# Patient Record
Sex: FEMALE | Race: WHITE | Employment: UNEMPLOYED | URBAN - METROPOLITAN AREA
[De-identification: names, ages, dates, MRNs, and addresses within clinical notes are randomized per-mention and may not be internally consistent; named-entity substitution may affect disease eponyms.]

---

## 2017-02-15 ENCOUNTER — OFFICE VISIT (OUTPATIENT)
Dept: INTERNAL MEDICINE CLINIC | Facility: CLINIC | Age: 22
End: 2017-02-15

## 2017-02-15 VITALS
SYSTOLIC BLOOD PRESSURE: 105 MMHG | DIASTOLIC BLOOD PRESSURE: 63 MMHG | BODY MASS INDEX: 22.79 KG/M2 | HEIGHT: 62.5 IN | WEIGHT: 127 LBS | HEART RATE: 61 BPM

## 2017-02-15 DIAGNOSIS — H65.02 ACUTE SEROUS OTITIS MEDIA OF LEFT EAR, RECURRENCE NOT SPECIFIED: Primary | ICD-10-CM

## 2017-02-15 PROCEDURE — 99212 OFFICE O/P EST SF 10 MIN: CPT | Performed by: INTERNAL MEDICINE

## 2017-02-15 PROCEDURE — 99202 OFFICE O/P NEW SF 15 MIN: CPT | Performed by: INTERNAL MEDICINE

## 2017-02-15 RX ORDER — DOXYCYCLINE HYCLATE 50 MG/1
50 CAPSULE ORAL 2 TIMES DAILY
Qty: 14 CAPSULE | Refills: 0 | Status: SHIPPED | OUTPATIENT
Start: 2017-02-15 | End: 2017-02-22

## 2017-02-15 NOTE — PROGRESS NOTES
Nato Olivas is a 23year old female. Patient presents with:  Ear Problem: Pt stts that she can not hear out of the left ear for about a week    HPI:   [facility] presents this afternoon, accompanied by her mother, for evaluation.     About 1 week ago, she specified  Doxycycline 50 mg twice daily ×7 days. Prescription sent to pharmacy. (Of note, penicillin allergic and Zithromax interacts with methadone). Call if no better. Would then refer to ENT.     The patient indicates understanding of these issues a

## 2017-03-15 ENCOUNTER — HOSPITAL ENCOUNTER (INPATIENT)
Facility: HOSPITAL | Age: 22
LOS: 40 days | Discharge: HOME | DRG: 871 | End: 2017-04-24
Attending: EMERGENCY MEDICINE | Admitting: HOSPITALIST
Payer: MEDICAID

## 2017-03-15 ENCOUNTER — APPOINTMENT (OUTPATIENT)
Dept: CV DIAGNOSTICS | Facility: HOSPITAL | Age: 22
DRG: 871 | End: 2017-03-15
Attending: HOSPITALIST
Payer: MEDICAID

## 2017-03-15 ENCOUNTER — APPOINTMENT (OUTPATIENT)
Dept: CT IMAGING | Facility: HOSPITAL | Age: 22
DRG: 871 | End: 2017-03-15
Attending: EMERGENCY MEDICINE
Payer: MEDICAID

## 2017-03-15 ENCOUNTER — APPOINTMENT (OUTPATIENT)
Dept: GENERAL RADIOLOGY | Facility: HOSPITAL | Age: 22
DRG: 871 | End: 2017-03-15
Attending: EMERGENCY MEDICINE
Payer: MEDICAID

## 2017-03-15 DIAGNOSIS — K64.8 INTERNAL HEMORRHOIDS: ICD-10-CM

## 2017-03-15 DIAGNOSIS — R07.89 CHEST PAIN, ATYPICAL: Primary | ICD-10-CM

## 2017-03-15 DIAGNOSIS — L03.114 LEFT ARM CELLULITIS: ICD-10-CM

## 2017-03-15 DIAGNOSIS — R77.8 ELEVATED TROPONIN: ICD-10-CM

## 2017-03-15 PROBLEM — R79.89 ELEVATED TROPONIN: Status: ACTIVE | Noted: 2017-03-15

## 2017-03-15 PROCEDURE — 99223 1ST HOSP IP/OBS HIGH 75: CPT | Performed by: HOSPITALIST

## 2017-03-15 PROCEDURE — 99254 IP/OBS CNSLTJ NEW/EST MOD 60: CPT | Performed by: INTERNAL MEDICINE

## 2017-03-15 PROCEDURE — 93306 TTE W/DOPPLER COMPLETE: CPT | Performed by: INTERNAL MEDICINE

## 2017-03-15 PROCEDURE — 71020 XR CHEST PA + LAT CHEST (CPT=71020): CPT

## 2017-03-15 PROCEDURE — 71260 CT THORAX DX C+: CPT

## 2017-03-15 PROCEDURE — 93306 TTE W/DOPPLER COMPLETE: CPT

## 2017-03-15 RX ORDER — ASPIRIN 81 MG/1
324 TABLET, CHEWABLE ORAL ONCE
Status: COMPLETED | OUTPATIENT
Start: 2017-03-15 | End: 2017-03-15

## 2017-03-15 RX ORDER — TRAMADOL HYDROCHLORIDE 50 MG/1
100 TABLET ORAL EVERY 6 HOURS PRN
Status: DISCONTINUED | OUTPATIENT
Start: 2017-03-15 | End: 2017-03-24

## 2017-03-15 RX ORDER — ACETAMINOPHEN 500 MG
1000 TABLET ORAL ONCE
Status: DISCONTINUED | OUTPATIENT
Start: 2017-03-15 | End: 2017-03-15

## 2017-03-15 RX ORDER — METHADONE HYDROCHLORIDE 10 MG/1
10 TABLET ORAL ONCE AS NEEDED
Status: COMPLETED | OUTPATIENT
Start: 2017-03-15 | End: 2017-03-15

## 2017-03-15 RX ORDER — SODIUM CHLORIDE 9 MG/ML
INJECTION, SOLUTION INTRAVENOUS CONTINUOUS
Status: DISCONTINUED | OUTPATIENT
Start: 2017-03-15 | End: 2017-03-17

## 2017-03-15 RX ORDER — METHADONE HYDROCHLORIDE 10 MG/1
30 TABLET ORAL ONCE
Status: COMPLETED | OUTPATIENT
Start: 2017-03-15 | End: 2017-03-15

## 2017-03-15 RX ORDER — ONDANSETRON 2 MG/ML
4 INJECTION INTRAMUSCULAR; INTRAVENOUS EVERY 6 HOURS PRN
Status: DISCONTINUED | OUTPATIENT
Start: 2017-03-15 | End: 2017-04-24

## 2017-03-15 RX ORDER — KETOROLAC TROMETHAMINE 30 MG/ML
30 INJECTION, SOLUTION INTRAMUSCULAR; INTRAVENOUS ONCE
Status: COMPLETED | OUTPATIENT
Start: 2017-03-15 | End: 2017-03-15

## 2017-03-15 RX ORDER — METHADONE HYDROCHLORIDE 10 MG/1
75 TABLET ORAL DAILY
Status: DISCONTINUED | OUTPATIENT
Start: 2017-03-15 | End: 2017-03-15

## 2017-03-15 RX ORDER — ACETAMINOPHEN 500 MG
1000 TABLET ORAL ONCE
Status: COMPLETED | OUTPATIENT
Start: 2017-03-15 | End: 2017-03-15

## 2017-03-15 RX ORDER — HEPARIN SODIUM 5000 [USP'U]/ML
5000 INJECTION, SOLUTION INTRAVENOUS; SUBCUTANEOUS EVERY 8 HOURS
Status: DISCONTINUED | OUTPATIENT
Start: 2017-03-15 | End: 2017-03-17

## 2017-03-15 RX ORDER — KETOROLAC TROMETHAMINE 30 MG/ML
30 INJECTION, SOLUTION INTRAMUSCULAR; INTRAVENOUS EVERY 6 HOURS PRN
Status: DISPENSED | OUTPATIENT
Start: 2017-03-15 | End: 2017-03-17

## 2017-03-15 RX ORDER — ACETAMINOPHEN 325 MG/1
650 TABLET ORAL EVERY 6 HOURS PRN
Status: DISCONTINUED | OUTPATIENT
Start: 2017-03-15 | End: 2017-04-24

## 2017-03-15 NOTE — ED PROVIDER NOTES
Patient Seen in: [facility] AND [facility] Emergency Department    History   Patient presents with:  Chest Pain Angina (cardiovascular)    Stated Complaint: chest pain radiating down left arm     HPI    24 yo F with PMH IV heroin abuse (approximately $100 daily) p arm  Other systems are as noted in HPI. Constitutional and vital signs reviewed. All other systems reviewed and negative except as noted above. PSFH elements reviewed from today and agreed except as otherwise stated in HPI.     Physical Exam     ED ---------                               -----------         ------                     CBC W/ DIFFERENTIAL[[id number]]          Abnormal            Final result                 Please view results for these tests on the individual orders.    L Pulse Readings from Last 1 Encounters:  03/15/17 : 106  , sinus, tachycardic     Evaluation for atypical chest pain in low risk [place] patient though with mild tachycardia noted - dimer sent and elevated for which CT obtained.  Fever/leukocytosis noted in mi

## 2017-03-15 NOTE — ED INITIAL ASSESSMENT (HPI)
Pt c/o chest pain and left arm pain that started at 2100. Pt sts the last time she used heroin was 2100.  Pt sts that she uses 3-4 times per day

## 2017-03-15 NOTE — PLAN OF CARE
PAIN - ADULT    • Verbalizes/displays adequate comfort level or patient's stated pain goal Not Progressing        RESPIRATORY - ADULT    • Achieves optimal ventilation and oxygenation Not Progressing          Patient complains of 10/10 pain on 1-10 pain sc

## 2017-03-15 NOTE — PROGRESS NOTES
[facility] dosing service    Initial Pharmacokinetic Consult for Vancomycin Dosing     Ladi Covington is a 23year old female admitted on 3/15 who is being treated for cellulitis and possible MRSA infection.   Pharmacy has been asked to dose Vancomycin b care.    Adair Angelucci, PharmD  3/15/2017  8:39 AM  [address] [facility] Extension: [phone]

## 2017-03-15 NOTE — PROGRESS NOTES
Lauro Brambila visits [facility] for methadone treatment, [phone]    Pharmacy called to verify methadone treatment doses.  Per the clinic, the patient is required to visit the clinic daily to receive her methadone dose and she has not been in

## 2017-03-15 NOTE — H&P
[facility] Patient Status:  Emergency    1/3/1995 MRN [id number]   Location [address] Attending Cookie Velasquez MD   Hosp Day # 0 PCP Unknown Pcp     Date:  3/15/2017 Facility-Administered Medications: None       Review of Systems:  Constitutional:  Weakness, Fatigue. Fever  Eye:  Negative. Ear/Nose/Mouth/Throat:  Negative. Respiratory:  Negative  Cardiovascular: Left-sided chest pain  Gastrointestinal:  Negative. DDIMER 0.72 03/15/2017   TROP 0.05 03/15/2017       Imaging:  No exam resulted this encounter. Assessment and Plan:    Acute febrile illness  We will need to rule out endocarditis as cause for fever, cultures pending at this time will get ESR and CRP.

## 2017-03-15 NOTE — PROGRESS NOTES
[address] [place], [place]. [address]  ?  03/15/2017  ? Re: Carrie Lisa  ?   To Whom It May Concern:    Carrie Lisa was admitted to [facility] from 3/15/2017 to present    Please excuse Carrie Lisa from attending

## 2017-03-15 NOTE — ED NOTES
Pt c/o chest pain with pain score of 10/10 that started last night, denies N/V, mother stated that pt is a little warm around 1 am

## 2017-03-15 NOTE — CONSULTS
[facility]    Report of Consultation    Kelly Connelly Patient Status:  Inpatient    1/3/1995 MRN [id number]   Location [facility] 3W/SW Attending Claudette Harsh, MD   [facility] Day # 0 PCP Unknown Pcp     Date of Admission:  3/15/20 PRN   Cefepime HCl (MAXIPIME) 1 g in sodium chloride 0.9% 100 mL IVPB-MBP 1 g Intravenous Q8H   0.9%  NaCl infusion  Intravenous Continuous   Vancomycin HCl (VANCOCIN) 750 mg in sodium chloride 0.9 % 250 mL IVPB 15 mg/kg Intravenous Q12H   Methadone HCl (D 8:46 AM. 2. Multiple bilateral poorly defined perivascular nodules suspicious for septic emboli. Differential diagnosis includes fungal infection and less likely metastatic disease. 3. Mild enlargement of main pulmonary artery.  4. Developmental Scheuermann

## 2017-03-15 NOTE — ED PROVIDER NOTES
CT CHEST W/IV CON    r/o PE    IMPRESSION:    -- no PE  -- thoracic aorta normal in caliber without dissection  -- no effusion or pneumothorax  -- Multiple small, somewhat ill-defined pulmonary nodules measuring up to 1 cm in diameter with mild surrounding

## 2017-03-15 NOTE — CONSULTS
[facility]    Report of Consultation    Fabiana General Patient Status:  Inpatient    1/3/1995 MRN [id number]   Location [facility] 3W/SW Attending Nayana Pelletier MD   [address] Day # 0 PCP Unknown Pcp     Date of Admission:  3/15/20 Medications:    Current Facility-Administered Medications:  ondansetron HCl (ZOFRAN) injection 4 mg 4 mg Intravenous Q6H PRN   Heparin Sodium (Porcine) 5000 UNIT/ML injection 5,000 Units 5,000 Units Subcutaneous Q8H   acetaminophen (TYLENOL) tab 650 mg 650 294 03/15/2017   CREATSERUM 0.81 03/15/2017   BUN 5* 03/15/2017    03/15/2017   K 3.6 03/15/2017    03/15/2017   CO2 28 03/15/2017   * 03/15/2017   CA 9.3 03/15/2017   ALB 3.6 01/29/2015   TP 6.7 01/29/2015   AST 15 01/29/2015   ALT 11* adenopathy. PULMONARY ARTERIES: Mild enlargement of main pulmonary artery measuring 3 cm. A incomplete filling defect in the anteromedial basal segment left lower lobe pulmonary artery on image 76 and 77 series 3. No evidence for pulmonary embolus.   AORTA with evidence of bilateral nodules on CT chest suspicious for septic emboli. No obvious pulmonary embolism seen   Although incomplete filling defect seen in left lower lobe which may be secondary to motion artifact.   Given current clinical picture recomme

## 2017-03-16 ENCOUNTER — APPOINTMENT (OUTPATIENT)
Dept: INTERVENTIONAL RADIOLOGY/VASCULAR | Facility: HOSPITAL | Age: 22
DRG: 871 | End: 2017-03-16
Attending: NURSE PRACTITIONER
Payer: MEDICAID

## 2017-03-16 ENCOUNTER — APPOINTMENT (OUTPATIENT)
Dept: CV DIAGNOSTICS | Facility: HOSPITAL | Age: 22
DRG: 871 | End: 2017-03-16
Attending: NURSE PRACTITIONER
Payer: MEDICAID

## 2017-03-16 PROCEDURE — 99232 SBSQ HOSP IP/OBS MODERATE 35: CPT | Performed by: INTERNAL MEDICINE

## 2017-03-16 PROCEDURE — 99233 SBSQ HOSP IP/OBS HIGH 50: CPT | Performed by: HOSPITALIST

## 2017-03-16 PROCEDURE — 93325 DOPPLER ECHO COLOR FLOW MAPG: CPT

## 2017-03-16 PROCEDURE — 93320 DOPPLER ECHO COMPLETE: CPT

## 2017-03-16 PROCEDURE — B24BZZ4 ULTRASONOGRAPHY OF HEART WITH AORTA, TRANSESOPHAGEAL: ICD-10-PCS | Performed by: INTERNAL MEDICINE

## 2017-03-16 RX ORDER — QUETIAPINE 25 MG/1
50 TABLET, FILM COATED ORAL NIGHTLY
Status: DISCONTINUED | OUTPATIENT
Start: 2017-03-16 | End: 2017-03-17

## 2017-03-16 RX ORDER — QUETIAPINE 25 MG/1
25 TABLET, FILM COATED ORAL DAILY
Status: DISCONTINUED | OUTPATIENT
Start: 2017-03-16 | End: 2017-03-17

## 2017-03-16 RX ORDER — MIDAZOLAM HYDROCHLORIDE 1 MG/ML
INJECTION INTRAMUSCULAR; INTRAVENOUS
Status: DISPENSED
Start: 2017-03-16 | End: 2017-03-17

## 2017-03-16 RX ORDER — METHADONE HYDROCHLORIDE 10 MG/5ML
50 SOLUTION ORAL DAILY
Status: DISCONTINUED | OUTPATIENT
Start: 2017-03-16 | End: 2017-03-24

## 2017-03-16 RX ORDER — SODIUM CHLORIDE 9 MG/ML
INJECTION, SOLUTION INTRAVENOUS
Status: COMPLETED
Start: 2017-03-16 | End: 2017-03-16

## 2017-03-16 RX ORDER — 0.9 % SODIUM CHLORIDE 0.9 %
VIAL (ML) INJECTION
Status: COMPLETED
Start: 2017-03-16 | End: 2017-03-16

## 2017-03-16 RX ORDER — HYDROXYZINE HYDROCHLORIDE 25 MG/1
25 TABLET, FILM COATED ORAL 3 TIMES DAILY PRN
Status: DISCONTINUED | OUTPATIENT
Start: 2017-03-16 | End: 2017-03-20

## 2017-03-16 RX ORDER — NICOTINE 21 MG/24HR
1 PATCH, TRANSDERMAL 24 HOURS TRANSDERMAL DAILY
Status: DISCONTINUED | OUTPATIENT
Start: 2017-03-16 | End: 2017-04-24

## 2017-03-16 NOTE — PAYOR COMM NOTE
CLINICALS  VRX#[id number]    [id number]  (20 year old F)       EM IVS-EM IVS-EM Buddy Ross MD Physician Signed  ED Provider Notes 3/15/2017  4:14 AM      Expand All Collapse All    Patient Seen in: [facility] AND [facility] Negative for joint swelling and arthralgias. (+) LUE pain. Skin: Negative for rash.  No itching    Neurological: Negative for syncope and headaches. Psychiatric/Behavioral: Negative for agitation.  The patient is not hyperactive.        Positive for stat 16.0 (*)        Neutrophil Absolute  13.3 (*)        Monocyte Absolute  1.1 (*)        All other components within normal limits    LACTIC ACID, PLASMA - Normal    CBC WITH DIFFERENTIAL WITH PLATELET      Narrative:       The following orders were created have received this report in error, please notify the sender immediately at [phone] and permanently delete the original report and destroy any copies or printouts.      MDM      DIFFERENTIAL DIAGNOSIS: After history and physical exam differential diag Illness:  Fabiana Parker is a(n) 23year old female, with a past medical history significant for IVDA with drug of choice being heroin presents with complaint of left-sided chest pain radiating towards the left arm, described as a 9 out of 10 in severity Negative. Immunologic:  Negative. Musculoskeletal:  Negative. Integumentary:  Negative. Neurologic:  Negative. Psychiatric:  Negative.   ROS reviewed as documented in chart    Physical Exam:  Temp:  [100.3 °F (37.9 °C)-101.9 °F (38.8 °C)] 101.9 °F (38. CRP.  Patient started on vancomycin will add cefepime.  Will order 2D echo in a.m.     Probable pulmonary septic emboli  Likely source for fever, as stated previously patient started on IV antibiotics blood cultures pending at this time, will check pro calc back pain)      •  Acid reflux      •  Drug treatment not indicated          Past Surgical History        Past Surgical History      TONSILLECTOMY    2009      UPPER GI ENDOSCOPY,EXAM    2013      COLONOSCOPY    2013        Family History  Family History  rash  No labored breathing  Abdomen soft  No c/c/e  LUE at antecubital area is erythema, induration, no fluctuance      Results:      Laboratory Data:    Lab Results  Component  Value  Date    [facility] SHEA 16.0*  03/15/2017    HGB  14.8  03/15/2017    HCT  45.5  0 IVDA    Recommendations:    - continue vancomycin, cefepime  - BC  - Echocardiogram  - likely 4-6 wks iv abx  - follow temp, wbc to doc decr  - monitor for improvement  - d/w patient  - will follow    Thank you for allowing me to participate in the care of  >60    GFRNAA   >60   >60    CA   9.3   8.4*    ALB    --    2.5*    NA   137   138    K   3.6   4.0    CL   102   110    CO2   28   24    ALKPHO    --    82    AST    --    14*    ALT    --    11*    BILT    --    0.5    TP    --    5.7*        No result WBC trending up. Need to eval JANIYA to vancomycin once sens returns.    - 2 D ECHO negative for vegetations  - Consult Cards for KAVITA today to eval for endocarditis    - Will need 6 weeks of IV abx.    - ID following.      TIGREE cellulitis    - cont vancomycin.

## 2017-03-16 NOTE — PROGRESS NOTES
INFECTIOUS DISEASE PROGRESS NOTE    Ladi Covington Patient Status:  Inpatient    1/3/1995 MRN [id number]   Location [facility] 3W/SW Attending Rosalia Parisi MD   Hosp Day # 1 PCP Unknown Pcp     Subjective:  Patient seen and examined, notes, aureus sepsis, bacteremia  # LUE cellulitis due to IVDA, ?early abscess  # Septic emboli to lung, suspect TVE.   TTE negative  # Fever  # Leukocytosis  # Active IVDA    Recommendations:    - continue vancomycin, d/c cefepime  - await staph aureus sens  - re

## 2017-03-16 NOTE — PROGRESS NOTES
[place] HOSPITALIST  Progress Note     [place] Shallow Patient Status:  Inpatient    1/3/1995 MRN [id number]   Location [facility] Attending Justin Cavanaugh MD   Hosp Day # 1 PCP Unknown Pcp     Chief Complaint: Chest Pain and there are no major discrepancies. Ct Chest Pain/pe (iv Only) Em    3/15/2017  CONCLUSION:  1. No large or central pulmonary embolus.  A incomplete filling defect in anteromedial basal left lower lobe pulmonary artery branch could be a motion rel methadone 50mg daily   - Lidoderm daily. Anxiety   - start atarax PRN and seroquel to help for insomnia  - Pt asked for Benzo's explained to avoid using Benzo's and opioids.   - Dr. Eleazar Ahumada to see. Quality:  · DVT Prophylaxis: Lovenox.    · CO

## 2017-03-16 NOTE — PLAN OF CARE
COPING    • Pt/Family able to verbalize concerns and demonstrate effective coping strategies Not Progressing        PAIN - ADULT    • Verbalizes/displays adequate comfort level or patient's stated pain goal Not Progressing            Patient continues to h

## 2017-03-16 NOTE — PLAN OF CARE
Problem: COPING  Goal: Pt/Family able to verbalize concerns and demonstrate effective coping strategies  INTERVENTIONS:  - Assist patient/family to identify coping skills, available support systems and cultural and spiritual values  - Provide emotional sup Progressing    Problem: RESPIRATORY - ADULT  Goal: Achieves optimal ventilation and oxygenation  INTERVENTIONS:  - Assess for changes in respiratory status  - Assess for changes in mentation and behavior  - Position to facilitate oxygenation and minimize r strengthening/mobility  - Encourage toileting schedule   Outcome: Progressing

## 2017-03-16 NOTE — PROGRESS NOTES
[facility]     Progress Note        Valerio Knapp Patient Status:  Inpatient    1/3/1995 MRN [id number]   Location [facility] 3W/SW Attending Sammy Washington MD   Hosp Day # 1 PCP Unknown Pcp       Subjective:   Patient seen an dry      Results:     Lab Results  Component Value Date   WBC 21.5 03/16/2017   HGB 11.8 03/16/2017   HCT 36.6 03/16/2017    03/16/2017   CREATSERUM 0.60 03/16/2017   BUN 3 03/16/2017    03/16/2017   K 4.0 03/16/2017    03/16/2017   CO2 prominent residual thymic tissue. No mass or adenopathy. PULMONARY ARTERIES: Mild enlargement of main pulmonary artery measuring 3 cm.  A incomplete filling defect in the anteromedial basal segment left lower lobe pulmonary artery on image 76 and 77 series unspecified provider.       Ekg 12-lead    3/15/2017  ECG Report  Interpretation  -------------------------- Sinus Tachycardia -Short OH BORDERLINE RHYTHM When compared with ECG of 05/12/2008 16:00:00 No significant changes have occurred Electronically sign

## 2017-03-16 NOTE — PROGRESS NOTES
Discussion of Trans-esophogeal echocardiogram with patient and her family members. All questions answered.

## 2017-03-16 NOTE — PROGRESS NOTES
[facility]  Report of Consultation    Namita Byers Patient Status:  Inpatient    1/3/1995 MRN [id number]   Location [facility] 3W/SW Attending Senia Craig MD   Hosp Day # 1 PCP Unknown Pcp     Date of Admission:  3/15/2017 mg, Oral, Q6H PRN  •  ketorolac tromethamine (TORADOL) 30 MG/ML injection 30 mg, 30 mg, Intravenous, Q6H PRN  •  Cefepime HCl (MAXIPIME) 1 g in sodium chloride 0.9% 100 mL IVPB-MBP, 1 g, Intravenous, Q8H  •  0.9%  NaCl infusion, , Intravenous, Continuous history of IVDA and use of methadone. Recommend continuing present management with Toradol and Tramadol PRN. Would not recommend narcotics at this time.        Malu Carlos  3/16/2017  11:02 AM

## 2017-03-16 NOTE — PROCEDURES
Pre-op: Bacteremia  Post-op: Same  Procedure: KAVITA  Findings: Suspicious for vegetation on pulmonic valve.  Otherwise normal  Complications: none  EBL: 0 cc  Specimen: none   Sedation: Versed and Fentanyl, monitored for 20 minutes  Condition: stable

## 2017-03-17 PROBLEM — F11.20 OPIOID TYPE DEPENDENCE, CONTINUOUS (HCC): Status: ACTIVE | Noted: 2017-03-17

## 2017-03-17 PROBLEM — F31.9 BIPOLAR DISORDER, UNSPECIFIED (HCC): Status: ACTIVE | Noted: 2017-03-17

## 2017-03-17 PROCEDURE — 99233 SBSQ HOSP IP/OBS HIGH 50: CPT | Performed by: HOSPITALIST

## 2017-03-17 PROCEDURE — 99232 SBSQ HOSP IP/OBS MODERATE 35: CPT | Performed by: INTERNAL MEDICINE

## 2017-03-17 PROCEDURE — 90792 PSYCH DIAG EVAL W/MED SRVCS: CPT | Performed by: OTHER

## 2017-03-17 RX ORDER — POTASSIUM CHLORIDE 20 MEQ/1
40 TABLET, EXTENDED RELEASE ORAL ONCE
Status: COMPLETED | OUTPATIENT
Start: 2017-03-17 | End: 2017-03-17

## 2017-03-17 RX ORDER — 0.9 % SODIUM CHLORIDE 0.9 %
VIAL (ML) INJECTION
Status: COMPLETED
Start: 2017-03-17 | End: 2017-03-17

## 2017-03-17 RX ORDER — HEPARIN SODIUM 5000 [USP'U]/ML
5000 INJECTION, SOLUTION INTRAVENOUS; SUBCUTANEOUS EVERY 8 HOURS
Status: DISCONTINUED | OUTPATIENT
Start: 2017-03-17 | End: 2017-04-24

## 2017-03-17 RX ORDER — KETOROLAC TROMETHAMINE 15 MG/ML
15 INJECTION, SOLUTION INTRAMUSCULAR; INTRAVENOUS EVERY 6 HOURS PRN
Status: DISCONTINUED | OUTPATIENT
Start: 2017-03-17 | End: 2017-03-19

## 2017-03-17 RX ORDER — QUETIAPINE 25 MG/1
100 TABLET, FILM COATED ORAL NIGHTLY
Status: DISCONTINUED | OUTPATIENT
Start: 2017-03-17 | End: 2017-03-20

## 2017-03-17 RX ORDER — HALOPERIDOL 5 MG/ML
1 INJECTION INTRAMUSCULAR EVERY 6 HOURS PRN
Status: DISCONTINUED | OUTPATIENT
Start: 2017-03-17 | End: 2017-04-24

## 2017-03-17 RX ORDER — ARIPIPRAZOLE 2 MG/1
2 TABLET ORAL DAILY
Status: DISCONTINUED | OUTPATIENT
Start: 2017-03-17 | End: 2017-03-22

## 2017-03-17 NOTE — PROGRESS NOTES
[facility]     Progress Note        Fabiana General Patient Status:  Inpatient    1/3/1995 MRN [id number]   Location [facility] 3W/SW Attending Nayana Pelletier MD   Hosp Day # 2 PCP Judithe Ormond, MD       Subjective:   Patient s non tender  Extremities: no clubbing, cyanosis, edema  Neurologic: no gross motor or sensory deficits  Skin: warm, dry      Results:       Lab Results  Component Value Date   WBC 12.7 03/17/2017   HGB 10.5 03/17/2017   HCT 31.9 03/17/2017    03/17/2 residual thymic tissue. No mass or adenopathy. PULMONARY ARTERIES: Mild enlargement of main pulmonary artery measuring 3 cm. A incomplete filling defect in the anteromedial basal segment left lower lobe pulmonary artery on image 76 and 77 series 3.  No miky provider. Assessment   1.  Septic emboli  2.  Fevers  3.  IV drug abuse  4.  Leukocytosis  5.   MRSA bacteremia     Plan   -Patient with evidence of bilateral nodules on CT chest suspicious for septic emboli.  No obvious pulmonary embolism seen

## 2017-03-17 NOTE — DIETARY NOTE
ADULT NUTRITION INITIAL ASSESSMENT    Pt is at moderate nutrition risk. Pt does not meet malnutrition criteria.       RECOMMENDATIONS TO MD:  RD to manage oral nutritional supplement(ONS)     NUTRITION DIAGNOSIS/PROBLEM:  Inadequate oral intake related to Fat/Muscle Mass: well nourished per visual exam.  - Fluid Accumulation: none preported   - Skin Integrity: intact.  - Demarcus score 22    NUTRITION PRESCRIPTION:  Diet: Regular/General  Oral Supplements: CIB shake daily  Calories: 1680 calories/day (30 americo

## 2017-03-17 NOTE — PROGRESS NOTES
[facility] HOSPITALIST  Progress Note     Gissel Bakanatoly Patient Status:  Inpatient    1/3/1995 MRN [id number]   Location [facility] Attending Felix Ugalde MD   Hosp Day # 2 PCP Gray Gonzalez MD     Chief Complaint: Billy Wallace Fumarate  100 mg Oral Nightly   • vancomycin  1.25 g Intravenous Q8H   • rifampin  600 mg Oral Daily   • Methadone HCl  50 mg Oral Daily   • nicotine  1 patch Transdermal Daily     HydrOXYzine HCl, ondansetron HCl, acetaminophen, TraMADol HCl    ASSESSMENT

## 2017-03-17 NOTE — CONSULTS
[facility]    Report of Consultation    Nato Olivas Patient Status:  Inpatient    1/3/1995 MRN [id number]   Location [facility] 3W/SW Attending Florina Darden MD   Hosp Day # 2 PCP Brice Duvall MD     Date of Admission: the responsibility. Patient admitted multiple psychosocial stresses but admitted in her own conscience that she has been rationalizing her stresses to use more drugs.     Today the patient reported alternation in the mood, difficulty sleeping, racing tho 100 mg Oral Nightly   Vancomycin HCl (VANCOCIN) 1.25 g in sodium chloride 0.9 % 500 mL IVPB 1.25 g Intravenous Q8H   rifampin (RIFADIN) cap 600 mg 600 mg Oral Daily   Ketorolac Tromethamine (TORADOL) injection 15 mg 15 mg Intravenous Q6H PRN   haloperidol embolus. Findings were called to Dr. Evangelista Gupta at 8:46 AM. 2. Multiple bilateral poorly defined perivascular nodules suspicious for septic emboli. Differential diagnosis includes fungal infection and less likely metastatic disease.  3. Mild enlargement of main I, 0 Hour  Lactic Acid, Plasma  D-Dimer  Lipid Panel  HCG, Beta Subunit, Quant  CBC With Differential With Platelet  Comp Metabolic Panel (14)  Procalcitonin  Drug Abuse Panel 10 screen STAT  Urinalysis with Culture Reflex Once  Vancomycin Trough, Serum  B

## 2017-03-17 NOTE — PLAN OF CARE
Problem: Patient/Family Goals  Goal: Patient/Family Short Term Goal  Patient’s Short Term Goal:    Interventions:  - See additional Care Plan goals for specific interventions   Outcome: Progressing  Comments: IV Antibiotic treatment for infection.    Approp

## 2017-03-17 NOTE — PROGRESS NOTES
[facility] dosing service    Follow-up Pharmacokinetic Consult for Vancomycin Dosing     Vira Harper is a 23year old female admitted on 3/15 who is being treated for endocarditis. Patient is on day 3 of Vancomycin 750 mg IV Q 8 hours.   Goal trough Pharmacy Extension: [phone]

## 2017-03-17 NOTE — PROGRESS NOTES
[place]  Inpatient Pain Management Progress Note      Patient name: Kaia Romero 23year old female  : 1/3/1995  MRN: [id number]    Diagnosis: atypical chest pain    Reason for Consult: atypical chest and left UE pain    Current ho

## 2017-03-17 NOTE — PLAN OF CARE
COPING    • Pt/Family able to verbalize concerns and demonstrate effective coping strategies Not Progressing        DRUG ABUSE/DETOX    • Will have no detox symptoms and will verbalize plan for changing drug-related behavior Not Progressing        PAIN - A

## 2017-03-17 NOTE — PROGRESS NOTES
INFECTIOUS DISEASE PROGRESS NOTE    Kaia Romero Patient Status:  Inpatient    1/3/1995 MRN [id number]   Location [facility] 3W/SW Attending Haider Church MD   Hosp Day # 2 PCP Himanshu Lomax MD     Subjective:  Patient seen and examined, n site cellulitis, leukocytosis and abnormal CT chest c/w probable septic emboli.  Was started on vancomycin, cefepime.  ID consulted.      # Staph aureus (MRSA) sepsis, bacteremia due to PVE  # LUE cellulitis due to IVDA, ?early abscess  # Septic emboli to

## 2017-03-17 NOTE — PROGRESS NOTES
[facility] dosing service    Follow-up Pharmacokinetic Consult for Vancomycin Dosing     Dillon Luu is a 23year old female admitted on 3/15 who is being treated for Bacteremia/Sepsis & possible Endocarditis  Patient is on day 2 of Vancomycin 750 mg trough level 15-20 ug/mL. 3.  Pharmacy will need BUN/Scr daily while on Vancomycin to assess renal function. 4.  Pharmacy will follow and monitor renal function changes, toxicity and efficacy.     Vinayak Saucedo, PharmD  3/16/2017  8:08 PM  Paola GREGORY

## 2017-03-18 PROCEDURE — B5181ZA FLUOROSCOPY OF SUPERIOR VENA CAVA USING LOW OSMOLAR CONTRAST, GUIDANCE: ICD-10-PCS | Performed by: HOSPITALIST

## 2017-03-18 PROCEDURE — 99233 SBSQ HOSP IP/OBS HIGH 50: CPT | Performed by: HOSPITALIST

## 2017-03-18 PROCEDURE — 99232 SBSQ HOSP IP/OBS MODERATE 35: CPT | Performed by: INTERNAL MEDICINE

## 2017-03-18 PROCEDURE — 02HV33Z INSERTION OF INFUSION DEVICE INTO SUPERIOR VENA CAVA, PERCUTANEOUS APPROACH: ICD-10-PCS | Performed by: HOSPITALIST

## 2017-03-18 RX ORDER — ALBUTEROL SULFATE 2.5 MG/3ML
2.5 SOLUTION RESPIRATORY (INHALATION)
Status: DISCONTINUED | OUTPATIENT
Start: 2017-03-18 | End: 2017-03-19

## 2017-03-18 RX ORDER — LIDOCAINE 50 MG/G
1 PATCH TOPICAL EVERY 24 HOURS
Status: DISCONTINUED | OUTPATIENT
Start: 2017-03-18 | End: 2017-04-24

## 2017-03-18 RX ORDER — 0.9 % SODIUM CHLORIDE 0.9 %
VIAL (ML) INJECTION
Status: COMPLETED
Start: 2017-03-18 | End: 2017-03-18

## 2017-03-18 RX ORDER — SODIUM CHLORIDE 0.9 % (FLUSH) 0.9 %
10 SYRINGE (ML) INJECTION AS NEEDED
Status: DISCONTINUED | OUTPATIENT
Start: 2017-03-18 | End: 2017-04-24

## 2017-03-18 RX ORDER — LIDOCAINE HYDROCHLORIDE 10 MG/ML
0.5 INJECTION, SOLUTION INFILTRATION; PERINEURAL ONCE AS NEEDED
Status: ACTIVE | OUTPATIENT
Start: 2017-03-18 | End: 2017-03-18

## 2017-03-18 NOTE — PROGRESS NOTES
Pulmonary/Critical Care Follow Up Note    HPI:   Geovanna Gilmore is a 23year old female with Patient presents with:  Chest Pain Angina (cardiovascular)      PCP Nory Palacio MD  Admission Attending Aubrey Francois 15 Day #3    C/o CP  Not be EXAM:   Blood pressure 115/66, pulse 79, temperature 98.6 °F (37 °C), temperature source Oral, resp. rate 20, height 5' 2\" (1.575 m), weight 125 lb 6.4 oz (56.881 kg), SpO2 96 %, not currently breastfeeding.     Intake/Output Summary (Last 24 hours) at 03/

## 2017-03-18 NOTE — PROGRESS NOTES
[facility] HOSPITALIST  Progress Note     Valerio Knapp Patient Status:  Inpatient    1/3/1995 MRN [id number]   Location [facility] Attending Sammy Washington MD   Hosp Day # 3 PCP Behzad Medrano MD     Chief Complaint: Marylee Knudsen lidocaine  1 patch Transdermal Q24H   • Heparin Sodium (Porcine)  5,000 Units Subcutaneous Q8H   • ARIpiprazole  2 mg Oral Daily   • QUEtiapine Fumarate  100 mg Oral Nightly   • rifampin  600 mg Oral Daily   • Methadone HCl  50 mg Oral Daily   • nicotine

## 2017-03-18 NOTE — PROGRESS NOTES
[facility] dosing service    Follow-up Pharmacokinetic Consult for Vancomycin Dosing     Lei Stern is a 23year old female admitted on 3/15 who is being treated for Endocarditits. Patient is on day 4 of Vancomycin 1.25 gm IV Q 8 hours.   Goal trou changes, toxicity and efficacy.     Brandin Amin PharmD  3/18/2017  4:56 AM  [address] Extension: [phone]

## 2017-03-19 PROCEDURE — 99232 SBSQ HOSP IP/OBS MODERATE 35: CPT | Performed by: INTERNAL MEDICINE

## 2017-03-19 PROCEDURE — 99233 SBSQ HOSP IP/OBS HIGH 50: CPT | Performed by: HOSPITALIST

## 2017-03-19 RX ORDER — KETOROLAC TROMETHAMINE 30 MG/ML
30 INJECTION, SOLUTION INTRAMUSCULAR; INTRAVENOUS EVERY 6 HOURS PRN
Status: DISPENSED | OUTPATIENT
Start: 2017-03-19 | End: 2017-03-21

## 2017-03-19 RX ORDER — SACCHAROMYCES BOULARDII 250 MG
250 CAPSULE ORAL 2 TIMES DAILY
Status: DISCONTINUED | OUTPATIENT
Start: 2017-03-19 | End: 2017-04-24

## 2017-03-19 NOTE — PROGRESS NOTES
[facility] dosing service    Follow-up Pharmacokinetic Consult for Vancomycin Dosing     Valerio Knapp is a 23year old female admitted on 3/15 who is being treated for MRSA sepsis, bacteremia, PV endocarditis & LUE cellulitis.   Patient is now on day 5 Continue Vancomycin at 1.5 gm IVPB Q 8 hours (based on therapeutic Trough of 18 ug/mL, pharmacokinetics, 55kg weight and renal function)    2. Pharmacy will re-check Vancomycin trough levels every 5-7 days. Goal trough level 15-20 ug/mL.     3.  Pharmacy

## 2017-03-19 NOTE — PROGRESS NOTES
[place] HOSPITALIST  Progress Note     Christy Barthel Patient Status:  Inpatient    1/3/1995 MRN [id number]   Location [facility] Attending Anais Antunez MD   Hosp Day # 4 PCP See Donovan MD     Chief Complaint: Alejandra López • albuterol sulfate  2.5 mg Nebulization Q6H WA   • Heparin Sodium (Porcine)  5,000 Units Subcutaneous Q8H   • ARIpiprazole  2 mg Oral Daily   • QUEtiapine Fumarate  100 mg Oral Nightly   • rifampin  600 mg Oral Daily   • Methadone HCl  50 mg Oral Daily

## 2017-03-19 NOTE — PLAN OF CARE
Problem: Patient/Family Goals  Goal: Patient/Family Long Term Goal  Patient’s Long Term Goal: free from infection    Interventions:  - monitor VS, labs  Administer medication as per [facility]  - See additional Care Plan goals for specific interventions   Outcome temperature  - Assess for signs of decreased coronary artery perfusion - ex.  Angina  - Evaluate fluid balance, assess for edema, trend weights   Outcome: Progressing  Goal: Absence of cardiac arrhythmias or at baseline  INTERVENTIONS:  - Continuous cardiac Outcome: Not Progressing    Problem: SAFETY ADULT - FALL  Goal: Free from fall injury  INTERVENTIONS:  - Assess pt frequently for physical needs  - Identify cognitive and physical deficits and behaviors that affect risk of falls.   - [place] fall precau

## 2017-03-20 PROCEDURE — 99232 SBSQ HOSP IP/OBS MODERATE 35: CPT | Performed by: INTERNAL MEDICINE

## 2017-03-20 PROCEDURE — 99233 SBSQ HOSP IP/OBS HIGH 50: CPT | Performed by: HOSPITALIST

## 2017-03-20 RX ORDER — CLONIDINE HYDROCHLORIDE 0.2 MG/1
0.2 TABLET ORAL 4 TIMES DAILY PRN
Status: DISCONTINUED | OUTPATIENT
Start: 2017-03-20 | End: 2017-03-24

## 2017-03-20 RX ORDER — QUETIAPINE 25 MG/1
100 TABLET, FILM COATED ORAL NIGHTLY
Status: DISCONTINUED | OUTPATIENT
Start: 2017-03-20 | End: 2017-03-21

## 2017-03-20 RX ORDER — MIRTAZAPINE 45 MG/1
45 TABLET, FILM COATED ORAL NIGHTLY
Status: DISCONTINUED | OUTPATIENT
Start: 2017-03-20 | End: 2017-03-21

## 2017-03-20 RX ORDER — 0.9 % SODIUM CHLORIDE 0.9 %
VIAL (ML) INJECTION
Status: DISPENSED
Start: 2017-03-20 | End: 2017-03-21

## 2017-03-20 RX ORDER — METHYLPREDNISOLONE SODIUM SUCCINATE 125 MG/2ML
60 INJECTION, POWDER, LYOPHILIZED, FOR SOLUTION INTRAMUSCULAR; INTRAVENOUS ONCE
Status: DISCONTINUED | OUTPATIENT
Start: 2017-03-20 | End: 2017-03-20

## 2017-03-20 RX ORDER — LOPERAMIDE HYDROCHLORIDE 2 MG/1
2 CAPSULE ORAL 4 TIMES DAILY PRN
Status: DISCONTINUED | OUTPATIENT
Start: 2017-03-20 | End: 2017-04-24

## 2017-03-20 RX ORDER — QUETIAPINE 200 MG/1
200 TABLET, FILM COATED ORAL NIGHTLY
Status: DISCONTINUED | OUTPATIENT
Start: 2017-03-20 | End: 2017-03-20

## 2017-03-20 RX ORDER — ACETAMINOPHEN 10 MG/ML
1000 INJECTION, SOLUTION INTRAVENOUS ONCE
Status: COMPLETED | OUTPATIENT
Start: 2017-03-20 | End: 2017-03-20

## 2017-03-20 RX ORDER — GABAPENTIN 100 MG/1
100 CAPSULE ORAL 3 TIMES DAILY
Status: DISCONTINUED | OUTPATIENT
Start: 2017-03-20 | End: 2017-04-24

## 2017-03-20 RX ORDER — ZOLPIDEM TARTRATE 5 MG/1
5 TABLET ORAL NIGHTLY PRN
Status: DISCONTINUED | OUTPATIENT
Start: 2017-03-20 | End: 2017-03-20

## 2017-03-20 RX ORDER — HYDROXYZINE HYDROCHLORIDE 25 MG/1
50 TABLET, FILM COATED ORAL 3 TIMES DAILY PRN
Status: DISCONTINUED | OUTPATIENT
Start: 2017-03-20 | End: 2017-03-21

## 2017-03-20 RX ORDER — LORAZEPAM 2 MG/ML
2 INJECTION INTRAMUSCULAR ONCE
Status: COMPLETED | OUTPATIENT
Start: 2017-03-20 | End: 2017-03-20

## 2017-03-20 RX ORDER — LORAZEPAM 2 MG/ML
1 INJECTION INTRAMUSCULAR ONCE
Status: DISCONTINUED | OUTPATIENT
Start: 2017-03-20 | End: 2017-03-20

## 2017-03-20 NOTE — PROGRESS NOTES
[place] HOSPITALIST  Progress Note     Ju Willis Patient Status:  Inpatient    1/3/1995 MRN [id number]   Location [facility] Attending Aly Peñaloza MD   Hosp Day # 5 PCP Saúl Diaz MD     Chief Complaint: Ramin Dunbra 1 patch Transdermal Q24H   • Heparin Sodium (Porcine)  5,000 Units Subcutaneous Q8H   • ARIpiprazole  2 mg Oral Daily   • QUEtiapine Fumarate  100 mg Oral Nightly   • rifampin  600 mg Oral Daily   • Methadone HCl  50 mg Oral Daily   • nicotine  1 patch Tr - Psychiatry following. Quality:  · DVT Prophylaxis: Lovenox. · CODE status: Full.       >30 min spent with patient. > 50% time was spent counseling patient, discussing plan of care, discussing labs and imaging findings.  Spoke with consultant

## 2017-03-20 NOTE — PLAN OF CARE
Problem: Patient/Family Goals  Goal: Patient/Family Long Term Goal  Patient’s Long Term Goal: Patient remains free of infection, as evidenced by normal vital signs and absence of signs and symptoms of infection by discharge    Interventions:  - monitor VS hemodynamic stability  INTERVENTIONS:  - Monitor vital signs, rhythm, and trends  - Monitor for bleeding, hypotension and signs of decreased cardiac output  - Evaluate effectiveness of vasoactive medications to optimize hemodynamic stability  - Monitor art evaluate response  - Implement non-pharmacological measures as appropriate and evaluate response  - Consider cultural and social influences on pain and pain management  - Manage/alleviate anxiety  - Utilize distraction and/or relaxation techniques  - Monit identified and integrated in the patient’s plan of care  Interventions:  - What would you like us to know as we care for you?  I want to feel better  - Provide timely, complete, and accurate information to patient/family  - Incorporate patient and family kn

## 2017-03-20 NOTE — PROGRESS NOTES
INFECTIOUS DISEASE PROGRESS NOTE    Radha Herron Patient Status:  Inpatient    1/3/1995 MRN [id number]   Location [facility] 3W/SW Attending Jarrell Peabody, MD   Hosp Day # 5 PCP Nara Head MD     Subjective:  Patient seen and examined, n Vancomycin 3/15, rifampin 3/17;  (cefepime 3/15-17)    24 y/o female with h/o IVDA presents with fever, LUE injection site cellulitis, leukocytosis and abnormal CT chest c/w probable septic emboli.  Was started on vancomycin, cefepime.  ID consulted.

## 2017-03-20 NOTE — PROGRESS NOTES
Pulmonary/Critical Care Follow Up Note    HPI:   Sandra Bell is a 23year old female with Patient presents with:  Chest Pain Angina (cardiovascular)      PCP Guillermina Michelle MD  Admission Attending Aubrey Haynes 15 Day #4    Still c/o juan m rate 18, height 5' 2\" (1.575 m), weight 121 lb 4.8 oz (55.021 kg), SpO2 98 %, not currently breastfeeding.     Intake/Output Summary (Last 24 hours) at 03/19/17 2102  Last data filed at 03/19/17 1400   Gross per 24 hour   Intake   2540 ml   Output   2200 m

## 2017-03-21 PROCEDURE — 99233 SBSQ HOSP IP/OBS HIGH 50: CPT | Performed by: HOSPITALIST

## 2017-03-21 PROCEDURE — 99232 SBSQ HOSP IP/OBS MODERATE 35: CPT | Performed by: INTERNAL MEDICINE

## 2017-03-21 PROCEDURE — 99233 SBSQ HOSP IP/OBS HIGH 50: CPT | Performed by: OTHER

## 2017-03-21 RX ORDER — POTASSIUM CHLORIDE 20 MEQ/1
40 TABLET, EXTENDED RELEASE ORAL EVERY 4 HOURS
Status: COMPLETED | OUTPATIENT
Start: 2017-03-21 | End: 2017-03-21

## 2017-03-21 RX ORDER — HYDROXYZINE HYDROCHLORIDE 25 MG/1
25 TABLET, FILM COATED ORAL 3 TIMES DAILY PRN
Status: DISCONTINUED | OUTPATIENT
Start: 2017-03-21 | End: 2017-03-24

## 2017-03-21 RX ORDER — 0.9 % SODIUM CHLORIDE 0.9 %
VIAL (ML) INJECTION
Status: COMPLETED
Start: 2017-03-21 | End: 2017-03-21

## 2017-03-21 RX ORDER — LORAZEPAM 2 MG/ML
0.5 INJECTION INTRAMUSCULAR NIGHTLY PRN
Status: DISCONTINUED | OUTPATIENT
Start: 2017-03-21 | End: 2017-03-22

## 2017-03-21 RX ORDER — MIRTAZAPINE 15 MG/1
15 TABLET, FILM COATED ORAL NIGHTLY
Status: DISCONTINUED | OUTPATIENT
Start: 2017-03-21 | End: 2017-03-22

## 2017-03-21 RX ORDER — QUETIAPINE 200 MG/1
200 TABLET, FILM COATED ORAL NIGHTLY
Status: DISCONTINUED | OUTPATIENT
Start: 2017-03-21 | End: 2017-03-24

## 2017-03-21 NOTE — PROGRESS NOTES
[facility]    Progress Note    Ambreen Tye Patient Status:  Inpatient    1/3/1995 MRN [id number]   Location [facility] 3W/SW Attending Jojo Diaz MD   [facility] Day # 6 PCP Alirio Dexter MD       Subjective:   Ambreen Gamble presumed endocarditis with septic emboli to lung.    - ID following.      LUE cellulitis - improved.    - cont vancomycin.    - ID following.      Heroin withdrawal.    - Likely diarrhea and restlessness related to this.    - d/c Ambien.    - Continue  Clon

## 2017-03-21 NOTE — PROGRESS NOTES
INFECTIOUS DISEASE PROGRESS NOTE    Sukumar Crisostomo Patient Status:  Inpatient    1/3/1995 MRN [id number]   Location [facility] 3W/SW Attending Saw Serrato MD   Hosp Day # 6 PCP Jessica Lee MD     Subjective:  Patient seen and examined, n fever, LUE injection site cellulitis, leukocytosis and abnormal CT chest c/w probable septic emboli.  Was started on vancomycin, cefepime.  ID consulted.      # Staph aureus (MRSA) sepsis, bacteremia due to PVE  # LUE cellulitis due to IVDA, ?early abscess

## 2017-03-21 NOTE — PROGRESS NOTES
[facility]    Progress Note      Assessment and Plan:   1. 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     Recommendations: Pain control, 6 weeks of antibiotics as

## 2017-03-21 NOTE — PLAN OF CARE
COPING    • Pt/Family able to verbalize concerns and demonstrate effective coping strategies Not Progressing          CARDIOVASCULAR - ADULT    • Maintains optimal cardiac output and hemodynamic stability Progressing    • Absence of cardiac arrhythmias or

## 2017-03-21 NOTE — PLAN OF CARE
Problem: Patient/Family Goals  Goal: Patient/Family Long Term Goal  Patient’s Long Term Goal: Patient remains free of infection, as evidenced by normal vital signs and absence of signs and symptoms of infection by discharge    Interventions:  - monitor VS output and hemodynamic stability  INTERVENTIONS:  - Monitor vital signs, rhythm, and trends  - Monitor for bleeding, hypotension and signs of decreased cardiac output  - Evaluate effectiveness of vasoactive medications to optimize hemodynamic stability  - care  Interventions:  - What would you like us to know as we care for you?  I want to feel better  - Provide timely, complete, and accurate information to patient/family  - Incorporate patient and family knowledge, values, beliefs, and cultural backgrounds

## 2017-03-21 NOTE — PROGRESS NOTES
Fabiana Parker is a 23year old  single female lives with her family with a chronic history of bipolar disorder and polysubstance dependency mostly heroin IV currently in methadone clinic who presented to the hospital with left-sided chest pain a Types: Cigarettes      Quit date: 01/23/2015    Smokeless Status: Never Used                        Alcohol Use: No                 Medications (Active prior to today's visit):    Current Facility-Administered Medications:  LORazepam (ATIVAN) injecti 11* 03/16/2017   T4F 0.73 11/02/2011   TSH 1.47 01/29/2015   DDIMER 0.72* 03/15/2017   CRP <0.5 01/29/2015   TROP 0.05* 03/15/2017         Imaging        Vitals   03/21/17  1401   BP: 107/69   Pulse: 58   Temp: 97.3 °F (36.3 °C)   Resp: 16       PHYSICAL E Platelet  Vancomycin Trough, Serum  Vancomycin Trough, Serum  CBC, Platelet; No Differential  Potassium  Vancomycin Trough, Serum  Basic Metabolic Panel (8)  CBC With Differential With Platelet  Hemoglobin M2V  Basic Metabolic Panel (8)  CBC, Platelet;  No

## 2017-03-21 NOTE — PROGRESS NOTES
[facility]     Progress Note        Namita Byers Patient Status:  Inpatient    1/3/1995 MRN [id number]   Location [facility] 3W/SW Attending Senia Craig MD   Hosp Day # 6 PCP Joselyn Booker MD       Subjective:   Patient s injection 4 mg 4 mg Intravenous Q6H PRN   acetaminophen (TYLENOL) tab 650 mg 650 mg Oral Q6H PRN   TraMADol HCl (ULTRAM) tab 100 mg 100 mg Oral Q6H PRN       Continuous Infusions:        Physical Exam  Constitutional: Mild distress  HEENT: PERRL, EOMI, valery INDICATIONS: Pleuritic chest pain, and fever. History of IVDA. TECHNIQUE: CT images of the chest were obtained with non-ionic intravenous contrast material.  Automated exposure control for dose reduction was used.  Adjustment of the mA and/or kV was done b nonocclusive embolus. Findings were called to Dr. Yuval Joe at 8:46 AM. 2. Multiple bilateral poorly defined perivascular nodules suspicious for septic emboli. Differential diagnosis includes fungal infection and less likely metastatic disease.  3. Mild enlarg

## 2017-03-22 PROCEDURE — 99233 SBSQ HOSP IP/OBS HIGH 50: CPT | Performed by: OTHER

## 2017-03-22 PROCEDURE — 99232 SBSQ HOSP IP/OBS MODERATE 35: CPT | Performed by: INTERNAL MEDICINE

## 2017-03-22 PROCEDURE — 99233 SBSQ HOSP IP/OBS HIGH 50: CPT | Performed by: HOSPITALIST

## 2017-03-22 RX ORDER — CYCLOBENZAPRINE HCL 10 MG
10 TABLET ORAL
Status: DISCONTINUED | OUTPATIENT
Start: 2017-03-22 | End: 2017-03-24

## 2017-03-22 RX ORDER — QUETIAPINE FUMARATE 50 MG/1
50 TABLET, EXTENDED RELEASE ORAL EVERY 24 HOURS
Status: DISCONTINUED | OUTPATIENT
Start: 2017-03-23 | End: 2017-03-24

## 2017-03-22 NOTE — PROGRESS NOTES
[facility]    Progress Note    Mickael Schwab Patient Status:  Inpatient    1/3/1995 MRN [id number]   Location [facility] 3W/SW Attending Shanon Gomez MD   [facility] Day # 7 PCP Curt Smith MD       Subjective:   Mickael Schwab for presumed endocarditis with septic emboli to lung.    - ID following.    - will be cautious giving narcotics to patient as she may be drug seeking  - add flexeril 10 mg BID     LUE cellulitis - improved.    - cont vancomycin.    - ID following.      Hero

## 2017-03-22 NOTE — PAYOR COMM NOTE
CLINICAL UPDATES  Carla Mode #[id number]  (20 year old F)       [facility] [id number]         Renee Silva DO Physician Signed  Progress Notes 3/22/2017 10:31 AM      [address] All [address]     Progress Note 600 mg  Oral  Daily    haloperidol lactate (HALDOL) 5 MG/ML injection 1 mg  1 mg  Intramuscular  Q6H PRN    methadone solution (DOLOPHINE) 10 mg/5mL  50 mg  Oral  Daily    nicotine (NICODERM CQ) 21 MG/24HR 1 patch  1 patch  Mercedez Amos 3/15/2017 at 6:35          3/15/2017  CONCLUSION:         1. Stable findings from April 18, 2015. 2. Normal heart and lungs. 3. Minimal scoliosis. 4. Azygos fissure. 5. A preliminary report was submitted and there are no major discrepancies.          Ct Renée vertebral bodies compatible with a developmental Scheuermann's variant.  OTHER:Negative.   Dictated by (CST): Heike Gamboa MD on 3/15/2017 at 7:45     Approved by (CST): Heike Gamboa MD on 3/15/2017 at 8:46          3/15/2017  CONCLUSION:         1.  No 3W/SW  Attending  Sammy Washington MD    Hosp Day #  6  PCP  Behzad Medrano MD      Subjective:  Patient seen and examined, notes, labs reviewed    Objective:  /69 mmHg  Pulse 82  Temp(Src) 98.3 °F (36.8 °C) (Oral)  Resp 18  Ht 157.5 cm (5' 2\")  Wt septic emboli.  Was started on vancomycin, cefepime.  ID consulted.      # Staph aureus (MRSA) sepsis, bacteremia due to PVE  # LUE cellulitis due to IVDA, ?early abscess---> resolved  # L antecubital thrombophlebitis, likely septic thrombophlebitis  # Sep edema.    Neurologic grossly intact with symmetric tone and strength and reflex.     Results:      Lab Results  Component  Value  Date    CREATSERUM  0.63  03/20/2017    BUN  4  03/20/2017    NA  138  03/20/2017    K  4.1  03/20/2017    CL  104  03/20/2017 Oral, Nightly  •  rifampin (RIFADIN) cap 600 mg, 600 mg, Oral, Daily  •  haloperidol lactate (HALDOL) 5 MG/ML injection 1 mg, 1 mg, Intramuscular, Q6H PRN  •  methadone solution (DOLOPHINE) 10 mg/5mL, 50 mg, Oral, Daily  •  nicotine (NICODERM CQ) 21 MG/24H Collapse All    [facility] HOSPITALIST  Progress Note    Blanco Sawyer Patient Status:  Inpatient    [facility] 1/3/1995  MRN  [id number]    [address] Attending  Bong Pollock MD    Hosp Day #  3  PCP  Liz Hallman Nos input(s): TROP, CK in the last 72 hours.          Imaging:         Medications:   •  vancomycin   1.5 g  Intravenous  Q8H    •  lidocaine   1 patch  Transdermal  Q24H    •  Heparin Sodium (Porcine)   5,000 Units  Subcutaneous  Q8H    •  ARIpiprazole   2 mg

## 2017-03-22 NOTE — PROGRESS NOTES
[facility]     Progress Note        Claribel Amaral Patient Status:  Inpatient    1/3/1995 MRN [id number]   Location [facility] 3W/SW Attending Pravin Carmona MD   Hosp Day # 7 PCP Ellery Aschoff, MD       Subjective:   Patient s tab 650 mg 650 mg Oral Q6H PRN   TraMADol HCl (ULTRAM) tab 100 mg 100 mg Oral Q6H PRN       Continuous Infusions:        Physical Exam  Constitutional: Mild distress  HEENT: PERRL, EOMI, nares patents, no nasal polyps   Cardio: RRR, S1 S2  Respiratory: jenna of the chest were obtained with non-ionic intravenous contrast material.  Automated exposure control for dose reduction was used. Adjustment of the mA and/or kV was done based on the patient's size.  Use of iterative reconstruction technique for dose reduct bilateral poorly defined perivascular nodules suspicious for septic emboli. Differential diagnosis includes fungal infection and less likely metastatic disease. 3. Mild enlargement of main pulmonary artery.  4. Developmental Scheuermann's variant mid and lo

## 2017-03-22 NOTE — PLAN OF CARE
Mother approached both Dr. Ly Carlos and COLT regarding her concerns of her daughters safety. She stated that she can not stay the night with her daughter or at all times and is worried about certain visitors she may have.  Her daughter stated to her that she was

## 2017-03-22 NOTE — PROGRESS NOTES
INFECTIOUS DISEASE PROGRESS NOTE    Sukumar Crisostomo Patient Status:  Inpatient    1/3/1995 MRN [id number]   Location [facility] 3W/SW Attending Saw Serrato MD   Hosp Day # 7 PCP Jessica Lee MD     Subjective:  Patient seen and examined, n 3/15-17)    22 y/o female with h/o IVDA presents with fever, LUE injection site cellulitis, leukocytosis and abnormal CT chest c/w probable septic emboli.  Was started on vancomycin, cefepime.  ID consulted.      # Staph aureus (MRSA) sepsis, bacteremia du

## 2017-03-23 PROCEDURE — 99233 SBSQ HOSP IP/OBS HIGH 50: CPT | Performed by: HOSPITALIST

## 2017-03-23 PROCEDURE — 99232 SBSQ HOSP IP/OBS MODERATE 35: CPT | Performed by: INTERNAL MEDICINE

## 2017-03-23 RX ORDER — POTASSIUM CHLORIDE 20 MEQ/1
40 TABLET, EXTENDED RELEASE ORAL ONCE
Status: COMPLETED | OUTPATIENT
Start: 2017-03-23 | End: 2017-03-23

## 2017-03-23 RX ORDER — 0.9 % SODIUM CHLORIDE 0.9 %
VIAL (ML) INJECTION
Status: COMPLETED
Start: 2017-03-23 | End: 2017-03-23

## 2017-03-23 NOTE — PROGRESS NOTES
[facility]    Progress Note    Denise Crum Patient Status:  Inpatient    1/3/1995 MRN [id number]   Location [facility] 3W/SW Attending Kenneth Castillo MD   [address] Day # 8 PCP Pasty Cooks, MD       Subjective:   Denise Crum vancomycin  1.5 g Intravenous Q8H   • lidocaine  1 patch Transdermal Q24H   • Heparin Sodium (Porcine)  5,000 Units Subcutaneous Q8H   • rifampin  600 mg Oral Daily   • Methadone HCl  50 mg Oral Daily   • nicotine  1 patch Transdermal Daily       Assessmen counseling patient, discussing plan of care, discussing labs and imaging findings.   All questions answered. Jose Lynn MD  03/23/2017

## 2017-03-23 NOTE — DISCHARGE PLANNING
3/23/17 CM Discharge planning   Pt resides with mother, family home, independent prior to admission. PT will need 4-6 wks of IV ABX dx Endocarditis. Pt + herion use prior to admission and can not be discharged with PICC in place.     Humberto Sigala X [id number]

## 2017-03-23 NOTE — PROGRESS NOTES
[facility]     Progress Note        Fortunato Morejon Patient Status:  Inpatient    1/3/1995 MRN [id number]   Location [facility] 3W/SW Attending Chucho Bustos MD   Hosp Day # 8 PCP Shanda Peterson MD       Subjective:   Patient s Oral Q6H PRN   TraMADol HCl (ULTRAM) tab 100 mg 100 mg Oral Q6H PRN       Continuous Infusions:        Physical Exam  Constitutional: Mild distress  HEENT: PERRL, EOMI, nares patents, no nasal polyps   Cardio: RRR, S1 S2  Respiratory: clear to auscultation obtained with non-ionic intravenous contrast material.  Automated exposure control for dose reduction was used. Adjustment of the mA and/or kV was done based on the patient's size. Use of iterative reconstruction technique for dose reduction was used.    FI perivascular nodules suspicious for septic emboli. Differential diagnosis includes fungal infection and less likely metastatic disease. 3. Mild enlargement of main pulmonary artery. 4. Developmental Scheuermann's variant mid and lower thoracic spine.

## 2017-03-23 NOTE — PROGRESS NOTES
Debra Sepulveda is a 23year old  single female lives with her family with a chronic history of bipolar disorder and polysubstance dependency mostly heroin IV currently in methadone clinic who presented to the hospital with left-sided chest pain a 1.00  Years: 1         Types: Cigarettes      Quit date: 01/23/2015    Smokeless Status: Never Used                        Alcohol Use: No                 Medications (Active prior to today's visit):    Current Facility-Administered Medications:  Cyclobenz ALB 2.5* 03/16/2017   ALKPHO 82 03/16/2017   TP 5.7* 03/16/2017   AST 14* 03/16/2017   ALT 11* 03/16/2017   T4F 0.73 11/02/2011   TSH 1.47 01/29/2015   DDIMER 0.72* 03/15/2017   CRP <0.5 01/29/2015   TROP 0.05* 03/15/2017         Imaging        Vitals (14)  Procalcitonin  Drug Abuse Panel 10 screen STAT  Urinalysis with Culture Reflex Once  Vancomycin Trough, Serum  Basic Metabolic Panel (8)  CBC With Differential With Platelet  Vancomycin Trough, Serum  Vancomycin Trough, Serum  CBC, Platelet;  No Diffe

## 2017-03-23 NOTE — PROGRESS NOTES
INFECTIOUS DISEASE PROGRESS NOTE    Ladi Covington Patient Status:  Inpatient    1/3/1995 MRN [id number]   Location [facility] 3W/SW Attending Rosalia Parisi MD   Hosp Day # 8 PCP Alla Will MD     Subjective:  Patient seen and examined, n [facility])      ASSESSMENT/PLAN:    Antibiotics:  Vancomycin 3/15, rifampin 3/17;  (cefepime 3/15-17)    22 y/o female with h/o IVDA presents with fever, LUE injection site cellulitis, leukocytosis and abnormal CT chest c/w probable septic emboli.  Was started

## 2017-03-23 NOTE — PLAN OF CARE
Problem: SAFETY ADULT - FALL  Goal: Free from fall injury  INTERVENTIONS:  - Assess pt frequently for physical needs  - Identify cognitive and physical deficits and behaviors that affect risk of falls.   - [place] fall precautions as indicated by assessme

## 2017-03-24 PROCEDURE — 99232 SBSQ HOSP IP/OBS MODERATE 35: CPT | Performed by: INTERNAL MEDICINE

## 2017-03-24 PROCEDURE — 99233 SBSQ HOSP IP/OBS HIGH 50: CPT | Performed by: OTHER

## 2017-03-24 PROCEDURE — 99233 SBSQ HOSP IP/OBS HIGH 50: CPT | Performed by: HOSPITALIST

## 2017-03-24 RX ORDER — CYCLOBENZAPRINE HCL 10 MG
10 TABLET ORAL 3 TIMES DAILY PRN
Status: DISCONTINUED | OUTPATIENT
Start: 2017-03-24 | End: 2017-04-24

## 2017-03-24 RX ORDER — METHADONE HYDROCHLORIDE 10 MG/1
10 TABLET ORAL ONCE
Status: COMPLETED | OUTPATIENT
Start: 2017-03-24 | End: 2017-03-24

## 2017-03-24 RX ORDER — METHADONE HYDROCHLORIDE 10 MG/5ML
60 SOLUTION ORAL DAILY
Status: DISCONTINUED | OUTPATIENT
Start: 2017-03-25 | End: 2017-04-13

## 2017-03-24 RX ORDER — QUETIAPINE FUMARATE 50 MG/1
150 TABLET, EXTENDED RELEASE ORAL EVERY 24 HOURS
Status: DISCONTINUED | OUTPATIENT
Start: 2017-03-24 | End: 2017-04-24

## 2017-03-24 RX ORDER — QUETIAPINE 200 MG/1
200 TABLET, FILM COATED ORAL NIGHTLY
Status: DISCONTINUED | OUTPATIENT
Start: 2017-03-24 | End: 2017-04-24

## 2017-03-24 NOTE — PLAN OF CARE
Problem: Patient/Family Goals  Goal: Patient/Family Short Term Goal  Patient’s Short Term Goal: by the end of the shift patient will state 5 reasons why she should stop using drugs    Interventions:   - assess patient knoweledge about side effects  -educat

## 2017-03-24 NOTE — PLAN OF CARE
Problem: Patient/Family Goals  Goal: Patient/Family Long Term Goal  Patient’s Long Term Goal: Patient remains free of infection, as evidenced by normal vital signs and absence of signs and symptoms of infection by discharge    Interventions:  - monitor VS

## 2017-03-24 NOTE — PROGRESS NOTES
[facility]    Progress Note    Kelly Clipper Patient Status:  Inpatient    1/3/1995 MRN [id number]   Location [facility] 3W/SW Attending Kelsey Recinos MD   [facility] Day # 9 PCP Kelly Thomas MD       Subjective:   Kelly Clipper Intravenous Q8H   • lidocaine  1 patch Transdermal Q24H   • Heparin Sodium (Porcine)  5,000 Units Subcutaneous Q8H   • rifampin  600 mg Oral Daily   • Methadone HCl  50 mg Oral Daily   • nicotine  1 patch Transdermal Daily       Assessment and Plan: discussing plan of care, discussing labs and imaging findings.   All questions answered. Oralia Galdamez MD  03/24/2017

## 2017-03-24 NOTE — PLAN OF CARE
Mother of patient approached nurse at the desk and was visibly and verbally upset due to the changes in medications that the physician did earlier. Mother demanded that RN page the doctor. RN paged the doctor.  Rn was instructed to tell patient and patient'

## 2017-03-24 NOTE — PLAN OF CARE
SAFETY ADULT - FALL    • Free from fall injury Adequate for Discharge    Sherrie is up independently. She does call appropriately. No slip socks are in place. Bed is low/locked.       PAIN - ADULT    • Verbalizes/displays adequate comfort level or patient'

## 2017-03-24 NOTE — PROGRESS NOTES
[facility] dosing service    Follow-up Pharmacokinetic Consult for Vancomycin Dosing     Allan Wang is a 23year old female admitted on 3/15 who is being treated for endocarditis. Patient is on day 10/42 of Vancomycin 1.5 gm IV Q 8 hours.   Goal tr PharmD  3/24/2017  3:12 PM  [address]: [phone]

## 2017-03-24 NOTE — PROGRESS NOTES
[place] [facility]    Progress Note    Nato Olivas Patient Status:  Inpatient    1/3/1995 MRN [id number]   Location [facility] 3W/SW Attending Greg Walker MD   [facility] Day # 9 PCP Brice Duvall MD     Subjective:   Subjective:   n

## 2017-03-25 PROCEDURE — 99233 SBSQ HOSP IP/OBS HIGH 50: CPT | Performed by: OTHER

## 2017-03-25 PROCEDURE — 99233 SBSQ HOSP IP/OBS HIGH 50: CPT | Performed by: HOSPITALIST

## 2017-03-25 RX ORDER — 0.9 % SODIUM CHLORIDE 0.9 %
VIAL (ML) INJECTION
Status: COMPLETED
Start: 2017-03-25 | End: 2017-03-25

## 2017-03-25 RX ORDER — PANTOPRAZOLE SODIUM 40 MG/1
40 TABLET, DELAYED RELEASE ORAL
Status: DISCONTINUED | OUTPATIENT
Start: 2017-03-25 | End: 2017-04-24

## 2017-03-25 NOTE — PROGRESS NOTES
[place] [facility]    Progress Note    Valerio Knapp Patient Status:  Inpatient    1/3/1995 MRN [id number]   Location [facility] 3W/SW Attending Jose Patel MD   [address] Day # 10 PCP Behzad Medrano MD       Subjective:   Valerio Knapp Methadone HCl  60 mg Oral Daily   • gabapentin  100 mg Oral TID   • saccharomyces boulardii  250 mg Oral BID   • lidocaine  1 patch Transdermal Q24H   • Heparin Sodium (Porcine)  5,000 Units Subcutaneous Q8H   • rifampin  600 mg Oral Daily   • nicotine  1 Prophylaxis: Lovenox.    · CODE status: Full.      >35 min spent with patient. > 50% time was spent counseling patient, discussing plan of care, discussing labs and imaging findings.   All questions answered. Andrae Jalloh MD  03/24/2017

## 2017-03-25 NOTE — PROGRESS NOTES
Sandoval Garcia is a 23year old  single female lives with her family with a chronic history of bipolar disorder and polysubstance dependency mostly heroin IV currently in methadone clinic who presented to the hospital with left-sided chest pain a her condition and the process more self discipline behavior. Patient agreed to follow medication regimen differently and to discontinue all as needed.     Patient did not complain about an anxiety today and reported that she likes Seroquel  Patient denied Q24H   Normal Saline Flush 0.9 % injection 10 mL 10 mL Intravenous PRN   Heparin Sodium (Porcine) 5000 UNIT/ML injection 5,000 Units 5,000 Units Subcutaneous Q8H   rifampin (RIFADIN) cap 600 mg 600 mg Oral Daily   haloperidol lactate (HALDOL) 5 MG/ML injec nightly. .  3. Start Seroquel XR 50 mg nightly at 7 PM starting tomorrow. 4.  Use Haldol 1 mg every 4 hours as needed for agitation    Generalized anxiety disorders:  1. Continue Atarax to 25 mg 3 times daily.     Opiate dependency:  1.  Focus on education Once  Blood Culture Once  Clostridium difficile(toxigenic)PCR Once    Meds This Visit:    No prescriptions requested or ordered in this encounter      3/24/2017  Aixa Ardon MD

## 2017-03-25 NOTE — PLAN OF CARE
PAIN - ADULT    • Verbalizes/displays adequate comfort level or patient's stated pain goal Not Progressing          CARDIOVASCULAR - ADULT    • Maintains optimal cardiac output and hemodynamic stability Progressing    • Absence of cardiac arrhythmias or at

## 2017-03-26 PROCEDURE — 99233 SBSQ HOSP IP/OBS HIGH 50: CPT | Performed by: HOSPITALIST

## 2017-03-26 NOTE — PROGRESS NOTES
Frank Crisostomo is a 23year old  single female lives with her family with a chronic history of bipolar disorder and polysubstance dependency mostly heroin IV currently in methadone clinic who presented to the hospital with left-sided chest pain a Surgical History    TONSILLECTOMY  2009    UPPER GI ENDOSCOPY,EXAM  2013    COLONOSCOPY  2013      Family History   Problem Relation Age of Onset   • Hypertension Maternal Grandmother    • Diabetes Maternal Grandfather    • Prostate Cancer Paternal English Ort 11.6* 03/25/2017   HCT 34.5* 03/25/2017    03/25/2017   CREATSERUM 0.90 03/25/2017   BUN 7* 03/25/2017    03/25/2017   K 3.9 03/25/2017    03/25/2017   CO2 27 03/25/2017   * 03/25/2017   CA 8.6 03/25/2017   ALB 2.5* 03/16/2017   A Plasma  D-Dimer  Lipid Panel  HCG, Beta Subunit, Quant  CBC With Differential With Platelet  Comp Metabolic Panel (14)  Procalcitonin  Drug Abuse Panel 10 screen STAT  Urinalysis with Culture Reflex Once  Vancomycin Trough, Serum  Basic Metabolic Panel (8)

## 2017-03-26 NOTE — PLAN OF CARE
COPING    • Pt/Family able to verbalize concerns and demonstrate effective coping strategies Adequate for Discharge          PAIN - ADULT    • Verbalizes/displays adequate comfort level or patient's stated pain goal Not Progressing          CARDIOVASCULAR

## 2017-03-26 NOTE — PROGRESS NOTES
[facility] HOSP - [facility]    Progress Note    Lei Stern Patient Status:  Inpatient    1/3/1995 MRN [id number]   Location [facility] 3W/SW Attending Romulo Chacko MD   [facility] Day # 11 PCP Elif Barahona MD       Subjective:   Lei Stern 250 mg Oral BID   • lidocaine  1 patch Transdermal Q24H   • Heparin Sodium (Porcine)  5,000 Units Subcutaneous Q8H   • rifampin  600 mg Oral Daily   • nicotine  1 patch Transdermal Daily       Assessment and Plan:       MRSA sepsis  With septic emboli on C questions answered. Naveen Yee MD  03/26/2017

## 2017-03-27 PROCEDURE — 99233 SBSQ HOSP IP/OBS HIGH 50: CPT | Performed by: HOSPITALIST

## 2017-03-27 RX ORDER — 0.9 % SODIUM CHLORIDE 0.9 %
VIAL (ML) INJECTION
Status: COMPLETED
Start: 2017-03-27 | End: 2017-03-27

## 2017-03-27 NOTE — PROGRESS NOTES
INFECTIOUS DISEASE PROGRESS NOTE    Mickael Schwab Patient Status:  Inpatient    1/3/1995 MRN [id number]   Location [facility] 3W/SW Attending Brandy Bentley MD   Hosp Day # 12 PCP Curt Smith MD     Subjective:  Patient seen and examined, (cefepime 3/15-17)    22 y/o female with h/o IVDA presents with fever, LUE injection site cellulitis, leukocytosis and abnormal CT chest c/w probable septic emboli.  Was started on vancomycin, cefepime.  ID consulted.      # Staph aureus (MRSA) sepsis, stacy

## 2017-03-27 NOTE — PROGRESS NOTES
[facility] dosing service    Follow-up Pharmacokinetic Consult for Vancomycin Dosing     Tamir Muniz is a 23year old female admitted on 3/15 who is being treated for endocarditis. Patient is on day 12 of Vancomycin 1.5 gm IV Q 12 hours.   Goal trou function changes, toxicity and efficacy.     Sandie Lance, PharmD  3/26/2017  7:49 PM  [address]: [phone]

## 2017-03-27 NOTE — DIETARY NOTE
ADULT NUTRITION RE-ASSESSMENT    Pt is at moderate nutrition risk. Pt does not meet malnutrition criteria.       RECOMMENDATIONS TO MD:  RD to manage oral nutritional supplement(ONS)     NUTRITION DIAGNOSIS/PROBLEM:  Inadequate oral intake related to decre 3.2 oz)   03/20/17 0403 54. 023 kg (119 lb 1.6 oz)   03/19/17 0455 55.021 kg (121 lb 4.8 oz)   03/17/17 0558 56.881 kg (125 lb 6.4 oz)   03/16/17 0411 54.931 kg (121 lb 1.6 oz)   03/15/17 0423 56.7 kg (125 lb)     GASTROINTESTINAL PROBLEMS: GI intact    ISAURA

## 2017-03-27 NOTE — PROGRESS NOTES
[facility]    Progress Note    Debbie Donnelly Patient Status:  Inpatient    1/3/1995 MRN [id number]   Location [facility] 3W/SW Attending Ksenia Dominguez MD   [address] Day # 12 PCP Lali Kate MD       Subjective:   Debbie Donnelly Daily   • gabapentin  100 mg Oral TID   • saccharomyces boulardii  250 mg Oral BID   • lidocaine  1 patch Transdermal Q24H   • Heparin Sodium (Porcine)  5,000 Units Subcutaneous Q8H   • rifampin  600 mg Oral Daily   • nicotine  1 patch Transdermal Daily discussing plan of care, discussing labs and imaging findings.   All questions answered. Andrae Jalloh MD  03/27/2017

## 2017-03-28 PROCEDURE — 99233 SBSQ HOSP IP/OBS HIGH 50: CPT | Performed by: OTHER

## 2017-03-28 PROCEDURE — 99233 SBSQ HOSP IP/OBS HIGH 50: CPT | Performed by: HOSPITALIST

## 2017-03-28 NOTE — PLAN OF CARE
Patient is walking in hallway with two of her friends. Mother at bedside. Informed patient that stay in the unit. Patient verbalized understanding.

## 2017-03-28 NOTE — PLAN OF CARE
CARDIOVASCULAR - ADULT    • Maintains optimal cardiac output and hemodynamic stability Progressing    • Absence of cardiac arrhythmias or at baseline    DENIES CHEST PAIN AT THIS TIME. ON TELE.  DURING ACTIVITIES HR 130S Progressing        DRUG ABUSE/DETOX

## 2017-03-28 NOTE — PAYOR COMM NOTE
CLINICAL UPDATE  Bridgette Orozcoa #[id number]  (20 year old F)       [facility] [id number]         Mela Foote MD Physician Signed  Progress Notes 3/28/2017 10:53 AM      Expand All Collapse All    INFECTIOUS DISEASE PROGRESS NOTE      [facility] atypical     Left arm cellulitis     Elevated troponin     Bipolar disorder, unspecified ([facility] [address]     Opioid type dependence, continuous ([facility])      ASSESSMENT/PLAN:    Antibiotics:  Vancomycin 3/15, rifampin 3/17;  (cefepime 3/15-17)    22 y/o female with h/o 99 %, not currently breastfeeding. Physical Exam:    General: No acute distress.    Respiratory: Clear to auscultation bilaterally. No wheezes. No rhonchi. Cardiovascular: S1, S2. Regular rate and rhythm. No murmurs, rubs or gallops.    Abdomen: Soft, n ECHO negative for vegetations  - KAVITA shows suspicious vegetation on pulmonic valve.    - Will need 6 weeks of IV abx for presumed endocarditis with septic emboli to lung.    - Day 13 out of 42  - ID following.    - will be cautious giving narcotics to south Cuca Lee MD    Hosp Day #  11  PCP  Melissa Zuniga MD          Subjective:    Jose Angel Stewart is a(n) 23year old female admitted for heroin withdrawal and endocarditis. Heart burn is better.  Resting in bed.          Objective:    Blood pressure 11 Transdermal  Q24H    •  Heparin Sodium (Porcine)   5,000 Units  Subcutaneous  Q8H    •  rifampin   600 mg  Oral  Daily    •  nicotine   1 patch  Transdermal  Daily          Assessment and Plan:        MRSA sepsis  With septic emboli on CT.  - cont vancomyc answered. Basilia Meza MD  03/26/2017          Brice Nancy #[id number]  (20 year old F)       [facility] [id number]         Reina Chinchilla MD Physician Addendum  Progress Notes 3/25/2017  2:23 PM      Expand All Collapse All    [place] [facility] TROP  0.05*  03/15/2017      Cyclobenzaprine HCl, Loperamide HCl, Normal Saline Flush, haloperidol lactate, ondansetron HCl, acetaminophen             •  vancomycin   1,500 mg  Intravenous  Q12H    •  Pantoprazole Sodium   40 mg  Oral  QAM AC    •  Baylee Combs - cont tramadol and methadone, lidoderm patch. - continue IS.  Added flexeril 10 BID  - Albuterol nebs q6hrs WA. Heroin abuse  - Dr. Mariaa Anderson following.    - cont methadone 50mg daily.    - UDS reviewed + for opioids, BZ's and cocaine.      Chest pain Lab Results  Component  Value  Date    [facility] SHEA 6.8  03/24/2017    HGB  11.3*  03/24/2017    HCT  34.7*  03/24/2017    PLT  285  03/24/2017    CREATSERUM  0.98  03/24/2017    BUN  4*  03/24/2017    NA  140  03/24/2017    K  4.1  03/24/2017    CL  104  03/2  3*    --    1*   4*    CREATSERUM   0.74    --    0.66   0.87        No results for input(s): ALT, AST in the last 72 hours.     Invalid input(s): ALPHOS TBIL        Problem list reviewed:  Patient Active Problem List:     Abdominal pain     Acne     Beha

## 2017-03-28 NOTE — PROGRESS NOTES
INFECTIOUS DISEASE PROGRESS NOTE    Ambreen Gamble Patient Status:  Inpatient    1/3/1995 MRN [id number]   Location [facility] 3W/SW Attending Garett Arceo MD   [facility] Day # 15 PCP Alirio Dexter MD     Subjective:  Patient seen and examined, presents with fever, LUE injection site cellulitis, leukocytosis and abnormal CT chest c/w probable septic emboli.  Was started on vancomycin, cefepime.  ID consulted.      # Staph aureus (MRSA) sepsis, bacteremia due to PVE   - HIV non-reactive 3/27  # TIGRE

## 2017-03-28 NOTE — PROGRESS NOTES
[place] [facility]    Progress Note    Sandoval Garcia Patient Status:  Inpatient    1/3/1995 MRN [id number]   Location [facility] 3W/SW Attending Aretha Dunlap MD   [facility] Day # 15 PCP Jaycob Loweyr MD       Subjective:   Sandoval Garcia 60 mg Oral Daily   • gabapentin  100 mg Oral TID   • saccharomyces boulardii  250 mg Oral BID   • lidocaine  1 patch Transdermal Q24H   • Heparin Sodium (Porcine)  5,000 Units Subcutaneous Q8H   • rifampin  600 mg Oral Daily   • nicotine  1 patch Transderm Prophylaxis: Lovenox.    · CODE status: Full.      >35 min spent with patient. > 50% time was spent counseling patient, discussing plan of care, discussing labs and imaging findings.   All questions answered. Andrae Jalloh MD  03/28/2017

## 2017-03-28 NOTE — PROGRESS NOTES
Fortunato Morejon is a 23year old  single female lives with her family with a chronic history of bipolar disorder and polysubstance dependency mostly heroin IV currently in methadone clinic who presented to the hospital with left-sided chest pain a Mother       Social History:   Smoking Status: Former Smoker                   Packs/Day: 1.00  Years: 1         Types: Cigarettes      Quit date: 01/23/2015    Smokeless Status: Never Used                        Alcohol Use: No                 Medications T4F 0.73 11/02/2011   TSH 1.47 01/29/2015   DDIMER 0.72* 03/15/2017   CRP <0.5 01/29/2015   TROP 0.05* 03/15/2017         Imaging        Vitals   03/28/17  0821   BP: 113/80   Pulse: 104   Temp: 98 °F (36.7 °C)   Resp: 18       PHYSICAL EXAM:   The patie Serum  CBC, Platelet; No Differential  Potassium  Vancomycin Trough, Serum  Basic Metabolic Panel (8)  CBC With Differential With Platelet  Hemoglobin X8Z  Basic Metabolic Panel (8)  CBC, Platelet;  No Differential  Basic Metabolic Panel (8)  CBC With Diffe

## 2017-03-29 PROCEDURE — 99233 SBSQ HOSP IP/OBS HIGH 50: CPT | Performed by: HOSPITALIST

## 2017-03-29 RX ORDER — POTASSIUM CHLORIDE 20 MEQ/1
40 TABLET, EXTENDED RELEASE ORAL ONCE
Status: COMPLETED | OUTPATIENT
Start: 2017-03-29 | End: 2017-03-29

## 2017-03-29 NOTE — PROGRESS NOTES
[place] [facility] - [facility]    Progress Note    Ace Poser Patient Status:  Inpatient    1/3/1995 MRN [id number]   Location [facility] 3W/SW Attending Tyrell Carey MD   [facility] Day # 14 PCP Jasper Hall MD       Subjective:   Ace Poser Fumarate  200 mg Oral Nightly   • Methadone HCl  60 mg Oral Daily   • gabapentin  100 mg Oral TID   • saccharomyces boulardii  250 mg Oral BID   • lidocaine  1 patch Transdermal Q24H   • Heparin Sodium (Porcine)  5,000 Units Subcutaneous Q8H   • rifampin added.    - Added flexeril 10 TID       Quality:  · DVT Prophylaxis: Lovenox.    · CODE status: Full.      >35 min spent with patient. > 50% time was spent counseling patient, discussing plan of care, discussing labs and imaging findings.   All questions an

## 2017-03-30 PROCEDURE — 99233 SBSQ HOSP IP/OBS HIGH 50: CPT | Performed by: OTHER

## 2017-03-30 PROCEDURE — 99233 SBSQ HOSP IP/OBS HIGH 50: CPT | Performed by: HOSPITALIST

## 2017-03-30 RX ORDER — LORAZEPAM 2 MG/ML
0.5 INJECTION INTRAMUSCULAR ONCE
Status: COMPLETED | OUTPATIENT
Start: 2017-03-30 | End: 2017-03-30

## 2017-03-30 RX ORDER — HALOPERIDOL 5 MG/ML
0.5 INJECTION INTRAMUSCULAR ONCE
Status: COMPLETED | OUTPATIENT
Start: 2017-03-30 | End: 2017-03-30

## 2017-03-30 RX ORDER — LORAZEPAM 0.5 MG/1
0.5 TABLET ORAL DAILY
Status: DISCONTINUED | OUTPATIENT
Start: 2017-03-31 | End: 2017-04-03

## 2017-03-30 RX ORDER — HALOPERIDOL 1 MG/1
0.5 TABLET ORAL 3 TIMES DAILY
Status: DISCONTINUED | OUTPATIENT
Start: 2017-03-30 | End: 2017-04-04

## 2017-03-30 NOTE — PLAN OF CARE
Problem: Patient/Family Goals  Goal: Patient/Family Long Term Goal  Patient’s Long Term Goal: Patient remains free of infection, as evidenced by normal vital signs and absence of signs and symptoms of infection by discharge    Interventions:  - monitor VS Spirometry  - Assess the need for suctioning and perform as needed  - Assess and instruct to report SOB or any respiratory difficulty  - Respiratory Therapy support as indicated  - Manage/alleviate anxiety  - Monitor for signs/symptoms of CO2 retention   O

## 2017-03-30 NOTE — PLAN OF CARE
Cont. Contact isolation, picc, iv abx. Pt. Occ. Takes off tele herself & heard telling mother 'this is how i get their attention'. Instructed pt.  On rationale of tele monitor staying on & making a plan w/staff if needs to be off for adl's, etc. Also instru

## 2017-03-30 NOTE — PROGRESS NOTES
[place] [facility] - [facility]    Progress Note    Fabiana General Patient Status:  Inpatient    1/3/1995 MRN [id number]   Location [facility] 3W/SW Attending Jacqueline Sanchez Day # 15 PCP Judithe Ormond, MD     Subjective:     Ruth Corley rhabdo    3. gerd  - added protonix 40 mg PO qdaily     4. LUE cellulitis - improved.    - improved    - ID following.      5.Heroin withdrawal/Psychosis  - Likely diarrhea and restlessness related to this.    - d/c Ambien.    - Continue  Clonidine 0.2mg PO

## 2017-03-30 NOTE — PLAN OF CARE
Cont. Contact isolation, picc, iv abx. Seen by dr. Pedroza Backbone & is adjusting meds. Cont. To monitor, maintain safety, set limits as appropriate.       CARDIOVASCULAR - ADULT    • Maintains optimal cardiac output and hemodynamic stability Progressing

## 2017-03-30 NOTE — PROGRESS NOTES
Lei Stern is a 23year old  single female lives with her family with a chronic history of bipolar disorder and polysubstance dependency mostly heroin IV currently in methadone clinic who presented to the hospital with left-sided chest pain a Hypertension Maternal Grandmother    • Diabetes Maternal Grandfather    • Prostate Cancer Paternal Grandfather      w/ metastasis   • back pain[other] [OTHER] Father    • Hypertension Mother       Social History:   Smoking Status: Former Smoker 03/30/2017    03/30/2017   CREATSERUM 0.79 03/30/2017   BUN 12 03/30/2017    03/30/2017   K 4.1 03/30/2017   K 4.1 03/30/2017    03/30/2017   CO2 28 03/30/2017   * 03/30/2017   CA 8.7 03/30/2017   ALB 2.5* 03/16/2017   ALKPHO 82 0 Visit:    Orders Placed This Encounter  CBC With Differential With Platelet  Basic Metabolic Panel (8)  Troponin I, 0 Hour  Lactic Acid, Plasma  D-Dimer  Lipid Panel  HCG, Beta Subunit, Quant  CBC With Differential With Platelet  Comp Metabolic Panel (14)

## 2017-03-31 PROCEDURE — 99233 SBSQ HOSP IP/OBS HIGH 50: CPT | Performed by: HOSPITALIST

## 2017-03-31 NOTE — PROGRESS NOTES
INFECTIOUS DISEASE PROGRESS NOTE    Arabella Barrios Patient Status:  Inpatient    1/3/1995 MRN [id number]   Location [facility] 3W/SW Attending Edwin Morelos MD   [facility] Day # 12 PCP Shannon Mcguire MD     Subjective:  Patient seen and examined, fever, LUE injection site cellulitis, leukocytosis and abnormal CT chest c/w probable septic emboli.  Was started on vancomycin, cefepime.  ID consulted.      # Staph aureus (MRSA) sepsis, bacteremia due to PVE   - HIV non-reactive 3/27  # LUE cellulitis d

## 2017-03-31 NOTE — PLAN OF CARE
COPING    • Pt/Family able to verbalize concerns and demonstrate effective coping strategies Not Progressing        DRUG ABUSE/DETOX    • Will have no detox symptoms and will verbalize plan for changing drug-related behavior Not Progressing          CARDIO

## 2017-03-31 NOTE — PAYOR COMM NOTE
CLINICAL UPDATES    Bridgette Salinas #[id number]  (20 year old F)       [facility] [id number]         Doug Keita MD Physician Addendum  Progress Notes 3/31/2017 11:34 AM      Expand All Collapse All      [place] 2. Joint/muscle pain  - check CK level-WNL    - ruled out rhabdo    3. gerd  - added protonix 40 mg PO qdaily     4. LUE cellulitis - improved.    - improved    - ID following.      5.Heroin withdrawal/Psychosis  - Likely diarrhea and restlessness related to DDIMER  0.72*  03/15/2017    CRP  <0.5  01/29/2015    TROP  0.05*  03/15/2017    CK  55  03/29/2017                          Mattie Lock MD  3/31/2017                            Revision History       Date/Time User Provider Type Action     3/3 1. MRSA sepsis  With septic emboli on CT.  - cont rifampin and daptomycin.    -  Vancomycin discontinued. Daptomycin started yesterday. - Blood cx x 2 shows MRSA.  Rpt blood cx 3/16 NG.  Rpt blood cx 3/21 negative  - PICC line 3/18  - WBC normal and afebril HCT  33.1*  03/30/2017    PLT  212  03/30/2017    CREATSERUM  0.79  03/30/2017    BUN  12  03/30/2017    NA  140  03/30/2017    K  4.1  03/30/2017    K  4.1  03/30/2017    CL  104  03/30/2017    CO2  28  03/30/2017    GLU  110*  03/30/2017    CA  8.7  03/3 Lab Results  Component  Value  Date    [facility] SHEA 2.9*  03/29/2017    HGB  11.0*  03/29/2017    HCT  32.8*  03/29/2017    PLT  176  03/29/2017    CREATSERUM  0.72  03/29/2017    BUN  8  03/29/2017    NA  139  03/29/2017    K  3.7  03/29/2017    CL  104  03/29/20 - check CK level-WNL    - ruled out rhabdo      Heartburn  - added protonix 40 mg PO qdaily     LUE cellulitis - improved.    - improved    - ID following.      Heroin withdrawal/Psychosis  - Likely diarrhea and restlessness related to this.    - d/c Ambien

## 2017-03-31 NOTE — PROGRESS NOTES
[place] [facility] - [facility]    Progress Note    Namita Byers Patient Status:  Inpatient    1/3/1995 MRN [id number]   Location [facility] 3W/SW Attending Jacqueline Sanchez Stephani Day # 16 PCP Joselyn Booker MD     Subjective:     HENT: related to this.    - d/c Ambien.    - Continue  Clonidine 0.2mg PO qhours for restlessness hold for SBp < 90 or HR < 60.   - continue seroquel 200 qHS and  And  150 mg qHS  - Dr. Eliseo Ramos following   - cont methadone 60mg daily for withdrawal- increased fr

## 2017-04-01 PROCEDURE — 99232 SBSQ HOSP IP/OBS MODERATE 35: CPT | Performed by: HOSPITALIST

## 2017-04-01 RX ORDER — MAGNESIUM OXIDE 400 MG (241.3 MG MAGNESIUM) TABLET
400 TABLET ONCE
Status: COMPLETED | OUTPATIENT
Start: 2017-04-01 | End: 2017-04-01

## 2017-04-01 NOTE — PLAN OF CARE
DISCHARGE PLANNING    • Discharge to home or other facility with appropriate resources Not Progressing          Day 18 of 42 on long term Abx therapy    CARDIOVASCULAR - ADULT    • Maintains optimal cardiac output and hemodynamic stability Progressing    •

## 2017-04-01 NOTE — PLAN OF CARE
COPING    • Pt/Family able to verbalize concerns and demonstrate effective coping strategies Not Progressing        DISCHARGE PLANNING    • Discharge to home or other facility with appropriate resources Not Progressing        DRUG ABUSE/DETOX    • Will hav

## 2017-04-02 ENCOUNTER — APPOINTMENT (OUTPATIENT)
Dept: GENERAL RADIOLOGY | Facility: HOSPITAL | Age: 22
DRG: 871 | End: 2017-04-02
Attending: HOSPITALIST
Payer: MEDICAID

## 2017-04-02 PROCEDURE — 99233 SBSQ HOSP IP/OBS HIGH 50: CPT | Performed by: HOSPITALIST

## 2017-04-02 PROCEDURE — 71010 XR CHEST AP PORTABLE  (CPT=71010): CPT

## 2017-04-02 RX ORDER — CALAMINE
LOTION (ML) TOPICAL AS NEEDED
Status: DISCONTINUED | OUTPATIENT
Start: 2017-04-02 | End: 2017-04-24

## 2017-04-02 RX ORDER — LORAZEPAM 2 MG/ML
0.5 INJECTION INTRAMUSCULAR ONCE
Status: COMPLETED | OUTPATIENT
Start: 2017-04-02 | End: 2017-04-02

## 2017-04-02 RX ORDER — MAGNESIUM OXIDE 400 MG (241.3 MG MAGNESIUM) TABLET
400 TABLET ONCE
Status: COMPLETED | OUTPATIENT
Start: 2017-04-02 | End: 2017-04-02

## 2017-04-02 RX ORDER — FAMOTIDINE 20 MG/1
40 TABLET ORAL DAILY
Status: DISCONTINUED | OUTPATIENT
Start: 2017-04-02 | End: 2017-04-24

## 2017-04-02 RX ORDER — DIPHENHYDRAMINE HYDROCHLORIDE 50 MG/ML
25 INJECTION INTRAMUSCULAR; INTRAVENOUS EVERY 4 HOURS PRN
Status: DISCONTINUED | OUTPATIENT
Start: 2017-04-02 | End: 2017-04-04

## 2017-04-02 RX ORDER — SODIUM CHLORIDE 9 MG/ML
INJECTION, SOLUTION INTRAVENOUS ONCE
Status: COMPLETED | OUTPATIENT
Start: 2017-04-02 | End: 2017-04-02

## 2017-04-02 RX ORDER — CETIRIZINE HYDROCHLORIDE 10 MG/1
5 TABLET ORAL DAILY
Status: DISCONTINUED | OUTPATIENT
Start: 2017-04-02 | End: 2017-04-24

## 2017-04-02 RX ORDER — DEXAMETHASONE 4 MG/1
6 TABLET ORAL ONCE
Status: COMPLETED | OUTPATIENT
Start: 2017-04-02 | End: 2017-04-02

## 2017-04-02 RX ORDER — HYDROXYZINE HYDROCHLORIDE 25 MG/1
25 TABLET, FILM COATED ORAL ONCE
Status: COMPLETED | OUTPATIENT
Start: 2017-04-02 | End: 2017-04-02

## 2017-04-02 RX ORDER — METHYLPREDNISOLONE SODIUM SUCCINATE 125 MG/2ML
125 INJECTION, POWDER, LYOPHILIZED, FOR SOLUTION INTRAMUSCULAR; INTRAVENOUS EVERY 6 HOURS
Status: DISCONTINUED | OUTPATIENT
Start: 2017-04-02 | End: 2017-04-04

## 2017-04-02 NOTE — PLAN OF CARE
1500: paged dr Arcelia Wang, pt stated she is very itchy and has rash on abdomen hands and around tape Phyllistine Nicholas

## 2017-04-02 NOTE — PLAN OF CARE
Shortly after change of shift, this RN observed pt's heart rated elevated to 150's. Pt not in room when attempting to assess and pt found coming down the pacheco with day shift RN who found her smoking on the balcony.   After returning to the pt's room she wa

## 2017-04-02 NOTE — SIGNIFICANT EVENT
Patient was caught smoking on balcony yesterday. Patient given warning about smoking on hospital grounds, how it affects her current illness and potential dangers of stroke.   It was explained to patient, her boyfriend, and her mother that if caught again

## 2017-04-02 NOTE — PROGRESS NOTES
[place] [facility] - [facility]    Progress Note    Ju Willis Patient Status:  Inpatient    1/3/1995 MRN [id number]   Location [facility] 3W/SW Attending Chirag Naik MD   Hosp Day # 16 PCP Saúl Diaz MD       Subjective:   Caught at continue seroquel 200 qHS and  And  150 mg qHS  - Dr. Madelyn Sifuentes following   - cont methadone 60mg daily for withdrawal- increased from 50 mg      6. Septic emboli to lung    - suspect R sided endocarditis from IVDA.    - KAVITA as above.    - cont abx as above.

## 2017-04-02 NOTE — PROGRESS NOTES
[facility]    Progress Note    Claribelwilberto Amaral Patient Status:  Inpatient    1/3/1995 MRN [id number]   Location [facility] 3W/SW Attending Marko John MD   Hosp Day # 18 PCP Ellery Aschoff, MD       Subjective:   Caught at qhours for restlessness hold for SBp < 90 or HR < 60.   - continue seroquel 200 qHS and  And  150 mg qHS  - Dr. David Perrin following   - cont methadone 60mg daily for withdrawal- increased from 50 mg      6. Septic emboli to lung    - suspect R sided endocardi

## 2017-04-03 PROCEDURE — 99233 SBSQ HOSP IP/OBS HIGH 50: CPT | Performed by: OTHER

## 2017-04-03 PROCEDURE — 99233 SBSQ HOSP IP/OBS HIGH 50: CPT | Performed by: HOSPITALIST

## 2017-04-03 RX ORDER — LORAZEPAM 2 MG/ML
1 INJECTION INTRAMUSCULAR ONCE
Status: COMPLETED | OUTPATIENT
Start: 2017-04-03 | End: 2017-04-03

## 2017-04-03 RX ORDER — 0.9 % SODIUM CHLORIDE 0.9 %
VIAL (ML) INJECTION
Status: COMPLETED
Start: 2017-04-03 | End: 2017-04-03

## 2017-04-03 RX ORDER — HYDROXYZINE HYDROCHLORIDE 25 MG/1
25 TABLET, FILM COATED ORAL 2 TIMES DAILY
Status: DISCONTINUED | OUTPATIENT
Start: 2017-04-03 | End: 2017-04-24

## 2017-04-03 RX ORDER — LORAZEPAM 0.5 MG/1
0.5 TABLET ORAL NIGHTLY
Status: DISCONTINUED | OUTPATIENT
Start: 2017-04-03 | End: 2017-04-05

## 2017-04-03 RX ORDER — DIPHENHYDRAMINE HYDROCHLORIDE, ZINC ACETATE 2; .1 G/100G; G/100G
1 CREAM TOPICAL 3 TIMES DAILY PRN
Status: DISCONTINUED | OUTPATIENT
Start: 2017-04-03 | End: 2017-04-24

## 2017-04-03 RX ORDER — HALOPERIDOL 5 MG/ML
1 INJECTION INTRAMUSCULAR ONCE
Status: COMPLETED | OUTPATIENT
Start: 2017-04-03 | End: 2017-04-03

## 2017-04-03 NOTE — PROGRESS NOTES
INFECTIOUS DISEASE PROGRESS NOTE    Carrie Lisa Patient Status:  Inpatient    1/3/1995 MRN [id number]   Location [facility] 3W/SW Attending Ximena Morgan MD   [facility] Day # 23 PCP Alta Lucas MD     Subjective:  Patient seen and examined, cefepime 3/15-17)    22 y/o female with h/o IVDA presents with fever, LUE injection site cellulitis, leukocytosis and abnormal CT chest c/w probable septic emboli.  Was started on vancomycin, cefepime.  ID consulted.      # Acute Rash--> ?rifampin vs dapto

## 2017-04-03 NOTE — PLAN OF CARE
CARDIOVASCULAR - ADULT    • Maintains optimal cardiac output and hemodynamic stability Progressing    • Absence of cardiac arrhythmias or at baseline    ON TELE. SINUS TACH. DENIES CHEST PAIN THIS SHIFT.   Progressing        DRUG ABUSE/DETOX    • Will have

## 2017-04-03 NOTE — PROGRESS NOTES
PT CALLED C/O CHEST TIGHTNESS AND SOB.  VS STABLE. 100% ON RA  EKG DONE  DR Erlin Mckinley NOTIFIED  SEE ORDERS

## 2017-04-03 NOTE — PLAN OF CARE
At 1144: This writer given to patient  IM Ativan and Haldol  After medication administration noted drop off medication on injected site. Explained to patient IM injection procedure.      At 1236 Patient stated that \"All medications came out from injection

## 2017-04-03 NOTE — BH PROGRESS NOTE
S  Called to see patient b/o sob and pleuritic chest pain. She says this is similar to the day of admission but the pain is not as bad. She is tearful and anxious. Said she was up to the bathroom to urinate and deveolped the sudden onset of cp and sob.

## 2017-04-03 NOTE — PLAN OF CARE
Problem: Patient/Family Goals  Goal: Patient/Family Long Term Goal  Patient’s Long Term Goal: Patient remains free of infection, as evidenced by normal vital signs and absence of signs and symptoms of infection by discharge    Interventions:  - monitor VS and hemodynamic stability  INTERVENTIONS:  - Monitor vital signs, rhythm, and trends  - Monitor for bleeding, hypotension and signs of decreased cardiac output  - Evaluate effectiveness of vasoactive medications to optimize hemodynamic stability  - Monitor to call for assistance with activity based on assessment  - Modify environment to reduce risk of injury  - Provide assistive devices as appropriate  - Consider OT/PT consult to assist with strengthening/mobility  - Encourage toileting schedule   Outcome: P

## 2017-04-03 NOTE — DIETARY NOTE
ADULT NUTRITION RE-ASSESSMENT    Pt is at moderate nutrition risk. Pt does not meet malnutrition criteria.       RECOMMENDATIONS TO MD:  none at this time     NUTRITION DIAGNOSIS/PROBLEM:  Inadequate oral intake related to decreased ability to consume suff 6.4 oz)   03/21/17 0601 57.244 kg (126 lb 3.2 oz)     GASTROINTESTINAL PROBLEMS: GI intact    FOOD/NUTRITION RELATED HISTORY:  Appetite: Good and Improved  Intake: most reported as %   Intake Meeting Needs: Yes  Food Allergies: No  Cultural/Ethnic/Re

## 2017-04-03 NOTE — PLAN OF CARE
Patient requested IV benadryl. Informed patient that not due till another an hour. Offered topical benadryl cream, patient refused. Paged Dr. Myrna Osler. Per MD give one dose of Benadryl now then next dose should be given after 6 hours.

## 2017-04-03 NOTE — PROGRESS NOTES
[facility]    Progress Note    Debbie Donnelly Patient Status:  Inpatient    1/3/1995 MRN [id number]   Location [facility] 3W/SW Attending Jeffrey Garcia.  Mary Hedrick, [address] [facility] [address] Day # 23 PCP Lali Kate MD       Subjective:   Overnight h difficulty coping   off Ambien.    Continue  Clonidine 0.2mg PO qhours for restlessness hold for SBp < 90 or HR < 60.   continue seroquel 200 qHS and  And  150 mg qHS  Dr. Barry Hays following   cont methadone 60mg daily for withdrawal- increased from 50 mg

## 2017-04-04 PROCEDURE — 99233 SBSQ HOSP IP/OBS HIGH 50: CPT | Performed by: HOSPITALIST

## 2017-04-04 PROCEDURE — 99233 SBSQ HOSP IP/OBS HIGH 50: CPT | Performed by: OTHER

## 2017-04-04 RX ORDER — HALOPERIDOL 5 MG/ML
0.5 INJECTION INTRAMUSCULAR ONCE
Status: COMPLETED | OUTPATIENT
Start: 2017-04-04 | End: 2017-04-04

## 2017-04-04 RX ORDER — HALOPERIDOL 1 MG/1
1 TABLET ORAL 3 TIMES DAILY
Status: DISCONTINUED | OUTPATIENT
Start: 2017-04-04 | End: 2017-04-24

## 2017-04-04 RX ORDER — LORAZEPAM 2 MG/ML
0.5 INJECTION INTRAMUSCULAR ONCE
Status: COMPLETED | OUTPATIENT
Start: 2017-04-04 | End: 2017-04-04

## 2017-04-04 RX ORDER — METHYLPREDNISOLONE SODIUM SUCCINATE 125 MG/2ML
60 INJECTION, POWDER, LYOPHILIZED, FOR SOLUTION INTRAMUSCULAR; INTRAVENOUS EVERY 6 HOURS
Status: DISCONTINUED | OUTPATIENT
Start: 2017-04-04 | End: 2017-04-05

## 2017-04-04 NOTE — PROGRESS NOTES
INFECTIOUS DISEASE PROGRESS NOTE    Vira Harper Patient Status:  Inpatient    1/3/1995 MRN [id number]   Location [facility] 3W/SW Attending Jennifer Wright MD   Hosp Day # 21 PCP Ivonne Monson MD     Subjective:  Patient seen and examined, 3/15-17)    24 y/o female with h/o IVDA presents with fever, LUE injection site cellulitis, leukocytosis and abnormal CT chest c/w probable septic emboli.  Was started on vancomycin, cefepime.  ID consulted.      # Acute Rash--> ?rifampin vs daptomyin  # S

## 2017-04-04 NOTE — PROGRESS NOTES
[facility]    Progress Note    Sukumar Crisostomo Patient Status:  Inpatient    1/3/1995 MRN [id number]   Location [facility] 3W/SW Attending Deborah Delacruz.  Nereyda Robertson, [address] [facility] [address] Day # 21 PCP Jessica Lee MD       Subjective:   No acute ev difficulty coping   off Ambien.    Continue  Clonidine 0.2mg PO qhours for restlessness hold for SBp < 90 or HR < 60.   continue seroquel 200 qHS and  And  150 mg qHS  Dr. Cook Room following   cont methadone 60mg daily for withdrawal- increased from 50 mg

## 2017-04-04 NOTE — PLAN OF CARE
CARDIOVASCULAR - ADULT    • Maintains optimal cardiac output and hemodynamic stability Progressing    • Absence of cardiac arrhythmias or at baseline Progressing    Sinus tach on tele.  VSS    COPING    • Pt/Family able to verbalize concerns and demonstrate

## 2017-04-05 PROCEDURE — 99232 SBSQ HOSP IP/OBS MODERATE 35: CPT | Performed by: HOSPITALIST

## 2017-04-05 PROCEDURE — 99233 SBSQ HOSP IP/OBS HIGH 50: CPT | Performed by: OTHER

## 2017-04-05 RX ORDER — METHYLPREDNISOLONE SODIUM SUCCINATE 125 MG/2ML
60 INJECTION, POWDER, LYOPHILIZED, FOR SOLUTION INTRAMUSCULAR; INTRAVENOUS EVERY 8 HOURS
Status: DISCONTINUED | OUTPATIENT
Start: 2017-04-06 | End: 2017-04-06

## 2017-04-05 RX ORDER — CLONAZEPAM 0.5 MG/1
0.5 TABLET ORAL DAILY
Status: DISCONTINUED | OUTPATIENT
Start: 2017-04-05 | End: 2017-04-24

## 2017-04-05 NOTE — PAYOR COMM NOTE
CLINICAL UPDATES  Ganesh Alfonso #[id number]  (20 year old F)       [facility] [id number]         Altaf Kaufman MD Physician Signed  Progress Notes 4/1/2017  8:22 PM      [address]    Progress Note     improved.    - improved    - ID following.      5.Heroin withdrawal/Psychosis  - with diarrhea and restlessness, difficulty coping   - off Ambien.    - Continue  Clonidine 0.2mg PO qhours for restlessness hold for SBp < 90 or HR < 60.   - continue seroquel PM      Expand All Collapse All    [place] [facility] - [facility]    Progress Note  [address] [facility] Patient Status:  Inpatient    [facility] 1/3/1995  [address] [facility] [id number]    [facility] 3W/SW  Attending  Cesar Calderon MD    Hosp Day #  18  PC coping   - off Ambien.    - Continue  Clonidine 0.2mg PO qhours for restlessness hold for SBp < 90 or HR < 60.   - continue seroquel 200 qHS and  And  150 mg qHS  - Dr. Thomas Mathis following   - cont methadone 60mg daily for withdrawal- increased from 50 mg 01/29/2015    MG  1.8  04/02/2017    TROP  0.05*  03/15/2017    CK  55  03/29/2017                    April Ramirez MD  4/2/2017  Rodo Gonzalez I #[id number]  (20 year old F)       [facility] [id number]         Jose Sparks MD Physi diffuse  Likely drug reaction, rifampin stopped  D/w ID  cont antihistamines, Pepcid  On solumedrol  No respiratory difficulties    Joint/muscle pain  improved  check CK level-WNL    ruled out rhabdo    GERD  protonix 40 mg PO qdaily     LUE cellulitis  im -------------------------- Sinus Tachycardia WITHIN NORMAL LIMITS When compared with ECG of 03/15/2017 03:04:37 No significant changes have occurred Electronically signed on 04/03/2017 at 13:22 by Cuong Post.      4/3/2017  Dorota Fitch [facility] I # radiculopathy     mild dextroscoliosis with normal flexion and extension x-rays     L5-S1 right & central mild-slight, L4-5 mild central, L3-4 slight central bulging discs     Neck pain     Bilateral groin pain     Leg pain     Chest pain, atypical     Lef

## 2017-04-05 NOTE — PROGRESS NOTES
INFECTIOUS DISEASE PROGRESS NOTE    Valerio Knapp Patient Status:  Inpatient    1/3/1995 MRN [id number]   Location [facility] 3W/SW Attending Sammy Washington MD   Hosp Day # 21 PCP Behzad Medrano MD     Subjective:  Patient seen and examined, [facility])      ASSESSMENT/PLAN:    Antibiotics:   daptomycin 3/28, ( rifampin 3/17-4/2, Vancomycin 3/15-28, cefepime 3/15-17)    24 y/o female with h/o IVDA presents with fever, LUE injection site cellulitis, leukocytosis and abnormal CT chest c/w probable sep

## 2017-04-05 NOTE — PLAN OF CARE
Problem: Patient/Family Goals  Goal: Patient/Family Long Term Goal  Patient’s Long Term Goal: Patient remains free of infection, as evidenced by normal vital signs and absence of signs and symptoms of infection by discharge    Interventions:  - monitor VS staff  - Encourage 12-Step involvement or recovery focused alternative   Outcome: Progressing    Problem: CARDIOVASCULAR - ADULT  Goal: Maintains optimal cardiac output and hemodynamic stability  INTERVENTIONS:  - Monitor vital signs, rhythm, and trends  - goal  INTERVENTIONS:  - Encourage pt to monitor pain and request assistance  - Assess pain using appropriate pain scale  - Administer analgesics based on type and severity of pain and evaluate response  - Implement non-pharmacological measures as appropria post-discharge preferences of patient/family/discharge partner  - Complete POLST form as appropriate  - Assess patient’s ability to be responsible for managing their own health  - Refer to Case Management Department for coordinating discharge planning if t

## 2017-04-05 NOTE — PROGRESS NOTES
Nato Olivas is a 23year old  single female lives with her family with a chronic history of bipolar disorder and polysubstance dependency mostly heroin IV currently in methadone clinic who presented to the hospital with left-sided chest pain a (HALDOL) tab 1 mg 1 mg Oral TID   MethylPREDNISolone Sodium Succ (Solu-MEDROL) injection 60 mg 60 mg Intravenous Q6H   diphenhydrAMINE-zinc acetate (BENADRYL) 3-4.7 % cream 1 Application 1 Application Topical TID PRN   HydrOXYzine HCl (ATARAX) tab 25 mg 25 04/04/2017   CA 8.5 04/04/2017   ALB 2.7* 04/02/2017   ALKPHO 87 04/02/2017   TP 6.0 04/02/2017   AST 29 04/02/2017   ALT 27 04/02/2017   T4F 0.73 11/02/2011   TSH 1.47 01/29/2015   DDIMER 0.47 04/02/2017   CRP <0.5 01/29/2015   MG 1.9 04/03/2017   TROP 0. Acid, Plasma  D-Dimer  Lipid Panel  HCG, Beta Subunit, Quant  CBC With Differential With Platelet  Comp Metabolic Panel (14)  Procalcitonin  Drug Abuse Panel 10 screen STAT  Urinalysis with Culture Reflex Once  Vancomycin Trough, Serum  Basic Metabolic Pan Once    Meds This Visit:    No prescriptions requested or ordered in this encounter      4/4/2017  Pal Jenkins MD

## 2017-04-05 NOTE — PROGRESS NOTES
[facility]    Progress Note    Geovanna Gilmore Patient Status:  Inpatient    1/3/1995 MRN [id number]   Location [facility] 3W/SW Attending Mike Jackman.  Rosa Sandy, [address] [facility] Se Day # 21 PCP Nory Palacio MD       Subjective:   No acute ev Ambien.    Continue  Clonidine 0.2mg PO qhours for restlessness hold for SBp < 90 or HR < 60.   continue seroquel 200 qHS and  And  150 mg qHS  Dr. Mary Leal following   cont methadone 60mg daily for withdrawal- increased from 50 mg  Added scheduled Haldol a

## 2017-04-06 PROCEDURE — 99232 SBSQ HOSP IP/OBS MODERATE 35: CPT | Performed by: HOSPITALIST

## 2017-04-06 RX ORDER — PREDNISONE 20 MG/1
40 TABLET ORAL
Status: DISCONTINUED | OUTPATIENT
Start: 2017-04-07 | End: 2017-04-10

## 2017-04-06 NOTE — PROGRESS NOTES
Debbie Donnelly is a 23year old  single female lives with her family with a chronic history of bipolar disorder and polysubstance dependency mostly heroin IV currently in methadone clinic who presented to the hospital with left-sided chest pain a Packs/Day: 1.00  Years: 1         Types: Cigarettes      Quit date: 01/23/2015    Smokeless Status: Never Used                        Alcohol Use: No                 Medications (Active prior to today's visit):    Current Facility-Administered Medications: Hives    Laboratory Data:    Lab Results  Component Value Date   WBC 12.9* 04/05/2017   HGB 10.9* 04/05/2017   HCT 33.1* 04/05/2017    04/05/2017   CREATSERUM 0.69 04/05/2017   BUN 10 04/05/2017    04/05/2017   K 4.4 04/05/2017    04/05/ for drug-seeking behavior.         Orders This Visit:    Orders Placed This Encounter  CBC With Differential With Platelet  Basic Metabolic Panel (8)  Troponin I, 0 Hour  Lactic Acid, Plasma  D-Dimer  Lipid Panel  HCG, Beta Subunit, Quant  CBC With Differen Catheter Once  Blood Culture Once  Blood Culture FREQ X 2  Blood Culture Once  Blood Culture Once  Clostridium difficile(toxigenic)PCR Once  Blood Culture Once  Blood Culture Once    Meds This Visit:    No prescriptions requested or ordered in this encount

## 2017-04-06 NOTE — PROGRESS NOTES
[place] [facility] - [facility]    Progress Note    Namita Byers Patient Status:  Inpatient    1/3/1995 MRN [id number]   Location [facility] 3W/SW Attending Jacoby Menendez.  Agustina Fonseca, [address] [facility] Se Day # 25 PCP Joselyn Booker MD       Subjective:   No acute ev restlessness, difficulty coping -> this is much improved  off Ambien.    continue seroquel 200 qHS and  And  150 mg qHS  Dr. Lee Dangelo following   cont methadone 60mg daily for withdrawal- increased from 50 mg  Added scheduled Haldol and klonopin    Septic e

## 2017-04-06 NOTE — PLAN OF CARE
Problem: Patient/Family Goals  Goal: Patient/Family Long Term Goal  Patient’s Long Term Goal: Patient remains free of infection, as evidenced by normal vital signs and absence of signs and symptoms of infection by discharge    Interventions:  - monitor VS him    Problem: DRUG ABUSE/DETOX  Goal: Will have no detox symptoms and will verbalize plan for changing drug-related behavior  INTERVENTIONS:  - Administer medication as ordered  - Monitor physical status  - Provide emotional support with 1:1 interaction deep breathe, Incentive Spirometry  - Assess the need for suctioning and perform as needed  - Assess and instruct to report SOB or any respiratory difficulty  - Respiratory Therapy support as indicated  - Manage/alleviate anxiety  - Monitor for signs/sympt partner in discharge planning  - Arrange for needed discharge resources and transportation as appropriate  - Identify discharge learning needs (meds, wound care, etc)  - Arrange for interpreters to assist at discharge as needed  - Consider post-discharge p

## 2017-04-06 NOTE — PLAN OF CARE
Problem: Patient/Family Goals  Goal: Patient/Family Long Term Goal  Patient’s Long Term Goal: Patient remains free of infection, as evidenced by normal vital signs and absence of signs and symptoms of infection by discharge    Interventions:  - monitor VS Restricted visitors after 2100.     Problem: CARDIOVASCULAR - ADULT  Goal: Maintains optimal cardiac output and hemodynamic stability  INTERVENTIONS:  - Monitor vital signs, rhythm, and trends  - Monitor for bleeding, hypotension and signs of decreased card assistance  - Assess pain using appropriate pain scale  - Administer analgesics based on type and severity of pain and evaluate response  - Implement non-pharmacological measures as appropriate and evaluate response  - Consider cultural and social influenc ability or social support system   Outcome: Progressing    Problem: Patient Centered Care  Goal: Patient preferences are identified and integrated in the patient’s plan of care  Interventions:  - What would you like us to know as we care for you?  I want to

## 2017-04-07 PROCEDURE — 99233 SBSQ HOSP IP/OBS HIGH 50: CPT | Performed by: OTHER

## 2017-04-07 PROCEDURE — 99232 SBSQ HOSP IP/OBS MODERATE 35: CPT | Performed by: HOSPITALIST

## 2017-04-07 RX ORDER — BISACODYL 10 MG
10 SUPPOSITORY, RECTAL RECTAL
Status: DISCONTINUED | OUTPATIENT
Start: 2017-04-07 | End: 2017-04-24

## 2017-04-07 RX ORDER — POLYETHYLENE GLYCOL 3350 17 G/17G
17 POWDER, FOR SOLUTION ORAL DAILY PRN
Status: DISCONTINUED | OUTPATIENT
Start: 2017-04-07 | End: 2017-04-24

## 2017-04-07 NOTE — PLAN OF CARE
Problem: Patient/Family Goals  Goal: Patient/Family Long Term Goal  Patient’s Long Term Goal: Patient remains free of infection, as evidenced by normal vital signs and absence of signs and symptoms of infection by discharge    Interventions:  - monitor VS cardiac output and hemodynamic stability  INTERVENTIONS:  - Monitor vital signs, rhythm, and trends  - Monitor for bleeding, hypotension and signs of decreased cardiac output  - Evaluate effectiveness of vasoactive medications to optimize hemodynamic stabi Administer analgesics based on type and severity of pain and evaluate response  - Implement non-pharmacological measures as appropriate and evaluate response  - Consider cultural and social influences on pain and pain management  - Manage/alleviate anxiety Progressing    Problem: Patient Centered Care  Goal: Patient preferences are identified and integrated in the patient’s plan of care  Interventions:  - What would you like us to know as we care for you?  I want to feel better  - Provide timely, complete, an

## 2017-04-07 NOTE — PROGRESS NOTES
[place] [facility] - [facility]    Progress Note    Namita Byers Patient Status:  Inpatient    1/3/1995 MRN [id number]   Location [facility] 3W/SW Attending Jacoby Menendez.  Agustina Fonseca, [address] [facility] Se Day # 23 PCP Joselyn Booker MD       Subjective:   No acute ev restlessness, difficulty coping -> this is much improved  off Ambien.    continue seroquel   Dr. Jana Caban following   cont methadone 60mg daily for withdrawal- increased from 50 mg  Added scheduled Haldol and klonopin    Septic emboli to lung    suspect R s

## 2017-04-07 NOTE — PROGRESS NOTES
INFECTIOUS DISEASE PROGRESS NOTE    Fortunato Morejon Patient Status:  Inpatient    1/3/1995 MRN [id number]   Location [facility] 3W/SW Attending Chucho Bustos MD   [address] Day # 21 PCP Shanda Peterson MD     Subjective:  Patient seen and examined, female with h/o IVDA presents with fever, LUE injection site cellulitis, leukocytosis and abnormal CT chest c/w probable septic emboli.  Was started on vancomycin, cefepime.  ID consulted.      # Acute Rash--> resolving off rifampin   # Staph aureus (MRSA)

## 2017-04-07 NOTE — PLAN OF CARE
Problem: Patient/Family Goals  Goal: Patient/Family Long Term Goal  Patient’s Long Term Goal: Patient remains free of infection, as evidenced by normal vital signs and absence of signs and symptoms of infection by discharge    Interventions:  - monitor VS support with 1:1 interaction with staff  - Encourage 12-Step involvement or recovery focused alternative   Outcome: Progressing  Patient did not exhibit and drug seeking behavior     Problem: CARDIOVASCULAR - ADULT  Goal: Maintains optimal cardiac output a indicated  - Manage/alleviate anxiety  - Monitor for signs/symptoms of CO2 retention   Outcome: Progressing  Saturating well on room air    Problem: PAIN - ADULT  Goal: Verbalizes/displays adequate comfort level or patient’s stated pain goal  INTERVENTIONS preferences of patient/family/discharge partner  - Complete POLST form as appropriate  - Assess patient’s ability to be responsible for managing their own health  - Refer to Case Management Department for coordinating discharge planning if the patient need

## 2017-04-08 PROCEDURE — 99232 SBSQ HOSP IP/OBS MODERATE 35: CPT | Performed by: HOSPITALIST

## 2017-04-08 NOTE — PROGRESS NOTES
Vira Harper is a 23year old  single female lives with her family with a chronic history of bipolar disorder and polysubstance dependency mostly heroin IV currently in methadone clinic who presented to the hospital with left-sided chest pain a Packs/Day: 1.00  Years: 1         Types: Cigarettes      Quit date: 01/23/2015    Smokeless Status: Never Used                        Alcohol Use: No                 Medications (Active prior to today's visit):    Current Facility-Administered Medications: acetaminophen (TYLENOL) tab 650 mg 650 mg Oral Q6H PRN       Allergies:    Amoxicillin             Hives    Laboratory Data:    Lab Results  Component Value Date   WBC 13.5* 04/07/2017   HGB 10.6* 04/07/2017   HCT 32.0* 04/07/2017    04/07/2017 Encounter  CBC With Differential With Platelet  Basic Metabolic Panel (8)  Troponin I, 0 Hour  Lactic Acid, Plasma  D-Dimer  Lipid Panel  HCG, Beta Subunit, Quant  CBC With Differential With Platelet  Comp Metabolic Panel (14)  Procalcitonin  Drug Abuse Pa Platelet  Insert PICC/Midline Catheter Once  Blood Culture Once  Blood Culture FREQ X 2  Blood Culture Once  Blood Culture Once  Clostridium difficile(toxigenic)PCR Once  Blood Culture Once  Blood Culture Once    Meds This Visit:    No prescriptions reques

## 2017-04-08 NOTE — PLAN OF CARE
Problem: Patient/Family Goals  Goal: Patient/Family Long Term Goal  Patient’s Long Term Goal: Patient remains free of infection, as evidenced by normal vital signs and absence of signs and symptoms of infection by discharge    Interventions:  - monitor VS noted    Problem: CARDIOVASCULAR - ADULT  Goal: Maintains optimal cardiac output and hemodynamic stability  INTERVENTIONS:  - Monitor vital signs, rhythm, and trends  - Monitor for bleeding, hypotension and signs of decreased cardiac output  - Evaluate eff pain and request assistance  - Assess pain using appropriate pain scale  - Administer analgesics based on type and severity of pain and evaluate response  - Implement non-pharmacological measures as appropriate and evaluate response  - Consider cultural an patient needs post-hospital services based on physician/LIP order or complex needs related to functional status, cognitive ability or social support system   Outcome: Progressing  Plan of care discussed with pt    Problem: Patient Centered Care  Goal: Robyn

## 2017-04-08 NOTE — PROGRESS NOTES
[facility]  Hospitalist Progress  Note     Pedro Sheridan Patient Status:  Inpatient      23year old [facility] [id number]   Location 569/569-A Attending Margarito Cornejo MD   Hosp Day # 24 PCP Desiree Adrian MD     ASSESSMENT/PLAN    In 28.7  28.8   MCHC  33.2  33.2   RDW  13.6  13.4   WBC  13.5*  13.9*   PLT  234  229     Recent Labs   Lab  04/07/17   0618   GLU  89   BUN  18   CREATSERUM  0.66   GFRAA  >60   GFRNAA  >60   CA  8.5   NA  136   K  4.4   CL  100   CO2  31

## 2017-04-08 NOTE — PLAN OF CARE
Problem: Patient/Family Goals  Goal: Patient/Family Long Term Goal  Patient’s Long Term Goal: Patient remains free of infection, as evidenced by normal vital signs and absence of signs and symptoms of infection by discharge    Interventions:  - monitor VS ventilation and oxygenation  INTERVENTIONS:  - Assess for changes in respiratory status  - Assess for changes in mentation and behavior  - Position to facilitate oxygenation and minimize respiratory effort  - Oxygen supplementation based on oxygen saturati Progressing    Problem: Patient Centered Care  Goal: Patient preferences are identified and integrated in the patient’s plan of care  Interventions:  - What would you like us to know as we care for you?  I want to feel better  - Provide timely, complete, an

## 2017-04-09 PROCEDURE — 99232 SBSQ HOSP IP/OBS MODERATE 35: CPT | Performed by: HOSPITALIST

## 2017-04-09 RX ORDER — 0.9 % SODIUM CHLORIDE 0.9 %
VIAL (ML) INJECTION
Status: COMPLETED
Start: 2017-04-09 | End: 2017-04-09

## 2017-04-09 NOTE — PROGRESS NOTES
[facility]  Hospitalist Progress  Note     Ladi Arunor Patient Status:  Inpatient      23year old CSN [id number]   Location 569/569-A Attending Samantha Naik MD   Hosp Day # 25 PCP Alla Will MD     ASSESSMENT/PLAN    In 28.7  28.8   MCHC  33.2  33.2   RDW  13.6  13.4   WBC  13.5*  13.9*   PLT  234  229     Recent Labs   Lab  04/07/17   0618   GLU  89   BUN  18   CREATSERUM  0.66   GFRAA  >60   GFRNAA  >60   CA  8.5   NA  136   K  4.4   CL  100   CO2  31

## 2017-04-09 NOTE — PLAN OF CARE
Problem: Patient/Family Goals  Goal: Patient/Family Long Term Goal  Patient’s Long Term Goal: Patient remains free of infection, as evidenced by normal vital signs and absence of signs and symptoms of infection by discharge    Interventions:  - monitor VS I want to stay clean\"    Problem: CARDIOVASCULAR - ADULT  Goal: Maintains optimal cardiac output and hemodynamic stability  INTERVENTIONS:  - Monitor vital signs, rhythm, and trends  - Monitor for bleeding, hypotension and signs of decreased cardiac outpu Encourage pt to monitor pain and request assistance  - Assess pain using appropriate pain scale  - Administer analgesics based on type and severity of pain and evaluate response  - Implement non-pharmacological measures as appropriate and evaluate response planning if the patient needs post-hospital services based on physician/LIP order or complex needs related to functional status, cognitive ability or social support system   Outcome: Progressing  Discharge plan discussed with pt    Problem: Patient Светлана Perez

## 2017-04-10 PROCEDURE — 99233 SBSQ HOSP IP/OBS HIGH 50: CPT | Performed by: HOSPITALIST

## 2017-04-10 PROCEDURE — 99232 SBSQ HOSP IP/OBS MODERATE 35: CPT | Performed by: OTHER

## 2017-04-10 NOTE — PROGRESS NOTES
[place] [facility] - [facility]  Hospitalist Progress  Note     Ronnald Gearing Patient Status:  Inpatient      23year old [facility] [id number]   Location 569/569-A Attending Ale Pal MD   Hosp Day # 32 PCP Jaycob Lowery MD     ASSESSMENT/PLAN    I No results for input(s): GLU, BUN, CREATSERUM, GFRAA, GFRNAA, CA, ALB, NA, K, CL, CO2, ALKPHO, AST, ALT, BILT, TP in the last 72 hours.

## 2017-04-10 NOTE — PROGRESS NOTES
INFECTIOUS DISEASE PROGRESS NOTE    Tamir Muniz Patient Status:  Inpatient    1/3/1995 MRN [id number]   Location [facility] 3W/SW Attending Aury Kaur MD   [address] Day # 32 PCP Marilyn Basilio MD     Subjective:  Patient seen and examined, emboli.  Was started on vancomycin, cefepime.  ID consulted.      # Acute Rash--> resolving off rifampin   # Staph aureus (MRSA) sepsis, bacteremia due to PVE   - HIV non-reactive 3/27  # LUE cellulitis due to IVDA, ?early abscess---> resolved  # L antecub

## 2017-04-11 PROCEDURE — 99233 SBSQ HOSP IP/OBS HIGH 50: CPT | Performed by: HOSPITALIST

## 2017-04-11 RX ORDER — FLUCONAZOLE 100 MG/1
150 TABLET ORAL ONCE
Status: COMPLETED | OUTPATIENT
Start: 2017-04-11 | End: 2017-04-11

## 2017-04-11 RX ORDER — MAGNESIUM OXIDE 400 MG (241.3 MG MAGNESIUM) TABLET
400 TABLET ONCE
Status: COMPLETED | OUTPATIENT
Start: 2017-04-11 | End: 2017-04-11

## 2017-04-11 NOTE — PAYOR COMM NOTE
4/5/17  Objective:    Blood pressure 115/79, pulse 98, temperature 97.6 °F (36.4 °C), temperature source Oral, resp. rate 18, height 5' 2\" (1.575 m), weight 130 lb 8 oz (59.194 kg), SpO2 97 %, not currently breastfeeding.     HEENT: conjunctivae/corneas cl Results  Component  Value  Date    [facility] SHEA 12.9*  04/05/2017    HGB  10.9*  04/05/2017    HCT  33.1*  04/05/2017    PLT  257  04/05/2017    CREATSERUM  0.69  04/05/2017    BUN  10  04/05/2017    NA  137  04/05/2017    K  4.4  04/05/2017    CL  104  04/05/2017 PO qdaily     LUE cellulitis  resolved  ID following.    Cont abx          4/7/17    Subjective:  Patient seen and examined, notes, labs reviewed  rash resolving      Objective:  /79 mmHg  Pulse 108  Temp(Src) 97.4 °F (36.3 °C) (Oral)  Resp 18  Ht 157 following  -Repeat KAVITA upon completion of treatment course    Other Problems  Sepsis.  Due to MRSA.  Surveillance blood cultures negative.  See above  Septic emboli.  Due to above  Rash.  Possibly related to rifampin which is stopped.  Resolved.  Decrease Decrease steroids in 3 days  Left upper extremity cellulitis.  Resolved  Heroin abuse/withdrawal.  Psychiatry following  Leukocytosis.  Due to steroids.     DVT prophylaxis with subcu heparin  CODE STATUS is full code      SUBJECTIVE   no new complaints.  N

## 2017-04-11 NOTE — PROGRESS NOTES
Sumaya Chau is a 23year old  single female lives with her family with a chronic history of bipolar disorder and polysubstance dependency mostly heroin IV currently in methadone clinic who presented to the hospital with left-sided chest pain a Maternal Grandfather    • Prostate Cancer Paternal Grandfather      w/ metastasis   • back pain[other] [OTHER] Father    • Hypertension Mother       Social History:   Smoking Status: Former Smoker                   Packs/Day: 1.00  Years: 1         Types: Subcutaneous Q8H   haloperidol lactate (HALDOL) 5 MG/ML injection 1 mg 1 mg Intramuscular Q6H PRN   nicotine (NICODERM CQ) 21 MG/24HR 1 patch 1 patch Transdermal Daily   ondansetron HCl (ZOFRAN) injection 4 mg 4 mg Intravenous Q6H PRN   acetaminophen (TYLE Continue methadone 60 mg daily. 5.  Communicate with attending and RN and agree to be strict with the medication management and minimize reinforcement for drug-seeking behavior.         Orders This Visit:    Orders Placed This Encounter  CBC With Different Differential With Platelet  Basic Metabolic Panel (8)  MD BLOOD SMEAR CONSULT  CBC With Differential With Platelet  Basic Metabolic Panel (8)  CBC With Differential With Platelet  Basic Metabolic Panel (8)  CBC With Differential With Platelet  Basic Metabo

## 2017-04-11 NOTE — PLAN OF CARE
Problem: COPING  Goal: Pt/Family able to verbalize concerns and demonstrate effective coping strategies  INTERVENTIONS:  - Assist patient/family to identify coping skills, available support systems and cultural and spiritual values  - Provide emotional sup Progressing    Problem: RESPIRATORY - ADULT  Goal: Achieves optimal ventilation and oxygenation  INTERVENTIONS:  - Assess for changes in respiratory status  - Assess for changes in mentation and behavior  - Position to facilitate oxygenation and minimize r strengthening/mobility  - Encourage toileting schedule   Outcome: Progressing    Problem: Patient Centered Care  Goal: Patient preferences are identified and integrated in the patient’s plan of care  Interventions:  - What would you like us to know as we c

## 2017-04-11 NOTE — PROGRESS NOTES
[place] [facility] - [facility]  Hospitalist Progress  Note     Namita Barronoln Patient Status:  Inpatient      23year old CSN [id number]   Location 569/569-A Attending Mayda Wilkins MD   Hosp Day # 32 PCP Joselyn Booker MD     ASSESSMENT/PLAN    I Recent Labs   Lab  04/11/17   0520   GLU  113*   BUN  17   CREATSERUM  0.71   GFRAA  >60   GFRNAA  >60   CA  8.8   NA  138   K  4.2   CL  97   CO2  33*

## 2017-04-12 PROCEDURE — 99232 SBSQ HOSP IP/OBS MODERATE 35: CPT | Performed by: OTHER

## 2017-04-12 PROCEDURE — 99233 SBSQ HOSP IP/OBS HIGH 50: CPT | Performed by: HOSPITALIST

## 2017-04-12 RX ORDER — SIMETHICONE 80 MG
80 TABLET,CHEWABLE ORAL
Status: DISCONTINUED | OUTPATIENT
Start: 2017-04-12 | End: 2017-04-24

## 2017-04-12 NOTE — PLAN OF CARE
Problem: Patient/Family Goals  Goal: Patient/Family Long Term Goal  Patient’s Long Term Goal: Patient remains free of infection, as evidenced by normal vital signs and absence of signs and symptoms of infection by discharge    Interventions:  - monitor VS emotional support with 1:1 interaction with staff  - Encourage 12-Step involvement or recovery focused alternative   Outcome: Progressing  Patient is calm and receptive to treatment plan.     Problem: CARDIOVASCULAR - ADULT  Goal: Maintains optimal cardiac indicated  - Manage/alleviate anxiety  - Monitor for signs/symptoms of CO2 retention   Outcome: Progressing  Patient 02 saturation within normal limits at this time.     Problem: PAIN - ADULT  Goal: Verbalizes/displays adequate comfort level or patient’s st Arrange for interpreters to assist at discharge as needed  - Consider post-discharge preferences of patient/family/discharge partner  - Complete POLST form as appropriate  - Assess patient’s ability to be responsible for managing their own health  - Refer

## 2017-04-12 NOTE — PROGRESS NOTES
[facility] [place]  Hospitalist Progress  Note     Debra Sepulveda Patient Status:  Inpatient      23year old [facility] [id number]   Location 569/569-A Attending Esther Mcmanus MD   Hosp Day # 28 PCP Rogelio Montoya MD     ASSESSMENT/PLAN    I MCV  87.3   MCH  29.3   MCHC  33.5   RDW  13.8   WBC  12.1*   PLT  200     Recent Labs   Lab  04/11/17   0520  04/12/17   0511   GLU  113*  94   BUN  17  14   CREATSERUM  0.71  0.86   GFRAA  >60  >60   GFRNAA  >60  >60   CA  8.8  8.9   NA  138  138   K

## 2017-04-12 NOTE — PLAN OF CARE
Problem: Patient/Family Goals  Goal: Patient/Family Long Term Goal  Patient’s Long Term Goal: Patient remains free of infection, as evidenced by normal vital signs and absence of signs and symptoms of infection by discharge    Interventions:  - monitor VS Angina  - Evaluate fluid balance, assess for edema, trend weights   Outcome: Progressing  Goal: Absence of cardiac arrhythmias or at baseline  INTERVENTIONS:  - Continuous cardiac monitoring, monitor vital signs, obtain 12 lead EKG if indicated  - Evaluate injury  INTERVENTIONS:  - Assess pt frequently for physical needs  - Identify cognitive and physical deficits and behaviors that affect risk of falls.   - [place] fall precautions as indicated by assessment.  - Educate pt/family on patient safety includin

## 2017-04-13 ENCOUNTER — APPOINTMENT (OUTPATIENT)
Dept: GENERAL RADIOLOGY | Facility: HOSPITAL | Age: 22
DRG: 871 | End: 2017-04-13
Attending: HOSPITALIST
Payer: MEDICAID

## 2017-04-13 PROCEDURE — 99233 SBSQ HOSP IP/OBS HIGH 50: CPT | Performed by: HOSPITALIST

## 2017-04-13 PROCEDURE — 74000 XR ABDOMEN (1 VIEW) (CPT=74000): CPT

## 2017-04-13 RX ORDER — BISACODYL 10 MG
10 SUPPOSITORY, RECTAL RECTAL
Status: DISCONTINUED | OUTPATIENT
Start: 2017-04-13 | End: 2017-04-13

## 2017-04-13 RX ORDER — PREDNISONE 20 MG/1
20 TABLET ORAL
Status: COMPLETED | OUTPATIENT
Start: 2017-04-14 | End: 2017-04-16

## 2017-04-13 RX ORDER — METHADONE HYDROCHLORIDE 10 MG/5ML
60 SOLUTION ORAL DAILY
Status: DISCONTINUED | OUTPATIENT
Start: 2017-04-13 | End: 2017-04-24

## 2017-04-13 RX ORDER — LACTULOSE 10 G/15ML
20 SOLUTION ORAL 2 TIMES DAILY
Status: DISCONTINUED | OUTPATIENT
Start: 2017-04-13 | End: 2017-04-24

## 2017-04-13 NOTE — PLAN OF CARE
Problem: Patient/Family Goals  Goal: Patient/Family Long Term Goal  Patient’s Long Term Goal: Patient remains free of infection, as evidenced by normal vital signs and absence of signs and symptoms of infection by discharge    Interventions:  - monitor VS color and temperature  - Assess for signs of decreased coronary artery perfusion - ex. Angina  - Evaluate fluid balance, assess for edema, trend weights   Outcome: Progressing  No calls received from telemetry.     Problem: RESPIRATORY - ADULT  Goal: [employer] night time for supervision.  No visitors allowed after 9 PM.

## 2017-04-13 NOTE — PROGRESS NOTES
Tiana Nagel is a 23year old  single female lives with her family with a chronic history of bipolar disorder and polysubstance dependency mostly heroin IV currently in methadone clinic who presented to the hospital with left-sided chest pain a Family History   Problem Relation Age of Onset   • Hypertension Maternal Grandmother    • Diabetes Maternal Grandfather    • Prostate Cancer Paternal Grandfather      w/ metastasis   • back pain[other] [OTHER] Father    • Hypertension Mother       Soc Q24H   Normal Saline Flush 0.9 % injection 10 mL 10 mL Intravenous PRN   Heparin Sodium (Porcine) 5000 UNIT/ML injection 5,000 Units 5,000 Units Subcutaneous Q8H   haloperidol lactate (HALDOL) 5 MG/ML injection 1 mg 1 mg Intramuscular Q6H PRN   nicotine (N daily.    Opiate dependency:  1.  Focus on education and support. 2.  Eliminating the drug-seeking behavior by eliminating as needed medication. 4.  Continue methadone 60 mg daily.   5.  Communicate with attending and RN and agree to be strict with the me STAT  Troponin I, 0 Hour  Troponin I  Troponin I  D-Dimer  CBC With Differential With Platelet  Comp Metabolic Panel (14)  Potassium  CBC With Differential With Platelet  Basic Metabolic Panel (8)  MD BLOOD SMEAR CONSULT  CBC With Differential With Platele

## 2017-04-13 NOTE — PROGRESS NOTES
[facility]  Hospitalist Progress  Note     Arabella Naas Patient Status:  Inpatient      23year old [facility] [id number]   Location 569/569-A Attending Maricruz Brian MD   Hosp Day # 34 PCP Shannon Mcguire MD     ASSESSMENT/PLAN    I 3.62*   HGB  10.6*   HCT  31.6*   MCV  87.3   MCH  29.3   MCHC  33.5   RDW  13.8   WBC  12.1*   PLT  200     Recent Labs   Lab  04/11/17   0520  04/12/17   0511  04/13/17   0550   GLU  113*  94  108*   BUN  17  14  12   CREATSERUM  0.71  0.86  0.72   GFRAA

## 2017-04-13 NOTE — PLAN OF CARE
Problem: Patient/Family Goals  Goal: Patient/Family Long Term Goal  Patient’s Long Term Goal: Patient remains free of infection, as evidenced by normal vital signs and absence of signs and symptoms of infection by discharge    Interventions:  - monitor VS involvement or recovery focused alternative   Outcome: Progressing  Important items from home such as phone and computer to help with coping.     Problem: CARDIOVASCULAR - ADULT  Goal: Maintains optimal cardiac output and hemodynamic stability  INTERVENTION Outcome: Progressing  Patient on room air,resting comfortable in bed at this time.     Problem: PAIN - ADULT  Goal: Verbalizes/displays adequate comfort level or patient’s stated pain goal  INTERVENTIONS:  - Encourage pt to monitor pain and request assist preferences of patient/family/discharge partner  - Complete POLST form as appropriate  - Assess patient’s ability to be responsible for managing their own health  - Refer to Case Management Department for coordinating discharge planning if the patient need

## 2017-04-14 PROCEDURE — 99233 SBSQ HOSP IP/OBS HIGH 50: CPT | Performed by: HOSPITALIST

## 2017-04-14 PROCEDURE — 99253 IP/OBS CNSLTJ NEW/EST LOW 45: CPT | Performed by: INTERNAL MEDICINE

## 2017-04-14 PROCEDURE — 45378 DIAGNOSTIC COLONOSCOPY: CPT | Performed by: INTERNAL MEDICINE

## 2017-04-14 PROCEDURE — 99232 SBSQ HOSP IP/OBS MODERATE 35: CPT | Performed by: OTHER

## 2017-04-14 RX ORDER — POLYETHYLENE GLYCOL 3350 17 G/17G
17 POWDER, FOR SOLUTION ORAL 2 TIMES DAILY
Status: DISCONTINUED | OUTPATIENT
Start: 2017-04-15 | End: 2017-04-15 | Stop reason: DRUGHIGH

## 2017-04-14 RX ORDER — MAGNESIUM OXIDE 400 MG (241.3 MG MAGNESIUM) TABLET
400 TABLET ONCE
Status: COMPLETED | OUTPATIENT
Start: 2017-04-14 | End: 2017-04-14

## 2017-04-14 NOTE — PROGRESS NOTES
INFECTIOUS DISEASE PROGRESS NOTE    Debbie Donnelly Patient Status:  Inpatient    1/3/1995 MRN [id number]   Location [facility] 3W/SW Attending Kit Humphreys MD   Hosp Day # 27 PCP Lali Kate MD     Subjective:  Patient seen and examined[age] y/o female with h/o IVDA presents with fever, LUE injection site cellulitis, leukocytosis and abnormal CT chest c/w probable septic emboli.  Was started on vancomycin, cefepime.  ID consulted.      # Acute Rash--> resolving off rifampin   # Staph aureus (M

## 2017-04-14 NOTE — CONSULTS
[facility]  Report of GI Consultation    Vira Harper Patient Status:  Inpatient    1/3/1995 MRN [id number]   Location [facility] 5SW/SE Attending Ebony Bell MD   [facility] Day # 27 PCP Ivonne Monson MD     Date of Admission: shows increased stool burden, possible constipation. Ms. Homar Amato describes seeing some blood with stools. This sounds like streaks, drops, hemorrhoidal.    Ms. Homar Amato describes previous diagnosis of \"IBS. \"  She describes seeing a pediatric GI specialist s Daily   lactulose (CHRONULAC) 10 GM/15ML solution 20 g 20 g Oral BID   simethicone (MYLICON) chewable tab 80 mg 80 mg Oral TID CC and HS   PEG 3350 (MIRALAX) powder packet 17 g 17 g Oral Daily PRN   bisacodyl (DULCOLAX) rectal suppository 10 mg 10 mg Recta loss.  10 point review of systems is as above, otherwise negative. Physical Exam:   Blood pressure 120/83, pulse 92, temperature 97.7 °F (36.5 °C), temperature source Oral, resp.  rate 20, height 5' 2\" (1.575 m), weight 133 lb 13.1 oz (60.7 kg), SpO2 98 several times per day. 3.  Celiac serologies after we discussed testing for celiac disease. 4.  The most aggressive evaluation of these symptoms would be EGD and colonoscopy examinations to evaluate for ulcer disease, inflammatory bowel disease.   She

## 2017-04-14 NOTE — PLAN OF CARE
Problem: Patient/Family Goals  Goal: Patient/Family Short Term Goal  Patient’s Short Term Goal: by the end of the shift patient will state 5 reasons why she should stop using drugs    Interventions:   - assess patient knoweledge about side effects  -educat artery perfusion - ex. Angina  - Evaluate fluid balance, assess for edema, trend weights   Outcome: Progressing  Vital signs taken and recorded, on telebox.  No signs of bleeding and no edema noted,     Problem: RESPIRATORY - ADULT  Goal: Achieves optimal v physical limitations  - Instruct pt to call for assistance with activity based on assessment  - Modify environment to reduce risk of injury  - Provide assistive devices as appropriate  - Consider OT/PT consult to assist with strengthening/mobility  - Encou

## 2017-04-14 NOTE — PROGRESS NOTES
[facility]  Hospitalist Progress  Note     Fabiana General Patient Status:  Inpatient      23year old [facility] [id number]   Location 569/569-A Attending Charles Peters MD   Hosp Day # 27 PCP Judithe Ormond, MD     ASSESSMENT/PLAN    I warm and dry. No rashes, erythema, diaphoresis. Psych: Normal mood and affect.  Behavior and judgment normal.     Labs:  Recent Labs   Lab  04/14/17   0546   RBC  3.76   HGB  10.8*   HCT  32.8*   MCV  87.4   MCH  28.7   MCHC  32.8   RDW  14.0   WBC  11.6*

## 2017-04-15 PROCEDURE — 99233 SBSQ HOSP IP/OBS HIGH 50: CPT | Performed by: HOSPITALIST

## 2017-04-15 RX ORDER — POLYETHYLENE GLYCOL 3350 17 G/17G
17 POWDER, FOR SOLUTION ORAL DAILY
Status: DISCONTINUED | OUTPATIENT
Start: 2017-04-15 | End: 2017-04-24

## 2017-04-15 RX ORDER — MAGNESIUM CARB/ALUMINUM HYDROX 105-160MG
296 TABLET,CHEWABLE ORAL ONCE
Status: COMPLETED | OUTPATIENT
Start: 2017-04-16 | End: 2017-04-16

## 2017-04-15 RX ORDER — MAGNESIUM OXIDE 400 MG (241.3 MG MAGNESIUM) TABLET
400 TABLET ONCE
Status: COMPLETED | OUTPATIENT
Start: 2017-04-15 | End: 2017-04-15

## 2017-04-15 RX ORDER — PREDNISONE 10 MG/1
10 TABLET ORAL
Status: COMPLETED | OUTPATIENT
Start: 2017-04-17 | End: 2017-04-19

## 2017-04-15 NOTE — PROGRESS NOTES
Kelly Connelly is a 23year old  single female lives with her family with a chronic history of bipolar disorder and polysubstance dependency mostly heroin IV currently in methadone clinic who presented to the hospital with left-sided chest pain a [place] [employer] Father    • Hypertension Mother       Social History:   Smoking Status: Former Smoker                   Packs/Day: 1.00  Years: 1         Types: Cigarettes      Quit date: 01/23/2015    Smokeless Status: Never Used                        Alcohol Us Heparin Sodium (Porcine) 5000 UNIT/ML injection 5,000 Units 5,000 Units Subcutaneous Q8H   haloperidol lactate (HALDOL) 5 MG/ML injection 1 mg 1 mg Intramuscular Q6H PRN   nicotine (NICODERM CQ) 21 MG/24HR 1 patch 1 patch Transdermal Daily   ondansetron nightly at 7 PM starting tomorrow. 4.  Continue Haldol 1 mg 3 times daily. Generalized anxiety disorders:  1. Continue Atarax 25 mg twice daily. 2.   Continue Klonopin 0.5 mg daily. Opiate dependency:  1.  Focus on education and support.   2.  Lewis With Platelet  Basic Metabolic Panel (8)  CBC With Differential With Platelet  Basic Metabolic Panel (8)  Magnesium  Magnesium  Magnesium  Drug screen 7 w/out confirmation, urine STAT  Troponin I, 0 Hour  Troponin I  Troponin I  D-Dimer  CBC With Different

## 2017-04-15 NOTE — PROGRESS NOTES
[facility]  Hospitalist Progress  Note     Kaia Romero Patient Status:  Inpatient      23year old [facility] [id number]   [facility] [address] Deborah Muñoz MD   Hosp Day # 32 PCP Himanshu Lomax MD     ASSESSMENT/PLAN    I effusions. No peripheral edema. Skin: Skin is warm and dry. No rashes, erythema, diaphoresis. Psych: Normal mood and affect.  Behavior and judgment normal.     Labs:  Recent Labs   Lab  04/14/17   0546   RBC  3.76   HGB  10.8*   HCT  32.8*   MCV  87.4

## 2017-04-16 PROCEDURE — 99232 SBSQ HOSP IP/OBS MODERATE 35: CPT | Performed by: INTERNAL MEDICINE

## 2017-04-16 PROCEDURE — 99232 SBSQ HOSP IP/OBS MODERATE 35: CPT | Performed by: HOSPITALIST

## 2017-04-16 NOTE — PROGRESS NOTES
[facility]  GI SERVICE PROGRESS NOTE    Tamir Muniz Patient Status:  Inpatient    1/3/1995 MRN [id number]   Location [facility] 5SW/SE Attending Salvador Coe MD   [facility] Day # 28 PCP Marilyn Basilio MD       Subjective:     Gianfranco Pruett 0500   MG  1.8  1.8  1.9       No results for input(s): URINE, CULTI, BLDSMR in the last 72 hours.               Assessment and Plan:     28-year-old woman with complex and tragic psychiatric history of depression, anxiety, attention deficit disorder and lopes

## 2017-04-16 NOTE — PLAN OF CARE
Problem: Patient/Family Goals  Goal: Patient/Family Long Term Goal  Patient’s Long Term Goal: Patient remains free of infection, as evidenced by normal vital signs and absence of signs and symptoms of infection by discharge    Interventions:  - monitor VS hemodynamic stability  INTERVENTIONS:  - Monitor vital signs, rhythm, and trends  - Monitor for bleeding, hypotension and signs of decreased cardiac output  - Evaluate effectiveness of vasoactive medications to optimize hemodynamic stability  - Monitor art Educate pt/family on patient safety including physical limitations  - Instruct pt to call for assistance with activity based on assessment  - Modify environment to reduce risk of injury  - Provide assistive devices as appropriate  - Consider OT/PT consult Outcome: Progressing

## 2017-04-16 NOTE — PLAN OF CARE
Problem: COPING  Goal: Pt/Family able to verbalize concerns and demonstrate effective coping strategies  INTERVENTIONS:  - Assist patient/family to identify coping skills, available support systems and cultural and spiritual values  - Provide emotional sup replacement therapy as ordered   Outcome: Progressing  No calls from tele.      Problem: RESPIRATORY - ADULT  Goal: Achieves optimal ventilation and oxygenation  INTERVENTIONS:  - Assess for changes in respiratory status  - Assess for changes in mentation a Modify environment to reduce risk of injury  - Provide assistive devices as appropriate  - Consider OT/PT consult to assist with strengthening/mobility  - Encourage toileting schedule   Outcome: Progressing  Patient is steady upon ambulation.      Problem:

## 2017-04-16 NOTE — PROGRESS NOTES
[facility]  Hospitalist Progress  Note     Kelly Connelly Patient Status:  Inpatient      23year old [facility] [id number]   Location 569/569-A Attending Johny Taveras MD   Hosp Day # 28 PCP Kelly Thomas MD     ASSESSMENT/PLAN    I No joint effusions. No peripheral edema. Skin: Skin is warm and dry. No rashes, erythema, diaphoresis. Psych: Normal mood and affect.  Behavior and judgment normal.     Labs:  Recent Labs   Lab  04/14/17   0546   RBC  3.76   HGB  10.8*   HCT  32.8*   MC

## 2017-04-17 ENCOUNTER — ANESTHESIA (OUTPATIENT)
Dept: ENDOSCOPY | Facility: HOSPITAL | Age: 22
DRG: 871 | End: 2017-04-17
Payer: MEDICAID

## 2017-04-17 ENCOUNTER — ANESTHESIA EVENT (OUTPATIENT)
Dept: ENDOSCOPY | Facility: HOSPITAL | Age: 22
DRG: 871 | End: 2017-04-17
Payer: MEDICAID

## 2017-04-17 ENCOUNTER — SURGERY (OUTPATIENT)
Age: 22
End: 2017-04-17

## 2017-04-17 PROCEDURE — 0DB68ZX EXCISION OF STOMACH, VIA NATURAL OR ARTIFICIAL OPENING ENDOSCOPIC, DIAGNOSTIC: ICD-10-PCS | Performed by: INTERNAL MEDICINE

## 2017-04-17 PROCEDURE — 0DJD8ZZ INSPECTION OF LOWER INTESTINAL TRACT, VIA NATURAL OR ARTIFICIAL OPENING ENDOSCOPIC: ICD-10-PCS | Performed by: INTERNAL MEDICINE

## 2017-04-17 PROCEDURE — 43239 EGD BIOPSY SINGLE/MULTIPLE: CPT | Performed by: INTERNAL MEDICINE

## 2017-04-17 PROCEDURE — 0DB98ZX EXCISION OF DUODENUM, VIA NATURAL OR ARTIFICIAL OPENING ENDOSCOPIC, DIAGNOSTIC: ICD-10-PCS | Performed by: INTERNAL MEDICINE

## 2017-04-17 PROCEDURE — 99232 SBSQ HOSP IP/OBS MODERATE 35: CPT | Performed by: HOSPITALIST

## 2017-04-17 RX ORDER — SODIUM CHLORIDE, SODIUM LACTATE, POTASSIUM CHLORIDE, CALCIUM CHLORIDE 600; 310; 30; 20 MG/100ML; MG/100ML; MG/100ML; MG/100ML
INJECTION, SOLUTION INTRAVENOUS CONTINUOUS
Status: DISCONTINUED | OUTPATIENT
Start: 2017-04-17 | End: 2017-04-17

## 2017-04-17 RX ORDER — MIDAZOLAM HYDROCHLORIDE 1 MG/ML
INJECTION INTRAMUSCULAR; INTRAVENOUS AS NEEDED
Status: DISCONTINUED | OUTPATIENT
Start: 2017-04-17 | End: 2017-04-17 | Stop reason: SURG

## 2017-04-17 RX ORDER — KETAMINE HCL IN 0.9 % NACL 100MG/10ML
SYRINGE (ML) INTRAVENOUS AS NEEDED
Status: DISCONTINUED | OUTPATIENT
Start: 2017-04-17 | End: 2017-04-17 | Stop reason: SURG

## 2017-04-17 RX ORDER — SODIUM CHLORIDE, SODIUM LACTATE, POTASSIUM CHLORIDE, CALCIUM CHLORIDE 600; 310; 30; 20 MG/100ML; MG/100ML; MG/100ML; MG/100ML
INJECTION, SOLUTION INTRAVENOUS CONTINUOUS PRN
Status: DISCONTINUED | OUTPATIENT
Start: 2017-04-17 | End: 2017-04-17 | Stop reason: SURG

## 2017-04-17 RX ORDER — NALOXONE HYDROCHLORIDE 0.4 MG/ML
80 INJECTION, SOLUTION INTRAMUSCULAR; INTRAVENOUS; SUBCUTANEOUS AS NEEDED
Status: DISCONTINUED | OUTPATIENT
Start: 2017-04-17 | End: 2017-04-17 | Stop reason: HOSPADM

## 2017-04-17 RX ORDER — LIDOCAINE HYDROCHLORIDE 10 MG/ML
INJECTION, SOLUTION EPIDURAL; INFILTRATION; INTRACAUDAL; PERINEURAL AS NEEDED
Status: DISCONTINUED | OUTPATIENT
Start: 2017-04-17 | End: 2017-04-17 | Stop reason: SURG

## 2017-04-17 RX ADMIN — SODIUM CHLORIDE, SODIUM LACTATE, POTASSIUM CHLORIDE, CALCIUM CHLORIDE: 600; 310; 30; 20 INJECTION, SOLUTION INTRAVENOUS at 11:31:00

## 2017-04-17 RX ADMIN — KETAMINE HCL IN 0.9 % NACL 80 MG: 100MG/10ML SYRINGE (ML) INTRAVENOUS at 11:35:00

## 2017-04-17 RX ADMIN — MIDAZOLAM HYDROCHLORIDE 2 MG: 1 INJECTION INTRAMUSCULAR; INTRAVENOUS at 11:30:00

## 2017-04-17 RX ADMIN — LIDOCAINE HYDROCHLORIDE 50 MG: 10 INJECTION, SOLUTION EPIDURAL; INFILTRATION; INTRACAUDAL; PERINEURAL at 11:34:00

## 2017-04-17 RX ADMIN — SODIUM CHLORIDE, SODIUM LACTATE, POTASSIUM CHLORIDE, CALCIUM CHLORIDE: 600; 310; 30; 20 INJECTION, SOLUTION INTRAVENOUS at 12:35:00

## 2017-04-17 NOTE — OPERATIVE REPORT
EGD AND COLONOSCOPY PROCEDURE REPORTS:    DATE OF PROCEDURE:  4/17/2017    PCP: Ellery Aschoff, MD     PREOPERATIVE DIAGNOSIS:  Dyspepsia, abdominal bloating, change bowel habit     POSTOPERATIVE DIAGNOSIS:  See impression. SURGEON:  Xu Nolan induced by the washings did limit our exam today somewhat. Sessile, small polyps could have been missed. COLONOSCOPY FINDINGS:  · Entirely healthy and normal cecum and ileocecal valve terminal ileum  · Healthy normal colonic mucosa throughout.   No inf

## 2017-04-17 NOTE — ADDENDUM NOTE
Addendum  created 04/17/17 1241 by Pastora Skinner CRNA    Modules edited: Orders, PRL Based Order Sets

## 2017-04-17 NOTE — PROGRESS NOTES
[facility]  Hospitalist Progress  Note     Jose Angel Gave Patient Status:  Inpatient      23year old [facility] [id number]   Location 569/569-A Attending Josse Diaz MD   Hosp Day # 35 PCP Melissa Zuniga MD     ASSESSMENT/PLAN    I Psych: Normal mood and affect. Behavior and judgment normal.     Labs:  No results for input(s): RBC, HGB, HCT, MCV, MCH, MCHC, RDW, NEPRELIM, WBC, PLT in the last 72 hours.   Recent Labs   Lab  04/15/17   0541  04/16/17   0500   GLU  81  128*   BUN  13

## 2017-04-17 NOTE — ANESTHESIA PREPROCEDURE EVALUATION
Anesthesia PreOp Note    HPI:     Arabella Barrios is a 23year old female who presents for preoperative consultation requested by: Phani Mays MD    Date of Surgery: 3/15/2017 - 4/17/2017    Procedure(s):  COLONOSCOPY  GRPLLNLYNLUNKBAWVYNCCYP (attention deficit disorder) 2009   • Depression    • Anxiety state, unspecified    • LBP (low back pain)    • Acid reflux    • Drug treatment not indicated            Past Surgical History    TONSILLECTOMY  2009    UPPER GI ENDOSCOPY,EXAM  2013    COLONOS calamine lotion  Topical PRN Liudmila Rose MD    nicotine (NICODERM CQ) 7 MG/24HR 1 patch 1 patch Transdermal Daily Ethan Sanchez MD 1 patch at 04/16/17 1150   Pantoprazole Sodium (PROTONIX) EC tab 40 mg 40 mg Oral QAM AC Tan Fuller • Prostate Cancer Paternal Grandfather      w/ metastasis   • back pain[other] [OTHER] Father    • Hypertension Mother        Social History   Marital Status: Single  Spouse Name: N/A    Years of Education: N/A  Number of Children: 0     Occupational His Weight:    56.7 kg (125 lb)   SpO2: 98% 99% 99% 100%        Anesthesia ROS/Med Hx and Physical Exam     Patient summary reviewed and Nursing notes reviewed    Airway   Mallampati: I  TM distance: >3 FB  Neck ROM: full  Dental - normal exam     Pulmonary

## 2017-04-17 NOTE — PROGRESS NOTES
INFECTIOUS DISEASE PROGRESS NOTE    Ladi Covington Patient Status:  Inpatient    1/3/1995 MRN [id number]   Location [facility] 3W/SW Attending Rosalia Parisi MD   [facility] Day # 35 PCP Alla Will MD     Subjective:  Patient seen and examined, vancomycin, cefepime.  ID consulted.      # Acute Rash--> resolving off rifampin   # Staph aureus (MRSA) sepsis, bacteremia due to PVE   - HIV non-reactive 3/27  # LUE cellulitis due to IVDA, ?early abscess---> resolved  # L antecubital thrombophlebitis, l

## 2017-04-17 NOTE — PAYOR COMM NOTE
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  Blood cultures positive for MRSA.  No evidence of valve dysfunction.  -Continue daptomycin to finish treatment course 42 days  -ID following  -Repeat KAVITA upon co 4/1217  SUBJECTIVE  Complains of abdominal bloating.  +pain off and on  Ambulating in hallways w/o issues      OBJECTIVE    Vital signs:  Temp:  [97.7 °F (36.5 °C)-98.3 °F (36.8 °C)] 98.3 °F (36.8 °C)  Pulse:  [] 100  Resp:  [18-20] 20  BP: ( steroids.   Abdominal bloating--> simethicone--> will check kub    DVT prophylaxis with subcu heparin  CODE STATUS is full code  Disposition: Home after completion of 42 days of antibiotics    SUBJECTIVE  Complains of abdominal bloating.  +pain off and on    Musculoskeletal: No edema.  LUE erythema, induration antecubital area resolved  Integument: diffuse rash resolved  picc line    Labs:        Recent Labs    Lab  04/14/17   0546    WBC   11.6*    HGB   10.8*    PLT   220          Recent Labs    Lab  04/1 rifampin which is stopped.  Resolved.  Decrease steroids in 3 days  Left upper extremity cellulitis.  Resolved  Heroin abuse/withdrawal.  Psychiatry following  Leukocytosis.  Due to steroids.   Abdominal bloating--> simethicone--> will check kub    DVT prop friends and family are remarking that she has low abdominal distention. Showered this morning.  Ambulating around the room. Continuing to tolerate general diet.  States decreased appetite.     Afebrile; HR 80s - 102; BP stable      Objective:    Blood pre heroin.  Apparently she has been on methadone therapy for the heroin addiction.     Ms. Jonnathan Santos was admitted through the emergency department March 15th, 2017 complaining of chest pain and left arm pain since 9 PM the night before.  She had used heroin at 9 PM above  Septic emboli.  Due to above  Rash.  Possibly related to rifampin which is stopped.  Resolved.  Decrease steroids in 3 days  Left upper extremity cellulitis.  Resolved  Heroin abuse/withdrawal.  Psychiatry following  Leukocytosis.  Due to steroids.

## 2017-04-17 NOTE — ANESTHESIA POSTPROCEDURE EVALUATION
Patient: Carrie Lisa    Procedure Summary     Date Anesthesia Start Anesthesia Stop Room / Location    04/17/17 1131  [facility] ENDOSCOPY 05 / [facility] ENDOSCOPY       Procedure Diagnosis Surgeon Responsible Provider    COLONOSCOPY (N/A ); [facility]

## 2017-04-17 NOTE — PLAN OF CARE
Problem: Patient/Family Goals  Goal: Patient/Family Long Term Goal  Patient’s Long Term Goal: Patient remains free of infection, as evidenced by normal vital signs and absence of signs and symptoms of infection by discharge    Interventions:  - monitor VS Problem: CARDIOVASCULAR - ADULT  Goal: Maintains optimal cardiac output and hemodynamic stability  INTERVENTIONS:  - Monitor vital signs, rhythm, and trends  - Monitor for bleeding, hypotension and signs of decreased cardiac output  - Evaluate effectiv Assess pain using appropriate pain scale  - Administer analgesics based on type and severity of pain and evaluate response  - Implement non-pharmacological measures as appropriate and evaluate response  - Consider cultural and social influences on pain and planning if the patient needs post-hospital services based on physician/LIP order or complex needs related to functional status, cognitive ability or social support system   Outcome: Progressing  Her potential discharge date is April 24th, 2017.  Pt is also

## 2017-04-17 NOTE — PLAN OF CARE
Problem: Patient/Family Goals  Goal: Patient/Family Long Term Goal  Patient’s Long Term Goal: Patient remains free of infection, as evidenced by normal vital signs and absence of signs and symptoms of infection by discharge    Interventions:  - monitor VS involvement or recovery focused alternative   Outcome: Progressing  Patient has been clean from drugs since admission date,encourage patient to keep up the good work once discharge.      Problem: CARDIOVASCULAR - ADULT  Goal: Maintains optimal cardiac outpu Monitor for signs/symptoms of CO2 retention   Outcome: Progressing  Patient oxygen saturation within normal limits     Problem: PAIN - ADULT  Goal: Verbalizes/displays adequate comfort level or patient’s stated pain goal  INTERVENTIONS:  - Encourage pt to Consider post-discharge preferences of patient/family/discharge partner  - Complete POLST form as appropriate  - Assess patient’s ability to be responsible for managing their own health  - Refer to Case Management Department for coordinating discharge plan

## 2017-04-17 NOTE — PLAN OF CARE
Problem: COPING  Goal: Pt/Family able to verbalize concerns and demonstrate effective coping strategies  INTERVENTIONS:  - Assist patient/family to identify coping skills, available support systems and cultural and spiritual values  - Provide emotional sup pain and evaluate response  - Implement non-pharmacological measures as appropriate and evaluate response  - Consider cultural and social influences on pain and pain management  - Manage/alleviate anxiety  - Utilize distraction and/or relaxation techniques

## 2017-04-18 PROCEDURE — 99233 SBSQ HOSP IP/OBS HIGH 50: CPT | Performed by: HOSPITALIST

## 2017-04-18 NOTE — PLAN OF CARE
Problem: Patient/Family Goals  Goal: Patient/Family Long Term Goal  Patient’s Long Term Goal: Patient remains free of infection, as evidenced by normal vital signs and absence of signs and symptoms of infection by discharge    Interventions:  - monitor VS support with 1:1 interaction with staff  - Encourage 12-Step involvement or recovery focused alternative   Outcome: Progressing    Problem: CARDIOVASCULAR - ADULT  Goal: Maintains optimal cardiac output and hemodynamic stability  INTERVENTIONS:  - Monitor Verbalizes/displays adequate comfort level or patient’s stated pain goal  INTERVENTIONS:  - Encourage pt to monitor pain and request assistance  - Assess pain using appropriate pain scale  - Administer analgesics based on type and severity of pain and eval Management Department for coordinating discharge planning if the patient needs post-hospital services based on physician/LIP order or complex needs related to functional status, cognitive ability or social support system   Outcome: Progressing    Problem:

## 2017-04-18 NOTE — PLAN OF CARE
Problem: Patient/Family Goals  Goal: Patient/Family Long Term Goal  Patient’s Long Term Goal: Patient remains free of infection, as evidenced by normal vital signs and absence of signs and symptoms of infection by discharge    Interventions:  - monitor VS Instruct pt to call for assistance with activity based on assessment  - Modify environment to reduce risk of injury  - Provide assistive devices as appropriate  - Consider OT/PT consult to assist with strengthening/mobility  - Encourage toileting schedule

## 2017-04-18 NOTE — PROGRESS NOTES
[facility]  Hospitalist Progress  Note     Ladi Covington Patient Status:  Inpatient      23year old [facility] [id number]   Location 569/569-A Attending Irving Perez MD   Hosp Day # 29 PCP Alla Will MD     ASSESSMENT/PLAN    I D/w mom and pt the normal endoscopy results and that boating may be functional.  Discussed plan for repeat CT if worsens.   All questions answered

## 2017-04-19 PROCEDURE — 99232 SBSQ HOSP IP/OBS MODERATE 35: CPT | Performed by: HOSPITALIST

## 2017-04-19 NOTE — PLAN OF CARE
Problem: Patient/Family Goals  Goal: Patient/Family Long Term Goal  Patient’s Long Term Goal: Patient remains free of infection, as evidenced by normal vital signs and absence of signs and symptoms of infection by discharge    Interventions:  - monitor VS medication as ordered  - Monitor physical status  - Provide emotional support with 1:1 interaction with staff  - Encourage 12-Step involvement or recovery focused alternative   Outcome: Progressing  No signs of detox    Problem: Willian difficulty  - Respiratory Therapy support as indicated  - Manage/alleviate anxiety  - Monitor for signs/symptoms of CO2 retention   Outcome: Progressing  Saturates well on room air     Problem: PAIN - ADULT  Goal: Verbalizes/displays adequate comfort level etc)  - Arrange for interpreters to assist at discharge as needed  - Consider post-discharge preferences of patient/family/discharge partner  - Complete POLST form as appropriate  - Assess patient’s ability to be responsible for managing their own health

## 2017-04-19 NOTE — PLAN OF CARE
Spoke with [facility]  on the phone because [facility] was requesting to go on the balcony with her visitor Ryan. [facility] stated that she knows this andrew and is comfortable with him taking her out on the balcony.  Gave permission for the visit until

## 2017-04-19 NOTE — PROGRESS NOTES
[place] [facility] - [facility]    Progress Note    Geovanna Gilmore Patient Status:  Inpatient    1/3/1995 MRN [id number]   Location [facility] 5SW/SE Attending Glenda Vega MD   [address] Day # 28 PCP Nory Palacio MD       Subjective:     Pt still f Full    Maria Elena Jim MD  4/19/2017

## 2017-04-20 PROCEDURE — 99232 SBSQ HOSP IP/OBS MODERATE 35: CPT | Performed by: HOSPITALIST

## 2017-04-20 RX ORDER — FUROSEMIDE 20 MG/1
10 TABLET ORAL ONCE
Status: COMPLETED | OUTPATIENT
Start: 2017-04-20 | End: 2017-04-20

## 2017-04-20 NOTE — PLAN OF CARE
Problem: Patient/Family Goals  Goal: Patient/Family Long Term Goal  Patient’s Long Term Goal: Patient remains free of infection, as evidenced by normal vital signs and absence of signs and symptoms of infection by discharge    Interventions:  - monitor VS interaction with staff  - Encourage 12-Step involvement or recovery focused alternative   Outcome: Progressing  No signs of detox    Problem: CARDIOVASCULAR - ADULT  Goal: Absence of cardiac arrhythmias or at baseline  INTERVENTIONS:  - Continuous cardiac unsuccessful or patient reports new pain   Outcome: Progressing  Denied pain    Problem: SAFETY ADULT - FALL  Goal: Free from fall injury  INTERVENTIONS:  - Assess pt frequently for physical needs  - Identify cognitive and physical deficits and behaviors t accurate information to patient/family  - Incorporate patient and family knowledge, values, beliefs, and cultural backgrounds into the planning and delivery of care  - Encourage patient/family to participate in care and decision-making at the level they ch

## 2017-04-20 NOTE — DISCHARGE PLANNING
RN spoke with MD who states the pt is not allowed to go to the [facility].  aware.     radha valadez,MARGAUX [id number]

## 2017-04-20 NOTE — PROGRESS NOTES
[place] [facility] - [facility]    Progress Note    Son Chambers Patient Status:  Inpatient    1/3/1995 MRN [id number]   Location [facility] 5SW/SE Attending Jimmy Douglas MD   [address] Day # 39 PCP Luther Aldrich MD       Subjective:   Pt c/o bloat proph:   heparin    Code status:   Full      Raymundo Garcia MD  4/20/2017

## 2017-04-21 PROCEDURE — 99232 SBSQ HOSP IP/OBS MODERATE 35: CPT | Performed by: HOSPITALIST

## 2017-04-21 RX ORDER — SULFAMETHOXAZOLE AND TRIMETHOPRIM 800; 160 MG/1; MG/1
1 TABLET ORAL EVERY 12 HOURS SCHEDULED
Status: COMPLETED | OUTPATIENT
Start: 2017-04-21 | End: 2017-04-23

## 2017-04-21 RX ORDER — 0.9 % SODIUM CHLORIDE 0.9 %
VIAL (ML) INJECTION
Status: COMPLETED
Start: 2017-04-21 | End: 2017-04-21

## 2017-04-21 NOTE — PROGRESS NOTES
[facility]    Progress Note    Tianarosa m Ngael Patient Status:  Inpatient    1/3/1995 MRN [id number]   Location [facility] 5SW/SE Attending Gabrielle Rosales MD   [facility] Day # 40 PCP Dimitrios Finch MD       Subjective:     Pt c/o jonas abuse/withdrawal.  Psychiatry following  Leukocytosis.  Due to steroids.     dvt proph:   heparin    Code status:   Full    Caryn Lambert MD  4/21/2017

## 2017-04-21 NOTE — PAYOR COMM NOTE
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  Blood cultures positive for MRSA.  No evidence of valve dysfunction.  -Continue daptomycin to finish treatment course 42 days  -ID following  -Repeat KAVITA upon co 4/20/17  Subjective:    Pt c/o bloating. Had BM yesterday.      Objective:    Blood pressure 112/68, pulse 104, temperature 97.5 °F (36.4 °C), temperature source Oral, resp.  rate 18, height 5' 2\" (1.575 m), weight 125 lb (56.7 kg), SpO2 100 %, not clinically  - miralax daily  - gave 10 mg po lasix on 4/20    Dysuria.   - check UA and culture  - give 3 days Bactrim for now    Other Problems  Sepsis.  Due to MRSA.  Surveillance blood cultures negative.  See above  Septic emboli.  Due to above  Rash.

## 2017-04-21 NOTE — PLAN OF CARE
Problem: Patient/Family Goals  Goal: Patient/Family Long Term Goal  Patient’s Long Term Goal: Patient remains free of infection, as evidenced by normal vital signs and absence of signs and symptoms of infection by discharge    Interventions:  - monitor VS hemodynamic stability  INTERVENTIONS:  - Monitor vital signs, rhythm, and trends  - Monitor for bleeding, hypotension and signs of decreased cardiac output  - Evaluate effectiveness of vasoactive medications to optimize hemodynamic stability  - Monitor art evaluate response  - Implement non-pharmacological measures as appropriate and evaluate response  - Consider cultural and social influences on pain and pain management  - Manage/alleviate anxiety  - Utilize distraction and/or relaxation techniques  - Monit

## 2017-04-22 PROCEDURE — 99232 SBSQ HOSP IP/OBS MODERATE 35: CPT | Performed by: HOSPITALIST

## 2017-04-22 NOTE — PROGRESS NOTES
[place] [facility] - [facility]    Progress Note    Frank Crisostomo Patient Status:  Inpatient    1/3/1995 MRN [id number]   Location [facility] 5SW/SE Attending Arianne Chand MD   [facility] Day # 45 PCP Leisa Loredo MD       Subjective:     Pt still w following  Leukocytosis.  Due to steroids.     dvt proph:   heparin    Code status:   Full    Dany Parker MD  4/22/2017

## 2017-04-22 NOTE — PLAN OF CARE
Problem: Patient/Family Goals  Goal: Patient/Family Long Term Goal  Patient’s Long Term Goal: Patient remains free of infection, as evidenced by normal vital signs and absence of signs and symptoms of infection by discharge    Interventions:  - monitor VS ordered    Problem: CARDIOVASCULAR - ADULT  Goal: Maintains optimal cardiac output and hemodynamic stability  INTERVENTIONS:  - Monitor vital signs, rhythm, and trends  - Monitor for bleeding, hypotension and signs of decreased cardiac output  - Evaluate e pain and request assistance  - Assess pain using appropriate pain scale  - Administer analgesics based on type and severity of pain and evaluate response  - Implement non-pharmacological measures as appropriate and evaluate response  - Consider cultural an post-hospital services based on physician/LIP order or complex needs related to functional status, cognitive ability or social support system   Outcome: Progressing  Possible discharge on Monday 4/24    Problem: Patient Centered Care  Goal: Patient prefere

## 2017-04-23 PROCEDURE — 99232 SBSQ HOSP IP/OBS MODERATE 35: CPT | Performed by: HOSPITALIST

## 2017-04-23 NOTE — PROGRESS NOTES
[place] [facility] - [facility]    Progress Note    Lauro Brambila Patient Status:  Inpatient    1/3/1995 MRN [id number]   Location [facility] 5SW/SE Attending Isabelle Cuevas MD   [facility] Day # 44 PCP Zulma Trinidad MD       Subjective:     No more dy following  Leukocytosis.  Due to steroids.     dvt proph:   heparin    Code status:   Full      Christina Kim MD  4/23/2017

## 2017-04-23 NOTE — PLAN OF CARE
Problem: Patient/Family Goals  Goal: Patient/Family Long Term Goal  Patient’s Long Term Goal: Patient remains free of infection, as evidenced by normal vital signs and absence of signs and symptoms of infection by discharge    Interventions:  - monitor VS drug-related behavior  INTERVENTIONS:  - Administer medication as ordered  - Monitor physical status  - Provide emotional support with 1:1 interaction with staff  - Encourage 12-Step involvement or recovery focused alternative   Outcome: Progressing  No si Manage/alleviate anxiety  - Utilize distraction and/or relaxation techniques  - Monitor for opioid side effects  - Notify MD/LIP if interventions unsuccessful or patient reports new pain   Outcome: Progressing  No complaints of pain     Problem: SAFETY AGUEDA and integrated in the patient’s plan of care  Interventions:  - What would you like us to know as we care for you?  I want to feel better  - Provide timely, complete, and accurate information to patient/family  - Incorporate patient and family knowledge, Emanuel Cruz

## 2017-04-23 NOTE — PLAN OF CARE
Problem: Patient/Family Goals  Goal: Patient/Family Long Term Goal  Patient’s Long Term Goal: Patient remains free of infection, as evidenced by normal vital signs and absence of signs and symptoms of infection by discharge    Interventions:  - monitor VS involvement or recovery focused alternative   Outcome: Progressing  Medication as ordered. No detox symptoms noted.     Problem: CARDIOVASCULAR - ADULT  Goal: Maintains optimal cardiac output and hemodynamic stability  INTERVENTIONS:  - Monitor vital signs, Verbalizes/displays adequate comfort level or patient’s stated pain goal  INTERVENTIONS:  - Encourage pt to monitor pain and request assistance  - Assess pain using appropriate pain scale  - Administer analgesics based on type and severity of pain and eval health  - Refer to Case Management Department for coordinating discharge planning if the patient needs post-hospital services based on physician/LIP order or complex needs related to functional status, cognitive ability or social support system   Outcome:

## 2017-04-24 ENCOUNTER — APPOINTMENT (OUTPATIENT)
Dept: CT IMAGING | Facility: HOSPITAL | Age: 22
DRG: 871 | End: 2017-04-24
Attending: HOSPITALIST
Payer: MEDICAID

## 2017-04-24 VITALS
HEIGHT: 62 IN | HEART RATE: 99 BPM | WEIGHT: 125 LBS | OXYGEN SATURATION: 98 % | RESPIRATION RATE: 18 BRPM | BODY MASS INDEX: 23 KG/M2 | SYSTOLIC BLOOD PRESSURE: 119 MMHG | TEMPERATURE: 98 F | DIASTOLIC BLOOD PRESSURE: 77 MMHG

## 2017-04-24 PROCEDURE — 99233 SBSQ HOSP IP/OBS HIGH 50: CPT | Performed by: OTHER

## 2017-04-24 PROCEDURE — 74177 CT ABD & PELVIS W/CONTRAST: CPT

## 2017-04-24 PROCEDURE — 99239 HOSP IP/OBS DSCHRG MGMT >30: CPT | Performed by: HOSPITALIST

## 2017-04-24 RX ORDER — QUETIAPINE 150 MG/1
150 TABLET, FILM COATED, EXTENDED RELEASE ORAL EVERY 24 HOURS
Qty: 30 TABLET | Refills: 0 | Status: SHIPPED | OUTPATIENT
Start: 2017-04-24 | End: 2017-08-22

## 2017-04-24 RX ORDER — PANTOPRAZOLE SODIUM 40 MG/1
40 TABLET, DELAYED RELEASE ORAL
Qty: 30 TABLET | Refills: 0 | Status: SHIPPED | OUTPATIENT
Start: 2017-04-24 | End: 2017-05-02

## 2017-04-24 RX ORDER — GABAPENTIN 100 MG/1
100 CAPSULE ORAL 3 TIMES DAILY
Qty: 90 CAPSULE | Refills: 0 | Status: SHIPPED | OUTPATIENT
Start: 2017-04-24 | End: 2017-05-01

## 2017-04-24 RX ORDER — SIMETHICONE 80 MG
80 TABLET,CHEWABLE ORAL
Qty: 120 TABLET | Refills: 0 | Status: SHIPPED | OUTPATIENT
Start: 2017-04-24 | End: 2017-05-02

## 2017-04-24 RX ORDER — QUETIAPINE 200 MG/1
200 TABLET, FILM COATED ORAL NIGHTLY
Qty: 30 TABLET | Refills: 0 | Status: SHIPPED | OUTPATIENT
Start: 2017-04-24 | End: 2017-08-22

## 2017-04-24 RX ORDER — CLONAZEPAM 0.5 MG/1
0.5 TABLET ORAL DAILY
Qty: 30 TABLET | Refills: 0 | Status: SHIPPED | OUTPATIENT
Start: 2017-04-24 | End: 2017-09-27 | Stop reason: ALTCHOICE

## 2017-04-24 RX ORDER — CIPROFLOXACIN 500 MG/1
500 TABLET, FILM COATED ORAL EVERY 12 HOURS SCHEDULED
Status: DISCONTINUED | OUTPATIENT
Start: 2017-04-24 | End: 2017-04-24

## 2017-04-24 RX ORDER — HYDROXYZINE HYDROCHLORIDE 25 MG/1
25 TABLET, FILM COATED ORAL 2 TIMES DAILY
Qty: 60 TABLET | Refills: 0 | Status: SHIPPED | OUTPATIENT
Start: 2017-04-24 | End: 2017-09-27 | Stop reason: ALTCHOICE

## 2017-04-24 RX ORDER — HALOPERIDOL 1 MG/1
1 TABLET ORAL 3 TIMES DAILY
Qty: 90 TABLET | Refills: 0 | Status: SHIPPED | OUTPATIENT
Start: 2017-04-24 | End: 2017-09-27 | Stop reason: ALTCHOICE

## 2017-04-24 RX ORDER — NICOTINE 21 MG/24HR
1 PATCH, TRANSDERMAL 24 HOURS TRANSDERMAL DAILY
Qty: 30 PATCH | Refills: 0 | Status: SHIPPED | OUTPATIENT
Start: 2017-04-24 | End: 2017-05-02

## 2017-04-24 NOTE — PROGRESS NOTES
INFECTIOUS DISEASE PROGRESS NOTE    Lauro Brambila Patient Status:  Inpatient    1/3/1995 MRN [id number]   Location [facility] 3W/SW Attending Brenda Zhang MD   [address] Day # 36 PCP Zulma Trinidad MD     Subjective:  Patient seen and examined, Rash--> resolving off rifampin   # Staph aureus (MRSA) sepsis, bacteremia due to PVE   - HIV non-reactive 3/27  # LUE cellulitis due to IVDA, ?early abscess---> resolved  # L antecubital thrombophlebitis, likely septic thrombophlebitis  # Septic emboli to

## 2017-04-24 NOTE — PLAN OF CARE
Patient still had visitor in room at 2100. I reminded [facility] that her visitor needed to leave. Shortly after, mother Shivam Henderson called me and I asked her if it was ok that the visitor was still here.  [facility] stated she was going to call an uber for her v

## 2017-04-24 NOTE — PLAN OF CARE
Problem: Patient/Family Goals  Goal: Patient/Family Long Term Goal  Patient’s Long Term Goal: Patient remains free of infection, as evidenced by normal vital signs and absence of signs and symptoms of infection by discharge    Interventions:  - monitor VS focused alternative   Outcome: Progressing  Continues to be clean and free from use at this time.     Problem: CARDIOVASCULAR - ADULT  Goal: Maintains optimal cardiac output and hemodynamic stability  INTERVENTIONS:  - Monitor vital signs, rhythm, and trend Verbalizes/displays adequate comfort level or patient’s stated pain goal  INTERVENTIONS:  - Encourage pt to monitor pain and request assistance  - Assess pain using appropriate pain scale  - Administer analgesics based on type and severity of pain and eval their own health  - Refer to Case Management Department for coordinating discharge planning if the patient needs post-hospital services based on physician/LIP order or complex needs related to functional status, cognitive ability or social support system

## 2017-04-24 NOTE — PLAN OF CARE
Problem: Patient/Family Goals  Goal: Patient/Family Long Term Goal  Patient’s Long Term Goal: Patient remains free of infection, as evidenced by normal vital signs and absence of signs and symptoms of infection by discharge    Interventions:  - monitor VS as ordered  - Monitor physical status  - Provide emotional support with 1:1 interaction with staff  - Encourage 12-Step involvement or recovery focused alternative   Outcome: Progressing  No signs of detox noted    Problem: CARDIOVASCULAR - ADULT  Goal: Ab techniques  - Monitor for opioid side effects  - Notify MD/LIP if interventions unsuccessful or patient reports new pain   Outcome: Progressing  Denied pain.  Stated abdomen doesn't feel right, Dr Tan Paget aware    Problem: SAFETY ADULT - FALL  Goal: Free f What would you like us to know as we care for you?  I want to feel better  - Provide timely, complete, and accurate information to patient/family  - Incorporate patient and family knowledge, values, beliefs, and cultural backgrounds into the planning and de

## 2017-04-24 NOTE — PROGRESS NOTES
[place] [facility] - [facility]    Progress Note    Mickael Schwab Patient Status:  Inpatient    1/3/1995 MRN [id number]   Location [facility] 5SW/SE Attending Mariluz Lee MD   [facility] Day # 36 PCP Curt Smith MD     Subjective:     Constituti status:   Full    Dispo: ct abd today, if ok d/c home.  Remove picc line prior to d/c     Results:     Lab Results  Component Value Date   WBC 5.7 04/24/2017   HGB 10.7* 04/24/2017   HCT 32.0* 04/24/2017    04/24/2017   CREATSERUM 0.84 04/24/2017   B

## 2017-04-25 NOTE — PLAN OF CARE
Problem: Patient/Family Goals  Goal: Patient/Family Long Term Goal  Patient’s Long Term Goal: Patient remains free of infection, as evidenced by normal vital signs and absence of signs and symptoms of infection by discharge    Interventions:  - monitor VS emotional support with 1:1 interaction with staff  - Encourage 12-Step involvement or recovery focused alternative   Outcome: Adequate for Discharge  Provided support - monitored for signs of detox    Problem: CARDIOVASCULAR - ADULT  Goal: Maintains optima of CO2 retention   Outcome: Adequate for Discharge  No c/o of shortness of breath.  SpO2 98% on RA    Problem: PAIN - ADULT  Goal: Verbalizes/displays adequate comfort level or patient’s stated pain goal  INTERVENTIONS:  - Encourage pt to monitor pain and r patient/family/discharge partner  - Complete POLST form as appropriate  - Assess patient’s ability to be responsible for managing their own health  - Refer to Case Management Department for coordinating discharge planning if the patient needs post-hospital

## 2017-04-25 NOTE — PAYOR COMM NOTE
4/22/17    Subjective:      Pt still with dysuria.      Objective:    Blood pressure 102/63, pulse 102, temperature 97.7 °F (36.5 °C), temperature source Axillary, resp.  rate 16, height 5' 2\" (1.575 m), weight 125 lb (56.7 kg), SpO2 97 %, not currently Psychiatry following  Leukocytosis.  Due to steroids.     dvt proph:   heparin            Date of Discharge:  4/24/2017

## 2017-04-25 NOTE — DISCHARGE SUMMARY
[place] [facility] - [facility]    Discharge Summary    Allan Wang Patient Status:  Inpatient    1/3/1995 MRN [id number]   Location [facility] 5SW/SE Attending Km Moura MD   [address] Day # 36 PCP Landry Yusuf MD     Date of Admission: 3/1 of choice being heroin presents with complaint of left-sided chest pain radiating towards the left arm, described as a 9 out of 10 in severity aggravated by deep breathing and no relieving factors.  She describes her pain as sharp denies any associated chong this was given:  1 mg on 4/24/2017  6:27 PM   Commonly known as:  HALDOL        Take 1 tablet (1 mg total) by mouth 3 (three) times daily.     Quantity:  90 tablet   Refills:  0       HydrOXYzine HCl 25 MG Tabs   Last time this was given:  25 mg on 4/24/201 0         CONTINUE taking these medications       Instructions Prescription details    METHADONE HCL OR   Last time this was given:  60 mg on 4/24/2017 10:36 AM        Take 75 mg by mouth daily.     Refills:  0            Where to Get Your Medications preparing this discharge.     Raheem Monet  4/24/2017  7:52 PM

## 2017-04-26 NOTE — PROGRESS NOTES
Allan Wang is a 23year old  single female lives with her family with a chronic history of bipolar disorder and polysubstance dependency mostly heroin IV currently in methadone clinic who presented to the hospital with left-sided chest pain a Maternal Grandmother    • Diabetes Maternal Grandfather    • Prostate Cancer Paternal Grandfather      w/ metastasis   • back pain[other] [OTHER] Father    • Hypertension Mother       Social History:   Smoking Status: Former Smoker                   Packs/ and support with encouraging positive behavior  2. Continue Seroquel 200 mg nightly. .  3. Continue Seroquel  mg nightly at 7 PM starting tomorrow. 4.  Continue Haldol 1 mg 3 times daily. Generalized anxiety disorders:  1.   Continue Atarax 25 mg t With Platelet  Basic Metabolic Panel (8)  CBC With Differential With Platelet  Basic Metabolic Panel (8)  Magnesium  Magnesium  Magnesium  Drug screen 7 w/out confirmation, urine STAT  Troponin I, 0 Hour  Troponin I  D-Dimer  CBC With Differential With Alireza daily.      nicotine 21 MG/24HR Transdermal Patch 24 Hr 30 patch 0      Sig: Place 1 patch onto the skin daily. nicotine 7 MG/24HR Transdermal Patch 24 Hr 30 patch 0      Sig: Place 1 patch onto the skin daily.       simethicone 80 MG Oral Chew Tab 120

## 2017-05-01 ENCOUNTER — OFFICE VISIT (OUTPATIENT)
Dept: INTERNAL MEDICINE CLINIC | Facility: CLINIC | Age: 22
End: 2017-05-01

## 2017-05-01 VITALS
WEIGHT: 143 LBS | BODY MASS INDEX: 25.66 KG/M2 | SYSTOLIC BLOOD PRESSURE: 122 MMHG | DIASTOLIC BLOOD PRESSURE: 81 MMHG | HEART RATE: 109 BPM | HEIGHT: 62.5 IN

## 2017-05-01 DIAGNOSIS — I33.0 SUBACUTE BACTERIAL ENDOCARDITIS: Primary | ICD-10-CM

## 2017-05-01 DIAGNOSIS — F11.20 OPIOID TYPE DEPENDENCE, CONTINUOUS (HCC): ICD-10-CM

## 2017-05-01 PROCEDURE — 99212 OFFICE O/P EST SF 10 MIN: CPT | Performed by: INTERNAL MEDICINE

## 2017-05-01 PROCEDURE — 99214 OFFICE O/P EST MOD 30 MIN: CPT | Performed by: INTERNAL MEDICINE

## 2017-05-01 RX ORDER — GABAPENTIN 100 MG/1
100 CAPSULE ORAL 3 TIMES DAILY
Qty: 90 CAPSULE | Refills: 3 | Status: SHIPPED | OUTPATIENT
Start: 2017-05-01 | End: 2017-07-06

## 2017-05-01 RX ORDER — PANTOPRAZOLE SODIUM 40 MG/1
40 TABLET, DELAYED RELEASE ORAL
Qty: 30 TABLET | Refills: 5 | Status: CANCELLED | OUTPATIENT
Start: 2017-05-01

## 2017-05-01 NOTE — PROGRESS NOTES
Debbie Donnelly is a 23year old female. Patient presents with:  Hospital F/U: Pt stts she went to the ER on March 15th due to chest pain and was admitted    HPI:   Sherrie presents this afternoon, accompanied by her mother, for hospital follow-up.     She days with Cipro on the 42nd day. UTI was treated with Bactrim. She was discharged home on April 24. PICC line was removed prior to discharge. She presents today with her mother for follow-up.   She has an appointment with the [facility] in Cardiology tomorro Amoxicillin             Hives   Past Medical History   Diagnosis Date   • Self-inflicted injury 0383     hospitalizaed   • ADD (attention deficit disorder) 2009   • Depression    • Anxiety state, unspecified    • LBP (low back pain)    • Acid reflux

## 2017-05-01 NOTE — PATIENT INSTRUCTIONS
Please stop pantoprazole. Continue gabapentin. Await appointment with Cardiology tomorrow. Continue follow-up with Psychiatry, IOP, and substance abuse treatment. Return visit in one month.

## 2017-05-02 ENCOUNTER — OFFICE VISIT (OUTPATIENT)
Dept: CARDIOLOGY CLINIC | Facility: CLINIC | Age: 22
End: 2017-05-02

## 2017-05-02 VITALS
SYSTOLIC BLOOD PRESSURE: 96 MMHG | HEIGHT: 62.5 IN | DIASTOLIC BLOOD PRESSURE: 68 MMHG | RESPIRATION RATE: 18 BRPM | BODY MASS INDEX: 25.66 KG/M2 | HEART RATE: 72 BPM | WEIGHT: 143 LBS

## 2017-05-02 DIAGNOSIS — R07.89 CHEST PAIN, ATYPICAL: ICD-10-CM

## 2017-05-02 DIAGNOSIS — R07.9 CHEST PAIN, UNSPECIFIED TYPE: Primary | ICD-10-CM

## 2017-05-02 PROCEDURE — 99213 OFFICE O/P EST LOW 20 MIN: CPT | Performed by: NURSE PRACTITIONER

## 2017-05-02 PROCEDURE — 99212 OFFICE O/P EST SF 10 MIN: CPT | Performed by: NURSE PRACTITIONER

## 2017-05-02 NOTE — PROGRESS NOTES
Namita Byers is a 23year old female. Patient presents with:  Hospital F/U: vegatation on pulmonic valve  Chest Pain: left side chest pain, occasional  Dizziness: positional  Dyspnea: on exertion    HPI:   Patient comes in today for a checkup after hospi treated with daptomycin ×42 days      Social History:    Smoking Status: Current Some Day Smoker         Packs/Day: 1.00  Years: 1         Types: Cigarettes      Last Attempt to quit: 01/23/2015    Smokeless Status: Never Used                        [employer]

## 2017-05-12 ENCOUNTER — OFFICE VISIT (OUTPATIENT)
Dept: INTERNAL MEDICINE CLINIC | Facility: CLINIC | Age: 22
End: 2017-05-12

## 2017-05-12 VITALS
BODY MASS INDEX: 24.94 KG/M2 | DIASTOLIC BLOOD PRESSURE: 68 MMHG | HEIGHT: 62.5 IN | WEIGHT: 139 LBS | SYSTOLIC BLOOD PRESSURE: 96 MMHG | HEART RATE: 84 BPM | TEMPERATURE: 98 F

## 2017-05-12 DIAGNOSIS — J02.9 ACUTE PHARYNGITIS, UNSPECIFIED ETIOLOGY: Primary | ICD-10-CM

## 2017-05-12 PROCEDURE — 99212 OFFICE O/P EST SF 10 MIN: CPT | Performed by: INTERNAL MEDICINE

## 2017-05-12 PROCEDURE — 99213 OFFICE O/P EST LOW 20 MIN: CPT | Performed by: INTERNAL MEDICINE

## 2017-05-12 PROCEDURE — 87880 STREP A ASSAY W/OPTIC: CPT | Performed by: INTERNAL MEDICINE

## 2017-05-12 NOTE — PROGRESS NOTES
Viktor Braxton is a 23year old female. Patient presents with:  Sore Throat    HPI:   [facility] presents this morning, accompanied by her mother, for evaluation. Since Wednesday, 2 days ago, she has had a sore throat which is slowly improving.   She note comfortably in no distress  BP 96/68 mmHg  Pulse 84  Temp(Src) 98.2 °F (36.8 °C) (Oral)  Ht 5' 2.5\" (1.588 m)  Wt 139 lb (63.05 kg)  BMI 25.00 kg/m2  HEENT: Anicteric, conjunctiva pink, canals and TMs normal bilaterally, turbinates normal without purulent

## 2017-05-12 NOTE — PATIENT INSTRUCTIONS
Your Strep test today was negative. Treat symptoms with Tylenol and warm salt water gargles as needed. Please try to stop smoking. Call if not soon better.

## 2017-05-19 ENCOUNTER — HOSPITAL ENCOUNTER (OUTPATIENT)
Dept: CV DIAGNOSTICS | Facility: HOSPITAL | Age: 22
Discharge: HOME | End: 2017-05-19
Attending: NURSE PRACTITIONER
Payer: MEDICAID

## 2017-05-19 DIAGNOSIS — R07.9 CHEST PAIN, UNSPECIFIED TYPE: ICD-10-CM

## 2017-05-19 PROCEDURE — 93350 STRESS TTE ONLY: CPT | Performed by: NURSE PRACTITIONER

## 2017-05-19 PROCEDURE — 93018 CV STRESS TEST I&R ONLY: CPT | Performed by: NURSE PRACTITIONER

## 2017-05-19 PROCEDURE — 93016 CV STRESS TEST SUPVJ ONLY: CPT | Performed by: NURSE PRACTITIONER

## 2017-05-19 PROCEDURE — 93017 CV STRESS TEST TRACING ONLY: CPT | Performed by: NURSE PRACTITIONER

## 2017-07-05 ENCOUNTER — TELEPHONE (OUTPATIENT)
Dept: FAMILY MEDICINE CLINIC | Facility: CLINIC | Age: 22
End: 2017-07-05

## 2017-07-05 NOTE — TELEPHONE ENCOUNTER
Actions Requested: Requesting appt. Problem: Feeling burning sensation from stomach towards throat. Onset and Timing: One day  Associated Symptoms: No other symptom. Aggravating by: Possibly food or drink.   Alleviated by: None  Triage Note: Pts\" moth

## 2017-07-06 ENCOUNTER — APPOINTMENT (OUTPATIENT)
Dept: LAB | Facility: HOSPITAL | Age: 22
End: 2017-07-06
Attending: INTERNAL MEDICINE
Payer: COMMERCIAL

## 2017-07-06 ENCOUNTER — OFFICE VISIT (OUTPATIENT)
Dept: INTERNAL MEDICINE CLINIC | Facility: CLINIC | Age: 22
End: 2017-07-06

## 2017-07-06 VITALS
BODY MASS INDEX: 24.81 KG/M2 | HEART RATE: 74 BPM | DIASTOLIC BLOOD PRESSURE: 64 MMHG | SYSTOLIC BLOOD PRESSURE: 90 MMHG | TEMPERATURE: 98 F | HEIGHT: 64 IN | WEIGHT: 145.31 LBS | RESPIRATION RATE: 18 BRPM

## 2017-07-06 DIAGNOSIS — R30.0 DYSURIA: ICD-10-CM

## 2017-07-06 DIAGNOSIS — K21.9 GASTROESOPHAGEAL REFLUX DISEASE, ESOPHAGITIS PRESENCE NOT SPECIFIED: Primary | ICD-10-CM

## 2017-07-06 LAB
BILIRUB UR QL: NEGATIVE
COLOR UR: YELLOW
GLUCOSE UR-MCNC: NEGATIVE MG/DL
KETONES UR-MCNC: NEGATIVE MG/DL
NITRITE UR QL STRIP.AUTO: NEGATIVE
PH UR: 7 [PH] (ref 5–8)
PROT UR-MCNC: 30 MG/DL
RBC #/AREA URNS AUTO: 49 /HPF
SP GR UR STRIP: 1.01 (ref 1–1.03)
UROBILINOGEN UR STRIP-ACNC: <2
VIT C UR-MCNC: NEGATIVE MG/DL
WBC #/AREA URNS AUTO: 307 /HPF

## 2017-07-06 PROCEDURE — 99212 OFFICE O/P EST SF 10 MIN: CPT | Performed by: INTERNAL MEDICINE

## 2017-07-06 PROCEDURE — 87186 SC STD MICRODIL/AGAR DIL: CPT | Performed by: INTERNAL MEDICINE

## 2017-07-06 PROCEDURE — 87077 CULTURE AEROBIC IDENTIFY: CPT | Performed by: INTERNAL MEDICINE

## 2017-07-06 PROCEDURE — 87086 URINE CULTURE/COLONY COUNT: CPT | Performed by: INTERNAL MEDICINE

## 2017-07-06 PROCEDURE — 81001 URINALYSIS AUTO W/SCOPE: CPT | Performed by: INTERNAL MEDICINE

## 2017-07-06 PROCEDURE — 99213 OFFICE O/P EST LOW 20 MIN: CPT | Performed by: INTERNAL MEDICINE

## 2017-07-06 RX ORDER — PANTOPRAZOLE SODIUM 40 MG/1
40 TABLET, DELAYED RELEASE ORAL
Qty: 30 TABLET | Refills: 5 | Status: SHIPPED | OUTPATIENT
Start: 2017-07-06 | End: 2017-09-27 | Stop reason: ALTCHOICE

## 2017-07-06 NOTE — PROGRESS NOTES
HPI:    Patient ID: Ju Willis is a 23year old female. HPI  Patient is here with a couple of active complaints. Recent history remarkable for a long hospitalization for bacterial endocarditis in March and April. That was related to IV drug use. pulses. Pulmonary/Chest: Effort normal and breath sounds normal.   Abdominal: Soft. Bowel sounds are normal. She exhibits no distension. There is no hepatosplenomegaly. There is tenderness. Mild diffuse abdominal tenderness.   Also bilateral CVA and fl

## 2017-07-07 ENCOUNTER — TELEPHONE (OUTPATIENT)
Dept: INTERNAL MEDICINE CLINIC | Facility: CLINIC | Age: 22
End: 2017-07-07

## 2017-07-07 RX ORDER — SULFAMETHOXAZOLE AND TRIMETHOPRIM 800; 160 MG/1; MG/1
1 TABLET ORAL 2 TIMES DAILY
Qty: 14 TABLET | Refills: 0 | Status: SHIPPED | OUTPATIENT
Start: 2017-07-07 | End: 2017-07-14

## 2017-07-07 RX ORDER — CIPROFLOXACIN 500 MG/1
500 TABLET, FILM COATED ORAL 2 TIMES DAILY
Qty: 10 TABLET | Refills: 0 | Status: CANCELLED | OUTPATIENT
Start: 2017-07-07 | End: 2017-07-12

## 2017-07-07 NOTE — TELEPHONE ENCOUNTER
Please see message attacehd to test results UA shows infection. Awaiting culture. Start on Bactrim now.  Rx sent

## 2017-07-07 NOTE — TELEPHONE ENCOUNTER
Verified name and  with pt mother  Results/recommendations reviewed with pt mother and pt mother verb understanding.

## 2017-07-07 NOTE — TELEPHONE ENCOUNTER
Pt's mom is calling to inquire about results. Was informed message will be sent to nurse or MD to relate results and will call her back, Pt's mom got upset and stated her daughter is in a lot of pain and cant wait for results.  Pt's mom repeated Rolly Glad is o

## 2017-07-11 ENCOUNTER — TELEPHONE (OUTPATIENT)
Dept: FAMILY MEDICINE CLINIC | Facility: CLINIC | Age: 22
End: 2017-07-11

## 2017-07-11 RX ORDER — FLUCONAZOLE 150 MG/1
150 TABLET ORAL ONCE
Qty: 1 TABLET | Refills: 0 | Status: SHIPPED | OUTPATIENT
Start: 2017-07-11 | End: 2017-07-11

## 2017-07-11 NOTE — TELEPHONE ENCOUNTER
Please call patient or mother. A prescription for Diflucan sent to pharmacy. She is to take it once if she is developing any symptoms only.

## 2017-07-11 NOTE — TELEPHONE ENCOUNTER
Actions Requested: Mother requesting medication to prevent yeast infection on pt d/t being on multiple antibiotics. Pharmacy verified. Problem: Mother wanted to make sure pt was to take both antibiotics that were prescribed.    Onset and Timing: N/a  A

## 2017-07-13 ENCOUNTER — TELEPHONE (OUTPATIENT)
Dept: INTERNAL MEDICINE CLINIC | Facility: CLINIC | Age: 22
End: 2017-07-13

## 2017-07-13 DIAGNOSIS — Z09 FOLLOW-UP TREATMENT: ICD-10-CM

## 2017-07-13 DIAGNOSIS — R30.0 DYSURIA: Primary | ICD-10-CM

## 2017-07-13 NOTE — TELEPHONE ENCOUNTER
LOV 7/6/17 seen for multiple issues and UTI being one. Started bactrim and macrobid 7/10/17. Continues to have dysuria, afebrile, denies hematuria, foul smelling urine.   Still has a few days left to complete therapy and wants to know if additional labs s

## 2017-08-22 ENCOUNTER — OFFICE VISIT (OUTPATIENT)
Dept: OBGYN CLINIC | Facility: CLINIC | Age: 22
End: 2017-08-22

## 2017-08-22 ENCOUNTER — APPOINTMENT (OUTPATIENT)
Dept: LAB | Age: 22
End: 2017-08-22
Attending: OBSTETRICS & GYNECOLOGY
Payer: COMMERCIAL

## 2017-08-22 VITALS
SYSTOLIC BLOOD PRESSURE: 108 MMHG | WEIGHT: 144.31 LBS | HEIGHT: 62.6 IN | BODY MASS INDEX: 25.89 KG/M2 | DIASTOLIC BLOOD PRESSURE: 82 MMHG | HEART RATE: 80 BPM

## 2017-08-22 DIAGNOSIS — Z11.3 VENEREAL DISEASE SCREENING: ICD-10-CM

## 2017-08-22 DIAGNOSIS — N91.1 AMENORRHEA, SECONDARY: ICD-10-CM

## 2017-08-22 DIAGNOSIS — N89.8 VAGINAL DISCHARGE: ICD-10-CM

## 2017-08-22 DIAGNOSIS — N91.1 AMENORRHEA, SECONDARY: Primary | ICD-10-CM

## 2017-08-22 LAB
HCG SERPL QL: NEGATIVE
PROLACTIN SERPL-MCNC: 1.9 NG/ML (ref 1.4–24.2)
TSH SERPL-ACNC: 1.12 UIU/ML (ref 0.45–5.33)

## 2017-08-22 PROCEDURE — 86803 HEPATITIS C AB TEST: CPT

## 2017-08-22 PROCEDURE — 86780 TREPONEMA PALLIDUM: CPT

## 2017-08-22 PROCEDURE — 84443 ASSAY THYROID STIM HORMONE: CPT

## 2017-08-22 PROCEDURE — 87340 HEPATITIS B SURFACE AG IA: CPT

## 2017-08-22 PROCEDURE — 87389 HIV-1 AG W/HIV-1&-2 AB AG IA: CPT

## 2017-08-22 PROCEDURE — 36415 COLL VENOUS BLD VENIPUNCTURE: CPT

## 2017-08-22 PROCEDURE — 84703 CHORIONIC GONADOTROPIN ASSAY: CPT

## 2017-08-22 PROCEDURE — 84146 ASSAY OF PROLACTIN: CPT

## 2017-08-22 PROCEDURE — 99213 OFFICE O/P EST LOW 20 MIN: CPT | Performed by: OBSTETRICS & GYNECOLOGY

## 2017-08-22 NOTE — PROGRESS NOTES
HPI:    Patient ID: Fortunato Morejon is a 23year old female. HPI   with amenorrhea since March.   She followed this with 4 heavy flow days beginning   and none since Menarche at age 17 and regular menses until admission for endocarditis i Hives   PHYSICAL EXAM:   Physical Exam   Genitourinary:   Genitourinary Comments: EG, vagina and cervix w/o lesions. Uterus NSSC and no adnexal mass / tenderness.                    ASSESSMENT/PLAN:   Amenorrhea, secondary  (primary encounter diagnosis

## 2017-08-23 ENCOUNTER — TELEPHONE (OUTPATIENT)
Dept: OBGYN CLINIC | Facility: CLINIC | Age: 22
End: 2017-08-23

## 2017-08-23 LAB
C TRACH DNA SPEC QL NAA+PROBE: POSITIVE
HBV SURFACE AG SERPL QL IA: NONREACTIVE
HCV AB SERPL QL IA: NONREACTIVE
HIV1+2 AB SERPL QL IA: NONREACTIVE
N GONORRHOEA DNA SPEC QL NAA+PROBE: NEGATIVE
T PALLIDUM AB SER QL: NEGATIVE
T VAGINALIS RRNA SPEC QL NAA+PROBE: NEGATIVE

## 2017-08-23 RX ORDER — AZITHROMYCIN 500 MG/1
1000 TABLET, FILM COATED ORAL ONCE
Qty: 2 TABLET | Refills: 0 | Status: SHIPPED | OUTPATIENT
Start: 2017-08-23 | End: 2017-08-23

## 2017-08-23 NOTE — TELEPHONE ENCOUNTER
Pt notified SCOTTIE reviewed pt's labs done yesterday 8/22/17 and the Chlamydia result is positive and everything else was negative and normal. Pt informed a rx for Zithromax has been sent to her pharmacy and pt is to take both tabs at once.  Pt advised to noti

## 2017-08-23 NOTE — TELEPHONE ENCOUNTER
Received call from reference lab to report a positive Chlamydia result. Routed to SCOTTIE to review and advise.

## 2017-09-27 ENCOUNTER — OFFICE VISIT (OUTPATIENT)
Dept: OBGYN CLINIC | Facility: CLINIC | Age: 22
End: 2017-09-27

## 2017-09-27 VITALS
SYSTOLIC BLOOD PRESSURE: 111 MMHG | DIASTOLIC BLOOD PRESSURE: 74 MMHG | BODY MASS INDEX: 26 KG/M2 | HEART RATE: 88 BPM | WEIGHT: 145 LBS

## 2017-09-27 DIAGNOSIS — N91.1 AMENORRHEA, SECONDARY: ICD-10-CM

## 2017-09-27 DIAGNOSIS — Z20.2 CHLAMYDIA CONTACT, TREATED: Primary | ICD-10-CM

## 2017-09-27 DIAGNOSIS — Z32.00 PREGNANCY EXAMINATION OR TEST, PREGNANCY UNCONFIRMED: ICD-10-CM

## 2017-09-27 PROCEDURE — 99213 OFFICE O/P EST LOW 20 MIN: CPT | Performed by: OBSTETRICS & GYNECOLOGY

## 2017-09-27 PROCEDURE — 81025 URINE PREGNANCY TEST: CPT | Performed by: OBSTETRICS & GYNECOLOGY

## 2017-09-27 RX ORDER — MEDROXYPROGESTERONE ACETATE 10 MG/1
10 TABLET ORAL DAILY
Qty: 7 TABLET | Refills: 0 | Status: SHIPPED | OUTPATIENT
Start: 2017-09-27 | End: 2017-10-04

## 2017-09-27 NOTE — PROGRESS NOTES
HPI:    Patient ID: Ambreen Gamble is a 23year old female. HPI Here for Chlamydia DIONICIO. Both she and partner treated and resumed IC. She is amenorrheic with previous normal labs, most likely due to meds. Megan Dukes at visit.      Review of Systems   C MedroxyPROGESTERone Acetate (PROVERA) 10 MG Oral Tab 7 tablet 0      Sig: Take 1 tablet (10 mg total) by mouth daily.            Imaging & Referrals:  None       BB#[id number]

## 2017-09-30 ENCOUNTER — TELEPHONE (OUTPATIENT)
Dept: PEDIATRICS CLINIC | Facility: CLINIC | Age: 22
End: 2017-09-30

## 2017-10-04 ENCOUNTER — APPOINTMENT (OUTPATIENT)
Dept: GENERAL RADIOLOGY | Facility: HOSPITAL | Age: 22
DRG: 603 | End: 2017-10-04
Attending: EMERGENCY MEDICINE
Payer: MEDICAID

## 2017-10-04 ENCOUNTER — OFFICE VISIT (OUTPATIENT)
Dept: INTERNAL MEDICINE CLINIC | Facility: CLINIC | Age: 22
End: 2017-10-04

## 2017-10-04 ENCOUNTER — HOSPITAL ENCOUNTER (INPATIENT)
Facility: HOSPITAL | Age: 22
LOS: 4 days | Discharge: HOME | DRG: 603 | End: 2017-10-09
Attending: EMERGENCY MEDICINE | Admitting: HOSPITALIST
Payer: MEDICAID

## 2017-10-04 ENCOUNTER — LAB ENCOUNTER (OUTPATIENT)
Dept: LAB | Facility: HOSPITAL | Age: 22
End: 2017-10-04
Attending: INTERNAL MEDICINE
Payer: MEDICAID

## 2017-10-04 VITALS
DIASTOLIC BLOOD PRESSURE: 69 MMHG | WEIGHT: 143 LBS | HEIGHT: 62.6 IN | SYSTOLIC BLOOD PRESSURE: 108 MMHG | BODY MASS INDEX: 25.66 KG/M2 | TEMPERATURE: 101 F | HEART RATE: 93 BPM

## 2017-10-04 DIAGNOSIS — L03.114 LEFT ARM CELLULITIS: Primary | ICD-10-CM

## 2017-10-04 DIAGNOSIS — L03.114 CELLULITIS OF LEFT UPPER EXTREMITY: Primary | ICD-10-CM

## 2017-10-04 DIAGNOSIS — L03.114 CELLULITIS OF LEFT UPPER EXTREMITY: ICD-10-CM

## 2017-10-04 PROCEDURE — 85025 COMPLETE CBC W/AUTO DIFF WBC: CPT

## 2017-10-04 PROCEDURE — 36415 COLL VENOUS BLD VENIPUNCTURE: CPT

## 2017-10-04 PROCEDURE — 99212 OFFICE O/P EST SF 10 MIN: CPT | Performed by: INTERNAL MEDICINE

## 2017-10-04 PROCEDURE — 80053 COMPREHEN METABOLIC PANEL: CPT

## 2017-10-04 PROCEDURE — 73080 X-RAY EXAM OF ELBOW: CPT | Performed by: EMERGENCY MEDICINE

## 2017-10-04 PROCEDURE — 99222 1ST HOSP IP/OBS MODERATE 55: CPT | Performed by: HOSPITALIST

## 2017-10-04 PROCEDURE — 99214 OFFICE O/P EST MOD 30 MIN: CPT | Performed by: INTERNAL MEDICINE

## 2017-10-04 RX ORDER — CLINDAMYCIN HYDROCHLORIDE 300 MG/1
300 CAPSULE ORAL 4 TIMES DAILY
Qty: 40 CAPSULE | Refills: 0 | Status: ON HOLD | OUTPATIENT
Start: 2017-10-04 | End: 2017-10-09

## 2017-10-04 RX ORDER — CIPROFLOXACIN 250 MG/1
250 TABLET, FILM COATED ORAL 2 TIMES DAILY
Qty: 20 TABLET | Refills: 0 | Status: SHIPPED | OUTPATIENT
Start: 2017-10-04 | End: 2017-10-09

## 2017-10-04 NOTE — PROGRESS NOTES
Denise Crum is a 23year old female.   Patient presents with:  Arm Pain: heroin addict, used needle last night, can't move arm       HPI:   Pt is a 24 yo woman comes with her mom  C/c left arm pain x one day -- last night when she used heroin last -- [employer] fevers  VISION: No recent vision problems, blurry vision or double vision  HEENT: No decreased hearing ear pain nasal congestion or sore throat  RESPIRATORY: denies shortness of breath, cough, wheezing  CARDIOVASCULAR: denies chest pain on exertion, palpit admission, she states that it is not as bad as last time but did advise that she is at high risk-both the patient and the mom is aware  They are willing to do blood work   advised to ice the area and to come back in 2 days  Advised to go to ER at any time

## 2017-10-05 ENCOUNTER — APPOINTMENT (OUTPATIENT)
Dept: ULTRASOUND IMAGING | Facility: HOSPITAL | Age: 22
DRG: 603 | End: 2017-10-05
Attending: NURSE PRACTITIONER
Payer: MEDICAID

## 2017-10-05 ENCOUNTER — APPOINTMENT (OUTPATIENT)
Dept: CV DIAGNOSTICS | Facility: HOSPITAL | Age: 22
DRG: 603 | End: 2017-10-05
Attending: NURSE PRACTITIONER
Payer: MEDICAID

## 2017-10-05 PROCEDURE — 93306 TTE W/DOPPLER COMPLETE: CPT | Performed by: NURSE PRACTITIONER

## 2017-10-05 PROCEDURE — 93971 EXTREMITY STUDY: CPT | Performed by: NURSE PRACTITIONER

## 2017-10-05 PROCEDURE — 99233 SBSQ HOSP IP/OBS HIGH 50: CPT | Performed by: HOSPITALIST

## 2017-10-05 RX ORDER — MEDROXYPROGESTERONE ACETATE 10 MG/1
10 TABLET ORAL DAILY
COMMUNITY
End: 2017-11-28 | Stop reason: ALTCHOICE

## 2017-10-05 RX ORDER — SODIUM CHLORIDE AND POTASSIUM CHLORIDE .9; .15 G/100ML; G/100ML
SOLUTION INTRAVENOUS CONTINUOUS
Status: DISCONTINUED | OUTPATIENT
Start: 2017-10-05 | End: 2017-10-05

## 2017-10-05 RX ORDER — ONDANSETRON 2 MG/ML
4 INJECTION INTRAMUSCULAR; INTRAVENOUS EVERY 6 HOURS PRN
Status: DISCONTINUED | OUTPATIENT
Start: 2017-10-05 | End: 2017-10-09

## 2017-10-05 RX ORDER — CLONAZEPAM 1 MG/1
1 TABLET ORAL NIGHTLY
Status: DISCONTINUED | OUTPATIENT
Start: 2017-10-05 | End: 2017-10-08

## 2017-10-05 RX ORDER — IBUPROFEN 400 MG/1
400 TABLET ORAL EVERY 4 HOURS PRN
Status: DISCONTINUED | OUTPATIENT
Start: 2017-10-05 | End: 2017-10-09

## 2017-10-05 RX ORDER — ONDANSETRON 2 MG/ML
4 INJECTION INTRAMUSCULAR; INTRAVENOUS EVERY 4 HOURS PRN
Status: DISCONTINUED | OUTPATIENT
Start: 2017-10-05 | End: 2017-10-09

## 2017-10-05 RX ORDER — LOPERAMIDE HYDROCHLORIDE 2 MG/1
2 CAPSULE ORAL EVERY 4 HOURS PRN
Status: DISCONTINUED | OUTPATIENT
Start: 2017-10-05 | End: 2017-10-09

## 2017-10-05 RX ORDER — HEPARIN SODIUM AND DEXTROSE 10000; 5 [USP'U]/100ML; G/100ML
18 INJECTION INTRAVENOUS ONCE
Status: COMPLETED | OUTPATIENT
Start: 2017-10-05 | End: 2017-10-05

## 2017-10-05 RX ORDER — KETOROLAC TROMETHAMINE 30 MG/ML
30 INJECTION, SOLUTION INTRAMUSCULAR; INTRAVENOUS EVERY 6 HOURS PRN
Status: DISPENSED | OUTPATIENT
Start: 2017-10-05 | End: 2017-10-07

## 2017-10-05 RX ORDER — METHADONE HYDROCHLORIDE 10 MG/1
80 TABLET ORAL DAILY
Status: DISCONTINUED | OUTPATIENT
Start: 2017-10-05 | End: 2017-10-08

## 2017-10-05 RX ORDER — NICOTINE 21 MG/24HR
1 PATCH, TRANSDERMAL 24 HOURS TRANSDERMAL DAILY
Status: DISCONTINUED | OUTPATIENT
Start: 2017-10-06 | End: 2017-10-09

## 2017-10-05 RX ORDER — DIPHENHYDRAMINE HYDROCHLORIDE 50 MG/ML
25 INJECTION INTRAMUSCULAR; INTRAVENOUS EVERY 6 HOURS PRN
Status: DISCONTINUED | OUTPATIENT
Start: 2017-10-05 | End: 2017-10-09

## 2017-10-05 RX ORDER — HYDROCODONE BITARTRATE AND ACETAMINOPHEN 5; 325 MG/1; MG/1
1 TABLET ORAL EVERY 6 HOURS PRN
Status: DISCONTINUED | OUTPATIENT
Start: 2017-10-05 | End: 2017-10-09

## 2017-10-05 RX ORDER — ONDANSETRON 4 MG/1
4 TABLET, ORALLY DISINTEGRATING ORAL EVERY 4 HOURS PRN
Status: DISCONTINUED | OUTPATIENT
Start: 2017-10-05 | End: 2017-10-09

## 2017-10-05 RX ORDER — ACETAMINOPHEN 325 MG/1
650 TABLET ORAL EVERY 6 HOURS PRN
Status: DISCONTINUED | OUTPATIENT
Start: 2017-10-05 | End: 2017-10-09

## 2017-10-05 RX ORDER — TRAZODONE HYDROCHLORIDE 50 MG/1
50 TABLET ORAL NIGHTLY PRN
Status: DISCONTINUED | OUTPATIENT
Start: 2017-10-05 | End: 2017-10-09

## 2017-10-05 RX ORDER — NICOTINE 21 MG/24HR
1 PATCH, TRANSDERMAL 24 HOURS TRANSDERMAL DAILY
Status: DISCONTINUED | OUTPATIENT
Start: 2017-10-05 | End: 2017-10-05

## 2017-10-05 RX ORDER — DICYCLOMINE HCL 20 MG
20 TABLET ORAL EVERY 4 HOURS PRN
Status: DISCONTINUED | OUTPATIENT
Start: 2017-10-05 | End: 2017-10-09

## 2017-10-05 RX ORDER — HEPARIN SODIUM 5000 [USP'U]/ML
5000 INJECTION, SOLUTION INTRAVENOUS; SUBCUTANEOUS EVERY 12 HOURS
Status: DISCONTINUED | OUTPATIENT
Start: 2017-10-05 | End: 2017-10-05

## 2017-10-05 RX ORDER — MEDROXYPROGESTERONE ACETATE 5 MG/1
10 TABLET ORAL DAILY
Status: DISCONTINUED | OUTPATIENT
Start: 2017-10-05 | End: 2017-10-09

## 2017-10-05 RX ORDER — 0.9 % SODIUM CHLORIDE 0.9 %
VIAL (ML) INJECTION
Status: COMPLETED
Start: 2017-10-05 | End: 2017-10-05

## 2017-10-05 RX ORDER — HEPARIN SODIUM 5000 [USP'U]/ML
5000 INJECTION, SOLUTION INTRAVENOUS; SUBCUTANEOUS EVERY 12 HOURS SCHEDULED
Status: DISCONTINUED | OUTPATIENT
Start: 2017-10-05 | End: 2017-10-05 | Stop reason: ALTCHOICE

## 2017-10-05 RX ORDER — DIPHENHYDRAMINE HYDROCHLORIDE 50 MG/ML
INJECTION INTRAMUSCULAR; INTRAVENOUS
Status: COMPLETED
Start: 2017-10-05 | End: 2017-10-05

## 2017-10-05 RX ORDER — HEPARIN SODIUM 1000 [USP'U]/ML
80 INJECTION, SOLUTION INTRAVENOUS; SUBCUTANEOUS ONCE
Status: COMPLETED | OUTPATIENT
Start: 2017-10-05 | End: 2017-10-05

## 2017-10-05 RX ORDER — HEPARIN SODIUM AND DEXTROSE 10000; 5 [USP'U]/100ML; G/100ML
INJECTION INTRAVENOUS CONTINUOUS
Status: DISCONTINUED | OUTPATIENT
Start: 2017-10-06 | End: 2017-10-09

## 2017-10-05 RX ORDER — CALCIUM CARBONATE 200(500)MG
500 TABLET,CHEWABLE ORAL 3 TIMES DAILY PRN
Status: DISCONTINUED | OUTPATIENT
Start: 2017-10-05 | End: 2017-10-09

## 2017-10-05 RX ORDER — DIVALPROEX SODIUM 500 MG/1
1000 TABLET, EXTENDED RELEASE ORAL NIGHTLY
Status: DISCONTINUED | OUTPATIENT
Start: 2017-10-05 | End: 2017-10-07

## 2017-10-05 NOTE — CM/SW NOTE
Pt is an active heroin user per notes. SW was informed that pt goes to [facility] still to receive methadone, thought pt actively uses. Pt has hx of Bipolar. Pt was hospitalized from 3/15-4/24 due to needing LT IV Abx.  Per notes last hospitalization,

## 2017-10-05 NOTE — CONSULTS
..                                                       INFECTIOUS DISEASE CONSULTATION    Carrie Lisa Patient Status:  Inpatient    1/3/1995 MRN [id number]   Location [facility] 5SW/SE Attending Luna Denney MD   Hosp Day # 0 DARLENE Wolff uses drugs, including Heroin. She reports that she does not drink alcohol.     Allergies:    Amoxicillin             Hives    Medications:    Current Facility-Administered Medications:   •  ondansetron HCl (ZOFRAN) injection 4 mg, 4 mg, Intravenous, Q6H PRN No subconjunctival hemorrhage. Neck: No lymphadenopathy. supple, no masses  Respiratory: Lungs are clear to auscultation. No wheezing. .  Cardiovascular: No murmur presently. No rub. Regular rate and rhythm. .  Abdomen: Soft, nontender, nondistended.   Cecilia Luciana [facility])      ASSESSMENT/PLAN:  -Left arm cellulitis. No evidence of septic elbow at this time. 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   Status post trial of vancomycin compli

## 2017-10-05 NOTE — PROGRESS NOTES
[facility]    Progress Note    Lauro Brambila Patient Status:  Inpatient    1/3/1995 MRN [id number]   Location [facility] 5SW/SE Attending Nadia Browning MD   Hosp Day # 0 PCP Zulma Trinidad MD       Subjective:   Lauro Brambila allergy  - will consult ID for antibiotics   - check ultrasound to rule out DVT     History of IV drug use  - Counselled patient to quit   - on withdrawal protocol     Smoking cessation  - counselled patient to quit  - continue nicotine patch       Quality

## 2017-10-05 NOTE — ED PROVIDER NOTES
Patient Seen in: [facility] AND [facility] Emergency Department    History   Patient presents with:  Cellulitis (integumentary, infectious)    Stated Complaint: elevated White count, Fever    HPI    22-year-old female with history of bipolar disorder, IV heroin of Systems    Positive for stated complaint: elevated White count, Fever  Other systems are as noted in HPI. Constitutional and vital signs reviewed. All other systems reviewed and negative except as noted above.     PSFH elements reviewed from today elbow joint effusion.     Moderate subcutaneous soft tissue stranding and swelling seen in the superficial soft tissues overlying the elbow, primarily anteriorly.      ============================================================  ED Course  ----------------

## 2017-10-05 NOTE — H&P
[address] [facility] Patient Status:  Emergency    1/3/1995 MRN [id number]   Location [address] Attending Gilles Mark MD   [facility] Day # 0 PCP Behzad Medrano MD     Date:  10/ reports that she does not drink alcohol. Allergies:    Amoxicillin             Hives    Home Medications:  Prior to Admission Medications   Prescriptions Last Dose Informant Patient Reported? Taking?    Ciprofloxacin HCl 250 MG Oral Tab Not Taking  No No organomegaly. Lymphatics:  No lymphadenopathy neck, axilla, groin. Musculoskeletal: Normal range of motion. normal strength. Feet:  Normal pulses. Neurologic:  Alert, oriented, no focal deficits, cranial nerves II-XII are grossly intact.   Cognition an

## 2017-10-05 NOTE — PLAN OF CARE
Problem: Patient/Family Goals  Goal: Patient/Family Long Term Goal  Patient's shortTerm Goal: decrease pain     Interventions:  - pain management   - IV Abx   - cool/ hot compress     - See additional Care Plan goals for specific interventions   Outcome: P

## 2017-10-05 NOTE — PROGRESS NOTES
[facility] dosing service    Initial Pharmacokinetic Consult for Vancomycin Dosing     Vira Harper is a 23year old female who is being treated for cellulitis. Pharmacy has been asked to dose Vancomycin by Dr. Terry Moreira    She is allergic to amoxicillin. care.    Payam George [facility]  10/5/2017  5:20 AM  [facility]: [phone]

## 2017-10-05 NOTE — ED NOTES
Pt presents to the ED sent by PCP for IV abx for left arm cellulitis s/p IV heroin use. Pt last used yesterday. Neuro: WNL  HEENT: WNL  Resp: WNL  Cardiac:  WNL  GI: WNL  : WNL  Skin: Multiple track marks to bilateral arms, redness noted to the anteri

## 2017-10-05 NOTE — ED NOTES
Unable to obtain 2nd set of blood culture by ultrasound guidance x2. MD aware and states OK to start IV abx.

## 2017-10-05 NOTE — ED INITIAL ASSESSMENT (HPI)
Patient presents with left arm cellulitis d/t heroin injections. Patient seen by pcp, labs drawn, elevated white count. Advised to go to ER for evaluation as she believes 'po abx won't work' per doctors note. Last heroin use last night.

## 2017-10-06 ENCOUNTER — APPOINTMENT (OUTPATIENT)
Dept: CT IMAGING | Facility: HOSPITAL | Age: 22
DRG: 603 | End: 2017-10-06
Attending: INTERNAL MEDICINE
Payer: MEDICAID

## 2017-10-06 PROCEDURE — 73200 CT UPPER EXTREMITY W/O DYE: CPT | Performed by: INTERNAL MEDICINE

## 2017-10-06 PROCEDURE — 3E0234Z INTRODUCTION OF SERUM, TOXOID AND VACCINE INTO MUSCLE, PERCUTANEOUS APPROACH: ICD-10-PCS | Performed by: HOSPITALIST

## 2017-10-06 PROCEDURE — 99233 SBSQ HOSP IP/OBS HIGH 50: CPT | Performed by: HOSPITALIST

## 2017-10-06 PROCEDURE — 99223 1ST HOSP IP/OBS HIGH 75: CPT | Performed by: INTERNAL MEDICINE

## 2017-10-06 RX ORDER — 0.9 % SODIUM CHLORIDE 0.9 %
VIAL (ML) INJECTION
Status: COMPLETED
Start: 2017-10-06 | End: 2017-10-06

## 2017-10-06 RX ORDER — SODIUM CHLORIDE 0.9 % (FLUSH) 0.9 %
10 SYRINGE (ML) INJECTION AS NEEDED
Status: DISCONTINUED | OUTPATIENT
Start: 2017-10-06 | End: 2017-10-09

## 2017-10-06 RX ORDER — LIDOCAINE HYDROCHLORIDE 10 MG/ML
0.5 INJECTION, SOLUTION INFILTRATION; PERINEURAL ONCE AS NEEDED
Status: ACTIVE | OUTPATIENT
Start: 2017-10-06 | End: 2017-10-06

## 2017-10-06 NOTE — CONSULTS
[facility]    PATIENT'S NAME: Jigna Bledsoe   ATTENDING PHYSICIAN: Topher Roman MD   CONSULTING PHYSICIAN: Naomi Alva.  Tyrell Sanchez MD   PATIENT ACCOUNT#:   [id number]    LOCATION:  [address] [place] [address] #:   [id number]       DATE OF BIRTH:  [birth year] Admits to longstanding and recent IV drug abuse. FAMILY HISTORY:  No history of venous thromboembolic disease in the family. REVIEW OF SYSTEMS:  Other systems reviewed and negative x12. PHYSICAL EXAMINATION:    GENERAL:  No acute distress.   Lynn this hospitalization, we would agree with continuing heparin IV for the time being.   She does not necessarily need long-term anticoagulation, however, if at the time of discharge she is still having symptoms secondary to her thrombosis, we would recommend

## 2017-10-06 NOTE — PROGRESS NOTES
Vascular Access Note    Vascular Access Screening:   Allergies to Lidocaine: no  Allergies to Latex: no  Presence of Pacemaker/Defibrillator: No  Mastectomy with Lymph Node Dissection: No  AV Fistula / AV Graft: No  Dialysis Catheter: No  Central Line: No

## 2017-10-06 NOTE — PLAN OF CARE
Problem: Patient/Family Goals  Goal: Patient/Family Long Term Goal  Patient's shortTerm Goal: decrease pain     Interventions:  - pain management   - IV Abx   - cool/ hot compress     - See additional Care Plan goals for specific interventions    Outcome:

## 2017-10-06 NOTE — CONSULTS
[facility]    PATIENT'S NAME: Ericka Landaverde   ATTENDING PHYSICIAN: Gume Bacon MD   CONSULTING PHYSICIAN: Farrukh Gann MD   PATIENT ACCOUNT#:   [id number]    LOCATION:  [address] [place] [address] #:   [id number]       DATE OF BIR Daptomycin, heparin, Provera, methadone 80 mg daily, NicoDerm CQ. ALLERGIES:  Amoxil. SOCIAL HISTORY:  She lives with her mother and grandparents, and is not working. FAMILY HISTORY:  Both parents have bipolar disorder.   Her father had drug addict hesitant to give a controlled substance to somebody who is a substance abuser, she reports that she does not like benzodiazepines in general and does not abuse them outside of the hospital.  I think it would be reasonable to try to get her symptoms under c

## 2017-10-06 NOTE — PROGRESS NOTES
[facility] [facility]    Progress Note    Denise Crum Patient Status:  Inpatient    1/3/1995 MRN [id number]   Location [facility] 5SW/SE Attending Kenneth Castillo MD   Hosp Day # 1 PCP Pasty Cooks, MD     Subjective:     Constitution need xarelto at discharge   - elevate arm     History of IV drug use  - Counselled patient to quit   - on withdrawal protocol   - psych following     Smoking cessation  - counselled patient to quit  - continue nicotine patch         Quality:  · DVT Prophyl

## 2017-10-06 NOTE — CONSULTS
..                                                       INFECTIOUS DISEASE CONSULTATION    Sandra Bell Patient Status:  Inpatient    1/3/1995 MRN [id number]   Location [facility] 5SW/SE Attending Cinthia Waller MD   Hosp Day # 1 DARLENE Duron PRN  •  DiphenhydrAMINE HCl (BENADRYL) injection 25 mg, 25 mg, Intravenous, Q6H PRN  •  Methadone HCl (DOLOPHINE) tab 80 mg, 80 mg, Oral, Daily  •  medroxyPROGESTERone (PROVERA) tab, 10 mg, Oral, Daily  •  Dicyclomine HCl (BENTYL) tab 20 mg, 20 mg, Oral, Q dorsum of her left hand were again there was a pen marking from previous erythema. She states she can move her elbow. I was able to demonstrate some movement of the elbow by having her point at things in the room.   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 Status post trial of vancomycin complicated by neutropenia and some itching and eventually transitioned to daptomycin. Completed a 6 week course.     P

## 2017-10-07 PROCEDURE — 99233 SBSQ HOSP IP/OBS HIGH 50: CPT | Performed by: HOSPITALIST

## 2017-10-07 RX ORDER — OLANZAPINE 5 MG/1
5 TABLET ORAL NIGHTLY
Status: DISCONTINUED | OUTPATIENT
Start: 2017-10-07 | End: 2017-10-08

## 2017-10-07 RX ORDER — 0.9 % SODIUM CHLORIDE 0.9 %
VIAL (ML) INJECTION
Status: COMPLETED
Start: 2017-10-07 | End: 2017-10-07

## 2017-10-07 RX ORDER — DIVALPROEX SODIUM 500 MG/1
1500 TABLET, EXTENDED RELEASE ORAL NIGHTLY
Status: DISCONTINUED | OUTPATIENT
Start: 2017-10-07 | End: 2017-10-09

## 2017-10-07 NOTE — BH PROGRESS NOTE
Pt seen. She reports continued withdrawal sxs of muscle aches, extreme irritability (but her nurse reports that she is much calmer than during her last admission.)  Insomnia, nausea.   She also reports poor sleep at night, and says she just napped for 20

## 2017-10-07 NOTE — CONSULTS
..                                                       INFECTIOUS DISEASE CONSULTATION    Carrie Lisa Patient Status:  Inpatient    1/3/1995 MRN [id number]   Location [facility] 5SW/SE Attending Luna Denney MD   Hosp Day # 2 PCP Terra Wolff alcohol.     Allergies:    Amoxicillin             Hives    Medications:    Current Facility-Administered Medications:   •  Normal Saline Flush 0.9 % injection 10 mL, 10 mL, Intravenous, PRN  •  ondansetron HCl (ZOFRAN) injection 4 mg, 4 mg, Intravenous, Q6 94/64  HEENT: No subjective containable hemorrhage. .    Neck: No thrush. No masses. Respiratory: Lungs are clear to auscultation. No wheezing. .  Cardiovascular: Regular rate and rhythm without murmur. Abdomen: Soft, nontender, nondistended.   No hepatos pain     Leg pain     Chest pain, atypical     Left arm cellulitis     Elevated troponin     Bipolar disorder, unspecified     Opioid type dependence, continuous (HCC)     Superficial venous thrombosis of arm, left     IV drug abuse      ASSESSMENT/PLAN:

## 2017-10-08 PROCEDURE — 99233 SBSQ HOSP IP/OBS HIGH 50: CPT | Performed by: HOSPITALIST

## 2017-10-08 PROCEDURE — 99232 SBSQ HOSP IP/OBS MODERATE 35: CPT | Performed by: INTERNAL MEDICINE

## 2017-10-08 RX ORDER — METHADONE HYDROCHLORIDE 10 MG/5ML
10 SOLUTION ORAL ONCE
Status: COMPLETED | OUTPATIENT
Start: 2017-10-08 | End: 2017-10-08

## 2017-10-08 RX ORDER — METHADONE HYDROCHLORIDE 10 MG/5ML
90 SOLUTION ORAL DAILY
Status: DISCONTINUED | OUTPATIENT
Start: 2017-10-09 | End: 2017-10-09

## 2017-10-08 RX ORDER — OLANZAPINE 10 MG/1
10 TABLET ORAL NIGHTLY
Status: DISCONTINUED | OUTPATIENT
Start: 2017-10-08 | End: 2017-10-09

## 2017-10-08 NOTE — PROGRESS NOTES
[facility]    Hematology/Oncology   Progress Note    Carrie Lisa Patient Status:  Inpatient    1/3/1995 MRN [id number]   Location [facility] 5SW/SE Attending Luna Denney MD   [facility] Day # 3 PCP MD Martha Swartz drug abuse and previous endocarditis, admitted to the hospital with left upper extremity cellulitis secondary to IV drug use as well as a provoked left upper extremity superficial thrombus in the left cephalic vein.   The patient's IV antibiotics are being

## 2017-10-08 NOTE — PLAN OF CARE
Left arm redness and swelling improved. Heparin drip continues. Continue to monitor PTT and left arm redness and swelling. Daptomycin iv.

## 2017-10-08 NOTE — PROGRESS NOTES
[facility] - [facility]    Progress Note for 2017    Carrie Lisa Patient Status:  Inpatient    1/3/1995 MRN [id number]   Location [facility] 5SW/SE Attending Luna Denney MD   [facility] Day # 3 PCP MD Shanell Swartz lymphadenopathy is likely reactive. Short-term followup ultrasound recommended following resolution of patient's symptoms to ensure resolution.   Enlargement of the distal cephalic vein consistent with patient's known thrombus seen on prior DVT study perfor

## 2017-10-08 NOTE — PROGRESS NOTES
[place] [facility] - [facility]    Progress Note for 2017    Denise Crum Patient Status:  Inpatient    1/3/1995 MRN [id number]   Location [facility] 5SW/SE Attending Kenneth Castillo MD   [facility] Day # 3 PCP Pasty Cooks, MD Frazier Katos lymphadenopathy is likely reactive. Short-term followup ultrasound recommended following resolution of patient's symptoms to ensure resolution.   Enlargement of the distal cephalic vein consistent with patient's known thrombus seen on prior DVT study perfor

## 2017-10-08 NOTE — BH PROGRESS NOTE
Pt seen with mother and male friend Coretta Choi in the  room during the entire interview. She again told me that she didn't sleep well last night, but she told Dr Cody Banks that she did sleep well, and was falling asleep when she spoke with her.   Pt said that her

## 2017-10-09 VITALS
HEIGHT: 62 IN | OXYGEN SATURATION: 98 % | SYSTOLIC BLOOD PRESSURE: 93 MMHG | HEART RATE: 56 BPM | DIASTOLIC BLOOD PRESSURE: 51 MMHG | TEMPERATURE: 98 F | BODY MASS INDEX: 27.58 KG/M2 | RESPIRATION RATE: 18 BRPM | WEIGHT: 149.88 LBS

## 2017-10-09 PROCEDURE — 99239 HOSP IP/OBS DSCHRG MGMT >30: CPT | Performed by: HOSPITALIST

## 2017-10-09 RX ORDER — PANTOPRAZOLE SODIUM 40 MG/1
40 TABLET, DELAYED RELEASE ORAL
Status: DISCONTINUED | OUTPATIENT
Start: 2017-10-09 | End: 2017-10-09

## 2017-10-09 RX ORDER — DIVALPROEX SODIUM 500 MG/1
1500 TABLET, EXTENDED RELEASE ORAL NIGHTLY
Qty: 30 TABLET | Refills: 0 | Status: SHIPPED | OUTPATIENT
Start: 2017-10-09 | End: 2017-11-28 | Stop reason: ALTCHOICE

## 2017-10-09 RX ORDER — DICYCLOMINE HCL 20 MG
20 TABLET ORAL EVERY 4 HOURS PRN
Qty: 30 TABLET | Refills: 0 | Status: SHIPPED | OUTPATIENT
Start: 2017-10-09 | End: 2017-10-16

## 2017-10-09 RX ORDER — CLINDAMYCIN HYDROCHLORIDE 150 MG/1
450 CAPSULE ORAL 3 TIMES DAILY
Qty: 63 CAPSULE | Refills: 0 | Status: SHIPPED | OUTPATIENT
Start: 2017-10-09 | End: 2017-10-16

## 2017-10-09 RX ORDER — TRAZODONE HYDROCHLORIDE 50 MG/1
50 TABLET ORAL NIGHTLY PRN
Qty: 30 PACKAGE | Refills: 0 | Status: SHIPPED | OUTPATIENT
Start: 2017-10-09 | End: 2017-10-31

## 2017-10-09 RX ORDER — OLANZAPINE 10 MG/1
10 TABLET ORAL NIGHTLY
Qty: 30 TABLET | Refills: 0 | Status: SHIPPED | OUTPATIENT
Start: 2017-10-09 | End: 2017-11-28 | Stop reason: ALTCHOICE

## 2017-10-09 NOTE — PROGRESS NOTES
INFECTIOUS DISEASE PROGRESS NOTE    Vira Harper Patient Status:  Inpatient    1/3/1995 MRN [id number]   Location [facility] 5SW/SE Attending Taryn Peralta MD   Hosp Day # 4 PCP Ivonne Monson MD     Subjective:  Afebrile.  Complaining of LUE of septic elbow at this time. 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   Status post trial of vancomycin complicated by neutropenia and some itching and eventually transitione

## 2017-10-09 NOTE — PLAN OF CARE
Problem: Patient/Family Goals  Goal: Patient/Family Long Term Goal  Patient's shortTerm Goal: decrease pain     Interventions:  - pain management   - IV Abx   - cool/ hot compress     - See additional Care Plan goals for specific interventions    Outcome: indicated   Outcome: Progressing      Comments: Pt on Heparin drip. Complaints of pain in left arm, PRN Norco given. Plan is for possible discharge today or tomorrow. VSS, all needs attended to, will continue to monitor.

## 2017-10-09 NOTE — DISCHARGE SUMMARY
[place] [facility] - [facility]    Discharge Summary    Ace Poser Patient Status:  Inpatient    1/3/1995 MRN [id number]   Location [facility] 5SW/SE Attending Tyrell Carey MD   [facility] Day # 4 PCP Jasper Hall MD     Date of Admission: 10/4/ Warm and dry  Psych: Normal affect  Ext: left arm trace edema, redness much improved      History of Present Illness: Denise Crum is a(n) 23year old female, medical history significant for IVDA and bacterial endocarditis presents with a complaint of l known as:  XARELTO      Take 1 tablet (15 mg total) by mouth 2 (two) times daily with meals.    Stop taking on:  10/30/2017  Quantity:  42 tablet  Refills:  0     TraZODone HCl 50 MG Tabs  Commonly known as:  DESYREL      Take 1 tablet (50 mg total) by mout preparation and coordination of this discharge    Addendum  Patient seen and examined independently. Chart reviewed. Agree with plan with APN.     Geovanna Vázquez MD

## 2017-10-09 NOTE — PROGRESS NOTES
[facility]    Progress Note    Fortunato Morejon Patient Status:  Inpatient    1/3/1995 MRN [id number]   Location [facility] 5SW/SE Attending Charla Benedict MD   [facility] Day # 4 PCP Shanda Peterson MD     Subjective:     Constitution methadone  - advised patient she needs to quit   - on withdrawal protocol     Smoking Cessation  - counseled patient to quit   - continue nicotine patch      History of Biplar Disorder  - Psych on board   - continue psych meds         Quality:  · DVT Proph

## 2017-10-09 NOTE — PROGRESS NOTES
Pt discharged to home, mother provided transportation. Discharge information and prescriptions reviewed. Patient and mother verbalized understanding. PICC line discontinued by Frantz Owens RN.

## 2017-10-09 NOTE — CM/SW NOTE
DEIDRE informed by pt's mother that the pt's xarelto prescription was denied by [facility] due to insurance. DEIDRE placed call to the [facility] who stated that this drug will require prior authorization.  They provided the number to call as

## 2017-10-09 NOTE — PAYOR COMM NOTE
--------------  ADMISSION REVIEW     Payor: Mason Hendrix #:  [id number]  Authorization Number: [id number]    Admit date: 10/5/17  Admit time: 503 [address]       Admitting Physician: Chanel Seo MD  Attending Physician: COLONOSCOPY N/A      Comment: Procedure: COLONOSCOPY;  Surgeon: Fadia Abbasi MD;  Location: [address] [place] [address] ENDOSCOPY  No date: OTHER SURGICAL HISTORY      Comment: wisdom teeth   2009: TONSILLECTOMY  2013: UPPER GI ENDOSCOPY,EXAM    Family Erythema and tenderness noted to the anterior aspect of the left arm to the distal third of the arm to the antecubital area. No fluctuance noted. Small area of induration noted to the antecubital area. No open wound or active drainage noted.   Psychiatri H&P - H&P Note      H&P signed by Mary Taylor MD at 10/5/2017  6:32 AM     Author:  Mary Taylor MD Service:  (none) Author Type:  Physician    Filed:  10/5/2017  6:32 AM Date of Service:  10/4/2017  9:56 PM Status:  Signed    : • Hypertension Maternal Grandmother    • Diabetes Maternal Grandfather    • Prostate Cancer Paternal Grandfather      w/ metastasis      reports that she has been smoking Cigarettes. She has a 1.00 pack-year smoking history.  She has never used smokeless venous distention, no lymphadenopathy, no thyromegaly. Respiratory:  Lungs are clear to auscultation, respirations are non-labored, breath sounds are equal, symmetrical chest wall expansion.   Cardiovascular:  Normal rate, regular rhythm, no murmur, no tejal [address] [facility] Units/hr Intravenous Cyndy Wright, RN      DAPTOmycin (CUBICIN) 250 mg in [employer] for Injection IV syringe     Date Action Dose Route User    10/8/2017 2104 Given 250 mg Intravenous Jeremiah Kaminski, HANSEL      DiphenhydrAMINE HCl (

## 2017-10-10 ENCOUNTER — TELEPHONE (OUTPATIENT)
Dept: INTERNAL MEDICINE UNIT | Facility: HOSPITAL | Age: 22
End: 2017-10-10

## 2017-10-16 ENCOUNTER — OFFICE VISIT (OUTPATIENT)
Dept: INTERNAL MEDICINE CLINIC | Facility: CLINIC | Age: 22
End: 2017-10-16

## 2017-10-16 VITALS
SYSTOLIC BLOOD PRESSURE: 111 MMHG | HEIGHT: 62.6 IN | HEART RATE: 89 BPM | BODY MASS INDEX: 26.74 KG/M2 | DIASTOLIC BLOOD PRESSURE: 79 MMHG | WEIGHT: 149 LBS

## 2017-10-16 DIAGNOSIS — F19.10 IV DRUG ABUSE (HCC): ICD-10-CM

## 2017-10-16 DIAGNOSIS — F11.20 OPIOID TYPE DEPENDENCE, CONTINUOUS (HCC): ICD-10-CM

## 2017-10-16 DIAGNOSIS — L03.114 LEFT ARM CELLULITIS: Primary | ICD-10-CM

## 2017-10-16 DIAGNOSIS — I82.612 SUPERFICIAL VENOUS THROMBOSIS OF ARM, LEFT: ICD-10-CM

## 2017-10-16 PROCEDURE — 99214 OFFICE O/P EST MOD 30 MIN: CPT | Performed by: INTERNAL MEDICINE

## 2017-10-16 PROCEDURE — 99212 OFFICE O/P EST SF 10 MIN: CPT | Performed by: INTERNAL MEDICINE

## 2017-10-16 RX ORDER — DICYCLOMINE HCL 20 MG
20 TABLET ORAL EVERY 4 HOURS PRN
Qty: 30 TABLET | Refills: 0 | Status: SHIPPED | OUTPATIENT
Start: 2017-10-16 | End: 2017-11-28 | Stop reason: ALTCHOICE

## 2017-10-16 NOTE — PATIENT INSTRUCTIONS
Please finish clindamycin. Please take Xarelto twice daily for 3 weeks. Continue follow-up with your counselor and with Dr. Gilford Adam. Refrain completely from all illicit drug use. Return visit in 1 month.

## 2017-10-16 NOTE — PROGRESS NOTES
Jeanne Barthel is a 23year old female. Patient presents with:  Hospital F/U    HPI:   [facility] presents this afternoon, accompanied by her mother, for hospital follow-up.     She was hospitalized from October 4 until October 9 with left upper extremity ce with heroin and occasionally snorting crack. She has not used any illicit drugs since her hospitalization. She sees a counselor regularly, along with Dr. Dario Varghese of Psychiatry. She is on methadone maintenance.     Current Outpatient Prescriptions:  Dic GENERAL: Pleasant female appearing well in no distress  /79 (BP Location: Right arm, Patient Position: Sitting, Cuff Size: adult)   Pulse 89   Ht 5' 2.6\" (1.59 m)   Wt 149 lb (67.6 kg)   LMP 07/14/2017   BMI 26.73 kg/m²   HEENT: Anicteric, conjunc

## 2017-10-27 ENCOUNTER — APPOINTMENT (OUTPATIENT)
Dept: GENERAL RADIOLOGY | Facility: HOSPITAL | Age: 22
End: 2017-10-27
Attending: EMERGENCY MEDICINE
Payer: MEDICAID

## 2017-10-27 ENCOUNTER — HOSPITAL ENCOUNTER (EMERGENCY)
Facility: HOSPITAL | Age: 22
Discharge: HOME | End: 2017-10-27
Attending: EMERGENCY MEDICINE
Payer: MEDICAID

## 2017-10-27 VITALS
RESPIRATION RATE: 18 BRPM | BODY MASS INDEX: 27.6 KG/M2 | OXYGEN SATURATION: 97 % | WEIGHT: 150 LBS | HEIGHT: 62 IN | DIASTOLIC BLOOD PRESSURE: 60 MMHG | HEART RATE: 60 BPM | TEMPERATURE: 98 F | SYSTOLIC BLOOD PRESSURE: 95 MMHG

## 2017-10-27 DIAGNOSIS — L03.113 CELLULITIS OF RIGHT UPPER EXTREMITY: Primary | ICD-10-CM

## 2017-10-27 PROCEDURE — 99283 EMERGENCY DEPT VISIT LOW MDM: CPT

## 2017-10-27 PROCEDURE — 73110 X-RAY EXAM OF WRIST: CPT | Performed by: EMERGENCY MEDICINE

## 2017-10-27 RX ORDER — SULFAMETHOXAZOLE AND TRIMETHOPRIM 800; 160 MG/1; MG/1
1 TABLET ORAL 2 TIMES DAILY
Qty: 14 TABLET | Refills: 0 | Status: SHIPPED | OUTPATIENT
Start: 2017-10-27 | End: 2017-10-31

## 2017-10-27 RX ORDER — NAPROXEN 500 MG/1
500 TABLET ORAL 2 TIMES DAILY PRN
Qty: 14 TABLET | Refills: 0 | Status: SHIPPED | OUTPATIENT
Start: 2017-10-27 | End: 2017-11-03

## 2017-10-27 NOTE — ED NOTES
Patient discharged home with mom with written/verbal discharge instructions which patient verbalizes understanding. Gait steady.

## 2017-10-27 NOTE — ED INITIAL ASSESSMENT (HPI)
Patient states she used heroin in her R wrist last night, states she woke up with R hand numbness/swelling  Today, R radial pulse present

## 2017-10-27 NOTE — ED PROVIDER NOTES
Patient Seen in: [facility] AND [facility] Emergency Department    History   Patient presents with:  Hand Pain    Stated Complaint: R hand pain    HPI    53-year-old female patient presents her complaining of pain and redness of the volar aspect of her wrist.  P (36.7 °C) [10/27/17 1443]  Temp src: n/a  SpO2: 98 % [10/27/17 1443]  O2 Device: None (Room air) [10/27/17 1523]    Current:/66   Pulse 80   Temp 98 °F (36.7 °C)   Resp 18   Ht 157.5 cm (5' 2\")   Wt 68 kg   LMP 10/14/2017   SpO2 98%   BMI 27.44 kg/m

## 2017-10-27 NOTE — ED NOTES
Rec'd patient sitting on cart with complaints of right wrist pain and swelling onset this morning.   States she \"shot up\" heroin last night using a dirty needle and this morning she woke with N/T and pain to wrist.  States she went to the [facility] Clinic

## 2017-10-31 ENCOUNTER — OFFICE VISIT (OUTPATIENT)
Dept: INTERNAL MEDICINE CLINIC | Facility: CLINIC | Age: 22
End: 2017-10-31

## 2017-10-31 VITALS
SYSTOLIC BLOOD PRESSURE: 101 MMHG | TEMPERATURE: 99 F | HEIGHT: 62.6 IN | BODY MASS INDEX: 27.09 KG/M2 | WEIGHT: 151 LBS | HEART RATE: 85 BPM | DIASTOLIC BLOOD PRESSURE: 71 MMHG

## 2017-10-31 DIAGNOSIS — I80.8 SUPERFICIAL THROMBOPHLEBITIS OF BOTH UPPER EXTREMITIES: ICD-10-CM

## 2017-10-31 DIAGNOSIS — L03.113 CELLULITIS OF HAND, RIGHT: Primary | ICD-10-CM

## 2017-10-31 PROCEDURE — 99213 OFFICE O/P EST LOW 20 MIN: CPT | Performed by: INTERNAL MEDICINE

## 2017-10-31 PROCEDURE — 99212 OFFICE O/P EST SF 10 MIN: CPT | Performed by: INTERNAL MEDICINE

## 2017-10-31 RX ORDER — SULFAMETHOXAZOLE AND TRIMETHOPRIM 800; 160 MG/1; MG/1
1 TABLET ORAL 2 TIMES DAILY
Qty: 14 TABLET | Refills: 0 | Status: SHIPPED | OUTPATIENT
Start: 2017-10-31 | End: 2017-11-07

## 2017-10-31 NOTE — PROGRESS NOTES
Ju Willis is a 23year old female. Patient presents with:  ER F/U: Pt stts she was seen in the ER on 10/27 due to cellulitis in the right wrist    HPI:   [facility] presents this afternoon, again accompanied by her mother, for ER follow-up.     She went disorder ([facility] [address]    • History of blood clots    • Intravenous drug abuse     Heroin and crack cocaine   • Opioid dependence [facility])      Past Surgical History:  2013: COLONOSCOPY  4/17/2017: COLONOSCOPY N/A      Comment: Procedure: COLONOSCOPY;  Surgeon: Chantal Ramos times daily and naproxen as needed. The patient indicates understanding of these issues and agrees to the plan. The patient is asked to return as needed.     Elif Barahona MD  10/31/2017  2:10 PM

## 2017-10-31 NOTE — PATIENT INSTRUCTIONS
Please continue generic Bactrim twice daily for 14 days total.  Apply warm compresses to the areas of phlebitis near both elbows 2-3 times daily, and take naproxen as needed.   Please consider more aggressive treatment for substance use disorder, and follow

## 2017-11-28 ENCOUNTER — OFFICE VISIT (OUTPATIENT)
Dept: INTERNAL MEDICINE CLINIC | Facility: CLINIC | Age: 22
End: 2017-11-28

## 2017-11-28 VITALS
SYSTOLIC BLOOD PRESSURE: 105 MMHG | BODY MASS INDEX: 27.52 KG/M2 | WEIGHT: 153.38 LBS | DIASTOLIC BLOOD PRESSURE: 71 MMHG | HEART RATE: 73 BPM | TEMPERATURE: 98 F | HEIGHT: 62.6 IN

## 2017-11-28 DIAGNOSIS — M25.521 RIGHT ELBOW PAIN: Primary | ICD-10-CM

## 2017-11-28 DIAGNOSIS — F19.10 IV DRUG ABUSE (HCC): ICD-10-CM

## 2017-11-28 DIAGNOSIS — F19.90 IV DRUG USER: ICD-10-CM

## 2017-11-28 DIAGNOSIS — I82.612 SUPERFICIAL VENOUS THROMBOSIS OF ARM, LEFT: ICD-10-CM

## 2017-11-28 DIAGNOSIS — F11.20 OPIOID DEPENDENCE ON AGONIST THERAPY (HCC): ICD-10-CM

## 2017-11-28 PROCEDURE — 99214 OFFICE O/P EST MOD 30 MIN: CPT | Performed by: INTERNAL MEDICINE

## 2017-11-28 PROCEDURE — 99212 OFFICE O/P EST SF 10 MIN: CPT | Performed by: INTERNAL MEDICINE

## 2017-11-28 RX ORDER — PANTOPRAZOLE SODIUM 40 MG/1
TABLET, DELAYED RELEASE ORAL
Refills: 5 | COMMUNITY
Start: 2017-11-12 | End: 2017-12-20 | Stop reason: ALTCHOICE

## 2017-11-28 NOTE — PROGRESS NOTES
Pedro Sheridan is a 23year old female.   Patient presents with:  Arm Pain: right arm, pt c/o of swelling, redness, warm to touch  Nausea      HPI:   She is a 22-year-old female with be drug abuse, opioid dependence, history of bacterial endocarditis, supe crack cocaine   • Opioid dependence [facility])       Past Surgical History:   Procedure Laterality Date   • Colonoscopy  2013   • Colonoscopy N/A 4/17/2017    Procedure: COLONOSCOPY;  Surgeon: Lucila Barrera MD;  Location: [address] [place] [address] ENDOSCOPY   • Other davi without murmur, rubs or gallops. GI: normal bowel sounds ,no tenderness or masses, no guarding or rigidity. EXTREMITIES: Right antecubital area stiff and fibrosed with scar tissue and track marks. No fluctuant swelling or palpable abscess.   No warmth or stop however she knows her limitation and is trying to work around it. Advised her to follow-up with a psychiatrist and restart the methadone program  The patient indicates understanding of these issues and agrees to the plan.           Marylene Putnam, MD  41

## 2017-11-29 ENCOUNTER — LAB ENCOUNTER (OUTPATIENT)
Dept: LAB | Facility: HOSPITAL | Age: 22
End: 2017-11-29
Attending: INTERNAL MEDICINE
Payer: MEDICAID

## 2017-11-29 DIAGNOSIS — F19.90 IV DRUG USER: ICD-10-CM

## 2017-11-29 PROCEDURE — 36415 COLL VENOUS BLD VENIPUNCTURE: CPT

## 2017-11-29 PROCEDURE — 85025 COMPLETE CBC W/AUTO DIFF WBC: CPT

## 2017-11-29 PROCEDURE — 87040 BLOOD CULTURE FOR BACTERIA: CPT

## 2017-12-04 ENCOUNTER — TELEPHONE (OUTPATIENT)
Dept: OTHER | Age: 22
End: 2017-12-04

## 2017-12-04 ENCOUNTER — HOSPITAL ENCOUNTER (OUTPATIENT)
Dept: ULTRASOUND IMAGING | Facility: HOSPITAL | Age: 22
Discharge: HOME | End: 2017-12-04
Attending: INTERNAL MEDICINE
Payer: MEDICAID

## 2017-12-04 ENCOUNTER — TELEPHONE (OUTPATIENT)
Dept: INTERNAL MEDICINE CLINIC | Facility: CLINIC | Age: 22
End: 2017-12-04

## 2017-12-04 DIAGNOSIS — F19.90 IV DRUG USER: ICD-10-CM

## 2017-12-04 DIAGNOSIS — I82.C11 THROMBOSIS OF RIGHT INTERNAL JUGULAR VEIN (HCC): Primary | ICD-10-CM

## 2017-12-04 PROBLEM — I82.B19 SUBCLAVIAN VEIN THROMBOSIS (HCC): Status: ACTIVE | Noted: 2017-12-04

## 2017-12-04 PROBLEM — I82.C19 INTERNAL JUGULAR VEIN THROMBOSIS (HCC): Status: ACTIVE | Noted: 2017-12-04

## 2017-12-04 PROCEDURE — 93971 EXTREMITY STUDY: CPT | Performed by: INTERNAL MEDICINE

## 2017-12-04 NOTE — TELEPHONE ENCOUNTER
Per mom of pt ,  Pt rx med Xarelto is not cover by her insurance and it is very expensive and would like to know if Dr Charlette Jaquez can give another med to pt or what to do?  pls call mom of pt once rx med have send to pharmacy on file verified.

## 2017-12-04 NOTE — TELEPHONE ENCOUNTER
Spoke with patient's mom and states if  can do a prior auth for medication since the insurance is not covering Eliquis.  authorized for prior auth to be entered and for patient to continue taking an aspirin.  Gave instructions to patient's m

## 2017-12-04 NOTE — TELEPHONE ENCOUNTER
Verified name and . Results/recommendations reviewed with pt and pt verb understanding          Notes Recorded by Rain Wilson MD on 2017 at 8:55 AM CST  Results noted to be normal, please inform the patient.   Please inform that the blood culture

## 2017-12-04 NOTE — PROGRESS NOTES
Ultrasound duplex right upper extremity evidencing a closed being thrombosis of cephalic veins and partially occluding thrombus of internal jugular vein extending to subclavian vein and the right side. This is secondary to chronic IV drug use.   Ordered El

## 2017-12-05 ENCOUNTER — TELEPHONE (OUTPATIENT)
Dept: INTERNAL MEDICINE CLINIC | Facility: CLINIC | Age: 22
End: 2017-12-05

## 2017-12-05 DIAGNOSIS — I82.C11 ACUTE EMBOLISM AND THROMBOSIS OF RIGHT INTERNAL JUGULAR VEIN (HCC): Primary | ICD-10-CM

## 2017-12-05 NOTE — TELEPHONE ENCOUNTER
PA for Eliquis 5 mg tab completed with [employer] via CMM response time 3-5 business days KEY [id number].

## 2017-12-06 ENCOUNTER — TELEPHONE (OUTPATIENT)
Dept: INTERNAL MEDICINE CLINIC | Facility: CLINIC | Age: 22
End: 2017-12-06

## 2017-12-06 DIAGNOSIS — I82.C11 ACUTE EMBOLISM AND THROMBOSIS OF RIGHT INTERNAL JUGULAR VEIN (HCC): Primary | ICD-10-CM

## 2017-12-06 RX ORDER — WARFARIN SODIUM 5 MG/1
5 TABLET ORAL DAILY
Qty: 30 TABLET | Refills: 1 | Status: SHIPPED | OUTPATIENT
Start: 2017-12-06 | End: 2017-12-20

## 2017-12-06 RX ORDER — ENOXAPARIN SODIUM 100 MG/ML
60 INJECTION SUBCUTANEOUS 2 TIMES DAILY
Qty: 10 VIAL | Refills: 0 | Status: SHIPPED | OUTPATIENT
Start: 2017-12-06 | End: 2017-12-11

## 2017-12-06 NOTE — TELEPHONE ENCOUNTER
Eliquis prior authorization was denied by the insurance. Xarelto was attempted before.   Patient and her mother refused to get the medication because of the cost.  Patient being an IV drug abuser and noncompliant, I was hesitant to start her on Lovenox and

## 2017-12-06 NOTE — TELEPHONE ENCOUNTER
PA for Eliquis is denied. Per mom insurance told her covered drug is Warfarin. Mother requests a new rx be sent in immediately. Please advise.

## 2017-12-06 NOTE — TELEPHONE ENCOUNTER
Appt made, pt will start to take warfarin 5 mg today and tmr, see pt on Friday. Will be starting on lovenox shot as well.

## 2017-12-08 ENCOUNTER — TELEPHONE (OUTPATIENT)
Dept: INTERNAL MEDICINE CLINIC | Facility: CLINIC | Age: 22
End: 2017-12-08

## 2017-12-08 ENCOUNTER — TELEPHONE (OUTPATIENT)
Dept: CARDIOLOGY CLINIC | Facility: CLINIC | Age: 22
End: 2017-12-08

## 2017-12-08 ENCOUNTER — ANTI-COAG VISIT (OUTPATIENT)
Dept: INTERNAL MEDICINE CLINIC | Facility: CLINIC | Age: 22
End: 2017-12-08

## 2017-12-08 DIAGNOSIS — I82.C11 ACUTE EMBOLISM AND THROMBOSIS OF RIGHT INTERNAL JUGULAR VEIN (HCC): ICD-10-CM

## 2017-12-08 PROCEDURE — 36416 COLLJ CAPILLARY BLOOD SPEC: CPT

## 2017-12-08 PROCEDURE — 99211 OFF/OP EST MAY X REQ PHY/QHP: CPT

## 2017-12-08 PROCEDURE — 85610 PROTHROMBIN TIME: CPT

## 2017-12-08 RX ORDER — ENOXAPARIN SODIUM 100 MG/ML
60 INJECTION SUBCUTANEOUS 2 TIMES DAILY
Qty: 14 VIAL | Refills: 1 | Status: SHIPPED | OUTPATIENT
Start: 2017-12-08 | End: 2017-12-15

## 2017-12-08 RX ORDER — ENOXAPARIN SODIUM 100 MG/ML
INJECTION SUBCUTANEOUS
Qty: 6 ML | Refills: 0 | OUTPATIENT
Start: 2017-12-08

## 2017-12-08 NOTE — TELEPHONE ENCOUNTER
Pt's mother called in while at the coumadin clinic requesting for Pt to have an order for warfarin shots.

## 2017-12-08 NOTE — TELEPHONE ENCOUNTER
Hi Dr. Dorita Drake,    Pt was at coumadin clinic today with INR 1.2, since her INR is still below therapeutic range.  Will need more lovenox shot, she states only have enough til Sun, We will be seeing her on coming Tuesday again, if you can give her more refill

## 2017-12-08 NOTE — TELEPHONE ENCOUNTER
Pharmacy     [address], [phone], [phone]   Medication Detail      Disp Refills Start End    Enoxaparin Sodium (LOVENOX) 60 MG/0.6ML Subcutaneous Solution 14 vial 1 1

## 2017-12-09 NOTE — TELEPHONE ENCOUNTER
Conversation: Refill Request   (Newest Message First)   Maria G Guevara RN         12/8/17 12:21 PM   Note      Pt's mother informed that the refill has been sent.                  12/8/17 12:15 PM   Milena Thompson MD routed this conversation to [facility]

## 2017-12-12 ENCOUNTER — ANTI-COAG VISIT (OUTPATIENT)
Dept: INTERNAL MEDICINE CLINIC | Facility: CLINIC | Age: 22
End: 2017-12-12

## 2017-12-12 ENCOUNTER — TELEPHONE (OUTPATIENT)
Dept: INTERNAL MEDICINE CLINIC | Facility: CLINIC | Age: 22
End: 2017-12-12

## 2017-12-12 DIAGNOSIS — I82.C11 ACUTE EMBOLISM AND THROMBOSIS OF RIGHT INTERNAL JUGULAR VEIN (HCC): ICD-10-CM

## 2017-12-12 PROCEDURE — 85610 PROTHROMBIN TIME: CPT

## 2017-12-12 PROCEDURE — 36416 COLLJ CAPILLARY BLOOD SPEC: CPT

## 2017-12-12 PROCEDURE — 99211 OFF/OP EST MAY X REQ PHY/QHP: CPT

## 2017-12-12 NOTE — TELEPHONE ENCOUNTER
Hi Dr. Castellon Members. I have asked pt to give you a call for an appointment earlier than you asked. Pt is having discomfort now in her other arm. I told her to stop her Lovenox as her INT=2.0. She will see Coumadin clinic next week.  If you would like me to do

## 2017-12-19 ENCOUNTER — ANTI-COAG VISIT (OUTPATIENT)
Dept: INTERNAL MEDICINE CLINIC | Facility: CLINIC | Age: 22
End: 2017-12-19

## 2017-12-19 DIAGNOSIS — I82.C11 ACUTE EMBOLISM AND THROMBOSIS OF RIGHT INTERNAL JUGULAR VEIN (HCC): ICD-10-CM

## 2017-12-19 PROCEDURE — 85610 PROTHROMBIN TIME: CPT

## 2017-12-19 PROCEDURE — 36416 COLLJ CAPILLARY BLOOD SPEC: CPT

## 2017-12-20 ENCOUNTER — OFFICE VISIT (OUTPATIENT)
Dept: INTERNAL MEDICINE CLINIC | Facility: CLINIC | Age: 22
End: 2017-12-20

## 2017-12-20 VITALS
TEMPERATURE: 98 F | BODY MASS INDEX: 27.6 KG/M2 | SYSTOLIC BLOOD PRESSURE: 117 MMHG | HEART RATE: 73 BPM | WEIGHT: 153.81 LBS | HEIGHT: 62.6 IN | DIASTOLIC BLOOD PRESSURE: 83 MMHG

## 2017-12-20 DIAGNOSIS — I82.C11 ACUTE EMBOLISM AND THROMBOSIS OF RIGHT INTERNAL JUGULAR VEIN (HCC): Primary | ICD-10-CM

## 2017-12-20 DIAGNOSIS — I82.B11 THROMBOSIS OF RIGHT SUBCLAVIAN VEIN (HCC): ICD-10-CM

## 2017-12-20 PROCEDURE — 99212 OFFICE O/P EST SF 10 MIN: CPT | Performed by: INTERNAL MEDICINE

## 2017-12-20 PROCEDURE — 99214 OFFICE O/P EST MOD 30 MIN: CPT | Performed by: INTERNAL MEDICINE

## 2017-12-20 RX ORDER — WARFARIN SODIUM 5 MG/1
5 TABLET ORAL DAILY
Qty: 60 TABLET | Refills: 1 | Status: SHIPPED | OUTPATIENT
Start: 2017-12-20 | End: 2018-04-26

## 2017-12-20 NOTE — PROGRESS NOTES
HPI:    Patient ID: Frank Crisostomo is a 23year old female. HPI  Patient is here for follow-up of left subclavian and internal carotid vein thrombosis. There is thrombosis of right and left cephalic vein. She is a chronic IV heroin abuser.   Currently HENT:   Head: Normocephalic and atraumatic. Eyes: Conjunctivae are normal.   Neck: Neck supple. Cardiovascular: Normal rate and regular rhythm. Exam reveals no gallop and no friction rub. No murmur heard.   Pulmonary/Chest: Effort normal and breath

## 2017-12-27 ENCOUNTER — OFFICE VISIT (OUTPATIENT)
Dept: HEMATOLOGY/ONCOLOGY | Facility: HOSPITAL | Age: 22
End: 2017-12-27
Attending: INTERNAL MEDICINE
Payer: MEDICAID

## 2017-12-27 VITALS
RESPIRATION RATE: 16 BRPM | DIASTOLIC BLOOD PRESSURE: 64 MMHG | BODY MASS INDEX: 26.38 KG/M2 | SYSTOLIC BLOOD PRESSURE: 109 MMHG | WEIGHT: 147 LBS | HEART RATE: 77 BPM | TEMPERATURE: 99 F | HEIGHT: 62.6 IN

## 2017-12-27 DIAGNOSIS — I82.C11 THROMBOSIS OF RIGHT INTERNAL JUGULAR VEIN (HCC): Primary | ICD-10-CM

## 2017-12-27 DIAGNOSIS — I82.612 SUPERFICIAL VENOUS THROMBOSIS OF ARM, LEFT: ICD-10-CM

## 2017-12-27 DIAGNOSIS — I82.B11 THROMBOSIS OF RIGHT SUBCLAVIAN VEIN (HCC): ICD-10-CM

## 2017-12-27 DIAGNOSIS — I82.611 SUPERFICIAL VENOUS THROMBOSIS OF ARM, RIGHT: ICD-10-CM

## 2017-12-27 DIAGNOSIS — F19.10 IV DRUG ABUSE (HCC): ICD-10-CM

## 2017-12-27 PROCEDURE — 99212 OFFICE O/P EST SF 10 MIN: CPT | Performed by: INTERNAL MEDICINE

## 2017-12-27 PROCEDURE — 99215 OFFICE O/P EST HI 40 MIN: CPT | Performed by: INTERNAL MEDICINE

## 2017-12-27 NOTE — PROGRESS NOTES
[facility] Progress Note    Patient Name: Allan Wang   YOB: 1995   Medical Record Number: [id number]   Attending Physician: Garfield Diaz M.D.      Chief Complaint:  Recurrent venous thrombosis superficial and deep, IV drug abuse    Hist metastasis       Social History:    Social History  Social History   Marital status: Single  Spouse name: N/A    Years of education: N/A  Number of children: 0     Occupational History  Unemployed       Social History Main Topics   Smoking status: Current CREATSERUM 0.64 10/09/2017   ANIONGAP 5 10/09/2017   GFRNAA >60 10/09/2017   GFRAA >60 10/09/2017   CA 8.8 10/09/2017   OSMOCALC 291 10/09/2017   ALKPHO 95 10/04/2017   AST 21 10/04/2017   ALT 12 (L) 10/04/2017   ALKPHOS 56 01/29/2015   BILT 0.6 10/04/20 Risk assessment: High recent diagnosis of right subclavian and internal jugular deep venous thrombosis continued IV drug abuse    The patient's emotional well being was assessed and resources were discussed.   Appropriate resources were reviewed and d

## 2018-01-02 ENCOUNTER — TELEPHONE (OUTPATIENT)
Dept: OTHER | Age: 23
End: 2018-01-02

## 2018-01-02 NOTE — TELEPHONE ENCOUNTER
Pt's mother states pt has red bumps on both arms, bumps are open, draining pus. Temp is 101.  Advised pt's mother to take pt to ER, mom agreed with recommendations, will take pt to [address] [facility] ER.     1/3/18 ER f/u

## 2018-01-03 ENCOUNTER — HOSPITAL ENCOUNTER (INPATIENT)
Facility: HOSPITAL | Age: 23
LOS: 6 days | Discharge: HOME | DRG: 603 | End: 2018-01-09
Attending: EMERGENCY MEDICINE | Admitting: HOSPITALIST
Payer: MEDICAID

## 2018-01-03 ENCOUNTER — OFFICE VISIT (OUTPATIENT)
Dept: INTERNAL MEDICINE CLINIC | Facility: CLINIC | Age: 23
End: 2018-01-03

## 2018-01-03 ENCOUNTER — APPOINTMENT (OUTPATIENT)
Dept: ULTRASOUND IMAGING | Facility: HOSPITAL | Age: 23
DRG: 603 | End: 2018-01-03
Attending: EMERGENCY MEDICINE
Payer: MEDICAID

## 2018-01-03 VITALS
SYSTOLIC BLOOD PRESSURE: 111 MMHG | HEIGHT: 62 IN | DIASTOLIC BLOOD PRESSURE: 74 MMHG | HEART RATE: 81 BPM | BODY MASS INDEX: 27.42 KG/M2 | TEMPERATURE: 98 F | WEIGHT: 149 LBS

## 2018-01-03 DIAGNOSIS — F11.20 OPIOID TYPE DEPENDENCE, CONTINUOUS (HCC): ICD-10-CM

## 2018-01-03 DIAGNOSIS — L03.119 CELLULITIS OF UPPER EXTREMITY, UNSPECIFIED LATERALITY: Primary | ICD-10-CM

## 2018-01-03 DIAGNOSIS — I82.612 SUPERFICIAL VENOUS THROMBOSIS OF ARM, LEFT: Primary | ICD-10-CM

## 2018-01-03 DIAGNOSIS — L03.113 CELLULITIS OF RIGHT ARM: ICD-10-CM

## 2018-01-03 DIAGNOSIS — L03.114 LEFT ARM CELLULITIS: ICD-10-CM

## 2018-01-03 DIAGNOSIS — F11.29 OPIOID DEPENDENCE WITH OPIOID-INDUCED DISORDER (HCC): ICD-10-CM

## 2018-01-03 LAB
ANION GAP SERPL CALC-SCNC: 10 MMOL/L (ref 0–18)
APTT PPP: 31.5 SECONDS (ref 23.2–35.3)
B-HCG UR QL: NEGATIVE
BASOPHILS # BLD: 0.1 K/UL (ref 0–0.2)
BASOPHILS NFR BLD: 1 %
BILIRUB UR QL: NEGATIVE
BUN SERPL-MCNC: 10 MG/DL (ref 8–20)
BUN/CREAT SERPL: 12.7 (ref 10–20)
CALCIUM SERPL-MCNC: 8.8 MG/DL (ref 8.5–10.5)
CHLORIDE SERPL-SCNC: 103 MMOL/L (ref 95–110)
CLARITY UR: CLEAR
CO2 SERPL-SCNC: 24 MMOL/L (ref 22–32)
COLOR UR: YELLOW
CREAT SERPL-MCNC: 0.79 MG/DL (ref 0.5–1.5)
EOSINOPHIL # BLD: 0.2 K/UL (ref 0–0.7)
EOSINOPHIL NFR BLD: 2 %
ERYTHROCYTE [DISTWIDTH] IN BLOOD BY AUTOMATED COUNT: 14.5 % (ref 11–15)
GLUCOSE SERPL-MCNC: 81 MG/DL (ref 70–99)
GLUCOSE UR-MCNC: NEGATIVE MG/DL
HCT VFR BLD AUTO: 39.6 % (ref 35–48)
HGB BLD-MCNC: 13 G/DL (ref 12–16)
HGB UR QL STRIP.AUTO: NEGATIVE
HYALINE CASTS #/AREA URNS AUTO: 1 /LPF
INR BLD: 1.6 (ref 0.9–1.2)
KETONES UR-MCNC: NEGATIVE MG/DL
LYMPHOCYTES # BLD: 3.6 K/UL (ref 1–4)
LYMPHOCYTES NFR BLD: 40 %
MCH RBC QN AUTO: 27.7 PG (ref 27–32)
MCHC RBC AUTO-ENTMCNC: 32.9 G/DL (ref 32–37)
MCV RBC AUTO: 84.2 FL (ref 80–100)
MONOCYTES # BLD: 0.6 K/UL (ref 0–1)
MONOCYTES NFR BLD: 7 %
NEUTROPHILS # BLD AUTO: 4.6 K/UL (ref 1.8–7.7)
NEUTROPHILS NFR BLD: 50 %
NITRITE UR QL STRIP.AUTO: NEGATIVE
OSMOLALITY UR CALC.SUM OF ELEC: 282 MOSM/KG (ref 275–295)
PH UR: 6 [PH] (ref 5–8)
PLATELET # BLD AUTO: 317 K/UL (ref 140–400)
PMV BLD AUTO: 8.5 FL (ref 7.4–10.3)
POTASSIUM SERPL-SCNC: 4.4 MMOL/L (ref 3.3–5.1)
PROT UR-MCNC: NEGATIVE MG/DL
PROTHROMBIN TIME: 18.6 SECONDS (ref 11.8–14.5)
RBC # BLD AUTO: 4.71 M/UL (ref 3.7–5.4)
RBC #/AREA URNS AUTO: 1 /HPF
SODIUM SERPL-SCNC: 137 MMOL/L (ref 136–144)
SP GR UR STRIP: 1.02 (ref 1–1.03)
UROBILINOGEN UR STRIP-ACNC: <2
VIT C UR-MCNC: NEGATIVE MG/DL
WBC # BLD AUTO: 9.2 K/UL (ref 4–11)
WBC #/AREA URNS AUTO: 2 /HPF

## 2018-01-03 PROCEDURE — 99214 OFFICE O/P EST MOD 30 MIN: CPT | Performed by: INTERNAL MEDICINE

## 2018-01-03 PROCEDURE — 99212 OFFICE O/P EST SF 10 MIN: CPT | Performed by: INTERNAL MEDICINE

## 2018-01-03 PROCEDURE — 93970 EXTREMITY STUDY: CPT | Performed by: EMERGENCY MEDICINE

## 2018-01-03 PROCEDURE — 99223 1ST HOSP IP/OBS HIGH 75: CPT | Performed by: HOSPITALIST

## 2018-01-03 RX ORDER — TRAMADOL HYDROCHLORIDE 50 MG/1
50 TABLET ORAL EVERY 6 HOURS PRN
Status: DISCONTINUED | OUTPATIENT
Start: 2018-01-03 | End: 2018-01-04

## 2018-01-03 RX ORDER — DIPHENHYDRAMINE HYDROCHLORIDE 50 MG/ML
25 INJECTION INTRAMUSCULAR; INTRAVENOUS EVERY 6 HOURS PRN
Status: DISCONTINUED | OUTPATIENT
Start: 2018-01-03 | End: 2018-01-03

## 2018-01-03 RX ORDER — SODIUM CHLORIDE 0.9 % (FLUSH) 0.9 %
3 SYRINGE (ML) INJECTION AS NEEDED
Status: DISCONTINUED | OUTPATIENT
Start: 2018-01-03 | End: 2018-01-09

## 2018-01-03 RX ORDER — WARFARIN SODIUM 2.5 MG/1
2.5 TABLET ORAL
COMMUNITY
End: 2018-03-27

## 2018-01-03 RX ORDER — WARFARIN SODIUM 7.5 MG/1
7.5 TABLET ORAL NIGHTLY
Status: DISCONTINUED | OUTPATIENT
Start: 2018-01-03 | End: 2018-01-05

## 2018-01-03 RX ORDER — ALPRAZOLAM 1 MG/1
1 TABLET ORAL EVERY 6 HOURS PRN
Status: DISCONTINUED | OUTPATIENT
Start: 2018-01-03 | End: 2018-01-09

## 2018-01-03 RX ORDER — MORPHINE SULFATE 4 MG/ML
4 INJECTION, SOLUTION INTRAMUSCULAR; INTRAVENOUS ONCE
Status: COMPLETED | OUTPATIENT
Start: 2018-01-03 | End: 2018-01-03

## 2018-01-03 RX ORDER — NICOTINE 21 MG/24HR
1 PATCH, TRANSDERMAL 24 HOURS TRANSDERMAL DAILY
Status: DISCONTINUED | OUTPATIENT
Start: 2018-01-03 | End: 2018-01-09

## 2018-01-03 RX ORDER — ONDANSETRON 2 MG/ML
4 INJECTION INTRAMUSCULAR; INTRAVENOUS EVERY 6 HOURS PRN
Status: DISCONTINUED | OUTPATIENT
Start: 2018-01-03 | End: 2018-01-09

## 2018-01-03 RX ORDER — 0.9 % SODIUM CHLORIDE 0.9 %
VIAL (ML) INJECTION
Status: COMPLETED
Start: 2018-01-03 | End: 2018-01-03

## 2018-01-03 RX ORDER — WARFARIN SODIUM 2.5 MG/1
2.5 TABLET ORAL ONCE
Status: COMPLETED | OUTPATIENT
Start: 2018-01-03 | End: 2018-01-03

## 2018-01-03 RX ORDER — KETOROLAC TROMETHAMINE 15 MG/ML
15 INJECTION, SOLUTION INTRAMUSCULAR; INTRAVENOUS EVERY 6 HOURS PRN
Status: DISCONTINUED | OUTPATIENT
Start: 2018-01-03 | End: 2018-01-04

## 2018-01-03 RX ORDER — DIPHENHYDRAMINE HYDROCHLORIDE 50 MG/ML
25 INJECTION INTRAMUSCULAR; INTRAVENOUS ONCE
Status: COMPLETED | OUTPATIENT
Start: 2018-01-03 | End: 2018-01-03

## 2018-01-03 RX ORDER — DIPHENHYDRAMINE HYDROCHLORIDE 50 MG/ML
25 INJECTION INTRAMUSCULAR; INTRAVENOUS EVERY 8 HOURS
Status: DISCONTINUED | OUTPATIENT
Start: 2018-01-04 | End: 2018-01-09

## 2018-01-03 NOTE — PATIENT INSTRUCTIONS
Alcohol Addiction  Are you addicted to alcohol? You may be addicted to alcohol if your drinking harms yourself or others or leads to other problems with your daily life. The medical term for this is alcohol use disorder.  Your healthcare provider may

## 2018-01-03 NOTE — ED INITIAL ASSESSMENT (HPI)
Pt sent from Dr Linda Peters office for abscess to bilateral arms. Pt states she has had a fever at home. Pt hx of heroin use. Pt tearful in triage. Pt denies SI/HI.

## 2018-01-03 NOTE — ED PROVIDER NOTES
Patient Seen in: [facility] Emergency Department    History   Patient presents with:  Abscess (integumentary)    Stated Complaint: bilateral arm abscess    HPI    Patient presents emergency department complaining of bilateral arm swelling and pain noted in HPI. Constitutional and vital signs reviewed. All other systems reviewed and negative except as noted above.     Physical Exam   ED Triage Vitals [01/03/18 1354]  BP: 119/77  Pulse: 95  Resp: 22  Temp: 97.8 °F (36.6 °C)  Temp src: Oral  SpO2: for the following:     Glucose 107 (*)     Calcium, Total 8.4 (*)     All other components within normal limits   PROTHROMBIN TIME (PT) - Abnormal; Notable for the following:     PT 16.9 (*)     INR 1.4 (*)     All other components within normal limits   V created for panel order CBC WITH DIFFERENTIAL WITH PLATELET.   Procedure                               Abnormality         Status                     ---------                               -----------         ------                     CBC W/ DIFFERENTIAL[ Impression:  Cellulitis of upper extremity, unspecified laterality  (primary encounter diagnosis)  Opioid dependence with [facility]    Disposition:  Admit  1/3/2018  5:32 pm    Follow-up:  No follow-up provider specified.       Medicatio

## 2018-01-03 NOTE — PROGRESS NOTES
Patient ID: Debra Sepulveda is a 26year old female. Patient presents with:  Swelling: IV heroin addict. Reports that she hasnt used heroin since 12/15/2017. Reports random fevers. HISTORY OF PRESENT ILLNESS:   HPI  Patient presents for above.   He •  Warfarin Sodium 5 MG Oral Tab, Take 1 tablet (5 mg total) by mouth daily. , Disp: 60 tablet, Rfl: 1  •  METHADONE HCL OR, Take 90 mg by mouth daily.   , Disp: , Rfl:     Allergies:  Amoxicillin             Hives      Social History  Social History   Willye Saltness Kayla Aragon MD  1/3/2018

## 2018-01-03 NOTE — ED NOTES
Pt crying in waiting room because her abscess are now \"leaking staph infection\". Pt squeezing abscess in waiting area. Pt mother stating patient needs a room \"right now\".   Pt and mother informed that we are working on getting patient a room as soon as

## 2018-01-04 ENCOUNTER — TELEPHONE (OUTPATIENT)
Dept: INTERNAL MEDICINE CLINIC | Facility: CLINIC | Age: 23
End: 2018-01-04

## 2018-01-04 ENCOUNTER — APPOINTMENT (OUTPATIENT)
Dept: GENERAL RADIOLOGY | Facility: HOSPITAL | Age: 23
DRG: 603 | End: 2018-01-04
Attending: HOSPITALIST
Payer: MEDICAID

## 2018-01-04 LAB
ANION GAP SERPL CALC-SCNC: 6 MMOL/L (ref 0–18)
BASOPHILS # BLD: 0.1 K/UL (ref 0–0.2)
BASOPHILS NFR BLD: 1 %
BUN SERPL-MCNC: 10 MG/DL (ref 8–20)
BUN/CREAT SERPL: 14.7 (ref 10–20)
CALCIUM SERPL-MCNC: 8.4 MG/DL (ref 8.5–10.5)
CHLORIDE SERPL-SCNC: 108 MMOL/L (ref 95–110)
CO2 SERPL-SCNC: 22 MMOL/L (ref 22–32)
CREAT SERPL-MCNC: 0.68 MG/DL (ref 0.5–1.5)
EOSINOPHIL # BLD: 0.2 K/UL (ref 0–0.7)
EOSINOPHIL NFR BLD: 3 %
ERYTHROCYTE [DISTWIDTH] IN BLOOD BY AUTOMATED COUNT: 14.6 % (ref 11–15)
GLUCOSE SERPL-MCNC: 107 MG/DL (ref 70–99)
HCT VFR BLD AUTO: 38.9 % (ref 35–48)
HGB BLD-MCNC: 12.7 G/DL (ref 12–16)
INR BLD: 1.4 (ref 0.9–1.2)
LACTATE SERPL-SCNC: 0.9 MMOL/L (ref 0.5–2.2)
LYMPHOCYTES # BLD: 2.3 K/UL (ref 1–4)
LYMPHOCYTES NFR BLD: 34 %
MCH RBC QN AUTO: 27.7 PG (ref 27–32)
MCHC RBC AUTO-ENTMCNC: 32.6 G/DL (ref 32–37)
MCV RBC AUTO: 84.9 FL (ref 80–100)
MONOCYTES # BLD: 0.4 K/UL (ref 0–1)
MONOCYTES NFR BLD: 5 %
NEUTROPHILS # BLD AUTO: 3.8 K/UL (ref 1.8–7.7)
NEUTROPHILS NFR BLD: 57 %
OSMOLALITY UR CALC.SUM OF ELEC: 282 MOSM/KG (ref 275–295)
PLATELET # BLD AUTO: 279 K/UL (ref 140–400)
PMV BLD AUTO: 8.6 FL (ref 7.4–10.3)
POTASSIUM SERPL-SCNC: 4 MMOL/L (ref 3.3–5.1)
PROTHROMBIN TIME: 16.9 SECONDS (ref 11.8–14.5)
RBC # BLD AUTO: 4.59 M/UL (ref 3.7–5.4)
RHEUMATOID FACT SER QL: <5 IU/ML
SODIUM SERPL-SCNC: 136 MMOL/L (ref 136–144)
VANCOMYCIN TROUGH SERPL-MCNC: 22.1 MCG/ML (ref 10–20)
WBC # BLD AUTO: 6.7 K/UL (ref 4–11)

## 2018-01-04 PROCEDURE — 99254 IP/OBS CNSLTJ NEW/EST MOD 60: CPT | Performed by: SURGERY

## 2018-01-04 PROCEDURE — 90792 PSYCH DIAG EVAL W/MED SRVCS: CPT | Performed by: OTHER

## 2018-01-04 PROCEDURE — 05H633Z INSERTION OF INFUSION DEVICE INTO LEFT SUBCLAVIAN VEIN, PERCUTANEOUS APPROACH: ICD-10-PCS | Performed by: HOSPITALIST

## 2018-01-04 PROCEDURE — 99233 SBSQ HOSP IP/OBS HIGH 50: CPT | Performed by: HOSPITALIST

## 2018-01-04 PROCEDURE — 71046 X-RAY EXAM CHEST 2 VIEWS: CPT | Performed by: HOSPITALIST

## 2018-01-04 RX ORDER — SODIUM CHLORIDE 0.9 % (FLUSH) 0.9 %
10 SYRINGE (ML) INJECTION AS NEEDED
Status: DISCONTINUED | OUTPATIENT
Start: 2018-01-04 | End: 2018-01-09

## 2018-01-04 RX ORDER — KETOROLAC TROMETHAMINE 30 MG/ML
30 INJECTION, SOLUTION INTRAMUSCULAR; INTRAVENOUS EVERY 6 HOURS PRN
Status: DISPENSED | OUTPATIENT
Start: 2018-01-04 | End: 2018-01-05

## 2018-01-04 RX ORDER — METHADONE HYDROCHLORIDE 10 MG/5ML
110 SOLUTION ORAL DAILY
Status: DISCONTINUED | OUTPATIENT
Start: 2018-01-04 | End: 2018-01-09

## 2018-01-04 RX ORDER — LORAZEPAM 2 MG/ML
1 INJECTION INTRAMUSCULAR EVERY 6 HOURS PRN
Status: DISCONTINUED | OUTPATIENT
Start: 2018-01-04 | End: 2018-01-09

## 2018-01-04 RX ORDER — TRAMADOL HYDROCHLORIDE 50 MG/1
50 TABLET ORAL EVERY 4 HOURS PRN
Status: DISCONTINUED | OUTPATIENT
Start: 2018-01-04 | End: 2018-01-09

## 2018-01-04 RX ORDER — 0.9 % SODIUM CHLORIDE 0.9 %
VIAL (ML) INJECTION
Status: COMPLETED
Start: 2018-01-04 | End: 2018-01-04

## 2018-01-04 RX ORDER — POLYETHYLENE GLYCOL 3350 17 G
1 POWDER IN PACKET (EA) ORAL AS NEEDED
Status: DISCONTINUED | OUTPATIENT
Start: 2018-01-04 | End: 2018-01-09

## 2018-01-04 RX ORDER — ALBUTEROL SULFATE 2.5 MG/3ML
2.5 SOLUTION RESPIRATORY (INHALATION) EVERY 6 HOURS PRN
Status: DISCONTINUED | OUTPATIENT
Start: 2018-01-04 | End: 2018-01-09

## 2018-01-04 RX ORDER — POLYETHYLENE GLYCOL 3350 17 G/17G
17 POWDER, FOR SOLUTION ORAL DAILY
Status: DISCONTINUED | OUTPATIENT
Start: 2018-01-04 | End: 2018-01-08

## 2018-01-04 RX ORDER — SENNA AND DOCUSATE SODIUM 50; 8.6 MG/1; MG/1
2 TABLET, FILM COATED ORAL DAILY
Status: DISCONTINUED | OUTPATIENT
Start: 2018-01-04 | End: 2018-01-08

## 2018-01-04 NOTE — H&P
[facility]    PATIENT'S NAME: Hira Pérez   ATTENDING PHYSICIAN: Doni Boss MD   PATIENT ACCOUNT#:   [id number]    LOCATION:  [address]  MEDICAL RECORD #:   [id number]       YOB: 1995  ADMISSION DATE:       01/03/2 the anterior ventral area of both forearms, developing lump on the right side. The patient had fever and chills per her report at home. She denies any chest pain or shortness of breath. Other 12-point review of systems is negative.       PHYSICAL EXAMINA Tylenol and small doses of IV Toradol for now. Further recommendations to follow.     Dictated By Vijay Moreno MD  d: 01/03/2018 17:30:00  t: 01/03/2018 17:59:11  [facility] [id number]  FB/

## 2018-01-04 NOTE — PROGRESS NOTES
[facility]    Progress Note    Radha Herron Patient Status:  Inpatient    1/3/1995 MRN [id number]   Location [facility] 5SW/SE Attending Isabelle Townsend MD   Hosp Day # 1 PCP Montrell Adkins MD       Subjective:   Radha Herron Warfarin Sodium  7.5 mg Oral Nightly   • nicotine  1 patch Transdermal Daily   • DiphenhydrAMINE HCl  25 mg Intravenous Q8H       Current PRN Inpatient Meds:      LORazepam, Normal Saline Flush, TraMADol HCl, ketorolac (TORADOL) injection, Normal Saline Fl Assessment and Plan:   Cellulitis R and L forearms. Suspect MRSA  +/- b/l abscesses   -u/s done in ER on the R upper ext showed seroma.   Patient adamant that she had colored drainage from the arm  -cont IV vancomycin  -elevation  -ID consult  -bl

## 2018-01-04 NOTE — ED NOTES
Pt refused Blood Cultures, and lactic acid. MD informed pt importance of obtaining blood samples.  Pt states, \"I don't want anyone drawing anymore blood\"

## 2018-01-04 NOTE — CONSULTS
[facility] ID CONSULT NOTE    Debbie Donnelly Patient Status:  Inpatient    1/3/1995 MRN [id number]   Location [facility] 5SW/SE Attending Hannah Akbar MD   Hosp Day # 1 PCP Theresa Cage MD       Reason for Con ENDOSCOPY  No date: OTHER SURGICAL HISTORY      Comment: wisdom teeth   2009: TONSILLECTOMY  2013: UPPER GI ENDOSCOPY,EXAM  Family History   Problem Relation Age of Onset   • back pain [OTHER] Father    • Hypertension Mother    • Hypertension Maternal [facility] [place] discomfort  RESPIRATORY:  No shortness of breath, cough or sputum. GASTROINTESTINAL:  No anorexia, nausea, vomiting or diarrhea. No abdominal pain or blood. GENITOURINARY:  No Burning on urination.    NEUROLOGICAL:  No headache, dizziness, syncope, paraly yesterday and sent to the ER for further evaluation given increased redness and swelling to BUE, and subjective fevers. According to patient, these are chronic wounds/nodules but that the LUE wound began draining purulent material in the ED.  Patient states PA-C  [facility]  [phone]  1/4/2018

## 2018-01-04 NOTE — TELEPHONE ENCOUNTER
Pt's mother is calling to inform clinic that pt was admitted into the hospital. Pt is scheduled to come in today at 1:45 but mother is unsure if she should cancel or re-schedule. pls advise.  Thank you

## 2018-01-04 NOTE — ED NOTES
REceived pt via wheelchair. Pt is A&OX4. Pt reports bilateral arm swelling, pus like drainage, fever from wounds. Pt has hx of heroin abuse. Last time used was 12/15. Pt has old scars to R/L wrist. Denies SI, or HI.

## 2018-01-04 NOTE — PLAN OF CARE
Patient voided 150cc of clear and yellow urine. Post void residual was done and recorded. I will continue to monitor patient for intake and output. Patient is encouraged to drink plenty of oral fluids. Patient verbalized understanding.

## 2018-01-04 NOTE — PROGRESS NOTES
[facility] dosing service    Initial Pharmacokinetic Consult for Vancomycin Dosing     Jeanne Barthel is a 26year old female admitted on 1/3/18 who is being treated for cellulitis. Pharmacy has been asked to dose Vancomycin by Dr. Shashi Clements.     She is continue to follow her. We appreciate the opportunity to assist in her care.     Rubi Whitehead, PharmD  1/3/2018  7:58 PM  [address]: [phone]

## 2018-01-04 NOTE — PROGRESS NOTES
Pharmacy [facility] =  [phone]     Patient receives methadone liquid 110mg PO daily.      CAP center requests that at discharge it is ensured that patient has adequate supply

## 2018-01-04 NOTE — PLAN OF CARE
Patient reported that she has not voided. Notified Dr. Venus Bledsoe regarding patient not voiding since last night. Patient refused to be straight cath and to have gibson catheter inserted.  Dr. Venus Bledsoe is aware, and ordered post void residual after patient voi

## 2018-01-04 NOTE — PLAN OF CARE
Patient gait is unsteady, and reported that she felt light headed after she jump up after the bed. Patient was instructed to use call light for assistance and informed that we will activate bed alarm to ensure safety.  Patient refused, and stated, \"I do no

## 2018-01-05 ENCOUNTER — APPOINTMENT (OUTPATIENT)
Dept: ULTRASOUND IMAGING | Facility: HOSPITAL | Age: 23
DRG: 603 | End: 2018-01-05
Attending: SURGERY
Payer: MEDICAID

## 2018-01-05 LAB
ANION GAP SERPL CALC-SCNC: 4 MMOL/L (ref 0–18)
BASOPHILS # BLD: 0.1 K/UL (ref 0–0.2)
BASOPHILS NFR BLD: 1 %
BENZODIAZ UR QL SCN: DETECTED
BILIRUB UR QL: NEGATIVE
BUN SERPL-MCNC: 5 MG/DL (ref 8–20)
BUN/CREAT SERPL: 7.9 (ref 10–20)
CALCIUM SERPL-MCNC: 8.5 MG/DL (ref 8.5–10.5)
CCP IGG SERPL-ACNC: 0.5 U/ML (ref 0–6.9)
CHLORIDE SERPL-SCNC: 106 MMOL/L (ref 95–110)
CO2 SERPL-SCNC: 25 MMOL/L (ref 22–32)
COLOR UR: YELLOW
CREAT SERPL-MCNC: 0.63 MG/DL (ref 0.5–1.5)
EOSINOPHIL # BLD: 0.4 K/UL (ref 0–0.7)
EOSINOPHIL NFR BLD: 5 %
ERYTHROCYTE [DISTWIDTH] IN BLOOD BY AUTOMATED COUNT: 14.2 % (ref 11–15)
GLUCOSE SERPL-MCNC: 86 MG/DL (ref 70–99)
GLUCOSE UR-MCNC: NEGATIVE MG/DL
HAV AB SER QL IA: REACTIVE
HAV IGM SERPL QL IA: NONREACTIVE
HBV CORE AB SERPL QL IA: NONREACTIVE
HBV SURFACE AB SER-ACNC: 115.04 MIU/ML (ref ?–10)
HBV SURFACE AG SERPL QL IA: NONREACTIVE
HBV SURFACE AG SERPL QL IA: REACTIVE
HCT VFR BLD AUTO: 37.3 % (ref 35–48)
HCV AB SERPL QL IA: NONREACTIVE
HGB BLD-MCNC: 12.1 G/DL (ref 12–16)
HGB UR QL STRIP.AUTO: NEGATIVE
HIV1+2 AB SERPL QL IA: NONREACTIVE
INR BLD: 1.3 (ref 0.9–1.2)
KETONES UR-MCNC: NEGATIVE MG/DL
LYMPHOCYTES # BLD: 2.6 K/UL (ref 1–4)
LYMPHOCYTES NFR BLD: 36 %
MCH RBC QN AUTO: 27.3 PG (ref 27–32)
MCHC RBC AUTO-ENTMCNC: 32.4 G/DL (ref 32–37)
MCV RBC AUTO: 84.4 FL (ref 80–100)
METHADONE UR QL SCN: DETECTED
MONOCYTES # BLD: 0.5 K/UL (ref 0–1)
MONOCYTES NFR BLD: 7 %
NEUTROPHILS # BLD AUTO: 3.8 K/UL (ref 1.8–7.7)
NEUTROPHILS NFR BLD: 52 %
NITRITE UR QL STRIP.AUTO: NEGATIVE
OPIATES UR QL SCN: DETECTED
OSMOLALITY UR CALC.SUM OF ELEC: 277 MOSM/KG (ref 275–295)
PH UR: 7 [PH] (ref 5–8)
PLATELET # BLD AUTO: 275 K/UL (ref 140–400)
PMV BLD AUTO: 8.8 FL (ref 7.4–10.3)
POTASSIUM SERPL-SCNC: 4 MMOL/L (ref 3.3–5.1)
PROT UR-MCNC: NEGATIVE MG/DL
PROTHROMBIN TIME: 15.4 SECONDS (ref 11.8–14.5)
RBC # BLD AUTO: 4.42 M/UL (ref 3.7–5.4)
RBC #/AREA URNS AUTO: 2 /HPF
SODIUM SERPL-SCNC: 135 MMOL/L (ref 136–144)
SP GR UR STRIP: 1.01 (ref 1–1.03)
UROBILINOGEN UR STRIP-ACNC: <2
VANCOMYCIN TROUGH SERPL-MCNC: 14.4 MCG/ML (ref 10–20)
VIT C UR-MCNC: NEGATIVE MG/DL
WBC # BLD AUTO: 7.3 K/UL (ref 4–11)
WBC #/AREA URNS AUTO: 4 /HPF

## 2018-01-05 PROCEDURE — 99233 SBSQ HOSP IP/OBS HIGH 50: CPT | Performed by: OTHER

## 2018-01-05 PROCEDURE — 0J9G3ZZ DRAINAGE OF RIGHT LOWER ARM SUBCUTANEOUS TISSUE AND FASCIA, PERCUTANEOUS APPROACH: ICD-10-PCS | Performed by: RADIOLOGY

## 2018-01-05 PROCEDURE — 99233 SBSQ HOSP IP/OBS HIGH 50: CPT | Performed by: HOSPITALIST

## 2018-01-05 PROCEDURE — 99231 SBSQ HOSP IP/OBS SF/LOW 25: CPT | Performed by: SURGERY

## 2018-01-05 PROCEDURE — 10160 PNXR ASPIR ABSC HMTMA BULLA: CPT | Performed by: SURGERY

## 2018-01-05 RX ORDER — WARFARIN SODIUM 10 MG/1
10 TABLET ORAL NIGHTLY
Status: DISCONTINUED | OUTPATIENT
Start: 2018-01-05 | End: 2018-01-07

## 2018-01-05 RX ORDER — BISACODYL 10 MG
10 SUPPOSITORY, RECTAL RECTAL
Status: DISCONTINUED | OUTPATIENT
Start: 2018-01-05 | End: 2018-01-09

## 2018-01-05 RX ORDER — IBUPROFEN 400 MG/1
800 TABLET ORAL 3 TIMES DAILY
Status: DISCONTINUED | OUTPATIENT
Start: 2018-01-05 | End: 2018-01-08

## 2018-01-05 RX ORDER — 0.9 % SODIUM CHLORIDE 0.9 %
VIAL (ML) INJECTION
Status: COMPLETED
Start: 2018-01-05 | End: 2018-01-05

## 2018-01-05 NOTE — CONSULTS
[facility]    PATIENT'S NAME: Cristina Boles   ATTENDING PHYSICIAN: Hal Aleman MD   CONSULTING PHYSICIAN: Behzad Roger MD   PATIENT ACCOUNT#:   [id number]    LOCATION:  [address] [place] [address] #:   [id number]       DATE OF [facility] regularly and has a history of heroin and cocaine use. 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 She would like to continue her education and perhaps go on to be a  or nurse. FAMILY HISTORY:  Hypertension.     REVIEW OF SYSTEMS:  Otherwis body.      CT scan of the humerus on the left in October showed cellulitis without abscess and reactive lymphadenopathy and no apparent foreign body.       CT scan of the chest in March of last year showed multiple bilateral nodules suspicious for septic em

## 2018-01-05 NOTE — PLAN OF CARE
Patient was resting comfortably in the bed. Boyfriend was in the bed with patient at the time, who had stayed overnight. Patient's cousin, named Jurgen Romero came to visit the patient.  Visitor's personal belongings were checked as ordered by Dr. Eleazar Ahumada for safe

## 2018-01-05 NOTE — PLAN OF CARE
Patient will be sent to Ultrasound for test and procedure to be done. Notified John Neil, nurse in ultrasound department to inform that there was not any orders of informed consent entered in the computer.  Sridevi Gandara in ultrasound reported that she will obtain

## 2018-01-05 NOTE — PLAN OF CARE
Problem: Patient/Family Goals  Goal: Patient/Family Long Term Goal  Patient's Long Term Goal:    Interventions:  -   - See additional Care Plan goals for specific interventions   Outcome: Progressing  (1) Patient will have improvement in skin integrity.   ( wound bed, drain sites and surrounding tissue  - Implement wound care per orders  - Initiate isolation precautions as appropriate  - Initiate Pressure Ulcer prevention bundle as indicated   Outcome: Progressing  (1) Assess skin for redness or skin breakdow

## 2018-01-05 NOTE — PROGRESS NOTES
[facility]    Progress Note    Carrie Lisa Patient Status:  Inpatient    1/3/1995 MRN [id number]   Location [facility] 5SW/SE Attending Skeeter Jeans, MD   Hosp Day # 2 PCP Lakesha Sharp MD     Assessment and Plan:     Pt wa (vde=[id number])    Result Date: 1/3/2018  CONCLUSION:   No evidence of venous thrombosis in the left or right upper extremity. Anechoic collection corresponding with area of lump in the right upper extremity. This could represent a small seroma.  No internal c

## 2018-01-05 NOTE — PROGRESS NOTES
[facility] ID PROGRESS NOTE    Gissel Bake Patient Status:  Inpatient    1/3/1995 MRN [id number]   Location [facility] 5SW/SE Attending Laure Osorio MD   Hosp Day # 2 PCP Deandra Alvarez MD     Subjective:  Awake, patient upset that visitor was a mild central, L3-4 slight central bulging discs     Neck pain     Bilateral groin pain     Leg pain     Chest pain, atypical     Left arm cellulitis     Elevated troponin     Bipolar disorder, unspecified ([address]     Opioid type dependence, continuous ([address] valve endocarditis with septic embolic to lung s/p 6 weeks daptomycin (completed 4/24/17)               -Recent TTE 10/5/2017 with no evidence of vegetations               -KAVITA 3/16: cannot exclude pulmonic valve vegetations  # History of IV heroin abuse

## 2018-01-05 NOTE — CONSULTS
[facility]    Report of Consultation    Lauro Brambila Patient Status:  Inpatient    1/3/1995 MRN [id number]   Location [facility] 5SW/SE Attending Brook Gregorio MD   Hosp Day # 1 PCP Ana Maria Mejia MD     Date of Admission: reported that she has been overwhelmed and distress with all of her psychological and addiction issue. Patient admitted that she has been very irritable and impulsive. Otherwise patient denied any homicidal or suicidal ideation.     Patient reported that Facility-Administered Medications:  methadone solution (DOLOPHINE) 10 mg/5mL 110 mg Oral Daily   LORazepam (ATIVAN) injection 1 mg 1 mg Intravenous Q6H PRN   Normal Saline Flush 0.9 % injection 10 mL 10 mL Intravenous PRN   Senna-Docusate Sodium (SENOKOT S 10/04/2017   ALT 12 (L) 10/04/2017   PTT 31.5 01/03/2018   INR 1.4 (H) 01/04/2018   PTP 16.9 (H) 01/04/2018   T4F 0.73 11/02/2011   TSH 1.12 08/22/2017   DDIMER 0.47 04/02/2017   CRP <0.5 01/29/2015   MG 1.9 04/16/2017   TROP 0.01 04/03/2017   CK 55 03/29/ Continue methadone 110 mg daily. 4.  Limit narcotic medication for her pain knowing patient have high tolerance and drug-seeking behavior.       Orders This Visit:    Orders Placed This Encounter      Urinalysis with Culture Reflex STAT      CBC With Diffe

## 2018-01-05 NOTE — PLAN OF CARE
Patient's mother came to nursing station very upset, and stated, \"I am going to take my daughter's boyfriend home to change clothes. When I come back, I want to speak to the primary doctor. I want to fire Dr. Juan Francisco Ibrahim from my daughter's care.  I want the vi

## 2018-01-05 NOTE — PROGRESS NOTES
[facility] dosing service    Follow-up Pharmacokinetic Consult for Vancomycin Dosing     Denise Crum is a 26year old female admitted on 1/3 who is being treated for cellulitis. Patient is on day 2 of Vancomycin 1250mg Q8H. .  Goal trough is 15-20ug

## 2018-01-06 LAB
INR BLD: 1.5 (ref 0.9–1.2)
PROTHROMBIN TIME: 17.3 SECONDS (ref 11.8–14.5)

## 2018-01-06 PROCEDURE — 99233 SBSQ HOSP IP/OBS HIGH 50: CPT | Performed by: HOSPITALIST

## 2018-01-06 PROCEDURE — 99231 SBSQ HOSP IP/OBS SF/LOW 25: CPT | Performed by: SURGERY

## 2018-01-06 RX ORDER — 0.9 % SODIUM CHLORIDE 0.9 %
VIAL (ML) INJECTION
Status: COMPLETED
Start: 2018-01-06 | End: 2018-01-06

## 2018-01-06 RX ORDER — MAGNESIUM CARB/ALUMINUM HYDROX 105-160MG
296 TABLET,CHEWABLE ORAL ONCE
Status: COMPLETED | OUTPATIENT
Start: 2018-01-06 | End: 2018-01-06

## 2018-01-06 NOTE — PROGRESS NOTES
Patient arguing with her Mom, [facility] out of room in gown and bare feet to [employer], stating she wanted to leave. Patient was agreeable to return to her room. Dr Tim Guidry notified of all of the above.

## 2018-01-06 NOTE — PROGRESS NOTES
[facility]    Progress Note    Newington Shallow Patient Status:  Inpatient    1/3/1995 MRN [id number]   Location [facility] 5SW/SE Attending Thea Lackey MD   Hosp Day # 2 PCP Elba Wilburn MD       Subjective:   Newington Shallow Current PRN Inpatient Meds:      bisacodyl, magnesium hydroxide, LORazepam, Normal Saline Flush, TraMADol HCl, ketorolac (TORADOL) injection, albuterol sulfate, nicotine polacrilex, Normal Saline Flush, ondansetron HCl, ALPRAZolam    Results:       L 1/4/2018  CONCLUSION: No acute cardiopulmonary pathology. Us Venous Doppler Arm Bilat - Diag Img (cpt=93970)    Result Date: 1/3/2018  CONCLUSION:   No evidence of venous thrombosis in the left or right upper extremity.   Anechoic collection corresp and workup  Robyn Hurt MD

## 2018-01-06 NOTE — PROGRESS NOTES
[facility]    Progress Note    Son Chambers Patient Status:  Inpatient    1/3/1995 MRN [id number]   Location [facility] 5SW/SE Attending Moisés Crocker MD   Hosp Day # 3 PCP Brooke Romero MD     Assessment and Plan:     Nadeen Delacruz cardiopulmonary pathology. Us Puncture Asp/abscess/hemt/cyst Bul  (cpt=10160)    Result Date: 1/5/2018  CONCLUSION: Successful ultrasound-guided aspiration of the cystic lesion within the right forearm subcutaneous tissues.   Cellulitis within the r

## 2018-01-06 NOTE — PLAN OF CARE
Problem: Patient/Family Goals  Goal: Patient/Family Long Term Goal  Patient's Long Term Goal: Improvement in skin integrity!     Interventions:  -   - See additional Care Plan goals for specific interventions   Outcome: Progressing  (1) Patient will have im infection  INTERVENTIONS:  - Assess and document risk factors for pressure ulcer development  - Assess and document skin integrity  - Assess and document dressing/incision, wound bed, drain sites and surrounding tissue  - Implement wound care per orders  -

## 2018-01-06 NOTE — PROGRESS NOTES
[facility] dosing service    Follow-up Pharmacokinetic Consult for Vancomycin Dosing     Valerio Knapp is a 26year old female admitted on 1/3 who is being treated for cellulitis. Patient is on day 3 of Vancomycin 1 gm IV Q 8 hours.   Goal trough is 1 trough levels prior to 1/8 0700 dose. Goal trough level 15-20 ug/mL. 3.  Pharmacy will need BUN/Scr daily while on Vancomycin to assess renal function. 4.  Pharmacy will follow and monitor renal function changes, toxicity and efficacy.     Natalie Shaw

## 2018-01-07 LAB
INR BLD: 2.5 (ref 0.9–1.2)
INR BLD: 2.5 (ref 0.9–1.2)
PROTHROMBIN TIME: 26.3 SECONDS (ref 11.8–14.5)
PROTHROMBIN TIME: 26.7 SECONDS (ref 11.8–14.5)

## 2018-01-07 PROCEDURE — 99233 SBSQ HOSP IP/OBS HIGH 50: CPT | Performed by: HOSPITALIST

## 2018-01-07 RX ORDER — CLOTRIMAZOLE 1 %
CREAM WITH APPLICATOR VAGINAL NIGHTLY
Status: DISCONTINUED | OUTPATIENT
Start: 2018-01-07 | End: 2018-01-07 | Stop reason: RX

## 2018-01-07 RX ORDER — ACETAMINOPHEN 500 MG
1000 TABLET ORAL EVERY 6 HOURS PRN
Status: DISCONTINUED | OUTPATIENT
Start: 2018-01-07 | End: 2018-01-09

## 2018-01-07 RX ORDER — CALCIUM CARBONATE 200(500)MG
500 TABLET,CHEWABLE ORAL 3 TIMES DAILY PRN
Status: DISCONTINUED | OUTPATIENT
Start: 2018-01-07 | End: 2018-01-09

## 2018-01-07 NOTE — PLAN OF CARE
Problem: Patient/Family Goals  Goal: Patient/Family Long Term Goal  Patient's Long Term Goal: return home    Interventions:  - IV abx  Psych consult  Follow MD orders  - See additional Care Plan goals for specific interventions   Outcome: Progressing    Go

## 2018-01-07 NOTE — PROGRESS NOTES
[facility]    [facility]    Ladi Covington Patient Status:  Inpatient    1/3/1995 MRN [id number]   Location [facility] 5SW/SE Attending Elis Butt MD   Hosp Day # 3 PCP Rosibel Tapia MD       Subjective:   Ladi Covington Intravenous Q8H       Current PRN Inpatient Meds:      bisacodyl, magnesium hydroxide, LORazepam, Normal Saline Flush, TraMADol HCl, albuterol sulfate, nicotine polacrilex, Normal Saline Flush, ondansetron HCl, ALPRAZolam    Results:       Lab Results  Com Growth 2 Days N/A       Imaging/EKG:   Us Puncture Asp/abscess/hemt/cyst Bul  (cpt=10160)    Result Date: 1/5/2018  CONCLUSION: Successful ultrasound-guided aspiration of the cystic lesion within the right forearm subcutaneous tissues.   Cellulitis within t

## 2018-01-07 NOTE — PLAN OF CARE
Problem: Patient/Family Goals  Goal: Patient/Family Long Term Goal  Patient's Long Term Goal: return home    Interventions:  - IV abx  Psych consult  Follow MD orders  - See additional Care Plan goals for specific interventions    Outcome: Not Progressing

## 2018-01-07 NOTE — PROGRESS NOTES
Viktor Braxton is a 26year old  single female lives with her family with a chronic history of bipolar disorder and polysubstance dependency mostly heroin IV currently in methadone clinic who presented to the hospital with upper extremity celluli manipulation. Patient denied any homicidal or suicidal ideation. Patient going to be challenging in this treatment at least in the first week until we start to being controlled with the management.     HISTORY:  Past Medical History:   Diagnosis Date   • Oral Daily   PEG 3350 (MIRALAX) powder packet 17 g 17 g Oral Daily   TraMADol HCl (ULTRAM) tab 50 mg 50 mg Oral Q4H PRN   albuterol sulfate (VENTOLIN) (2.5 MG/3ML) 0.083% nebulizer solution 2.5 mg 2.5 mg Nebulization Q6H PRN   nicotine polacrilex (NICORETT report was communicated by telephone to Michael Ross, Dr. Robby Rodriguez nurse at the dictation time shown below.           Vitals   01/06/18  1628   BP: 108/59   Pulse: 75   Resp: 18   Temp: 97.7 °F (36.5 °C)       PHYSICAL EXAM:     The patient is alert and oriented × benzodiazepine medication the patient taken for now appropriate clinical indication.   6.  Follow-up during these admission    Orders This Visit:    Orders Placed This Encounter      Urinalysis with Culture Reflex STAT      CBC With Differential With Platel

## 2018-01-07 NOTE — PROGRESS NOTES
[facility] HOSP - [facility]    Progress Note    Lei Stern Patient Status:  Inpatient    1/3/1995 MRN [id number]   Location [facility] 5SW/SE Attending Arnold Fink MD   Hosp Day # 4 PCP Em Pizarro MD       Subjective:   Lei Stern DiphenhydrAMINE HCl  25 mg Intravenous Q8H       Current PRN Inpatient Meds:      acetaminophen, Calcium Carbonate Antacid, bisacodyl, magnesium hydroxide, LORazepam, Normal Saline Flush, TraMADol HCl, albuterol sulfate, nicotine polacrilex, Normal Saline Cellulitis R and L forearms. Suspect MRSA  +/- b/l abscesses   -u/s done in ER on the R upper ext showed seroma.   Patient adamant that she had colored drainage from the arm  -cont IV vancomycin  -elevation  -ID consult  -blood cultures pending  -s/p I+D

## 2018-01-08 ENCOUNTER — APPOINTMENT (OUTPATIENT)
Dept: GENERAL RADIOLOGY | Facility: HOSPITAL | Age: 23
DRG: 603 | End: 2018-01-08
Attending: HOSPITALIST
Payer: MEDICAID

## 2018-01-08 LAB
ANION GAP SERPL CALC-SCNC: 7 MMOL/L (ref 0–18)
BUN SERPL-MCNC: 4 MG/DL (ref 8–20)
BUN/CREAT SERPL: 6.3 (ref 10–20)
CALCIUM SERPL-MCNC: 8.3 MG/DL (ref 8.5–10.5)
CHLORIDE SERPL-SCNC: 104 MMOL/L (ref 95–110)
CO2 SERPL-SCNC: 25 MMOL/L (ref 22–32)
CREAT SERPL-MCNC: 0.63 MG/DL (ref 0.5–1.5)
GLUCOSE SERPL-MCNC: 105 MG/DL (ref 70–99)
INR BLD: 2.6 (ref 0.9–1.2)
NUCLEAR IGG TITR SER IF: NEGATIVE {TITER}
OSMOLALITY UR CALC.SUM OF ELEC: 279 MOSM/KG (ref 275–295)
POTASSIUM SERPL-SCNC: 4 MMOL/L (ref 3.3–5.1)
PROTHROMBIN TIME: 27.6 SECONDS (ref 11.8–14.5)
SODIUM SERPL-SCNC: 136 MMOL/L (ref 136–144)
VANCOMYCIN TROUGH SERPL-MCNC: 12.8 MCG/ML (ref 10–20)

## 2018-01-08 PROCEDURE — 73090 X-RAY EXAM OF FOREARM: CPT | Performed by: HOSPITALIST

## 2018-01-08 PROCEDURE — 99233 SBSQ HOSP IP/OBS HIGH 50: CPT | Performed by: HOSPITALIST

## 2018-01-08 RX ORDER — POLYETHYLENE GLYCOL 3350 17 G/17G
17 POWDER, FOR SOLUTION ORAL 2 TIMES DAILY
Status: DISCONTINUED | OUTPATIENT
Start: 2018-01-08 | End: 2018-01-09

## 2018-01-08 RX ORDER — KETOROLAC TROMETHAMINE 30 MG/ML
30 INJECTION, SOLUTION INTRAMUSCULAR; INTRAVENOUS EVERY 6 HOURS PRN
Status: DISCONTINUED | OUTPATIENT
Start: 2018-01-08 | End: 2018-01-09

## 2018-01-08 RX ORDER — SODIUM PHOSPHATE, DIBASIC AND SODIUM PHOSPHATE, MONOBASIC 7; 19 G/133ML; G/133ML
1 ENEMA RECTAL ONCE AS NEEDED
Status: ACTIVE | OUTPATIENT
Start: 2018-01-08 | End: 2018-01-08

## 2018-01-08 RX ORDER — SENNA AND DOCUSATE SODIUM 50; 8.6 MG/1; MG/1
2 TABLET, FILM COATED ORAL 2 TIMES DAILY
Status: DISCONTINUED | OUTPATIENT
Start: 2018-01-08 | End: 2018-01-09

## 2018-01-08 NOTE — OCCUPATIONAL THERAPY NOTE
OCCUPATIONAL THERAPY EVALUATION - INPATIENT     Room Number: 558/558-A  Evaluation Date: 1/8/2018  Type of Evaluation: Initial  Presenting Problem:  (B foreram cellulitis; fever; suspect MRSA)    Physician Order: IP Consult to Occupational Therapy  Reason Pt's mother reported pt had fallen out of bed twice last night. RN was notified of this. Pt became very dramatic at times and expressing she would have to be in a w/c as she could not walk when she demonstrated she could walk.  RN aware of pt's pain and pt MRSA treated with daptomycin ×42 days   • Bipolar disorder ([facility] [address]    • History of blood clots    • Intravenous drug abuse     Heroin and crack cocaine   • Opioid dependence [facility])        Past Surgical History  Past Surgical History:  2013: COLONOSCOPY  4/17/ Upper extremity ROM to be within functional limits; when formally assessed pt with BUE shoulder flexion to 1/2 AROM;  BUE elbow extension 3/4 AROM    STRENGTH ASSESSMENT  Unable to accurately assess at this time; demonstrated functional strength     COORDI 3x/week     Екатерина nKott OTJOSEFINA/COLLETTE davis

## 2018-01-08 NOTE — PLAN OF CARE
Problem: Patient/Family Goals  Goal: Patient/Family Long Term Goal  Patient's Long Term Goal: return home    Interventions:  - IV abx  Psych consult  Follow MD orders  - See additional Care Plan goals for specific interventions    Outcome: Progressing    G

## 2018-01-08 NOTE — PHYSICAL THERAPY NOTE
PHYSICAL THERAPY EVALUATION - INPATIENT     Room Number: 558/558-A  Evaluation Date: 1/8/2018  Type of Evaluation: Initial  Physician Order: PT Eval and Treat    Presenting Problem: Cellulitis R and L forearms.  Suspected MRSA  Reason for Therapy: Mobil able to lift her extremity against gravity and is able to have a full AROM on B LE in supine. She c/o pain on B thighs with AROM which likely is more muscular is nature.  Left pt on the recliner and encourage to be up at tolerance and to call nursing for as 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 ..   -   Moving to and from a bed to a chair (including a wheelchair)?: A Little   -   Need to walk in hospital room?: [address]

## 2018-01-08 NOTE — PROGRESS NOTES
[facility] dosing service    Follow-up Pharmacokinetic Consult for Vancomycin Dosing     Kelly Connelly is a 26year old female who is being treated for cellulitis. Patient is on day 6 of Vancomycin 1 gm IV Q 8 hours. Goal trough is 15-20 ug/mL.     Monica Sam vascularity was seen. There is diffuse subcutaneous edema throughout the right forearm   with multiple ill-defined areas of fluid. No other defined loculated collection    Based on the above:    1.  Continue Vancomycin at 1 gm IVPB Q 8 hours (based on Troug

## 2018-01-08 NOTE — PROGRESS NOTES
clotrimazole (LOTRIMIN) 1 % vaginal cream is unavailable &  Auto-Substituted to Miconazole Vaginal cream Per P&T PROTOCOL

## 2018-01-08 NOTE — PROGRESS NOTES
[facility]    Progress Note    Claribel Amaral Patient Status:  Inpatient    1/3/1995 MRN [id number]   Location [facility] 5SW/SE Attending Gilda Harper MD   Hosp Day # 5 PCP Jerod Carranza MD       Subjective:   Claribel Amaral Transdermal Daily   • DiphenhydrAMINE HCl  25 mg Intravenous Q8H       Current PRN Inpatient Meds:      FLEET ENEMA, ketorolac (TORADOL) injection, acetaminophen, Calcium Carbonate Antacid, bisacodyl, magnesium hydroxide, LORazepam, Normal Saline Flush, Tr Value Ref Range   Blood Culture Result No Growth 4 Days N/A       Imaging/EKG:   Xr Forearm Bilat (dai=[id number])    Result Date: 1/8/2018  CONCLUSION: Proximal forearm soft tissue swelling bilaterally without a radiopaque foreign body.              Assessment

## 2018-01-08 NOTE — CM/SW NOTE
SW met with the patient at bedside today. The patient was not receptive to SW visit, but did answer a few questions. She lives with her mom, oes not work or go to school. She is independent with her care.   She has a dx bipolar disorder and is an IV drug

## 2018-01-08 NOTE — PROGRESS NOTES
[facility] ID PROGRESS NOTE    [facility] Patient Status:  Inpatient    1/3/1995 MRN [id number]   Location [facility] 5SW/SE Attending Arnold Fink MD   Hosp Day # 5 PCP Em Pizarro MD     Subjective:  Awake, mom at the bedside.  States that troponin     Bipolar disorder, unspecified ([address]     Opioid type dependence, continuous ([address]     Superficial venous thrombosis of arm, left     IV drug abuse     Opioid dependence on agonist therapy ([address]     Subclavian vein thrombosis ([address]     Internal septic embolic to lung s/p 6 weeks daptomycin (completed 4/24/17)               -Recent TTE 10/5/2017 with no evidence of vegetations               -KAVITA 3/16: cannot exclude pulmonic valve vegetations  # History of IV heroin abuse               -States las

## 2018-01-09 VITALS
BODY MASS INDEX: 26.2 KG/M2 | RESPIRATION RATE: 18 BRPM | OXYGEN SATURATION: 98 % | TEMPERATURE: 98 F | WEIGHT: 142.38 LBS | SYSTOLIC BLOOD PRESSURE: 129 MMHG | DIASTOLIC BLOOD PRESSURE: 67 MMHG | HEIGHT: 62 IN | HEART RATE: 59 BPM

## 2018-01-09 LAB
C TRACH DNA SPEC QL NAA+PROBE: NEGATIVE
INR BLD: 3.3 (ref 0.9–1.2)
N GONORRHOEA DNA SPEC QL NAA+PROBE: NEGATIVE
PROTHROMBIN TIME: 33.5 SECONDS (ref 11.8–14.5)

## 2018-01-09 PROCEDURE — 99239 HOSP IP/OBS DSCHRG MGMT >30: CPT | Performed by: HOSPITALIST

## 2018-01-09 PROCEDURE — 99233 SBSQ HOSP IP/OBS HIGH 50: CPT | Performed by: OTHER

## 2018-01-09 RX ORDER — CEFADROXIL 1000 MG/1
1 TABLET ORAL 2 TIMES DAILY
Qty: 8 TABLET | Refills: 0 | Status: SHIPPED | COMMUNITY
Start: 2018-01-09 | End: 2018-03-05

## 2018-01-09 RX ORDER — WARFARIN SODIUM 7.5 MG/1
7.5 TABLET ORAL NIGHTLY
Status: DISCONTINUED | OUTPATIENT
Start: 2018-01-10 | End: 2018-01-09

## 2018-01-09 RX ORDER — BISACODYL 10 MG
10 SUPPOSITORY, RECTAL RECTAL
Refills: 0 | Status: SHIPPED | COMMUNITY
Start: 2018-01-09 | End: 2018-03-05

## 2018-01-09 RX ORDER — SULFAMETHOXAZOLE AND TRIMETHOPRIM 800; 160 MG/1; MG/1
1 TABLET ORAL 2 TIMES DAILY
Qty: 16 TABLET | Refills: 0 | Status: SHIPPED | OUTPATIENT
Start: 2018-01-09 | End: 2018-01-17

## 2018-01-09 RX ORDER — SULFAMETHOXAZOLE AND TRIMETHOPRIM 800; 160 MG/1; MG/1
1 TABLET ORAL 2 TIMES DAILY
Qty: 16 TABLET | Refills: 0 | Status: SHIPPED | COMMUNITY
Start: 2018-01-09 | End: 2018-03-05

## 2018-01-09 RX ORDER — SENNA AND DOCUSATE SODIUM 50; 8.6 MG/1; MG/1
2 TABLET, FILM COATED ORAL 2 TIMES DAILY
Refills: 0 | Status: SHIPPED | COMMUNITY
Start: 2018-01-09 | End: 2018-03-05

## 2018-01-09 RX ORDER — WARFARIN SODIUM 7.5 MG/1
7.5 TABLET ORAL NIGHTLY
Status: DISCONTINUED | OUTPATIENT
Start: 2018-01-09 | End: 2018-01-09

## 2018-01-09 RX ORDER — POLYETHYLENE GLYCOL 3350 17 G/17G
17 POWDER, FOR SOLUTION ORAL 2 TIMES DAILY
Refills: 0 | Status: SHIPPED | COMMUNITY
Start: 2018-01-09 | End: 2018-03-05

## 2018-01-09 RX ORDER — CEFADROXIL 1000 MG/1
1 TABLET ORAL 2 TIMES DAILY
Qty: 10 TABLET | Refills: 0 | Status: SHIPPED | OUTPATIENT
Start: 2018-01-09 | End: 2018-01-17

## 2018-01-09 RX ORDER — KETOROLAC TROMETHAMINE 10 MG/1
10 TABLET, FILM COATED ORAL EVERY 8 HOURS PRN
Qty: 21 TABLET | Refills: 0 | Status: SHIPPED | OUTPATIENT
Start: 2018-01-09 | End: 2018-03-05

## 2018-01-09 RX ORDER — MAGNESIUM CARB/ALUMINUM HYDROX 105-160MG
296 TABLET,CHEWABLE ORAL AS NEEDED
Status: DISCONTINUED | OUTPATIENT
Start: 2018-01-09 | End: 2018-01-09

## 2018-01-09 RX ORDER — CALCIUM CARBONATE 200(500)MG
500 TABLET,CHEWABLE ORAL 3 TIMES DAILY PRN
Refills: 0 | Status: SHIPPED | COMMUNITY
Start: 2018-01-09 | End: 2018-03-05

## 2018-01-09 RX ORDER — ACETAMINOPHEN 500 MG
1000 TABLET ORAL EVERY 8 HOURS PRN
Refills: 0 | Status: SHIPPED | COMMUNITY
Start: 2018-01-09 | End: 2018-03-05

## 2018-01-09 NOTE — PLAN OF CARE
Problem: Patient/Family Goals  Goal: Patient/Family Long Term Goal  Patient's Long Term Goal: return home    Interventions:  - IV abx  Psych consult  Follow MD orders  - See additional Care Plan goals for specific interventions    Outcome: Progressing    G wound care per orders  - Initiate isolation precautions as appropriate  - Initiate Pressure Ulcer prevention bundle as indicated   Outcome: Progressing  Skin remains intact, pt is ambulatory, continent and turns/repositions self in bed.     Problem: SAFETY environmental stimuli, as able  - Instruct patient/family in relaxation techniques, as appropriate  - Assess for spiritual and psychosocial needs and initiate Spiritual Care or Behavioral Health consult as needed   Outcome: Not Progressing  Pt has poor

## 2018-01-09 NOTE — PLAN OF CARE
Focus: coping  Data: pt was upset w/ bf,arguing, yelling, crying, \"give me my phone\".  Pt pulled her lt arm midline catheter, pressure dressing applied to site, asked pt if she wanted bf to leave the room but she wanted him to stay and lie beside her, pt

## 2018-01-09 NOTE — PLAN OF CARE
Problem: Patient/Family Goals  Goal: Patient/Family Long Term Goal  Patient's Long Term Goal: return home    Interventions:  - IV abx  Psych consult  Follow MD orders  - See additional Care Plan goals for specific interventions    Outcome: Progressing    G her head. Patient updated as high fall risk and bed alarm placed. Patient is upset with these parameters. Patient indicated that she does not want Dr. Robert Boyle to see her and is tearful and wailing. Patient has not had a bowel movement since admission.  oLrie Mail

## 2018-01-09 NOTE — CM/SW NOTE
DEIDRE met with the patient at bedside again today. Per report, she will be d/c'd today and expressed interest in inpatient rehab. DEIDRE met with her at bedside to discuss. Patient barely opened her eyes during this visit.   When asked by DEIDRE and her RN re inpat

## 2018-01-09 NOTE — PLAN OF CARE
ANXIETY    • Will report anxiety at manageable levels Adequate for Discharge        COPING    • Pt/Family able to verbalize concerns and demonstrate effective coping strategies Adequate for Discharge        PAIN - ADULT    • Verbalizes/displays adequate co

## 2018-01-09 NOTE — PROGRESS NOTES
[facility] ID PROGRESS NOTE    Ju Willis Patient Status:  Inpatient    1/3/1995 MRN [id number]   Location [facility] 5SW/SE Attending David Littlejohn MD   Hosp Day # 6 PCP Rome Henley MD     Subjective:  Awake, still having pain.  Midline pulle of arm, left     IV drug abuse     Opioid dependence on agonist therapy ([facility] [address]     Subclavian vein thrombosis (HCC)     Internal jugular vein thrombosis (HCC)     Acute embolism and thrombosis of right internal jugular vein (HCC)     Cellulitis of right arm vegetations               -KAVITA 3/16: cannot exclude pulmonic valve vegetations  # History of IV heroin abuse               -States last used 12/16               -On methadone               -HIV AG AB 1/4 nonreactive  # Tobacco usage   -Per patient, smokes

## 2018-01-10 ENCOUNTER — TELEPHONE (OUTPATIENT)
Dept: CASE MANAGEMENT | Age: 23
End: 2018-01-10

## 2018-01-10 ENCOUNTER — TELEPHONE (OUTPATIENT)
Dept: INTERNAL MEDICINE UNIT | Facility: HOSPITAL | Age: 23
End: 2018-01-10

## 2018-01-10 NOTE — TELEPHONE ENCOUNTER
HOSPITALIST NURSE              TELEPHONE NOTE    Per Dr Nadege Espinal, discharging Hospitalist Md. Pt to continue Coumadin at 5mg QD and follow up w INR draw at the [address] today. Pt called, no answer.  Voicemail left requesting call back to

## 2018-01-10 NOTE — TELEPHONE ENCOUNTER
HOSPITALIST NURSE              TELEPHONE NOTE    Call made to pts mother (listed in confidentiality agreement) to notify of Coumadin directions and pts need to f/u at Coumadin Clinic today. Apt at [facility] Coumadin clinic scheduled.    Coumadin letter rou

## 2018-01-10 NOTE — PROGRESS NOTES
Debra Sepulveda is a 26year old  single female lives with her family with a chronic history of bipolar disorder and polysubstance dependency mostly heroin IV currently in methadone clinic who presented to the hospital with upper extremity celluli Problem Relation Age of Onset   • back pain [OTHER] Father    • Hypertension Mother    • Hypertension Maternal Grandmother    • Diabetes Maternal Grandfather    • Prostate Cancer Paternal Grandfather      w/ metastasis      Social History: Smoking status denied any homicidal or suicidal ideation. Patient denying any auditory or visual hallucination. Cognition impaired by her distractibility and narcotic medicine. Judgment and insight are impaired.        ASSESSMENT/PLAN:       Bipolar disorder unspecifie Cefadroxil 1 g Oral Tab 10 tablet 0      Sig: Take 1 g by mouth 2 (two) times daily. Sulfamethoxazole-TMP -160 MG Oral Tab per tablet 16 tablet 0      Sig: Take 1 tablet by mouth 2 (two) times daily.       Ketorolac Tromethamine 10 MG Oral Tab 21

## 2018-01-11 ENCOUNTER — ANTI-COAG VISIT (OUTPATIENT)
Dept: INTERNAL MEDICINE CLINIC | Facility: CLINIC | Age: 23
End: 2018-01-11

## 2018-01-11 DIAGNOSIS — I82.C11 ACUTE EMBOLISM AND THROMBOSIS OF RIGHT INTERNAL JUGULAR VEIN (HCC): ICD-10-CM

## 2018-01-11 LAB — INR: 3.7 (ref 2–3)

## 2018-01-11 PROCEDURE — 85610 PROTHROMBIN TIME: CPT

## 2018-01-11 PROCEDURE — 93793 ANTICOAG MGMT PT WARFARIN: CPT

## 2018-01-12 NOTE — PAYOR COMM NOTE
--------------  ADMISSION REVIEW     Payor: Mason Hendrix #:  [id number]  Authorization Number: [id number]    Admit date: 1/3/18  Admit time: 1940         Patient Seen in: [facility] Emergency Department Conjunctivae and EOM are normal.   Neck: Normal range of motion. Neck supple. Cardiovascular: Normal rate and regular rhythm. No murmur heard. Pulmonary/Chest: Effort normal and breath sounds normal. No respiratory distress. Abdominal: Soft.  She ex extremity, unspecified laterality  (primary encounter diagnosis)    Disposition:  Admit        ADMISSION DATE:       01/03/2018    HISTORY AND PHYSICAL EXAMINATION    CHIEF COMPLAINT:  Bilateral proximal forearm cellulitis and possible abscesses.     HISTOR thyromegaly or lymphadenopathy. LUNGS:  Clear to auscultation bilaterally. Normal respiratory effort. No intercostal retractions. HEART:  Regular rate and rhythm. S1 and S2 auscultated. No murmur. ABDOMEN:  Soft, nondistended. No tenderness.   No TempSrc:   Oral Oral Oral   SpO2:   97% 99% 97%   Weight: 142 lb 6.4 oz (64.6 kg)         Height: 5' 2\" (1.575 m)               GENERAL:  The patient appeared to be in no distress and was comfortable.   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   SKIN:  Warm and hydr in both upper extremity systems in the cephalic veins and subclavian axillary veins  -f/u with Dr. Dorinda Roamn as outpt  -monitor INR     Intractable arm pain  -x rays show no metal fragments  -PT OT to see     Exp wheezing  -suspect viral bronchitis  -albuterol

## 2018-01-19 ENCOUNTER — TELEPHONE (OUTPATIENT)
Dept: INTERNAL MEDICINE CLINIC | Facility: CLINIC | Age: 23
End: 2018-01-19

## 2018-01-19 NOTE — TELEPHONE ENCOUNTER
No answer. Left message to call back. SHERLYN if pt calls back please schedule pt with Coumadin anytime next week.

## 2018-01-21 NOTE — DISCHARGE SUMMARY
[facility] HOSP - [facility]    Discharge Summary    Radha Herron Patient Status:  Inpatient    1/3/1995 MRN [id number]   Location [facility] 5SW/SE Attending No att. providers found   [place] Day # 6 PCP Montrell Adkins MD     Date of Admission: 1/3 see     Exp wheezing  Tobacco abuse  -suspect viral bronchitis  -albuterol neb prn     Urinary retention  -likely from metadone / chronic constipation  -aggressive stool softeners     Myalgias/joint pains  -FANY negative  -checked RF, anti-CCP - both neg  - Pain.   Quantity:  21 tablet  Refills:  0     PEG 3350 Pack  Commonly known as:  MIRALAX      Take 17 g by mouth 2 (two) times daily.    Refills:  0     Senna-Docusate Sodium 8.6-50 MG Tabs  Commonly known as:  SENOKOT S      Take 2 tablets by mouth 2 (two) the location directed by your doctor or nurse    Bring a paper prescription for each of these medications  · Cefadroxil 1 g Tabs  · Sulfamethoxazole-TMP -160 MG Tabs per tablet         Follow up Visits  Mela Kamara MD  [facility]

## 2018-01-22 ENCOUNTER — ANTI-COAG VISIT (OUTPATIENT)
Dept: INTERNAL MEDICINE CLINIC | Facility: CLINIC | Age: 23
End: 2018-01-22

## 2018-01-22 DIAGNOSIS — I82.C11 ACUTE EMBOLISM AND THROMBOSIS OF RIGHT INTERNAL JUGULAR VEIN (HCC): ICD-10-CM

## 2018-01-22 LAB — INR: 1.7 (ref 2–3)

## 2018-01-22 PROCEDURE — 36416 COLLJ CAPILLARY BLOOD SPEC: CPT

## 2018-01-22 PROCEDURE — 85610 PROTHROMBIN TIME: CPT

## 2018-01-26 NOTE — PAYOR COMM NOTE
--------------  DISCHARGE REVIEW    Payor: Mason Hendrix #:  [id number]  Authorization Number: [id number]    Admit date: 1/3/18  Admit time:  1940  Discharge Date: 1/9/2018  2:09 PM     Admitting Physician: Brice De Luna abscesses   -u/s done in ER on the R upper ext showed seroma.   Patient adamant that she had colored drainage from the arm  -cont IV vancomycin - home on oral abx  -elevation  -ID consult  -blood cultures pending  -s/p I+D - micro with no growth     H/O stacy STRENGTH  Notes to patient:  TAKE AS NEEDED      Take 2 tablets (1,000 mg total) by mouth every 8 (eight) hours as needed. Refills:  0     bisacodyl 10 MG Supp  Commonly known as:  DULCOLAX      Place 1 suppository (10 mg total) rectally daily as needed. Stop taking on:  1/17/2018  Quantity:  16 tablet  Refills:  0     Cefadroxil 1 g Tabs  Ask about: Should I take this medication? Take 1 g by mouth 2 (two) times daily.    Stop taking on:  1/17/2018  Quantity:  10 tablet  Refills:  0           Where to

## 2018-02-05 ENCOUNTER — ANTI-COAG VISIT (OUTPATIENT)
Dept: INTERNAL MEDICINE CLINIC | Facility: CLINIC | Age: 23
End: 2018-02-05

## 2018-02-05 DIAGNOSIS — I82.C11 ACUTE EMBOLISM AND THROMBOSIS OF RIGHT INTERNAL JUGULAR VEIN (HCC): ICD-10-CM

## 2018-02-05 LAB — INR: 1.2 (ref 2–3)

## 2018-02-05 PROCEDURE — 85610 PROTHROMBIN TIME: CPT

## 2018-02-05 PROCEDURE — 93793 ANTICOAG MGMT PT WARFARIN: CPT

## 2018-02-05 PROCEDURE — 36416 COLLJ CAPILLARY BLOOD SPEC: CPT

## 2018-02-13 ENCOUNTER — ANTI-COAG VISIT (OUTPATIENT)
Dept: INTERNAL MEDICINE CLINIC | Facility: CLINIC | Age: 23
End: 2018-02-13

## 2018-02-13 DIAGNOSIS — I82.C11 ACUTE EMBOLISM AND THROMBOSIS OF RIGHT INTERNAL JUGULAR VEIN (HCC): ICD-10-CM

## 2018-02-13 LAB — INR: 1.7 (ref 2–3)

## 2018-02-13 PROCEDURE — 36416 COLLJ CAPILLARY BLOOD SPEC: CPT

## 2018-02-13 PROCEDURE — 85610 PROTHROMBIN TIME: CPT

## 2018-02-14 ENCOUNTER — OFFICE VISIT (OUTPATIENT)
Dept: INTERNAL MEDICINE CLINIC | Facility: CLINIC | Age: 23
End: 2018-02-14

## 2018-02-14 VITALS
BODY MASS INDEX: 27.05 KG/M2 | TEMPERATURE: 98 F | WEIGHT: 147 LBS | SYSTOLIC BLOOD PRESSURE: 128 MMHG | DIASTOLIC BLOOD PRESSURE: 81 MMHG | HEART RATE: 73 BPM | HEIGHT: 62 IN

## 2018-02-14 DIAGNOSIS — L03.113 CELLULITIS OF RIGHT ARM: Primary | ICD-10-CM

## 2018-02-14 DIAGNOSIS — F11.20 OPIOID TYPE DEPENDENCE, CONTINUOUS (HCC): ICD-10-CM

## 2018-02-14 PROCEDURE — 99212 OFFICE O/P EST SF 10 MIN: CPT | Performed by: INTERNAL MEDICINE

## 2018-02-14 PROCEDURE — 99214 OFFICE O/P EST MOD 30 MIN: CPT | Performed by: INTERNAL MEDICINE

## 2018-02-14 RX ORDER — CLINDAMYCIN HYDROCHLORIDE 300 MG/1
300 CAPSULE ORAL 3 TIMES DAILY
Qty: 21 CAPSULE | Refills: 0 | Status: SHIPPED | OUTPATIENT
Start: 2018-02-14 | End: 2018-02-21

## 2018-02-14 NOTE — PROGRESS NOTES
Patient ID: Fortunato Morejon is a 26year old female. Patient presents with:  Hospital F/U  Infection: Infection on R arm, started off as a blister and had a fever 3 days ago, 100 taken orally.        HISTORY OF PRESENT ILLNESS:   HPI  Patient presents for Current Outpatient Prescriptions:   •  Clindamycin HCl 300 MG Oral Cap, Take 1 capsule (300 mg total) by mouth 3 (three) times daily. , Disp: 21 capsule, Rfl: 0  •  acetaminophen 500 MG Oral Tab, Take 2 tablets (1,000 mg total) by mouth every 8 (eight) hour Smokeless tobacco: Never Used    Alcohol use No    Drug use: Yes     Other Topics Concern    Caffeine Concern No    Comment: 2 cans Cola per day    Exercise Yes    Comment: hep     Social History Narrative    The patient does not use an assistive device. Tim Guo

## 2018-02-14 NOTE — PATIENT INSTRUCTIONS
Recovering from [facility] [address] making a new life for yourself. This includes finding new interests. It involves building new relationships. It means taking better care of yourself.  These will all help you replace substance use with a new and h Cellulitis is an infection of the deep layers of skin. A break in the skin, such as a cut or scratch, can let bacteria under the skin. If the bacteria get to deep layers of the skin, it can be serious.  If not treated, cellulitis can get into the bloodstrea Date Last Reviewed: 9/1/2016  © 1427-2070 [employer] [address]. [address] [facility], [address] [place]. All rights reserved. This information is not intended as a substitute for professional medical care.  Always follow your healthcare professional'

## 2018-02-22 ENCOUNTER — ANTI-COAG VISIT (OUTPATIENT)
Dept: INTERNAL MEDICINE CLINIC | Facility: CLINIC | Age: 23
End: 2018-02-22

## 2018-02-22 DIAGNOSIS — I82.C11 ACUTE EMBOLISM AND THROMBOSIS OF RIGHT INTERNAL JUGULAR VEIN (HCC): ICD-10-CM

## 2018-02-22 LAB — INR: 1.3 (ref 2–3)

## 2018-02-22 PROCEDURE — 99211 OFF/OP EST MAY X REQ PHY/QHP: CPT

## 2018-02-22 PROCEDURE — 36416 COLLJ CAPILLARY BLOOD SPEC: CPT

## 2018-02-22 PROCEDURE — 85610 PROTHROMBIN TIME: CPT

## 2018-03-05 ENCOUNTER — ANTI-COAG VISIT (OUTPATIENT)
Dept: INTERNAL MEDICINE CLINIC | Facility: CLINIC | Age: 23
End: 2018-03-05

## 2018-03-05 ENCOUNTER — OFFICE VISIT (OUTPATIENT)
Dept: INTERNAL MEDICINE CLINIC | Facility: CLINIC | Age: 23
End: 2018-03-05

## 2018-03-05 VITALS
SYSTOLIC BLOOD PRESSURE: 107 MMHG | TEMPERATURE: 98 F | HEART RATE: 79 BPM | DIASTOLIC BLOOD PRESSURE: 72 MMHG | HEIGHT: 62 IN | BODY MASS INDEX: 27.79 KG/M2 | WEIGHT: 151 LBS

## 2018-03-05 DIAGNOSIS — R23.4 SKIN ESCHAR: Primary | ICD-10-CM

## 2018-03-05 DIAGNOSIS — I82.C11 ACUTE EMBOLISM AND THROMBOSIS OF RIGHT INTERNAL JUGULAR VEIN (HCC): ICD-10-CM

## 2018-03-05 DIAGNOSIS — L03.119 CELLULITIS OF UPPER EXTREMITY, UNSPECIFIED LATERALITY: ICD-10-CM

## 2018-03-05 LAB — INR: 1.2 (ref 2–3)

## 2018-03-05 PROCEDURE — 99212 OFFICE O/P EST SF 10 MIN: CPT | Performed by: INTERNAL MEDICINE

## 2018-03-05 PROCEDURE — 85610 PROTHROMBIN TIME: CPT

## 2018-03-05 PROCEDURE — 99214 OFFICE O/P EST MOD 30 MIN: CPT | Performed by: INTERNAL MEDICINE

## 2018-03-05 PROCEDURE — 36416 COLLJ CAPILLARY BLOOD SPEC: CPT

## 2018-03-05 RX ORDER — CLINDAMYCIN HYDROCHLORIDE 300 MG/1
300 CAPSULE ORAL 3 TIMES DAILY
Qty: 21 CAPSULE | Refills: 0 | Status: SHIPPED | OUTPATIENT
Start: 2018-03-05 | End: 2018-03-27

## 2018-03-05 NOTE — PATIENT INSTRUCTIONS
Cellulitis  Cellulitis is an infection of the deep layers of skin. A break in the skin, such as a cut or scratch, can let bacteria under the skin. If the bacteria get to deep layers of the skin, it can be serious.  If not treated, cellulitis can get into · Fever higher of 100.4º F (38.0º C) or higher after 2 days on antibiotics  Date Last Reviewed: 9/1/2016  © 6338-2705 [employer] [address]. [address] [facility], [address] [place] [address]. All rights reserved.  This information is not intended as a substitut Follow up with your healthcare provider, or as advised. If your infection does not go away on the first antibiotic, your healthcare provider will prescribe a different one.   When to seek medical advice  Call your healthcare provider right away if any of th

## 2018-03-05 NOTE — PROGRESS NOTES
Kaia Romero is a 26year old female.   Patient presents with:  Wound: right arm, seen by Dr. Gonsales Payment was given antibiotics      HPI:   Pt comes as an urgent visit   Pt with her mom   C/c right forarm   C/o having this lesion for over 2 weeks   She states EXAM:   /72 (BP Location: Left arm, Patient Position: Sitting, Cuff Size: adult)   Pulse 79   Temp 98.2 °F (36.8 °C) (Oral)   Ht 5' 2\" (1.575 m)   Wt 151 lb (68.5 kg)   BMI 27.62 kg/m²   GENERAL: well developed, well nourished,in no apparent dis

## 2018-03-12 ENCOUNTER — OFFICE VISIT (OUTPATIENT)
Dept: WOUND CARE | Facility: HOSPITAL | Age: 23
End: 2018-03-12
Attending: NURSE PRACTITIONER
Payer: MEDICAID

## 2018-03-12 DIAGNOSIS — R23.4 SKIN ESCHAR: ICD-10-CM

## 2018-03-12 PROBLEM — S41.101A OPEN ARM WOUND, RIGHT, INITIAL ENCOUNTER: Status: ACTIVE | Noted: 2018-03-12

## 2018-03-12 PROCEDURE — 97162 PT EVAL MOD COMPLEX 30 MIN: CPT

## 2018-03-12 PROCEDURE — 97597 DBRDMT OPN WND 1ST 20 CM/<: CPT

## 2018-03-12 NOTE — PROGRESS NOTES
Subjective    Chief Complaint  This information was obtained from the patient  The patient is new to the [address] [facility] here for an initial visit for the evaluation and management of non-healing wound(s).     Allergies  vancomycin, amoxicillin    HPI  This in Height/Length: 62 in (157.48 cm), Weight: 149.6 lbs (68 kgs), BMI: 27.4, Temperature: 98.4 °F (36.89 °C), Pulse: 74 bpm, Respiratory Rate: 17 breaths/min, Blood Pressure: 98/66 mmHg, Pulse Oximetry: 99 %.      Integumentary (Hair, Skin)  Wound #1 Right, Med Pt is a 26 year female who has a history of heroin use with a open wound in her R mid forearm. Pt reports the wound started after a heroin injection site blistered up and popped.  The wound than became painful irritated and appeared to look infected with pu Pt will continue to be seen 1-2x/week for 10-12 weeks for selective debridement (01586OG, [id number]), non-selective enzymatic debridement (35722), advanced wound dressings, compression wrapping (33695DM), and possible consult with ID if infection continues t Achieve wound closure to promote return to normal functional activities  Thank you for your referral. Please co-sign  this letter using the CO-SIGN button to certify the need for these services furnished under this plan of treatment and while under my care

## 2018-03-13 ENCOUNTER — TELEPHONE (OUTPATIENT)
Dept: INTERNAL MEDICINE CLINIC | Facility: CLINIC | Age: 23
End: 2018-03-13

## 2018-03-13 NOTE — TELEPHONE ENCOUNTER
Left message for patient regarding overdue INR. Patient missed appointment with [address] yesterday. Please call our office to schedule an appointment or go to any [address] [place] [address] lab for lab draw.  Any patient representative can help assist in scheduling an appo

## 2018-03-14 ENCOUNTER — TELEPHONE (OUTPATIENT)
Dept: INTERNAL MEDICINE CLINIC | Facility: CLINIC | Age: 23
End: 2018-03-14

## 2018-03-14 NOTE — TELEPHONE ENCOUNTER
Patient Mother calling to have appointment scheduled due to Patient missed original appointment.      Please Advise

## 2018-03-14 NOTE — TELEPHONE ENCOUNTER
Left message for patient to call back regarding appointment request. Any patient representative can assist in scheduling an appt with coumadin clinic. Once patient calls back, please schedule a 15 minute appointment with coumadin clinic. Thank you.

## 2018-03-15 ENCOUNTER — ANTI-COAG VISIT (OUTPATIENT)
Dept: INTERNAL MEDICINE CLINIC | Facility: CLINIC | Age: 23
End: 2018-03-15

## 2018-03-15 DIAGNOSIS — I82.C11 ACUTE EMBOLISM AND THROMBOSIS OF RIGHT INTERNAL JUGULAR VEIN (HCC): ICD-10-CM

## 2018-03-15 LAB — INR: 1 (ref 2–3)

## 2018-03-15 PROCEDURE — 99211 OFF/OP EST MAY X REQ PHY/QHP: CPT

## 2018-03-15 PROCEDURE — 85610 PROTHROMBIN TIME: CPT

## 2018-03-15 PROCEDURE — 36416 COLLJ CAPILLARY BLOOD SPEC: CPT

## 2018-03-15 PROCEDURE — 99212 OFFICE O/P EST SF 10 MIN: CPT

## 2018-03-19 ENCOUNTER — OFFICE VISIT (OUTPATIENT)
Dept: WOUND CARE | Facility: HOSPITAL | Age: 23
End: 2018-03-19
Attending: NURSE PRACTITIONER
Payer: MEDICAID

## 2018-03-19 DIAGNOSIS — L03.119 CELLULITIS OF ARM: ICD-10-CM

## 2018-03-19 DIAGNOSIS — S41.101A OPEN ARM WOUND, RIGHT, INITIAL ENCOUNTER: Primary | ICD-10-CM

## 2018-03-19 PROCEDURE — 97602 WOUND(S) CARE NON-SELECTIVE: CPT

## 2018-03-19 NOTE — PROGRESS NOTES
Subjective    Chief Complaint  This information was obtained from the patient  3/19/18 No complaints, pt reports she has been changing the dressings daily as recommended slight pain in the wound but a little better than before.      Allergies  vancomycin, a The periwound skin texture is normal. The periwound skin moisture is normal. The periwound skin color is normal. The temperature of the periwound skin is Warm. Periwound skin does not exhibit signs or symptoms of infection.  Local Pulse is Normal.        As Reduce wound area by 100%  Wound closure  Achieve wound closure to promote return to normal functional activities

## 2018-03-23 ENCOUNTER — ANTI-COAG VISIT (OUTPATIENT)
Dept: INTERNAL MEDICINE CLINIC | Facility: CLINIC | Age: 23
End: 2018-03-23

## 2018-03-23 DIAGNOSIS — I82.C11 ACUTE EMBOLISM AND THROMBOSIS OF RIGHT INTERNAL JUGULAR VEIN (HCC): ICD-10-CM

## 2018-03-23 LAB — INR: 2 (ref 2–3)

## 2018-03-23 PROCEDURE — 36416 COLLJ CAPILLARY BLOOD SPEC: CPT

## 2018-03-23 PROCEDURE — 85610 PROTHROMBIN TIME: CPT

## 2018-03-26 ENCOUNTER — APPOINTMENT (OUTPATIENT)
Dept: WOUND CARE | Facility: HOSPITAL | Age: 23
End: 2018-03-26
Payer: MEDICAID

## 2018-03-27 ENCOUNTER — OFFICE VISIT (OUTPATIENT)
Dept: INTERNAL MEDICINE CLINIC | Facility: CLINIC | Age: 23
End: 2018-03-27

## 2018-03-27 VITALS — HEIGHT: 62 IN | BODY MASS INDEX: 26.16 KG/M2 | WEIGHT: 142.13 LBS

## 2018-03-27 DIAGNOSIS — M79.89 ARM SWELLING: Primary | ICD-10-CM

## 2018-03-27 DIAGNOSIS — L02.414 ABSCESS OF LEFT ARM: ICD-10-CM

## 2018-03-27 PROCEDURE — 99213 OFFICE O/P EST LOW 20 MIN: CPT | Performed by: INTERNAL MEDICINE

## 2018-03-27 PROCEDURE — 99212 OFFICE O/P EST SF 10 MIN: CPT | Performed by: INTERNAL MEDICINE

## 2018-03-27 NOTE — PROGRESS NOTES
Sandoval Garcia is a 26year old female. Patient presents with:  Bruising: Loss of feeling, Bruising/Pain and Swelling on Back side of RT arm. Hx of Blood Clots. Lesion: Lesion on LT Arm which patient reports with brown discharge recently.      HPI:   [de-identified] 2009: TONSILLECTOMY  2013: UPPER GI ENDOSCOPY,EXAM   Social History:  Smoking status: Current Every Day Smoker                                                   Packs/day: 1.00      Years: 1.00         Types: Cigarettes     Last attempt to quit: 1/23/201

## 2018-03-30 ENCOUNTER — TELEPHONE (OUTPATIENT)
Dept: FAMILY MEDICINE CLINIC | Facility: CLINIC | Age: 23
End: 2018-03-30

## 2018-03-30 ENCOUNTER — ANTI-COAG VISIT (OUTPATIENT)
Dept: INTERNAL MEDICINE CLINIC | Facility: CLINIC | Age: 23
End: 2018-03-30

## 2018-03-30 DIAGNOSIS — I82.C11 ACUTE EMBOLISM AND THROMBOSIS OF RIGHT INTERNAL JUGULAR VEIN (HCC): ICD-10-CM

## 2018-03-30 NOTE — TELEPHONE ENCOUNTER
Pt mother is calling state that [employer] [place] [employer] is stopping pt coumadin  Mother is requesting to speak with Madeleine to explain

## 2018-04-02 ENCOUNTER — APPOINTMENT (OUTPATIENT)
Dept: WOUND CARE | Facility: HOSPITAL | Age: 23
End: 2018-04-02
Payer: MEDICAID

## 2018-04-16 ENCOUNTER — APPOINTMENT (OUTPATIENT)
Dept: WOUND CARE | Facility: HOSPITAL | Age: 23
End: 2018-04-16
Payer: MEDICAID

## 2018-04-23 ENCOUNTER — TELEPHONE (OUTPATIENT)
Dept: INTERNAL MEDICINE CLINIC | Facility: CLINIC | Age: 23
End: 2018-04-23

## 2018-04-23 ENCOUNTER — APPOINTMENT (OUTPATIENT)
Dept: WOUND CARE | Facility: HOSPITAL | Age: 23
End: 2018-04-23
Payer: MEDICAID

## 2018-04-26 ENCOUNTER — TELEPHONE (OUTPATIENT)
Dept: INTERNAL MEDICINE CLINIC | Facility: CLINIC | Age: 23
End: 2018-04-26

## 2018-04-26 ENCOUNTER — OFFICE VISIT (OUTPATIENT)
Dept: INTERNAL MEDICINE CLINIC | Facility: CLINIC | Age: 23
End: 2018-04-26

## 2018-04-26 VITALS
WEIGHT: 153 LBS | TEMPERATURE: 98 F | HEIGHT: 62 IN | DIASTOLIC BLOOD PRESSURE: 81 MMHG | HEART RATE: 81 BPM | SYSTOLIC BLOOD PRESSURE: 118 MMHG | BODY MASS INDEX: 28.16 KG/M2

## 2018-04-26 DIAGNOSIS — I82.B11 THROMBOSIS OF RIGHT SUBCLAVIAN VEIN (HCC): ICD-10-CM

## 2018-04-26 DIAGNOSIS — I82.612 SUPERFICIAL VENOUS THROMBOSIS OF ARM, LEFT: Primary | ICD-10-CM

## 2018-04-26 DIAGNOSIS — F11.90 HEROIN USE: ICD-10-CM

## 2018-04-26 DIAGNOSIS — I82.C11 THROMBOSIS OF RIGHT INTERNAL JUGULAR VEIN (HCC): ICD-10-CM

## 2018-04-26 PROCEDURE — 1111F DSCHRG MED/CURRENT MED MERGE: CPT | Performed by: INTERNAL MEDICINE

## 2018-04-26 PROCEDURE — 99214 OFFICE O/P EST MOD 30 MIN: CPT | Performed by: INTERNAL MEDICINE

## 2018-04-26 PROCEDURE — 99212 OFFICE O/P EST SF 10 MIN: CPT | Performed by: INTERNAL MEDICINE

## 2018-04-26 RX ORDER — WARFARIN SODIUM 5 MG/1
5 TABLET ORAL DAILY
Qty: 60 TABLET | Refills: 1 | Status: SHIPPED | OUTPATIENT
Start: 2018-04-26 | End: 2018-09-16

## 2018-04-27 ENCOUNTER — ANTI-COAG VISIT (OUTPATIENT)
Dept: INTERNAL MEDICINE CLINIC | Facility: CLINIC | Age: 23
End: 2018-04-27

## 2018-04-27 DIAGNOSIS — I82.B11 THROMBOSIS OF RIGHT SUBCLAVIAN VEIN (HCC): ICD-10-CM

## 2018-04-27 PROCEDURE — 36416 COLLJ CAPILLARY BLOOD SPEC: CPT

## 2018-04-27 PROCEDURE — 85610 PROTHROMBIN TIME: CPT

## 2018-04-30 ENCOUNTER — APPOINTMENT (OUTPATIENT)
Dept: WOUND CARE | Facility: HOSPITAL | Age: 23
End: 2018-04-30
Payer: MEDICAID

## 2018-05-04 ENCOUNTER — ANTI-COAG VISIT (OUTPATIENT)
Dept: INTERNAL MEDICINE CLINIC | Facility: CLINIC | Age: 23
End: 2018-05-04

## 2018-05-04 DIAGNOSIS — I82.B11 THROMBOSIS OF RIGHT SUBCLAVIAN VEIN (HCC): ICD-10-CM

## 2018-05-04 PROCEDURE — 85610 PROTHROMBIN TIME: CPT

## 2018-05-04 PROCEDURE — 36416 COLLJ CAPILLARY BLOOD SPEC: CPT

## 2018-05-11 ENCOUNTER — ANTI-COAG VISIT (OUTPATIENT)
Dept: INTERNAL MEDICINE CLINIC | Facility: CLINIC | Age: 23
End: 2018-05-11

## 2018-05-11 DIAGNOSIS — I82.B11 THROMBOSIS OF RIGHT SUBCLAVIAN VEIN (HCC): ICD-10-CM

## 2018-05-11 PROCEDURE — 36416 COLLJ CAPILLARY BLOOD SPEC: CPT

## 2018-05-11 PROCEDURE — 85610 PROTHROMBIN TIME: CPT

## 2018-05-29 ENCOUNTER — ANTI-COAG VISIT (OUTPATIENT)
Dept: INTERNAL MEDICINE CLINIC | Facility: CLINIC | Age: 23
End: 2018-05-29

## 2018-05-29 DIAGNOSIS — I82.B11 THROMBOSIS OF RIGHT SUBCLAVIAN VEIN (HCC): ICD-10-CM

## 2018-05-29 PROCEDURE — 99211 OFF/OP EST MAY X REQ PHY/QHP: CPT

## 2018-05-29 PROCEDURE — 36416 COLLJ CAPILLARY BLOOD SPEC: CPT

## 2018-05-29 PROCEDURE — 85610 PROTHROMBIN TIME: CPT

## 2018-06-18 ENCOUNTER — ANTI-COAG VISIT (OUTPATIENT)
Dept: INTERNAL MEDICINE CLINIC | Facility: CLINIC | Age: 23
End: 2018-06-18

## 2018-06-18 DIAGNOSIS — I82.B11 THROMBOSIS OF RIGHT SUBCLAVIAN VEIN (HCC): ICD-10-CM

## 2018-06-18 PROCEDURE — 99212 OFFICE O/P EST SF 10 MIN: CPT

## 2018-06-18 PROCEDURE — 85610 PROTHROMBIN TIME: CPT

## 2018-06-18 PROCEDURE — 36416 COLLJ CAPILLARY BLOOD SPEC: CPT

## 2018-06-18 PROCEDURE — 99211 OFF/OP EST MAY X REQ PHY/QHP: CPT

## 2018-07-02 ENCOUNTER — ANTI-COAG VISIT (OUTPATIENT)
Dept: INTERNAL MEDICINE CLINIC | Facility: CLINIC | Age: 23
End: 2018-07-02

## 2018-07-02 ENCOUNTER — OFFICE VISIT (OUTPATIENT)
Dept: OBGYN CLINIC | Facility: CLINIC | Age: 23
End: 2018-07-02

## 2018-07-02 VITALS
HEIGHT: 62.7 IN | DIASTOLIC BLOOD PRESSURE: 69 MMHG | SYSTOLIC BLOOD PRESSURE: 101 MMHG | WEIGHT: 150 LBS | BODY MASS INDEX: 26.91 KG/M2 | HEART RATE: 84 BPM

## 2018-07-02 DIAGNOSIS — I82.B11 THROMBOSIS OF RIGHT SUBCLAVIAN VEIN (HCC): ICD-10-CM

## 2018-07-02 DIAGNOSIS — Z12.4 SCREENING FOR MALIGNANT NEOPLASM OF CERVIX: ICD-10-CM

## 2018-07-02 DIAGNOSIS — Z01.419 ENCOUNTER FOR GYNECOLOGICAL EXAMINATION WITHOUT ABNORMAL FINDING: Primary | ICD-10-CM

## 2018-07-02 DIAGNOSIS — Z72.51 HIGH RISK HETEROSEXUAL BEHAVIOR: ICD-10-CM

## 2018-07-02 DIAGNOSIS — Z11.3 SCREENING EXAMINATION FOR VENEREAL DISEASE: ICD-10-CM

## 2018-07-02 LAB — INR: 3 (ref 2–3)

## 2018-07-02 PROCEDURE — 99395 PREV VISIT EST AGE 18-39: CPT | Performed by: OBSTETRICS & GYNECOLOGY

## 2018-07-02 PROCEDURE — 36416 COLLJ CAPILLARY BLOOD SPEC: CPT

## 2018-07-02 PROCEDURE — 85610 PROTHROMBIN TIME: CPT

## 2018-07-03 LAB
C TRACH DNA SPEC QL NAA+PROBE: NEGATIVE
N GONORRHOEA DNA SPEC QL NAA+PROBE: NEGATIVE

## 2018-07-06 LAB — T VAGINALIS RRNA SPEC QL NAA+PROBE: NEGATIVE

## 2018-07-12 ENCOUNTER — TELEPHONE (OUTPATIENT)
Dept: OBGYN CLINIC | Facility: CLINIC | Age: 23
End: 2018-07-12

## 2018-07-13 NOTE — TELEPHONE ENCOUNTER
Informed pt that SCOTTIE stated that her STD cervical screen is negative, but pap shows a mild/low grade result. Informed pt with guidelines, she is to repeat exam and pap in one year since she is under 22years old.  Informed pt that she still needs to get the

## 2018-07-13 NOTE — TELEPHONE ENCOUNTER
Please let [facility] know that STD cervical screen is negative but pap shows a mild / Low Grade result. With current guidelines, she is to repeat exam and pap in 1 year since she is under 23 y/o. She still needs to get the blood STD screen.  She has difficul

## 2018-08-07 ENCOUNTER — ANTI-COAG VISIT (OUTPATIENT)
Dept: INTERNAL MEDICINE CLINIC | Facility: CLINIC | Age: 23
End: 2018-08-07
Payer: MEDICAID

## 2018-08-07 DIAGNOSIS — I82.B11 THROMBOSIS OF RIGHT SUBCLAVIAN VEIN (HCC): ICD-10-CM

## 2018-08-07 LAB — INR: 1.5 (ref 2–3)

## 2018-08-07 PROCEDURE — 85610 PROTHROMBIN TIME: CPT

## 2018-08-07 PROCEDURE — 36416 COLLJ CAPILLARY BLOOD SPEC: CPT

## 2018-08-23 ENCOUNTER — OFFICE VISIT (OUTPATIENT)
Dept: INTERNAL MEDICINE CLINIC | Facility: CLINIC | Age: 23
End: 2018-08-23
Payer: MEDICAID

## 2018-08-23 ENCOUNTER — ANTI-COAG VISIT (OUTPATIENT)
Dept: INTERNAL MEDICINE CLINIC | Facility: CLINIC | Age: 23
End: 2018-08-23
Payer: MEDICAID

## 2018-08-23 VITALS
SYSTOLIC BLOOD PRESSURE: 100 MMHG | HEART RATE: 69 BPM | RESPIRATION RATE: 16 BRPM | WEIGHT: 151 LBS | HEIGHT: 62 IN | DIASTOLIC BLOOD PRESSURE: 68 MMHG | BODY MASS INDEX: 27.79 KG/M2

## 2018-08-23 DIAGNOSIS — I82.B11 THROMBOSIS OF RIGHT SUBCLAVIAN VEIN (HCC): ICD-10-CM

## 2018-08-23 DIAGNOSIS — F41.9 ANXIETY: ICD-10-CM

## 2018-08-23 DIAGNOSIS — F32.9 REACTIVE DEPRESSION: ICD-10-CM

## 2018-08-23 DIAGNOSIS — L97.121 SKIN ULCER OF LEFT THIGH, LIMITED TO BREAKDOWN OF SKIN (HCC): Primary | ICD-10-CM

## 2018-08-23 LAB — INR: 1.2 (ref 2–3)

## 2018-08-23 PROCEDURE — 99212 OFFICE O/P EST SF 10 MIN: CPT | Performed by: INTERNAL MEDICINE

## 2018-08-23 PROCEDURE — 85610 PROTHROMBIN TIME: CPT

## 2018-08-23 PROCEDURE — 36416 COLLJ CAPILLARY BLOOD SPEC: CPT

## 2018-08-23 PROCEDURE — 99213 OFFICE O/P EST LOW 20 MIN: CPT | Performed by: INTERNAL MEDICINE

## 2018-08-23 NOTE — PROGRESS NOTES
Tori Vanegas is a 26year old female.   Patient presents with:  Lesion: left thigh      HPI:   Pt comes for a f/y  C/c thigh wound   C/o  Thigh wound one mn or so --had taken antibiotics that she had left over but it isnt healing all the way   She states METHADONE HCL OR Take 110 mg by mouth daily.    Disp:  Rfl:       Past Medical History:   Diagnosis Date   • Bacterial endocarditis March 2017    SBE pulmonic valve secondary to MRSA treated with daptomycin ×42 days   • Bipolar disorder [facility])    • History this visit:    Skin ulcer of left thigh, limited to breakdown of skin ([address]  -     [facility] WOUND ELENA   Appears to be healing well , as her previous wound needed debridement, will hold off on abx   Where to go to ER if any fevers or her wound gets worse any bl

## 2018-08-23 NOTE — PATIENT INSTRUCTIONS
Treating Anxiety Disorders with Therapy    If you have an anxiety disorder, you don’t have to suffer anymore. Treatment is available. Therapy (also called counseling) is often a helpful treatment for anxiety disorders.  With therapy, a specially trained piero Therapy will help you feel better and teach you skills to help manage anxiety long term. But change doesn’t happen right away. It takes a commitment from you. And treatment only works if you learn to face the causes of your anxiety.  So, you might feel wors © 9995-9685 [employer] [address]. [address] [facility], [facility] [place]. All rights reserved. This information is not intended as a substitute for professional medical care. Always follow your healthcare professional's instructions.

## 2018-08-30 ENCOUNTER — TELEPHONE (OUTPATIENT)
Dept: OBGYN CLINIC | Facility: CLINIC | Age: 23
End: 2018-08-30

## 2018-08-30 ENCOUNTER — OFFICE VISIT (OUTPATIENT)
Dept: WOUND CARE | Facility: HOSPITAL | Age: 23
End: 2018-08-30
Attending: NURSE PRACTITIONER
Payer: MEDICAID

## 2018-08-30 DIAGNOSIS — L97.121 NON-PRESSURE CHRONIC ULCER OF LEFT THIGH, LIMITED TO BREAKDOWN OF SKIN (HCC): Primary | ICD-10-CM

## 2018-08-30 PROCEDURE — 97161 PT EVAL LOW COMPLEX 20 MIN: CPT

## 2018-08-30 PROCEDURE — 97597 DBRDMT OPN WND 1ST 20 CM/<: CPT

## 2018-08-30 NOTE — PROGRESS NOTES
Subjective    Chief Complaint  This information was obtained from the patient  The patient is new to the [address] [facility] here for an initial visit for the evaluation and management of non-healing wound(s).  L thigh wound    Allergies  vancomycin, amoxicillin This information was obtained from the patient  Patient has a surgical history of:   Tonsillectomy (2009)  colonoscopy (2013, 2017)  upper GI endoscopy    Medications  methadone 5 mg tablet oral tablet oral  warfarin 5 mg tablet oral tablet oral        Obje The wound has slight depth, but some granulation tissue after top layer of eschar was removed. Educated pt on moist dressings and issued supplies for the next week.   Pt declined supplies to be ordered through insurance, states they will \"manage\" with  Treatment Notes Summary  Wound #2 (Left, Anterior Thigh)  . Wound Treatment Note  Cleansed wound and periwound with non-cytotoxic agent.  using 0.9% normal saline (1)  Applied topical agent to base of wound bed. using Silver hydrogel (1)  Applied Primary Wou Electronically signed by therapist: Zach Bright PT    Physician's certification required: Yes    Certification From:  8/30/2018  To: 11/28/2018  Tiana Nagel [id number]

## 2018-08-30 NOTE — TELEPHONE ENCOUNTER
SPOKE WITH MOM AND THEN SHE BROUGHT PT TO THE PHONE AND I SPOKE WITH BOTH OF THEM ON SPEAKER. HAS BEEN ON COUMADIN FOR 1 YEAR. PERIODS USUALLY LAST 3-4 DAYS WITH THE LAST DAY BEING LIGHT.   PERIOD BEGAN 4 DAYS AGO AND HAS BEEN SOAKING PAD Q 1-2 HOURS BUT

## 2018-08-30 NOTE — TELEPHONE ENCOUNTER
MESSAGE REVIEWED WITH SCOTTIE OVER THE PHONE.  DUE TO H/O SUBCLAVIAN CLOT HE CANNOT PRESCRIBE ANYTHING HORMONAL TO STOP THE BLEEDING SO RECOMMENDS PT GO TO THE ER FOR AN EVAL.   I CALLED AND NOTIFIED PT'S MOTHER, SAM AND ALSO REVIEWED SIGNS AND SYMPTOMS AN

## 2018-09-07 ENCOUNTER — TELEPHONE (OUTPATIENT)
Dept: INTERNAL MEDICINE CLINIC | Facility: CLINIC | Age: 23
End: 2018-09-07

## 2018-09-07 NOTE — TELEPHONE ENCOUNTER
Patient scheduled today at 2:30 and needs to reschedule. Patient looking for something next week but schedule is on hold. Please call and reschedule.

## 2018-09-12 ENCOUNTER — APPOINTMENT (OUTPATIENT)
Dept: WOUND CARE | Facility: HOSPITAL | Age: 23
End: 2018-09-12
Attending: NURSE PRACTITIONER
Payer: MEDICAID

## 2018-09-16 DIAGNOSIS — I82.B11 THROMBOSIS OF RIGHT SUBCLAVIAN VEIN (HCC): ICD-10-CM

## 2018-09-17 RX ORDER — WARFARIN SODIUM 5 MG/1
TABLET ORAL
Qty: 60 TABLET | Refills: 0 | Status: SHIPPED | OUTPATIENT
Start: 2018-09-17 | End: 2018-12-12

## 2018-09-17 NOTE — TELEPHONE ENCOUNTER
Requested Prescriptions     Pending Prescriptions Disp Refills   • WARFARIN SODIUM 5 MG Oral Tab [Pharmacy Med Name: WARFARIN SOD 5MG TABLETS (PEACH)] 60 tablet 0     Sig: TAKE 1 TABLET(5 MG) BY MOUTH DAILY       Last Office Visit with PCP: 8/23/2018  Last

## 2018-09-19 ENCOUNTER — ANTI-COAG VISIT (OUTPATIENT)
Dept: INTERNAL MEDICINE CLINIC | Facility: CLINIC | Age: 23
End: 2018-09-19
Payer: MEDICAID

## 2018-09-19 DIAGNOSIS — I82.B11 THROMBOSIS OF RIGHT SUBCLAVIAN VEIN (HCC): ICD-10-CM

## 2018-09-19 LAB — INR: 5.3 (ref 2–3)

## 2018-09-19 PROCEDURE — 85610 PROTHROMBIN TIME: CPT

## 2018-09-19 PROCEDURE — 99211 OFF/OP EST MAY X REQ PHY/QHP: CPT | Performed by: INTERNAL MEDICINE

## 2018-09-19 PROCEDURE — 36416 COLLJ CAPILLARY BLOOD SPEC: CPT

## 2018-09-19 PROCEDURE — 93793 ANTICOAG MGMT PT WARFARIN: CPT

## 2018-09-26 ENCOUNTER — APPOINTMENT (OUTPATIENT)
Dept: WOUND CARE | Facility: HOSPITAL | Age: 23
End: 2018-09-26
Attending: NURSE PRACTITIONER
Payer: MEDICAID

## 2018-09-27 ENCOUNTER — OFFICE VISIT (OUTPATIENT)
Dept: OBGYN CLINIC | Facility: CLINIC | Age: 23
End: 2018-09-27
Payer: MEDICAID

## 2018-09-27 VITALS
SYSTOLIC BLOOD PRESSURE: 93 MMHG | BODY MASS INDEX: 28 KG/M2 | HEART RATE: 67 BPM | DIASTOLIC BLOOD PRESSURE: 62 MMHG | WEIGHT: 151 LBS

## 2018-09-27 DIAGNOSIS — T19.2XXA RETAINED TAMPON, INITIAL ENCOUNTER: Primary | ICD-10-CM

## 2018-09-27 PROCEDURE — 99213 OFFICE O/P EST LOW 20 MIN: CPT | Performed by: CLINICAL NURSE SPECIALIST

## 2018-09-27 RX ORDER — CLINDAMYCIN HYDROCHLORIDE 300 MG/1
300 CAPSULE ORAL 3 TIMES DAILY
Qty: 21 CAPSULE | Refills: 0 | Status: SHIPPED | OUTPATIENT
Start: 2018-09-27 | End: 2018-10-04

## 2018-09-27 NOTE — PROGRESS NOTES
Lei Stern is a 26year old female [place] [address] Patient's last menstrual period was 08/27/2018. Patient presents with:  Gyn Problem: discharge, foul smell  Here with mom. C/o vaginal odor and discharge for the last month.  Discharge is sometimes red, someti activity: Yes        Partners: Male        Birth control/protection: Condom    Other Topics      Concerns:         Service: Not Asked        Blood Transfusions: Not Asked        Caffeine Concern: No           2 cans Cola per day        Occupational normal without masses or tenderness  Perineum: normal  Anus: no hemorroids   Lymph node: no inguinal lymph nodes    Assessment & Plan:  [facility] was seen today for gyn problem.     Diagnoses and all orders for this visit:    Retained tampon, initial encount

## 2018-10-05 ENCOUNTER — ANTI-COAG VISIT (OUTPATIENT)
Dept: INTERNAL MEDICINE CLINIC | Facility: CLINIC | Age: 23
End: 2018-10-05
Payer: MEDICAID

## 2018-10-05 DIAGNOSIS — Z51.81 ENCOUNTER FOR THERAPEUTIC DRUG MONITORING: Primary | ICD-10-CM

## 2018-10-05 DIAGNOSIS — I82.B11 THROMBOSIS OF RIGHT SUBCLAVIAN VEIN (HCC): ICD-10-CM

## 2018-10-05 DIAGNOSIS — Z79.01 LONG TERM (CURRENT) USE OF ANTICOAGULANTS: ICD-10-CM

## 2018-10-05 PROCEDURE — 99211 OFF/OP EST MAY X REQ PHY/QHP: CPT

## 2018-10-05 PROCEDURE — 36416 COLLJ CAPILLARY BLOOD SPEC: CPT

## 2018-10-05 PROCEDURE — 99212 OFFICE O/P EST SF 10 MIN: CPT

## 2018-10-05 PROCEDURE — 85610 PROTHROMBIN TIME: CPT

## 2018-10-10 ENCOUNTER — APPOINTMENT (OUTPATIENT)
Dept: WOUND CARE | Facility: HOSPITAL | Age: 23
End: 2018-10-10
Attending: NURSE PRACTITIONER
Payer: MEDICAID

## 2018-11-01 ENCOUNTER — ANTI-COAG VISIT (OUTPATIENT)
Dept: INTERNAL MEDICINE CLINIC | Facility: CLINIC | Age: 23
End: 2018-11-01
Payer: MEDICAID

## 2018-11-01 DIAGNOSIS — I82.B11 THROMBOSIS OF RIGHT SUBCLAVIAN VEIN (HCC): ICD-10-CM

## 2018-11-01 DIAGNOSIS — Z51.81 ENCOUNTER FOR THERAPEUTIC DRUG MONITORING: Primary | ICD-10-CM

## 2018-11-01 DIAGNOSIS — Z79.01 LONG TERM (CURRENT) USE OF ANTICOAGULANTS: ICD-10-CM

## 2018-11-01 PROCEDURE — 36416 COLLJ CAPILLARY BLOOD SPEC: CPT

## 2018-11-01 PROCEDURE — 85610 PROTHROMBIN TIME: CPT

## 2018-11-23 ENCOUNTER — OFFICE VISIT (OUTPATIENT)
Dept: DERMATOLOGY CLINIC | Facility: CLINIC | Age: 23
End: 2018-11-23
Payer: MEDICAID

## 2018-11-23 DIAGNOSIS — L70.0 ACNE VULGARIS: Primary | ICD-10-CM

## 2018-11-23 PROCEDURE — 99212 OFFICE O/P EST SF 10 MIN: CPT | Performed by: DERMATOLOGY

## 2018-11-23 PROCEDURE — 99202 OFFICE O/P NEW SF 15 MIN: CPT | Performed by: DERMATOLOGY

## 2018-11-23 RX ORDER — SPIRONOLACTONE 25 MG/1
25 TABLET ORAL DAILY
Qty: 60 TABLET | Refills: 3 | Status: SHIPPED | OUTPATIENT
Start: 2018-11-23 | End: 2019-02-13

## 2018-11-23 RX ORDER — DOXYCYCLINE HYCLATE 100 MG/1
CAPSULE ORAL
Qty: 60 CAPSULE | Refills: 6 | Status: SHIPPED | OUTPATIENT
Start: 2018-11-23 | End: 2018-12-12

## 2018-11-23 RX ORDER — TEMAZEPAM 15 MG/1
CAPSULE ORAL
Refills: 0 | COMMUNITY
Start: 2018-10-04 | End: 2018-12-12 | Stop reason: ALTCHOICE

## 2018-11-23 RX ORDER — CITALOPRAM 20 MG/1
TABLET ORAL
Refills: 0 | COMMUNITY
Start: 2018-10-04 | End: 2019-05-28

## 2018-11-29 ENCOUNTER — ANTI-COAG VISIT (OUTPATIENT)
Dept: INTERNAL MEDICINE CLINIC | Facility: CLINIC | Age: 23
End: 2018-11-29
Payer: MEDICAID

## 2018-11-29 DIAGNOSIS — I82.B11 THROMBOSIS OF RIGHT SUBCLAVIAN VEIN (HCC): ICD-10-CM

## 2018-11-29 PROCEDURE — 93793 ANTICOAG MGMT PT WARFARIN: CPT | Performed by: INTERNAL MEDICINE

## 2018-12-03 NOTE — PROGRESS NOTES
Tamir Muniz is a 26year old female. Patient presents with:  Acne: New pt presenting with acne to face, ears, neck, and back. Pt states became worse after starting after starting antidepressant aprox. c/o pain 4/10 and boil -like lesions.  2 months p night Disp: 20 g Rfl: 3   WARFARIN SODIUM 5 MG Oral Tab TAKE 1 TABLET(5 MG) BY MOUTH DAILY Disp: 60 tablet Rfl: 0   METHADONE HCL OR Take 110 mg by mouth daily.    Disp:  Rfl:    Citalopram Hydrobromide 20 MG Oral Tab TK 1 T PO  QAM Disp:  Rfl: 0   temazepa Types: Heroin, \"Crack\" cocaine      Sexual activity: Yes        Partners: Male        Birth control/protection: Condom    Other Topics      Concerns:         Service: Not Asked        Blood Transfusions: Not Asked        Caffeine Concern:  No nourished,oriented to person, place, time, and situation,in no apparent distress  HEENT: atraumatic, normocephalic  NECK: supple,no adenopathy,  EXTREMITIES: no cyanosis, clubbing or edema    SKIN:  multiple inflammatory papules,   nodules  , prominent atr Prescriptions Disp Refills   • Doxycycline Hyclate 100 MG Oral Cap 60 capsule 6     Sig: Take po bid   • spironolactone 25 MG Oral Tab 60 tablet 3     Sig: Take 1 tablet (25 mg total) by mouth daily.    • Tretinoin 0.05 % External Cream 20 g 3     Sig: Appl

## 2018-12-06 ENCOUNTER — NURSE TRIAGE (OUTPATIENT)
Dept: OTHER | Age: 23
End: 2018-12-06

## 2018-12-06 NOTE — TELEPHONE ENCOUNTER
Spoke to pts mother relayed Dr. Alexis Bell message as shown below. Pts mother verbalized understanding. States will be calling back to schedule appt if not will take pt to [facility] as advised. -awaiting call back at this time.

## 2018-12-06 NOTE — TELEPHONE ENCOUNTER
Action Requested: Summary for Provider     []  Critical Lab, Recommendations Needed  [] Need Additional Advice  []   FYI    []   Need Orders  [] Need Medications Sent to Pharmacy  []  Other     SUMMARY: Pt mother asking for meds to pharmacy for possible \"

## 2018-12-06 NOTE — TELEPHONE ENCOUNTER
Pt would need to be evaluated - if not avail today then WIC/[facility]/First avail MD   Advised warm compresses until eval

## 2018-12-12 ENCOUNTER — OFFICE VISIT (OUTPATIENT)
Dept: INTERNAL MEDICINE CLINIC | Facility: CLINIC | Age: 23
End: 2018-12-12
Payer: MEDICAID

## 2018-12-12 VITALS
SYSTOLIC BLOOD PRESSURE: 95 MMHG | HEART RATE: 71 BPM | DIASTOLIC BLOOD PRESSURE: 66 MMHG | WEIGHT: 162 LBS | TEMPERATURE: 99 F | RESPIRATION RATE: 17 BRPM | BODY MASS INDEX: 30 KG/M2

## 2018-12-12 DIAGNOSIS — H00.011 HORDEOLUM EXTERNUM OF RIGHT UPPER EYELID: Primary | ICD-10-CM

## 2018-12-12 DIAGNOSIS — I82.B11 THROMBOSIS OF RIGHT SUBCLAVIAN VEIN (HCC): ICD-10-CM

## 2018-12-12 PROCEDURE — 99213 OFFICE O/P EST LOW 20 MIN: CPT | Performed by: INTERNAL MEDICINE

## 2018-12-12 PROCEDURE — 99212 OFFICE O/P EST SF 10 MIN: CPT | Performed by: INTERNAL MEDICINE

## 2018-12-12 RX ORDER — WARFARIN SODIUM 5 MG/1
TABLET ORAL
Qty: 60 TABLET | Refills: 0 | Status: SHIPPED | OUTPATIENT
Start: 2018-12-12 | End: 2019-02-27

## 2018-12-12 NOTE — PROGRESS NOTES
Ambrocio Soler is a 26year old female.   Patient presents with:  Eye Problem: right eye lump      HPI:   Pt comes as an urgent visit   C/c bump on the right upper eye lid   C/o bump on the right eyelid x  1 1/2 weeks --usually black and blue and it was it Hydrobromide 20 MG Oral Tab TK 1 T PO  QAM Disp:  Rfl: 0   spironolactone 25 MG Oral Tab Take 1 tablet (25 mg total) by mouth daily.  Disp: 60 tablet Rfl: 3   Tretinoin 0.05 % External Cream Apply to the entire face as directed at bedtime every other night 95/66 (BP Location: Left arm, Patient Position: Sitting, Cuff Size: adult)   Pulse 71   Temp 98.6 °F (37 °C) (Oral)   Resp 17   Wt 162 lb (73.5 kg)   BMI 29.63 kg/m²   GENERAL: well developed, well nourished,in no apparent distress  SKIN: no rashes,no susp

## 2018-12-31 ENCOUNTER — ANTI-COAG VISIT (OUTPATIENT)
Dept: INTERNAL MEDICINE CLINIC | Facility: CLINIC | Age: 23
End: 2018-12-31
Payer: MEDICAID

## 2018-12-31 DIAGNOSIS — Z79.01 LONG TERM (CURRENT) USE OF ANTICOAGULANTS: ICD-10-CM

## 2018-12-31 DIAGNOSIS — I82.B11 THROMBOSIS OF RIGHT SUBCLAVIAN VEIN (HCC): ICD-10-CM

## 2018-12-31 DIAGNOSIS — Z51.81 ENCOUNTER FOR THERAPEUTIC DRUG MONITORING: ICD-10-CM

## 2018-12-31 PROCEDURE — 85610 PROTHROMBIN TIME: CPT

## 2018-12-31 PROCEDURE — 93793 ANTICOAG MGMT PT WARFARIN: CPT

## 2018-12-31 PROCEDURE — 36416 COLLJ CAPILLARY BLOOD SPEC: CPT

## 2019-01-11 ENCOUNTER — OFFICE VISIT (OUTPATIENT)
Dept: DERMATOLOGY CLINIC | Facility: CLINIC | Age: 24
End: 2019-01-11
Payer: MEDICAID

## 2019-01-11 DIAGNOSIS — L70.0 ACNE VULGARIS: Primary | ICD-10-CM

## 2019-01-11 PROCEDURE — 99212 OFFICE O/P EST SF 10 MIN: CPT | Performed by: DERMATOLOGY

## 2019-01-11 PROCEDURE — 99213 OFFICE O/P EST LOW 20 MIN: CPT | Performed by: DERMATOLOGY

## 2019-01-11 RX ORDER — SPIRONOLACTONE 100 MG/1
100 TABLET, FILM COATED ORAL DAILY
Qty: 30 TABLET | Refills: 3 | Status: SHIPPED | OUTPATIENT
Start: 2019-01-11 | End: 2019-02-13

## 2019-01-11 RX ORDER — DOXYCYCLINE HYCLATE 100 MG/1
CAPSULE ORAL
Qty: 60 CAPSULE | Refills: 6 | Status: SHIPPED | OUTPATIENT
Start: 2019-01-11 | End: 2019-02-13

## 2019-01-11 RX ORDER — TRETINOIN 0.025 %
GEL (GRAM) TOPICAL
Qty: 20 G | Refills: 3 | Status: SHIPPED | OUTPATIENT
Start: 2019-01-11 | End: 2019-04-08

## 2019-01-11 RX ORDER — CLINDAMYCIN PHOSPHATE 10 MG/G
1 GEL TOPICAL DAILY
Qty: 60 G | Refills: 5 | Status: SHIPPED | OUTPATIENT
Start: 2019-01-11 | End: 2019-02-10

## 2019-01-14 ENCOUNTER — ANTI-COAG VISIT (OUTPATIENT)
Dept: INTERNAL MEDICINE CLINIC | Facility: CLINIC | Age: 24
End: 2019-01-14
Payer: MEDICAID

## 2019-01-14 DIAGNOSIS — Z51.81 ENCOUNTER FOR THERAPEUTIC DRUG MONITORING: ICD-10-CM

## 2019-01-14 DIAGNOSIS — I82.B11 THROMBOSIS OF RIGHT SUBCLAVIAN VEIN (HCC): ICD-10-CM

## 2019-01-14 DIAGNOSIS — Z79.01 LONG TERM (CURRENT) USE OF ANTICOAGULANTS: ICD-10-CM

## 2019-01-14 LAB — INR: 1.9 (ref 2–3)

## 2019-01-14 PROCEDURE — 36416 COLLJ CAPILLARY BLOOD SPEC: CPT

## 2019-01-14 PROCEDURE — 93793 ANTICOAG MGMT PT WARFARIN: CPT

## 2019-01-14 PROCEDURE — 85610 PROTHROMBIN TIME: CPT

## 2019-01-21 NOTE — PROGRESS NOTES
Viktor Braxton is a 27year old female. Patient presents with:  Acne: LOV 11/23/2018 Patient present today with f/u on acne. Patient currently not using any meds that was prescribed to her             Vancomycin;  Amoxicillin    Current Outpatient Medic Oral Tab Take 1 tablet (100 mg total) by mouth daily. Disp: 30 tablet Rfl: 3   Clindamycin Phosphate 1 % External Gel Apply 1 Application topically daily for 30 doses. Apply thin film to the affected area(s).  Disp: 60 g Rfl: 5   benzoyl peroxide 5 % Extern file      Food insecurity - worry: Not on file      Food insecurity - inability: Not on file      Transportation needs - medical: Not on file      Transportation needs - non-medical: Not on file    Occupational History      Occupation: Unemployed    Ana Maria Ha prescribed to her     Patient states she did fill initial prescriptions use these for a while. No clear exactly how long she is the topicals.   Had noted some improvement with the spinal lactone no side effects discontinue this was taking it once daily sti menses. May need to titrate up dose. Discussed over-the-counter products patient not using any of these consistently as well. Does have severe inflammatory lesions, scarring. Await response  Add doxycycline. Will continue on this.   Interaction with wa Hours: No results found for this or any previous visit (from the past 48 hour(s)). Meds This Visit:      Imaging Orders:  None     Referral Orders:  No orders of the defined types were placed in this encounter.         The patient indicates understandin

## 2019-02-12 ENCOUNTER — ANTI-COAG VISIT (OUTPATIENT)
Dept: INTERNAL MEDICINE CLINIC | Facility: CLINIC | Age: 24
End: 2019-02-12
Payer: MEDICAID

## 2019-02-12 DIAGNOSIS — Z79.01 LONG TERM (CURRENT) USE OF ANTICOAGULANTS: ICD-10-CM

## 2019-02-12 DIAGNOSIS — I82.B11 THROMBOSIS OF RIGHT SUBCLAVIAN VEIN (HCC): ICD-10-CM

## 2019-02-12 DIAGNOSIS — Z51.81 ENCOUNTER FOR THERAPEUTIC DRUG MONITORING: ICD-10-CM

## 2019-02-12 LAB — INR: 1.9 (ref 2–3)

## 2019-02-12 PROCEDURE — 93793 ANTICOAG MGMT PT WARFARIN: CPT

## 2019-02-12 PROCEDURE — 85610 PROTHROMBIN TIME: CPT

## 2019-02-12 PROCEDURE — 36416 COLLJ CAPILLARY BLOOD SPEC: CPT

## 2019-02-13 ENCOUNTER — OFFICE VISIT (OUTPATIENT)
Dept: INTERNAL MEDICINE CLINIC | Facility: CLINIC | Age: 24
End: 2019-02-13
Payer: MEDICAID

## 2019-02-13 ENCOUNTER — LAB ENCOUNTER (OUTPATIENT)
Dept: LAB | Age: 24
End: 2019-02-13
Attending: INTERNAL MEDICINE
Payer: MEDICAID

## 2019-02-13 VITALS
HEIGHT: 62 IN | BODY MASS INDEX: 30.36 KG/M2 | DIASTOLIC BLOOD PRESSURE: 74 MMHG | HEART RATE: 75 BPM | WEIGHT: 165 LBS | SYSTOLIC BLOOD PRESSURE: 105 MMHG | TEMPERATURE: 98 F

## 2019-02-13 DIAGNOSIS — G43.709 CHRONIC MIGRAINE WITHOUT AURA WITHOUT STATUS MIGRAINOSUS, NOT INTRACTABLE: ICD-10-CM

## 2019-02-13 DIAGNOSIS — Z11.3 SCREENING EXAMINATION FOR VENEREAL DISEASE: ICD-10-CM

## 2019-02-13 DIAGNOSIS — G43.709 CHRONIC MIGRAINE WITHOUT AURA WITHOUT STATUS MIGRAINOSUS, NOT INTRACTABLE: Primary | ICD-10-CM

## 2019-02-13 LAB
ALBUMIN SERPL-MCNC: 4.1 G/DL (ref 3.4–5)
ALBUMIN/GLOB SERPL: 0.9 {RATIO} (ref 1–2)
ALP LIVER SERPL-CCNC: 143 U/L (ref 52–144)
ALT SERPL-CCNC: 25 U/L (ref 13–56)
ANION GAP SERPL CALC-SCNC: 12 MMOL/L (ref 0–18)
AST SERPL-CCNC: 21 U/L (ref 15–37)
BASOPHILS # BLD AUTO: 0.09 X10(3) UL (ref 0–0.2)
BASOPHILS NFR BLD AUTO: 1.1 %
BILIRUB SERPL-MCNC: 0.5 MG/DL (ref 0.1–2)
BUN BLD-MCNC: 8 MG/DL (ref 7–18)
BUN/CREAT SERPL: 8.9 (ref 10–20)
CALCIUM BLD-MCNC: 9.1 MG/DL (ref 8.5–10.1)
CHLORIDE SERPL-SCNC: 97 MMOL/L (ref 98–107)
CO2 SERPL-SCNC: 29 MMOL/L (ref 21–32)
CREAT BLD-MCNC: 0.9 MG/DL (ref 0.55–1.02)
DEPRECATED RDW RBC AUTO: 41.7 FL (ref 35.1–46.3)
EOSINOPHIL # BLD AUTO: 0.19 X10(3) UL (ref 0–0.7)
EOSINOPHIL NFR BLD AUTO: 2.4 %
ERYTHROCYTE [DISTWIDTH] IN BLOOD BY AUTOMATED COUNT: 13.1 % (ref 11–15)
GLOBULIN PLAS-MCNC: 4.6 G/DL (ref 2.8–4.4)
GLUCOSE BLD-MCNC: 82 MG/DL (ref 70–99)
HCT VFR BLD AUTO: 41.3 % (ref 35–48)
HGB BLD-MCNC: 13.7 G/DL (ref 12–16)
IMM GRANULOCYTES # BLD AUTO: 0.04 X10(3) UL (ref 0–1)
IMM GRANULOCYTES NFR BLD: 0.5 %
LYMPHOCYTES # BLD AUTO: 2.93 X10(3) UL (ref 1–4)
LYMPHOCYTES NFR BLD AUTO: 37.1 %
M PROTEIN MFR SERPL ELPH: 8.7 G/DL (ref 6.4–8.2)
MCH RBC QN AUTO: 28.9 PG (ref 26–34)
MCHC RBC AUTO-ENTMCNC: 33.2 G/DL (ref 31–37)
MCV RBC AUTO: 87.1 FL (ref 80–100)
MONOCYTES # BLD AUTO: 0.47 X10(3) UL (ref 0.1–1)
MONOCYTES NFR BLD AUTO: 5.9 %
NEUTROPHILS # BLD AUTO: 4.18 X10 (3) UL (ref 1.5–7.7)
NEUTROPHILS # BLD AUTO: 4.18 X10(3) UL (ref 1.5–7.7)
NEUTROPHILS NFR BLD AUTO: 53 %
OSMOLALITY SERPL CALC.SUM OF ELEC: 283 MOSM/KG (ref 275–295)
PLATELET # BLD AUTO: 336 10(3)UL (ref 150–450)
POTASSIUM SERPL-SCNC: 4 MMOL/L (ref 3.5–5.1)
RBC # BLD AUTO: 4.74 X10(6)UL (ref 3.8–5.3)
SODIUM SERPL-SCNC: 138 MMOL/L (ref 136–145)
T PALLIDUM AB SER QL: NEGATIVE
TSI SER-ACNC: 2.08 MIU/ML (ref 0.36–3.74)
WBC # BLD AUTO: 7.9 X10(3) UL (ref 4–11)

## 2019-02-13 PROCEDURE — 85025 COMPLETE CBC W/AUTO DIFF WBC: CPT

## 2019-02-13 PROCEDURE — 87340 HEPATITIS B SURFACE AG IA: CPT

## 2019-02-13 PROCEDURE — 86780 TREPONEMA PALLIDUM: CPT

## 2019-02-13 PROCEDURE — 87389 HIV-1 AG W/HIV-1&-2 AB AG IA: CPT

## 2019-02-13 PROCEDURE — 99212 OFFICE O/P EST SF 10 MIN: CPT | Performed by: INTERNAL MEDICINE

## 2019-02-13 PROCEDURE — 86803 HEPATITIS C AB TEST: CPT

## 2019-02-13 PROCEDURE — 36415 COLL VENOUS BLD VENIPUNCTURE: CPT

## 2019-02-13 PROCEDURE — 80053 COMPREHEN METABOLIC PANEL: CPT

## 2019-02-13 PROCEDURE — 84443 ASSAY THYROID STIM HORMONE: CPT

## 2019-02-13 PROCEDURE — 99213 OFFICE O/P EST LOW 20 MIN: CPT | Performed by: INTERNAL MEDICINE

## 2019-02-13 NOTE — PROGRESS NOTES
Tori Vanegas is a 27year old female.   Patient presents with:  Migraine: started 3-4 days ago, pressure, nausea       HPI:   Pt comes as an urgent visit   With her mom  C/c migraines   C/o migraines x 3 days --light bothers hers , has been in dark room thrombosis--advised lifelong anticoagulation with warfarin as per [facility]al[facility] note December 2017  Thrombophlebitis of the cephalic vein and left cephalic vein thrombosis  Acne- sees derm dr Mauro Szymanski               Current Outpatient Medications:  benzoyl peroxide throat  SKIN: denies any unusual skin lesions or rashes  RESPIRATORY: denies shortness of breath, cough, wheezing  CARDIOVASCULAR: denies chest pain on exertion, palpitations, swelling in feet  GI: denies abdominal pain and denies heartburn, nausea or vomi

## 2019-02-14 LAB
HBV SURFACE AG SER-ACNC: <0.1 [IU]/L
HBV SURFACE AG SERPL QL IA: NONREACTIVE
HCV AB SERPL QL IA: NONREACTIVE

## 2019-02-27 DIAGNOSIS — I82.B11 THROMBOSIS OF RIGHT SUBCLAVIAN VEIN (HCC): ICD-10-CM

## 2019-02-28 RX ORDER — WARFARIN SODIUM 5 MG/1
TABLET ORAL
Qty: 60 TABLET | Refills: 0 | Status: SHIPPED | OUTPATIENT
Start: 2019-02-28 | End: 2019-05-17

## 2019-02-28 NOTE — TELEPHONE ENCOUNTER
No Protocol on this med.        Requested Prescriptions     Pending Prescriptions Disp Refills   • WARFARIN SODIUM 5 MG Oral Tab [Pharmacy Med Name: WARFARIN SOD 5MG TABLETS (PEACH)] 60 tablet 0     Sig: TAKE 1 TABLET(5 MG) BY MOUTH DAILY       Last Office

## 2019-03-13 ENCOUNTER — ANTI-COAG VISIT (OUTPATIENT)
Dept: INTERNAL MEDICINE CLINIC | Facility: CLINIC | Age: 24
End: 2019-03-13
Payer: MEDICAID

## 2019-03-13 DIAGNOSIS — I82.B11 THROMBOSIS OF RIGHT SUBCLAVIAN VEIN (HCC): ICD-10-CM

## 2019-03-13 DIAGNOSIS — Z51.81 ENCOUNTER FOR THERAPEUTIC DRUG MONITORING: ICD-10-CM

## 2019-03-13 DIAGNOSIS — Z79.01 LONG TERM (CURRENT) USE OF ANTICOAGULANTS: ICD-10-CM

## 2019-03-13 LAB — INR: 1.7 (ref 2–3)

## 2019-03-13 PROCEDURE — 85610 PROTHROMBIN TIME: CPT

## 2019-03-13 PROCEDURE — 93793 ANTICOAG MGMT PT WARFARIN: CPT

## 2019-03-13 PROCEDURE — 36416 COLLJ CAPILLARY BLOOD SPEC: CPT

## 2019-03-19 ENCOUNTER — APPOINTMENT (OUTPATIENT)
Dept: ULTRASOUND IMAGING | Facility: HOSPITAL | Age: 24
End: 2019-03-19
Attending: EMERGENCY MEDICINE
Payer: MEDICAID

## 2019-03-19 ENCOUNTER — HOSPITAL ENCOUNTER (EMERGENCY)
Facility: HOSPITAL | Age: 24
Discharge: HOME | End: 2019-03-19
Attending: EMERGENCY MEDICINE
Payer: MEDICAID

## 2019-03-19 ENCOUNTER — APPOINTMENT (OUTPATIENT)
Dept: CT IMAGING | Facility: HOSPITAL | Age: 24
End: 2019-03-19
Attending: EMERGENCY MEDICINE
Payer: MEDICAID

## 2019-03-19 ENCOUNTER — OFFICE VISIT (OUTPATIENT)
Dept: INTERNAL MEDICINE CLINIC | Facility: CLINIC | Age: 24
End: 2019-03-19
Payer: MEDICAID

## 2019-03-19 VITALS
RESPIRATION RATE: 16 BRPM | OXYGEN SATURATION: 100 % | TEMPERATURE: 99 F | HEART RATE: 68 BPM | DIASTOLIC BLOOD PRESSURE: 61 MMHG | SYSTOLIC BLOOD PRESSURE: 108 MMHG

## 2019-03-19 VITALS
WEIGHT: 164 LBS | BODY MASS INDEX: 30.18 KG/M2 | DIASTOLIC BLOOD PRESSURE: 83 MMHG | SYSTOLIC BLOOD PRESSURE: 115 MMHG | HEART RATE: 92 BPM | HEIGHT: 62 IN | RESPIRATION RATE: 16 BRPM

## 2019-03-19 DIAGNOSIS — F11.23 HEROIN WITHDRAWAL (HCC): ICD-10-CM

## 2019-03-19 DIAGNOSIS — R60.9 DEPENDENT EDEMA: Primary | ICD-10-CM

## 2019-03-19 DIAGNOSIS — R79.1 SUBTHERAPEUTIC INTERNATIONAL NORMALIZED RATIO (INR): ICD-10-CM

## 2019-03-19 DIAGNOSIS — F19.10 IV DRUG ABUSE (HCC): ICD-10-CM

## 2019-03-19 DIAGNOSIS — M79.605 PAIN OF LEFT LOWER EXTREMITY: Primary | ICD-10-CM

## 2019-03-19 LAB
ANION GAP SERPL CALC-SCNC: 7 MMOL/L (ref 0–18)
BASOPHILS # BLD AUTO: 0.07 X10(3) UL (ref 0–0.2)
BASOPHILS NFR BLD AUTO: 0.8 %
BUN BLD-MCNC: 9 MG/DL (ref 7–18)
BUN/CREAT SERPL: 11.3 (ref 10–20)
CALCIUM BLD-MCNC: 9.8 MG/DL (ref 8.5–10.1)
CHLORIDE SERPL-SCNC: 106 MMOL/L (ref 98–107)
CO2 SERPL-SCNC: 26 MMOL/L (ref 21–32)
CREAT BLD-MCNC: 0.8 MG/DL (ref 0.55–1.02)
D DIMER PPP FEU-MCNC: 0.84 MCG/ML (ref ?–0.5)
DEPRECATED RDW RBC AUTO: 42.2 FL (ref 35.1–46.3)
EOSINOPHIL # BLD AUTO: 0.1 X10(3) UL (ref 0–0.7)
EOSINOPHIL NFR BLD AUTO: 1.1 %
ERYTHROCYTE [DISTWIDTH] IN BLOOD BY AUTOMATED COUNT: 13.2 % (ref 11–15)
GLUCOSE BLD-MCNC: 79 MG/DL (ref 70–99)
HCT VFR BLD AUTO: 42.8 % (ref 35–48)
HGB BLD-MCNC: 13.8 G/DL (ref 12–16)
IMM GRANULOCYTES # BLD AUTO: 0.05 X10(3) UL (ref 0–1)
IMM GRANULOCYTES NFR BLD: 0.5 %
INR BLD: 1.31 (ref 0.9–1.2)
LYMPHOCYTES # BLD AUTO: 3.14 X10(3) UL (ref 1–4)
LYMPHOCYTES NFR BLD AUTO: 33.8 %
MCH RBC QN AUTO: 28.3 PG (ref 26–34)
MCHC RBC AUTO-ENTMCNC: 32.2 G/DL (ref 31–37)
MCV RBC AUTO: 87.7 FL (ref 80–100)
MONOCYTES # BLD AUTO: 0.54 X10(3) UL (ref 0.1–1)
MONOCYTES NFR BLD AUTO: 5.8 %
NEUTROPHILS # BLD AUTO: 5.39 X10 (3) UL (ref 1.5–7.7)
NEUTROPHILS # BLD AUTO: 5.39 X10(3) UL (ref 1.5–7.7)
NEUTROPHILS NFR BLD AUTO: 58 %
OSMOLALITY SERPL CALC.SUM OF ELEC: 286 MOSM/KG (ref 275–295)
PLATELET # BLD AUTO: 378 10(3)UL (ref 150–450)
POTASSIUM SERPL-SCNC: 4.4 MMOL/L (ref 3.5–5.1)
PROTHROMBIN TIME: 16.2 SECONDS (ref 11.8–14.5)
RBC # BLD AUTO: 4.88 X10(6)UL (ref 3.8–5.3)
SODIUM SERPL-SCNC: 139 MMOL/L (ref 136–145)
WBC # BLD AUTO: 9.3 X10(3) UL (ref 4–11)

## 2019-03-19 PROCEDURE — 85379 FIBRIN DEGRADATION QUANT: CPT | Performed by: EMERGENCY MEDICINE

## 2019-03-19 PROCEDURE — 96374 THER/PROPH/DIAG INJ IV PUSH: CPT

## 2019-03-19 PROCEDURE — 71260 CT THORAX DX C+: CPT | Performed by: EMERGENCY MEDICINE

## 2019-03-19 PROCEDURE — 96361 HYDRATE IV INFUSION ADD-ON: CPT

## 2019-03-19 PROCEDURE — 85025 COMPLETE CBC W/AUTO DIFF WBC: CPT | Performed by: EMERGENCY MEDICINE

## 2019-03-19 PROCEDURE — 99212 OFFICE O/P EST SF 10 MIN: CPT | Performed by: PHYSICIAN ASSISTANT

## 2019-03-19 PROCEDURE — 93970 EXTREMITY STUDY: CPT | Performed by: EMERGENCY MEDICINE

## 2019-03-19 PROCEDURE — 99213 OFFICE O/P EST LOW 20 MIN: CPT | Performed by: PHYSICIAN ASSISTANT

## 2019-03-19 PROCEDURE — 80048 BASIC METABOLIC PNL TOTAL CA: CPT | Performed by: EMERGENCY MEDICINE

## 2019-03-19 PROCEDURE — 99285 EMERGENCY DEPT VISIT HI MDM: CPT

## 2019-03-19 PROCEDURE — 85610 PROTHROMBIN TIME: CPT | Performed by: EMERGENCY MEDICINE

## 2019-03-19 RX ORDER — ONDANSETRON 4 MG/1
4 TABLET, ORALLY DISINTEGRATING ORAL EVERY 4 HOURS PRN
Qty: 10 TABLET | Refills: 0 | Status: SHIPPED | OUTPATIENT
Start: 2019-03-19 | End: 2019-03-26

## 2019-03-19 RX ORDER — ACETAMINOPHEN 325 MG/1
650 TABLET ORAL ONCE
Status: COMPLETED | OUTPATIENT
Start: 2019-03-19 | End: 2019-03-19

## 2019-03-19 RX ORDER — ONDANSETRON 2 MG/ML
4 INJECTION INTRAMUSCULAR; INTRAVENOUS ONCE
Status: COMPLETED | OUTPATIENT
Start: 2019-03-19 | End: 2019-03-19

## 2019-03-19 NOTE — ED NOTES
Pt informed that because of her past medical history and history of substance abuse,  Pt's belonging will have to be searched by security. Mother states that patient doesn't have any belonging besides her clothes.  Two purses seen at the bedside, mother Qiananatoly Grant

## 2019-03-19 NOTE — ED INITIAL ASSESSMENT (HPI)
Pt is a heroin addict that has sores on all ext's and left breast. Pt shoots in these sites. Pt reports that she is withdrawing from heroin.  Pt reports vomiting, left calf pain, and a sore to left breast.

## 2019-03-19 NOTE — ED PROVIDER NOTES
Patient Seen in: [facility] Emergency Department    History   Patient presents with:  Deep Vein Thrombosis (cardiovascular)    Stated Complaint:     HPI    29-year-old female with history of IV drug abuse, polysubstance abuse, prior DVTs presently noted in HPI. Constitutional and vital signs reviewed. All other systems reviewed and negative except as noted above.     Physical Exam     ED Triage Vitals [03/19/19 1707]   /78   Pulse 72   Resp 14   Temp 98.7 °F (37.1 °C)   Temp src Oral   Sp DIFFERENTIAL[[id number]]                              Final result                 Please view results for these tests on the individual orders.    URINALYSIS WITH CULTURE REFLEX   RAINBOW DRAW BLUE   RAINBOW DRAW LAVENDER   RAINBOW DRAW LIGHT GREEN   K[address]lyn[address]

## 2019-03-19 NOTE — PROGRESS NOTES
HPI:    Patient ID: Arabella Barrios is a 27year old female. HPI   Patient presents today for pain in the left leg that started this morning. Patient states that she has sores on her legs and she is concerned about cellulitis at this time.   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   Sore on her left breast    Musculoskeletal:   Tenderness to palpation of left calf, positive homans sign    Neurological: She is alert and oriented to person, place, and time. Skin: Skin is warm. Lesion noted. She is diaphoretic.    D

## 2019-03-20 ENCOUNTER — ANTI-COAG VISIT (OUTPATIENT)
Dept: INTERNAL MEDICINE CLINIC | Facility: CLINIC | Age: 24
End: 2019-03-20

## 2019-03-20 ENCOUNTER — TELEPHONE (OUTPATIENT)
Dept: INTERNAL MEDICINE CLINIC | Facility: CLINIC | Age: 24
End: 2019-03-20

## 2019-03-20 DIAGNOSIS — I82.B11 THROMBOSIS OF RIGHT SUBCLAVIAN VEIN (HCC): ICD-10-CM

## 2019-03-20 PROCEDURE — 93793 ANTICOAG MGMT PT WARFARIN: CPT | Performed by: INTERNAL MEDICINE

## 2019-03-20 NOTE — ED NOTES
Discharge instructions reviewed with the patient and mom. Pt encouraged to quit IV drug use. Pt verbalized understanding.

## 2019-03-20 NOTE — TELEPHONE ENCOUNTER
Pt mom is calling to request a apt sooner then the one pthas on 4/8 pt was in hospital and follow up with PCP Dr. Micheal Lewis for pt to get in sooner because INR 1.3 which was high Dr Jay Jay Castañeda and to get in to get it level       Please advise

## 2019-03-28 ENCOUNTER — ANTI-COAG VISIT (OUTPATIENT)
Dept: INTERNAL MEDICINE CLINIC | Facility: CLINIC | Age: 24
End: 2019-03-28

## 2019-03-28 ENCOUNTER — TELEPHONE (OUTPATIENT)
Dept: INTERNAL MEDICINE CLINIC | Facility: CLINIC | Age: 24
End: 2019-03-28

## 2019-03-28 DIAGNOSIS — Z51.81 ENCOUNTER FOR THERAPEUTIC DRUG MONITORING: ICD-10-CM

## 2019-03-28 DIAGNOSIS — I82.B11 THROMBOSIS OF RIGHT SUBCLAVIAN VEIN (HCC): Primary | ICD-10-CM

## 2019-03-28 DIAGNOSIS — I82.B11 THROMBOSIS OF RIGHT SUBCLAVIAN VEIN (HCC): ICD-10-CM

## 2019-03-28 DIAGNOSIS — Z79.01 LONG TERM (CURRENT) USE OF ANTICOAGULANTS: ICD-10-CM

## 2019-03-28 NOTE — TELEPHONE ENCOUNTER
Anticoag referral expires soon. Order pended. Please sign, thank you.   Dx: Thrombosis of right subclavian vein (HCC) (I82.B11)

## 2019-04-02 ENCOUNTER — ANTI-COAG VISIT (OUTPATIENT)
Dept: INTERNAL MEDICINE CLINIC | Facility: CLINIC | Age: 24
End: 2019-04-02
Payer: MEDICAID

## 2019-04-02 DIAGNOSIS — Z79.01 LONG TERM (CURRENT) USE OF ANTICOAGULANTS: ICD-10-CM

## 2019-04-02 DIAGNOSIS — I82.B11 THROMBOSIS OF RIGHT SUBCLAVIAN VEIN (HCC): ICD-10-CM

## 2019-04-02 DIAGNOSIS — Z51.81 ENCOUNTER FOR THERAPEUTIC DRUG MONITORING: ICD-10-CM

## 2019-04-02 PROCEDURE — 93793 ANTICOAG MGMT PT WARFARIN: CPT

## 2019-04-02 PROCEDURE — 85610 PROTHROMBIN TIME: CPT

## 2019-04-02 PROCEDURE — 36416 COLLJ CAPILLARY BLOOD SPEC: CPT

## 2019-04-08 ENCOUNTER — OFFICE VISIT (OUTPATIENT)
Dept: INTERNAL MEDICINE CLINIC | Facility: CLINIC | Age: 24
End: 2019-04-08
Payer: MEDICAID

## 2019-04-08 ENCOUNTER — ANTI-COAG VISIT (OUTPATIENT)
Dept: INTERNAL MEDICINE CLINIC | Facility: CLINIC | Age: 24
End: 2019-04-08
Payer: MEDICAID

## 2019-04-08 VITALS
BODY MASS INDEX: 30.36 KG/M2 | HEIGHT: 62 IN | DIASTOLIC BLOOD PRESSURE: 72 MMHG | WEIGHT: 165 LBS | HEART RATE: 88 BPM | SYSTOLIC BLOOD PRESSURE: 102 MMHG

## 2019-04-08 DIAGNOSIS — Z79.01 LONG TERM (CURRENT) USE OF ANTICOAGULANTS: ICD-10-CM

## 2019-04-08 DIAGNOSIS — L98.9 SORE ON LEG: Primary | ICD-10-CM

## 2019-04-08 DIAGNOSIS — F11.20 OPIOID DEPENDENCE ON AGONIST THERAPY (HCC): ICD-10-CM

## 2019-04-08 DIAGNOSIS — I82.B11 THROMBOSIS OF RIGHT SUBCLAVIAN VEIN (HCC): ICD-10-CM

## 2019-04-08 DIAGNOSIS — Z51.81 ENCOUNTER FOR THERAPEUTIC DRUG MONITORING: ICD-10-CM

## 2019-04-08 PROCEDURE — 99212 OFFICE O/P EST SF 10 MIN: CPT | Performed by: INTERNAL MEDICINE

## 2019-04-08 PROCEDURE — 36416 COLLJ CAPILLARY BLOOD SPEC: CPT

## 2019-04-08 PROCEDURE — 99213 OFFICE O/P EST LOW 20 MIN: CPT | Performed by: INTERNAL MEDICINE

## 2019-04-08 PROCEDURE — 85610 PROTHROMBIN TIME: CPT

## 2019-04-08 PROCEDURE — 93793 ANTICOAG MGMT PT WARFARIN: CPT

## 2019-04-08 NOTE — PROGRESS NOTES
Nato Olivas is a 27year old female.   Patient presents with:  Wound: legs       HPI:   Pt comes for f/u with mom   C/c sores on legs   C./o mult sores on b/l legs x 2 mn   Used heroin one mn ag o   HA subsided -- never saw bereket -- thinks it was withdr tablet Rfl: 0   benzoyl peroxide 5 % External Gel Use with clindamycin gel bid Disp: 90 g Rfl: 2   Citalopram Hydrobromide 20 MG Oral Tab TK 1 T PO  QAM Disp:  Rfl: 0   METHADONE HCL OR Take 110 mg by mouth daily.    Disp:  Rfl:       Past Medical History: distress  SKIN: mult  Lesions b/l LE with some of hem escar like , hyper pigmented spots also noted some on her breasts, few well-healed ones on bilateral upper extremities   HEENT: atraumatic, normocephalic  LUNGS: clear to auscultation, no wheeze  CARDIO

## 2019-04-16 ENCOUNTER — TELEPHONE (OUTPATIENT)
Dept: WOUND CARE | Facility: HOSPITAL | Age: 24
End: 2019-04-16

## 2019-04-16 ENCOUNTER — APPOINTMENT (OUTPATIENT)
Dept: WOUND CARE | Facility: HOSPITAL | Age: 24
End: 2019-04-16
Attending: NURSE PRACTITIONER
Payer: MEDICAID

## 2019-04-16 DIAGNOSIS — I82.B11 THROMBOSIS OF RIGHT SUBCLAVIAN VEIN (HCC): ICD-10-CM

## 2019-04-22 ENCOUNTER — ANTI-COAG VISIT (OUTPATIENT)
Dept: INTERNAL MEDICINE CLINIC | Facility: CLINIC | Age: 24
End: 2019-04-22
Payer: MEDICAID

## 2019-04-22 ENCOUNTER — OFFICE VISIT (OUTPATIENT)
Dept: INTERNAL MEDICINE CLINIC | Facility: CLINIC | Age: 24
End: 2019-04-22
Payer: MEDICAID

## 2019-04-22 VITALS
HEART RATE: 79 BPM | SYSTOLIC BLOOD PRESSURE: 101 MMHG | HEIGHT: 62 IN | DIASTOLIC BLOOD PRESSURE: 70 MMHG | WEIGHT: 165 LBS | TEMPERATURE: 99 F | BODY MASS INDEX: 30.36 KG/M2

## 2019-04-22 DIAGNOSIS — Z51.81 ENCOUNTER FOR THERAPEUTIC DRUG MONITORING: ICD-10-CM

## 2019-04-22 DIAGNOSIS — J02.9 SORE THROAT: Primary | ICD-10-CM

## 2019-04-22 DIAGNOSIS — M25.532 LEFT WRIST PAIN: ICD-10-CM

## 2019-04-22 DIAGNOSIS — I82.B11 THROMBOSIS OF RIGHT SUBCLAVIAN VEIN (HCC): ICD-10-CM

## 2019-04-22 DIAGNOSIS — Z79.01 LONG TERM (CURRENT) USE OF ANTICOAGULANTS: ICD-10-CM

## 2019-04-22 PROCEDURE — 93793 ANTICOAG MGMT PT WARFARIN: CPT

## 2019-04-22 PROCEDURE — 36416 COLLJ CAPILLARY BLOOD SPEC: CPT

## 2019-04-22 PROCEDURE — 99213 OFFICE O/P EST LOW 20 MIN: CPT | Performed by: PHYSICIAN ASSISTANT

## 2019-04-22 PROCEDURE — 85610 PROTHROMBIN TIME: CPT

## 2019-04-22 PROCEDURE — 99212 OFFICE O/P EST SF 10 MIN: CPT | Performed by: PHYSICIAN ASSISTANT

## 2019-04-22 NOTE — PROGRESS NOTES
HPI:    Patient ID: Frank Crisostomo is a 27year old female. HPI   Patient with PMH of drug use on methadone currently presents for a sore throat that started yesterday. She states she has some pain when swallowing and it feel scratchy.  No pmh of season Years since quittin.2      Smokeless tobacco: Never Used    Alcohol use: No      Alcohol/week: 0.0 oz    Past Surgical History:   Procedure Laterality Date   • COLONOSCOPY     • COLONOSCOPY N/A 2017    Performed by Cuong Kelley She has no cervical adenopathy. Neurological: She is alert and oriented to person, place, and time. Skin: Skin is warm and dry. She is not diaphoretic. Psychiatric: She has a normal mood and affect.  Her behavior is normal. Judgment and thought cont

## 2019-04-29 ENCOUNTER — APPOINTMENT (OUTPATIENT)
Dept: WOUND CARE | Facility: HOSPITAL | Age: 24
End: 2019-04-29
Attending: INTERNAL MEDICINE
Payer: MEDICAID

## 2019-05-15 ENCOUNTER — ANTI-COAG VISIT (OUTPATIENT)
Dept: INTERNAL MEDICINE CLINIC | Facility: CLINIC | Age: 24
End: 2019-05-15
Payer: MEDICAID

## 2019-05-15 DIAGNOSIS — I82.B11 THROMBOSIS OF RIGHT SUBCLAVIAN VEIN (HCC): ICD-10-CM

## 2019-05-15 DIAGNOSIS — Z79.01 LONG TERM (CURRENT) USE OF ANTICOAGULANTS: ICD-10-CM

## 2019-05-15 DIAGNOSIS — Z51.81 ENCOUNTER FOR THERAPEUTIC DRUG MONITORING: ICD-10-CM

## 2019-05-15 PROCEDURE — 36416 COLLJ CAPILLARY BLOOD SPEC: CPT

## 2019-05-15 PROCEDURE — 93793 ANTICOAG MGMT PT WARFARIN: CPT

## 2019-05-15 PROCEDURE — 85610 PROTHROMBIN TIME: CPT

## 2019-05-17 DIAGNOSIS — I82.B11 THROMBOSIS OF RIGHT SUBCLAVIAN VEIN (HCC): ICD-10-CM

## 2019-05-17 RX ORDER — WARFARIN SODIUM 5 MG/1
TABLET ORAL
Qty: 60 TABLET | Refills: 0 | Status: SHIPPED | OUTPATIENT
Start: 2019-05-17 | End: 2019-05-28

## 2019-05-17 NOTE — TELEPHONE ENCOUNTER
Pt mother calling and states pt only has two tabs left. Unable to refill per protocol. Routed to MD for review.

## 2019-05-28 ENCOUNTER — ANTI-COAG VISIT (OUTPATIENT)
Dept: INTERNAL MEDICINE CLINIC | Facility: CLINIC | Age: 24
End: 2019-05-28
Payer: MEDICAID

## 2019-05-28 ENCOUNTER — OFFICE VISIT (OUTPATIENT)
Dept: INTERNAL MEDICINE CLINIC | Facility: CLINIC | Age: 24
End: 2019-05-28
Payer: MEDICAID

## 2019-05-28 VITALS
DIASTOLIC BLOOD PRESSURE: 84 MMHG | WEIGHT: 152.81 LBS | SYSTOLIC BLOOD PRESSURE: 128 MMHG | HEART RATE: 90 BPM | HEIGHT: 62 IN | BODY MASS INDEX: 28.12 KG/M2

## 2019-05-28 DIAGNOSIS — I82.B11 THROMBOSIS OF RIGHT SUBCLAVIAN VEIN (HCC): ICD-10-CM

## 2019-05-28 DIAGNOSIS — Z79.01 LONG TERM (CURRENT) USE OF ANTICOAGULANTS: ICD-10-CM

## 2019-05-28 DIAGNOSIS — R85.612 LOW GRADE SQUAMOUS INTRAEPITH LESION ON CYTOLOGIC SMEAR ANUS (LGSIL): ICD-10-CM

## 2019-05-28 DIAGNOSIS — M67.432 GANGLION OF LEFT WRIST: Primary | ICD-10-CM

## 2019-05-28 DIAGNOSIS — Z51.81 ENCOUNTER FOR THERAPEUTIC DRUG MONITORING: ICD-10-CM

## 2019-05-28 DIAGNOSIS — F11.90 HEROIN USE: ICD-10-CM

## 2019-05-28 DIAGNOSIS — R07.89 ATYPICAL CHEST PAIN: ICD-10-CM

## 2019-05-28 PROCEDURE — 85610 PROTHROMBIN TIME: CPT

## 2019-05-28 PROCEDURE — 99212 OFFICE O/P EST SF 10 MIN: CPT | Performed by: INTERNAL MEDICINE

## 2019-05-28 PROCEDURE — 99214 OFFICE O/P EST MOD 30 MIN: CPT | Performed by: INTERNAL MEDICINE

## 2019-05-28 PROCEDURE — 93793 ANTICOAG MGMT PT WARFARIN: CPT

## 2019-05-28 PROCEDURE — 36416 COLLJ CAPILLARY BLOOD SPEC: CPT

## 2019-05-28 RX ORDER — ALBUTEROL SULFATE 90 UG/1
2 AEROSOL, METERED RESPIRATORY (INHALATION) EVERY 6 HOURS PRN
Qty: 1 INHALER | Refills: 0 | Status: SHIPPED | OUTPATIENT
Start: 2019-05-28 | End: 2020-07-29

## 2019-05-28 RX ORDER — NALOXONE HYDROCHLORIDE 4 MG/.1ML
4 SPRAY, METERED NASAL AS NEEDED
Qty: 1 KIT | Refills: 0 | Status: SHIPPED | OUTPATIENT
Start: 2019-05-28 | End: 2019-06-22

## 2019-05-28 RX ORDER — WARFARIN SODIUM 5 MG/1
TABLET ORAL
Qty: 60 TABLET | Refills: 0 | Status: SHIPPED | OUTPATIENT
Start: 2019-05-28 | End: 2019-09-03

## 2019-05-28 NOTE — PROGRESS NOTES
Claribel Amaral is a 27year old female.   Patient presents with:  Lump: wrist follow up      HPI:   Pt comes for f/u-- here with her filui morrison --who lived in [address] [place] [address] -- has been together for 14 mns   C/c left wrist swelling   C/o states her ?cyst has been 2017  Thrombophlebitis of the cephalic vein and left cephalic vein thrombosis  Acne- sees derm dr Kay Lawton        Current Outpatient Medications:  Warfarin Sodium 5 MG Oral Tab Take Coumadin as recommended by the Coumadin clinic Disp: 60 tablet Rfl: 0   Alb feet  NEURO: denies headaches , + anxiety,+ depression-- off antidepressants bc it made her feel suicidal     EXAM:   /84 (BP Location: Left arm, Patient Position: Sitting, Cuff Size: adult)   Pulse 90   Ht 5' 2\" (1.575 m)   Wt 152 lb 12.8 oz (69.3

## 2019-05-30 ENCOUNTER — APPOINTMENT (OUTPATIENT)
Dept: LAB | Age: 24
End: 2019-05-30
Attending: INTERNAL MEDICINE
Payer: MEDICAID

## 2019-05-30 DIAGNOSIS — Z79.01 LONG TERM (CURRENT) USE OF ANTICOAGULANTS: ICD-10-CM

## 2019-05-30 DIAGNOSIS — I82.B11 THROMBOSIS OF RIGHT SUBCLAVIAN VEIN (HCC): ICD-10-CM

## 2019-05-30 DIAGNOSIS — R07.89 ATYPICAL CHEST PAIN: ICD-10-CM

## 2019-05-30 DIAGNOSIS — Z51.81 ENCOUNTER FOR THERAPEUTIC DRUG MONITORING: ICD-10-CM

## 2019-05-30 PROCEDURE — 93010 ELECTROCARDIOGRAM REPORT: CPT | Performed by: INTERNAL MEDICINE

## 2019-05-30 PROCEDURE — 36415 COLL VENOUS BLD VENIPUNCTURE: CPT

## 2019-05-30 PROCEDURE — 85610 PROTHROMBIN TIME: CPT

## 2019-05-30 PROCEDURE — 93005 ELECTROCARDIOGRAM TRACING: CPT

## 2019-05-31 ENCOUNTER — ANTI-COAG VISIT (OUTPATIENT)
Dept: INTERNAL MEDICINE CLINIC | Facility: CLINIC | Age: 24
End: 2019-05-31

## 2019-05-31 DIAGNOSIS — Z79.01 LONG TERM (CURRENT) USE OF ANTICOAGULANTS: ICD-10-CM

## 2019-05-31 DIAGNOSIS — Z51.81 ENCOUNTER FOR THERAPEUTIC DRUG MONITORING: ICD-10-CM

## 2019-05-31 DIAGNOSIS — I82.B11 THROMBOSIS OF RIGHT SUBCLAVIAN VEIN (HCC): ICD-10-CM

## 2019-06-04 ENCOUNTER — TELEPHONE (OUTPATIENT)
Dept: INTERNAL MEDICINE CLINIC | Facility: CLINIC | Age: 24
End: 2019-06-04

## 2019-06-05 ENCOUNTER — NURSE TRIAGE (OUTPATIENT)
Dept: OTHER | Age: 24
End: 2019-06-05

## 2019-06-05 DIAGNOSIS — R39.9 UTI SYMPTOMS: Primary | ICD-10-CM

## 2019-06-05 NOTE — TELEPHONE ENCOUNTER
Action Requested: Summary for Provider     []  Critical Lab, Recommendations Needed  [] Need Additional Advice  []   FYI    []   Need Orders  [] Need Medications Sent to Pharmacy  []  Other     SUMMARY:Pt requesting ABX for s/s burning with urination, bravo

## 2019-06-05 NOTE — TELEPHONE ENCOUNTER
Will order UA–please ask patient to call back once it is drawn  If she is positive for a urinary tract infection then she will need to let the Coumadin clinic know that she is going to be started on antibiotics

## 2019-06-05 NOTE — TELEPHONE ENCOUNTER
Verified pt name and . Verified pt name and . Reviewed doctor's recommendations as noted below. Pt states she isn't sure if she will be able to get to lab today, also not sure she will be able to provide urine sample.  Advised pt to make sure she is d

## 2019-06-14 ENCOUNTER — ANTI-COAG VISIT (OUTPATIENT)
Dept: INTERNAL MEDICINE CLINIC | Facility: CLINIC | Age: 24
End: 2019-06-14
Payer: MEDICAID

## 2019-06-14 DIAGNOSIS — Z51.81 ENCOUNTER FOR THERAPEUTIC DRUG MONITORING: ICD-10-CM

## 2019-06-14 DIAGNOSIS — I82.B11 THROMBOSIS OF RIGHT SUBCLAVIAN VEIN (HCC): ICD-10-CM

## 2019-06-14 DIAGNOSIS — Z79.01 LONG TERM (CURRENT) USE OF ANTICOAGULANTS: ICD-10-CM

## 2019-06-14 PROCEDURE — 93793 ANTICOAG MGMT PT WARFARIN: CPT

## 2019-06-14 PROCEDURE — 85610 PROTHROMBIN TIME: CPT

## 2019-06-14 PROCEDURE — 36416 COLLJ CAPILLARY BLOOD SPEC: CPT

## 2019-06-22 DIAGNOSIS — F11.90 HEROIN USE: ICD-10-CM

## 2019-06-24 RX ORDER — NALOXONE HYDROCHLORIDE 4 MG/.1ML
SPRAY NASAL
Qty: 1 EACH | Refills: 3 | Status: SHIPPED | OUTPATIENT
Start: 2019-06-24 | End: 2020-02-14

## 2019-06-24 NOTE — TELEPHONE ENCOUNTER
Review pended refill request as it does not fall under a protocol.   Requested Prescriptions     Pending Prescriptions Disp Refills   • NARCAN 4 MG/0.1ML Nasal Liquid [Pharmacy Med Name: Chales Favre 4MG/0.1ML NASAL SPRAY 2 PACK]  0     Sig: USE 1 SPRAYS IN NOSTR

## 2019-07-10 ENCOUNTER — OFFICE VISIT (OUTPATIENT)
Dept: OBGYN CLINIC | Facility: CLINIC | Age: 24
End: 2019-07-10
Payer: MEDICAID

## 2019-07-10 VITALS
WEIGHT: 151.38 LBS | DIASTOLIC BLOOD PRESSURE: 76 MMHG | HEIGHT: 63.25 IN | HEART RATE: 84 BPM | SYSTOLIC BLOOD PRESSURE: 105 MMHG | BODY MASS INDEX: 26.49 KG/M2

## 2019-07-10 DIAGNOSIS — Z11.3 SCREEN FOR STD (SEXUALLY TRANSMITTED DISEASE): ICD-10-CM

## 2019-07-10 DIAGNOSIS — Z01.419 ENCOUNTER FOR GYNECOLOGICAL EXAMINATION WITHOUT ABNORMAL FINDING: Primary | ICD-10-CM

## 2019-07-10 DIAGNOSIS — Z12.4 SCREENING FOR MALIGNANT NEOPLASM OF CERVIX: ICD-10-CM

## 2019-07-10 PROCEDURE — 99395 PREV VISIT EST AGE 18-39: CPT | Performed by: OBSTETRICS & GYNECOLOGY

## 2019-07-11 LAB
C TRACH DNA SPEC QL NAA+PROBE: NEGATIVE
N GONORRHOEA DNA SPEC QL NAA+PROBE: NEGATIVE

## 2019-07-12 LAB — T VAGINALIS RRNA SPEC QL NAA+PROBE: NEGATIVE

## 2019-07-14 LAB — HPV I/H RISK 1 DNA SPEC QL NAA+PROBE: POSITIVE

## 2019-07-17 ENCOUNTER — ANTI-COAG VISIT (OUTPATIENT)
Dept: INTERNAL MEDICINE CLINIC | Facility: CLINIC | Age: 24
End: 2019-07-17
Payer: MEDICAID

## 2019-07-17 DIAGNOSIS — Z51.81 ENCOUNTER FOR THERAPEUTIC DRUG MONITORING: ICD-10-CM

## 2019-07-17 DIAGNOSIS — I82.B11 THROMBOSIS OF RIGHT SUBCLAVIAN VEIN (HCC): ICD-10-CM

## 2019-07-17 DIAGNOSIS — Z79.01 LONG TERM (CURRENT) USE OF ANTICOAGULANTS: ICD-10-CM

## 2019-07-17 LAB — INR: 2.5 (ref 2–3)

## 2019-07-17 PROCEDURE — 36416 COLLJ CAPILLARY BLOOD SPEC: CPT

## 2019-07-17 PROCEDURE — 93793 ANTICOAG MGMT PT WARFARIN: CPT

## 2019-07-17 PROCEDURE — 85610 PROTHROMBIN TIME: CPT

## 2019-07-22 NOTE — PROGRESS NOTES
HPI:    Patient ID: Lei Stern is a 27year old female. HPI   with menses q 26 d/ 4d / cramps x 2 d. Condoms for BC. Presents with partner today. They confirm they are using IV heroin w/o needle sharing.  She no longer has outside partners f normal heart sounds. No murmur heard. Pulmonary/Chest: Effort normal and breath sounds normal. She has no wheezes. She has no rales. No breast discharge. Bilateral breasts without skin / nipple changes, mass, tenderness or axillary adenopathy.      Abd

## 2019-08-19 ENCOUNTER — ANTI-COAG VISIT (OUTPATIENT)
Dept: INTERNAL MEDICINE CLINIC | Facility: CLINIC | Age: 24
End: 2019-08-19
Payer: MEDICAID

## 2019-08-19 ENCOUNTER — OFFICE VISIT (OUTPATIENT)
Dept: INTERNAL MEDICINE CLINIC | Facility: CLINIC | Age: 24
End: 2019-08-19
Payer: MEDICAID

## 2019-08-19 VITALS
WEIGHT: 142 LBS | HEIGHT: 63.25 IN | SYSTOLIC BLOOD PRESSURE: 107 MMHG | HEART RATE: 58 BPM | DIASTOLIC BLOOD PRESSURE: 72 MMHG | BODY MASS INDEX: 24.85 KG/M2

## 2019-08-19 DIAGNOSIS — I82.B11 THROMBOSIS OF RIGHT SUBCLAVIAN VEIN (HCC): ICD-10-CM

## 2019-08-19 DIAGNOSIS — Z51.81 ENCOUNTER FOR THERAPEUTIC DRUG MONITORING: ICD-10-CM

## 2019-08-19 DIAGNOSIS — Z79.01 LONG TERM (CURRENT) USE OF ANTICOAGULANTS: ICD-10-CM

## 2019-08-19 DIAGNOSIS — H10.32 ACUTE CONJUNCTIVITIS OF LEFT EYE, UNSPECIFIED ACUTE CONJUNCTIVITIS TYPE: Primary | ICD-10-CM

## 2019-08-19 LAB — INR: 1.7 (ref 2–3)

## 2019-08-19 PROCEDURE — 93793 ANTICOAG MGMT PT WARFARIN: CPT

## 2019-08-19 PROCEDURE — 85610 PROTHROMBIN TIME: CPT

## 2019-08-19 PROCEDURE — 36416 COLLJ CAPILLARY BLOOD SPEC: CPT

## 2019-08-19 PROCEDURE — 99213 OFFICE O/P EST LOW 20 MIN: CPT | Performed by: PHYSICIAN ASSISTANT

## 2019-08-19 RX ORDER — POLYMYXIN B SULFATE AND TRIMETHOPRIM 1; 10000 MG/ML; [USP'U]/ML
1 SOLUTION OPHTHALMIC EVERY 6 HOURS
Qty: 10 ML | Refills: 0 | Status: SHIPPED | OUTPATIENT
Start: 2019-08-19 | End: 2019-08-26

## 2019-08-19 NOTE — PROGRESS NOTES
HPI:    Patient ID: Jeanne Barthel is a 27year old female. HPI  Patient presents complaining of left eye pain, redness and crusting. Boyfriend was diagnosed with pink eye thinks she got it from him.  Does not wear contact lens    Review of Systems   Co Years since quittin.5      Smokeless tobacco: Never Used    Alcohol use:  Yes      Alcohol/week: 0.0 standard drinks      Comment: social    Past Surgical History:   Procedure Laterality Date   • COLONOSCOPY     • COLONOSCOPY N/A 2017    Perfor behavior is normal. Judgment and thought content normal.              ASSESSMENT/PLAN:   1.  Acute conjunctivitis of left eye, unspecified acute conjunctivitis type  -advised to alternate warm and cool compress  -keep good eye and hand hygiene  -apply one d

## 2019-09-03 DIAGNOSIS — I82.B11 THROMBOSIS OF RIGHT SUBCLAVIAN VEIN (HCC): ICD-10-CM

## 2019-09-03 RX ORDER — WARFARIN SODIUM 5 MG/1
TABLET ORAL
Qty: 60 TABLET | Refills: 0 | Status: SHIPPED | OUTPATIENT
Start: 2019-09-03 | End: 2019-09-25

## 2019-09-03 NOTE — TELEPHONE ENCOUNTER
Pt's mother stopped by [facility] requesting refill of Warfarin 5mg tabs. Pt is out of meds and needs to take today.

## 2019-09-03 NOTE — TELEPHONE ENCOUNTER
Pt mom is following up and is requesting a call back from the nurse.        Please advise     CB # [phone]

## 2019-09-25 ENCOUNTER — OFFICE VISIT (OUTPATIENT)
Dept: INTERNAL MEDICINE CLINIC | Facility: CLINIC | Age: 24
End: 2019-09-25
Payer: MEDICAID

## 2019-09-25 VITALS
BODY MASS INDEX: 25 KG/M2 | HEART RATE: 88 BPM | SYSTOLIC BLOOD PRESSURE: 102 MMHG | HEIGHT: 63 IN | TEMPERATURE: 99 F | DIASTOLIC BLOOD PRESSURE: 74 MMHG

## 2019-09-25 DIAGNOSIS — J06.9 VIRAL URI: Primary | ICD-10-CM

## 2019-09-25 DIAGNOSIS — I82.B11 THROMBOSIS OF RIGHT SUBCLAVIAN VEIN (HCC): ICD-10-CM

## 2019-09-25 PROCEDURE — 99213 OFFICE O/P EST LOW 20 MIN: CPT | Performed by: PHYSICIAN ASSISTANT

## 2019-09-25 RX ORDER — WARFARIN SODIUM 5 MG/1
TABLET ORAL
Qty: 60 TABLET | Refills: 0 | Status: SHIPPED | OUTPATIENT
Start: 2019-09-25 | End: 2019-10-07

## 2019-09-25 RX ORDER — ONDANSETRON 4 MG/1
4 TABLET, FILM COATED ORAL EVERY 8 HOURS PRN
Qty: 20 TABLET | Refills: 0 | Status: SHIPPED | OUTPATIENT
Start: 2019-09-25 | End: 2020-09-09

## 2019-09-25 NOTE — PROGRESS NOTES
HPI:    Patient ID: Tamir Muniz is a 27year old female. HPI   Patient presents with hot flashes, headaches, chills and nausea since yesterday. Was at work and felt sick and threw up, had to leave early. Denies any cough or congestion.  Hx of substan Day Smoker        Packs/day: 1.00        Years: 1.00        Pack years: 1        Types: Cigarettes        Quit date: 2015        Years since quittin.6      Smokeless tobacco: Never Used    Alcohol use:  Yes      Alcohol/week: 0.0 standard drinks oriented to person, place, and time. Skin: Skin is warm and dry. Psychiatric: She has a normal mood and affect. Her behavior is normal. Judgment and thought content normal.              ASSESSMENT/PLAN:   1.  Viral URI  -supportive measures, rest, fluid

## 2019-09-27 ENCOUNTER — TELEPHONE (OUTPATIENT)
Dept: INTERNAL MEDICINE CLINIC | Facility: CLINIC | Age: 24
End: 2019-09-27

## 2019-09-27 DIAGNOSIS — I82.B11 THROMBOSIS OF RIGHT SUBCLAVIAN VEIN (HCC): ICD-10-CM

## 2019-09-27 NOTE — TELEPHONE ENCOUNTER
Per mom of patient,  Patient needs a new prescription for her Warfarin 5mg. Patient is totally out of medicine.       Current Outpatient Medications:        Warfarin Sodium 5 MG Oral Tab TAKE 1 TABLET BY MOUTH EVERY DAY Disp: 60 tablet Rfl: 0

## 2019-10-07 ENCOUNTER — ANTI-COAG VISIT (OUTPATIENT)
Dept: INTERNAL MEDICINE CLINIC | Facility: CLINIC | Age: 24
End: 2019-10-07
Payer: MEDICAID

## 2019-10-07 DIAGNOSIS — Z79.01 LONG TERM (CURRENT) USE OF ANTICOAGULANTS: ICD-10-CM

## 2019-10-07 DIAGNOSIS — Z51.81 ENCOUNTER FOR THERAPEUTIC DRUG MONITORING: ICD-10-CM

## 2019-10-07 DIAGNOSIS — I82.B11 THROMBOSIS OF RIGHT SUBCLAVIAN VEIN (HCC): ICD-10-CM

## 2019-10-07 PROCEDURE — 36416 COLLJ CAPILLARY BLOOD SPEC: CPT

## 2019-10-07 PROCEDURE — 93793 ANTICOAG MGMT PT WARFARIN: CPT

## 2019-10-07 PROCEDURE — 85610 PROTHROMBIN TIME: CPT

## 2019-10-07 RX ORDER — WARFARIN SODIUM 5 MG/1
TABLET ORAL
Qty: 60 TABLET | Refills: 0 | Status: SHIPPED | OUTPATIENT
Start: 2019-10-07 | End: 2020-03-30

## 2019-10-26 ENCOUNTER — TELEPHONE (OUTPATIENT)
Dept: INTERNAL MEDICINE CLINIC | Facility: CLINIC | Age: 24
End: 2019-10-26

## 2019-10-26 RX ORDER — WARFARIN SODIUM 7.5 MG/1
TABLET ORAL
Qty: 30 TABLET | Refills: 0 | Status: ON HOLD | OUTPATIENT
Start: 2019-10-26 | End: 2020-05-31

## 2019-10-26 RX ORDER — WARFARIN SODIUM 5 MG/1
TABLET ORAL
Qty: 60 TABLET | Refills: 0 | Status: ON HOLD | OUTPATIENT
Start: 2019-10-26 | End: 2020-05-31

## 2019-10-26 NOTE — TELEPHONE ENCOUNTER
Spoke with mother states patient is out of Coumadin, did not take any dose yesterday and has no meds for today .  Was told by pharmacy is to soon to fill     [employer], spoke with Samantha Coronado  The delay is with insurance, he requesting a maximum do

## 2019-10-26 NOTE — TELEPHONE ENCOUNTER
Dr. Shelly Cao on call for Dr. Irais Briseno:      Can you approve warfarin 5mg and 7.5mg tablets? Mother called. Patient was seen by coumadin clinic 10/7/19.   A new RX was sent to pharmacy on 10/7/19, however pharmacy will not fill it because its refill too so

## 2019-11-04 ENCOUNTER — ANTI-COAG VISIT (OUTPATIENT)
Dept: INTERNAL MEDICINE CLINIC | Facility: CLINIC | Age: 24
End: 2019-11-04
Payer: MEDICAID

## 2019-11-04 DIAGNOSIS — Z51.81 ENCOUNTER FOR THERAPEUTIC DRUG MONITORING: ICD-10-CM

## 2019-11-04 DIAGNOSIS — I82.B11 THROMBOSIS OF RIGHT SUBCLAVIAN VEIN (HCC): ICD-10-CM

## 2019-11-04 DIAGNOSIS — Z79.01 LONG TERM (CURRENT) USE OF ANTICOAGULANTS: ICD-10-CM

## 2019-11-04 PROCEDURE — 36416 COLLJ CAPILLARY BLOOD SPEC: CPT

## 2019-11-04 PROCEDURE — 85610 PROTHROMBIN TIME: CPT

## 2019-11-04 PROCEDURE — 93793 ANTICOAG MGMT PT WARFARIN: CPT

## 2019-12-04 ENCOUNTER — ANTI-COAG VISIT (OUTPATIENT)
Dept: INTERNAL MEDICINE CLINIC | Facility: CLINIC | Age: 24
End: 2019-12-04
Payer: MEDICAID

## 2019-12-04 DIAGNOSIS — I82.B11 THROMBOSIS OF RIGHT SUBCLAVIAN VEIN (HCC): ICD-10-CM

## 2019-12-04 DIAGNOSIS — Z79.01 LONG TERM (CURRENT) USE OF ANTICOAGULANTS: ICD-10-CM

## 2019-12-04 DIAGNOSIS — Z51.81 ENCOUNTER FOR THERAPEUTIC DRUG MONITORING: ICD-10-CM

## 2019-12-04 PROCEDURE — 93793 ANTICOAG MGMT PT WARFARIN: CPT

## 2019-12-04 PROCEDURE — 85610 PROTHROMBIN TIME: CPT

## 2019-12-04 PROCEDURE — 36416 COLLJ CAPILLARY BLOOD SPEC: CPT

## 2019-12-28 ENCOUNTER — TELEPHONE (OUTPATIENT)
Dept: INTERNAL MEDICINE CLINIC | Facility: CLINIC | Age: 24
End: 2019-12-28

## 2019-12-30 ENCOUNTER — NURSE TRIAGE (OUTPATIENT)
Dept: INTERNAL MEDICINE CLINIC | Facility: CLINIC | Age: 24
End: 2019-12-30

## 2019-12-30 NOTE — TELEPHONE ENCOUNTER
Patient mom calling and states daughter throat hurts and hard to swallow       Please advise   Transfer to triage

## 2019-12-30 NOTE — TELEPHONE ENCOUNTER
Action Requested: Summary for Provider     []  Critical Lab, Recommendations Needed  [] Need Additional Advice  []   FYI    []   Need Orders  [] Need Medications Sent to Pharmacy  []  Other     SUMMARY: Per protocol advised OV today pt scheduled today with

## 2019-12-31 ENCOUNTER — OFFICE VISIT (OUTPATIENT)
Dept: INTERNAL MEDICINE CLINIC | Facility: CLINIC | Age: 24
End: 2019-12-31
Payer: MEDICAID

## 2019-12-31 VITALS
SYSTOLIC BLOOD PRESSURE: 99 MMHG | BODY MASS INDEX: 25.16 KG/M2 | HEIGHT: 63 IN | TEMPERATURE: 98 F | WEIGHT: 142 LBS | DIASTOLIC BLOOD PRESSURE: 73 MMHG | HEART RATE: 88 BPM

## 2019-12-31 DIAGNOSIS — F19.10 IV DRUG ABUSE (HCC): ICD-10-CM

## 2019-12-31 DIAGNOSIS — J02.9 SORE THROAT: Primary | ICD-10-CM

## 2019-12-31 PROCEDURE — 99213 OFFICE O/P EST LOW 20 MIN: CPT | Performed by: PHYSICIAN ASSISTANT

## 2019-12-31 RX ORDER — AZITHROMYCIN 250 MG/1
TABLET, FILM COATED ORAL
Qty: 6 TABLET | Refills: 0 | Status: SHIPPED | OUTPATIENT
Start: 2019-12-31 | End: 2020-03-30 | Stop reason: ALTCHOICE

## 2019-12-31 NOTE — PROGRESS NOTES
HPI:    Patient ID: Ambreen Gamble is a 27year old female. HPI   Patient presents complaining of a sore throat that started yesterday. Patient would also like to have 2 sores near her pubic region checked to make sure they are not infected.  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 Conjunctivae and EOM are normal.   Neck: Normal range of motion. Neck supple. No thyromegaly present. Cardiovascular: Normal rate, regular rhythm and normal heart sounds.     Pulmonary/Chest: Effort no

## 2020-01-15 ENCOUNTER — TELEPHONE (OUTPATIENT)
Dept: OBGYN CLINIC | Facility: CLINIC | Age: 25
End: 2020-01-15

## 2020-01-15 ENCOUNTER — LAB ENCOUNTER (OUTPATIENT)
Dept: LAB | Facility: HOSPITAL | Age: 25
End: 2020-01-15
Attending: OBSTETRICS & GYNECOLOGY
Payer: MEDICAID

## 2020-01-15 ENCOUNTER — OFFICE VISIT (OUTPATIENT)
Dept: OBGYN CLINIC | Facility: CLINIC | Age: 25
End: 2020-01-15
Payer: MEDICAID

## 2020-01-15 VITALS
BODY MASS INDEX: 25 KG/M2 | SYSTOLIC BLOOD PRESSURE: 109 MMHG | WEIGHT: 138.38 LBS | DIASTOLIC BLOOD PRESSURE: 76 MMHG | HEART RATE: 81 BPM | TEMPERATURE: 99 F

## 2020-01-15 DIAGNOSIS — Z11.3 SCREEN FOR STD (SEXUALLY TRANSMITTED DISEASE): ICD-10-CM

## 2020-01-15 DIAGNOSIS — R10.2 PELVIC PAIN: Primary | ICD-10-CM

## 2020-01-15 DIAGNOSIS — T19.2XXA RETAINED TAMPON, INITIAL ENCOUNTER: ICD-10-CM

## 2020-01-15 DIAGNOSIS — Z32.00 PREGNANCY EXAMINATION OR TEST, PREGNANCY UNCONFIRMED: ICD-10-CM

## 2020-01-15 DIAGNOSIS — R10.2 PELVIC PAIN: ICD-10-CM

## 2020-01-15 LAB — CONTROL LINE PRESENT WITH A CLEAR BACKGROUND (YES/NO): YES YES/NO

## 2020-01-15 PROCEDURE — 81025 URINE PREGNANCY TEST: CPT | Performed by: OBSTETRICS & GYNECOLOGY

## 2020-01-15 PROCEDURE — 99214 OFFICE O/P EST MOD 30 MIN: CPT | Performed by: OBSTETRICS & GYNECOLOGY

## 2020-01-16 ENCOUNTER — APPOINTMENT (OUTPATIENT)
Dept: LAB | Facility: HOSPITAL | Age: 25
End: 2020-01-16
Attending: OBSTETRICS & GYNECOLOGY
Payer: MEDICAID

## 2020-01-16 DIAGNOSIS — R10.2 PELVIC PAIN: ICD-10-CM

## 2020-01-16 LAB
BILIRUB UR QL: NEGATIVE
C TRACH DNA SPEC QL NAA+PROBE: NEGATIVE
COLOR UR: YELLOW
GLUCOSE UR-MCNC: NEGATIVE MG/DL
HGB UR QL STRIP.AUTO: NEGATIVE
KETONES UR-MCNC: NEGATIVE MG/DL
N GONORRHOEA DNA SPEC QL NAA+PROBE: NEGATIVE
NITRITE UR QL STRIP.AUTO: NEGATIVE
PH UR: 6 [PH] (ref 5–8)
PROT UR-MCNC: NEGATIVE MG/DL
RBC #/AREA URNS AUTO: 1 /HPF
SP GR UR STRIP: 1.01 (ref 1–1.03)
UROBILINOGEN UR STRIP-ACNC: <2
WBC #/AREA URNS AUTO: 2 /HPF

## 2020-01-16 PROCEDURE — 81001 URINALYSIS AUTO W/SCOPE: CPT

## 2020-01-16 NOTE — TELEPHONE ENCOUNTER
Pt received via bed. Assessment completed surgical site  5 laps plus DIANA CDI. Pt oriented to room call bell and remote. SCD placed, IS and teaching provided with return demo. Ice chips provided. Mom and Spouse at the bedside for support. States pain @ 6/10. 2 is tolerable level. Pts goal for shift will be to manage pain and nausea, walk and void adequate amount.  Pt aware of planned goals Pt states that she had to go to work. Informed pt that the lab is open until 8:00 pm today. Pt states she will not go today, but will go first thing in the am.  Informed pt that the lab opens at 6:30 AM.     Sent to [address] [place] [address] as a FYI.

## 2020-01-16 NOTE — TELEPHONE ENCOUNTER
I ordered a urinalysis today for patient to do at the lab after our visit. She didn't go.   Please call and advise her I need her UA results to determine abx course

## 2020-01-17 LAB — TRICHOMONAS SCREEN: NEGATIVE

## 2020-01-19 NOTE — H&P
HPI:  The patient is a 28-year-old sexually active female who presents complaining of suspected retained tampon and pelvic and vaginal discomfort. The patient states that the tampons been in for over a month.   She tried to remove it on her own and then he Non-medical: Not on file    Tobacco Use      Smoking status: Current Every Day Smoker        Packs/day: 1.00        Years: 1.00        Pack years: 1        Types: Cigarettes        Quit date: 2015        Years since quittin.9      Smokeless tobacc assistive device. .        The patient does live in a home with stairs.       FAMILY HISTORY:  Family History   Problem Relation Age of Onset   • Other (back pain) Father    • Hypertension Mother    • Hypertension Maternal Grandmother    • Diabetes Maternal denies back pain. Skin/Breast:  Denies any breast pain, lumps, or discharge. Neurological:  denies headaches, extremity weakness  Psychiatric: denies depression or anxiety.        01/15/20  0957   BP: 109/76   Pulse: 81   Temp: 98.5 °F (36.9 °C)       PH Rocephin due to amoxicillin allergy. Toxic shock syndrome symptoms reviewed. She is nontoxic-appearing today and afebrile with normal vital signs. All questions answered and patient and her mother voiced understanding.

## 2020-01-21 NOTE — TELEPHONE ENCOUNTER
----- Message from Brad Issa DO sent at 1/19/2020 12:44 PM CST -----  UA neg for UTI. Needs doxycycline 100mg BID x 14 days for presumed PID with retained tampon and also needs diflucan 150mg PO x 2 doses for yeast infx.

## 2020-01-28 ENCOUNTER — ANTI-COAG VISIT (OUTPATIENT)
Dept: INTERNAL MEDICINE CLINIC | Facility: CLINIC | Age: 25
End: 2020-01-28
Payer: MEDICAID

## 2020-01-28 DIAGNOSIS — Z79.01 LONG TERM (CURRENT) USE OF ANTICOAGULANTS: ICD-10-CM

## 2020-01-28 DIAGNOSIS — I82.B11 THROMBOSIS OF RIGHT SUBCLAVIAN VEIN (HCC): ICD-10-CM

## 2020-01-28 DIAGNOSIS — Z51.81 ENCOUNTER FOR THERAPEUTIC DRUG MONITORING: ICD-10-CM

## 2020-01-28 LAB — INR: 1.1 (ref 0.8–1.2)

## 2020-01-28 PROCEDURE — 36416 COLLJ CAPILLARY BLOOD SPEC: CPT

## 2020-01-28 PROCEDURE — 93793 ANTICOAG MGMT PT WARFARIN: CPT

## 2020-01-28 PROCEDURE — 85610 PROTHROMBIN TIME: CPT

## 2020-01-29 NOTE — TELEPHONE ENCOUNTER
Informed pt that we have tried to reach her and lmtcb. Informed pt that her UA neg for UTI. Informed pt that she needs doxycycline 100mg bid x 14 days fo presumed PID with retained tampon and also needs diflucan 150mg po x 2 doses for yeast infection. Sent to [address] [place] [address] to see if he wants to send Diflucan, because when entered it states drug-drug Warafin Sodium - Fluconazole and Methadone and Fluconazole. Please send if you want or let us know other medication. Also please send Doxycycline 100mg bid x 14 days if you wish. It states Drug-Drug, Warfarin Sodium - Doxycycline. Informed pt that we are awaiting to see what JIM recs.

## 2020-01-30 RX ORDER — AZITHROMYCIN 500 MG/1
TABLET, FILM COATED ORAL
Qty: 4 TABLET | Refills: 0 | Status: CANCELLED | OUTPATIENT
Start: 2020-01-30

## 2020-01-30 NOTE — TELEPHONE ENCOUNTER
Lets try monistat 7 due to interaction. Its been over 2 weeks since I saw her. Is she still having discomfort or has that resolved.   Given her drug to drug interaction, I want to make sure it is clinically necessary

## 2020-01-30 NOTE — TELEPHONE ENCOUNTER
Pt informed of JIMs recs and verbalized understanding. Pt stated that she is still having the same pelvic pain and cramping that she was having when she saw JIM in the office. Message to [address] [place] [address] as [phone] Darin Barrios.

## 2020-01-30 NOTE — TELEPHONE ENCOUNTER
When Azithromycin is entered the pop up states Drug - Drug Azithromycin and Warfarin. Azithromycin is pended if you wish to send it with the pop up Drug - Drug Azithromycin and Warfarin. Do you wish to send another antibiotic. When JIM gives recs, will call pt with what med was sent.

## 2020-02-04 ENCOUNTER — TELEPHONE (OUTPATIENT)
Dept: OBGYN CLINIC | Facility: CLINIC | Age: 25
End: 2020-02-04

## 2020-02-04 NOTE — TELEPHONE ENCOUNTER
Pt requesting to speak to nurse. Pt states that she is using 7day Monistat and notice that urine is orange.  Please advise

## 2020-02-05 ENCOUNTER — TELEPHONE (OUTPATIENT)
Dept: INTERNAL MEDICINE CLINIC | Facility: CLINIC | Age: 25
End: 2020-02-05

## 2020-02-05 NOTE — TELEPHONE ENCOUNTER
CALLED PT AND SHE USED A VERY QUIET VOICE, WAS CONFUSED SEVERAL TIMES. PT WAS SURE JIM PRESCRIBED DOXY. I ASKED PT MULTIPLE TIMES IF SHE WAS OK OR IF SHE NEEDED HELP BECAUSE SHE WAS SO CONFUSED.   EXPLAINED THAT IF SHE IS TAKING DOXY STILL ALONG WITH HER

## 2020-02-05 NOTE — TELEPHONE ENCOUNTER
Pt reports orange colored urine since yesterday morning. Denies pain and burning with urination. Reports mild abdominal pain and cramping but has had these symptoms for a few weeks.  Pt saw Sharonda Devine on 1/15/20 for retained tampon and states symptoms have not imp

## 2020-02-05 NOTE — TELEPHONE ENCOUNTER
Who sent her the doxy? We were waiting to hear from coumadin clinic regarding safety with INR and warfarin use?

## 2020-02-05 NOTE — TELEPHONE ENCOUNTER
Spoke with [employer], she states she is not feeling well today and just can't come in (Acute call noted to OB 2/5) reiterated the importance of closer monitoring for INR, that last time she was in it was low and now if taking antibiotics it can be too high.

## 2020-02-06 RX ORDER — DOXYCYCLINE HYCLATE 100 MG/1
100 CAPSULE ORAL 2 TIMES DAILY
Qty: 28 CAPSULE | Refills: 0 | Status: SHIPPED | OUTPATIENT
Start: 2020-02-06 | End: 2020-02-20

## 2020-02-06 NOTE — TELEPHONE ENCOUNTER
Spoke with pt and she was incoherent on the phone. Pt gave verbal of speaking with her Mom regarding her medication. Spoke with pt Mom (Sonia Fraser signed).  Per Mariel Rivera pt had Doxy that was left over from the last time she was prescribed the medication

## 2020-02-06 NOTE — TELEPHONE ENCOUNTER
Lui Rubio from [facility].  She spoke to the coumadin clinic and states pt has not been compliant with their care. She states its okay for doxycycline as it will actually help thin her blood. Her INR was not therapeutic.   Okay for doxy 100mg BID x 14 days

## 2020-02-06 NOTE — TELEPHONE ENCOUNTER
Occupational Therapy Daily Treatment    Visit Count: 4  Plan of Care:6/4/2019 Through: 7/16/2019  Insurance Information:   [facility]  85/15  BOMN  Referred by: John Mcknight MD; Next provider visit (if known/scheduled): Mid July 2019 with orthopedics (5/27/19 cardiac doctor)  Medical Diagnosis (from order):   Treatment Diagnosis: Shoulder symptoms with increased pain/symptoms, impaired strength, impaired range of motion, impaired joint mobility/play, impaired activity tolerance     Date of Surgery: 3/19/19; Surgery performed: right shoulder arthroscopic rotator cuff repair, arthroscopic biceps tenodesis, arthroscopic labral debridement, arthroscopic subacromial decompression     SUBJECTIVE   Patient states that he continues to fatigue very easily due to the heart problems.  He states that the shoulder feels more stiff in the cooler weather.       Current Pain (0-10 scale): 1/10, more tightness and stiffness   Functional Change: none reported since evaluation    OBJECTIVE     NO OBJECTIVE MEASUREMENTS TAKEN TODAY    Range of Motion (degrees)   Left Right   Date Initial Initial   Shoulder Flexion (170-180) 163 98   Shoulder Abduction (170-180) 170 85   Shoulder Adduction (50-75) 45 0   Shoulder Internal Rotation () 90 45   Shoulder External Rotation (80-90) 90 50   Reported in degrees, active range of motion recorded unless noted as AA=active assistive or P=passive; standard testing positions unless otherwise noted, norms included in ( ); *=pain   only those motions that were assessed are noted.    Strength (out of 5)    Left Right   Date Initial Initial   Shoulder Flexors 4 3-   Shoulder Abductors 4 3-   Shoulder Internal Rotators 4 3+   Shoulder External Rotators  4 3-   standard testing positions unless otherwise noted; *=pain  Only muscle strength that was assessed are noted.    Treatment     Therapeutic Exercise:   Exercise Repetitions Sets Position/Cues   Rolling therapy ball on table for shoulder flexion  1 1  signed 3 minutes   Supine PROM, all shoulder motions 10 3 Emphasis on flexion and abduction    Supine ABC with 3# weight 2 1    Non-resistive pulleys for shoulder flexion with back to door - - 5 minutes   Supine cane exercises (press ups, shoulder flexion, IR/ER, and abduction) 10 2 each  2-3# weighted cane   Side-lying ER with 2# weight - -    Side-lying Abduction 2# weight  - -    Standing lifting therapy ball with two hands to shoulder-height 10 2    Standing cane exercises, shoulder flexion  - -    Wall slides for shoulder flexion and abduction in doorway 10 2 each Abduction is very difficult for patient    Comments: needed repeated cues to breath while completing exercises.    Modalities:  Ice applied to shoulder x10 minutes after therapy     Home Program:  Exercise: Date issued Date DC Comments   Non-resistive pulleys for shoulder flexion with back to door   Supine cane exercises (press ups, shoulder flexion, IR/ER, and abduction)    6/4/19  Has pulleys at home from previous therapy.   Handout Provided     Standing cane exercises  6/10/19  Demonstrated Understanding     Wall slides 6/17/19  Demonstrated Understanding       Skilled input: therapist position for techniques, hand placement and palpation for techniques    Writer verbally educated the patient and received verbal consent from the patient on hand placement, positioning of patient, and techniques to be performed today including therapist position for techniques, hand placement and palpation for techniques as described above and how they are pertinent to the patient's plan of care.     Suggestions for next session as indicated: progress per plan of care, review home exercise program, progress per guidelines, continue with ROM, stretching, and strengthening per patient tolerance.  No formal postoperative restrictions.  Be mindful of patient's current cardiac status.  Avoid lifting more than 10 pounds at this time and avoid any cardiovascular warm-up activity  until after he begins cardiac rehab.       ASSESSMENT   Patient struggles more with holding his breath today during activity.  He needs frequent rest breaks due to becoming light-headed.  Oxygen level is at 98% and heart rate is between 70-80bpm.  Will benefit from additional verbal cues to breathe during exercises. Will progress per patient tolerance.     Pain after treatment (patient reported, 0-10 scale): no numerical value, states shoulder feels \"looser\"  Result of above outlined education: Verbalizes understanding, Demonstrates understanding and Needs reinforcement    Goals: To be obtained by end of this plan of care:  1. Patient will be independent with progressed and modified home exercise program   2. Decrease pain/symptoms to 0/10  3. Improve involved strength to 4/5  4. Improve involved range of motion to 160 shoulder flexion and abduction   5. Independent with instructed task modifications  through improvements listed above patient will:  6. Be able to  and lift a light grocery bag,  steering wheel, perform light home/yard maintenance tasks without pain/difficulty  7. Be able to reach overhead, out to side, behind back without pain/difficulty to improve activities of independent daily living  8. Be able to sleep 6 hours without disruption from pain    THERAPY DAILY BILLING   Insurance: [employer] 2. N/A    Evaluation Procedures:  No evaluation codes were used on this date of service    Timed Procedures:  Therapeutic Exercise, 45 minutes   Ice 10 minutes after treatment    Untimed Procedures:  No untimed codes were used on this date of service    Total Treatment Time: 55 minutes

## 2020-02-14 DIAGNOSIS — F11.90 HEROIN USE: ICD-10-CM

## 2020-02-14 RX ORDER — NALOXONE HYDROCHLORIDE 4 MG/.1ML
SPRAY NASAL
Qty: 2 EACH | Refills: 1 | Status: SHIPPED | OUTPATIENT
Start: 2020-02-14

## 2020-02-14 NOTE — TELEPHONE ENCOUNTER
Review pended refill request as it does not fall under a protocol.   Requested Prescriptions     Pending Prescriptions Disp Refills   • NARCAN 4 MG/0.1ML Nasal Liquid [Pharmacy Med Name: Georgi Galvez 4MG/0.1ML NASAL SPRAY 2 PACK] 2 each 0     Sig: USE 1 SPRAYS IN

## 2020-02-24 ENCOUNTER — ANTI-COAG VISIT (OUTPATIENT)
Dept: INTERNAL MEDICINE CLINIC | Facility: CLINIC | Age: 25
End: 2020-02-24
Payer: MEDICAID

## 2020-02-24 DIAGNOSIS — Z51.81 ENCOUNTER FOR THERAPEUTIC DRUG MONITORING: ICD-10-CM

## 2020-02-24 DIAGNOSIS — I82.B11 THROMBOSIS OF RIGHT SUBCLAVIAN VEIN (HCC): ICD-10-CM

## 2020-02-24 DIAGNOSIS — Z79.01 LONG TERM (CURRENT) USE OF ANTICOAGULANTS: ICD-10-CM

## 2020-02-24 LAB
INR: 1.9 (ref 0.8–1.2)
TEST STRIP EXPIRATION DATE: ABNORMAL DATE

## 2020-02-24 PROCEDURE — 36416 COLLJ CAPILLARY BLOOD SPEC: CPT

## 2020-02-24 PROCEDURE — 93793 ANTICOAG MGMT PT WARFARIN: CPT

## 2020-02-24 PROCEDURE — 85610 PROTHROMBIN TIME: CPT

## 2020-03-09 ENCOUNTER — ANTI-COAG VISIT (OUTPATIENT)
Dept: INTERNAL MEDICINE CLINIC | Facility: CLINIC | Age: 25
End: 2020-03-09
Payer: MEDICAID

## 2020-03-09 ENCOUNTER — TELEPHONE (OUTPATIENT)
Dept: INTERNAL MEDICINE CLINIC | Facility: CLINIC | Age: 25
End: 2020-03-09

## 2020-03-09 DIAGNOSIS — Z51.81 ENCOUNTER FOR THERAPEUTIC DRUG MONITORING: ICD-10-CM

## 2020-03-09 DIAGNOSIS — Z79.01 LONG TERM (CURRENT) USE OF ANTICOAGULANTS: ICD-10-CM

## 2020-03-09 DIAGNOSIS — I82.B11 THROMBOSIS OF RIGHT SUBCLAVIAN VEIN (HCC): ICD-10-CM

## 2020-03-09 LAB
INR: 1.3 (ref 0.8–1.2)
TEST STRIP EXPIRATION DATE: ABNORMAL DATE

## 2020-03-09 PROCEDURE — 36416 COLLJ CAPILLARY BLOOD SPEC: CPT

## 2020-03-09 PROCEDURE — 93793 ANTICOAG MGMT PT WARFARIN: CPT

## 2020-03-09 PROCEDURE — 85610 PROTHROMBIN TIME: CPT

## 2020-03-09 NOTE — TELEPHONE ENCOUNTER
Today's INR 1.3  Goal 2.0-3.0    Reports missing one dose. Understands per protocol, weekly dose increased by 20% (actual increase 21.4%). Repeat INR in 2 weeks.

## 2020-03-30 ENCOUNTER — TELEPHONE (OUTPATIENT)
Dept: INTERNAL MEDICINE CLINIC | Facility: CLINIC | Age: 25
End: 2020-03-30

## 2020-03-30 DIAGNOSIS — I82.B11 THROMBOSIS OF RIGHT SUBCLAVIAN VEIN (HCC): ICD-10-CM

## 2020-03-30 RX ORDER — WARFARIN SODIUM 5 MG/1
TABLET ORAL
Qty: 60 TABLET | Refills: 0 | Status: SHIPPED | OUTPATIENT
Start: 2020-03-30 | End: 2020-05-08

## 2020-04-01 ENCOUNTER — TELEPHONE (OUTPATIENT)
Dept: INTERNAL MEDICINE CLINIC | Facility: CLINIC | Age: 25
End: 2020-04-01

## 2020-04-01 DIAGNOSIS — I82.B11 THROMBOSIS OF RIGHT SUBCLAVIAN VEIN (HCC): Primary | ICD-10-CM

## 2020-04-01 DIAGNOSIS — Z79.01 LONG TERM (CURRENT) USE OF ANTICOAGULANTS: ICD-10-CM

## 2020-04-01 NOTE — TELEPHONE ENCOUNTER
Anticoag referral . Next INR 4/6. Order pended. Please sign, thank you.     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

## 2020-05-08 ENCOUNTER — TELEPHONE (OUTPATIENT)
Dept: INTERNAL MEDICINE CLINIC | Facility: CLINIC | Age: 25
End: 2020-05-08

## 2020-05-08 DIAGNOSIS — I82.B11 THROMBOSIS OF RIGHT SUBCLAVIAN VEIN (HCC): ICD-10-CM

## 2020-05-08 RX ORDER — WARFARIN SODIUM 5 MG/1
TABLET ORAL
Qty: 60 TABLET | Refills: 0 | Status: ON HOLD | OUTPATIENT
Start: 2020-05-08 | End: 2020-05-31

## 2020-05-08 NOTE — TELEPHONE ENCOUNTER
Current Outpatient Medications:   •  Warfarin Sodium 5 MG Oral Tab, As directed by Coumadin Clinic 1 tab daily (5mg) except Mondays take 1 1/2 tabs (7.5 mg) with changes per coumadin clinic, Disp: 60 tablet, Rfl: 0

## 2020-05-13 ENCOUNTER — ANTI-COAG VISIT (OUTPATIENT)
Dept: INTERNAL MEDICINE CLINIC | Facility: CLINIC | Age: 25
End: 2020-05-13
Payer: MEDICAID

## 2020-05-13 DIAGNOSIS — Z51.81 ENCOUNTER FOR THERAPEUTIC DRUG MONITORING: ICD-10-CM

## 2020-05-13 DIAGNOSIS — I82.B11 THROMBOSIS OF RIGHT SUBCLAVIAN VEIN (HCC): ICD-10-CM

## 2020-05-13 DIAGNOSIS — Z79.01 LONG TERM (CURRENT) USE OF ANTICOAGULANTS: ICD-10-CM

## 2020-05-13 PROCEDURE — 93793 ANTICOAG MGMT PT WARFARIN: CPT

## 2020-05-13 PROCEDURE — 85610 PROTHROMBIN TIME: CPT

## 2020-05-13 PROCEDURE — 36416 COLLJ CAPILLARY BLOOD SPEC: CPT

## 2020-05-30 ENCOUNTER — HOSPITAL ENCOUNTER (OUTPATIENT)
Facility: HOSPITAL | Age: 25
Setting detail: OBSERVATION
Discharge: HOME | End: 2020-05-31
Attending: EMERGENCY MEDICINE | Admitting: HOSPITALIST
Payer: MEDICAID

## 2020-05-30 ENCOUNTER — APPOINTMENT (OUTPATIENT)
Dept: CT IMAGING | Facility: HOSPITAL | Age: 25
End: 2020-05-30
Attending: EMERGENCY MEDICINE
Payer: MEDICAID

## 2020-05-30 ENCOUNTER — APPOINTMENT (OUTPATIENT)
Dept: GENERAL RADIOLOGY | Facility: HOSPITAL | Age: 25
End: 2020-05-30
Attending: EMERGENCY MEDICINE
Payer: MEDICAID

## 2020-05-30 DIAGNOSIS — R56.9 SEIZURE (HCC): Primary | ICD-10-CM

## 2020-05-30 DIAGNOSIS — I82.B11 THROMBOSIS OF RIGHT SUBCLAVIAN VEIN (HCC): ICD-10-CM

## 2020-05-30 DIAGNOSIS — F19.10 POLYSUBSTANCE ABUSE (HCC): ICD-10-CM

## 2020-05-30 PROCEDURE — 99244 OFF/OP CNSLTJ NEW/EST MOD 40: CPT | Performed by: OTHER

## 2020-05-30 PROCEDURE — 71045 X-RAY EXAM CHEST 1 VIEW: CPT | Performed by: EMERGENCY MEDICINE

## 2020-05-30 PROCEDURE — 70450 CT HEAD/BRAIN W/O DYE: CPT | Performed by: EMERGENCY MEDICINE

## 2020-05-30 PROCEDURE — 95816 EEG AWAKE AND DROWSY: CPT | Performed by: OTHER

## 2020-05-30 RX ORDER — LORAZEPAM 2 MG/ML
0.5 INJECTION INTRAMUSCULAR EVERY 2 HOUR PRN
Status: DISCONTINUED | OUTPATIENT
Start: 2020-05-30 | End: 2020-05-31

## 2020-05-30 RX ORDER — LORAZEPAM 2 MG/ML
0.5 INJECTION INTRAMUSCULAR EVERY 6 HOURS PRN
Status: DISCONTINUED | OUTPATIENT
Start: 2020-05-30 | End: 2020-05-30

## 2020-05-30 RX ORDER — ENOXAPARIN SODIUM 100 MG/ML
40 INJECTION SUBCUTANEOUS DAILY
Status: DISCONTINUED | OUTPATIENT
Start: 2020-05-30 | End: 2020-05-31

## 2020-05-30 RX ORDER — LORAZEPAM 2 MG/ML
2 INJECTION INTRAMUSCULAR AS NEEDED
Status: DISCONTINUED | OUTPATIENT
Start: 2020-05-30 | End: 2020-05-30

## 2020-05-30 RX ORDER — ACETAMINOPHEN 325 MG/1
650 TABLET ORAL EVERY 6 HOURS PRN
Status: DISCONTINUED | OUTPATIENT
Start: 2020-05-30 | End: 2020-05-31

## 2020-05-30 RX ORDER — METHADONE HYDROCHLORIDE 10 MG/1
20 TABLET ORAL ONCE
Status: COMPLETED | OUTPATIENT
Start: 2020-05-30 | End: 2020-05-30

## 2020-05-30 RX ORDER — ONDANSETRON 2 MG/ML
4 INJECTION INTRAMUSCULAR; INTRAVENOUS EVERY 6 HOURS PRN
Status: DISCONTINUED | OUTPATIENT
Start: 2020-05-30 | End: 2020-05-31

## 2020-05-30 RX ORDER — CLONIDINE HYDROCHLORIDE 0.1 MG/1
0.1 TABLET ORAL 2 TIMES DAILY PRN
Status: DISCONTINUED | OUTPATIENT
Start: 2020-05-30 | End: 2020-05-31

## 2020-05-30 NOTE — ED NOTES
Patient now answering questions but able to move all extremities equally, mother at bedside providing information. Patient opens eyes and tracks this writer. MD at bedside.

## 2020-05-30 NOTE — ED INITIAL ASSESSMENT (HPI)
Mom pick patient from friends house. Mom witnessed patient having seizures. Patient has no history of seizures.

## 2020-05-30 NOTE — ED NOTES
Patient alert and oriented x's 4 at this time, answering questions appropriately. States she has been shooting up heroin as well as using benzos.

## 2020-05-30 NOTE — PLAN OF CARE
Pt came up from ED around 1130. Pt is A&Ox4, VSS on room air. Complaining of headache, PRN tylenol given. Voiding freely. Pt and mom denying consult from Dr. Juan Francisco Ibrahim. On general diet, but has little to no appetite. Up with SBA.  Seizure precautions in place pain and evaluate response  - Implement non-pharmacological measures as appropriate and evaluate response  - Consider cultural and social influences on pain and pain management  - Manage/alleviate anxiety  - Utilize distraction and/or relaxation techniques social support system  Outcome: Progressing     Problem: METABOLIC/FLUID AND ELECTROLYTES - ADULT  Goal: Electrolytes maintained within normal limits  Description  INTERVENTIONS:  - Monitor labs and rhythm and assess patient for signs and symptoms of elect

## 2020-05-30 NOTE — ED PROVIDER NOTES
Patient Seen in: [facility] Emergency Department      History   Patient presents with:  Seizures    Stated Complaint: seizure    HPI    Patient presents to the emergency department with what mother reports was a tonic-clonic seizure.   Patient has All other systems reviewed and negative except as noted above.     Physical Exam     ED Triage Vitals   BP 05/30/20 0846 126/83   Pulse 05/30/20 0846 106   Resp 05/30/20 0846 19   Temp 05/30/20 0912 98.1 °F (36.7 °C)   Temp src 05/30/20 1138 Oral   SpO2 CO2 17.0 (*)     Creatinine 1.04 (*)     BUN/CREA Ratio 8.7 (*)     All other components within normal limits   URINALYSIS WITH CULTURE REFLEX - Abnormal; Notable for the following components:    Ketones Urine Trace (*)     Nitrite Urine Positive (*)     L GREEN   RAINBOW DRAW GOLD   BLOOD CULTURE   BLOOD CULTURE   URINE CULTURE, ROUTINE                  MDM     Pulse Ox: 96%, Normal,     Cardiac Monitor: Pulse Readings from Last 1 Encounters:  05/30/20 : 76  , sinus,      Radiology findings: Shahana Barley to the hospital for observation to a telemetry bed.             Disposition and Plan     Clinical Impression:  Seizure [facility])  (primary encounter diagnosis)  Polysubstance abuse ([facility] [address]    Disposition:  Admit  5/30/2020  9:56 am    Follow-up:  No follow-up pro

## 2020-05-30 NOTE — ED NOTES
Orders for admission, patient is aware of plan and ready to go upstairs. Any questions, please call ED RN Velma Pete at extension [phone]. Patient is alert and oriented x's 4 at this time.

## 2020-05-30 NOTE — H&P
[address] [facility] Patient Status:  Emergency    1/3/1995 MRN [id number]   Location [address] Attending Amanda Us MD   Hosp Day # 0 PCP Susie Cardoso MD     Date:   Other (back pain) Father    • Hypertension Mother    • Hypertension Maternal Grandmother    • Diabetes Maternal Grandfather    • Prostate Cancer Paternal Grandfather         w/ metastasis     Social History:  Social History    Tobacco Use      Smoking stat clear and coherent. Psychiatric:  Cooperative, appropriate mood & affect, but anxious.   Skin: multiple healed skin lesions, and tattoos    Cervical Papanicolaou to be done in MD's office    Results:     Lab Results   Component Value Date    WBC 7.3 05/30/ clinic), now withdrawal  -psych consult offered (but both mom and daughter vehemently refused)  -clonidine as BP allows  -zofran, ativan prn  -supportive  -would be a candidate for methadone or subaxone, if she agrees to psych consult  -SW for resources, p

## 2020-05-30 NOTE — CONSULTS
[facility] HOSP - [facility]    Report of Consultation    Sandra Bell Patient Status:  Emergency    1/3/1995 MRN [id number]   Location [address] Attending Doris Mayfield MD   Hosp Day # 0 PCP Charles Rivera MD     Date Social History  Patient Guardians:  Not on file    Other Topics            Concern  Caffeine Concern        No    Comment: 2 cans Cola per day  Exercise                Yes    Comment:hep  Reaction to local anes* No    Social History Narrative    The proximally and distally                  - lower  extremities 5/5 proximally and distally    Sensory Exam:  Light touch sensation- intact in all 4 extremities    Deep Tendon Reflexes:  Biceps 2+ bilateral symmetric  Triceps 2+ bilateral symmetric  Brachior benzodiazepine withdrawal.  There is no need for antiepileptic medications in this kind of scenario but still we will do MRI of the brain and EEG to rule out any other etiology for her new onset seizure    Thank you for allowing me to participate in the ca

## 2020-05-30 NOTE — PROCEDURES
EEG report    REFERRING PHYSICIAN: Cesar Calderon MD    PCP and phone number:  Jocelin Sharp MD  [phone]    TECHNIQUE: 21 channels of EEG, 2 channels of EOG, and 1 channel of EKG were recorded utilizing the International 10/20 System.  The rec

## 2020-05-30 NOTE — ED NOTES
Assumed care of patient from [facility]. Patient is currently sitting calmly in ED stretcher. PIV infusing with normal saline. Patient alert, oriented x4, speech clear. Respirations regular and nonlabored. Will continue to monitor.

## 2020-05-31 ENCOUNTER — APPOINTMENT (OUTPATIENT)
Dept: MRI IMAGING | Facility: HOSPITAL | Age: 25
End: 2020-05-31
Attending: Other
Payer: MEDICAID

## 2020-05-31 VITALS
SYSTOLIC BLOOD PRESSURE: 111 MMHG | RESPIRATION RATE: 20 BRPM | DIASTOLIC BLOOD PRESSURE: 79 MMHG | WEIGHT: 120 LBS | HEART RATE: 88 BPM | BODY MASS INDEX: 21.26 KG/M2 | OXYGEN SATURATION: 100 % | HEIGHT: 63 IN | TEMPERATURE: 98 F

## 2020-05-31 PROCEDURE — 99217 OBSERVATION CARE DISCHARGE: CPT | Performed by: HOSPITALIST

## 2020-05-31 PROCEDURE — 70551 MRI BRAIN STEM W/O DYE: CPT | Performed by: OTHER

## 2020-05-31 PROCEDURE — 99214 OFFICE O/P EST MOD 30 MIN: CPT | Performed by: OTHER

## 2020-05-31 RX ORDER — METHADONE HYDROCHLORIDE 10 MG/1
20 TABLET ORAL ONCE
Status: DISCONTINUED | OUTPATIENT
Start: 2020-05-31 | End: 2020-05-31

## 2020-05-31 RX ORDER — LORAZEPAM 1 MG/1
1 TABLET ORAL EVERY 4 HOURS PRN
Status: DISCONTINUED | OUTPATIENT
Start: 2020-05-31 | End: 2020-05-31

## 2020-05-31 RX ORDER — KETOROLAC TROMETHAMINE 30 MG/ML
30 INJECTION, SOLUTION INTRAMUSCULAR; INTRAVENOUS EVERY 6 HOURS PRN
Status: DISCONTINUED | OUTPATIENT
Start: 2020-05-31 | End: 2020-05-31

## 2020-05-31 RX ORDER — WARFARIN SODIUM 5 MG/1
TABLET ORAL
Qty: 60 TABLET | Refills: 0 | Status: SHIPPED | OUTPATIENT
Start: 2020-05-31 | End: 2020-07-08

## 2020-05-31 RX ORDER — LEVOFLOXACIN 500 MG/1
500 TABLET, FILM COATED ORAL DAILY
Status: DISCONTINUED | OUTPATIENT
Start: 2020-05-31 | End: 2020-05-31

## 2020-05-31 RX ORDER — LEVOFLOXACIN 500 MG/1
500 TABLET, FILM COATED ORAL DAILY
Qty: 3 TABLET | Refills: 0 | Status: SHIPPED | OUTPATIENT
Start: 2020-05-31 | End: 2020-06-03

## 2020-05-31 NOTE — PROGRESS NOTES
[facility] AND [facility]  Neurology Progress Note    Elridge Naas Patient Status:  Observation    1/3/1995 MRN [id number]   Location [facility] 4W/SW/SE Attending Altaf Kaufman MD   Hosp Day # 0 PCP Kishan Amaya MD     Subjective:  Jon Stewart Visual fields full to confrontation        Fundoscopically- No papilledema or retinal hemorrhages. Normal optic discs, sharp edges. III, IV, VI- EOM intact, NORMA  V. Facial sensation intact  VII. Face symmetric, no facial weakness  VIII.  Hearing intact t 9:30 AM          Mri Brain (cpt=[id number])    Result Date: 5/31/2020  CONCLUSION:  1. Unremarkable non-contrast MR appearance of the brain. No suspect epileptogenic foci are identified.  2. Significant image degradation secondary to patient related motion janna wound, right, initial encounter     Thrombosis of right subclavian vein (HCC)     Reactive depression     Long term (current) use of anticoagulants     Encounter for therapeutic drug monitoring     New onset seizure ([facility] [address]     Seizure ([facility] [address]     Vijay Yeboah

## 2020-05-31 NOTE — PLAN OF CARE
Patient sleeping throughout most of the night. PRN ativan given for anxiety. Clonidine x1 given for withdrawal symptoms. Methadone given as ordered by MD. PRN tylenol for headache. PRN zofran for nausea. Patient did not eat dinner, reports no appetite.  Did pain and evaluate response  - Implement non-pharmacological measures as appropriate and evaluate response  - Consider cultural and social influences on pain and pain management  - Manage/alleviate anxiety  - Utilize distraction and/or relaxation techniques social support system  Outcome: Progressing     Problem: METABOLIC/FLUID AND ELECTROLYTES - ADULT  Goal: Electrolytes maintained within normal limits  Description  INTERVENTIONS:  - Monitor labs and rhythm and assess patient for signs and symptoms of elect

## 2020-06-01 ENCOUNTER — TELEPHONE (OUTPATIENT)
Dept: INTERNAL MEDICINE UNIT | Facility: HOSPITAL | Age: 25
End: 2020-06-01

## 2020-06-01 NOTE — TELEPHONE ENCOUNTER
VM left for pt  Instructing to schedule coumadin clinic follow up Margarette, recommended Wednesday OR Thursday. Message routed to Coumadin clinic for assistance w scheduling a sooner apt.

## 2020-06-01 NOTE — TELEPHONE ENCOUNTER
Spoke with Sherrie. [facility] appointment made for this Wednesday 6/3. Encouraged her to keep this appointment as her INR was low during recent hospitalization and she is at risk for developing a blood clot when INR is low. She verbalized understanding.

## 2020-06-03 ENCOUNTER — ANTI-COAG VISIT (OUTPATIENT)
Dept: INTERNAL MEDICINE CLINIC | Facility: CLINIC | Age: 25
End: 2020-06-03
Payer: MEDICAID

## 2020-06-03 DIAGNOSIS — Z51.81 ENCOUNTER FOR THERAPEUTIC DRUG MONITORING: ICD-10-CM

## 2020-06-03 DIAGNOSIS — I82.B11 THROMBOSIS OF RIGHT SUBCLAVIAN VEIN (HCC): ICD-10-CM

## 2020-06-03 DIAGNOSIS — Z79.01 LONG TERM (CURRENT) USE OF ANTICOAGULANTS: ICD-10-CM

## 2020-06-03 PROCEDURE — 36416 COLLJ CAPILLARY BLOOD SPEC: CPT

## 2020-06-03 PROCEDURE — 93793 ANTICOAG MGMT PT WARFARIN: CPT

## 2020-06-03 PROCEDURE — 85610 PROTHROMBIN TIME: CPT

## 2020-06-08 ENCOUNTER — TELEPHONE (OUTPATIENT)
Dept: INTERNAL MEDICINE CLINIC | Facility: CLINIC | Age: 25
End: 2020-06-08

## 2020-06-08 RX ORDER — SULFAMETHOXAZOLE AND TRIMETHOPRIM 800; 160 MG/1; MG/1
1 TABLET ORAL 2 TIMES DAILY
Qty: 14 TABLET | Refills: 0 | Status: SHIPPED | OUTPATIENT
Start: 2020-06-08 | End: 2020-06-15

## 2020-06-08 NOTE — DISCHARGE SUMMARY
[facility] HOSPITALIST  DISCHARGE SUMMARY     Namita Byers Patient Status:  Observation    1/3/1995 MRN [id number]   Location [facility] 4W/SW/SE Attending No att. providers found   Hosp Day # 0 PCP Jose Lopez MD     Date of Admission:  both mom and daughter vehemently refused)  -clonidine as BP allows  -zofran, ativan prn  -supportive  -would be a candidate for methadone or subaxone, if she agrees to psych consult  - for resources, pt would really benefit from structured dual diagnosis puffs into the lungs every 6 (six) hours as needed. Quantity:  1 Inhaler  Refills:  0     METHADONE HCL OR      Take 110 mg by mouth daily. Refills:  0     Narcan 4 MG/0.1ML Liqd  Generic drug:  Naloxone HCl      USE 1 SPRAYS IN NOSTRIL AS NEEDED.  IF P Risk  0-28   Low Risk. TCM Follow-Up Recommendation:  LACE < 29: Low Risk of readmission after discharge from the hospital. No TCM follow-up needed.         April Ramirez MD 6/7/2020    Time spent:  > 30 minutes

## 2020-06-08 NOTE — TELEPHONE ENCOUNTER
ACC was notified by PCP that patient will be starting Bactrim two times daily for the next 7 days. Bactrin may increase the effect of coumadin and make the blood thinner. Called and spoke with [employer].  Educated on the effect of the antibiotic and coum

## 2020-06-08 NOTE — PROGRESS NOTES
Reviewed urine culture results--noted sensitivity to Bactrim    Called and spoke to pt --patient currently without any urinary   symptoms   GENERAL HEALTH: No fevers, no chills,no  sweats, no fatigue  GI: denies abdominal pain,no  Nausea, no vomiting

## 2020-06-08 NOTE — TELEPHONE ENCOUNTER
Spoke with infectious disease–since patient is without any symptoms and her urine did not show any pus, she does not need to be treated  Called and spoke to pt, she has not picked up the medications--did advise her that she did not have to take it  Call ba

## 2020-06-09 ENCOUNTER — VIRTUAL PHONE E/M (OUTPATIENT)
Dept: INTERNAL MEDICINE CLINIC | Facility: CLINIC | Age: 25
End: 2020-06-09
Payer: MEDICAID

## 2020-06-09 DIAGNOSIS — F19.10 POLYSUBSTANCE ABUSE (HCC): ICD-10-CM

## 2020-06-09 DIAGNOSIS — R56.9 SEIZURE (HCC): Primary | ICD-10-CM

## 2020-06-09 PROCEDURE — 99213 OFFICE O/P EST LOW 20 MIN: CPT | Performed by: INTERNAL MEDICINE

## 2020-06-09 NOTE — PROGRESS NOTES
Telemedicine Visit  Virtual/Telephone Check-In    Lauro Brambila verbally consents to a Telephone Check-In service on 06/09/20.  Patient understands and accepts financial responsibility for any deductible, co-insurance and/or co-pays associated with this s weeks ago      Problem list    H/o herion use  echar s/p debridment  Right internal jugular and subclavian vein thrombosis--advised lifelong anticoagulation with warfarin as per [facility] note December 2017  Thrombophlebitis of the cephalic vein and left cep central, L3-4 slight central bulging discs     Neck pain     Bilateral groin pain     Leg pain     Chest pain, atypical     Left arm cellulitis     Elevated troponin     Bipolar disorder, unspecified ([address]     Opioid type dependence, continuous ([address]     S ITCHING  Amoxicillin             HIVES  Clindamycin             RASH    Physical Exam  Pt A+O x 3 ,no acute distress  Pt speaking in complete sentences without any conversational dyspnea , no respt distress , no coughing       Summary of topics discussed/

## 2020-06-17 ENCOUNTER — ANTI-COAG VISIT (OUTPATIENT)
Dept: INTERNAL MEDICINE CLINIC | Facility: CLINIC | Age: 25
End: 2020-06-17
Payer: MEDICAID

## 2020-06-17 DIAGNOSIS — I82.B11 THROMBOSIS OF RIGHT SUBCLAVIAN VEIN (HCC): ICD-10-CM

## 2020-06-17 DIAGNOSIS — Z51.81 ENCOUNTER FOR THERAPEUTIC DRUG MONITORING: ICD-10-CM

## 2020-06-17 DIAGNOSIS — Z79.01 LONG TERM (CURRENT) USE OF ANTICOAGULANTS: ICD-10-CM

## 2020-06-17 PROCEDURE — 93793 ANTICOAG MGMT PT WARFARIN: CPT

## 2020-06-17 PROCEDURE — 36416 COLLJ CAPILLARY BLOOD SPEC: CPT

## 2020-06-17 PROCEDURE — 85610 PROTHROMBIN TIME: CPT

## 2020-07-08 ENCOUNTER — TELEPHONE (OUTPATIENT)
Dept: INTERNAL MEDICINE CLINIC | Facility: CLINIC | Age: 25
End: 2020-07-08

## 2020-07-08 DIAGNOSIS — I82.B11 THROMBOSIS OF RIGHT SUBCLAVIAN VEIN (HCC): ICD-10-CM

## 2020-07-08 RX ORDER — WARFARIN SODIUM 5 MG/1
TABLET ORAL
Qty: 60 TABLET | Refills: 0 | Status: SHIPPED | OUTPATIENT
Start: 2020-07-08 | End: 2020-09-09

## 2020-07-08 NOTE — TELEPHONE ENCOUNTER
Current Outpatient Medications:   •  Warfarin Sodium 5 MG Oral Tab, As directed by Coumadin Clinic 1 tab (5mg) every other day to alternate with 1 1/2 tabs (7.5 mg) every other day, with changes per coumadin clinic, Disp: 60 tablet, Rfl: 0

## 2020-07-20 ENCOUNTER — ANTI-COAG VISIT (OUTPATIENT)
Dept: INTERNAL MEDICINE CLINIC | Facility: CLINIC | Age: 25
End: 2020-07-20
Payer: MEDICAID

## 2020-07-20 DIAGNOSIS — Z79.01 LONG TERM (CURRENT) USE OF ANTICOAGULANTS: ICD-10-CM

## 2020-07-20 DIAGNOSIS — I82.B11 THROMBOSIS OF RIGHT SUBCLAVIAN VEIN (HCC): ICD-10-CM

## 2020-07-20 DIAGNOSIS — Z51.81 ENCOUNTER FOR THERAPEUTIC DRUG MONITORING: ICD-10-CM

## 2020-07-20 LAB
INR: 1 (ref 0.8–1.2)
TEST STRIP EXPIRATION DATE: NORMAL DATE

## 2020-07-20 PROCEDURE — 85610 PROTHROMBIN TIME: CPT

## 2020-07-20 PROCEDURE — 36416 COLLJ CAPILLARY BLOOD SPEC: CPT

## 2020-07-20 PROCEDURE — 93793 ANTICOAG MGMT PT WARFARIN: CPT

## 2020-07-29 ENCOUNTER — OFFICE VISIT (OUTPATIENT)
Dept: NEUROLOGY | Facility: CLINIC | Age: 25
End: 2020-07-29
Payer: MEDICAID

## 2020-07-29 VITALS — BODY MASS INDEX: 21.26 KG/M2 | WEIGHT: 120 LBS | HEIGHT: 63 IN

## 2020-07-29 DIAGNOSIS — R56.9 SEIZURE (HCC): Primary | ICD-10-CM

## 2020-07-29 PROCEDURE — 3008F BODY MASS INDEX DOCD: CPT | Performed by: OTHER

## 2020-07-29 PROCEDURE — 99214 OFFICE O/P EST MOD 30 MIN: CPT | Performed by: OTHER

## 2020-07-29 RX ORDER — DIVALPROEX SODIUM 500 MG/1
1000 TABLET, EXTENDED RELEASE ORAL DAILY
Qty: 180 TABLET | Refills: 2 | Status: SHIPPED | OUTPATIENT
Start: 2020-07-29 | End: 2020-09-09
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
## 2020-08-12 ENCOUNTER — TELEPHONE (OUTPATIENT)
Dept: INTERNAL MEDICINE CLINIC | Facility: CLINIC | Age: 25
End: 2020-08-12

## 2020-08-12 ENCOUNTER — ANTI-COAG VISIT (OUTPATIENT)
Dept: INTERNAL MEDICINE CLINIC | Facility: CLINIC | Age: 25
End: 2020-08-12
Payer: MEDICAID

## 2020-08-12 DIAGNOSIS — Z3A.01 LESS THAN 8 WEEKS GESTATION OF PREGNANCY: Primary | ICD-10-CM

## 2020-08-12 DIAGNOSIS — I82.B11 THROMBOSIS OF RIGHT SUBCLAVIAN VEIN (HCC): ICD-10-CM

## 2020-08-12 DIAGNOSIS — Z51.81 ENCOUNTER FOR THERAPEUTIC DRUG MONITORING: ICD-10-CM

## 2020-08-12 DIAGNOSIS — Z79.01 LONG TERM (CURRENT) USE OF ANTICOAGULANTS: ICD-10-CM

## 2020-08-12 LAB
INR: 1.9 (ref 0.8–1.2)
TEST STRIP EXPIRATION DATE: ABNORMAL DATE

## 2020-08-12 PROCEDURE — 85610 PROTHROMBIN TIME: CPT

## 2020-08-12 PROCEDURE — 36416 COLLJ CAPILLARY BLOOD SPEC: CPT

## 2020-08-12 PROCEDURE — 93793 ANTICOAG MGMT PT WARFARIN: CPT

## 2020-08-12 NOTE — TELEPHONE ENCOUNTER
She should stop the Coumadin immediately  Check a home pregnancy test  Will forward anticoagulation clinic as well

## 2020-08-12 NOTE — TELEPHONE ENCOUNTER
Advised patient of Dr. Cal Grubbs note. Patient verbalized understanding. Patient last took coumadin last night. INR at the coumadin clinic today was 1.9. Patient did check a urine pregnancy test and it was positive. Patient is 2 weeks late on her period. Patient is having bilateral breast pain, morning sickness, and emotional. Patient was on the coumadin for a blood clot. Please advise on next steps. Patient has an appointment with Dr Deon Reyes tomorrow in the office.  Did you want to check a blood pregnancy test?

## 2020-08-12 NOTE — TELEPHONE ENCOUNTER
Yes, noted that patient has appointment with me tomorrow and can order the blood test tomorrow  She needs to follow-up with ob GYN--I think that she has seen Dr. Gema Cifuentes in the past  She should let her know her consultants know as well-neurology

## 2020-08-12 NOTE — TELEPHONE ENCOUNTER
Patient had coumadin today, states she may be pregnant does have an appt tomorrow morning wants to know if she would still be on her coumadin meds

## 2020-08-12 NOTE — TELEPHONE ENCOUNTER
No MyChart. Left complete message (FYI) in patient's voice mail regarding Dr Matty Parham note.     Future Appointments   Date Time Provider Jacqueline Quinones   8/13/2020  8:40 AM Soledad Bowling MD [facility]   8/26/2020  8:15 AM [facility]   8/28/2020  7:45 AM Hal Roberts MD [facility]

## 2020-08-13 ENCOUNTER — OFFICE VISIT (OUTPATIENT)
Dept: INTERNAL MEDICINE CLINIC | Facility: CLINIC | Age: 25
End: 2020-08-13
Payer: MEDICAID

## 2020-08-13 VITALS
HEIGHT: 63 IN | SYSTOLIC BLOOD PRESSURE: 121 MMHG | BODY MASS INDEX: 26.01 KG/M2 | DIASTOLIC BLOOD PRESSURE: 83 MMHG | RESPIRATION RATE: 15 BRPM | TEMPERATURE: 98 F | WEIGHT: 146.81 LBS | HEART RATE: 79 BPM

## 2020-08-13 DIAGNOSIS — Z79.01 ON WARFARIN AT HOME: ICD-10-CM

## 2020-08-13 DIAGNOSIS — I82.B11 THROMBOSIS OF RIGHT SUBCLAVIAN VEIN (HCC): ICD-10-CM

## 2020-08-13 DIAGNOSIS — N92.6 MISSED PERIODS: Primary | ICD-10-CM

## 2020-08-13 DIAGNOSIS — F19.10 POLYSUBSTANCE ABUSE (HCC): ICD-10-CM

## 2020-08-13 DIAGNOSIS — Z3A.01 LESS THAN 8 WEEKS GESTATION OF PREGNANCY: ICD-10-CM

## 2020-08-13 DIAGNOSIS — R56.9 SEIZURES (HCC): ICD-10-CM

## 2020-08-13 LAB — CONTROL LINE PRESENT WITH A CLEAR BACKGROUND (YES/NO): YES YES/NO

## 2020-08-13 PROCEDURE — 81025 URINE PREGNANCY TEST: CPT | Performed by: INTERNAL MEDICINE

## 2020-08-13 PROCEDURE — 3079F DIAST BP 80-89 MM HG: CPT | Performed by: INTERNAL MEDICINE

## 2020-08-13 PROCEDURE — 3008F BODY MASS INDEX DOCD: CPT | Performed by: INTERNAL MEDICINE

## 2020-08-13 PROCEDURE — 3074F SYST BP LT 130 MM HG: CPT | Performed by: INTERNAL MEDICINE

## 2020-08-13 PROCEDURE — 99214 OFFICE O/P EST MOD 30 MIN: CPT | Performed by: INTERNAL MEDICINE

## 2020-08-13 RX ORDER — PNV 119/IRON FUM/FOLIC ACID 29 MG-1 MG
1 TABLET ORAL DAILY
Qty: 90 TABLET | Refills: 3 | Status: SHIPPED | OUTPATIENT
Start: 2020-08-13

## 2020-08-13 NOTE — PROGRESS NOTES
Geovanna Gilmore is a 23year old female.   Patient presents with:  Irregular Periods      HPI:   Pt comes as an urgent visit --last seen   C/c missed period  C/o LMP 7/1/2020 -- sexually active but doesn't use condoms   Still using heroin and on benzos - sh vein   She saw him again December and recommendations are as follows 12/17  She will remain on Coumadin with a goal INR of 2-3 if she can remain free of IV drugs over the next 6 months we can consider discontinuing anticoagulation at that time however she blood clots    • Intravenous drug abuse ([facility] [address]     Heroin and crack cocaine   • Opioid dependence [facility])       Past Surgical History:   Procedure Laterality Date   • Colonoscopy  2013   • Colonoscopy N/A 4/17/2017    Procedure: COLONOSCOPY;  Surgeon: Elisabeth Avila, NAVIGATOR    Less than 8 weeks gestation of pregnancy  -     OBG - INTERNAL    Seizures (HCC)  -     NEURO - INTERNAL    Thrombosis of right subclavian vein (HCC)  -     ONCOLOGY/HEMATOLOGY - INTERNAL    On warfarin at home  -     ONCOLOGY/HEMATOLOGY - INT

## 2020-08-17 ENCOUNTER — TELEPHONE (OUTPATIENT)
Dept: OBGYN CLINIC | Facility: CLINIC | Age: 25
End: 2020-08-17

## 2020-08-17 NOTE — TELEPHONE ENCOUNTER
Pt had a home positive preg test. LMP 7-1-20. Pt also had a positive with her pcp. Pt has decided to see our group and agrees to see all the MDs. Pt will call if she has any problems. Pt states that she has regular periods and they come every 28 days.

## 2020-08-17 NOTE — TELEPHONE ENCOUNTER
Pt had a home positive test and a positive with her pcp. Pt would like to see all women. Pt will call the midwives to schedule an appt.

## 2020-08-19 ENCOUNTER — TELEPHONE (OUTPATIENT)
Dept: INTERNAL MEDICINE CLINIC | Facility: CLINIC | Age: 25
End: 2020-08-19

## 2020-08-19 NOTE — TELEPHONE ENCOUNTER
Per MA call patient and be sure she has appt with Dr. Veronika Iglesias since MA stopped warfarin.  LM for pt to call back

## 2020-08-20 NOTE — TELEPHONE ENCOUNTER
Pt contacted and informed her to call Dr. Daniel Wilson office to schedule appt. Pt verbal understanding.

## 2020-08-25 ENCOUNTER — TELEPHONE (OUTPATIENT)
Dept: OBGYN CLINIC | Facility: CLINIC | Age: 25
End: 2020-08-25

## 2020-08-25 ENCOUNTER — TELEPHONE (OUTPATIENT)
Dept: INTERNAL MEDICINE CLINIC | Facility: CLINIC | Age: 25
End: 2020-08-25

## 2020-08-25 NOTE — TELEPHONE ENCOUNTER
Informed pt that we received a from from [address] [place] [address] for authorization for Release of Information. Informed pt that we need her signature. (The line for signature states verbal authorization at 10:05 am on 8/24/20 per [place]. )

## 2020-08-25 NOTE — TELEPHONE ENCOUNTER
Patients mother states [facility] was sending a medical release form to Dr. Adi Alcantar is requesting a call to discuss. States they need this immediately.

## 2020-08-26 ENCOUNTER — TELEPHONE (OUTPATIENT)
Dept: HEMATOLOGY/ONCOLOGY | Facility: HOSPITAL | Age: 25
End: 2020-08-26

## 2020-08-26 NOTE — TELEPHONE ENCOUNTER
Received forms, will fill out, please call her mom to let her know that this is done prior to faxing

## 2020-08-26 NOTE — PROGRESS NOTES
Cancer Center Progress Note    Patient Name: Ambrocio Soler   YOB: 1995   Medical Record Number: [id number]   Attending Physician: Anthony Edwards M.D.      Chief Complaint:  Recurrent venous thrombosis superficial and deep, IV drug abuse    Hist • Hypertension Maternal Grandmother    • Diabetes Maternal Grandfather    • Prostate Cancer Paternal Grandfather         w/ metastasis       Social History:  Social History    Socioeconomic History      Marital status: Single      Spouse name: Not on sangeeta cans Cola per day        Occupational Exposure: Not Asked        Hobby Hazards: Not Asked        Sleep Concern: Not Asked        Stress Concern: Not Asked        Weight Concern: Not Asked        Special Diet: Not Asked        Back Care: Not Asked        Ex x12    Vital Signs:  /61 (BP Location: Left arm)   Pulse 89   Resp 16   Ht 1.6 m (5' 3\")   Wt 63.6 kg (140 lb 3.4 oz)   LMP 07/01/2020   SpO2 98%   BMI 24.84 kg/m²     Physical Examination:  General: Patient is alert and oriented x 3, not in acute d and 12/4/2017 showed thrombus in the right internal jugular and subclavian veins. She has had previous ultrasound showing superficial thrombus in both upper extremities.   –The patient's DVT and superficial thrombotic events are certainly secondary to vasc

## 2020-08-27 ENCOUNTER — NURSE ONLY (OUTPATIENT)
Dept: OBGYN CLINIC | Facility: CLINIC | Age: 25
End: 2020-08-27
Payer: MEDICAID

## 2020-08-27 VITALS — HEIGHT: 62.75 IN | WEIGHT: 146 LBS | BODY MASS INDEX: 26.19 KG/M2

## 2020-08-27 DIAGNOSIS — Z34.81 ENCOUNTER FOR SUPERVISION OF OTHER NORMAL PREGNANCY IN FIRST TRIMESTER: Primary | ICD-10-CM

## 2020-08-27 PROCEDURE — 3008F BODY MASS INDEX DOCD: CPT | Performed by: OBSTETRICS & GYNECOLOGY

## 2020-08-27 NOTE — PROGRESS NOTES
Pt seen for OBN appt today with no complaints. Normal PN labs, hep C and varicella ordered. Pt advised all labs must be completed and resulted prior to MD appt. NPN appt with MD scheduled with SCOTTIE on 9/3/2020. Pt is a current IV drug user.   Pt is in no colpo was done   Hypertension  No    Infertility  No    Kidney Disease/Frequent UTIs  No    Medication Allergies Yes See list   Latex Allergies No    Food Allergies  No    Neurological Disorder/Epilepsy Yes Seizures without benzodiazapines   Operations/ Mental retardation/autism:  Yes (Comment: Pt's cousin has autism)   If yes, was the person tested for Fragile X?:  No (Comment: unsure)   Other inherited genetic or chromosomal disorder:  Yes (Comment: FOB's needed brain surgery at 3years old for artery

## 2020-09-01 ENCOUNTER — LAB ENCOUNTER (OUTPATIENT)
Dept: LAB | Facility: HOSPITAL | Age: 25
End: 2020-09-01
Attending: INTERNAL MEDICINE
Payer: MEDICAID

## 2020-09-01 ENCOUNTER — TELEPHONE (OUTPATIENT)
Dept: OBGYN CLINIC | Facility: CLINIC | Age: 25
End: 2020-09-01

## 2020-09-01 DIAGNOSIS — Z34.81 ENCOUNTER FOR SUPERVISION OF OTHER NORMAL PREGNANCY IN FIRST TRIMESTER: ICD-10-CM

## 2020-09-01 DIAGNOSIS — R56.9 SEIZURE (HCC): ICD-10-CM

## 2020-09-01 LAB
ALBUMIN SERPL-MCNC: 3.1 G/DL (ref 3.4–5)
ALBUMIN/GLOB SERPL: 0.7 {RATIO} (ref 1–2)
ALP LIVER SERPL-CCNC: 103 U/L (ref 52–144)
ALT SERPL-CCNC: 226 U/L (ref 13–56)
ANION GAP SERPL CALC-SCNC: 6 MMOL/L (ref 0–18)
ANTIBODY SCREEN: NEGATIVE
AST SERPL-CCNC: 157 U/L (ref 15–37)
BASOPHILS # BLD AUTO: 0.07 X10(3) UL (ref 0–0.2)
BASOPHILS NFR BLD AUTO: 0.7 %
BILIRUB SERPL-MCNC: 0.7 MG/DL (ref 0.1–2)
BUN BLD-MCNC: 10 MG/DL (ref 7–18)
BUN/CREAT SERPL: 15.4 (ref 10–20)
CALCIUM BLD-MCNC: 9.5 MG/DL (ref 8.5–10.1)
CHLORIDE SERPL-SCNC: 105 MMOL/L (ref 98–112)
CO2 SERPL-SCNC: 25 MMOL/L (ref 21–32)
CREAT BLD-MCNC: 0.65 MG/DL (ref 0.55–1.02)
DEPRECATED RDW RBC AUTO: 43 FL (ref 35.1–46.3)
EOSINOPHIL # BLD AUTO: 0.07 X10(3) UL (ref 0–0.7)
EOSINOPHIL NFR BLD AUTO: 0.7 %
ERYTHROCYTE [DISTWIDTH] IN BLOOD BY AUTOMATED COUNT: 12.9 % (ref 11–15)
GLOBULIN PLAS-MCNC: 4.3 G/DL (ref 2.8–4.4)
GLUCOSE BLD-MCNC: 84 MG/DL (ref 70–99)
HBV SURFACE AG SER-ACNC: <0.1 [IU]/L
HBV SURFACE AG SERPL QL IA: NONREACTIVE
HCT VFR BLD AUTO: 39.8 % (ref 35–48)
HGB BLD-MCNC: 12.8 G/DL (ref 12–16)
IMM GRANULOCYTES # BLD AUTO: 0.03 X10(3) UL (ref 0–1)
IMM GRANULOCYTES NFR BLD: 0.3 %
LYMPHOCYTES # BLD AUTO: 1.37 X10(3) UL (ref 1–4)
LYMPHOCYTES NFR BLD AUTO: 14.5 %
M PROTEIN MFR SERPL ELPH: 7.4 G/DL (ref 6.4–8.2)
MCH RBC QN AUTO: 29 PG (ref 26–34)
MCHC RBC AUTO-ENTMCNC: 32.2 G/DL (ref 31–37)
MCV RBC AUTO: 90 FL (ref 80–100)
MONOCYTES # BLD AUTO: 0.43 X10(3) UL (ref 0.1–1)
MONOCYTES NFR BLD AUTO: 4.5 %
NEUTROPHILS # BLD AUTO: 7.5 X10 (3) UL (ref 1.5–7.7)
NEUTROPHILS # BLD AUTO: 7.5 X10(3) UL (ref 1.5–7.7)
NEUTROPHILS NFR BLD AUTO: 79.3 %
OSMOLALITY SERPL CALC.SUM OF ELEC: 280 MOSM/KG (ref 275–295)
PATIENT FASTING Y/N/NP: NO
PLATELET # BLD AUTO: 107 10(3)UL (ref 150–450)
POTASSIUM SERPL-SCNC: 4 MMOL/L (ref 3.5–5.1)
RBC # BLD AUTO: 4.42 X10(6)UL (ref 3.8–5.3)
RH BLOOD TYPE: POSITIVE
RUBV IGG SER-ACNC: 7.3 IU/ML (ref 10–?)
SODIUM SERPL-SCNC: 136 MMOL/L (ref 136–145)
WBC # BLD AUTO: 9.5 X10(3) UL (ref 4–11)

## 2020-09-01 PROCEDURE — 86901 BLOOD TYPING SEROLOGIC RH(D): CPT

## 2020-09-01 PROCEDURE — 86787 VARICELLA-ZOSTER ANTIBODY: CPT

## 2020-09-01 PROCEDURE — 80053 COMPREHEN METABOLIC PANEL: CPT

## 2020-09-01 PROCEDURE — 86900 BLOOD TYPING SEROLOGIC ABO: CPT

## 2020-09-01 PROCEDURE — 86850 RBC ANTIBODY SCREEN: CPT

## 2020-09-01 PROCEDURE — 87902 NFCT AGT GNTYP ALYS HEP C: CPT

## 2020-09-01 PROCEDURE — 86803 HEPATITIS C AB TEST: CPT

## 2020-09-01 PROCEDURE — 87522 HEPATITIS C REVRS TRNSCRPJ: CPT

## 2020-09-01 PROCEDURE — 85025 COMPLETE CBC W/AUTO DIFF WBC: CPT

## 2020-09-01 PROCEDURE — 86762 RUBELLA ANTIBODY: CPT

## 2020-09-01 PROCEDURE — 87389 HIV-1 AG W/HIV-1&-2 AB AG IA: CPT

## 2020-09-01 PROCEDURE — 36415 COLL VENOUS BLD VENIPUNCTURE: CPT

## 2020-09-01 PROCEDURE — 87340 HEPATITIS B SURFACE AG IA: CPT

## 2020-09-01 PROCEDURE — 86780 TREPONEMA PALLIDUM: CPT

## 2020-09-01 NOTE — TELEPHONE ENCOUNTER
8w6d Nataliia  staff presented to triage. Tyree Mueller states pt Mom is at  requesting note from [facility] for lab. Per Gabriela Parish needs note stating they can draw from anywhere they can. \" Informed Tyree Mueller to inform Mom to return to lab and lab will be c

## 2020-09-02 ENCOUNTER — TELEPHONE (OUTPATIENT)
Dept: NEUROLOGY | Facility: CLINIC | Age: 25
End: 2020-09-02

## 2020-09-02 ENCOUNTER — LAB ENCOUNTER (OUTPATIENT)
Dept: LAB | Facility: HOSPITAL | Age: 25
End: 2020-09-02
Attending: OBSTETRICS & GYNECOLOGY
Payer: MEDICAID

## 2020-09-02 DIAGNOSIS — Z34.81 ENCOUNTER FOR SUPERVISION OF OTHER NORMAL PREGNANCY IN FIRST TRIMESTER: ICD-10-CM

## 2020-09-02 PROCEDURE — 87086 URINE CULTURE/COLONY COUNT: CPT

## 2020-09-02 NOTE — TELEPHONE ENCOUNTER
----- Message from Divya Guerrero MD sent at 9/2/2020  7:22 AM CDT -----  Please let the patient know that results of these particular lab tests so far were normal.    Thank you

## 2020-09-02 NOTE — TELEPHONE ENCOUNTER
S/w pt and informed her of normal lab results per Dr. Sandy Bennett in previous note. Pt verbalized understanding.

## 2020-09-04 LAB
HCV QNT BY NAAT (LOG IU/ML): 7.05 LOG IU/ML
HCV QNT BY NAAT INTERP: DETECTED
T PALLIDUM AB SER QL: NEGATIVE
VZV IGG SER IA-ACNC: 1136 (ref 165–?)

## 2020-09-09 ENCOUNTER — INITIAL PRENATAL (OUTPATIENT)
Dept: OBGYN CLINIC | Facility: CLINIC | Age: 25
End: 2020-09-09
Payer: MEDICAID

## 2020-09-09 VITALS
DIASTOLIC BLOOD PRESSURE: 70 MMHG | SYSTOLIC BLOOD PRESSURE: 107 MMHG | HEART RATE: 85 BPM | WEIGHT: 146.63 LBS | BODY MASS INDEX: 26 KG/M2

## 2020-09-09 DIAGNOSIS — Z34.81 ENCOUNTER FOR SUPERVISION OF OTHER NORMAL PREGNANCY IN FIRST TRIMESTER: Primary | ICD-10-CM

## 2020-09-09 PROBLEM — L03.119 CELLULITIS OF UPPER EXTREMITY, UNSPECIFIED LATERALITY: Status: RESOLVED | Noted: 2018-01-03 | Resolved: 2020-09-09

## 2020-09-09 PROBLEM — L03.114 LEFT ARM CELLULITIS: Status: RESOLVED | Noted: 2017-03-15 | Resolved: 2020-09-09

## 2020-09-09 PROBLEM — I82.B19 SUBCLAVIAN VEIN THROMBOSIS (HCC): Status: RESOLVED | Noted: 2017-12-04 | Resolved: 2020-09-09

## 2020-09-09 PROBLEM — L03.113 CELLULITIS OF RIGHT ARM: Status: RESOLVED | Noted: 2018-01-03 | Resolved: 2020-09-09

## 2020-09-09 PROBLEM — Z51.81 ENCOUNTER FOR THERAPEUTIC DRUG MONITORING: Status: RESOLVED | Noted: 2019-03-28 | Resolved: 2020-09-09

## 2020-09-09 PROBLEM — L03.119 CELLULITIS OF ARM: Status: RESOLVED | Noted: 2018-01-03 | Resolved: 2020-09-09

## 2020-09-09 PROBLEM — S41.101A OPEN ARM WOUND, RIGHT, INITIAL ENCOUNTER: Status: RESOLVED | Noted: 2018-03-12 | Resolved: 2020-09-09

## 2020-09-09 PROBLEM — F31.9 BIPOLAR DISORDER, UNSPECIFIED (HCC): Status: RESOLVED | Noted: 2017-03-17 | Resolved: 2020-09-09

## 2020-09-09 PROBLEM — R56.9 SEIZURE (HCC): Status: RESOLVED | Noted: 2020-05-30 | Resolved: 2020-09-09

## 2020-09-09 PROCEDURE — 3074F SYST BP LT 130 MM HG: CPT | Performed by: OBSTETRICS & GYNECOLOGY

## 2020-09-09 PROCEDURE — 3078F DIAST BP <80 MM HG: CPT | Performed by: OBSTETRICS & GYNECOLOGY

## 2020-09-09 PROCEDURE — 0500F INITIAL PRENATAL CARE VISIT: CPT | Performed by: OBSTETRICS & GYNECOLOGY

## 2020-09-09 NOTE — PROGRESS NOTES
Here with mom. Pap/Gc/chlamydia/trichomonas. Will be going to [facility] detox and will be doing a 21 day inpatient detox. Bedside u/s--+FHT, c/w dates (unable to print pics). Pt unable to give urine at visit.   RTC 4 wk

## 2020-09-10 LAB
C TRACH DNA SPEC QL NAA+PROBE: NEGATIVE
N GONORRHOEA DNA SPEC QL NAA+PROBE: NEGATIVE
T VAGINALIS RRNA SPEC QL NAA+PROBE: NEGATIVE

## 2020-09-10 NOTE — TELEPHONE ENCOUNTER
Pt filled out ORIN at OBN visit. Records were placed in MA office for NPN appt with SCOTTIE.  Forms were filled out by pcp.

## 2020-09-11 ENCOUNTER — TELEPHONE (OUTPATIENT)
Dept: OBGYN CLINIC | Facility: CLINIC | Age: 25
End: 2020-09-11

## 2020-09-11 NOTE — TELEPHONE ENCOUNTER
Eddy from [facility] calling for mutual patient requesting to speak with nurse, please contact at:[phone],thanks.

## 2020-09-11 NOTE — TELEPHONE ENCOUNTER
[place] [employer] form filled clinicals attached including insurance card. Fax confirmation received. Spoke to pt. Asked how she was doing stated she was \"hanging in there\" [address] faxed to [facility].  Stated she needed to find pen and paper

## 2020-09-11 NOTE — TELEPHONE ENCOUNTER
Spoke with mom and pt. MD discussion -- due to pt's high risk pregnancy, pt needs to transfer to tertiary center. Pt has Andrea  ? [facility].    Please assist    Pt has IV drug use (heroin, fentanyl, benzothiazepine)-- pt is scheduled to inpatient det

## 2020-09-11 NOTE — TELEPHONE ENCOUNTER
Received call back from [address] [place] [address] NPI number [id number]    CPT [id number] sent for medical review

## 2020-09-11 NOTE — TELEPHONE ENCOUNTER
Girma Cuellar from [facility] states records received was not a good copy and requesting record to be faxed again and also labs. Also states pt will need clinic to do [place] for [id number] for dating. Appt not scheduled yet, possibly appt will be on 9/16/20 if pt accepts.    Recor

## 2020-09-15 NOTE — TELEPHONE ENCOUNTER
Will send to Mahesh Baird [phone] as FYI     willl await until pt is out of rehab to see what is the next step

## 2020-09-15 NOTE — TELEPHONE ENCOUNTER
Xin Ang from [facility] called back and did receive the referral, but, apparently the patient is entering rehab and will be a month before an ultra sound can be done.  This will make the orders different with gestation and right now they are not able to do anythin

## 2020-09-15 NOTE — TELEPHONE ENCOUNTER
CPT [id number] Approved,    Spoke to  Jessica Elizabeth from [facility] and Provided [address] Number, will fax over PA to [phone]  Fax Confirmation received

## 2020-09-18 NOTE — PROGRESS NOTES
Send letter     Good to see you at the office.   Your pap was normal.  Negative gc/chlamydia/trichomonas

## 2020-09-21 ENCOUNTER — TELEPHONE (OUTPATIENT)
Dept: OBGYN CLINIC | Facility: CLINIC | Age: 25
End: 2020-09-21

## 2020-09-21 PROBLEM — B19.20 HEPATITIS C: Status: ACTIVE | Noted: 2020-09-21

## 2020-09-21 NOTE — TELEPHONE ENCOUNTER
----- Message from Lon Duverney, DO sent at 9/21/2020 12:14 PM CDT -----  Please notify pt she tested positive for Hep C. She is curerntly in a rehab facility and pregnant, but she is tranferring care due to high risk pregnancy.   She needs to discuss

## 2020-09-23 ENCOUNTER — TELEPHONE (OUTPATIENT)
Dept: OBGYN CLINIC | Facility: CLINIC | Age: 25
End: 2020-09-23

## 2020-09-23 NOTE — TELEPHONE ENCOUNTER
Spoke to Joya advised pt must get dating US done first prior to getting FTS. Still in window to get dating US done.  Verbalized understands will try to figure out why dating wasn't done/ why she is getting FTS done

## 2020-09-23 NOTE — TELEPHONE ENCOUNTER
Pt is scheduled for ultrasound code 12712 needs prior authorization for the scan, Pt is coming in today @ 11:15

## 2020-09-24 ENCOUNTER — TELEPHONE (OUTPATIENT)
Dept: OBGYN CLINIC | Facility: CLINIC | Age: 25
End: 2020-09-24

## 2020-09-24 NOTE — TELEPHONE ENCOUNTER
Received [facility] First Trimester Screening dated 9-23-20.  Placed on [facility]'s desk

## 2022-02-16 ENCOUNTER — OFFICE VISIT (OUTPATIENT)
Dept: OBGYN CLINIC | Facility: CLINIC | Age: 27
End: 2022-02-16
Payer: MEDICAID

## 2022-02-16 VITALS
WEIGHT: 130 LBS | HEART RATE: 75 BPM | BODY MASS INDEX: 24 KG/M2 | DIASTOLIC BLOOD PRESSURE: 66 MMHG | SYSTOLIC BLOOD PRESSURE: 99 MMHG

## 2022-02-16 DIAGNOSIS — N91.2 AMENORRHEA: ICD-10-CM

## 2022-02-16 DIAGNOSIS — R30.0 DYSURIA: ICD-10-CM

## 2022-02-16 DIAGNOSIS — N89.8 VAGINAL DISCHARGE: ICD-10-CM

## 2022-02-16 DIAGNOSIS — R10.2 PELVIC PAIN: ICD-10-CM

## 2022-02-16 DIAGNOSIS — Z11.3 SCREENING FOR STD (SEXUALLY TRANSMITTED DISEASE): Primary | ICD-10-CM

## 2022-02-16 PROCEDURE — 3078F DIAST BP <80 MM HG: CPT | Performed by: CLINICAL NURSE SPECIALIST

## 2022-02-16 PROCEDURE — 99213 OFFICE O/P EST LOW 20 MIN: CPT | Performed by: CLINICAL NURSE SPECIALIST

## 2022-02-16 PROCEDURE — 3074F SYST BP LT 130 MM HG: CPT | Performed by: CLINICAL NURSE SPECIALIST

## 2022-02-17 LAB
C TRACH DNA SPEC QL NAA+PROBE: NEGATIVE
N GONORRHOEA DNA SPEC QL NAA+PROBE: NEGATIVE

## 2022-02-18 LAB
GENITAL VAGINOSIS SCREEN: POSITIVE
TRICHOMONAS SCREEN: NEGATIVE

## 2022-02-21 ENCOUNTER — TELEPHONE (OUTPATIENT)
Dept: OBGYN CLINIC | Facility: CLINIC | Age: 27
End: 2022-02-21

## 2022-02-21 NOTE — TELEPHONE ENCOUNTER
----- Message from ALISE Barrett sent at 2/21/2022 10:37 AM CST -----  Please let pt know vaginal culture is + for BV and send Rx for Flagyl or Metrogel, whichever she prefers. Vaginal STD testing is negative. Is she planning of having the blood work I ordered done? ?     MAF

## 2022-02-28 ENCOUNTER — APPOINTMENT (OUTPATIENT)
Dept: GENERAL RADIOLOGY | Facility: HOSPITAL | Age: 27
End: 2022-02-28
Payer: MEDICAID

## 2022-02-28 ENCOUNTER — APPOINTMENT (OUTPATIENT)
Dept: CT IMAGING | Facility: HOSPITAL | Age: 27
End: 2022-02-28
Attending: EMERGENCY MEDICINE
Payer: MEDICAID

## 2022-02-28 ENCOUNTER — HOSPITAL ENCOUNTER (EMERGENCY)
Facility: HOSPITAL | Age: 27
Discharge: HOME | End: 2022-02-28
Attending: EMERGENCY MEDICINE
Payer: MEDICAID

## 2022-02-28 VITALS
TEMPERATURE: 98 F | OXYGEN SATURATION: 97 % | DIASTOLIC BLOOD PRESSURE: 87 MMHG | RESPIRATION RATE: 16 BRPM | SYSTOLIC BLOOD PRESSURE: 108 MMHG | HEART RATE: 90 BPM

## 2022-02-28 DIAGNOSIS — F19.10 POLYSUBSTANCE ABUSE (HCC): ICD-10-CM

## 2022-02-28 DIAGNOSIS — R07.9 CHEST PAIN, UNSPECIFIED TYPE: Primary | ICD-10-CM

## 2022-02-28 LAB
ANION GAP SERPL CALC-SCNC: 2 MMOL/L (ref 0–18)
B-HCG UR QL: NEGATIVE
BASOPHILS # BLD AUTO: 0.08 X10(3) UL (ref 0–0.2)
BASOPHILS NFR BLD AUTO: 1.3 %
BILIRUB UR QL: NEGATIVE
BUN BLD-MCNC: 14 MG/DL (ref 7–18)
BUN/CREAT SERPL: 17.9 (ref 10–20)
CALCIUM BLD-MCNC: 9.3 MG/DL (ref 8.5–10.1)
CHLORIDE SERPL-SCNC: 103 MMOL/L (ref 98–112)
CLARITY UR: CLEAR
CO2 SERPL-SCNC: 33 MMOL/L (ref 21–32)
CREAT BLD-MCNC: 0.78 MG/DL
D DIMER PPP FEU-MCNC: 0.47 UG/ML FEU (ref ?–0.5)
DEPRECATED RDW RBC AUTO: 50.6 FL (ref 35.1–46.3)
EOSINOPHIL # BLD AUTO: 0.2 X10(3) UL (ref 0–0.7)
EOSINOPHIL NFR BLD AUTO: 3.2 %
ERYTHROCYTE [DISTWIDTH] IN BLOOD BY AUTOMATED COUNT: 16.1 % (ref 11–15)
GLUCOSE BLD-MCNC: 87 MG/DL (ref 70–99)
GLUCOSE UR-MCNC: NEGATIVE MG/DL
HCT VFR BLD AUTO: 47.3 %
HGB BLD-MCNC: 14.5 G/DL
HGB UR QL STRIP.AUTO: NEGATIVE
IMM GRANULOCYTES # BLD AUTO: 0.02 X10(3) UL (ref 0–1)
IMM GRANULOCYTES NFR BLD: 0.3 %
KETONES UR-MCNC: NEGATIVE MG/DL
LACTATE SERPL-SCNC: 1.8 MMOL/L (ref 0.4–2)
LYMPHOCYTES # BLD AUTO: 2.81 X10(3) UL (ref 1–4)
MCH RBC QN AUTO: 26.4 PG (ref 26–34)
MCHC RBC AUTO-ENTMCNC: 30.7 G/DL (ref 31–37)
MCV RBC AUTO: 86 FL
MONOCYTES # BLD AUTO: 0.44 X10(3) UL (ref 0.1–1)
MONOCYTES NFR BLD AUTO: 7 %
NEUTROPHILS # BLD AUTO: 2.74 X10 (3) UL (ref 1.5–7.7)
NEUTROPHILS # BLD AUTO: 2.74 X10(3) UL (ref 1.5–7.7)
NEUTROPHILS NFR BLD AUTO: 43.5 %
NITRITE UR QL STRIP.AUTO: NEGATIVE
OSMOLALITY SERPL CALC.SUM OF ELEC: 286 MOSM/KG (ref 275–295)
PH UR: 7 [PH] (ref 5–8)
PLATELET # BLD AUTO: 297 10(3)UL (ref 150–450)
POTASSIUM SERPL-SCNC: 4.5 MMOL/L (ref 3.5–5.1)
PROT UR-MCNC: NEGATIVE MG/DL
SODIUM SERPL-SCNC: 138 MMOL/L (ref 136–145)
SP GR UR STRIP: 1.01 (ref 1–1.03)
TROPONIN I HIGH SENSITIVITY: 4 NG/L
UROBILINOGEN UR STRIP-ACNC: <2
WBC # BLD AUTO: 6.3 X10(3) UL (ref 4–11)

## 2022-02-28 PROCEDURE — 87086 URINE CULTURE/COLONY COUNT: CPT | Performed by: EMERGENCY MEDICINE

## 2022-02-28 PROCEDURE — 87040 BLOOD CULTURE FOR BACTERIA: CPT | Performed by: EMERGENCY MEDICINE

## 2022-02-28 PROCEDURE — 71045 X-RAY EXAM CHEST 1 VIEW: CPT | Performed by: EMERGENCY MEDICINE

## 2022-02-28 PROCEDURE — 85025 COMPLETE CBC W/AUTO DIFF WBC: CPT | Performed by: EMERGENCY MEDICINE

## 2022-02-28 PROCEDURE — 36415 COLL VENOUS BLD VENIPUNCTURE: CPT

## 2022-02-28 PROCEDURE — 93010 ELECTROCARDIOGRAM REPORT: CPT | Performed by: EMERGENCY MEDICINE

## 2022-02-28 PROCEDURE — 81025 URINE PREGNANCY TEST: CPT

## 2022-02-28 PROCEDURE — 99285 EMERGENCY DEPT VISIT HI MDM: CPT

## 2022-02-28 PROCEDURE — 83605 ASSAY OF LACTIC ACID: CPT | Performed by: EMERGENCY MEDICINE

## 2022-02-28 PROCEDURE — 74177 CT ABD & PELVIS W/CONTRAST: CPT | Performed by: EMERGENCY MEDICINE

## 2022-02-28 PROCEDURE — 84484 ASSAY OF TROPONIN QUANT: CPT | Performed by: EMERGENCY MEDICINE

## 2022-02-28 PROCEDURE — 80048 BASIC METABOLIC PNL TOTAL CA: CPT | Performed by: EMERGENCY MEDICINE

## 2022-02-28 PROCEDURE — 96374 THER/PROPH/DIAG INJ IV PUSH: CPT

## 2022-02-28 PROCEDURE — 81001 URINALYSIS AUTO W/SCOPE: CPT | Performed by: EMERGENCY MEDICINE

## 2022-02-28 PROCEDURE — 93005 ELECTROCARDIOGRAM TRACING: CPT

## 2022-02-28 PROCEDURE — 85379 FIBRIN DEGRADATION QUANT: CPT | Performed by: EMERGENCY MEDICINE

## 2022-02-28 RX ORDER — LORAZEPAM 1 MG/1
1 TABLET ORAL ONCE
Status: COMPLETED | OUTPATIENT
Start: 2022-02-28 | End: 2022-02-28

## 2022-02-28 RX ORDER — LORAZEPAM 2 MG/ML
1 INJECTION INTRAMUSCULAR ONCE
Status: COMPLETED | OUTPATIENT
Start: 2022-02-28 | End: 2022-02-28

## 2022-03-01 NOTE — ED INITIAL ASSESSMENT (HPI)
Patient aox3 to ed via private vehicle co of chest pain xfew days with sob denies cough/fever hx of heroin use, last used x this am patient reported recent hx of sepsis and endocarditis

## 2022-03-01 NOTE — ED QUICK NOTES
Pt and pt's mother provided and explained d/c instructions, at-home care, follow-up, and otc rx. Pt in nad at this time. Iv access d/c. Vss. Hanson. Ambulatory. A&ox3. Belongings with pt. All questions and concerns addressed.

## 2022-03-22 RX ORDER — METRONIDAZOLE 500 MG/1
500 TABLET ORAL 2 TIMES DAILY
Qty: 14 TABLET | Refills: 0 | Status: SHIPPED | OUTPATIENT
Start: 2022-03-22 | End: 2022-03-29

## 2022-03-22 NOTE — TELEPHONE ENCOUNTER
Old calls. Mom states pt is at a Rehab facility and will not be available for 30 days or more. Asking we can release info to her as she is on pt's ORIN, Confirmed mom as authorized to receive info. Mom advised of MAF's results can recs per below. Mom requesting oral meds. Advised to inform pt no ETOH during the course of the tx. Also advised to remind pt when pt is dischared to complete blood tests ordered. Pharmacy and allergies reviewed. Mom ok with this and states understanding. Med order e-sent.

## 2022-03-27 NOTE — PLAN OF CARE
Pt is A&Ox4, VSS on room air. Complaining of not feeling well due to withdrawal, PO ativan given due to loss of IV access, MD aware. Tolerating general diet, some nausea, but no vomiting.  Neurology cleared her to go since MRI of brain with no contrast was Rest, ice, compress, elevate, wrist immobilizer.  NSAIDS PRN.     using appropriate pain scale  - Administer analgesics based on type and severity of pain and evaluate response  - Implement non-pharmacological measures as appropriate and evaluate response  - Consider cultural and social influences on pain and pain manage services based on physician/LIP order or complex needs related to functional status, cognitive ability or social support system  Outcome: Adequate for Discharge     Problem: METABOLIC/FLUID AND ELECTROLYTES - ADULT  Goal: Electrolytes maintained within nor

## 2022-04-14 ENCOUNTER — TELEPHONE (OUTPATIENT)
Dept: OBGYN CLINIC | Facility: CLINIC | Age: 27
End: 2022-04-14

## 2022-04-14 NOTE — TELEPHONE ENCOUNTER
Called back pt and mother answered (ORIN signed), states pt is currently at methadone clinic and cannot come to the phone. Mother states pt thinks it is possible that she has a retained tampon. States pt does not remember if she withdrew the tampon. Mother states pt is not having abdominal pain, cramping, fever, body aches, chills. Informed mother nurse was going to offer the only available appt at 10:20 today. Mother states pt will not me able to make an appt at this time. Advised to take pt to  or ER today. Mother states pt will not go to [facility] or ER. Mother states pt just needs to see the doctor in clinic. Mother accepts appt tomorrow morning. Instructed mother to monitor pt and take pt to the ER if she develops fever, body aches or chills. Mother agrees.

## 2022-04-15 ENCOUNTER — OFFICE VISIT (OUTPATIENT)
Dept: OBGYN CLINIC | Facility: CLINIC | Age: 27
End: 2022-04-15
Payer: MEDICAID

## 2022-04-15 VITALS
HEART RATE: 93 BPM | WEIGHT: 138 LBS | DIASTOLIC BLOOD PRESSURE: 68 MMHG | BODY MASS INDEX: 25 KG/M2 | SYSTOLIC BLOOD PRESSURE: 100 MMHG

## 2022-04-15 DIAGNOSIS — R10.2 PELVIC PRESSURE IN FEMALE: ICD-10-CM

## 2022-04-15 DIAGNOSIS — Z11.3 SCREEN FOR STD (SEXUALLY TRANSMITTED DISEASE): Primary | ICD-10-CM

## 2022-04-15 PROCEDURE — 87661 TRICHOMONAS VAGINALIS AMPLIF: CPT | Performed by: OBSTETRICS & GYNECOLOGY

## 2022-04-15 PROCEDURE — 87591 N.GONORRHOEAE DNA AMP PROB: CPT | Performed by: OBSTETRICS & GYNECOLOGY

## 2022-04-15 PROCEDURE — 87491 CHLMYD TRACH DNA AMP PROBE: CPT | Performed by: OBSTETRICS & GYNECOLOGY

## 2022-04-15 RX ORDER — QUETIAPINE FUMARATE 50 MG/1
TABLET, FILM COATED ORAL
COMMUNITY
Start: 2022-04-14

## 2022-04-15 RX ORDER — GABAPENTIN 300 MG/1
CAPSULE ORAL
COMMUNITY
Start: 2022-04-14

## 2022-04-15 RX ORDER — CLONIDINE HYDROCHLORIDE 0.1 MG/1
TABLET ORAL
COMMUNITY
Start: 2022-03-21

## 2022-04-19 ENCOUNTER — TELEPHONE (OUTPATIENT)
Dept: INTERNAL MEDICINE CLINIC | Facility: CLINIC | Age: 27
End: 2022-04-19

## 2022-04-19 NOTE — TELEPHONE ENCOUNTER
Per Juan Telles, patient is having 3Teeth extraction with IV sedation and would like a medical clearance fax to [phone]. Patient appointment is 04/26/2022.

## 2022-04-19 NOTE — TELEPHONE ENCOUNTER
Patients mother calling to ask for medical clearance for tooth extraction. Patient is scheduled for surgery 4/26/2022  With Dr Harmony Montana  Ph# [phone]

## 2022-04-21 ENCOUNTER — OFFICE VISIT (OUTPATIENT)
Dept: INTERNAL MEDICINE CLINIC | Facility: CLINIC | Age: 27
End: 2022-04-21
Payer: MEDICAID

## 2022-04-21 VITALS
SYSTOLIC BLOOD PRESSURE: 92 MMHG | BODY MASS INDEX: 25.76 KG/M2 | DIASTOLIC BLOOD PRESSURE: 60 MMHG | RESPIRATION RATE: 17 BRPM | HEART RATE: 76 BPM | WEIGHT: 140 LBS | HEIGHT: 62 IN

## 2022-04-21 DIAGNOSIS — Z01.818 PREOP EXAMINATION: Primary | ICD-10-CM

## 2022-04-21 PROCEDURE — 3078F DIAST BP <80 MM HG: CPT | Performed by: INTERNAL MEDICINE

## 2022-04-21 PROCEDURE — 99214 OFFICE O/P EST MOD 30 MIN: CPT | Performed by: INTERNAL MEDICINE

## 2022-04-21 PROCEDURE — 3074F SYST BP LT 130 MM HG: CPT | Performed by: INTERNAL MEDICINE

## 2022-04-21 PROCEDURE — 3008F BODY MASS INDEX DOCD: CPT | Performed by: INTERNAL MEDICINE

## 2022-04-21 RX ORDER — VENLAFAXINE 100 MG/1
100 TABLET ORAL DAILY
COMMUNITY

## 2022-04-25 ENCOUNTER — TELEPHONE (OUTPATIENT)
Dept: INTERNAL MEDICINE CLINIC | Facility: CLINIC | Age: 27
End: 2022-04-25

## 2022-04-25 NOTE — TELEPHONE ENCOUNTER
Yolanda Kim called from [facility]. She is asking for a more formal letter stating that patient is not on coumadin because the last note says she is. Letter created and faxed to [phone] via Right Fax per Dr. Kamaljit Lopez note below.

## 2022-04-25 NOTE — TELEPHONE ENCOUNTER
[facility] stated they received the pre op clearance but would like to confirm if patient is or isn't on coumadin. Patient's surgery is tomorrow at 10:30 am and they need a call back today.

## 2022-09-26 ENCOUNTER — NURSE TRIAGE (OUTPATIENT)
Dept: INTERNAL MEDICINE CLINIC | Facility: CLINIC | Age: 27
End: 2022-09-26

## 2022-09-27 NOTE — TELEPHONE ENCOUNTER
Spoke with Pt. Verified Pt's name and . Pt states she did not go to ER/IC yesterday. Pain has gone down and less sore today. Pt did want to schedule a routine follow-up with Dr. Cherie Morfin for a check up. Appointment scheduled.   Future Appointments   Date Time Provider Jacqueline Quionnes   10/31/2022 11:40 AM Sommer Ordoñez MD [facility]

## 2022-10-14 NOTE — PAYOR COMM NOTE
--------------  DISCHARGE REVIEW    Payor: Mason Hendrix #:  [id number]  Authorization Number: [id number]    Admit date: 10/5/17  Admit time:  0023  Discharge Date: 10/9/2017  4:07 PM     Admitting Physician: Audley Ganser Infant stable on room air in bassinet, all vital signs WNL. Tolerating NG feeds of fortified formula, disinterested in PO feeds, voiding and stooling appropriately. Mom and grandma visiting. Mom put infant to breast during NG feeding. Mom asked to speak to lactation, this RN spoke to [facility] and asked for her to contact mom when available. bowel syndrome     Low back pain     right L5 radiculopathy     mild dextroscoliosis with normal flexion and extension x-rays     L5-S1 right & central mild-slight, L4-5 mild central, L3-4 slight central bulging discs     Neck pain     Bilateral groin pain counseled patient to quit   - continue nicotine patch      History of Biplar Disorder  - Psych on board   - continue psych meds[VS.1]- scripts given[SH.1]        Discharge Condition: Stable    Discharge Medications:      Discharge Medications      START ta known as:  CLEOCIN      Take 1 capsule (300 mg total) by mouth 4 (four) times daily.    Stop taking on:  10/14/2017  Quantity:  40 capsule  Refills:  0           Where to Get Your Medications      These medications were sent to [id number] [facility]. -

## 2022-11-11 NOTE — PROGRESS NOTES
Ju Willis is a 26year old female.   Patient presents with:  Hospital F/U: hospital f/u cellulitis   Referral: referral for coumadin clinic      HPI:   Pt comes for f/o  c/c cellulitis   Hand and arm of the right arm - decreased feeling   Doing much b • Bipolar disorder ([facility] [address]    • History of blood clots    • Intravenous drug abuse     Heroin and crack cocaine   • Opioid dependence [facility])       Past Surgical History:   Procedure Laterality Date   • Colonoscopy  2013   • Colonoscopy N/A 4/17/2017    Proc clinic  Thrombosis of right subclavian vein (HCC)  -     AMB REFERRAL TO ANTICOAGULATION MONITORING  -     PROTHROMBIN TIME (PT); Future  -     Warfarin Sodium 5 MG Oral Tab; Take 1 tablet (5 mg total) by mouth daily.    Noted heme onc  recommendations that [[id number]],[UNKNOWN]

## 2022-12-13 ENCOUNTER — OFFICE VISIT (OUTPATIENT)
Dept: INTERNAL MEDICINE CLINIC | Facility: CLINIC | Age: 27
End: 2022-12-13
Payer: MEDICAID

## 2022-12-13 VITALS
WEIGHT: 165.63 LBS | HEIGHT: 62 IN | OXYGEN SATURATION: 100 % | DIASTOLIC BLOOD PRESSURE: 72 MMHG | RESPIRATION RATE: 20 BRPM | BODY MASS INDEX: 30.48 KG/M2 | SYSTOLIC BLOOD PRESSURE: 112 MMHG | HEART RATE: 93 BPM

## 2022-12-13 DIAGNOSIS — M79.89 SWELLING OF LEFT LOWER EXTREMITY: Primary | ICD-10-CM

## 2022-12-13 DIAGNOSIS — T14.8XXA OPEN WOUND: ICD-10-CM

## 2022-12-13 DIAGNOSIS — E66.09 CLASS 1 OBESITY DUE TO EXCESS CALORIES WITH SERIOUS COMORBIDITY AND BODY MASS INDEX (BMI) OF 30.0 TO 30.9 IN ADULT: ICD-10-CM

## 2022-12-13 DIAGNOSIS — R10.32 BILATERAL GROIN PAIN: ICD-10-CM

## 2022-12-13 DIAGNOSIS — B18.2 CHRONIC HEPATITIS C WITHOUT HEPATIC COMA (HCC): ICD-10-CM

## 2022-12-13 DIAGNOSIS — R10.31 BILATERAL GROIN PAIN: ICD-10-CM

## 2022-12-13 DIAGNOSIS — F19.10 IV DRUG ABUSE (HCC): ICD-10-CM

## 2022-12-13 PROCEDURE — 3008F BODY MASS INDEX DOCD: CPT | Performed by: INTERNAL MEDICINE

## 2022-12-13 PROCEDURE — 99214 OFFICE O/P EST MOD 30 MIN: CPT | Performed by: INTERNAL MEDICINE

## 2022-12-13 PROCEDURE — 3074F SYST BP LT 130 MM HG: CPT | Performed by: INTERNAL MEDICINE

## 2022-12-13 PROCEDURE — 3078F DIAST BP <80 MM HG: CPT | Performed by: INTERNAL MEDICINE

## 2022-12-13 RX ORDER — QUETIAPINE FUMARATE 50 MG/1
150 TABLET, FILM COATED ORAL NIGHTLY
COMMUNITY
Start: 2022-12-05

## 2022-12-13 RX ORDER — SULFAMETHOXAZOLE AND TRIMETHOPRIM 800; 160 MG/1; MG/1
1 TABLET ORAL 2 TIMES DAILY
Qty: 14 TABLET | Refills: 0 | Status: SHIPPED | OUTPATIENT
Start: 2022-12-13

## 2022-12-27 PROBLEM — E66.09 CLASS 1 OBESITY DUE TO EXCESS CALORIES WITH SERIOUS COMORBIDITY AND BODY MASS INDEX (BMI) OF 30.0 TO 30.9 IN ADULT: Status: ACTIVE | Noted: 2022-12-27

## 2022-12-27 PROBLEM — E66.811 CLASS 1 OBESITY DUE TO EXCESS CALORIES WITH SERIOUS COMORBIDITY AND BODY MASS INDEX (BMI) OF 30.0 TO 30.9 IN ADULT: Status: ACTIVE | Noted: 2022-12-27

## 2023-03-10 ENCOUNTER — OFFICE VISIT (OUTPATIENT)
Dept: OBGYN CLINIC | Facility: CLINIC | Age: 28
End: 2023-03-10

## 2023-03-10 VITALS
DIASTOLIC BLOOD PRESSURE: 78 MMHG | HEART RATE: 78 BPM | BODY MASS INDEX: 32 KG/M2 | WEIGHT: 175.63 LBS | SYSTOLIC BLOOD PRESSURE: 105 MMHG

## 2023-03-10 DIAGNOSIS — N89.8 VAGINAL ODOR: ICD-10-CM

## 2023-03-10 DIAGNOSIS — N89.8 VAGINAL DISCHARGE: Primary | ICD-10-CM

## 2023-03-10 DIAGNOSIS — Z11.3 ROUTINE SCREENING FOR STI (SEXUALLY TRANSMITTED INFECTION): ICD-10-CM

## 2023-03-10 PROCEDURE — 3078F DIAST BP <80 MM HG: CPT | Performed by: NURSE PRACTITIONER

## 2023-03-10 PROCEDURE — 99212 OFFICE O/P EST SF 10 MIN: CPT | Performed by: NURSE PRACTITIONER

## 2023-03-10 PROCEDURE — 3074F SYST BP LT 130 MM HG: CPT | Performed by: NURSE PRACTITIONER

## 2023-03-10 RX ORDER — VENLAFAXINE HYDROCHLORIDE 150 MG/1
150 CAPSULE, EXTENDED RELEASE ORAL EVERY MORNING
COMMUNITY
Start: 2023-01-30

## 2023-03-12 LAB
GENITAL VAGINOSIS SCREEN: NEGATIVE
TRICHOMONAS SCREEN: NEGATIVE

## 2023-03-13 LAB
C TRACH DNA SPEC QL NAA+PROBE: NEGATIVE
N GONORRHOEA DNA SPEC QL NAA+PROBE: NEGATIVE

## 2023-03-15 ENCOUNTER — TELEPHONE (OUTPATIENT)
Dept: OBGYN CLINIC | Facility: CLINIC | Age: 28
End: 2023-03-15

## 2023-03-15 NOTE — TELEPHONE ENCOUNTER
----- Message from ALISE Garcia sent at 3/14/2023  8:23 AM CDT -----  Please call patient and tell her that her cultures are normal, negative for STIs.   Review vaginal hygiene    ALISE Garcia

## 2023-04-10 ENCOUNTER — OFFICE VISIT (OUTPATIENT)
Dept: OBGYN CLINIC | Facility: CLINIC | Age: 28
End: 2023-04-10

## 2023-04-10 VITALS
HEART RATE: 94 BPM | SYSTOLIC BLOOD PRESSURE: 105 MMHG | DIASTOLIC BLOOD PRESSURE: 74 MMHG | WEIGHT: 173 LBS | BODY MASS INDEX: 32 KG/M2

## 2023-04-10 DIAGNOSIS — F19.10 POLYSUBSTANCE ABUSE (HCC): ICD-10-CM

## 2023-04-10 DIAGNOSIS — B19.20 HEPATITIS C VIRUS INFECTION WITHOUT HEPATIC COMA, UNSPECIFIED CHRONICITY: ICD-10-CM

## 2023-04-10 DIAGNOSIS — Z01.419 WELL WOMAN EXAM WITH ROUTINE GYNECOLOGICAL EXAM: Primary | ICD-10-CM

## 2023-04-10 DIAGNOSIS — Z12.4 SCREENING FOR CERVICAL CANCER: ICD-10-CM

## 2023-04-10 DIAGNOSIS — N89.8 VAGINAL ODOR: ICD-10-CM

## 2023-04-10 LAB — TRICHOMONAS SCREEN: NEGATIVE

## 2023-04-10 PROCEDURE — 3074F SYST BP LT 130 MM HG: CPT | Performed by: NURSE PRACTITIONER

## 2023-04-10 PROCEDURE — 99395 PREV VISIT EST AGE 18-39: CPT | Performed by: NURSE PRACTITIONER

## 2023-04-10 PROCEDURE — 3078F DIAST BP <80 MM HG: CPT | Performed by: NURSE PRACTITIONER

## 2023-04-12 LAB
GENITAL VAGINOSIS SCREEN: NEGATIVE
TRICHOMONAS SCREEN: NEGATIVE

## 2023-04-26 ENCOUNTER — OFFICE VISIT (OUTPATIENT)
Dept: INTERNAL MEDICINE CLINIC | Facility: CLINIC | Age: 28
End: 2023-04-26

## 2023-04-26 VITALS
WEIGHT: 175.38 LBS | SYSTOLIC BLOOD PRESSURE: 92 MMHG | HEART RATE: 106 BPM | HEIGHT: 62 IN | DIASTOLIC BLOOD PRESSURE: 70 MMHG | BODY MASS INDEX: 32.27 KG/M2

## 2023-04-26 DIAGNOSIS — R25.1 OCCASIONAL TREMORS: ICD-10-CM
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
## 2023-04-26 PROCEDURE — 3074F SYST BP LT 130 MM HG: CPT | Performed by: NURSE PRACTITIONER

## 2023-04-26 PROCEDURE — 3078F DIAST BP <80 MM HG: CPT | Performed by: NURSE PRACTITIONER

## 2023-04-26 PROCEDURE — 99214 OFFICE O/P EST MOD 30 MIN: CPT | Performed by: NURSE PRACTITIONER

## 2023-04-26 PROCEDURE — 3008F BODY MASS INDEX DOCD: CPT | Performed by: NURSE PRACTITIONER

## 2023-04-26 NOTE — ASSESSMENT & PLAN NOTE
Patient states she is having seizures. She is an active IV drug abuser using her using her femoral arteries for injections. She was in a drug rehab program last year but continues to use. She is here because she complains of tremors, she states she might have fallen and hit her head. She refuses to go to the emergency room and is requesting a CT of her head. Plan  Instructed her mother who is accompanying her to take her to inpatient drug rehab.   CT scan of her head ordered per patient's request

## 2023-05-15 ENCOUNTER — MED REC SCAN ONLY (OUTPATIENT)
Dept: INTERNAL MEDICINE CLINIC | Facility: CLINIC | Age: 28
End: 2023-05-15

## 2023-05-19 ENCOUNTER — TELEPHONE (OUTPATIENT)
Dept: INTERNAL MEDICINE CLINIC | Facility: CLINIC | Age: 28
End: 2023-05-19

## 2023-05-19 ENCOUNTER — HOSPITAL ENCOUNTER (OUTPATIENT)
Dept: CT IMAGING | Facility: HOSPITAL | Age: 28
Discharge: HOME | End: 2023-05-19
Attending: NURSE PRACTITIONER
Payer: MEDICAID
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
## 2023-05-19 PROCEDURE — 70450 CT HEAD/BRAIN W/O DYE: CPT | Performed by: NURSE PRACTITIONER

## 2023-05-20 NOTE — TELEPHONE ENCOUNTER
Patient called and results of CT discussed with her mother. CT scan is negative.     Plan  Will come in for full physical.    Katerin Radford DNP

## 2023-08-01 ENCOUNTER — OFFICE VISIT (OUTPATIENT)
Dept: INTERNAL MEDICINE CLINIC | Facility: CLINIC | Age: 28
End: 2023-08-01

## 2023-08-01 VITALS
HEART RATE: 71 BPM | HEIGHT: 62 IN | BODY MASS INDEX: 31.28 KG/M2 | WEIGHT: 170 LBS | DIASTOLIC BLOOD PRESSURE: 79 MMHG | SYSTOLIC BLOOD PRESSURE: 106 MMHG

## 2023-08-01 DIAGNOSIS — Z00.00 ANNUAL PHYSICAL EXAM: Primary | ICD-10-CM

## 2023-08-01 DIAGNOSIS — K59.01 SLOW TRANSIT CONSTIPATION: ICD-10-CM

## 2023-08-01 DIAGNOSIS — K64.0 GRADE I HEMORRHOIDS: ICD-10-CM

## 2023-08-01 DIAGNOSIS — K21.00 GASTROESOPHAGEAL REFLUX DISEASE WITH ESOPHAGITIS WITHOUT HEMORRHAGE: ICD-10-CM

## 2023-08-01 PROCEDURE — 3008F BODY MASS INDEX DOCD: CPT | Performed by: NURSE PRACTITIONER

## 2023-08-01 PROCEDURE — 3078F DIAST BP <80 MM HG: CPT | Performed by: NURSE PRACTITIONER

## 2023-08-01 PROCEDURE — 3074F SYST BP LT 130 MM HG: CPT | Performed by: NURSE PRACTITIONER

## 2023-08-01 PROCEDURE — 99395 PREV VISIT EST AGE 18-39: CPT | Performed by: NURSE PRACTITIONER

## 2023-08-01 RX ORDER — PANTOPRAZOLE SODIUM 20 MG/1
20 TABLET, DELAYED RELEASE ORAL
Qty: 30 TABLET | Refills: 1 | Status: SHIPPED | OUTPATIENT
Start: 2023-08-01

## 2023-08-01 NOTE — ASSESSMENT & PLAN NOTE
Normal exam.  Labs as ordered. Skin check normal.  Track marks and puncture wounds  Breast exam completed-no palpable abnormalities, discharge from the nipples or axillary adenopathy. No cervical or inguinal lymphadenopathy. Hernial orifices intact.   Follow up with GYNE

## 2023-08-02 RX ORDER — PANTOPRAZOLE SODIUM 20 MG/1
20 TABLET, DELAYED RELEASE ORAL
Qty: 90 TABLET | Refills: 0 | OUTPATIENT
Start: 2023-08-02

## 2024-01-04 ENCOUNTER — HOSPITAL ENCOUNTER (EMERGENCY)
Age: 29
Discharge: LEFT BEFORE DISCHARGE | End: 2024-01-04

## 2024-01-04 VITALS
DIASTOLIC BLOOD PRESSURE: 67 MMHG | RESPIRATION RATE: 16 BRPM | HEART RATE: 95 BPM | SYSTOLIC BLOOD PRESSURE: 93 MMHG | TEMPERATURE: 97.9 F | OXYGEN SATURATION: 97 % | HEIGHT: 62 IN | BODY MASS INDEX: 31.28 KG/M2 | WEIGHT: 170 LBS

## 2024-02-05 ENCOUNTER — OFFICE VISIT (OUTPATIENT)
Dept: INTERNAL MEDICINE CLINIC | Facility: CLINIC | Age: 29
End: 2024-02-05

## 2024-02-05 VITALS
SYSTOLIC BLOOD PRESSURE: 107 MMHG | HEART RATE: 98 BPM | HEIGHT: 62 IN | DIASTOLIC BLOOD PRESSURE: 76 MMHG | WEIGHT: 172 LBS | BODY MASS INDEX: 31.65 KG/M2

## 2024-02-05 DIAGNOSIS — M79.89 LEG SWELLING: ICD-10-CM

## 2024-02-05 DIAGNOSIS — T14.8XXA WOUND DISCHARGE: Primary | ICD-10-CM

## 2024-02-05 PROCEDURE — 99214 OFFICE O/P EST MOD 30 MIN: CPT | Performed by: NURSE PRACTITIONER

## 2024-02-05 NOTE — PROGRESS NOTES
HPI:    Patient ID: Latonia Barker is a 29 year old female.    HPI Right Groin draining wound and right leg is swollen.  She is pale.  29-year-old female who is a patient of Dr. Agatha Mcmanus. She is a know Heroin abuser and uses  several times a day.  She is draining from her right groin area. Her right leg is swollen.  She is here with her mother.  According to mother she has failed many inpatient treatment plans.    Hx of thrombosis    Right leg swollen.  Immunization History   Administered Date(s) Administered    Covid-19 Vaccine Moderna 100 mcg/0.5 ml 2021, 2021    DTAP 1998, 2002    DTP/HIB 1997, 1997, 1997    HEP A 2009, 2012    HEP A,Ped/Adol,(2 Dose) 2012    HEP B 1997, 1997, 1997    HIB 1998    HPV (Gardasil) 2009, 2009, 2012    MMR 1998, 2002    Meningococcal (Menomune) 2009    OPV 1997, 1997, 1997, 2002    TDAP 2009    Varicella 2002, 2009       Past Medical History:   Diagnosis Date    Acne     Anxiety     Bacterial endocarditis 2017    SBE pulmonic valve secondary to MRSA treated with daptomycin ×42 days    Bipolar disorder (HCC)     Chlamydia     Decorative tattoo     Depression     History of blood clots     Human papilloma virus infection     Intravenous drug abuse (HCC)     Heroin and crack cocaine    Opioid dependence (HCC)     Subclavian vein thrombosis (HCC) 2017    Substance abuse (HCC)       Past Surgical History:   Procedure Laterality Date    Colonoscopy      Colonoscopy N/A 2017    Procedure: COLONOSCOPY;  Surgeon: Xu Day MD;  Location: [facility] ENDOSCOPY    Colposcopy, cervix w/upper adjacent vagina; w/biopsy(s), cervix            Other surgical history      wisdom teeth     Other surgical history      fasciotomy of right arm    Tonsillectomy      Upper gi endoscopy,exam  2013      Social  History     Socioeconomic History    Marital status: Single    Number of children: 0   Occupational History    Occupation: Unemployed   Tobacco Use    Smoking status: Every Day     Packs/day: 1.00     Years: 1.00     Additional pack years: 0.00     Total pack years: 1.00     Types: Cigarettes     Last attempt to quit: 2015     Years since quittin.0    Smokeless tobacco: Never   Vaping Use    Vaping Use: Never used   Substance and Sexual Activity    Alcohol use: Yes     Alcohol/week: 0.0 standard drinks of alcohol     Comment: social    Drug use: Yes     Types: Heroin, \"Crack\" cocaine, benzodiazepines    Sexual activity: Yes     Partners: Male     Birth control/protection: Condom   Other Topics Concern    Caffeine Concern No     Comment:  2 cans Cola per day    Exercise Yes     Comment: hep    Reaction to local anesthetic No          Review of Systems   Constitutional:  Negative for chills, fatigue and fever.   HENT:  Negative for congestion, ear discharge, ear pain, facial swelling, hearing loss, postnasal drip, sinus pressure, sore throat and trouble swallowing.    Eyes:  Negative for pain, discharge, redness and visual disturbance.   Respiratory:  Negative for cough, chest tightness, shortness of breath and wheezing.    Cardiovascular:  Negative for chest pain, palpitations and leg swelling.   Gastrointestinal:  Negative for abdominal distention, abdominal pain, constipation, diarrhea, nausea and vomiting.   Endocrine: Negative for cold intolerance, heat intolerance, polydipsia, polyphagia and polyuria.   Genitourinary:  Negative for difficulty urinating, dysuria, pelvic pain and vaginal bleeding.   Musculoskeletal:  Negative for back pain, gait problem, neck pain and neck stiffness.   Skin:  Negative for color change and rash.   Neurological:  Negative for dizziness, seizures, weakness and headaches.   Psychiatric/Behavioral:  Negative for agitation and sleep disturbance. The patient is not  nervous/anxious.               Current Outpatient Medications   Medication Sig Dispense Refill    Melatonin 3 MG Oral Cap Take by mouth.      pantoprazole 20 MG Oral Tab EC Take 1 tablet (20 mg total) by mouth every morning before breakfast. 30 tablet 1    venlafaxine  MG Oral Capsule SR 24 Hr Take 1 capsule (150 mg total) by mouth every morning.      QUEtiapine 50 MG Oral Tab Take 3 tablets (150 mg total) by mouth nightly.      gabapentin 300 MG Oral Cap Take 1 capsule (300 mg total) by mouth nightly.      NARCAN 4 MG/0.1ML Nasal Liquid USE 1 SPRAYS IN NOSTRIL AS NEEDED. IF PATIENT REMAINS UNRESPONSIVE, REPEAT DOSE IN OTHER NOSTRIL 2 TO 5 MINUTES AFTER FIRST DOSE 2 each 1     Allergies:  Allergies   Allergen Reactions    Amoxicillin HIVES    Vancomycin RASH and ITCHING    Clindamycin RASH      PHYSICAL EXAM:   Physical Exam  Constitutional:       Appearance: Normal appearance.      Comments: Pale   HENT:      Head: Normocephalic.      Mouth/Throat:      Mouth: Mucous membranes are moist.   Cardiovascular:      Rate and Rhythm: Normal rate and regular rhythm.   Pulmonary:      Effort: Pulmonary effort is normal.   Abdominal:      General: Abdomen is flat.      Palpations: Abdomen is soft.   Musculoskeletal:         General: Normal range of motion.      Cervical back: Normal range of motion and neck supple.   Skin:     General: Skin is dry.      Comments: Right groin wound draining with swollen right leg.   Neurological:      Mental Status: She is alert and oriented to person, place, and time.   Psychiatric:         Mood and Affect: Mood normal.         Behavior: Behavior normal.         Thought Content: Thought content normal.         Judgment: Judgment normal.       /76 (BP Location: Right arm, Patient Position: Sitting, Cuff Size: adult)   Pulse 98   Ht 5' 2\" (1.575 m)   Wt 172 lb (78 kg)   LMP 01/29/2024 (Approximate)   BMI 31.46 kg/m²   Wt Readings from Last 2 Encounters:   02/05/24 172 lb (78  kg)   08/01/23 170 lb (77.1 kg)     Body mass index is 31.46 kg/m².(2)  Lab Results   Component Value Date    WBC 6.3 02/28/2022    RBC 5.50 (H) 02/28/2022    HGB 14.5 02/28/2022    HCT 47.3 02/28/2022    MCV 86.0 02/28/2022    MCH 26.4 02/28/2022    MCHC 30.7 (L) 02/28/2022    RDW 16.1 (H) 02/28/2022    .0 02/28/2022    MPV 8.8 01/05/2018      Lab Results   Component Value Date    GLU 87 02/28/2022    BUN 14 02/28/2022    BUNCREA 17.9 02/28/2022    CREATSERUM 0.78 02/28/2022    ANIONGAP 2 02/28/2022    GFRNAA 106 02/28/2022    GFRAA 122 02/28/2022    CA 9.3 02/28/2022    OSMOCALC 286 02/28/2022    ALKPHO 103 09/01/2020     (H) 09/01/2020     (H) 09/01/2020    ALKPHOS 56 01/29/2015    BILT 0.7 09/01/2020    TP 7.4 09/01/2020    ALB 3.1 (L) 09/01/2020    GLOBULIN 4.3 09/01/2020    AGRATIO 1.2 01/29/2015     02/28/2022    K 4.5 02/28/2022     02/28/2022    CO2 33.0 (H) 02/28/2022      Lab Results   Component Value Date    A1C 5.2 03/19/2017      Lab Results   Component Value Date    CHOLEST 135 03/15/2017    TRIG 40 03/15/2017    HDL 39 03/15/2017    LDL 88 03/15/2017    NONHDLC 96 03/15/2017      Lab Results   Component Value Date    T4F 0.73 11/02/2011    TSH 2.080 02/13/2019                ASSESSMENT/PLAN:     Problem List Items Addressed This Visit       Wound discharge - Primary     Wound-  IV drug abuser with right groin wound         Leg swelling     Right leg swelling- Hx of thrombosis  IV drug user.    Plan  Follow up in ED               No orders of the defined types were placed in this encounter.      Meds This Visit:  Requested Prescriptions      No prescriptions requested or ordered in this encounter       Imaging & Referrals:  None         ALISE Byrnes

## 2024-02-19 ENCOUNTER — HOSPITAL ENCOUNTER (EMERGENCY)
Facility: HOSPITAL | Age: 29
Discharge: HOME | End: 2024-02-19
Attending: EMERGENCY MEDICINE
Payer: MEDICAID

## 2024-02-19 VITALS
TEMPERATURE: 99 F | WEIGHT: 172 LBS | DIASTOLIC BLOOD PRESSURE: 77 MMHG | HEIGHT: 62 IN | RESPIRATION RATE: 14 BRPM | SYSTOLIC BLOOD PRESSURE: 111 MMHG | OXYGEN SATURATION: 100 % | HEART RATE: 75 BPM | BODY MASS INDEX: 31.65 KG/M2

## 2024-02-19 DIAGNOSIS — L03.90 CELLULITIS, UNSPECIFIED CELLULITIS SITE: Primary | ICD-10-CM

## 2024-02-19 LAB
ANION GAP SERPL CALC-SCNC: 5 MMOL/L (ref 0–18)
BASOPHILS # BLD AUTO: 0.06 X10(3) UL (ref 0–0.2)
BASOPHILS NFR BLD AUTO: 0.7 %
BUN BLD-MCNC: 10 MG/DL (ref 9–23)
BUN/CREAT SERPL: 12.7 (ref 10–20)
CALCIUM BLD-MCNC: 9.1 MG/DL (ref 8.7–10.4)
CHLORIDE SERPL-SCNC: 103 MMOL/L (ref 98–112)
CO2 SERPL-SCNC: 28 MMOL/L (ref 21–32)
CREAT BLD-MCNC: 0.79 MG/DL
DEPRECATED RDW RBC AUTO: 39.8 FL (ref 35.1–46.3)
EGFRCR SERPLBLD CKD-EPI 2021: 105 ML/MIN/1.73M2 (ref 60–?)
EOSINOPHIL # BLD AUTO: 0.12 X10(3) UL (ref 0–0.7)
EOSINOPHIL NFR BLD AUTO: 1.3 %
ERYTHROCYTE [DISTWIDTH] IN BLOOD BY AUTOMATED COUNT: 13.5 % (ref 11–15)
GLUCOSE BLD-MCNC: 85 MG/DL (ref 70–99)
HCT VFR BLD AUTO: 32.1 %
HGB BLD-MCNC: 9.5 G/DL
IMM GRANULOCYTES # BLD AUTO: 0.03 X10(3) UL (ref 0–1)
IMM GRANULOCYTES NFR BLD: 0.3 %
LACTATE SERPL-SCNC: 0.8 MMOL/L (ref 0.5–2)
LYMPHOCYTES # BLD AUTO: 2.44 X10(3) UL (ref 1–4)
LYMPHOCYTES NFR BLD AUTO: 26.5 %
MCH RBC QN AUTO: 24 PG (ref 26–34)
MCHC RBC AUTO-ENTMCNC: 29.6 G/DL (ref 31–37)
MCV RBC AUTO: 81.1 FL
MONOCYTES # BLD AUTO: 0.58 X10(3) UL (ref 0.1–1)
MONOCYTES NFR BLD AUTO: 6.3 %
NEUTROPHILS # BLD AUTO: 5.97 X10 (3) UL (ref 1.5–7.7)
NEUTROPHILS # BLD AUTO: 5.97 X10(3) UL (ref 1.5–7.7)
NEUTROPHILS NFR BLD AUTO: 64.9 %
OSMOLALITY SERPL CALC.SUM OF ELEC: 280 MOSM/KG (ref 275–295)
PLATELET # BLD AUTO: 281 10(3)UL (ref 150–450)
POTASSIUM SERPL-SCNC: 4.4 MMOL/L (ref 3.5–5.1)
RBC # BLD AUTO: 3.96 X10(6)UL
SODIUM SERPL-SCNC: 136 MMOL/L (ref 136–145)
WBC # BLD AUTO: 9.2 X10(3) UL (ref 4–11)

## 2024-02-19 PROCEDURE — 87040 BLOOD CULTURE FOR BACTERIA: CPT | Performed by: EMERGENCY MEDICINE

## 2024-02-19 PROCEDURE — 99283 EMERGENCY DEPT VISIT LOW MDM: CPT

## 2024-02-19 PROCEDURE — 85025 COMPLETE CBC W/AUTO DIFF WBC: CPT | Performed by: EMERGENCY MEDICINE

## 2024-02-19 PROCEDURE — 83605 ASSAY OF LACTIC ACID: CPT | Performed by: EMERGENCY MEDICINE

## 2024-02-19 PROCEDURE — 36415 COLL VENOUS BLD VENIPUNCTURE: CPT

## 2024-02-19 PROCEDURE — 99284 EMERGENCY DEPT VISIT MOD MDM: CPT

## 2024-02-19 PROCEDURE — 80048 BASIC METABOLIC PNL TOTAL CA: CPT | Performed by: EMERGENCY MEDICINE

## 2024-02-19 RX ORDER — DOXYCYCLINE HYCLATE 100 MG/1
100 CAPSULE ORAL 2 TIMES DAILY
Qty: 14 CAPSULE | Refills: 0 | Status: SHIPPED | OUTPATIENT
Start: 2024-02-19 | End: 2024-02-26

## 2024-02-19 NOTE — ED PROVIDER NOTES
Patient Seen in: [facility] Emergency Department      History     Chief Complaint   Patient presents with    Leg Pain    Cellulitis     Stated Complaint: Right Leg Pain    Subjective:   HPI    Patient is a 29-year-old female who is a known heroin drug user and presents with chronic wound infections of her lower legs.  She states for the last few months they have been getting worse.  She admits to worsening swelling and redness of the leg.  She is currently not on any antibiotics.  No known fevers.  She was recently accepted into rehab facility but needs medical clearance.    Objective:   Past Medical History:   Diagnosis Date    Acne     Anxiety     Bacterial endocarditis 2017    SBE pulmonic valve secondary to MRSA treated with daptomycin ×42 days    Bipolar disorder (HCC)     Chlamydia     Decorative tattoo     Depression     History of blood clots     Human papilloma virus infection     Intravenous drug abuse (HCC)     Heroin and crack cocaine    Opioid dependence (HCC)     Subclavian vein thrombosis (HCC) 2017    Substance abuse (HCC)               Past Surgical History:   Procedure Laterality Date    COLONOSCOPY      COLONOSCOPY N/A 2017    Procedure: COLONOSCOPY;  Surgeon: Xu Day MD;  Location: [facility] ENDOSCOPY    COLPOSCOPY, CERVIX W/UPPER ADJACENT VAGINA; W/BIOPSY(S), CERVIX            OTHER SURGICAL HISTORY      wisdom teeth     OTHER SURGICAL HISTORY      fasciotomy of right arm    TONSILLECTOMY      UPPER GI ENDOSCOPY,EXAM                  Social History     Socioeconomic History    Marital status: Single    Number of children: 0   Occupational History    Occupation: Unemployed   Tobacco Use    Smoking status: Every Day     Packs/day: 1.00     Years: 1.00     Additional pack years: 0.00     Total pack years: 1.00     Types: Cigarettes     Last attempt to quit: 2015     Years since quittin.0    Smokeless tobacco: Never   Vaping Use    Vaping  Use: Never used   Substance and Sexual Activity    Alcohol use: Yes     Alcohol/week: 0.0 standard drinks of alcohol     Comment: social    Drug use: Yes     Types: Heroin, \"Crack\" cocaine, benzodiazepines    Sexual activity: Yes     Partners: Male     Birth control/protection: Condom   Other Topics Concern    Caffeine Concern No     Comment:  2 cans Cola per day    Exercise Yes     Comment: hep    Reaction to local anesthetic No   Social History Narrative    The patient does not use an assistive device..      The patient does live in a home with stairs.              Review of Systems    Positive for stated complaint: Right Leg Pain  Other systems are as noted in HPI.  Constitutional and vital signs reviewed.      All other systems reviewed and negative except as noted above.    Physical Exam     ED Triage Vitals [02/19/24 0017]   /76   Pulse 94   Resp 18   Temp 98.6 °F (37 °C)   Temp src Temporal   SpO2 92 %   O2 Device None (Room air)       Current:/77   Pulse 75   Temp 98.6 °F (37 °C) (Temporal)   Resp 14   Ht 157.5 cm (5' 2\")   Wt 78 kg   LMP 01/29/2024 (Approximate)   SpO2 100%   BMI 31.46 kg/m²         Physical Exam    GENERAL: No acute distress, awake and alert  HEENT: EOMI, PERRL  Neck: supple  CV: RRR, no murmurs  Resp: CTAB, no wheezes or retractions  Extremities: FROM of all extremities, +edema BLE  Neuro: CN intact, normal speech, 5/5 motor strength in all extremities, no focal deficits  SKIN: chronic ulceration wounds in b/l groin and lower legs. +erythema to dorsum foot      ED Course     Labs Reviewed   CBC W/ DIFFERENTIAL - Abnormal; Notable for the following components:       Result Value    HGB 9.5 (*)     HCT 32.1 (*)     MCH 24.0 (*)     MCHC 29.6 (*)     All other components within normal limits   BASIC METABOLIC PANEL (8) - Normal   LACTIC ACID, PLASMA - Normal   CBC WITH DIFFERENTIAL WITH PLATELET    Narrative:     The following orders were created for panel order CBC With  Differential With Platelet.  Procedure                               Abnormality         Status                     ---------                               -----------         ------                     CBC W/ DIFFERENTIAL[[id number]]          Abnormal            Final result                 Please view results for these tests on the individual orders.   BLOOD CULTURE   BLOOD CULTURE          MDM           Medical Decision Making  Labs reassuring.  Vitals stable.  Blood cultures pending.  No evidence of sepsis at this time.  Patient cleared to go to rehab with antibiotics for worsening redness and swelling and advised to return for worsening symptoms.    Amount and/or Complexity of Data Reviewed  External Data Reviewed: labs and radiology.     Details: Labs 2022 reviewed  Ultrasound 2019 reviewed  Labs: ordered.    Risk  Prescription drug management.        Disposition and Plan     Clinical Impression:  1. Cellulitis, unspecified cellulitis site         Disposition:  Discharge  2/19/2024  4:02 am    Follow-up:  Agatha Mcmanus MD  [address] [place] [id number]  [phone]    Follow up in 2 day(s)            Medications Prescribed:  Current Discharge Medication List        START taking these medications    Details   doxycycline 100 MG Oral Cap Take 1 capsule (100 mg total) by mouth 2 (two) times daily for 7 days.  Qty: 14 capsule, Refills: 0

## 2024-02-19 NOTE — ED INITIAL ASSESSMENT (HPI)
Patient arrives to the ER complaining of right leg sores and swelling x1 week. Patient is an IV drug user. Right leg notably more swollen than left. +intermittent fevers

## 2024-04-16 ENCOUNTER — OFFICE VISIT (OUTPATIENT)
Dept: INTERNAL MEDICINE CLINIC | Facility: CLINIC | Age: 29
End: 2024-04-16

## 2024-04-16 VITALS
DIASTOLIC BLOOD PRESSURE: 78 MMHG | HEIGHT: 62 IN | HEART RATE: 90 BPM | SYSTOLIC BLOOD PRESSURE: 109 MMHG | BODY MASS INDEX: 30.18 KG/M2 | WEIGHT: 164 LBS

## 2024-04-16 DIAGNOSIS — F11.20 HEROIN ADDICTION (HCC): ICD-10-CM

## 2024-04-16 DIAGNOSIS — F11.90 HEROIN USE: ICD-10-CM

## 2024-04-16 DIAGNOSIS — R56.9 NEW ONSET SEIZURE (HCC): ICD-10-CM

## 2024-04-16 DIAGNOSIS — R56.9 SEIZURE (HCC): Primary | ICD-10-CM

## 2024-04-16 PROCEDURE — 99214 OFFICE O/P EST MOD 30 MIN: CPT | Performed by: NURSE PRACTITIONER

## 2024-04-16 RX ORDER — VENLAFAXINE HYDROCHLORIDE 150 MG/1
150 CAPSULE, EXTENDED RELEASE ORAL EVERY MORNING
Qty: 90 CAPSULE | Refills: 0 | Status: SHIPPED | OUTPATIENT
Start: 2024-04-16

## 2024-04-16 RX ORDER — ONDANSETRON 4 MG/1
4 TABLET, FILM COATED ORAL EVERY 8 HOURS PRN
COMMUNITY

## 2024-04-16 RX ORDER — NALOXONE HYDROCHLORIDE 4 MG/.1ML
4 SPRAY NASAL AS NEEDED
Qty: 1 EACH | Refills: 0 | Status: SHIPPED | OUTPATIENT
Start: 2024-04-16

## 2024-04-16 RX ORDER — QUETIAPINE FUMARATE 200 MG/1
200 TABLET, FILM COATED ORAL NIGHTLY
COMMUNITY
End: 2024-04-19

## 2024-04-16 RX ORDER — PRAZOSIN HYDROCHLORIDE 1 MG/1
1 CAPSULE ORAL NIGHTLY
Qty: 360 CAPSULE | Refills: 0 | Status: SHIPPED | OUTPATIENT
Start: 2024-04-16

## 2024-04-16 RX ORDER — BUPRENORPHINE AND NALOXONE 4; 1 MG/1; MG/1
FILM, SOLUBLE BUCCAL; SUBLINGUAL
Qty: 30 EACH | Refills: 0 | Status: CANCELLED | OUTPATIENT
Start: 2024-04-16

## 2024-04-16 RX ORDER — PANTOPRAZOLE SODIUM 20 MG/1
20 TABLET, DELAYED RELEASE ORAL
Qty: 90 TABLET | Refills: 1 | Status: SHIPPED | OUTPATIENT
Start: 2024-04-16

## 2024-04-16 RX ORDER — BUPRENORPHINE AND NALOXONE 4; 1 MG/1; MG/1
4 FILM, SOLUBLE BUCCAL; SUBLINGUAL 3 TIMES DAILY PRN
Qty: 90 EACH | Refills: 0 | Status: SHIPPED | OUTPATIENT
Start: 2024-04-16 | End: 2024-04-17

## 2024-04-16 RX ORDER — BUPRENORPHINE AND NALOXONE 4; 1 MG/1; MG/1
FILM, SOLUBLE BUCCAL; SUBLINGUAL
COMMUNITY
Start: 2024-03-11 | End: 2024-04-16

## 2024-04-16 RX ORDER — PRAZOSIN HYDROCHLORIDE 1 MG/1
CAPSULE ORAL
COMMUNITY
Start: 2024-04-01 | End: 2024-04-16

## 2024-04-16 RX ORDER — CLONIDINE HYDROCHLORIDE 0.1 MG/1
0.1 TABLET ORAL 2 TIMES DAILY PRN
COMMUNITY

## 2024-04-16 RX ORDER — GABAPENTIN 300 MG/1
300 CAPSULE ORAL NIGHTLY
Qty: 360 CAPSULE | Refills: 0 | Status: SHIPPED | OUTPATIENT
Start: 2024-04-16

## 2024-04-16 NOTE — ASSESSMENT & PLAN NOTE
Last time I saw patient she was doing very poorly.  She had generalized edema.  She has been injecting heroin into her right left groin for many years.  Her mother paid for a recovery program in [place].  Patient has been off of heroin for 41 days.  She states she is happy and feeling much better.  She is asking for a refill on discharge meds until she is able to see psychiatrist and neurologist

## 2024-04-16 NOTE — PROGRESS NOTES
HPI:    Patient ID: Latonia Barker is a 29 year old female.    HPI Follow up on Heroin additction.  29 year old female with a heroin addiction. Shet went into rehab on . At [facility].  She stated that she knew she was not going to be living much longer.  She has been off of herotin for 41 days now.  She states she is happy and she feels better.  She is proud to say that   She hosted a meeting at [facility].  [phone].      Immunization History   Administered Date(s) Administered    Covid-19 Vaccine Moderna 100 mcg/0.5 ml 2021, 2021    DTAP 1998, 2002    DTP/HIB 1997, 1997, 1997    HEP A 2009, 2012    HEP A,Ped/Adol,(2 Dose) 2012    HEP B 1997, 1997, 1997    HIB 1998    HPV (Gardasil) 2009, 2009, 2012    MMR 1998, 2002    Meningococcal (Menomune) 2009    OPV 1997, 1997, 1997, 2002    TDAP 2009    Varicella 2002, 2009       Past Medical History:    Acne    Anxiety    Bacterial endocarditis (HCC)    SBE pulmonic valve secondary to MRSA treated with daptomycin ×42 days    Bipolar disorder (HCC)    Chlamydia    Decorative tattoo    Depression    History of blood clots    Human papilloma virus infection    Intravenous drug abuse (HCC)    Heroin and crack cocaine    Opioid dependence (HCC)    Subclavian vein thrombosis (HCC)    Substance abuse (HCC)      Past Surgical History:   Procedure Laterality Date    Colonoscopy      Colonoscopy N/A 2017    Procedure: COLONOSCOPY;  Surgeon: Xu Day MD;  Location: [facility] ENDOSCOPY    Colposcopy, cervix w/upper adjacent vagina; w/biopsy(s), cervix            Other surgical history      wisdom teeth     Other surgical history      fasciotomy of right arm    Tonsillectomy      Upper gi endoscopy,exam  2013      Social History      Socioeconomic History    Marital status: Single    Number of children: 0   Occupational History    Occupation: Unemployed   Tobacco Use    Smoking status: Every Day     Current packs/day: 0.00     Average packs/day: 1 pack/day for 1 year (1.0 ttl pk-yrs)     Types: Cigarettes     Start date: 2014     Last attempt to quit: 2015     Years since quittin.2    Smokeless tobacco: Never   Vaping Use    Vaping status: Never Used   Substance and Sexual Activity    Alcohol use: Yes     Alcohol/week: 0.0 standard drinks of alcohol     Comment: social    Drug use: Yes     Types: Heroin, \"Crack\" cocaine, benzodiazepines    Sexual activity: Yes     Partners: Male     Birth control/protection: Condom   Other Topics Concern    Caffeine Concern No     Comment:  2 cans Cola per day    Exercise Yes     Comment: hep    Reaction to local anesthetic No          Review of Systems   Constitutional:  Negative for chills, fatigue and fever.   HENT:  Negative for ear pain, hearing loss, sinus pain, sore throat and trouble swallowing.    Eyes:  Negative for pain and visual disturbance.   Respiratory:  Negative for cough, chest tightness and shortness of breath.    Cardiovascular:  Negative for chest pain, palpitations and leg swelling.   Gastrointestinal:  Positive for constipation. Negative for abdominal pain, diarrhea, nausea and vomiting.   Endocrine: Negative for cold intolerance and heat intolerance.   Genitourinary:  Negative for dysuria and hematuria.   Musculoskeletal:  Negative for back pain and joint swelling.   Skin:  Negative for rash.   Allergic/Immunologic: Negative for environmental allergies.   Neurological:  Negative for weakness, numbness and headaches.   Hematological:  Does not bruise/bleed easily.   Psychiatric/Behavioral:  Negative for dysphoric mood and sleep disturbance. The patient is not nervous/anxious.               Current Outpatient Medications   Medication Sig Dispense Refill    pantoprazole 20  MG Oral Tab EC Take 1 tablet (20 mg total) by mouth every morning before breakfast. 90 tablet 1    Buprenorphine HCl-Naloxone HCl 4-1 MG Sublingual Film       gabapentin 300 MG Oral Cap Take 1 capsule (300 mg total) by mouth nightly. 360 capsule 0    Melatonin 3 MG Oral Cap Take 1 capsule (3 mg total) by mouth at bedtime. 30 capsule 0    Naloxone HCl (NARCAN) 4 MG/0.1ML Nasal Liquid 4 mg by Nasal route as needed. IF PATIENT REMAINS UNRESPONSIVE, REPEAT DOSE IN OTHER NOSTRIL 2 TO 5 MINUTES AFTER FIRST DOSE 1 each 0    QUEtiapine 200 MG Oral Tab Take 1 tablet (200 mg total) by mouth nightly.      prazosin 1 MG Oral Cap Take 1 capsule (1 mg total) by mouth nightly. Takes 4mg nightly 360 capsule 0    venlafaxine  MG Oral Capsule SR 24 Hr Take 1 capsule (150 mg total) by mouth every morning. 90 capsule 0    ondansetron (ZOFRAN) 4 mg tablet Take 1 tablet (4 mg total) by mouth every 8 (eight) hours as needed for Nausea.      cloNIDine 0.1 MG Oral Tab Take 1 tablet (0.1 mg total) by mouth 2 (two) times daily as needed.       Allergies:  Allergies   Allergen Reactions    Amoxicillin HIVES    Vancomycin RASH and ITCHING    Clindamycin RASH      PHYSICAL EXAM:   Physical Exam  Constitutional:       Appearance: Normal appearance. She is well-developed.   HENT:      Head: Normocephalic.   Cardiovascular:      Rate and Rhythm: Normal rate and regular rhythm.      Heart sounds: Normal heart sounds. No murmur heard.     No friction rub. No gallop.   Pulmonary:      Effort: Pulmonary effort is normal. No respiratory distress.      Breath sounds: Normal breath sounds. No wheezing, rhonchi or rales.   Abdominal:      General: Bowel sounds are normal. There is no distension.      Palpations: Abdomen is soft. There is no mass.      Tenderness: There is no abdominal tenderness. There is no right CVA tenderness, left CVA tenderness or guarding.   Musculoskeletal:         General: No tenderness.      Cervical back: Normal range of  motion and neck supple. No tenderness.      Right lower leg: No edema.      Left lower leg: No edema.   Lymphadenopathy:      Cervical: No cervical adenopathy.   Skin:     General: Skin is warm and dry.      Findings: No rash.      Comments: Groin wounds are clean dry with no erythema or drainage   Neurological:      Mental Status: She is alert. She is disoriented.      Coordination: Coordination normal.      Gait: Gait normal.   Psychiatric:         Mood and Affect: Mood normal.         Behavior: Behavior normal.         Thought Content: Thought content normal.         Judgment: Judgment normal.       /78 (BP Location: Right arm, Patient Position: Sitting, Cuff Size: adult)   Pulse 90   Ht 5' 2\" (1.575 m)   Wt 164 lb (74.4 kg)   LMP 03/27/2024 (Approximate)   BMI 30.00 kg/m²   Wt Readings from Last 2 Encounters:   04/16/24 164 lb (74.4 kg)   02/19/24 172 lb (78 kg)     Body mass index is 30 kg/m².(2)  Lab Results   Component Value Date    WBC 9.2 02/19/2024    RBC 3.96 02/19/2024    HGB 9.5 (L) 02/19/2024    HCT 32.1 (L) 02/19/2024    MCV 81.1 02/19/2024    MCH 24.0 (L) 02/19/2024    MCHC 29.6 (L) 02/19/2024    RDW 13.5 02/19/2024    .0 02/19/2024    MPV 8.8 01/05/2018      Lab Results   Component Value Date    GLU 85 02/19/2024    BUN 10 02/19/2024    BUNCREA 12.7 02/19/2024    CREATSERUM 0.79 02/19/2024    ANIONGAP 5 02/19/2024    GFRNAA 106 02/28/2022    GFRAA 122 02/28/2022    CA 9.1 02/19/2024    OSMOCALC 280 02/19/2024    ALKPHO 103 09/01/2020     (H) 09/01/2020     (H) 09/01/2020    ALKPHOS 56 01/29/2015    BILT 0.7 09/01/2020    TP 7.4 09/01/2020    ALB 3.1 (L) 09/01/2020    GLOBULIN 4.3 09/01/2020    AGRATIO 1.2 01/29/2015     02/19/2024    K 4.4 02/19/2024     02/19/2024    CO2 28.0 02/19/2024      Lab Results   Component Value Date    A1C 5.2 03/19/2017      Lab Results   Component Value Date    CHOLEST 135 03/15/2017    TRIG 40 03/15/2017    HDL 39 03/15/2017     LDL 88 03/15/2017    NONHDLC 96 03/15/2017      Lab Results   Component Value Date    T4F 0.73 11/02/2011    TSH 2.080 02/13/2019                ASSESSMENT/PLAN:     Problem List Items Addressed This Visit       Heroin addiction (HCC)     Last time I saw patient she was doing very poorly.  She had generalized edema.  She has been injecting heroin into her right left groin for many years.  Her mother paid for a recovery program in [place].  Patient has been off of heroin for 41 days.  She states she is happy and feeling much better.  She is asking for a refill on discharge meds until she is able to see psychiatrist and neurologist         Relevant Medications    Melatonin 3 MG Oral Cap    Naloxone HCl (NARCAN) 4 MG/0.1ML Nasal Liquid    QUEtiapine 200 MG Oral Tab    venlafaxine  MG Oral Capsule SR 24 Hr    New onset seizure (HCC)     Hx of seizure.    She had a seizure while in rehab.    Plan  Follow up with neurologist         Relevant Medications    gabapentin 300 MG Oral Cap    Melatonin 3 MG Oral Cap     Other Visit Diagnoses       Seizure (HCC)    -  Primary    Relevant Medications    gabapentin 300 MG Oral Cap    Melatonin 3 MG Oral Cap    Other Relevant Orders    Neuro Referral - In Network    Heroin use        Relevant Medications    Melatonin 3 MG Oral Cap    Naloxone HCl (NARCAN) 4 MG/0.1ML Nasal Liquid    QUEtiapine 200 MG Oral Tab    venlafaxine  MG Oral Capsule SR 24 Hr               No orders of the defined types were placed in this encounter.      Meds This Visit:  Requested Prescriptions     Signed Prescriptions Disp Refills    pantoprazole 20 MG Oral Tab EC 90 tablet 1     Sig: Take 1 tablet (20 mg total) by mouth every morning before breakfast.    gabapentin 300 MG Oral Cap 360 capsule 0     Sig: Take 1 capsule (300 mg total) by mouth nightly.    Melatonin 3 MG Oral Cap 30 capsule 0     Sig: Take 1 capsule (3 mg total) by mouth at bedtime.    Naloxone HCl (NARCAN) 4 MG/0.1ML  Nasal Liquid 1 each 0     Si mg by Nasal route as needed. IF PATIENT REMAINS UNRESPONSIVE, REPEAT DOSE IN OTHER NOSTRIL 2 TO 5 MINUTES AFTER FIRST DOSE    prazosin 1 MG Oral Cap 360 capsule 0     Sig: Take 1 capsule (1 mg total) by mouth nightly. Takes 4mg nightly    venlafaxine  MG Oral Capsule SR 24 Hr 90 capsule 0     Sig: Take 1 capsule (150 mg total) by mouth every morning.       Imaging & Referrals:  NEURO - INTERNAL         ALISE Byrnes

## 2024-04-17 ENCOUNTER — TELEPHONE (OUTPATIENT)
Dept: INTERNAL MEDICINE CLINIC | Facility: CLINIC | Age: 29
End: 2024-04-17

## 2024-04-17 NOTE — TELEPHONE ENCOUNTER
Will refill medications until patient able to get appointment with an addiction specialist    Fabiana Vega DNP  Working with

## 2024-04-17 NOTE — TELEPHONE ENCOUNTER
Patients Mother calling to inform that the wrong dose of:  Buprenorphine HCl-Naloxone HCl 4-1 MG Sublingual Film was sent to pharmacy, it is supposed to be 8 MG 3 times a day, please advise

## 2024-04-18 ENCOUNTER — TELEPHONE (OUTPATIENT)
Dept: INTERNAL MEDICINE CLINIC | Facility: CLINIC | Age: 29
End: 2024-04-18

## 2024-04-18 NOTE — TELEPHONE ENCOUNTER
Plan does not cover this medication       Current Outpatient Medications:     Buprenorphine HCl-Naloxone HCl 8.6-2.1 MG Sublingual SL Tab, Place 8.6 mg of buprenorphine under the tongue in the morning, at noon, and at bedtime., Disp: 30 tablet, Rfl: 0    Patient ID# [id number]  Maximum daily dose of 2

## 2024-04-19 NOTE — TELEPHONE ENCOUNTER
Prior authorization initiated for,await 1-5 days for decision - NOT needed    Buprenorphine HCl-Naloxone HCl 8.6-2.1 MG Sublingual SL Tab     Close reason: Prescription within prescribing limits. Prior Authorization not required.   Note from payer: No PA required, Prescription is within prescribing limits.       Called pharmacy to inform. Pharmacist said being denied    Exceeding dosing of two per day allowed current sig 3 daily.     Pharmacist said patient picked up  #90/30 day supply prescribed by: Matilde Lozano     unable to fill Suboxon 8.6-2.1

## 2024-04-19 NOTE — TELEPHONE ENCOUNTER
Fabiana, please advise on refill, pended for review. Thank you.    Patient's mother contacted, ok per ORIN (name and  of patient verified). MIRELA Jung's message reviewed, verbalizes understanding. She states patient is in need of Seroquel refill. Requesting that MIRELA Jung send refill if possible. Patient's preferred pharmacy verified. Medication pended for review.    Requested Prescriptions     Pending Prescriptions Disp Refills    QUEtiapine 200 MG Oral Tab 90 tablet 0     Sig: Take 1 tablet (200 mg total) by mouth nightly.     Last Office Visit with PCP: 2024    Per progress note by MIRELA Jung 24:  Heroin addiction (HCC)        Last time I saw patient she was doing very poorly.  She had generalized edema.  She has been injecting heroin into her right left groin for many years.  Her mother paid for a recovery program in [place].  Patient has been off of heroin for 41 days.  She states she is happy and feeling much better.  She is asking for a refill on discharge meds until she is able to see psychiatrist and neurologist           Relevant Medications     Melatonin 3 MG Oral Cap     Naloxone HCl (NARCAN) 4 MG/0.1ML Nasal Liquid     QUEtiapine 200 MG Oral Tab     venlafaxine  MG Oral Capsule SR 24 Hr

## 2024-04-19 NOTE — TELEPHONE ENCOUNTER
Please call patient.    Pharmacy will not fill this medication at this dose.    She needs to call the rehab center she was in  so they can connect her with a doctor who specializes in withdraw.    Fabiana Vega, DNP

## 2024-04-20 RX ORDER — QUETIAPINE FUMARATE 200 MG/1
200 TABLET, FILM COATED ORAL NIGHTLY
Qty: 90 TABLET | Refills: 0 | Status: SHIPPED | OUTPATIENT
Start: 2024-04-20

## 2024-05-06 ENCOUNTER — TELEPHONE (OUTPATIENT)
Dept: OBGYN CLINIC | Facility: CLINIC | Age: 29
End: 2024-05-06

## 2024-05-09 ENCOUNTER — TELEPHONE (OUTPATIENT)
Dept: OBGYN CLINIC | Facility: CLINIC | Age: 29
End: 2024-05-09

## 2024-05-09 ENCOUNTER — NURSE ONLY (OUTPATIENT)
Dept: OBGYN CLINIC | Facility: CLINIC | Age: 29
End: 2024-05-09

## 2024-05-09 DIAGNOSIS — Z34.81 ENCOUNTER FOR SUPERVISION OF OTHER NORMAL PREGNANCY IN FIRST TRIMESTER (HCC): Primary | ICD-10-CM

## 2024-05-09 NOTE — TELEPHONE ENCOUNTER
RN Spoke with Dr. Moran on call.  verbal per Dr. Moran:  1)patients medications will be reviewed with Dr. Laurent at patients appointment.  2) patient will discuss Maternal Fetal Medicine consult at time of Dr. Laurent appointment.  3)patient will have ultrasound done at first OB visit, patients dates are not off from her cycle   4)No additional testing at this time.  5) Please let patient know that we are referring her to [facility] (form in MA basket)    Patient has an appointment with Dr. Laurent 5/14/24    Left message to call back to let patient know above information.

## 2024-05-09 NOTE — TELEPHONE ENCOUNTER
6w3d  Patient seen for OB Nurse Education today, patient has a History of Drug abuse, multiple miscarriages , and is taking several medications.  1)Please review medication list to find out what patient can continue taking throughout the pregnancy.    2) does patient need Maternal Fetal Medicine consult?  3) Does patient need ultrasound due to multiple Miscarriages?  4)Is there any additional testing needed in addition to initial OB blood work?  5) referral for Vanessa Hunter through [facility]  RN please provide Information to patient when we call about the above information. Release of information form in MA basket to be signed at first New OB appointment     To Dr. Moran on call for recs
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
## 2024-05-13 NOTE — TELEPHONE ENCOUNTER
Called patient and patients mother answered (ORIN signed). Mother confirms patient will come to appointment tomorrow at 2:20 pm.

## 2024-05-14 ENCOUNTER — TELEPHONE (OUTPATIENT)
Dept: OBGYN CLINIC | Facility: CLINIC | Age: 29
End: 2024-05-14

## 2024-05-14 NOTE — TELEPHONE ENCOUNTER
Called and spoke to patients mother (ORIN signed) and informed that labs need to be completed prior to appointment so we would need to reschedule the appointment from today. Mother states understanding and rescheduled patient for next Friday with Dr. Christianson. States they will attempt to get labs done today.

## 2024-05-21 ENCOUNTER — LAB ENCOUNTER (OUTPATIENT)
Dept: LAB | Facility: HOSPITAL | Age: 29
End: 2024-05-21
Attending: OBSTETRICS & GYNECOLOGY

## 2024-05-21 DIAGNOSIS — Z34.81 ENCOUNTER FOR SUPERVISION OF OTHER NORMAL PREGNANCY IN FIRST TRIMESTER (HCC): ICD-10-CM

## 2024-05-21 LAB
ANTIBODY SCREEN: NEGATIVE
BASOPHILS # BLD AUTO: 0.06 X10(3) UL (ref 0–0.2)
BASOPHILS NFR BLD AUTO: 0.9 %
DEPRECATED RDW RBC AUTO: 47.6 FL (ref 35.1–46.3)
EOSINOPHIL # BLD AUTO: 0.13 X10(3) UL (ref 0–0.7)
EOSINOPHIL NFR BLD AUTO: 2 %
ERYTHROCYTE [DISTWIDTH] IN BLOOD BY AUTOMATED COUNT: 17.2 % (ref 11–15)
HBV SURFACE AG SER-ACNC: <0.1 [IU]/L
HBV SURFACE AG SERPL QL IA: NONREACTIVE
HCG SERPL QL: POSITIVE
HCT VFR BLD AUTO: 34.5 %
HGB BLD-MCNC: 11.2 G/DL
IMM GRANULOCYTES # BLD AUTO: 0.02 X10(3) UL (ref 0–1)
IMM GRANULOCYTES NFR BLD: 0.3 %
LYMPHOCYTES # BLD AUTO: 2.62 X10(3) UL (ref 1–4)
LYMPHOCYTES NFR BLD AUTO: 40.2 %
MCH RBC QN AUTO: 24.8 PG (ref 26–34)
MCHC RBC AUTO-ENTMCNC: 32.5 G/DL (ref 31–37)
MCV RBC AUTO: 76.3 FL
MONOCYTES # BLD AUTO: 0.43 X10(3) UL (ref 0.1–1)
MONOCYTES NFR BLD AUTO: 6.6 %
NEUTROPHILS # BLD AUTO: 3.26 X10 (3) UL (ref 1.5–7.7)
NEUTROPHILS # BLD AUTO: 3.26 X10(3) UL (ref 1.5–7.7)
NEUTROPHILS NFR BLD AUTO: 50 %
PLATELET # BLD AUTO: 217 10(3)UL (ref 150–450)
RBC # BLD AUTO: 4.52 X10(6)UL
RH BLOOD TYPE: POSITIVE
RUBV IGG SER-ACNC: 7.6 IU/ML (ref 10–?)
T PALLIDUM AB SER QL IA: NONREACTIVE
WBC # BLD AUTO: 6.5 X10(3) UL (ref 4–11)

## 2024-05-21 PROCEDURE — 87522 HEPATITIS C REVRS TRNSCRPJ: CPT | Performed by: OBSTETRICS & GYNECOLOGY

## 2024-05-21 PROCEDURE — 87086 URINE CULTURE/COLONY COUNT: CPT | Performed by: OBSTETRICS & GYNECOLOGY

## 2024-05-21 PROCEDURE — 36415 COLL VENOUS BLD VENIPUNCTURE: CPT

## 2024-05-21 PROCEDURE — 87340 HEPATITIS B SURFACE AG IA: CPT | Performed by: OBSTETRICS & GYNECOLOGY

## 2024-05-21 PROCEDURE — 86787 VARICELLA-ZOSTER ANTIBODY: CPT | Performed by: OBSTETRICS & GYNECOLOGY

## 2024-05-21 PROCEDURE — 86803 HEPATITIS C AB TEST: CPT | Performed by: OBSTETRICS & GYNECOLOGY

## 2024-05-21 PROCEDURE — 86762 RUBELLA ANTIBODY: CPT | Performed by: OBSTETRICS & GYNECOLOGY

## 2024-05-21 PROCEDURE — 85025 COMPLETE CBC W/AUTO DIFF WBC: CPT | Performed by: OBSTETRICS & GYNECOLOGY

## 2024-05-21 PROCEDURE — 87389 HIV-1 AG W/HIV-1&-2 AB AG IA: CPT | Performed by: OBSTETRICS & GYNECOLOGY

## 2024-05-21 PROCEDURE — 86900 BLOOD TYPING SEROLOGIC ABO: CPT

## 2024-05-21 PROCEDURE — 86850 RBC ANTIBODY SCREEN: CPT

## 2024-05-21 PROCEDURE — 87088 URINE BACTERIA CULTURE: CPT | Performed by: OBSTETRICS & GYNECOLOGY

## 2024-05-21 PROCEDURE — 86901 BLOOD TYPING SEROLOGIC RH(D): CPT

## 2024-05-21 PROCEDURE — 84703 CHORIONIC GONADOTROPIN ASSAY: CPT | Performed by: OBSTETRICS & GYNECOLOGY

## 2024-05-21 PROCEDURE — 86780 TREPONEMA PALLIDUM: CPT | Performed by: OBSTETRICS & GYNECOLOGY

## 2024-05-21 PROCEDURE — 87186 SC STD MICRODIL/AGAR DIL: CPT | Performed by: OBSTETRICS & GYNECOLOGY

## 2024-05-22 PROBLEM — R76.8 POSITIVE HEPATITIS C ANTIBODY TEST: Status: ACTIVE | Noted: 2024-05-22

## 2024-05-22 PROBLEM — O09.899 RUBELLA NON-IMMUNE STATUS, ANTEPARTUM (HCC): Status: ACTIVE | Noted: 2024-05-22

## 2024-05-22 PROBLEM — Z28.39 RUBELLA NON-IMMUNE STATUS, ANTEPARTUM (HCC): Status: ACTIVE | Noted: 2024-05-22

## 2024-05-22 LAB — VZV IGG SER IA-ACNC: 712.4 (ref 165–?)

## 2024-05-24 ENCOUNTER — TELEPHONE (OUTPATIENT)
Dept: OBGYN CLINIC | Facility: CLINIC | Age: 29
End: 2024-05-24

## 2024-05-24 ENCOUNTER — INITIAL PRENATAL (OUTPATIENT)
Dept: OBGYN CLINIC | Facility: CLINIC | Age: 29
End: 2024-05-24

## 2024-05-24 ENCOUNTER — LAB ENCOUNTER (OUTPATIENT)
Dept: LAB | Facility: HOSPITAL | Age: 29
End: 2024-05-24
Attending: OBSTETRICS & GYNECOLOGY

## 2024-05-24 VITALS
HEART RATE: 92 BPM | DIASTOLIC BLOOD PRESSURE: 71 MMHG | WEIGHT: 160.63 LBS | SYSTOLIC BLOOD PRESSURE: 106 MMHG | BODY MASS INDEX: 29 KG/M2

## 2024-05-24 DIAGNOSIS — O36.80X0 CONFIRM FETAL VIABILITY WITH HISTORY OF MISCARRIAGE, ULTRASOUND (HCC): Primary | ICD-10-CM

## 2024-05-24 DIAGNOSIS — O26.841 SIGNIFICANT DISCREPANCY BETWEEN UTERINE SIZE AND CLINICAL DATES, ANTEPARTUM, FIRST TRIMESTER (HCC): ICD-10-CM

## 2024-05-24 DIAGNOSIS — Z87.59 CONFIRM FETAL VIABILITY WITH HISTORY OF MISCARRIAGE, ULTRASOUND (HCC): Primary | ICD-10-CM

## 2024-05-24 DIAGNOSIS — Z34.91 ENCOUNTER FOR SUPERVISION OF NORMAL PREGNANCY IN FIRST TRIMESTER, UNSPECIFIED GRAVIDITY (HCC): Primary | ICD-10-CM

## 2024-05-24 LAB
APPEARANCE: CLEAR
B-HCG SERPL-ACNC: ABNORMAL MIU/ML
BILIRUBIN: NEGATIVE
GLUCOSE (URINE DIPSTICK): NEGATIVE MG/DL
KETONES (URINE DIPSTICK): NEGATIVE MG/DL
MULTISTIX LOT#: ABNORMAL NUMERIC
NITRITE, URINE: NEGATIVE
OCCULT BLOOD: NEGATIVE
PH, URINE: 6 (ref 4.5–8)
PROTEIN (URINE DIPSTICK): NEGATIVE MG/DL
SPECIFIC GRAVITY: 1.01 (ref 1–1.03)
URINE-COLOR: YELLOW
UROBILINOGEN,SEMI-QN: 0.2 MG/DL (ref 0–1.9)

## 2024-05-24 PROCEDURE — 87661 TRICHOMONAS VAGINALIS AMPLIF: CPT | Performed by: OBSTETRICS & GYNECOLOGY

## 2024-05-24 PROCEDURE — 87491 CHLMYD TRACH DNA AMP PROBE: CPT | Performed by: OBSTETRICS & GYNECOLOGY

## 2024-05-24 PROCEDURE — 87591 N.GONORRHOEAE DNA AMP PROB: CPT | Performed by: OBSTETRICS & GYNECOLOGY

## 2024-05-24 PROCEDURE — 36415 COLL VENOUS BLD VENIPUNCTURE: CPT | Performed by: OBSTETRICS & GYNECOLOGY

## 2024-05-24 PROCEDURE — 84702 CHORIONIC GONADOTROPIN TEST: CPT | Performed by: OBSTETRICS & GYNECOLOGY

## 2024-05-24 RX ORDER — CHOLECALCIFEROL (VITAMIN D3) 25 MCG
1 TABLET,CHEWABLE ORAL DAILY
COMMUNITY

## 2024-05-24 RX ORDER — NITROFURANTOIN 25; 75 MG/1; MG/1
100 CAPSULE ORAL 2 TIMES DAILY
Qty: 14 CAPSULE | Refills: 0 | Status: SHIPPED | OUTPATIENT
Start: 2024-05-24 | End: 2024-05-31

## 2024-05-24 RX ORDER — BUPRENORPHINE AND NALOXONE 8; 2 MG/1; MG/1
FILM, SOLUBLE BUCCAL; SUBLINGUAL
COMMUNITY
Start: 2024-03-30

## 2024-05-24 NOTE — TELEPHONE ENCOUNTER
----- Message from Caesar Christianson sent at 5/22/2024  9:24 AM CDT -----  Patient cannot see My Chart messages. Please send prescription for MacroBID x 7d and let her know. Can we place problem list for Friday discussion?

## 2024-05-24 NOTE — TELEPHONE ENCOUNTER
Patient informed Dr. Jean ordered OB ultrasound. Provided CS # to call for appointment.   Episode placed on discussion pile.

## 2024-05-24 NOTE — TELEPHONE ENCOUNTER
Pt informed of results and recs and verbalized understanding. Advised to complete Macrobid prescription and to drink at least 64 oz of water daily. Also advised to wipe front to back.   Patient asking for quant lab results from today. Patient had New Prenatal visit today and states quant was ordered because Dr. Christianson was not able to get fetal heart tones and stated possible too early in gestation.   Patient reports last menstrual period 3/25/24 is approximate (would make patient 8w4d today) Reports cycles are normally 28-32 days.  Patient denies pain, cramping and spotting/bleeding.   Message to Dr. Jean on call to please review and advise.     Component      Latest Ref Rng 5/24/2024   HCG QUANTITATIVE      <=4.2 mIU/mL 114,831.4 (H)

## 2024-05-25 PROBLEM — F19.11 HISTORY OF SUBSTANCE ABUSE (HCC): Status: ACTIVE | Noted: 2024-05-25

## 2024-05-25 PROBLEM — Z87.59 HISTORY OF MATERNAL DEEP VEIN THROMBOSIS: Status: ACTIVE | Noted: 2024-05-25

## 2024-05-25 PROBLEM — Z86.718 HISTORY OF MATERNAL DEEP VEIN THROMBOSIS: Status: ACTIVE | Noted: 2024-05-25

## 2024-05-25 PROBLEM — O09.299 HISTORY OF PRE-ECLAMPSIA IN PRIOR PREGNANCY, CURRENTLY PREGNANT (HCC): Status: ACTIVE | Noted: 2024-05-25

## 2024-05-25 NOTE — PROGRESS NOTES
Mom- Brandie at visit. Latonia's daughter, \"Mj\", was a camp of [facility] after birth and now in open adoption with a family near [facility]. Unable to document IUP on office ultrasound, though there was a lot of overlying bowel. We'll start with HCG quant and further recommendations on ultrasound to follow. If pregnancy proven viable, we discussed likely transfer to tertiary center as with prior pregnancy.

## 2024-05-28 DIAGNOSIS — Z34.81 PRENATAL CARE, SUBSEQUENT PREGNANCY, FIRST TRIMESTER (HCC): Primary | ICD-10-CM

## 2024-06-03 ENCOUNTER — TELEPHONE (OUTPATIENT)
Dept: INTERNAL MEDICINE CLINIC | Facility: CLINIC | Age: 29
End: 2024-06-03

## 2024-06-03 RX ORDER — PRAZOSIN HYDROCHLORIDE 1 MG/1
4 CAPSULE ORAL NIGHTLY
Qty: 360 CAPSULE | Refills: 0 | Status: SHIPPED | OUTPATIENT
Start: 2024-06-03 | End: 2024-08-02

## 2024-06-03 NOTE — TELEPHONE ENCOUNTER
Mom calling on status of prescription. She is requesting a call back when it is approved and sent to [facility].

## 2024-06-03 NOTE — TELEPHONE ENCOUNTER
Fabiana - Please review the RX sent for the below medication. Needs new RX sent please with updated Sig line      Sig line needs clarification.     Patient taking 4mg at a time but RX says 1mg     Per message, mom reports patient takes 4mg daily    Medication Quantity Refills Start End   prazosin 1 MG Oral Cap 360 capsule 0 4/16/2024 --   Sig:   Take 1 capsule (1 mg total) by mouth nightly. Takes 4mg nightly     Route:   Oral

## 2024-06-03 NOTE — TELEPHONE ENCOUNTER
Pharmacy question on Prazosin 1MG capsules    Clarification on Sig line conflicting ... Is it one capsule night, or 4 cs nightly? Mom says it is 4. Please advise so we can fill rx Properly.      prazosin 1 MG Oral Cap, Take 1 capsule (1 mg total) by mouth nightly. Takes 4mg nightly, Disp: 360 capsule, Rfl: 0

## 2024-06-09 ENCOUNTER — APPOINTMENT (OUTPATIENT)
Dept: ULTRASOUND IMAGING | Facility: HOSPITAL | Age: 29
DRG: 831 | End: 2024-06-09
Attending: INTERNAL MEDICINE

## 2024-06-09 ENCOUNTER — APPOINTMENT (OUTPATIENT)
Dept: CT IMAGING | Facility: HOSPITAL | Age: 29
DRG: 831 | End: 2024-06-09
Attending: STUDENT IN AN ORGANIZED HEALTH CARE EDUCATION/TRAINING PROGRAM

## 2024-06-09 ENCOUNTER — APPOINTMENT (OUTPATIENT)
Dept: CV DIAGNOSTICS | Facility: HOSPITAL | Age: 29
DRG: 831 | End: 2024-06-09
Attending: INTERNAL MEDICINE

## 2024-06-09 ENCOUNTER — APPOINTMENT (OUTPATIENT)
Dept: GENERAL RADIOLOGY | Facility: HOSPITAL | Age: 29
DRG: 831 | End: 2024-06-09
Attending: STUDENT IN AN ORGANIZED HEALTH CARE EDUCATION/TRAINING PROGRAM

## 2024-06-09 ENCOUNTER — HOSPITAL ENCOUNTER (INPATIENT)
Facility: HOSPITAL | Age: 29
LOS: 15 days | Discharge: HOME | DRG: 831 | End: 2024-06-24
Attending: STUDENT IN AN ORGANIZED HEALTH CARE EDUCATION/TRAINING PROGRAM | Admitting: INTERNAL MEDICINE

## 2024-06-09 ENCOUNTER — APPOINTMENT (OUTPATIENT)
Dept: ULTRASOUND IMAGING | Facility: HOSPITAL | Age: 29
DRG: 831 | End: 2024-06-09
Attending: STUDENT IN AN ORGANIZED HEALTH CARE EDUCATION/TRAINING PROGRAM

## 2024-06-09 DIAGNOSIS — O46.8X1 SUBCHORIONIC HEMATOMA IN FIRST TRIMESTER, SINGLE OR UNSPECIFIED FETUS (HCC): ICD-10-CM

## 2024-06-09 DIAGNOSIS — R65.21 SEPTIC SHOCK (HCC): Primary | ICD-10-CM

## 2024-06-09 DIAGNOSIS — A41.9 SEPTIC SHOCK (HCC): Primary | ICD-10-CM

## 2024-06-09 DIAGNOSIS — F11.93 OPIATE WITHDRAWAL (HCC): ICD-10-CM

## 2024-06-09 DIAGNOSIS — F11.90 OPIATE USE: ICD-10-CM

## 2024-06-09 DIAGNOSIS — O41.8X10 SUBCHORIONIC HEMATOMA IN FIRST TRIMESTER, SINGLE OR UNSPECIFIED FETUS (HCC): ICD-10-CM

## 2024-06-09 DIAGNOSIS — F11.20 HEROIN ADDICTION (HCC): ICD-10-CM

## 2024-06-09 DIAGNOSIS — J18.9 PNEUMONIA OF LEFT LOWER LOBE DUE TO INFECTIOUS ORGANISM: ICD-10-CM

## 2024-06-09 LAB
ALBUMIN SERPL-MCNC: 3.4 G/DL (ref 3.2–4.8)
ALBUMIN/GLOB SERPL: 1.2 {RATIO} (ref 1–2)
ALP LIVER SERPL-CCNC: 162 U/L
ALT SERPL-CCNC: 66 U/L
AMPHET UR QL SCN: NEGATIVE
ANION GAP SERPL CALC-SCNC: 6 MMOL/L (ref 0–18)
APAP SERPL-MCNC: <2 UG/ML (ref 10–20)
AST SERPL-CCNC: 206 U/L (ref ?–34)
ATRIAL RATE: 100 BPM
B-HCG SERPL-ACNC: ABNORMAL MIU/ML
BARBITURATES UR QL SCN: NEGATIVE
BASOPHILS # BLD: 0 X10(3) UL (ref 0–0.2)
BASOPHILS NFR BLD: 0 %
BILIRUB SERPL-MCNC: 0.4 MG/DL (ref 0.3–1.2)
BILIRUB UR QL: NEGATIVE
BUN BLD-MCNC: 15 MG/DL (ref 9–23)
BUN/CREAT SERPL: 13.6 (ref 10–20)
CALCIUM BLD-MCNC: 9.5 MG/DL (ref 8.7–10.4)
CHLORIDE SERPL-SCNC: 103 MMOL/L (ref 98–112)
CO2 SERPL-SCNC: 23 MMOL/L (ref 21–32)
COLOR UR: YELLOW
CREAT BLD-MCNC: 1.1 MG/DL
CREAT UR-SCNC: 161.1 MG/DL
DEPRECATED RDW RBC AUTO: 48.3 FL (ref 35.1–46.3)
EGFRCR SERPLBLD CKD-EPI 2021: 71 ML/MIN/1.73M2 (ref 60–?)
EOSINOPHIL # BLD: 0 X10(3) UL (ref 0–0.7)
EOSINOPHIL NFR BLD: 0 %
ERYTHROCYTE [DISTWIDTH] IN BLOOD BY AUTOMATED COUNT: 17.2 % (ref 11–15)
ETHANOL SERPL-MCNC: <3 MG/DL (ref ?–3)
FLUAV + FLUBV RNA SPEC NAA+PROBE: NEGATIVE
FLUAV + FLUBV RNA SPEC NAA+PROBE: NEGATIVE
GLOBULIN PLAS-MCNC: 2.8 G/DL (ref 2–3.5)
GLUCOSE BLD-MCNC: 117 MG/DL (ref 70–99)
GLUCOSE UR-MCNC: NORMAL MG/DL
HCT VFR BLD AUTO: 26.1 %
HGB BLD-MCNC: 8.2 G/DL
KETONES UR-MCNC: NEGATIVE MG/DL
L PNEUMO AG UR QL: NEGATIVE
LACTATE SERPL-SCNC: 1.9 MMOL/L (ref 0.5–2)
LACTATE SERPL-SCNC: 3.1 MMOL/L (ref 0.5–2)
LEUKOCYTE ESTERASE UR QL STRIP.AUTO: NEGATIVE
LYMPHOCYTES NFR BLD: 0.26 X10(3) UL (ref 1–4)
LYMPHOCYTES NFR BLD: 5 %
MCH RBC QN AUTO: 24.2 PG (ref 26–34)
MCHC RBC AUTO-ENTMCNC: 31.4 G/DL (ref 31–37)
MCV RBC AUTO: 77 FL
MDMA UR QL SCN: NEGATIVE
METHADONE UR QL SCN: NEGATIVE
MONOCYTES # BLD: 0.21 X10(3) UL (ref 0.1–1)
MONOCYTES NFR BLD: 4 %
NEUTROPHILS # BLD AUTO: 4.38 X10 (3) UL (ref 1.5–7.7)
NEUTROPHILS NFR BLD: 89 %
NEUTS BAND NFR BLD: 2 %
NEUTS HYPERSEG # BLD: 4.73 X10(3) UL (ref 1.5–7.7)
NITRITE UR QL STRIP.AUTO: NEGATIVE
OSMOLALITY SERPL CALC.SUM OF ELEC: 276 MOSM/KG (ref 275–295)
OXYCODONE UR QL SCN: NEGATIVE
P AXIS: 29 DEGREES
P-R INTERVAL: 122 MS
PCP UR QL SCN: NEGATIVE
PH UR: 5.5 [PH] (ref 5–8)
PLATELET # BLD AUTO: 85 10(3)UL (ref 150–450)
PLATELET MORPHOLOGY: NORMAL
PLATELETS.RETICULATED NFR BLD AUTO: 12.4 % (ref 0–7)
POTASSIUM SERPL-SCNC: 3.4 MMOL/L (ref 3.5–5.1)
PROT SERPL-MCNC: 6.2 G/DL (ref 5.7–8.2)
PROT UR-MCNC: 50 MG/DL
Q-T INTERVAL: 314 MS
QRS DURATION: 78 MS
QTC CALCULATION (BEZET): 405 MS
R AXIS: 71 DEGREES
RBC # BLD AUTO: 3.39 X10(6)UL
RSV RNA SPEC NAA+PROBE: NEGATIVE
SALICYLATES SERPL-MCNC: <3 MG/DL (ref 3–20)
SARS-COV-2 RNA RESP QL NAA+PROBE: NOT DETECTED
SODIUM SERPL-SCNC: 132 MMOL/L (ref 136–145)
SP GR UR STRIP: 1.02 (ref 1–1.03)
STREP PNEUMO ANTIGEN, URINE: NEGATIVE
T AXIS: -7 DEGREES
TOTAL CELLS COUNTED BLD: 100
UROBILINOGEN UR STRIP-ACNC: 3
VENTRICULAR RATE: 100 BPM
WBC # BLD AUTO: 5.2 X10(3) UL (ref 4–11)

## 2024-06-09 PROCEDURE — 99291 CRITICAL CARE FIRST HOUR: CPT | Performed by: INTERNAL MEDICINE

## 2024-06-09 PROCEDURE — 71045 X-RAY EXAM CHEST 1 VIEW: CPT | Performed by: STUDENT IN AN ORGANIZED HEALTH CARE EDUCATION/TRAINING PROGRAM

## 2024-06-09 PROCEDURE — 71260 CT THORAX DX C+: CPT | Performed by: STUDENT IN AN ORGANIZED HEALTH CARE EDUCATION/TRAINING PROGRAM

## 2024-06-09 PROCEDURE — 93306 TTE W/DOPPLER COMPLETE: CPT | Performed by: INTERNAL MEDICINE

## 2024-06-09 PROCEDURE — 76817 TRANSVAGINAL US OBSTETRIC: CPT | Performed by: STUDENT IN AN ORGANIZED HEALTH CARE EDUCATION/TRAINING PROGRAM

## 2024-06-09 PROCEDURE — 3E033XZ INTRODUCTION OF VASOPRESSOR INTO PERIPHERAL VEIN, PERCUTANEOUS APPROACH: ICD-10-PCS | Performed by: STUDENT IN AN ORGANIZED HEALTH CARE EDUCATION/TRAINING PROGRAM

## 2024-06-09 PROCEDURE — 76705 ECHO EXAM OF ABDOMEN: CPT | Performed by: INTERNAL MEDICINE

## 2024-06-09 PROCEDURE — 99223 1ST HOSP IP/OBS HIGH 75: CPT | Performed by: INTERNAL MEDICINE

## 2024-06-09 RX ORDER — SODIUM CHLORIDE 9 MG/ML
INJECTION, SOLUTION INTRAVENOUS CONTINUOUS
Status: DISCONTINUED | OUTPATIENT
Start: 2024-06-09 | End: 2024-06-12

## 2024-06-09 RX ORDER — MELATONIN
3 NIGHTLY
Status: DISCONTINUED | OUTPATIENT
Start: 2024-06-09 | End: 2024-06-24

## 2024-06-09 RX ORDER — ACETAMINOPHEN 500 MG
1000 TABLET ORAL ONCE
Status: COMPLETED | OUTPATIENT
Start: 2024-06-09 | End: 2024-06-09

## 2024-06-09 RX ORDER — POTASSIUM CHLORIDE 20 MEQ/1
40 TABLET, EXTENDED RELEASE ORAL ONCE
Status: COMPLETED | OUTPATIENT
Start: 2024-06-09 | End: 2024-06-09

## 2024-06-09 RX ORDER — PROCHLORPERAZINE EDISYLATE 5 MG/ML
5 INJECTION INTRAMUSCULAR; INTRAVENOUS EVERY 8 HOURS PRN
Status: DISCONTINUED | OUTPATIENT
Start: 2024-06-09 | End: 2024-06-24

## 2024-06-09 RX ORDER — PRAZOSIN HYDROCHLORIDE 2 MG/1
4 CAPSULE ORAL NIGHTLY
Status: DISCONTINUED | OUTPATIENT
Start: 2024-06-09 | End: 2024-06-11

## 2024-06-09 RX ORDER — VENLAFAXINE HYDROCHLORIDE 75 MG/1
150 CAPSULE, EXTENDED RELEASE ORAL NIGHTLY
Status: DISCONTINUED | OUTPATIENT
Start: 2024-06-09 | End: 2024-06-11

## 2024-06-09 RX ORDER — BUPRENORPHINE 8 MG/1
8 TABLET SUBLINGUAL 3 TIMES DAILY
Status: DISCONTINUED | OUTPATIENT
Start: 2024-06-09 | End: 2024-06-10

## 2024-06-09 RX ORDER — BUPRENORPHINE 8 MG/1
8 TABLET SUBLINGUAL 2 TIMES DAILY
Status: DISCONTINUED | OUTPATIENT
Start: 2024-06-09 | End: 2024-06-09

## 2024-06-09 RX ORDER — ACETAMINOPHEN 325 MG/1
650 TABLET ORAL EVERY 6 HOURS PRN
Status: DISCONTINUED | OUTPATIENT
Start: 2024-06-09 | End: 2024-06-24

## 2024-06-09 RX ORDER — QUETIAPINE FUMARATE 200 MG/1
200 TABLET, FILM COATED ORAL NIGHTLY
Status: DISCONTINUED | OUTPATIENT
Start: 2024-06-09 | End: 2024-06-14

## 2024-06-09 RX ORDER — ONDANSETRON 2 MG/ML
4 INJECTION INTRAMUSCULAR; INTRAVENOUS EVERY 6 HOURS PRN
Status: DISCONTINUED | OUTPATIENT
Start: 2024-06-09 | End: 2024-06-24

## 2024-06-09 RX ORDER — BUPRENORPHINE 2 MG/1
2 TABLET SUBLINGUAL EVERY 4 HOURS PRN
Status: DISCONTINUED | OUTPATIENT
Start: 2024-06-09 | End: 2024-06-09

## 2024-06-09 RX ORDER — ENOXAPARIN SODIUM 100 MG/ML
40 INJECTION SUBCUTANEOUS DAILY
Status: CANCELLED | OUTPATIENT
Start: 2024-06-09

## 2024-06-09 NOTE — ED QUICK NOTES
Pt out of dept with rn and monitor  Levo infusing  Pt brought to ct  Dr. Saleh at bedside  Iv rocephin given

## 2024-06-09 NOTE — CONSULTS
[facility] - CARDIOLOGY CONSULT NOTE    Latonia Barker Patient Status:  Emergency    1/3/1995 MRN [id number]   Location [facility] EMERGENCY DEPARTMENT Attending Caitlin Espinoza MD   Hosp Day # 0 PCP Agatha Mcmanus MD     Date of Admission:  2024  Date of Consult:  2024  I was asked by Caitlin Espinoza MD to provide recommendations for evaluation and management of cardiac issues.  Impression:     Pregnancy  10 weeks 2 days by ultrasound    History of endocarditis bacterial  Previous history of MRSA and possible pulmonary valve endocarditis with septic pulmonary emboli back in 2017. Status post trial of vancomycin complicated by neutropenia and some itching and eventually transitioned to daptomycin. Completed a 6 week course.     History of subclavian vein thrombosis    Hepatitis B history    Active IV drug abuse    Recommendations:    Echo    Reason for Consultation:     History of medical endocarditis, fever    History of Present Illness:   Patient is a 29 year old female who was admitted to the hospital for fever and history of endocarditis.    Patient's history of current pregnancy about 11 weeks and history of IV drug abuse on Suboxone history of bacterial endocarditis, subclavian vein thrombosis, hepatitis B presented with for complaints of fever and had low blood pressure.  Patient reports fevers throughout the day taken Tylenol at home and had shortness of breath and a cough.  There is no chest pain.  Patient denies abdominal pain nausea vomiting diarrhea.  Patient's mother reports vaginal bleeding.  OB has been consulted.  There is report of drug use relapse and patient used IV drugs day before admission.      Cardiac tests:  EKG sinus tachycardia 100 beats a minute mild ST-T wave abnormalities  Drug screen positive for benzodiazepines, cannabis, cocaine, opiates  Urinalysis abnormal bacteria  Urine positive for fentanyl  Results:     Lab Results   Component Value Date    WBC 5.2  06/09/2024    HGB 8.2 (L) 06/09/2024    HCT 26.1 (L) 06/09/2024    PLT 85.0 (L) 06/09/2024    CREATSERUM 1.10 (H) 06/09/2024    BUN 15 06/09/2024     (L) 06/09/2024    K 3.4 (L) 06/09/2024     06/09/2024    CO2 23.0 06/09/2024     (H) 06/09/2024    CA 9.5 06/09/2024    ALB 3.4 06/09/2024    ALKPHO 162 (H) 06/09/2024    BILT 0.4 06/09/2024    TP 6.2 06/09/2024     (H) 06/09/2024    ALT 66 (H) 06/09/2024    PTT 29.2 05/30/2020    INR 1.9 (A) 08/12/2020    T4F 0.73 11/02/2011    TSH 2.080 02/13/2019    LIP 18 (L) 05/30/2020    DDIMER 0.47 02/28/2022    CRP <0.5 01/29/2015    MG 1.9 04/16/2017    TROP <0.045 05/30/2020    CK 55 03/29/2017    ETOH <3 06/09/2024       US PREG EV ONLY (CPT=76817)    Result Date: 6/9/2024  CONCLUSION:   Single intrauterine pregnancy with a heart rate of 195 beats per minute and a crown-rump length of 3.4 cm, which corresponds to a gestational age of 10 weeks and 2 days.  A 3.2 cm subchorionic hemorrhage along the inferior aspect of the gestational sac.  A 1.9 cm left corpus luteal cyst.     Dictated by (CST): Kaz French MD on 6/09/2024 at 9:13 AM     Finalized by (CST): Kaz Frnech MD on 6/09/2024 at 9:16 AM          XR CHEST AP PORTABLE  (CPT=71045)    Result Date: 6/9/2024  CONCLUSION:  Hazy left basilar opacity, which may be secondary to subsegmental atelectasis in the setting of low lung volumes.  Superimposed pneumonia is a differential in the appropriate clinical setting.     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  treated with daptomycin ×42 days    Bipolar disorder (HCC)    Chlamydia    Decorative tattoo    Depression    Hepatitis B virus infection    History of blood clots    Human papilloma virus infection    Intravenous drug abuse (HCC)    Heroin and crack cocaine    Opioid dependence (HCC)    Subclavian vein thrombosis (HCC)    Substance abuse (HCC)       Past Surgical History  Past Surgical History:   Procedure Laterality Date    Colonoscopy      Colonoscopy N/A 2017    Procedure: COLONOSCOPY;  Surgeon: Xu Day MD;  Location: [facility] ENDOSCOPY    Colposcopy, cervix w/upper adjacent vagina; w/biopsy(s), cervix            Other surgical history      wisdom teeth     Other surgical history      fasciotomy of right arm    Tonsillectomy      Upper gi endoscopy,exam         Family History  Family History   Problem Relation Age of Onset    Other (back pain) Father     Hypertension Mother     Hypertension Maternal Grandmother     Diabetes Maternal Grandfather     Prostate Cancer Paternal Grandfather         w/ metastasis       Social History  Pediatric History   Patient Parents    Brandie Robbins (Mother)    Frankie Barker (Father)     Other Topics Concern     Service Not Asked    Blood Transfusions Not Asked    Caffeine Concern No     Comment:  2 cans Cola per day    Occupational Exposure Not Asked    Hobby Hazards Not Asked    Sleep Concern Not Asked    Stress Concern Not Asked    Weight Concern Not Asked    Special Diet Not Asked    Back Care Not Asked    Exercise Yes     Comment: hep    Bike Helmet Not Asked    Seat Belt Not Asked    Self-Exams Not Asked    Grew up on a farm Not Asked    History of tanning Not Asked    Outdoor occupation Not Asked    Breast feeding Not Asked    Reaction to local anesthetic No   Social History Narrative    The patient does not use an assistive device..      The patient does live in a home with stairs.           Current Medications:  Current  Facility-Administered Medications   Medication Dose Route Frequency    sodium chloride 0.9 % IV bolus 2,184 mL  30 mL/kg Intravenous Once    norepinephrine (Levophed) 4 mg/250mL infusion premix  0.5-30 mcg/min Intravenous Continuous     (Not in a hospital admission)      Allergies  Allergies   Allergen Reactions    Amoxicillin HIVES    Vancomycin RASH and ITCHING    Clindamycin RASH       Review of Systems:   10 pt ROS performed, separate from HPI  Review of Systems:  GENERAL: no fevers, chills, sweats  HEENT: no visual or hearing changes  SKIN: denies any unusual skin lesions or rashes  RESPIRATORY: denies shortness of breath with exertion  CARDIOVASCULAR: no active chest pain, no claudication  GI: denies abdominal pain and denies heartburn  : no dysuria or hematuria  NEURO: denies headaches, focal weaknesses or paresthesias  All other systems reviewed and negative.  fatigue is present  All other systems were reviewed are negative  Physical Exam:   Blood pressure 109/68, pulse 86, temperature 99.3 °F (37.4 °C), temperature source Oral, resp. rate 23, weight 158 lb 11.7 oz (72 kg), last menstrual period 03/25/2024, SpO2 95%, not currently breastfeeding.    Scheduled Meds:    sodium chloride  30 mL/kg Intravenous Once         Physical Exam:    General: Alert and oriented. No apparent distress. No respiratory or constitutional distress.  HEENT: Normocephalic, anicteric sclera, neck supple  Neck: No JVD, carotids 2+, supple  Cardiac: Regular rate. No pathologic murmur.  Lungs: Clear with normal effort.  Normal excursions and effort.  Abdomen: Soft, non-tender. BS-present.  Extremities: Without clubbing, cyanosis.  Peripheral pulsespresent.  Neurologic: Alert and oriented, normal affect. Motor ok.  Skin: Warm and dry.     Imaging: I independently visualized all relevant chest imaging in PACS, agree with radiology interpretation except where noted.  Thank you for allowing me to participate in the care of your  patient.    Ernie Mcdaniel MD  [facility]  Cardiac Electrophysiology  6/9/2024  5 level consult

## 2024-06-09 NOTE — CONSULTS
[facility]  part of [facility]    Consult Note    Date:  2024  Date of Admission:  2024    Chief Complaint:   Latonia Barker is a(n) 29 year old female with fevers.    HPI:   The patient has a history of venous thromboembolism, MRSA pulmonic valve endocarditis, cellulitis, sepsis and heroin abuse with IV drug injection and she had injected as recently as yesterday.  The patient was brought to the emergency room by her family as they noted lethargy with fevers up to 102.7 at home.  She had headache initially which is resolved.  She has mild GERD symptomatology.  She tells me that she injects her heroin in her femoral vein.  Upon arrival to the emergency room, her systolic blood pressure was in the 60s and she was started on Levophed while IV fluid bolus was being initiated.  Additionally, the patient is 10 weeks pregnant and ultrasonography suggested a 3.2 cm subchorionic hemorrhage along the inferior aspect of the gestational sac.  The hCG quantitative is decreasing from 114,000-to-76,000.  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 treated with daptomycin ×42 days    Bipolar disorder (HCC)    Chlamydia    Decorative tattoo    Depression    Hepatitis B virus infection    History of blood clots    Human papilloma virus infection    Intravenous drug abuse (HCC)    Heroin and crack cocaine    Opioid dependence (HCC)    Subclavian vein thrombosis (HCC)    Substance abuse (HCC)     Past Surgical History:   Procedure Laterality Date    Colonoscopy      Colonoscopy N/A 2017    Procedure: COLONOSCOPY;  Surgeon: Xu Day MD;  Location: [facility] ENDOSCOPY    Colposcopy, cervix w/upper adjacent vagina; w/biopsy(s), cervix            Other surgical history      wisdom teeth     Other surgical history      fasciotomy of right arm    Tonsillectomy      Upper gi endoscopy,exam        Family History   Problem Relation Age of Onset    Other (back pain) Father     Hypertension Mother     Hypertension Maternal Grandmother     Diabetes Maternal Grandfather     Prostate Cancer Paternal Grandfather         w/ metastasis     Social History: Single, 1 daughter, 1 pack/day tobacco, no alcohol, heroin injection  Social History     Socioeconomic History    Marital status: Single    Number of children: 0   Occupational History    Occupation: Unemployed   Tobacco Use    Smoking status: Every Day     Current packs/day: 0.00     Average packs/day: 1 pack/day for 1 year (1.0 ttl pk-yrs)     Types: Cigarettes     Start date: 2014     Last attempt to quit: 2015     Years since quittin.3    Smokeless tobacco: Current    Tobacco comments:     Vaping   Vaping Use    Vaping status: Never Used   Substance and Sexual Activity    Alcohol use: Not Currently     Comment: social    Drug use: Yes     Types: Heroin, \"Crack\" cocaine, benzodiazepines, Other-see comments     Comment: Acid, mushrooms    Sexual activity: Yes     Partners: Male     Birth control/protection: Condom   Other Topics Concern    Caffeine Concern No     Comment:  2 cans Cola per day    Exercise Yes     Comment: hep    Reaction to local anesthetic No   Social History Narrative    The patient does not use an assistive device..      The patient does live in a home with stairs.     Allergies/Medications:   Allergies:   Allergies   Allergen Reactions    Amoxicillin HIVES    Vancomycin RASH and ITCHING    Clindamycin RASH     (Not in a hospital admission)      Review of Systems:   Review of Systems:  Vision normal. Ear nose and throat normal. Bowel normal. Bladder function normal. Depression. No thyroid disease. No lymphatic system concerns.  No rash. Muscles and joints unremarkable. No weight loss no weight gain.    Physical Exam:   Vital Signs:  Blood pressure 111/70, pulse 91, temperature 99.3 °F (37.4 °C), temperature source Oral, resp.  rate (!) 27, weight 158 lb 11.7 oz (72 kg), last menstrual period 03/25/2024, SpO2 98%, not currently breastfeeding.     Alert white female conversant  HEENT examination is unremarkable with pupils equal round and reactive to light and accommodation.   Neck without adenopathy, thyromegaly, JVD nor bruit.   Lungs bibasilar crackles to auscultation and percussion.  Cardiac regular rate and rhythm no murmur.   Abdomen nontender, without hepatosplenomegaly and no mass appreciable.   Extremities without clubbing cyanosis nor edema.   Neurologic grossly intact with symmetric tone and strength and reflex.  Skin without gross abnormality    Results:     Lab Results   Component Value Date    WBC 5.2 06/09/2024    HGB 8.2 06/09/2024    HCT 26.1 06/09/2024    PLT 85.0 06/09/2024    CREATSERUM 1.10 06/09/2024    BUN 15 06/09/2024     06/09/2024    K 3.4 06/09/2024     06/09/2024    CO2 23.0 06/09/2024     06/09/2024    CA 9.5 06/09/2024    ALB 3.4 06/09/2024    ALKPHO 162 06/09/2024    BILT 0.4 06/09/2024    TP 6.2 06/09/2024     06/09/2024    ALT 66 06/09/2024    ETOH <3 06/09/2024     CT scan of the chest-pending    Chest x-ray-hazy left basilar opacity with atelectasis.    Pregnancy ultrasound-Single intrauterine pregnancy with a heart rate of 195 beats per minute and a crown-rump length of 3.4 cm, which corresponds to a gestational age of 10 weeks and 2 days.      A 3.2 cm subchorionic hemorrhage along the inferior aspect of the gestational sac.      A 1.9 cm left corpus luteal cyst.     Assessment/Plan:   1.  Septic shock-patient with fever and hypotension with prior history of MRSA endocarditis who had performed femoral IV heroin injection as recently as yesterday.    Recommendations:  1.  Empiric antibiotic  2.  Sepsis protocol  3.  Echocardiography  4.  Await cultures  5.  Central line  6.  Taper pressors  7.  Completion of 30 mill per kilogram sepsis bolus  8.  ICU and will monitor  clinically.    2.  Psychiatric-substance abuse disorder as well as history of bipolar disease status post recent prolonged rehab stay having been discharged 30 days ago.    Recommendations: Psychiatry evaluation.    3.  Pregnancy-3 cm subchorionic hemorrhage, now with decreasing beta hCG.    Recommendations: Await obstetrics opinion.    4.  DVT prophylaxis-would use SCDs in patient who has subchorionic hemorrhage at this juncture although the patient has an extensive history of thrombosis.    Recommendations: SCDs and would discuss risks of chemoprophylaxis anticoagulation with obstetrics.    5.  Social concerns.    6.  Respiratory-prior history of DVT and PE.  Patient has crackles at the left lung base.  The chest x-ray is relatively unimpressive except for left basilar atelectasis.    Recommendations: Will await CT scan for PE.    Discussed issues with mother at bedside.      [facility]  part of [facility]      Sepsis Reassessment Note    /70   Pulse 91   Temp 99.3 °F (37.4 °C) (Oral)   Resp (!) 27   Wt 158 lb 11.7 oz (72 kg)   LMP 03/25/2024 (Approximate)   SpO2 98%   BMI 29.03 kg/m²      I completed the sepsis reassessment at 10A    Cardiac:  Regularity: Regular  Rate: Tachycardic  Heart Sounds: S1,S2    Lungs:   Right: Diminished  Left: Rales    Peripheral Pulses:  Radial: Right 1+ or Left 1+      Capillary Refill:  <3 Secs    Skin:  Temp/Moisture: Warm and Dry  Color: Normal      Srinivasan Saleh MD  6/9/2024  10:28 AM    >35 min crit care time    Srinivasan Saleh MD  Medical Director, Critical Care, [facility]  Medical Director, [facility]  Pager: [phone]           yes

## 2024-06-09 NOTE — CONSULTS
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   -  follow up blood cx results  -  follow urine strep and legionella ag results  -  follow up TTE results  -  Follow fever curve, wbc  -  Reviewed labs, micro, imaging reports, available old records    D/w pt, RN, primary.    Thank you for allowing us to participate in the care of this patient. Please do not hesitate to call if you have any questions.   We will continue to follow with you and will make further recommendations based on her progress.    History of Present Illness:  Latonia Barker is a a(n) 29 year old female with hx of active IVDU, hep C, bipolar dx, history of MRSA bacteremia and 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 She states she was on suboxone but unfortunately relapsed and has been using for the past 4 days. She states she was very weak and having headaches as well as neck  stiffness. States she feels much better now. Mostly injects in her groin area. Denies sharing any needles.  Temp 99.3F on admission with normal wbc. Blood cx pending. Started on IV ceftriaxone and vanc.     History:  Past Medical History:    Acne    Anxiety    Bacterial endocarditis (HCC)    SBE pulmonic valve secondary to MRSA treated with daptomycin ×42 days    Bipolar disorder (HCC)    Chlamydia    Decorative tattoo    Depression    Hepatitis B virus infection    History of blood clots    Human papilloma virus infection    Intravenous drug abuse (HCC)    Heroin and crack cocaine    Opioid dependence (HCC)    Subclavian vein thrombosis (HCC)    Substance abuse (HCC)     Past Surgical History:   Procedure Laterality Date    Colonoscopy      Colonoscopy N/A 2017    Procedure: COLONOSCOPY;  Surgeon: Xu Day MD;  Location: [facility] ENDOSCOPY    Colposcopy, cervix w/upper adjacent vagina; w/biopsy(s), cervix            Other surgical history      wisdom teeth     Other surgical history      fasciotomy of right arm    Tonsillectomy      Upper gi endoscopy,exam       Family History   Problem Relation Age of Onset    Other (back pain) Father     Hypertension Mother     Hypertension Maternal Grandmother     Diabetes Maternal Grandfather     Prostate Cancer Paternal Grandfather         w/ metastasis      reports that she has been smoking cigarettes. She started smoking about 10 years ago. She has a 1 pack-year smoking history. She uses smokeless tobacco. She reports that she does not currently use alcohol. She reports current drug use. Drugs: Heroin, \"Crack\" cocaine, benzodiazepines, and Other-see comments.    Allergies:  Allergies   Allergen Reactions    Amoxicillin HIVES    Vancomycin RASH and ITCHING    Clindamycin RASH       Medications:    Current Facility-Administered Medications:     norepinephrine (Levophed) 4 mg/250mL infusion premix, 0.5-30 mcg/min, Intravenous, Continuous     sodium chloride 0.9% infusion, , Intravenous, Continuous    ondansetron (Zofran) 4 MG/2ML injection 4 mg, 4 mg, Intravenous, Q6H PRN    prochlorperazine (Compazine) 10 MG/2ML injection 5 mg, 5 mg, Intravenous, Q8H PRN    QUEtiapine (SEROquel) tab 200 mg, 200 mg, Oral, Nightly    buprenorphine (SUBUTEX) SL tab 2 mg, 2 mg, Sublingual, Q4H PRN    potassium chloride (Klor-Con M20) tab 40 mEq, 40 mEq, Oral, Once    Review of Systems:  CONSTITUTIONAL:  fever, weakness and fatigue.  HEENT:  Eyes:  No visual loss, blurred vision, double vision or yellow sclerae. Ears, Nose, Throat:  No hearing loss, sneezing, congestion, runny nose or sore throat.  SKIN:  No rash or itching.  CARDIOVASCULAR:  No chest pain, chest pressure or chest discomfort  RESPIRATORY:  No shortness of breath, cough or sputum.  GASTROINTESTINAL:  No anorexia, nausea, vomiting or diarrhea. No abdominal pain or blood.  GENITOURINARY:  No Burning on urination.   NEUROLOGICAL:  headache  MUSCULOSKELETAL:  No muscle, back pain, joint pain or stiffness.    Physical Exam:  Vital signs: Blood pressure 116/90, pulse 70, temperature 97.3 °F (36.3 °C), temperature source Temporal, resp. rate 16, weight 170 lb 3.1 oz (77.2 kg), last menstrual period 03/25/2024, SpO2 94%, not currently breastfeeding.    General: Alert, oriented, NAD  HEENT: Moist mucous membranes. EOMI  Neck: No lymphadenopathy.  Supple.  Cardiovascular: RRR  Respiratory: Clear to auscultation bilaterally.  No wheezes. No rhonchi.  Abdomen: Soft, nontender, nondistended.   Musculoskeletal: No edema noted  Integument: b/l groin area with wounds/needle scars. No active drainage or fluctuance    Laboratory Data:  Recent Labs   Lab 06/09/24  0732   RBC 3.39*   HGB 8.2*   HCT 26.1*   MCV 77.0*   MCH 24.2*   MCHC 31.4   RDW 17.2*   NEPRELIM 4.38   WBC 5.2   PLT 85.0*   NEUT 89   LYMPH 5   MON 4   EOS 0     Recent Labs   Lab 06/09/24  0732   *   BUN 15   CREATSERUM 1.10*   CA 9.5   ALB 3.4   *    K 3.4*      CO2 23.0   ALKPHO 162*   *   ALT 66*   BILT 0.4   TP 6.2       Microbiology: Reviewed in EMR    Radiology: Reviewed      MD Nikia Palma [facility]  [phone]  6/9/2024
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
## 2024-06-09 NOTE — PLAN OF CARE
Patient c/o dull upper back pain, sob, diaphoretic, heart rate 129, APRN called to the bedside. Dr Christianson updated. Dr. Marquez made aware of the situation, orders were placed. Sublingual subutex given. Patient more calm, resting comfortably. Tylenol given for headache. Levo infusing. IVF infusing         Problem: CARDIOVASCULAR - ADULT  Goal: Maintains optimal cardiac output and hemodynamic stability  Description: INTERVENTIONS:  - Monitor vital signs, rhythm, and trends  - Monitor for bleeding, hypotension and signs of decreased cardiac output  - Evaluate effectiveness of vasoactive medications to optimize hemodynamic stability  - Monitor arterial and/or venous puncture sites for bleeding and/or hematoma  - Assess quality of pulses, skin color and temperature  - Assess for signs of decreased coronary artery perfusion - ex. Angina  - Evaluate fluid balance, assess for edema, trend weights  Outcome: Progressing  Goal: Absence of cardiac arrhythmias or at baseline  Description: INTERVENTIONS:  - Continuous cardiac monitoring, monitor vital signs, obtain 12 lead EKG if indicated  - Evaluate effectiveness of antiarrhythmic and heart rate control medications as ordered  - Initiate emergency measures for life threatening arrhythmias  - Monitor electrolytes and administer replacement therapy as ordered  Outcome: Progressing     Problem: RESPIRATORY - ADULT  Goal: Achieves optimal ventilation and oxygenation  Description: INTERVENTIONS:  - Assess for changes in respiratory status  - Assess for changes in mentation and behavior  - Position to facilitate oxygenation and minimize respiratory effort  - Oxygen supplementation based on oxygen saturation or ABGs  - Provide Smoking Cessation handout, if applicable  - Encourage broncho-pulmonary hygiene including cough, deep breathe, Incentive Spirometry  - Assess the need for suctioning and perform as needed  - Assess and instruct to report SOB or any respiratory difficulty  -  Respiratory Therapy support as indicated  - Manage/alleviate anxiety  - Monitor for signs/symptoms of CO2 retention  Outcome: Progressing     Problem: NEUROLOGICAL - ADULT  Goal: Achieves stable or improved neurological status  Description: INTERVENTIONS  - Assess for and report changes in neurological status  - Initiate measures to prevent increased intracranial pressure  - Maintain blood pressure and fluid volume within ordered parameters to optimize cerebral perfusion and minimize risk of hemorrhage  - Monitor temperature, glucose, and sodium. Initiate appropriate interventions as ordered  Outcome: Progressing  Goal: Absence of seizures  Description: INTERVENTIONS  - Monitor for seizure activity  - Administer anti-seizure medications as ordered  - Monitor neurological status  Outcome: Progressing  Goal: Remains free of injury related to seizure activity  Description: INTERVENTIONS:  - Maintain airway, patient safety  and administer oxygen as ordered  - Monitor patient for seizure activity, document and report duration and description of seizure to MD/LIP  - If seizure occurs, turn patient to side and suction secretions as needed  - Reorient patient post seizure  - Seizure pads on all 4 side rails  - Instruct patient/family to notify RN of any seizure activity  - Instruct patient/family to call for assistance with activity based on assessment  Outcome: Progressing

## 2024-06-09 NOTE — BH PROGRESS NOTE
Received patient from ED. Patient alert and oriented. Denies any pain. On levophed gtt. Blood pressure stable. IVF infusing. Up to the bathroom with standby assistance. Voided x1. Daptomycin abx given.Fall precautions set up; educated patient on the importance on using the call light for assistance, patient verbalized she understood. Bed alarm on. Family at bedside.

## 2024-06-09 NOTE — ED QUICK NOTES
Md at bedside to place central line  Pt states unable to use femoral because it is where she shoots her drugs  Revealed multiple holes to right and left groin    Remains on levo, bp stable at this time   Ivf bolus complete  Rocephin complete  Pt cooperative  More awake, speech remains slurred

## 2024-06-09 NOTE — ED PROVIDER NOTES
[facility] Note  Patient: Latonia Barker Age: 29 year old Sex: female      MRN: [id number]  : 1/3/1995    Patient Seen in: [facility] Emergency Department    History     Chief Complaint   Patient presents with    Altered Mental Status    Fever     Stated Complaint: ams/fever    History obtained from: Patient, patient's mother, and EMS report    Patient is a 29-year-old  female at approximately 10 weeks 6 days based on LMP of 3/25/2024 with past medical history of IV drug use reportedly on Suboxone, bacterial endocarditis, subclavian vein thrombosis, hepatitis B presenting by EMS for evaluation of fever and hypotension.  Per EMS report, the patient has been having fever throughout the entire day.  Patient states that she has pain all over the back of her head and neck.  Has been taking Tylenol at home with mild improvement of symptoms.  Endorses shortness of breath and cough.  Denies any chest pain.  Denies any abdominal pain, nausea, vomiting or diarrhea.  Patient's mother states that she has been having vaginal bleeding over the past couple days.  Per EMS report, the patient denies any drug use currently.  However, upon further discussion, patient does state that she relapsed and used IV drugs yesterday.  Patient states that she frequently injects into her femoral veins bilaterally.    Review of Systems:  Review of Systems  Positive for stated complaint: ams/fever. Constitutional and vital signs reviewed. All other systems reviewed and negative except as noted above.    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 treated with daptomycin ×42 days    Bipolar disorder (HCC)    Chlamydia    Decorative tattoo    Depression    Hepatitis B virus infection    History of blood clots    Human papilloma virus infection    Intravenous drug abuse (HCC)    Heroin and crack cocaine    Opioid dependence (HCC)     Subclavian vein thrombosis (HCC)    Substance abuse (HCC)       Past Surgical History:   Procedure Laterality Date    Colonoscopy      Colonoscopy N/A 2017    Procedure: COLONOSCOPY;  Surgeon: Xu Day MD;  Location: [facility] ENDOSCOPY    Colposcopy, cervix w/upper adjacent vagina; w/biopsy(s), cervix            Other surgical history      wisdom teeth     Other surgical history      fasciotomy of right arm    Tonsillectomy      Upper gi endoscopy,exam          Family History   Problem Relation Age of Onset    Other (back pain) Father     Hypertension Mother     Hypertension Maternal Grandmother     Diabetes Maternal Grandfather     Prostate Cancer Paternal Grandfather         w/ metastasis       Specific Social Determinants of Health:   Social History     Socioeconomic History    Marital status: Single    Number of children: 0   Occupational History    Occupation: Unemployed   Tobacco Use    Smoking status: Every Day     Current packs/day: 0.00     Average packs/day: 1 pack/day for 1 year (1.0 ttl pk-yrs)     Types: Cigarettes     Start date: 2014     Last attempt to quit: 2015     Years since quittin.3    Smokeless tobacco: Current    Tobacco comments:     Vaping   Vaping Use    Vaping status: Never Used   Substance and Sexual Activity    Alcohol use: Not Currently     Comment: social    Drug use: Yes     Types: Heroin, \"Crack\" cocaine, benzodiazepines, Other-see comments     Comment: Acid, mushrooms    Sexual activity: Yes     Partners: Male     Birth control/protection: Condom   Other Topics Concern    Caffeine Concern No     Comment:  2 cans Cola per day    Exercise Yes     Comment: hep    Reaction to local anesthetic No   Social History Narrative    The patient does not use an assistive device..      The patient does live in a home with stairs.           PSFH elements reviewed from today and agreed except as otherwise stated in HPI.    Physical Exam     ED Triage  Vitals [06/09/24 0715]   BP 97/47   Pulse 104   Resp 22   Temp 99.3 °F (37.4 °C)   Temp src Oral   SpO2 95 %   O2 Device Nasal cannula       Current:/69 (BP Location: Left arm)   Pulse 82   Temp 97.3 °F (36.3 °C) (Temporal)   Resp 18   Wt 77.2 kg   LMP 03/25/2024 (Approximate)   SpO2 96%   BMI 31.13 kg/m²         Physical Exam  Constitutional:       General: She is not in acute distress.     Comments: Groaning, generally uncomfortable, significantly drowsy   HENT:      Head: Normocephalic and atraumatic.      Right Ear: External ear normal.      Left Ear: External ear normal.      Mouth/Throat:      Mouth: Mucous membranes are moist.   Eyes:      Extraocular Movements: Extraocular movements intact.      Comments: Pupils 2 mm and minimally reactive   Cardiovascular:      Rate and Rhythm: Regular rhythm. Tachycardia present.      Heart sounds: Normal heart sounds.   Pulmonary:      Effort: Pulmonary effort is normal. No respiratory distress.      Comments: Faint expiratory crackles in left posterior lung fields, normal respiratory drive  Abdominal:      Palpations: Abdomen is soft.      Tenderness: There is no abdominal tenderness.   Genitourinary:     Comments: Chronic inguinal tunneling scars in bilateral groin over the femoral vein  Musculoskeletal:      Cervical back: Normal range of motion.      Right lower leg: No edema.      Left lower leg: No edema.   Skin:     General: Skin is dry.      Capillary Refill: Capillary refill takes less than 2 seconds.      Coloration: Skin is pale.      Comments: Chronic scars on bilateral lower extremities consistent with IV drug use   Neurological:      General: No focal deficit present.      Mental Status: She is alert and oriented to person, place, and time.      Cranial Nerves: No cranial nerve deficit.      Sensory: No sensory deficit.      Comments: Oriented x 3, follows commands but requires repeated instructions occasionally secondary to drowsiness    Psychiatric:         Mood and Affect: Mood normal.         Behavior: Behavior normal.         ED Course   Labs:   Labs Reviewed   COMP METABOLIC PANEL (14) - Abnormal; Notable for the following components:       Result Value    Glucose 117 (*)     Sodium 132 (*)     Potassium 3.4 (*)     Creatinine 1.10 (*)     ALT 66 (*)      (*)     Alkaline Phosphatase 162 (*)     All other components within normal limits   URINALYSIS WITH CULTURE REFLEX - Abnormal; Notable for the following components:    Clarity Urine Turbid (*)     Blood Urine 2+ (*)     Protein Urine 50 (*)     Urobilinogen Urine 3 (*)     Bacteria Urine 3+ (*)     Squamous Epi. Cells Few (*)     All other components within normal limits   LACTIC ACID, PLASMA - Abnormal; Notable for the following components:    Lactic Acid 3.1 (*)     All other components within normal limits   HCG, BETA SUBUNIT (QUANT PREGNANCY TEST) - Abnormal; Notable for the following components:    Hcg Quantitative 76,801.8 (*)     All other components within normal limits   ACETAMINOPHEN (TYLENOL), S - Abnormal; Notable for the following components:    Acetaminophen <2.0 (*)     All other components within normal limits   SALICYLATE, SERUM - Abnormal; Notable for the following components:    Salicylate <3.0 (*)     All other components within normal limits   DRUG ABUSE PANEL 11 SCREEN - Abnormal; Notable for the following components:    Benzodiazepines Urine Presumed Positive (*)     Cannabinoid Urine Presumed Positive (*)     Cocaine Urine Presumed Positive (*)     Opiate Urine Presumed Positive (*)     Fentanyl Urine Presumed Positive (*)     All other components within normal limits   MANUAL DIFFERENTIAL - Abnormal; Notable for the following components:    Lymphocyte Absolute Manual 0.26 (*)     RBC Morphology See morphology below (*)     All other components within normal limits   CBC W/ DIFFERENTIAL - Abnormal; Notable for the following components:    RBC 3.39 (*)     HGB  8.2 (*)     HCT 26.1 (*)     MCV 77.0 (*)     MCH 24.2 (*)     RDW-SD 48.3 (*)     RDW 17.2 (*)     PLT 85.0 (*)     Immature Platelet Fraction 12.4 (*)     All other components within normal limits   ETHYL ALCOHOL - Normal   LACTIC ACID REFLEX POST POSTIVE - Normal   SARS-COV-2/FLU A AND B/RSV BY PCR (GENEXPERT) - Normal    Narrative:     This test is intended for the qualitative detection and differentiation of SARS-CoV-2, influenza A, influenza B, and respiratory syncytial virus (RSV) viral RNA in nasopharyngeal or nares swabs from individuals suspected of respiratory viral infection consistent with COVID-19 by their healthcare provider. Signs and symptoms of respiratory viral infection due to SARS-CoV-2, influenza, and RSV can be similar.    Test performed using the Xpert Xpress SARS-CoV-2/FLU/RSV (real time RT-PCR)  assay on the [employer] instrument, [employer], [place] [address].   This test is being used under the Food and Drug Administration's Emergency Use Authorization.    The authorized Fact Sheet for Healthcare Providers for this assay is available upon request from the laboratory.   CBC WITH DIFFERENTIAL WITH PLATELET    Narrative:     The following orders were created for panel order CBC With Differential With Platelet.  Procedure                               Abnormality         Status                     ---------                               -----------         ------                     CBC W/ DIFFERENTIAL[[id number]]          Abnormal            Final result                 Please view results for these tests on the individual orders.   STREPTOCOCCUS PNEUMONIAE AG, URINE   LEGIONELLA URINE AG SEROGRP 1   RAINBOW DRAW LAVENDER   RAINBOW DRAW LIGHT GREEN   RAINBOW DRAW BLUE   RAINBOW DRAW GOLD   BLOOD CULTURE   BLOOD CULTURE     Radiology findings:  I personally reviewed the images.   US LIVER (CPT=76705)    Result Date: 6/9/2024  CONCLUSION: Hepatomegaly.  Hepatic steatosis.     Dictated by (CST):  Gabo Gu MD on 6/09/2024 at 12:49 PM     Finalized by (CST): Gabo Gu MD on 6/09/2024 at 12:51 PM          XR CHEST AP PORTABLE  (CPT=71045)    Result Date: 6/9/2024  CONCLUSION:  Right internal jugular central venous catheter, in expected positioning.  Unchanged left retrocardiac opacity.    Dictated by (CST): Aziza Nath MD on 6/09/2024 at 11:38 AM     Finalized by (CST): Aziza Nath MD on 6/09/2024 at 11:38 AM          CT CHEST PE AORTA (IV ONLY) (CPT=71260)    Result Date: 6/9/2024  CONCLUSION:   1. No acute pulmonary embolism through the segmental pulmonary arteries. 2. Multifocal pneumonia demonstrated by multifocal ground-glass opacities and consolidations involving the left greater than right lower lobes and left upper lobe. 3. Two 4 mm right upper lobe pulmonary nodules, which are favored to be infectious/inflammatory, however recommend follow-up CT chest in 6 months to monitor for resolution. 4. Mild right heart predominant cardiomegaly. 5. Borderline dilated main pulmonary artery, which can be seen in pulmonary artery hypertension. 6. Small pericardial effusion. 7. Hepatosplenomegaly. 8. Lesser incidental findings described above.     Dictated by (CST): Kaz French MD on 6/09/2024 at 10:28 AM     Finalized by (CST): Kaz French MD on 6/09/2024 at 10:39 AM          US PREG EV ONLY (CPT=76817)    Result Date: 6/9/2024  CONCLUSION:   Single intrauterine pregnancy with a heart rate of 195 beats per minute and a crown-rump length of 3.4 cm, which corresponds to a gestational age of 10 weeks and 2 days.  A 3.2 cm subchorionic hemorrhage along the inferior aspect of the gestational sac.  A 1.9 cm left corpus luteal cyst.     Dictated by (CST): Kaz French MD on 6/09/2024 at 9:13 AM     Finalized by (CST): Kaz French MD on 6/09/2024 at 9:16 AM          XR CHEST AP PORTABLE  (CPT=71045)    Result Date: 6/9/2024  CONCLUSION:  Hazy left basilar opacity, which may be secondary to  subsegmental atelectasis in the setting of low lung volumes.  Superimposed pneumonia is a differential in the appropriate clinical setting.     Dictated by (CST): Aziza Nath MD on 6/09/2024 at 8:14 AM     Finalized by (CST): Aziza Nath MD on 6/09/2024 at 8:16 AM           EKG as interpreted by me: Ventricular rate 100, normal sinus rhythm, normal axis, no ME interval prolongation, narrow QRS, QTc 4 5 ms, no ST segment elevations, ST segment depressions in V4 through V6 with T wave inversions in V3  Cardiac Monitor: Interpreted by me.   Pulse Readings from Last 1 Encounters:   06/09/24 82   , sinus,     External non-ED records reviewed independently by me: Beta-hCG from 2 weeks ago of 114,831.4.    MDM   29-year-old G5, P1 female at approximately 10 weeks 6 days based on LMP with past medical history of IV drug use reportedly on Suboxone, bacterial endocarditis, subclavian vein thrombosis, hepatitis B presenting by EMS for evaluation of fever and hypotension.  Endorses 2 days of vaginal bleeding, cough, and shortness of breath.  Upon arrival emergency department, patient noted to be hypoxic with O2 saturations in the upper 80s, tachypneic, tachycardic, borderline febrile and hypotensive.  Gray in color.  Palpable distal pulses.  Lungs with faint expiratory crackles but otherwise no increased work of breathing.  Abdomen is soft and nontender.  Patient is placed on supplemental O2 via nasal cannula and peripheral access was established.  She was immediately started on sepsis fluid bolus via pressure bag.  I performed a bedside echocardiogram that shows approximately normal ejection fraction with normal cardiac chamber sizes.  No obvious valve vegetations.  No pericardial effusion.  FAST exam negative.  I was unable to adequately visualize uterus to confirm intrauterine pregnancy.  No free fluid in the abdomen.    Differential diagnoses considered includes, but is not limited to: Pneumonia, aspiration,  endocarditis, urinary tract infection, ectopic pregnancy, ruptured ectopic pregnancy, threatened AB, bacteremia,     Will obtain the following tests: CBC, CMP, beta-hCG, urinalysis, Tylenol, salicylate, alcohol levels, urine drug screen, lactic acid, blood cultures, COVID/flu/RSV panel, chest x-ray, CT PE, pregnancy ultrasound.  Please see ED course for my independent review of these tests/imaging results.    Initial Medications/Therapeutics administered: Tylenol, Rocephin, sepsis fluid bolus,    Chronic conditions affecting care: IV drug use reportedly on Suboxone, bacterial endocarditis, subclavian vein thrombosis, hepatitis B    Social Determinants of Health that impacted care: IV drug user with recent relapse.    ED Course as of 06/09/24 1434  ------------------------------------------------------------  Time: 06/09 0900  Comment: Independently reviewed the chest x-ray images that show evidence of a left basilar opacity to suggest pneumonia.  Agree with radiology reads above.  Labs with downtrending beta-hCG however pelvic ultrasound reveals intrauterine pregnancy with normal fetal heart tones.  Also with subchorionic hematoma.  Hemoglobin significantly decreased from baseline.  Patient endorses vaginal spotting but otherwise no significant bleeding.  Suspect the hemoglobin drop may be secondary to the subchorionic hematoma.  She is also thrombocytopenic likely reactive to underlying infectious etiologies.  Transaminitis consistent with likely substance use.  White blood cell count of 5.2 but lactic acid of 3.1 consistent with severe sepsis.  Patient remained hypotensive with maps in the 40s despite sepsis fluid bolus so she was started on Levophed for further blood pressure control.  She was started on Rocephin.  Vancomycin held due to reported allergy  ------------------------------------------------------------  Time: 06/09 0928  Comment: I endorsed the patient's case to [facility] cardiology APN regarding  request for echocardiogram to evaluate for endocarditis.  ------------------------------------------------------------  Time: 06/09 0936  Comment: I spoke with the patient's OB/GYN, Dr. Christianson, who recommended further monitoring of pregnancy.  No acute interventions indicated at this time given nonviable gestational age.  ------------------------------------------------------------  Time: 06/09 0940  Comment: I discussed with the patient's case with the [facility] who accepted the patient for admission to ICU.  ------------------------------------------------------------  Time: 06/09 0940  Comment: I endorsed the patient's case to Dr. Saleh, ariel/crit was made aware of new consult for ICU admission.  ------------------------------------------------------------  Time: 06/09 1030  Comment: Patient was evaluated by Dr. Saleh for ICU admission.  Requests central line placement.  Upon reevaluation, patient noted to have significant improvement of hypotension on peripheral Levophed.  Improvement of skin coloration.  Is awake, alert, oriented, answering questions appropriately.  ------------------------------------------------------------  Time: 06/09 1048  Comment: R internal jugular Central line placed, confirmatory CXR ordered  ------------------------------------------------------------  Time: 06/09 1140  Comment: I independently reviewed the postprocedural chest x-ray that shows right internal jugular central line in appropriate location.  Patient stable at time of transfer to ICU.            Procedures:  Procedure: Central Line Placement  Indication: The patient required placement of a central venous catheter for emergent venous access for purposes of laboratory evaluation, IV fluid administration, and /or medication administration.    After obtaining verbal consent, the patient was prepped for placement of a central venous catheter.  Area of entry was prepped and draped in sterile fashion.  Physical  landmarks were identified and confirmed by ultrasound imaging.  Lidocaine 1% was then infiltrated for local anesthetic.    Using the linear probe covered in a sterile sheath, a short axis view of the vein was obtained.  The right IJ vein was noted to be completely compressible and was identified as separate from any adjacent non compressible arterial structures. Under real-time guidance, the introducer needle was observed to tent the vein and then to puncture it.  There was positive return of venous blood and J point wire was then advanced in a modified Seldinger technique.  The introducer needle was then removed and #11 scalpel was then used to make a small incision to allow placement of the catheter.  The dilator was inserted over the the J point wire.  The dilator was then removed and the catheter was advanced over the wire, the J point wire was removed, and the catheter was secured in place with sutures.  Blood was readily drawn back through all ports and ports were easily flushed with saline or heparin flush.  Sterile dressing was then placed over the site.    X-ray confirmed placement and no pneumothorax.    Patient tolerated the procedure well.      Critical Care:  I spent a total of 120 minutes of critical care time in obtaining history, performing a physical exam, bedside monitoring of interventions, collecting and interpreting tests and discussion with consultants but not including time spent performing procedures.      Disposition and Plan     Clinical Impression:  1. Septic shock (HCC)    2. Pneumonia of left lower lobe due to infectious organism    3. Subchorionic hematoma in first trimester, single or unspecified fetus (HCC)    4. Opiate use        Disposition:  Admit    Follow-up:  No follow-up provider specified.    Medications Prescribed:  Current Discharge Medication List          Hospital Problems       Present on Admission  Date Reviewed: 5/25/2024            ICD-10-CM Noted POA    * (Principal)  Septic shock (HCC) A41.9, R65.21 6/9/2024 Unknown    Opiate use F11.90 6/9/2024 Unknown    Pneumonia of left lower lobe due to infectious organism J18.9 6/9/2024 Unknown    Subchorionic hematoma in first trimester, single or unspecified fetus (HCC) O41.8X10, O46.8X1 6/9/2024 Unknown        This note may have been created using voice dictation technology and may include inadvertent errors.      Caitlin Espinoza MD  Emergency Medicine

## 2024-06-10 ENCOUNTER — APPOINTMENT (OUTPATIENT)
Dept: GENERAL RADIOLOGY | Facility: HOSPITAL | Age: 29
DRG: 831 | End: 2024-06-10
Attending: INTERNAL MEDICINE

## 2024-06-10 ENCOUNTER — APPOINTMENT (OUTPATIENT)
Dept: GENERAL RADIOLOGY | Facility: HOSPITAL | Age: 29
DRG: 831 | End: 2024-06-10
Attending: EMERGENCY MEDICINE

## 2024-06-10 PROBLEM — F31.62 MODERATE MIXED BIPOLAR I DISORDER (HCC): Status: ACTIVE | Noted: 2024-06-10

## 2024-06-10 PROBLEM — F11.93 OPIATE WITHDRAWAL (HCC): Status: ACTIVE | Noted: 2024-06-10

## 2024-06-10 PROBLEM — F11.20 OPIATE DEPENDENCE, CONTINUOUS (HCC): Status: ACTIVE | Noted: 2017-03-17

## 2024-06-10 LAB
ALBUMIN SERPL-MCNC: 2.9 G/DL (ref 3.2–4.8)
ALBUMIN/GLOB SERPL: 1.2 {RATIO} (ref 1–2)
ALP LIVER SERPL-CCNC: 101 U/L
ALT SERPL-CCNC: 54 U/L
ANION GAP SERPL CALC-SCNC: 3 MMOL/L (ref 0–18)
ANTIBODY SCREEN: NEGATIVE
AST SERPL-CCNC: 187 U/L (ref ?–34)
BASOPHILS # BLD AUTO: 0.02 X10(3) UL (ref 0–0.2)
BASOPHILS NFR BLD AUTO: 0.3 %
BILIRUB SERPL-MCNC: 0.3 MG/DL (ref 0.3–1.2)
BUN BLD-MCNC: 6 MG/DL (ref 9–23)
BUN/CREAT SERPL: 13.6 (ref 10–20)
CALCIUM BLD-MCNC: 8 MG/DL (ref 8.7–10.4)
CHLORIDE SERPL-SCNC: 112 MMOL/L (ref 98–112)
CO2 SERPL-SCNC: 26 MMOL/L (ref 21–32)
CREAT BLD-MCNC: 0.44 MG/DL
DEPRECATED RDW RBC AUTO: 48.1 FL (ref 35.1–46.3)
EGFRCR SERPLBLD CKD-EPI 2021: 136 ML/MIN/1.73M2 (ref 60–?)
EOSINOPHIL # BLD AUTO: 0.01 X10(3) UL (ref 0–0.7)
EOSINOPHIL NFR BLD AUTO: 0.2 %
ERYTHROCYTE [DISTWIDTH] IN BLOOD BY AUTOMATED COUNT: 17.5 % (ref 11–15)
GLOBULIN PLAS-MCNC: 2.4 G/DL (ref 2–3.5)
GLUCOSE BLD-MCNC: 110 MG/DL (ref 70–99)
GLUCOSE BLDC GLUCOMTR-MCNC: 104 MG/DL (ref 70–99)
GLUCOSE BLDC GLUCOMTR-MCNC: 116 MG/DL (ref 70–99)
GLUCOSE BLDC GLUCOMTR-MCNC: 98 MG/DL (ref 70–99)
HCT VFR BLD AUTO: 20.9 %
HGB BLD-MCNC: 6.9 G/DL
HGB BLD-MCNC: 8.7 G/DL
IMM GRANULOCYTES # BLD AUTO: 0.07 X10(3) UL (ref 0–1)
IMM GRANULOCYTES NFR BLD: 1.1 %
LYMPHOCYTES # BLD AUTO: 0.88 X10(3) UL (ref 1–4)
LYMPHOCYTES NFR BLD AUTO: 13.7 %
MAGNESIUM SERPL-MCNC: 1.4 MG/DL (ref 1.6–2.6)
MCH RBC QN AUTO: 24.7 PG (ref 26–34)
MCHC RBC AUTO-ENTMCNC: 33 G/DL (ref 31–37)
MCV RBC AUTO: 74.9 FL
MONOCYTES # BLD AUTO: 0.49 X10(3) UL (ref 0.1–1)
MONOCYTES NFR BLD AUTO: 7.6 %
NEUTROPHILS # BLD AUTO: 4.97 X10 (3) UL (ref 1.5–7.7)
NEUTROPHILS # BLD AUTO: 4.97 X10(3) UL (ref 1.5–7.7)
NEUTROPHILS NFR BLD AUTO: 77.1 %
OSMOLALITY SERPL CALC.SUM OF ELEC: 290 MOSM/KG (ref 275–295)
PHOSPHATE SERPL-MCNC: 1.5 MG/DL (ref 2.4–5.1)
PLATELET # BLD AUTO: 69 10(3)UL (ref 150–450)
PLATELETS.RETICULATED NFR BLD AUTO: 15 % (ref 0–7)
POTASSIUM SERPL-SCNC: 3.9 MMOL/L (ref 3.5–5.1)
POTASSIUM SERPL-SCNC: 3.9 MMOL/L (ref 3.5–5.1)
PROT SERPL-MCNC: 5.3 G/DL (ref 5.7–8.2)
RBC # BLD AUTO: 2.79 X10(6)UL
RH BLOOD TYPE: POSITIVE
SODIUM SERPL-SCNC: 141 MMOL/L (ref 136–145)
TRIGL SERPL-MCNC: 308 MG/DL (ref 30–149)
WBC # BLD AUTO: 6.4 X10(3) UL (ref 4–11)

## 2024-06-10 PROCEDURE — 99233 SBSQ HOSP IP/OBS HIGH 50: CPT | Performed by: INTERNAL MEDICINE

## 2024-06-10 PROCEDURE — 30233N1 TRANSFUSION OF NONAUTOLOGOUS RED BLOOD CELLS INTO PERIPHERAL VEIN, PERCUTANEOUS APPROACH: ICD-10-PCS | Performed by: HOSPITALIST

## 2024-06-10 PROCEDURE — 0BH17EZ INSERTION OF ENDOTRACHEAL AIRWAY INTO TRACHEA, VIA NATURAL OR ARTIFICIAL OPENING: ICD-10-PCS | Performed by: EMERGENCY MEDICINE

## 2024-06-10 PROCEDURE — 99291 CRITICAL CARE FIRST HOUR: CPT | Performed by: INTERNAL MEDICINE

## 2024-06-10 PROCEDURE — 71045 X-RAY EXAM CHEST 1 VIEW: CPT | Performed by: INTERNAL MEDICINE

## 2024-06-10 PROCEDURE — 5A1955Z RESPIRATORY VENTILATION, GREATER THAN 96 CONSECUTIVE HOURS: ICD-10-PCS | Performed by: EMERGENCY MEDICINE

## 2024-06-10 PROCEDURE — 71045 X-RAY EXAM CHEST 1 VIEW: CPT | Performed by: EMERGENCY MEDICINE

## 2024-06-10 PROCEDURE — 90792 PSYCH DIAG EVAL W/MED SRVCS: CPT | Performed by: OTHER

## 2024-06-10 RX ORDER — DIPHENHYDRAMINE HYDROCHLORIDE 50 MG/ML
25 INJECTION INTRAMUSCULAR; INTRAVENOUS EVERY 4 HOURS PRN
Status: DISCONTINUED | OUTPATIENT
Start: 2024-06-10 | End: 2024-06-10

## 2024-06-10 RX ORDER — MORPHINE SULFATE 4 MG/ML
4 INJECTION, SOLUTION INTRAMUSCULAR; INTRAVENOUS ONCE
Status: DISCONTINUED | OUTPATIENT
Start: 2024-06-10 | End: 2024-06-10

## 2024-06-10 RX ORDER — FAMOTIDINE 10 MG/ML
20 INJECTION, SOLUTION INTRAVENOUS 2 TIMES DAILY
Status: DISCONTINUED | OUTPATIENT
Start: 2024-06-10 | End: 2024-06-24

## 2024-06-10 RX ORDER — MORPHINE SULFATE 4 MG/ML
4 INJECTION, SOLUTION INTRAMUSCULAR; INTRAVENOUS ONCE
Status: COMPLETED | OUTPATIENT
Start: 2024-06-10 | End: 2024-06-10

## 2024-06-10 RX ORDER — MIDAZOLAM HYDROCHLORIDE 1 MG/ML
2 INJECTION INTRAMUSCULAR; INTRAVENOUS EVERY 5 MIN PRN
Status: DISCONTINUED | OUTPATIENT
Start: 2024-06-10 | End: 2024-06-10

## 2024-06-10 RX ORDER — ETOMIDATE 2 MG/ML
INJECTION INTRAVENOUS
Status: COMPLETED
Start: 2024-06-10 | End: 2024-06-10

## 2024-06-10 RX ORDER — DEXMEDETOMIDINE HYDROCHLORIDE 4 UG/ML
INJECTION, SOLUTION INTRAVENOUS
Status: DISCONTINUED
Start: 2024-06-10 | End: 2024-06-10

## 2024-06-10 RX ORDER — DIPHENHYDRAMINE HYDROCHLORIDE 50 MG/ML
INJECTION INTRAMUSCULAR; INTRAVENOUS
Status: COMPLETED
Start: 2024-06-10 | End: 2024-06-10

## 2024-06-10 RX ORDER — SODIUM CHLORIDE 9 MG/ML
INJECTION, SOLUTION INTRAVENOUS ONCE
Status: DISCONTINUED | OUTPATIENT
Start: 2024-06-10 | End: 2024-06-13

## 2024-06-10 RX ORDER — DIPHENHYDRAMINE HCL 25 MG
50 CAPSULE ORAL EVERY 4 HOURS PRN
Status: DISCONTINUED | OUTPATIENT
Start: 2024-06-10 | End: 2024-06-10

## 2024-06-10 RX ORDER — MORPHINE SULFATE 4 MG/ML
INJECTION, SOLUTION INTRAMUSCULAR; INTRAVENOUS
Status: COMPLETED
Start: 2024-06-10 | End: 2024-06-10

## 2024-06-10 RX ORDER — HALOPERIDOL 5 MG/ML
2 INJECTION INTRAMUSCULAR EVERY 6 HOURS SCHEDULED
Status: DISCONTINUED | OUTPATIENT
Start: 2024-06-10 | End: 2024-06-11

## 2024-06-10 RX ORDER — DEXMEDETOMIDINE HYDROCHLORIDE 4 UG/ML
INJECTION, SOLUTION INTRAVENOUS CONTINUOUS
Status: DISCONTINUED | OUTPATIENT
Start: 2024-06-10 | End: 2024-06-10

## 2024-06-10 RX ORDER — QUETIAPINE FUMARATE 25 MG/1
50 TABLET, FILM COATED ORAL 3 TIMES DAILY
Status: DISCONTINUED | OUTPATIENT
Start: 2024-06-10 | End: 2024-06-11

## 2024-06-10 RX ORDER — HALOPERIDOL 5 MG/ML
5 INJECTION INTRAMUSCULAR ONCE
Status: COMPLETED | OUTPATIENT
Start: 2024-06-10 | End: 2024-06-10

## 2024-06-10 RX ORDER — SODIUM BICARBONATE 650 MG/1
325 TABLET ORAL AS NEEDED
Status: DISCONTINUED | OUTPATIENT
Start: 2024-06-10 | End: 2024-06-24

## 2024-06-10 RX ORDER — MIDAZOLAM HYDROCHLORIDE 1 MG/ML
4 INJECTION INTRAMUSCULAR; INTRAVENOUS ONCE
Status: DISCONTINUED | OUTPATIENT
Start: 2024-06-10 | End: 2024-06-13

## 2024-06-10 RX ORDER — MIDAZOLAM HYDROCHLORIDE 1 MG/ML
4 INJECTION INTRAMUSCULAR; INTRAVENOUS ONCE
Status: COMPLETED | OUTPATIENT
Start: 2024-06-10 | End: 2024-06-10

## 2024-06-10 RX ORDER — DIPHENHYDRAMINE HYDROCHLORIDE 50 MG/ML
50 INJECTION INTRAMUSCULAR; INTRAVENOUS EVERY 4 HOURS PRN
Status: DISCONTINUED | OUTPATIENT
Start: 2024-06-10 | End: 2024-06-17

## 2024-06-10 RX ORDER — SODIUM CHLORIDE 9 MG/ML
INJECTION, SOLUTION INTRAVENOUS ONCE
Status: COMPLETED | OUTPATIENT
Start: 2024-06-10 | End: 2024-06-10

## 2024-06-10 RX ORDER — HALOPERIDOL 5 MG/ML
INJECTION INTRAMUSCULAR
Status: DISPENSED
Start: 2024-06-10 | End: 2024-06-10

## 2024-06-10 RX ORDER — MIDAZOLAM HYDROCHLORIDE 1 MG/ML
INJECTION INTRAMUSCULAR; INTRAVENOUS
Status: COMPLETED
Start: 2024-06-10 | End: 2024-06-10

## 2024-06-10 RX ORDER — METHADONE HYDROCHLORIDE 10 MG/5ML
20 SOLUTION ORAL 2 TIMES DAILY
Status: DISCONTINUED | OUTPATIENT
Start: 2024-06-10 | End: 2024-06-14

## 2024-06-10 NOTE — PROGRESS NOTES
On call Nocturnist 06/10/24  Patient very restless and agitated. Given multiple medications including zyprexxa, versed, benadryl, morphine still very agitated.  Required 4 point restraints concern of her pulling out line, currently on levophed for hypotension.  Still extremely agitated.  Additional versed, haldol and morphine given without any improvement.  Decision made to intubate.    Rapid sequence intubation:   Indication for the procedure was severe agitation.  The patient was preoxygenated with 100% oxygen by face mask.  The patient was given the following IV medications:  etomidate and succinyl choline .  The patient was orally endotracheally intubated under direct visualization with a 7.5 ETT.  In line stabilization was performed during the procedure.  There was good misting on the tube; breath sounds were auscultated equally bilaterally; good color change with [employer] CO2 detector.  Chest X-ray pending.  The procedure was performed by myself.    I spent a total of 45 minutes of critical care time in obtaining history, performing a physical exam, bedside monitoring of interventions, collecting and interpreting tests and discussion with consultants but not including time spent performing procedures.

## 2024-06-10 NOTE — PROGRESS NOTES
[facility]  part of [facility]    Progress Note      Assessment and Plan:   1.  Septic shock-the patient now has gram-negative rods in the blood on culture.  Echo without vegetation.  Patient is yet requiring pressors.     Recommendations:  1.  Ongoing antibiotic and await identification of organism.     2.  Psychiatric-substance abuse disorder as well as history of bipolar disease status post recent prolonged rehab stay having been discharged 30 days ago.  The patient became delirious and agitated yesterday evening and had to be intubated.     Recommendations: CIWA protocol-as per psychiatry evaluation, methadone initiated..     3.  Pregnancy-3 cm subchorionic hemorrhage, now with decreasing beta hCG.     Recommendations: As per obstetrics.     4.  DVT prophylaxis-would use SCDs in patient who has subchorionic hemorrhage at this juncture although the patient has an extensive history of thrombosis.     Recommendations: SCDs and would discuss risks of chemoprophylaxis anticoagulation with obstetrics.     5.  Social concerns.     6.  Respiratory failure-patient had to be intubated yesterday.  CT shows no pulmonary embolism.  There is multifocal pneumonia and two 4 mm right upper lobe pulmonary nodules.    Recommendations: Full ventilator support and repeat CT scan of the chest in 6 months.  Ongoing antibiotic.    Discussed with family at bedside.      Subjective:   Latonia Barker is a(n) 29 year old female who is sedated on ventilator    Objective:   Blood pressure 99/77, pulse 70, temperature 97.2 °F (36.2 °C), temperature source Temporal, resp. rate 16, weight 168 lb 14 oz (76.6 kg), last menstrual period 03/25/2024, SpO2 100%, not currently breastfeeding.    Physical Exam sedate white female  HEENT examination is unremarkable with pupils equal round and reactive to light and accommodation.   Neck without adenopathy, thyromegaly, JVD nor bruit.   Lungs diminished to auscultation and  percussion.  Cardiac regular rate and rhythm no murmur.   Abdomen nontender, without hepatosplenomegaly and no mass appreciable.   Extremities without clubbing cyanosis nor edema.   Neurologic grossly intact with symmetric tone and strength and reflex.  Skin without gross abnormality     Results:     Lab Results   Component Value Date    WBC 6.4 06/10/2024    HGB 6.9 06/10/2024    HCT 20.9 06/10/2024    PLT 69.0 06/10/2024    CREATSERUM 0.44 06/10/2024    BUN 6 06/10/2024     06/10/2024    K 3.9 06/10/2024    K 3.9 06/10/2024     06/10/2024    CO2 26.0 06/10/2024     06/10/2024    CA 8.0 06/10/2024    ALB 2.9 06/10/2024    ALKPHO 101 06/10/2024    BILT 0.3 06/10/2024    TP 5.3 06/10/2024     06/10/2024    ALT 54 06/10/2024    MG 1.4 06/10/2024    PHOS 1.5 06/10/2024     CT scan of the chest-1. No acute pulmonary embolism through the segmental pulmonary arteries.   2. Multifocal pneumonia demonstrated by multifocal ground-glass opacities and consolidations involving the left greater than right lower lobes and left upper lobe.   3. Two 4 mm right upper lobe pulmonary nodules, which are favored to be infectious/inflammatory, however recommend follow-up CT chest in 6 months to monitor for resolution.   4. Mild right heart predominant cardiomegaly.   5. Borderline dilated main pulmonary artery, which can be seen in pulmonary artery hypertension.   6. Small pericardial effusion.   7. Hepatosplenomegaly.     >35 min crit care time    Srinivasan Saleh MD  Medical Director, Critical Care, [facility]  Medical Director, [facility]  Pager: [phone]

## 2024-06-10 NOTE — DIETARY NOTE
ADULT NUTRITION INITIAL ASSESSMENT    Pt is at high nutrition risk.  Pt does not meet malnutrition criteria.    RECOMMENDATIONS TO MD: See Nutrition Intervention for Enteral Nutrition (EN) rx.     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 Findings: pneumonia, 10 weeks pregnant with subchorionic hemorrhage of the gestational sac,  mild RAMON, respiratory failure intubated 6/10PMH sig for Bipolar, IV drug abuse.   Pt assessed due to consult for enteral nutrition. Pt has NG tube in place for EN access. Sedated on high dose Propofol (39.4 ml/hr= ~ 1040 kcals/lipids) and TG today at 308. Weight hx: fairly stable and pt is obese @ BMI 30.89 kg/m2. Physical exam:  Pt appears well nourished. +BM noted. Diet Hx: per RN mother left for home to sleep as works days and other family members to return later today. RD will obtain diet hx later when family is here.   EN start with high protein polymeric formula + additional modular protein supplements to meet needs. Elevated TG- monitor as value >400 mg/dl  will prompt need to adjust to alternate sedation if continues to need or per Pharmacy recommendations.   FOOD/NUTRITION RELATED HISTORY:  Appetite: Good  Intake:  NPO  Intake Meeting Needs: NPO  Percent Meals Eaten (last 3 days)       Date/Time Percent Meals Eaten (%)    24 1402 50 %           Food Allergies: No Known Food Allergies (NKFA)  Cultural/Ethnic/Spiritism Preferences: Not Obtained    GASTROINTESTINAL: +BM  formed medium brown. NG tube in place.     MEDICATIONS: reviewed Low dose pressor support. MAP: 83 mmHg   propofol 80 mcg/kg/min (06/10/24 1122)    dexmedetomidine Stopped (06/10/24 0430)    dextrose 10%      norepinephrine 2 mcg/min (06/10/24 0630)    sodium chloride 125 mL/hr at 06/10/24 1042        midazolam  4 mg Intravenous Once    haloperidol lactate        propofol        dexmedeTOMIDine in sodium chloride 0.9%        sodium chloride   Intravenous Once    cefepime  2 g Intravenous Q8H    Methadone HCl  20 mg Oral BID    haloperidol lactate  2 mg Intravenous 4 times per day    QUEtiapine  50 mg Oral TID    QUEtiapine  200 mg Oral Nightly    DAPTOmycin  8 mg/kg (Adjusted) Intravenous Q24H    prazosin  4 mg Oral Nightly    venlafaxine ER  150 mg Oral Nightly    melatonin  3 mg Oral Nightly   PRN meds noted. (No bowel regime presently)   LABS: reviewed Mg replaced/Lyte protocol (non cardiac) . Phos n/a- will obtain with pre-existing k and mg deficits noted past few days. EN order set placed. T today-high.   Recent Labs     24  0732 06/10/24  0544   * 110*   BUN 15 6*   CREATSERUM 1.10* 0.44*   CA 9.5 8.0*   MG  --  1.4*   * 141   K 3.4* 3.9  3.9    112   CO2 23.0 26.0   OSMOCALC 276 290       NUTRITION RELATED PHYSICAL FINDINGS:  - Nutrition Focused Physical Exam (NFPE): well nourished per visual exam  - Fluid Accumulation: none  see RN documentation for details  - Skin Integrity: intact see RN documentation for details    ANTHROPOMETRICS:  HT:  5'2\"   WT: 76.6 kg (168 lb 14 oz)   BMI: Body mass index is 30.89 kg/m².  BMI CLASSIFICATION: 30-34.9 kg/m2 - obesity class I  IBW: 110  lbs        153 % IBW  Usual Body Wt:  170-175  lbs 6/9/24 and  3/10/23.     97 % UBW    WEIGHT HISTORY:  Patient Weight(s) for the past 336 hrs:   Weight   06/10/24 0700 76.6 kg (168 lb 14 oz)   06/09/24 1159 77.2 kg (170 lb 3.1 oz)   06/09/24 0715 72 kg (158 lb 11.7 oz)     Wt Readings from Last 10 Encounters:   06/10/24 76.6 kg (168 lb 14 oz)   05/24/24 72.8 kg (160 lb 9.6 oz)   04/16/24 74.4 kg (164 lb)   02/19/24 78 kg (172 lb)   02/05/24 78 kg (172 lb)   08/01/23 77.1 kg (170 lb)   04/26/23 79.6 kg (175 lb 6.4 oz)   04/10/23 78.5 kg (173 lb)   03/10/23 79.7 kg (175 lb 9.6 oz)   12/13/22 75.1 kg (165 lb 9.6 oz)     NUTRITION DIAGNOSIS/PROBLEM:   Inadequate oral intake related to Lack of or limited access to food in the setting of intubation and inability to swallow as evidenced by NPO, 0 nutrition intake    NUTRITION INTERVENTION:     NUTRITION PRESCRIPTION:   Estimated Nutrition needs: --dosing wt of 76.6 kg - wt taken on 6/10/24  Calories: 1786  calories/day (24 calories per kg Dosing wt) ([place] state equation)   Protein:  g protein/day (1.2-2.0 g protein/kg Ideal body wt (IBW))  Fluid Needs: ~35 ml/kg adjusted IBW for obesity (57 kg) : 1995 ml (maintenance). Adjust per clinical status. Increased now for hypotension, sepsis (IVF NS @125 ml/hr )    - Diet:       Procedures    Regular/General diet Is Patient on Accuchecks? No    - EN rx via NG tube: Promote 25 ml/hr x 12 hrs; if tolerates advance to goal 35 ml/hr X ave 22 hr infusion time, ProSource 1 pack BID as med flush, FWF: 150 ml q 4 hrs to start (900 ml)= 850 kcals (1890 total kcals with non-nutrition kcals included) 71 g protein, 736 ml h20, (1636 ml total h20 including FWF), >100% RDI's, 105% veronica goal and 100% protein goal.   - Medical Food Supplements-NP0  - Vitamin and mineral supplements: none  - Feeding assistance: NPO  - Nutrition education: not appropriate at this time -intubated.   - Coordination of nutrition care: collaboration with other providers and discussed in Care Rounds    - Discharge and transfer of nutrition care to new setting or provider: monitor plans    MONITOR AND EVALUATE/NUTRITION GOALS:  - Food and Nutrient Intake:      Monitor: for PO initiation  - Food and Nutrient Administration:      Monitor: tolerance to enteral nutrition, adequacy of enteral nutrition, for enteral nutrition adjustment, and propofol rate  - Anthropometric Measurement:    Monitor weight  - Nutrition Goals:      TF meet >80% of goal within first week , labs within acceptable limits, and support body systems, TG wnl on Propofol.     DIETITIAN FOLLOW UP: RD to follow and monitor nutrition status    Vangie Walsh RD, LDN, CNSC ([id number])

## 2024-06-10 NOTE — PLAN OF CARE
Problem: RESPIRATORY - ADULT  Goal: Achieves optimal ventilation and oxygenation  Description: INTERVENTIONS:  - Assess for changes in respiratory status  - Assess for changes in mentation and behavior  - Position to facilitate oxygenation and minimize respiratory effort  - Oxygen supplementation based on oxygen saturation or ABGs  - Provide Smoking Cessation handout, if applicable  - Encourage broncho-pulmonary hygiene including cough, deep breathe, Incentive Spirometry  - Assess the need for suctioning and perform as needed  - Assess and instruct to report SOB or any respiratory difficulty  - Respiratory Therapy support as indicated  - Manage/alleviate anxiety  - Monitor for signs/symptoms of CO2 retention  Outcome: Progressing    Received patient intubated and on vent settings of VC/AC 16/430/+5/50%, ETT 7.5/ 21 @ the lip, [email or web address] sound auscultated and suction provided as needed. Patient is sedated and not a candidate for SBT today, fio2 titrated to 30%, no other changes/acute events on this shift, RT will continue to monitor the patient.       06/10/24 1615   Vent Information   Interface Invasive   Vent Type AV   Vent plugged into main power? Yes   Vent ID    Vent Mode VC/AC   Settings   FiO2 (%) 30 %   Resp Rate (Set) 16   Vt (Set, mL) 430 mL   Waveform Decelerating ramp   PEEP/CPAP (cm H2O) 5 cm H20   Peak Flow LPM 60   Trigger Sensitivity Flow (L/min) 1 L/min   Trigger Sensitivity Pressure (cm H2O) 3 cm H2O   Humidification Heater   H2O Bag Level 3/4 Full   Heater Temperature 98.6 °F (37 °C)   Readings   Total RR 17   Minute Ventilation (L/min) 7.3 L/min   Expiratory Tidal Volume 430 mL   PIP Observed (cm H2O) 17 cm H2O   MAP (cm H2O) 8   PEEP Low (cm H2O) 2 cm H2O   I/E Ratio 1:4.8   Plateau Pressure (cm H2O) 14 cm H2O   Static Compliance (L/cm H2O) 43   Dynamic Compliance (L/cm H2O) 35 L/cm H2O   Airway Resistance 6   Alarms   High RR 40   Insp Pressure High (cm H2O) 40 cm H2O   Insp Pressure Low  (cm H2O) 8 cm H2O   MV High (L/min) 30 L/min   MV Low (L/min) 3 L/min   Apnea Interval (sec) 20 seconds   Apnea Rate 16   Apnea Volume (mL) 430 mL

## 2024-06-10 NOTE — PLAN OF CARE
Pt became extremely agitated overnight, very difficult to redirect, pulled out her IV, rolling around the bed with IV tubings around her body, peeing on the floor with extremely poor safety awareness despite multiple redirections and with high risk to pull out her CVC while on levo x hypotension, Dr Cleveland notified about pt's behavior and seen pt at bedside, Benadryl, Versed, Zyprexa, morphine, and haldol  given (see MAR) but was not effective, attempting to kick staff, pt was placed on 4 point restraints x risk of harm to self and others, still attempts to get out of bed and continuous to be extremely agitated/restless, decision made by Dr Cleveland to intubate the pt, pt's mother updated and was agreeable with the tx plan. Pt remains very difficult to sedate even after intubation,currently now on propofol gtt with appropriate RASS score .Remains on low dose levo for bp support. Updates given to Dr Saleh and Dr Marquez about pt's condition.    Problem: Patient Centered Care  Goal: Patient preferences are identified and integrated in the patient's plan of care  Description: Interventions:  - What would you like us to know as we care for you?   - Provide timely, complete, and accurate information to patient/family  - Incorporate patient and family knowledge, values, beliefs, and cultural backgrounds into the planning and delivery of care  - Encourage patient/family to participate in care and decision-making at the level they choose  - Honor patient and family perspectives and choices  Outcome: Progressing     Problem: Patient/Family Goals  Goal: Patient/Family Long Term Goal  Description: Patient's Long Term Goal:     Interventions:  -   - See additional Care Plan goals for specific interventions  Outcome: Not Progressing  Goal: Patient/Family Short Term Goal  Description: Patient's Short Term Goal:     Interventions:   -  - See additional Care Plan goals for specific interventions  Outcome: Progressing     Problem:  CARDIOVASCULAR - ADULT  Goal: Maintains optimal cardiac output and hemodynamic stability  Description: INTERVENTIONS:  - Monitor vital signs, rhythm, and trends  - Monitor for bleeding, hypotension and signs of decreased cardiac output  - Evaluate effectiveness of vasoactive medications to optimize hemodynamic stability  - Monitor arterial and/or venous puncture sites for bleeding and/or hematoma  - Assess quality of pulses, skin color and temperature  - Assess for signs of decreased coronary artery perfusion - ex. Angina  - Evaluate fluid balance, assess for edema, trend weights  Outcome: Progressing  Goal: Absence of cardiac arrhythmias or at baseline  Description: INTERVENTIONS:  - Continuous cardiac monitoring, monitor vital signs, obtain 12 lead EKG if indicated  - Evaluate effectiveness of antiarrhythmic and heart rate control medications as ordered  - Initiate emergency measures for life threatening arrhythmias  - Monitor electrolytes and administer replacement therapy as ordered  Outcome: Progressing     Problem: RESPIRATORY - ADULT  Goal: Achieves optimal ventilation and oxygenation  Description: INTERVENTIONS:  - Assess for changes in respiratory status  - Assess for changes in mentation and behavior  - Position to facilitate oxygenation and minimize respiratory effort  - Oxygen supplementation based on oxygen saturation or ABGs  - Provide Smoking Cessation handout, if applicable  - Encourage broncho-pulmonary hygiene including cough, deep breathe, Incentive Spirometry  - Assess the need for suctioning and perform as needed  - Assess and instruct to report SOB or any respiratory difficulty  - Respiratory Therapy support as indicated  - Manage/alleviate anxiety  - Monitor for signs/symptoms of CO2 retention  Outcome: Progressing     Problem: NEUROLOGICAL - ADULT  Goal: Achieves stable or improved neurological status  Description: INTERVENTIONS  - Assess for and report changes in neurological status  -  Initiate measures to prevent increased intracranial pressure  - Maintain blood pressure and fluid volume within ordered parameters to optimize cerebral perfusion and minimize risk of hemorrhage  - Monitor temperature, glucose, and sodium. Initiate appropriate interventions as ordered  Outcome: Progressing  Goal: Absence of seizures  Description: INTERVENTIONS  - Monitor for seizure activity  - Administer anti-seizure medications as ordered  - Monitor neurological status  Outcome: Progressing  Goal: Remains free of injury related to seizure activity  Description: INTERVENTIONS:  - Maintain airway, patient safety  and administer oxygen as ordered  - Monitor patient for seizure activity, document and report duration and description of seizure to MD/LIP  - If seizure occurs, turn patient to side and suction secretions as needed  - Reorient patient post seizure  - Seizure pads on all 4 side rails  - Instruct patient/family to notify RN of any seizure activity  - Instruct patient/family to call for assistance with activity based on assessment  Outcome: Progressing     Problem: Risk for Violence-Violent Restraints/Seclusion  Goal: Patient will not express any violent or self-destructive behaviors  Description: Interventions:  - Apply the least restrictive restraint to prevent harm if patient strikes out and is not responding to redirection  - Notify patient and family of reasons restraints applied  - Assist the patient to identify the precipitating event  - Assist the patient to identify alternatives to physically acting out  - Assess for any contributing factors to violent behaviors (substances,  medications, history of trauma)  - Assess the patient's physical comfort, circulation, skin condition, hydration, nutrition and elimination needs   - Assess for the need to continue restraints  - Evaluate the need for an emergency medication  6/10/2024 0326 by Tiffany Kelley, RN  Outcome: Completed  6/10/2024 0325 by Tiffany Kelley,  RN  Outcome: Not Progressing     Problem: Safety Risk - Non-Violent Restraints  Goal: Patient will remain free from self-harm  Description: INTERVENTIONS:  - Apply the least restrictive restraint to prevent harm  - Notify patient and family of reasons restraints applied  - Assess for any contributing factors to confusion (electrolyte disturbances, delirium, medications)  - Discontinue any unnecessary medical devices as soon as possible  - Assess the patient's physical comfort, circulation, skin condition, hydration, nutrition and elimination needs   - Reorient and redirection as needed  - Assess for the need to continue restraints  6/10/2024 0414 by Tiffany Kelley RN  Outcome: Not Progressing  6/10/2024 0326 by Tiffany Kelley RN  Outcome: Not Progressing

## 2024-06-10 NOTE — CONSULTS
[facility]  part of [facility]    Report of Consultation    Latonia Barker Patient Status:  Inpatient    1/3/1995 MRN [id number]   Location [address]/ Attending Iram Marquez MD   Hosp Day # 1 PCP Agatha Mcmanus MD     Date of Admission:  2024  Date of Consult:  06/10/2024   Reason for Consultation:   Patient presented with heroin withdrawal, Iram Ahumada MD requested psychiatric consult for evaluation and advice.    Consult Duration     The patient seen for initial psychiatric consult evaluation.   Record reviewed, communication with attending, communication with RN and patient seen face to face evaluation.    History of Present Illness:   Patient is a 29 year old female with past medical history of acne, anxiety, bacterial endocarditis, bipolar disorder, chlamydia, decorative tattoo, depression, hepatitis B virus infection, history of blood clots, HPV infection, IV drug use, heroin and crack cocaine use, opioid dependence, subclavian vein thrombosis, and substance abuse who was admitted to the hospital for Septic shock (HCC): The patient has been demonstrating signs of being combative and is currently sedated.    Patient indicated for psych consult for evaluation and advise.    Per chart review, the patient presented to the hospital for septic shock (HCC).  The patient received the following psychotropic medications benadryl, versed, Zyprexa, morphine, haldol, propofol, precedex, and succinyl choline    Labs and imaging reviewed: BUN: 6, Creatinine: 0.44, AST: 187, ALT: 54, M.4  Recent Labs   Lab 24  0732 06/10/24  0544   ETOH <3  --    MG  --  1.4*   * 187*     Vital signs: BP: 100/82, HR: 70, RR: 16    The patient is well known to the psychiatry team from previous consult. The patient was last seen multiple times in 2017 and 2018.  Patient with a chronic history of opiate dependence mostly heroin IV and has been in methadone clinic.  Patient  also with a diagnosis of bipolar disorder who responded treatment to Seroquel in the past.     The patient seen today sedated with an NG tube due to being combative. Spoke with mother and grandmother regarding patient's condition. The following was said:    Mother indicated that patient was admitted at [facility] on March 5th which helped her a lot until she had a relapse. Mother states that patient is 11wks pregnant and has continued to use 8/2 Suboxone 3x daily throughout her pregnancy. Mother states she was trying to lower the dose per day for the patient.     The patient is not alert and oriented to person, place, date and condition.     The patient is not demonstrating any stacy or psychosis  The patient denies auditory or visual hallucinations  The patient denies suicidal or homicidal ideation.    The patient has been demonstrating signs of combativeness prior to sedation.    Past Psychiatric/Medication History:  1. Prior diagnoses: anxiety, bipolar disorder, depression, opioid dependence, substance abuse  2. Past psychiatric inpatient: Unknown  3. Past outpatient history: Unknown  4. Past suicide history: Unknown  5. 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 ×42 days    Bipolar disorder (HCC)    Chlamydia    Decorative tattoo    Depression    Hepatitis B virus infection    History of blood clots    Human papilloma virus infection    Intravenous drug abuse (HCC)    Heroin and crack cocaine    Opioid dependence (HCC)     Subclavian vein thrombosis (HCC)    Substance abuse (HCC)       Past Surgical History  Past Surgical History:   Procedure Laterality Date    Colonoscopy      Colonoscopy N/A 2017    Procedure: COLONOSCOPY;  Surgeon: Xu Day MD;  Location: [facility] ENDOSCOPY    Colposcopy, cervix w/upper adjacent vagina; w/biopsy(s), cervix            Other surgical history      wisdom teeth     Other surgical history      fasciotomy of right arm    Tonsillectomy      Upper gi endoscopy,exam         Family History  Family History   Problem Relation Age of Onset    Other (back pain) Father     Hypertension Mother     Hypertension Maternal Grandmother     Diabetes Maternal Grandfather     Prostate Cancer Paternal Grandfather         w/ metastasis       Social History  Social History     Socioeconomic History    Marital status: Single    Number of children: 0   Occupational History    Occupation: Unemployed   Tobacco Use    Smoking status: Every Day     Current packs/day: 0.00     Average packs/day: 1 pack/day for 1 year (1.0 ttl pk-yrs)     Types: Cigarettes     Start date: 2014     Last attempt to quit: 2015     Years since quittin.3    Smokeless tobacco: Current    Tobacco comments:     Vaping   Vaping Use    Vaping status: Never Used   Substance and Sexual Activity    Alcohol use: Not Currently     Comment: social    Drug use: Yes     Types: Heroin, \"Crack\" cocaine, benzodiazepines, Other-see comments     Comment: Acid, mushrooms    Sexual activity: Yes     Partners: Male     Birth control/protection: Condom   Other Topics Concern    Caffeine Concern No     Comment:  2 cans Cola per day    Exercise Yes     Comment: hep    Reaction to local anesthetic No   Social History Narrative    The patient does not use an assistive device..      The patient does live in a home with stairs.           Current Medications:  Current Facility-Administered Medications   Medication Dose Route Frequency     diphenhydrAMINE (Benadryl) 50 mg/mL  injection 50 mg  50 mg Intravenous Q4H PRN    midazolam (Versed) 2 MG/2ML injection 4 mg  4 mg Intravenous Once    propofol (Diprivan) 10 mg/mL infusion premix  5-100 mcg/kg/min (Dosing Weight) Intravenous Continuous    sodium chloride 0.9% infusion   Intravenous Once    methadone solution (DOLOPHINE) 10 mg/5mL  20 mg Oral BID    haloperidol lactate (Haldol) 5 MG/ML injection 2 mg  2 mg Intravenous 4 times per day    QUEtiapine (SEROquel) tab 50 mg  50 mg Oral TID    dextrose 10% infusion (TPN no rate)   Intravenous Continuous PRN    pancrelipase (Lip-Prot-Amyl) (Zenpep) DR particles cap 10,000 Units  10,000 Units Per G Tube PRN    And    sodium bicarbonate tab 325 mg  325 mg Oral PRN    sodium phosphate 30 mmol in sodium chloride 0.9% 150 mL IVPB  30 mmol Intravenous Once    meropenem (Merrem) 500 mg in sodium chloride 0.9% 100 mL IVPB-MBP  500 mg Intravenous Q8H    norepinephrine (Levophed) 4 mg/250mL infusion premix  0.5-30 mcg/min Intravenous Continuous    sodium chloride 0.9% infusion   Intravenous Continuous    ondansetron (Zofran) 4 MG/2ML injection 4 mg  4 mg Intravenous Q6H PRN    prochlorperazine (Compazine) 10 MG/2ML injection 5 mg  5 mg Intravenous Q8H PRN    QUEtiapine (SEROquel) tab 200 mg  200 mg Oral Nightly    acetaminophen (Tylenol) tab 650 mg  650 mg Oral Q6H PRN    prazosin (Minipress) cap 4 mg  4 mg Oral Nightly    venlafaxine ER (Effexor-XR) 24 hr cap 150 mg  150 mg Oral Nightly    melatonin tab 3 mg  3 mg Oral Nightly     Medications Prior to Admission   Medication Sig    prazosin 1 MG Oral Cap Take 4 capsules (4 mg total) by mouth nightly. Takes 4mg nightly    buprenorphine-naloxone 8-2 MG Sublingual Film dissolve 1 film under the tongue 3 TIMES A DAY for maintenance    prenatal vitamin with DHA 27-0.8-228 MG Oral Cap Take 1 capsule by mouth daily.    [] nitrofurantoin monohydrate macro (MACROBID) 100 MG Oral Cap Take 1 capsule (100 mg total) by  mouth 2 (two) times daily for 7 days.    QUEtiapine 200 MG Oral Tab Take 1 tablet (200 mg total) by mouth nightly.    [] Buprenorphine HCl-Naloxone HCl 8.6-2.1 MG Sublingual SL Tab Place 8.6 mg of buprenorphine under the tongue in the morning, at noon, and at bedtime.    pantoprazole 20 MG Oral Tab EC Take 1 tablet (20 mg total) by mouth every morning before breakfast.    gabapentin 300 MG Oral Cap Take 1 capsule (300 mg total) by mouth nightly.    Melatonin 3 MG Oral Cap Take 1 capsule (3 mg total) by mouth at bedtime.    Naloxone HCl (NARCAN) 4 MG/0.1ML Nasal Liquid 4 mg by Nasal route as needed. IF PATIENT REMAINS UNRESPONSIVE, REPEAT DOSE IN OTHER NOSTRIL 2 TO 5 MINUTES AFTER FIRST DOSE (Patient not taking: Reported on 2024)    venlafaxine  MG Oral Capsule SR 24 Hr Take 1 capsule (150 mg total) by mouth every morning.    ondansetron (ZOFRAN) 4 mg tablet Take 1 tablet (4 mg total) by mouth every 8 (eight) hours as needed for Nausea. (Patient not taking: Reported on 2024)    cloNIDine 0.1 MG Oral Tab Take 1 tablet (0.1 mg total) by mouth 2 (two) times daily as needed. (Patient not taking: Reported on 2024)       Allergies  Allergies   Allergen Reactions    Amoxicillin HIVES    Vancomycin RASH and ITCHING    Clindamycin RASH       Review of Systems:   As by Admitting/Attending    Results:   Laboratory Data:  Lab Results   Component Value Date    WBC 6.4 06/10/2024    HGB 8.7 (L) 06/10/2024    HCT 20.9 (L) 06/10/2024    PLT 69.0 (L) 06/10/2024    CREATSERUM 0.44 (L) 06/10/2024    BUN 6 (L) 06/10/2024     06/10/2024    K 3.9 06/10/2024    K 3.9 06/10/2024     06/10/2024    CO2 26.0 06/10/2024     (H) 06/10/2024    CA 8.0 (L) 06/10/2024    ALB 2.9 (L) 06/10/2024    ALKPHO 101 (H) 06/10/2024    TP 5.3 (L) 06/10/2024     (H) 06/10/2024    ALT 54 (H) 06/10/2024    PTT 29.2 2020    INR 1.9 (A) 2020    PTP 14.7 (H) 2020    T4F 0.73 2011    TSH  2.080 02/13/2019    LIP 18 (L) 05/30/2020    DDIMER 0.47 02/28/2022    CRP <0.5 01/29/2015    MG 1.4 (L) 06/10/2024    PHOS 1.5 (L) 06/10/2024    TROP <0.045 05/30/2020    CK 55 03/29/2017    ETOH <3 06/09/2024         Imaging:  XR CHEST AP PORTABLE  (CPT=71045)    Result Date: 6/10/2024  PROCEDURE: XR CHEST AP PORTABLE  (CPT=71045) TIME: 236  COMPARISON: [facility], XR CHEST AP PORTABLE (CPT=71045), 6/10/2024, 2:18 AM.  INDICATIONS: Readjustment of ETT verify placement.  TECHNIQUE:   Single view.   Findings and impression:  ETT is now well positioned 3.5 cm above the dexter  Near complete re-expansion of the left lung  [facility] provided a prelim report for this exam. This final report has no significant discrepancies with the [facility] report.      Dictated by (CST): Steffen Godinez MD on 6/10/2024 at 9:06 AM     Finalized by (CST): Steffen Godinez MD on 6/10/2024 at 9:06 AM          XR CHEST AP PORTABLE  (CPT=71045)    Result Date: 6/10/2024  PROCEDURE: XR CHEST AP PORTABLE  (CPT=71045) TIME: 0218  COMPARISON: [facility], XR CHEST AP PORTABLE (CPT=71045), 6/10/2024, 2:36 AM.  [facility], XR CHEST AP PORTABLE (CPT=71045), 6/09/2024, 11:02 AM.  INDICATIONS: Verify correct NG tube placement.  TECHNIQUE:   Single view.   Findings and impression:  Malposition ETT located 2.2 centimeter deep into the right mainstem bronchus.  This should be repositioned  Near complete left lung atelectasis has developed  Enteric tube into the stomach.  Right IJ catheter in the mid SVC.  Clear right lung  [facility] provided a prelim report for this exam. This final report has no significant discrepancies with the [facility] report.     Dictated by (CST): Steffen Godinez MD on 6/10/2024 at 9:03 AM     Finalized by (CST): Steffen Godinez MD on 6/10/2024 at 9:05 AM          US LIVER (CPT=76705)    Result Date: 6/9/2024  CONCLUSION: Hepatomegaly.  Hepatic steatosis.     Dictated by (CST): Riccardo  MD Gabo on 6/09/2024 at 12:49 PM     Finalized by (CST): Gabo Gu MD on 6/09/2024 at 12:51 PM          XR CHEST AP PORTABLE  (CPT=71045)    Result Date: 6/9/2024  CONCLUSION:  Right internal jugular central venous catheter, in expected positioning.  Unchanged left retrocardiac opacity.    Dictated by (CST): Aziza Nath MD on 6/09/2024 at 11:38 AM     Finalized by (CST): Aziza Nath MD on 6/09/2024 at 11:38 AM          CT CHEST PE AORTA (IV ONLY) (CPT=71260)    Result Date: 6/9/2024  CONCLUSION:   1. No acute pulmonary embolism through the segmental pulmonary arteries. 2. Multifocal pneumonia demonstrated by multifocal ground-glass opacities and consolidations involving the left greater than right lower lobes and left upper lobe. 3. Two 4 mm right upper lobe pulmonary nodules, which are favored to be infectious/inflammatory, however recommend follow-up CT chest in 6 months to monitor for resolution. 4. Mild right heart predominant cardiomegaly. 5. Borderline dilated main pulmonary artery, which can be seen in pulmonary artery hypertension. 6. Small pericardial effusion. 7. Hepatosplenomegaly. 8. Lesser incidental findings described above.     Dictated by (CST): Kaz French MD on 6/09/2024 at 10:28 AM     Finalized by (CST): Kaz French MD on 6/09/2024 at 10:39 AM          US PREG EV ONLY (CPT=76817)    Result Date: 6/9/2024  CONCLUSION:   Single intrauterine pregnancy with a heart rate of 195 beats per minute and a crown-rump length of 3.4 cm, which corresponds to a gestational age of 10 weeks and 2 days.  A 3.2 cm subchorionic hemorrhage along the inferior aspect of the gestational sac.  A 1.9 cm left corpus luteal cyst.     Dictated by (CST): Kaz French MD on 6/09/2024 at 9:13 AM     Finalized by (CST): Kaz French MD on 6/09/2024 at 9:16 AM          XR CHEST AP PORTABLE  (CPT=71045)    Result Date: 6/9/2024  CONCLUSION:  Hazy left basilar opacity, which may be secondary to  subsegmental atelectasis in the setting of low lung volumes.  Superimposed pneumonia is a differential in the appropriate clinical setting.     Dictated by (CST): Aziza Nath MD on 6/09/2024 at 8:14 AM     Finalized by (CST): Aziza Nath MD on 6/09/2024 at 8:16 AM           Vital Signs:   Blood pressure 104/78, pulse 73, temperature 97.6 °F (36.4 °C), temperature source Temporal, resp. rate 18, weight 76.6 kg (168 lb 14 oz), last menstrual period 03/25/2024, SpO2 99%, not currently breastfeeding.    Mental Status Exam:   Appearance: Stated age single, , female in hospital gown, laying down in hospital bed. Pt. Is intubated and sedated.  Psychomotor: Patient has been demonstrating lethargy due to sedation   Orientation: Patient is sedated   Gait: Not evaluated.  Attitude/Coorperation: Not cooperative due to sedation  Behavior: Unable to determine due to sedation  Speech: Unable to communicate verbally  Mood: Unable to express emotion  Affect: Blunted affect  Thought process: Unable to determine due to sedation  Thought content: No reports of suicidal or homicidal ideation  Perceptions: Unable to determine due to sedation  Concentration: Grossly impaired  Memory: Grossly impaired  Intellect: Average.  Judgment and Insight: Questionable.     Impression:     Opiate Withdrawal Syndrome.  Opiate Dependency.  Bipolar Disorder unspecified.  Septic shock (HCC)  Pneumonia of left lower lobe due to infectious organism  Subchorionic hematoma in first trimester, single or unspecified fetus (HCC)       The patient is a 29 year old  female, single, with past medical history of acne, anxiety, bacterial endocarditis, bipolar disorder, chlamydia, decorative tattoo, depression, hepatitis B virus infection, history of blood clots, HPV infection, IV drug use, heroin and crack cocaine use, opioid dependence, subclavian vein thrombosis, and substance abuse.  The patient is currently sedated.     Discussed risk  and benefit, acknowledging the current symptom and severity.  At this point, I would recommend the following approach:     Focus on safety  Focus on education and support.  Focus on insight orientation helping the patient understand diagnosis and treatment plan.  Add methadone 20 mg via NG tube BID  Consider utilizing Haldol  Add Seroquel 50 mg TID.  Lower propofol graduating  Discontinue Subutex and morphine  Processed with mom at length, the initiation of the above psychotropic medications I advised the patient of the risks, benefits, alternatives and potential side effects. The patient consents to administration of the medications and understands the right to refuse medications at any time. The patient verbalized understanding.   Coordinate plan with team    Orders This Visit:  Orders Placed This Encounter   Procedures    CBC With Differential With Platelet    Comp Metabolic Panel (14)    Urinalysis with Culture Reflex    Lactic Acid, Plasma    HCG, Beta Subunit (Quant Pregnancy Test)    Ethyl Alcohol    Acetaminophen (Tylenol), S    Salicylate, Serum    Drug Abuse Panel 11 screen    Manual differential    Lactic Acid Reflex Post Positive    CBC With Differential With Platelet    Comp Metabolic Panel (14)    Magnesium    Streptococcus Pneumoniae Ag, Urine    Legionella urine Ag serogrp 1    Potassium    Magnesium    Scan slide    MD BLOOD SMEAR CONSULT    Triglycerides    Basic Metabolic Panel (8)    Phosphorus    Phosphorus    Basic Metabolic Panel (8)    CBC With Differential With Platelet    Phosphorus    Hemoglobin    Triglycerides    Type and screen    ABORH (Blood Type)    Antibody Screen    Prepare RBC Once    SARS-CoV-2/Flu A and B/RSV by PCR (GeneXpert)    Blood Culture    Blood Culture PCR    Blood Culture    Blood Culture       Meds This Visit:  Requested Prescriptions      No prescriptions requested or ordered in this encounter       Luis De León MD  6/10/2024    Note to Patient: The 21st Century  Cures Act makes medical notes like these available to patients in the interest of transparency. However, be advised this is a medical document. It is intended as peer to peer communication. It is written in medical language and may contain abbreviations or verbiage that are unfamiliar. It may appear blunt or direct. Medical documents are intended to carry relevant information, facts as evident, and the clinical opinion of the practitioner. This note may have been transcribed using a voice dictation system. Voice recognition errors may occur. This should not be taken to alter the content or meaning of this note.

## 2024-06-10 NOTE — RESPIRATORY THERAPY NOTE
Patient received on ventilator.  Patient requires chronic ventilator support and is not a candidate for SBT. Bilateral breath sounds auscultated. Suction provided when indicated. No acute events. RT will continue to monitor.    Problem: RESPIRATORY - ADULT  Goal: Achieves optimal ventilation and oxygenation  Description: INTERVENTIONS:  - Assess for changes in respiratory status  - Assess for changes in mentation and behavior  - Position to facilitate oxygenation and minimize respiratory effort  - Oxygen supplementation based on oxygen saturation or ABGs  - Provide Smoking Cessation handout, if applicable  - Encourage broncho-pulmonary hygiene including cough, deep breathe, Incentive Spirometry  - Assess the need for suctioning and perform as needed  - Assess and instruct to report SOB or any respiratory difficulty  - Respiratory Therapy support as indicated  - Manage/alleviate anxiety  - Monitor for signs/symptoms of CO2 retention  Outcome: Progressing

## 2024-06-10 NOTE — PROGRESS NOTES
Progress Note     Latonia Barker Patient Status:  Inpatient    1/3/1995 MRN [id number]   Location [facility] 2W/SW Attending Iram Marquez MD   Hosp Day # 1 PCP Agatha Mcmanus MD     Chief Complaint: septic shock, multifocal pna, bacteremia, severe opioid withdrawal    Subjective:   S: Patient became increasingly agitated yesterday and eventually was sedated and intubated    Currently sedated and intubated, unresponsive  Mother and grandmother in room all questions addressed    Review of Systems:   ALEX due to sedation    Objective:   Vital signs:  Temp:  [97.3 °F (36.3 °C)-100.9 °F (38.3 °C)] 97.3 °F (36.3 °C)  Pulse:  [] 68  Resp:  [10-39] 18  BP: ()/() 91/66  SpO2:  [89 %-100 %] 100 %  FiO2 (%):  [50 %] 50 %    Wt Readings from Last 6 Encounters:   06/10/24 168 lb 14 oz (76.6 kg)   24 160 lb 9.6 oz (72.8 kg)   24 164 lb (74.4 kg)   24 172 lb (78 kg)   24 172 lb (78 kg)   23 170 lb (77.1 kg)         Physical Exam:    General: No acute distress. Alert , Intubated and sedated, central venous catheter in place     Respiratory: Clear to auscultation bilaterally. No wheezes. No rhonchi.  Cardiovascular: S1, S2. Regular rate and rhythm. No murmurs, rubs or gallops.   Abdomen: Soft, nontender, nondistended.  Positive bowel sounds. No rebound or guarding.  Neurologic:ALEX  Musculoskeletal:ALEX  Extremities: No edema.    Results:   Diagnostic Data:      Labs:    Labs Last 24 Hours:   BMP     CBC    Other     Na 141 Cl 112 BUN 6 Glu 110   Hb 6.9   PTT - Procal -   K 3.9; 3.9 CO2 26.0 Cr 0.44   WBC 6.4 >< PLT 69.0  INR - CRP -   Renal Lytes Endo    Hct 20.9   Trop - D dim -   eGFR - Ca 8.0 POC Gluc  -    LFT   pBNP - Lactic 1.9   eGFR AA - PO4 - A1c -    APk 101 Prot 5.3  LDL -     Mg 1.4 TSH -   ALT 54 T priyanka 0.3 Alb 2.9        COVID-19 Lab Results    COVID-19  Lab Results   Component Value Date    COVID19 Not Detected 2024       Pro-Calcitonin  No  results for input(s): \"PCT\" in the last 168 hours.    Cardiac  No results for input(s): \"TROP\", \"PBNP\" in the last 168 hours.    Creatinine Kinase  No results for input(s): \"CK\" in the last 168 hours.    Inflammatory Markers  No results for input(s): \"CRP\", \"PINKY\", \"LDH\", \"DDIMER\" in the last 168 hours.    Imaging: Imaging data reviewed in Epic.    Medications:    midazolam  4 mg Intravenous Once    haloperidol lactate        morphINE  4 mg Intravenous Once    propofol        dexmedeTOMIDine in sodium chloride 0.9%        magnesium sulfate  4 g Intravenous Once    sodium chloride   Intravenous Once    QUEtiapine  200 mg Oral Nightly    cefTRIAXone  2 g Intravenous Q24H    DAPTOmycin  8 mg/kg (Adjusted) Intravenous Q24H    buprenorphine  8 mg Sublingual TID    prazosin  4 mg Oral Nightly    venlafaxine ER  150 mg Oral Nightly    melatonin  3 mg Oral Nightly       Assessment & Plan:   ASSESSMENT / PLAN:     #Shock/likely septic/Acute hypoxic respiratory failure/ on 7L NC/Multifocal pna  Cxr suspicious for focal PNA L basilar / elevated LA- trend  Covid influenza ans RSV neg  Check strep U Ag and Legionella U Ag  ID history:    H/o bacterial endocarditis treated in March 2017 with prolonged hospital stay  H/o skin MRSA abscesses'2018 that required hospitalization  CTPE no PE but has multifocal PNA  -MAPS 40s on arrival  -on pressors and broad spectrum IV ceftriaxone in ED   Critical care/pulmonary on consult   ho MRSA- however has allergy to vancomycin- ID consulted for abx choice  Bcx IP  Cardiology consulted for TTE:  No evidence of a vegetation  - consult ID as well  -6/10: TTE neg for vegetation; Bcx 1/2 +ve GNR. Cpm- appr ID input c/uy IV merrem       Transaminitis: mild- improving  Likely shock liver  Check US liver  Monitor LFT daily  Avoid hepatotoxins     Ho IVDU:   Reportedly on suboxone since March and doing well but had relapse 1 day PTA   Per mother pt was on 8.2 mg suboxone tid, ruby MONTOYA at [facility]  recovery [phone]  -d/w psychiatry- ok to resume 8mg suboxone bid on admission  resume home dose of seroquel 200mg at bedtime  6/10: intubated and sedated due to severe agitation      10 Weeks 6 day viable IUP on US:   3.2 cm subchorionic hemorrhage along the inferior aspect of the gestational sac.   HCG down trending  Obgyne consulted in ED  Avoid a/c at this time     Bicytopenia: Hb downt trending  TCP possibly 2/2 sepsis  Anemia possibly from 3.2 cm subchorionic hemorrhage?  Monitor  Hold off ac   -6/10: 1 U prbc transfused        RAMON: mild- improved  Likely 2/2 shock  On pressors, IVF  Monitor renal function daily and avoid nephrotoxins       Bipolar disorder  -psych consult (of note both mom and daughter vehemently refused in prior admission)  -zofran prn, seroquel 200mg qhs  -supportive  -SW        Chronic anticoagulation  H/o Recurrent venous thrombosis superficial and deep    (had followed in [facility] in the past)  - per last hematology fup note  \"She completed more than 6 months of anticoagulation with warfarin for what we consider a provoked DVT and states she is no longer using IV drugs in the upper extremities.  She is now pregnant.  Would recommend stopping anticoagulation as long as she is able to avoid IV drug use \"  -avoid a/c due to subchorionic hemorrhage on US   monitor PLT closely           Quality:  DVT Prophylaxis: hold off on ac due to  monitor PLT closely;   CODE status: full             .         Coordinated care with providers and counseling re: treatment plan and workup     Iram Marquez MD    Supplementary Documentation:

## 2024-06-10 NOTE — PLAN OF CARE
Problem: Risk for Violence-Violent Restraints/Seclusion  Goal: Patient will not express any violent or self-destructive behaviors  Description: Interventions:  - Apply the least restrictive restraint to prevent harm if patient strikes out and is not responding to redirection  - Notify patient and family of reasons restraints applied  - Assist the patient to identify the precipitating event  - Assist the patient to identify alternatives to physically acting out  - Assess for any contributing factors to violent behaviors (substances,  medications, history of trauma)  - Assess the patient's physical comfort, circulation, skin condition, hydration, nutrition and elimination needs   - Assess for the need to continue restraints  - Evaluate the need for an emergency medication  Outcome: Not Progressing

## 2024-06-10 NOTE — PROGRESS NOTES
[facility] - CARDIOLOGY PROGRESS NOTE  Latonia Barker Patient Status:  Inpatient    1/3/1995 MRN [id number]   Location [facility] 2W/SW Attending Iram Marquez MD   Hosp Day # 1 PCP Agatha Mcmanus MD     Assessment:    1.  Septic shock, improving but still on low-dose Levophed.  Stable heart rate and rhythm.  Echocardiogram shows normal left ventricular size and contractility.  No valvular vegetations noted.    2.  Substance abuse disorder    3.  10-week pregnancy    4.  Severe anemia, thrombocytopenia    5.  Respiratory failure, now on ventilator.      Plan:    Continue to support blood pressure with Levophed as needed.    Agree with transfusion.      Subjective:  No chest pain or shortness of breath.    Objective:  /76 (BP Location: Left arm)   Pulse 71   Temp 97.5 °F (36.4 °C) (Temporal)   Resp 16   Wt 168 lb 14 oz (76.6 kg)   LMP 2024 (Approximate)   SpO2 98%   BMI 30.89 kg/m²     Temp (24hrs), Av.8 °F (36.6 °C), Min:97 °F (36.1 °C), Max:100.9 °F (38.3 °C)      Intake/Output:    Intake/Output Summary (Last 24 hours) at 6/10/2024 1259  Last data filed at 6/10/2024 1245  Gross per 24 hour   Intake 3263.15 ml   Output 2150 ml   Net 1113.15 ml       Wt Readings from Last 2 Encounters:   06/10/24 168 lb 14 oz (76.6 kg)   24 160 lb 9.6 oz (72.8 kg)       Physical Exam:    General: Intubated, sedated.  HEENT: No focal deficits.  Neck: No JVD, carotids 2+ no bruits.  Cardiac: Regular rate and rhythm, S1, S2 normal, no murmur  Lungs: Clear anterolaterally without wheezes, rales, rhonchi.   Abdomen: Soft  Extremities: Without clubbing, cyanosis or edema.  Peripheral pulses are diminished  Skin: Warm and dry.     Labs:  Lab Results   Component Value Date    WBC 6.4 06/10/2024    HGB 6.9 06/10/2024    HCT 20.9 06/10/2024    PLT 69.0 06/10/2024     Lab Results   Component Value Date     INR 1.9 (A) 08/12/2020     Lab Results   Component Value Date     06/10/2024    K 3.9 06/10/2024    K 3.9 06/10/2024     06/10/2024    CO2 26.0 06/10/2024    BUN 6 06/10/2024    CREATSERUM 0.44 06/10/2024     06/10/2024    MG 1.4 06/10/2024    CA 8.0 06/10/2024    ALT 54 06/10/2024     06/10/2024    ALB 2.9 06/10/2024     Lab Results   Component Value Date    TRIG 308 06/10/2024     Lab Results   Component Value Date    TROP <0.045 05/30/2020    TROP 0.01 04/03/2017    TROP 0.01 04/02/2017        Medications:     midazolam  4 mg Intravenous Once    haloperidol lactate        propofol        dexmedeTOMIDine in sodium chloride 0.9%        sodium chloride   Intravenous Once    cefepime  2 g Intravenous Q8H    Methadone HCl  20 mg Oral BID    haloperidol lactate  2 mg Intravenous 4 times per day    QUEtiapine  50 mg Oral TID    sodium phosphate  30 mmol Intravenous Once    QUEtiapine  200 mg Oral Nightly    prazosin  4 mg Oral Nightly    venlafaxine ER  150 mg Oral Nightly    melatonin  3 mg Oral Nightly      propofol 75 mcg/kg/min (06/10/24 1224)    dexmedetomidine Stopped (06/10/24 0430)    dextrose 10%      norepinephrine 2 mcg/min (06/10/24 0630)    sodium chloride 125 mL/hr at 06/10/24 1042       Josh Evans MD  6/10/2024  12:59 PM

## 2024-06-10 NOTE — PROGRESS NOTES
INFECTIOUS DISEASE PROGRESS NOTE  [facility]  part of [facility] ID PROGRESS NOTE    Latonia Bakrer Patient Status:  Inpatient    1/3/1995 MRN [id number]   Location [facility] 2W/SW Attending Iram Marquez MD   Hosp Day # 1 PCP Agatha Mcmanus MD     Subjective:  ROS unable to be reviewed. Was intubated overnight. 40% FIO2 on ventilator.    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 Continue on cefepime.  -  Repeat blood cx tomorrow AM.  -  Follow fever curve, wbc.  -  Reviewed labs, micro, imaging reports, available old records.  -  Case d/w patient, RN.     History of Present Illness:  Latonia Barker is a 29 year old female with hx of active IVDU, hep C, bipolar dx, history of MRSA bacteremia and 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 She states she was on suboxone but unfortunately relapsed and has been using for the past 4 days. She states she was very weak and having headaches as well as neck stiffness. States she feels much better now. Mostly injects in her groin area. Denies sharing any needles.  Temp 99.3F on admission with normal wbc. Blood cx pending. Started on IV ceftriaxone and vanc.     Physical  Exam:  BP 99/77 (BP Location: Left arm)   Pulse 70   Temp 97.2 °F (36.2 °C) (Temporal)   Resp 16   Wt 168 lb 14 oz (76.6 kg)   LMP 03/25/2024 (Approximate)   SpO2 100%   BMI 30.89 kg/m²     Gen:   Intubated, sedated  HEENT:  ETT+, neck supple  CV/lungs:  RRR, CTAB  Abdom:  Soft, NT/ND, +BS  Skin/extrem:  No rashes, no c/c/e  :   Dyson draining yellow urine  Lines:  RIJ CVC+    Laboratory Data: Reviewed    Microbiology: Reviewed    Radiology: Reviewed      [facility]  [phone]  6/10/2024

## 2024-06-10 NOTE — PLAN OF CARE
Problem: Risk for Violence-Violent Restraints/Seclusion  Goal: Patient will not express any violent or self-destructive behaviors  Description: Interventions:  - Apply the least restrictive restraint to prevent harm if patient strikes out and is not responding to redirection  - Notify patient and family of reasons restraints applied  - Assist the patient to identify the precipitating event  - Assist the patient to identify alternatives to physically acting out  - Assess for any contributing factors to violent behaviors (substances,  medications, history of trauma)  - Assess the patient's physical comfort, circulation, skin condition, hydration, nutrition and elimination needs   - Assess for the need to continue restraints  - Evaluate the need for an emergency medication  6/10/2024 0326 by Tiffany Kelley, RN  Outcome: Completed  6/10/2024 0325 by Tiffany Kelley, RN  Outcome: Not Progressing

## 2024-06-10 NOTE — PAYOR COMM NOTE
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 female with hx of active IVDU, hep C, bipolar dx, history of MRSA bacteremia and 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 She states she was on suboxone but unfortunately relapsed and has been using for the past 4 days. She states she was very weak and having headaches as well as neck stiffness. States she feels much better now. Mostly injects in her groin area. Denies sharing any needles.  Temp 99.3F on admission with normal wbc. Blood cx pending. Started on IV ceftriaxone and vanc.     Blood pressure 116/90, pulse 70, temperature 97.3 °F (36.3 °C), temperature source Temporal, resp. rate 16, weight 170 lb 3.1 oz (77.2 kg), last menstrual period 03/25/2024, SpO2 94%      ASSESSMENT:  # acute fever in an active IVDU pt  - r/o bacteremia, blood cx in progress  - UA with only 1-5wbc, most recent urine cx on 5/21/24 with E coli  - CT chest neg for PE but has multifocal GGOs and consolidations     # multifocal pneumonia  # H/o MRSA bacteremia due to pulmonary valve  endocarditis with septic embolic to lung s/p 6 weeks daptomycin (completed 17)   - repeat echo pending     # first trimester pregnancy  - US with subchorilapsed 4 days ago   onic hemorrhage  # hep C infection - untreated  # active IV heroin use  - HIV 24 non-reactive  - on Suboxone; re    PLAN:  -  continue Ceftriaxone and dapto 8mg/kg daily.   -  follow up blood cx results  -  follow urine strep and legionella ag results  -  follow up TTE results  -  Follow fever curve, wbc  -  Reviewed labs, micro, imaging reports, available old records     We will continue to follow with you and will make further recommendations based on her progress.  Mercedes Collazo MD   2024    On call Nocturnist 06/10/24  Patient very restless and agitated. Given multiple medications including zyprexxa, versed, benadryl, morphine still very agitated.  Required 4 point restraints concern of her pulling out line, currently on levophed for hypotension.  Still extremely agitated.  Additional versed, haldol and morphine given without any improvement.  Decision made to intubate.     Rapid sequence intubation:   Indication for the procedure was severe agitation.  The patient was preoxygenated with 100% oxygen by face mask.  The patient was given the following IV medications:  etomidate and succinyl choline .  The patient was orally endotracheally intubated under direct visualization with a 7.5 ETT.  In line stabilization was performed during the procedure.  There was good misting on the tube; breath sounds were auscultated equally bilaterally; good color change with [place] [employer] CO2 detector.  Chest X-ray pending.  The procedure was performed by myself.  Al Cleveland MD   6/10/2024  1:32 AM     OB Consultation Note   30 y/o  with FDLMP of 24 which places her at 11w0d today. I was called by Caitlin Espinoza in emergency department yesterday with report of onset sepsis and need for CCU admission. No report of vaginal  bleeding prior to admission. Caitlin did report viable 10w2d ultrasound in emergency department admission. This matches menstrual dates by 4 days. I saw her and Mom- Randy ( my long term patient ) for new prenatal visit on May 24th. I could not prove viable pregnancy at that time and ordered ultrasound. This was never scheduled. At that time she reported successful rehab in March. I did explain to her and Randy that her complex history including polysubstance abuse, Hep C, VTE and would still require level 3 care. We did the same with last pregnancy and sent her to [facility]. She ended up relocating to [place] and delivered  with pre-eclampsia at 35 weeks       06/10/24 0300 06/10/24 0400 06/10/24 0500 06/10/24 0600    BP: 122/77 90/66 91/68 91/66   BP Location:   Left arm Left arm Left arm   Pulse: (!) 124 69 67 68   Resp: (!) 29 18 17 18   Temp:   97.3 °F (36.3 °C)       TempSrc:   Temporal       SpO2: 100% 100% 100% 100%     6/10/2024 0556      Gross per 24 hour   Intake 5387.15 ml   Output 1300 ml   Net 4087.15 ml       Lab 24  0732 06/10/24  0544   RBC 3.39* 2.79*   HGB 8.2* 6.9*   HCT 26.1* 20.9*   MCV 77.0* 74.9*   MCH 24.2* 24.7*   MCHC 31.4 33.0   RDW 17.2* 17.5*   NEPRELIM 4.38 4.97   WBC 5.2 6.4   PLT 85.0* 69.0*      * 110*   BUN 15 6*   CREATSERUM 1.10* 0.44*   CA 9.5 8.0*   ALB 3.4 2.9*   * 141   K 3.4* 3.9  3.9    112   CO2 23.0 26.0   ALKPHO 162* 101*   * 187*   ALT 66* 54*   BILT 0.4 0.3   TP 6.2 5.3*      Blood Culture     Status: Abnormal (Preliminary result)     Collection Time: 24  7:34 AM     Specimen: Blood,peripheral   Result Value Ref Range     Blood Culture Result Positive N/A     Blood Smear Positive Blood Culture (A) N/A     Blood Smear Gram Negative Rods (A) N/A     Assessment and Plan:     Septic shock (HCC)    Pneumonia of left lower lobe due to infectious organism    Subchorionic hematoma in first trimester, single or unspecified fetus  (HCC)    Opiate use     PLAN: From an OB standpoint, there is nothing to do unless she has significant bleeding. Moderate sized sub-chorionic hemorrhage seen on ultrasound. Though HCG level dropped significantly from that seen in late May, this does not indicate inevitable AB. I would advise a repeat ultrasound near time of DC home to prove viability. We would then transfer care to tertiary center.  Caesar Christianson, DO  6/10/2024  7:10 AM     ID PROGRESS NOTE   PLAN:  -  Stop daptomycin. Continue on cefepime.  -  Repeat blood cx tomorrow AM.  -  Follow fever curve, wbc.  Mi Treviño PA-C   6/10/2024    Pulmonology   Blood pressure 99/77, pulse 70, temperature 97.2 °F (36.2 °C), temperature source Temporal, resp. rate 16, weight 168 lb 14 oz (76.6 kg), last menstrual period 03/25/2024, SpO2 100%     Component Value Date     WBC 6.4 06/10/2024     HGB 6.9 06/10/2024     HCT 20.9 06/10/2024     PLT 69.0 06/10/2024     CREATSERUM 0.44 06/10/2024     BUN 6 06/10/2024      06/10/2024     K 3.9 06/10/2024     K 3.9 06/10/2024      06/10/2024     CO2 26.0 06/10/2024      06/10/2024     CA 8.0 06/10/2024     ALB 2.9 06/10/2024     ALKPHO 101 06/10/2024     BILT 0.3 06/10/2024     TP 5.3 06/10/2024      06/10/2024     ALT 54 06/10/2024     MG 1.4 06/10/2024     PHOS 1.5 06/10/2024     CT scan of the chest-1. No acute pulmonary embolism through the segmental pulmonary arteries.   2. Multifocal pneumonia demonstrated by multifocal ground-glass opacities and consolidations involving the left greater than right lower lobes and left upper lobe.   3. Two 4 mm right upper lobe pulmonary nodules, which are favored to be infectious/inflammatory, however recommend follow-up CT chest in 6 months to monitor for resolution.   4. Mild right heart predominant cardiomegaly.   5. Borderline dilated main pulmonary artery, which can be seen in pulmonary artery hypertension.   6. Small pericardial effusion.   7.  Hepatosplenomegaly.     Respiratory failure-patient had to be intubated yesterday.  CT shows no pulmonary embolism.  There is multifocal pneumonia and two 4 mm right upper lobe pulmonary nodules.     Recommendations: Full ventilator support and repeat CT scan of the chest in 6 months.  Ongoing antibiotic.  Srinivasan Saleh MD   6/10/2024 12:01 PM     CARDIOLOGY PROGRESS NOTE   /76 (BP Location: Left arm)   Pulse 71   Temp 97.5 °F (36.4 °C) (Temporal)   Resp 16   Wt 168 lb 14 oz (76.6 kg)   LMP 2024 (Approximate)   SpO2 98%   BMI 30.89 kg/m²      Temp (24hrs), Av.8 °F (36.6 °C), Min:97 °F (36.1 °C), Max:100.9 °F (38.3 °C)     6/10/2024 1245      Gross per 24 hour   Intake 3263.15 ml   Output 2150 ml   Net 1113.15 ml     Assessment:  1.  Septic shock, improving but still on low-dose Levophed.  Stable heart rate and rhythm.  Echocardiogram shows normal left ventricular size and contractility.  No valvular vegetations noted.     2.  Substance abuse disorder     3.  10-week pregnancy     4.  Severe anemia, thrombocytopenia     5.  Respiratory failure, now on ventilator.     Plan:  Continue to support blood pressure with Levophed as needed.  Agree with transfusion.  Josh Evans MD  6/10/2024  12:59 PM    MEDICATIONS ADMINISTERED:  Medications 06/09/24 06/10/24   acetaminophen (Tylenol Extra Strength) tab 1,000 mg  Dose: 1,000 mg  Freq: Once Route: OR  Start: 24 End: 24    0718 ML-Given             buprenorphine (SUBUTEX) SL tab 8 mg  Dose: 8 mg  Freq: 3 times daily Route: SL  Start: 24 End: 06/10/24 1022   Admin Instructions:   ** SUBLINGUAL ** route of administration. Autosub for Suboxone  After the medicine is completely dissolved, take a large sip of water, swish it gently around your teeth and gums, and swallow. You should wait at least 1 hour before brushing your teeth to avoid damage to your teeth and give your mouth a chance to return to its natural state.     2309 ZB-Given          (0900 MW)-Not Given [C]   1022-D/C'd         ceFEPIme (Maxpime) 2 g in sodium chloride 0.9% 100 mL IVPB-MBP  Dose: 2 g  Freq: Every 8 hours Route: IV  Last Dose: 2 g (06/10/24 1054)  Start: 06/10/24 1000 End: 06/10/24 1413   Order specific questions:        1054 MW-New Bag   1413-D/C'd         cefTRIAXone (Rocephin) 1 g in D5W 100 mL IVPB-ADD  Dose: 1 g  Freq: Once Route: IV  Last Dose: Stopped (06/09/24 0915)  Start: 06/09/24 0845 End: 06/09/24 0915   Admin Instructions:   Ceftriaxone must NOT be administered simultaneously with calcium containing IV solutions. Includes Y-site as well.  In patients other than neonates ceftriaxone and calcium containing products may administered sequentially, provided the line is flushed in between administrations.   Order specific questions:       0845 ML-New Bag   0915 ML-Stopped            cefTRIAXone (Rocephin) 2 g in D5W 100 mL IVPB-ADD  Dose: 2 g  Freq: Every 24 hours Route: IV  Last Dose: 2 g (06/09/24 2354)  Start: 06/10/24 0000 End: 06/10/24 0954   Admin Instructions:   Ceftriaxone 1gm given in ED; will leave Rocephin 2gm to be given 6/10 @ 0000 as entered per ID  Ceftriaxone must NOT be administered simultaneously with calcium containing IV solutions. Includes Y-site as well.  In patients other than neonates ceftriaxone and calcium containing products may administered sequentially, provided the line is flushed in between administrations.   Order specific questions:       2354 ZB-New Bag          0954-D/C'd          DAPTOmycin (Cubicin) 500 mg in sodium chloride 0.9% PF IV syringe  Dose: 8 mg/kg  Weight Dosing Info: 60.9 kg (Adjusted)  Freq: Every 24 hours Route: IV  Start: 06/10/24 1200 End: 06/10/24 1204   Admin Instructions:   Administer IV Push over 2 minutes  Do not administer with other dextrose containing IVs   Order specific questions:        1150 MW-New Bag   1204-D/C'd         DAPTOmycin (Cubicin) 600 mg in sodium chloride 0.9% PF IV  syringe  Dose: 8 mg/kg  Weight Dosing Info: 72 kg  Freq: Once Route: IV  Start: 06/09/24 1136 End: 06/09/24 1209   Admin Instructions:   Administer IV Push over 2 minutes  Do not administer with other dextrose containing IVs   Order specific questions:       1209 LP-New Bag             diphenhydrAMINE (Benadryl) 50 mg/mL injection  Start: 06/10/24 0031 End: 06/10/24 0032   Admin Instructions:   Tiffany Kelley   : cabinet override     0032 ZB-Given [C]            etomidate (Amidate) 2 mg/mL injection  Start: 06/10/24 0151 End: 06/10/24 0153   Admin Instructions:   Evi Bright   : cabinet override     0153 ZB-Given [C]            famotidine (Pepcid) 20 mg/2mL injection 20 mg  Dose: 20 mg  Freq: 2 times daily Route: IV  Start: 06/10/24 2100     2100            haloperidol lactate (Haldol) 5 MG/ML injection 2 mg  Dose: 2 mg  Freq: 4 times per day Route: IV  Start: 06/10/24 1200     1155 MW-Given   1800           haloperidol lactate (Haldol) 5 MG/ML injection 5 mg  Dose: 5 mg  Freq: Once Route: IV  Start: 06/10/24 0145 End: 06/10/24 0145     0145 ZB-Given            magnesium sulfate 4 g/100mL IVPB premix 4 g  Dose: 4 g  Freq: Once Route: IV  Last Dose: 4 g (06/10/24 0848)  Start: 06/10/24 0630 End: 06/10/24 1048     0848 MW-New Bag            melatonin tab 3 mg  Dose: 3 mg  Freq: Nightly Route: OR  Start: 06/09/24 2100    2140 ZB-Given          2100            meropenem (Merrem) 500 mg in sodium chloride 0.9% 100 mL IVPB-MBP  Dose: 500 mg  Freq: Every 8 hours Route: IV  Last Dose: 500 mg (06/10/24 1517)  Start: 06/10/24 1430   Order specific questions:        1517 MW-New Bag   2230           methadone solution (DOLOPHINE) 10 mg/5mL  Dose: 20 mg  Freq: 2 times daily Route: OR  Start: 06/10/24 1030     1132 MW-Given [C]   2100           midazolam (Versed) 2 MG/2ML injection  Start: 06/10/24 0121 End: 06/10/24 0124   Admin Instructions:   Barbara Martin   : cabinet override     0124 ZB-Given            midazolam  (Versed) 2 MG/2ML injection 4 mg  Dose: 4 mg  Freq: Once Route: IV  Start: 06/10/24 0045 End: 06/10/24 0051   Admin Instructions:   Administer by slow IV injection over at least 2 mins.     0051 ZB-Given            morphINE PF 4 MG/ML injection  Start: 06/10/24 0138 End: 06/10/24 0142   Admin Instructions:   Tiffany Kelley   : cabinet override     0142 ZB-Given            morphINE PF 4 MG/ML injection 4 mg  Dose: 4 mg  Freq: Once Route: IV  Start: 06/10/24 0115 End: 06/10/24 0113     0113 ZB-Given            OLANZapine (Zyprexa) 10 mg in sterile water for injection (PF) IM injection  Dose: 10 mg  Freq: Once Route: IM  Start: 06/10/24 0115 End: 06/10/24 0119   Admin Instructions:   (maximum total dose: 30 mg/day) Reconstitute 10 mg vial with 2.1 ml sterile water for injection.  Resulting solution is ~ 5 mg/ml.  Use immediately (within 1 hour) following reconstitution.  Discard any unused portion.  Separate administration of IM olanzapine and IV/IM benzodiazepines by at least 1 hour as coadministration may result in excessive sedation and cardiorespiratory depression.     0119 ZB-Given            potassium chloride (Klor-Con M20) tab 40 mEq  Dose: 40 mEq  Freq: Once Route: OR  Start: 06/09/24 1245 End: 06/09/24 1359   Admin Instructions:   Do not crush    1359 LP-Given             prazosin (Minipress) cap 4 mg  Dose: 4 mg  Freq: Nightly Route: OR  Start: 06/09/24 2100    2140 ZB-Given          2100            propofol (Diprivan) 10 MG/ML injection  Start: 06/10/24 0147 End: 06/10/24 1359   Admin Instructions:   Tiffany Kelley   : cabinet override        QUEtiapine (SEROquel) tab 200 mg  Dose: 200 mg  Freq: Nightly Route: OR  Start: 06/09/24 2100    1949 ZB-Given          2100            QUEtiapine (SEROquel) tab 50 mg  Dose: 50 mg  Freq: 3 times daily Route: OR  Start: 06/10/24 1030     1107 MW-Given   1637 MW-Given   2100          sodium chloride 0.9 % IV bolus 2,184 mL  Dose: 30 mL/kg  Weight Dosing Info: 72.8 kg  Freq:  Once Route: IV  Last Dose: Stopped (06/09/24 1006)  Start: 06/09/24 0718 End: 06/09/24 1006   Admin Instructions:   Assess vital signs upon completion of IVF bolus. If SBP is <90 or MAP is <65 re-check in 10 minutes and notify physician of results.    0718 ML-New Bag   1006 ML-Stopped            sodium chloride 0.9% infusion  Freq: Once Route: IV  Start: 06/10/24 0945 End: 06/10/24 1016   Admin Instructions:   To standard blood administration set with integral filter. Change every 4 hours or after 2 units, whichever comes first. ALERT: NEVER mix any medication or solution with blood or blood products.  Run at KVO rate     1016 MW-New Bag            sodium phosphate 30 mmol in sodium chloride 0.9% 150 mL IVPB  Dose: 30 mmol  Freq: Once Route: IV  Last Dose: 30 mmol (06/10/24 1416)  Start: 06/10/24 1300   Admin Instructions:   Administer through peripheral line     1416 MW-New Bag            succinylcholine (Anectine) 20 MG/ML injection 100 mg  Dose: 100 mg  Freq: Once Route: IV  Start: 06/10/24 0245 End: 06/10/24 0232   Admin Instructions:   Warning paralyzing agent     0232 ZB-Given            venlafaxine ER (Effexor-XR) 24 hr cap 150 mg  Dose: 150 mg  Freq: Nightly Route: OR  Start: 06/09/24 2100   Admin Instructions:   Do not crush or chew. Capsule may be opened, and the contents sprinkled on applesauce.  Swallow applesauce mixture immediately without chewing.    2140 ZB-Given          2100                 dexmedeTOMIDine in sodium chloride 0.9% (Precedex) 400 mcg/100mL infusion premix  Rate: 3.9-29 mL/hr Dose: 0.2-1.5 mcg/kg/hr  Weight Dosing Info: 77.2 kg (Dosing Weight)  Freq: Continuous Route: IV  Last Dose: Stopped (06/10/24 0430)  Start: 06/10/24 0200 End: 06/10/24 1346   Admin Instructions:   Titrate to RASS 0 to -1, unless other specified by provider    When multiple sedation orders are active, begin with the agent that was ordered first. Titrate to the maximum ordered dose to achieve the ordered RASS goal  but not beyond a dose that induces HR <50 bpm. If titrated to the maximum ordered or tolerated without achieving RASS goal then start additional sedation agent as ordered. **NOTE** For down titration of infusion of multiple sedatives, propofol should always be titrated down FIRST, to achieve clinically indicated RASS goal; document RASS assessment with all titrations. This does not apply when patient is concurrently on neuromuscular blocker, contact physician.   Order specific questions:        0307 ZB/HB-New Bag   0315 ZB-Rate/Dose Change   0328 ZB-Rate/Dose Change   0406 ZB-Rate/Dose Change   0430 ZB-Stopped     1346-D/C'd          norepinephrine (Levophed) 4 mg/250mL infusion premix  Rate: 1.9-112.5 mL/hr Dose: 0.5-30 mcg/min  Freq: Continuous Route: IV  Last Dose: 2 mcg/min (06/10/24 1318)  Start: 06/09/24 1200   Admin Instructions:   Target systolic BP greater than or equal to 90; or MAP greater than or equal to 60.  Notify prescriber if maximum dose rate is reached and patient is still not at goal.   Order specific questions:       1244 LP-New Bag   1330 LP-Rate/Dose Change   2100 ZB-Rate/Dose Change        0100 ZB-Stopped   0630 ZB-Restarted   1318 MW-New Bag          norepinephrine (Levophed) 4 mg/250mL infusion premix  Rate: 1.9-112.5 mL/hr Dose: 0.5-30 mcg/min  Freq: Continuous Route: IV  Last Dose: 3 mcg/min (06/09/24 1000)  Start: 06/09/24 0831 End: 06/09/24 1159   Admin Instructions:   Target systolic BP greater than or equal to 90; or MAP greater than or equal to 60.  Notify prescriber if maximum dose rate is reached and patient is still not at goal.   Order specific questions:       0831 ML-New Bag   0923 ML-Rate/Dose Change   0943 ML-Rate/Dose Change   1000 ML-Rate/Dose Change   1126 ML-Handoff     1159-D/C'd           propofol (Diprivan) 10 mg/mL infusion premix  Rate: 2.3-46.3 mL/hr Dose: 5-100 mcg/kg/min  Weight Dosing Info: 77.2 kg (Dosing Weight)  Freq: Continuous Route: IV  Last Dose: 85  mcg/kg/min (06/10/24 1619)  Start: 06/10/24 0200   Admin Instructions:   DO NOT bolus patient with propofol due to hypotensive effects.  If doses greater than 50mcg/kg/min are needed, provider must be notified.    When multiple sedation orders are active, begin with the agent that was ordered first.  Titrate to the maximum ordered dose to achieve the ordered RASS goal but not beyond a dose that induces SBP < 90mmHg, MAP <60, or HR<50 bpm. If titrated to the maximum ordered or tolerated dose without achieving RASS goal then start additional sedation agent as ordered. **NOTE** For down titration of infusion of multiple sedatives, propofol should always be titrated down FIRST, to achieve clinically indicated RASS goal; document RASS assessment with all titrations. This does not apply when patient is concurrently on neuromuscular blocker, contact physician.  Change tubing every 12 hours   Order specific questions:        0200 ZB-Rate/Dose Change   0234 ZB/NA-New Bag   0328 ZB-Rate/Dose Change   0406 ZB/SF-New Bag   0430 ZB-Rate/Dose Change     0500 ZB-Rate/Dose Change   0615 HB/ZB-New Bag   0716 ZB/MW-Handoff   0902 MW/MT-New Bag   1115 MW-Rate/Dose Change     1122 MW/CO-New Bag   1224 MW-Rate/Dose Change   1315 MW-Rate/Dose Change   1348 MW-Rate/Dose Change   1417 MW/MT-New Bag     1526 MW-Rate/Dose Change   1619 MW/CO-New Bag           sodium chloride 0.9% infusion  Rate: 125 mL/hr  Freq: Continuous Route: IV  Start: 06/09/24 1200    1244 LP-New Bag   1813 LP-New Bag         0332 ZB-New Bag   1042 MW-New Bag             acetaminophen (Tylenol) tab 650 mg  Dose: 650 mg  Freq: Every 6 hours PRN Route: OR  PRN Reasons: mild pain,moderate pain,severe pain  Start: 06/09/24 1801    1813 LP-Given   2354 ZB-Given            buprenorphine (SUBUTEX) SL tab 2 mg  Dose: 2 mg  Freq: Every 4 hours PRN Route: SL  PRN Reason: withdrawal  PRN Comment: withdrawal per COW  Start: 06/09/24 1159 End: 06/09/24 1800   Admin Instructions:    ** SUBLINGUAL ** route of administration. Autosub for Suboxone  After the medicine is completely dissolved, take a large sip of water, swish it gently around your teeth and gums, and swallow. You should wait at least 1 hour before brushing your teeth to avoid damage to your teeth and give your mouth a chance to return to its natural state.    1724 LP-Given   1800-D/C'd          diphenhydrAMINE (Benadryl) 50 mg/mL injection  Start: 06/10/24 0031 End: 06/10/24 0032   Admin Instructions:   Tiffany Kelley   : cabinet override     0032 ZB-Given [C]            etomidate (Amidate) 2 mg/mL injection  Start: 06/10/24 0151 End: 06/10/24 0153   Admin Instructions:   Evi Bright   : cabinet override     0153 ZB-Given [C]            iopamidol 76% (ISOVUE-370) injection for power injector  Dose: 80 mL  Freq: IMG once as needed Route: IV  PRN Reason: contrast  Start: 06/09/24 1007 End: 06/09/24 1007    1007 DL-Given             midazolam (Versed) 2 MG/2ML injection  Start: 06/10/24 0121 End: 06/10/24 0124   Admin Instructions:   Barbara Martin   : cabinet override     0124 ZB-Given            morphINE PF 4 MG/ML injection  Start: 06/10/24 0138 End: 06/10/24 0142   Admin Instructions:   Tiffany Kelley   : cabinet override     0142 ZB-Given            ondansetron (Zofran) 4 MG/2ML injection 4 mg  Dose: 4 mg  Freq: Every 6 hours PRN Route: IV  PRN Reasons: Nausea,vomiting  Start: 06/09/24 1158   Admin Instructions:   Default antiemetic sequence (unless otherwise preferred by patient):  1. ondansetron (Zofran)  2. prochlorperazine (Compazine). Wait 15 minutes before proceeding to next medication in sequence.  Follow therapeutic duplication policy.    2007 ZB-Given               Vitals (last day)       Date/Time Temp Pulse Resp BP SpO2 Weight O2 Device O2 Flow Rate (L/min) [facility]    06/10/24 1400 -- 73 18 104/78 99 % -- -- --     06/10/24 1317 97.6 °F (36.4 °C) 71 18 104/81 99 % -- -- -- MW    06/10/24 1300 -- 71 17 105/81 99 % --  -- -- MW    06/10/24 1217 97.5 °F (36.4 °C) 71 16 101/76 98 % -- -- -- MW    06/10/24 1200 -- 67 16 100/76 99 % -- -- -- MW    06/10/24 1117 97.2 °F (36.2 °C) 70 16 99/77 100 % -- -- -- MW    06/10/24 1100 97 °F (36.1 °C) 72 17 102/76 100 % -- -- -- MW    06/10/24 1045 97.4 °F (36.3 °C) 72 17 102/75 99 % -- -- -- MW    06/10/24 1032 97.3 °F (36.3 °C) 72 16 102/77 100 % -- -- -- MW    06/10/24 1017 97.2 °F (36.2 °C) 71 16 104/77 100 % -- -- -- MW    06/10/24 1000 -- 70 16 100/82 99 % -- -- -- MW    06/10/24 0900 -- 66 17 104/85 100 % -- -- -- MW    06/10/24 0800 98 °F (36.7 °C) 65 16 102/87 100 % -- -- -- MW    06/10/24 0700 -- 67 18 105/87 100 % -- -- -- MW    06/10/24 0700 -- -- -- -- -- 168 lb 14 oz -- -- ZB    06/10/24 0600 -- 68 18 91/66 100 % -- -- -- ZB    06/10/24 0500 -- 67 17 91/68 100 % -- -- -- ZB    06/10/24 0400 97.3 °F (36.3 °C) 69 18 90/66 100 % -- Ventilator -- ZB    06/10/24 0300 -- 124 29 122/77 100 % -- Ventilator -- ZB    06/10/24 0200 -- 124 15 162/111 89 % -- Ventilator -- ZB    06/10/24 0100 -- 88 31 127/81 99 % -- None (Room air) -- ZB    06/10/24 0000 98.2 °F (36.8 °C) 108 20 102/63 92 % -- -- --     06/09/24 2300 -- 90 18 98/78 93 % -- High flow nasal cannula 2 L/min     06/09/24 2200 -- 104 31 90/60 90 % -- High flow nasal cannula 2 L/min B    06/09/24 2100 -- 104 34 89/55 93 % -- None (Room air) --     06/09/24 2045 -- 105 25 90/44 -- -- -- --     06/09/24 2000 100.9 °F (38.3 °C) 111 22 91/44 93 % -- None (Room air) --     06/09/24 1830 -- 117 39 113/78 100 % -- Nasal cannula 2 L/min     06/09/24 1800 -- 108 32 121/73 100 % -- Nasal cannula 2 L/min     06/09/24 1730 -- 117 27 123/88 100 % -- Nasal cannula 2 L/min     06/09/24 1700 -- 97 22 123/88 97 % -- Nasal cannula 2 L/min     06/09/24 1600 97.3 °F (36.3 °C) 73 27 120/83 96 % -- Nasal cannula 2 L/min     06/09/24 1500 -- 83 18 104/67 92 % -- Nasal cannula 2 L/min     06/09/24 1430 -- 82 18 101/69 96 % -- None  (Room air) -- LP    06/09/24 1400 -- 88 10 111/75 95 % -- None (Room air) -- LP    06/09/24 1300 -- 70 16 116/90 94 % -- None (Room air) -- LP    06/09/24 1230 -- 73 21 105/84 94 % -- None (Room air) -- LP    06/09/24 1200 -- 81 23 122/87 95 % -- None (Room air) -- LP    06/09/24 1159 -- -- -- -- -- 170 lb 3.1 oz -- -- LP    06/09/24 1147 97.3 °F (36.3 °C) 83 22 111/76 94 % -- None (Room air) -- LP    06/09/24 1100 -- 92 23 117/87 93 % -- -- -- ML    06/09/24 1045 -- 79 19 119/84 93 % -- None (Room air) -- ML    06/09/24 1030 -- 78 19 116/80 98 % -- -- -- ML    06/09/24 1015 -- 90 22 93/63 97 % -- -- -- ML    06/09/24 1000 -- 91 27 111/70 98 % -- -- -- ML    06/09/24 0945 -- 79 13 76/45 94 % -- -- -- ML    06/09/24 0900 -- 86 23 109/68 95 % -- -- -- ML    06/09/24 0830 -- 88 16 71/39 100 % -- -- -- ML    06/09/24 0715 99.3 °F (37.4 °C) 104 22 97/47 95 % 158 lb 11.7 oz Nasal cannula 7 L/min ML     Blood Transfusion Record       Product Unit Status Volume Start End            Transfuse RBC       24  [id number]  [id number] Stopped 339.58 mL 06/10/24 1017 06/10/24 1300             FOR REVIEW/APPROVAL OF INPT ADMISSION

## 2024-06-10 NOTE — CONSULTS
[facility]  part of formerly [facility]    OB Consultation Note    Latonia Barker Patient Status:  Inpatient    1/3/1995 MRN [id number]   Location [facility] 2W/SW Attending Iram Marquez MD   Hosp Day # 1 PCP Agatha Mcmanus MD       Subjective:   Latonia Barker is a 30 y/o   with FDLMP of 24  which places her at 11w0d today. I was called by Caitlin Espinoza in emergency department yesterday with report of onset sepsis and need for CCU admission. No report of vaginal bleeding prior to admission. Caitlin did report viable 10w2d ultrasound in emergency department admission. This matches menstrual dates by 4 days. I saw her and Mom- Randy ( my long term patient ) for new prenatal visit on May 24th. I could not prove viable pregnancy at that time and ordered ultrasound. This was never scheduled. At that time she reported successful rehab in March. I did explain to her and Randy that her complex history including polysubstance abuse, Hep C, VTE and would still require level 3 care. We did the same with last pregnancy and sent her to [facility]. She ended up relocating to [place] and delivered  with pre-eclampsia at 35 weeks. She was using heroin again at that time and her daughter is a camp of [facility]. \"Mj\" was placed in an open adoption near [facility].       Review of Systems:   10 point ROS completed and was negative, except for pertinent positive and negatives stated in subjective. Patient is now sedated and intubated as of last night.     Objective:     Vitals:    06/10/24 0300 06/10/24 0400 06/10/24 0500 06/10/24 0600   BP: 122/77 90/66 91/68 91/66   BP Location:  Left arm Left arm Left arm   Pulse: (!) 124 69 67 68   Resp: (!) 29 18 17 18   Temp:  97.3 °F (36.3 °C)     TempSrc:  Temporal     SpO2: 100% 100% 100% 100%   Weight:         Patient Weight for the past 72 hrs:   Weight   24 0715 158 lb 11.7 oz (72 kg)   24 1159 170 lb 3.1 oz (77.2 kg)        Intake/Output Summary (Last 24 hours) at 6/10/2024 0710  Last data filed at 6/10/2024 0556  Gross per 24 hour   Intake 5387.15 ml   Output 1300 ml   Net 4087.15 ml         GENERAL: Sedated  LUNGS:  No audible wheezing  ABDOMEN: Non-distended  EXTREMITIES: There was no edema      Current Scheduled Inpatient Meds:      midazolam  4 mg Intravenous Once    haloperidol lactate        morphINE  4 mg Intravenous Once    propofol        dexmedeTOMIDine in sodium chloride 0.9%        magnesium sulfate  4 g Intravenous Once    sodium chloride   Intravenous Once    QUEtiapine  200 mg Oral Nightly    cefTRIAXone  2 g Intravenous Q24H    DAPTOmycin  8 mg/kg (Adjusted) Intravenous Q24H    buprenorphine  8 mg Sublingual TID    prazosin  4 mg Oral Nightly    venlafaxine ER  150 mg Oral Nightly    melatonin  3 mg Oral Nightly       Current PRN Inpatient Meds:        diphenhydrAMINE    haloperidol lactate    propofol    dexmedeTOMIDine in sodium chloride 0.9%    ondansetron    prochlorperazine    acetaminophen    Results:     Lab Results   Component Value Date    WBC 6.4 06/10/2024    HGB 6.9 (LL) 06/10/2024    HCT 20.9 (L) 06/10/2024    PLT 69.0 (L) 06/10/2024    CREATSERUM 0.44 (L) 06/10/2024    BUN 6 (L) 06/10/2024     06/10/2024    K 3.9 06/10/2024    K 3.9 06/10/2024     06/10/2024    CO2 26.0 06/10/2024     (H) 06/10/2024    CA 8.0 (L) 06/10/2024    ALB 2.9 (L) 06/10/2024    ALKPHO 101 (H) 06/10/2024    BILT 0.3 06/10/2024    TP 5.3 (L) 06/10/2024     (H) 06/10/2024    ALT 54 (H) 06/10/2024    PTT 29.2 05/30/2020    INR 1.9 (A) 08/12/2020    T4F 0.73 11/02/2011    TSH 2.080 02/13/2019    LIP 18 (L) 05/30/2020    DDIMER 0.47 02/28/2022    CRP <0.5 01/29/2015    MG 1.4 (L) 06/10/2024    TROP <0.045 05/30/2020    CK 55 03/29/2017    ETOH <3 06/09/2024       Recent Labs   Lab 06/09/24  0732 06/10/24  0544   RBC 3.39* 2.79*   HGB 8.2* 6.9*   HCT 26.1* 20.9*   MCV 77.0* 74.9*   MCH 24.2* 24.7*   MCHC  31.4 33.0   RDW 17.2* 17.5*   NEPRELIM 4.38 4.97   WBC 5.2 6.4   PLT 85.0* 69.0*     Recent Labs   Lab 06/09/24  0732 06/10/24  0544   * 110*   BUN 15 6*   CREATSERUM 1.10* 0.44*   CA 9.5 8.0*   ALB 3.4 2.9*   * 141   K 3.4* 3.9  3.9    112   CO2 23.0 26.0   ALKPHO 162* 101*   * 187*   ALT 66* 54*   BILT 0.4 0.3   TP 6.2 5.3*     Lab Results   Component Value Date    INR 1.9 (A) 08/12/2020    INR 1.0 07/20/2020    INR 2.1 (A) 06/17/2020       Culture:  Hospital Encounter on 06/09/24   1. Blood Culture     Status: Abnormal (Preliminary result)    Collection Time: 06/09/24  7:34 AM    Specimen: Blood,peripheral   Result Value Ref Range    Blood Culture Result Positive N/A    Blood Smear Positive Blood Culture (A) N/A    Blood Smear Gram Negative Rods (A) N/A       Imaging/EKG:   US LIVER (CPT=76705)    Result Date: 6/9/2024  CONCLUSION: Hepatomegaly.  Hepatic steatosis.     Dictated by (CST): Gabo Gu MD on 6/09/2024 at 12:49 PM     Finalized by (CST): Gabo Gu MD on 6/09/2024 at 12:51 PM          XR CHEST AP PORTABLE  (CPT=71045)    Result Date: 6/9/2024  CONCLUSION:  Right internal jugular central venous catheter, in expected positioning.  Unchanged left retrocardiac opacity.    Dictated by (CST): Aziza Nath MD on 6/09/2024 at 11:38 AM     Finalized by (CST): Aziza Nath MD on 6/09/2024 at 11:38 AM          CT CHEST PE AORTA (IV ONLY) (CPT=71260)    Result Date: 6/9/2024  CONCLUSION:   1. No acute pulmonary embolism through the segmental pulmonary arteries. 2. Multifocal pneumonia demonstrated by multifocal ground-glass opacities and consolidations involving the left greater than right lower lobes and left upper lobe. 3. Two 4 mm right upper lobe pulmonary nodules, which are favored to be infectious/inflammatory, however recommend follow-up CT chest in 6 months to monitor for resolution. 4. Mild right heart predominant cardiomegaly. 5. Borderline dilated main pulmonary  artery, which can be seen in pulmonary artery hypertension. 6. Small pericardial effusion. 7. Hepatosplenomegaly. 8. Lesser incidental findings described above.     Dictated by (CST): Kaz French MD on 6/09/2024 at 10:28 AM     Finalized by (CST): Kaz French MD on 6/09/2024 at 10:39 AM          US PREG EV ONLY (CPT=76817)    Result Date: 6/9/2024  CONCLUSION:   Single intrauterine pregnancy with a heart rate of 195 beats per minute and a crown-rump length of 3.4 cm, which corresponds to a gestational age of 10 weeks and 2 days.  A 3.2 cm subchorionic hemorrhage along the inferior aspect of the gestational sac.  A 1.9 cm left corpus luteal cyst.     Dictated by (CST): Kaz French MD on 6/09/2024 at 9:13 AM     Finalized by (CST): Kaz French MD on 6/09/2024 at 9:16 AM          XR CHEST AP PORTABLE  (CPT=71045)    Result Date: 6/9/2024  CONCLUSION:  Hazy left basilar opacity, which may be secondary to subsegmental atelectasis in the setting of low lung volumes.  Superimposed pneumonia is a differential in the appropriate clinical setting.     Dictated by (CST): Aziza Nath MD on 6/09/2024 at 8:14 AM     Finalized by (CST): Aziza Nath MD on 6/09/2024 at 8:16 AM         EKG 12 Lead    Result Date: 6/9/2024  Normal sinus rhythm Cannot rule out Inferior infarct , age undetermined ST & T wave abnormality, consider anterior ischemia Abnormal ECG No previous ECGs found in Muse Confirmed by THOM FOX, NING ([id number]) on 6/9/2024 3:16:16 PM   Assessment and Plan:     Septic shock (HCC)        Pneumonia of left lower lobe due to infectious organism        Subchorionic hematoma in first trimester, single or unspecified fetus (HCC)        Opiate use      PLAN: From an OB standpoint, there is nothing to do unless she has significant bleeding. Moderate sized sub-chorionic hemorrhage seen on ultrasound. Though HCG level dropped significantly from that seen in late May, this does not indicate inevitable AB. I  would advise a repeat ultrasound near time of DC home to prove viability. We would then transfer care to tertiary center. Dr Josselyn Jean is on call for next 24 hours and Dr Amber Paulino to follow on Tuesday.           Caesar Christianson, DO

## 2024-06-11 ENCOUNTER — APPOINTMENT (OUTPATIENT)
Dept: GENERAL RADIOLOGY | Facility: HOSPITAL | Age: 29
DRG: 831 | End: 2024-06-11
Attending: INTERNAL MEDICINE

## 2024-06-11 LAB
ANION GAP SERPL CALC-SCNC: 8 MMOL/L (ref 0–18)
BASOPHILS # BLD AUTO: 0.03 X10(3) UL (ref 0–0.2)
BASOPHILS NFR BLD AUTO: 0.7 %
BLOOD TYPE BARCODE: 7300
BUN BLD-MCNC: <5 MG/DL (ref 9–23)
CALCIUM BLD-MCNC: 7.5 MG/DL (ref 8.7–10.4)
CHLORIDE SERPL-SCNC: 112 MMOL/L (ref 98–112)
CO2 SERPL-SCNC: 22 MMOL/L (ref 21–32)
CREAT BLD-MCNC: 0.51 MG/DL
DEPRECATED RDW RBC AUTO: 49.4 FL (ref 35.1–46.3)
EGFRCR SERPLBLD CKD-EPI 2021: 131 ML/MIN/1.73M2 (ref 60–?)
EOSINOPHIL # BLD AUTO: 0.05 X10(3) UL (ref 0–0.7)
EOSINOPHIL NFR BLD AUTO: 1.1 %
ERYTHROCYTE [DISTWIDTH] IN BLOOD BY AUTOMATED COUNT: 17.5 % (ref 11–15)
GLUCOSE BLD-MCNC: 92 MG/DL (ref 70–99)
GLUCOSE BLDC GLUCOMTR-MCNC: 106 MG/DL (ref 70–99)
GLUCOSE BLDC GLUCOMTR-MCNC: 106 MG/DL (ref 70–99)
GLUCOSE BLDC GLUCOMTR-MCNC: 120 MG/DL (ref 70–99)
GLUCOSE BLDC GLUCOMTR-MCNC: 96 MG/DL (ref 70–99)
HCT VFR BLD AUTO: 26.6 %
HGB BLD-MCNC: 8.9 G/DL
IMM GRANULOCYTES # BLD AUTO: 0.04 X10(3) UL (ref 0–1)
IMM GRANULOCYTES NFR BLD: 0.9 %
LYMPHOCYTES # BLD AUTO: 0.9 X10(3) UL (ref 1–4)
LYMPHOCYTES NFR BLD AUTO: 19.8 %
MAGNESIUM SERPL-MCNC: 1.7 MG/DL (ref 1.6–2.6)
MCH RBC QN AUTO: 25.9 PG (ref 26–34)
MCHC RBC AUTO-ENTMCNC: 33.5 G/DL (ref 31–37)
MCV RBC AUTO: 77.6 FL
MONOCYTES # BLD AUTO: 0.45 X10(3) UL (ref 0.1–1)
MONOCYTES NFR BLD AUTO: 9.9 %
NEUTROPHILS # BLD AUTO: 3.07 X10 (3) UL (ref 1.5–7.7)
NEUTROPHILS # BLD AUTO: 3.07 X10(3) UL (ref 1.5–7.7)
NEUTROPHILS NFR BLD AUTO: 67.6 %
PHOSPHATE SERPL-MCNC: 2.1 MG/DL (ref 2.4–5.1)
PHOSPHATE SERPL-MCNC: 2.1 MG/DL (ref 2.4–5.1)
PLATELET # BLD AUTO: 87 10(3)UL (ref 150–450)
PLATELETS.RETICULATED NFR BLD AUTO: 9.6 % (ref 0–7)
POTASSIUM SERPL-SCNC: 3.5 MMOL/L (ref 3.5–5.1)
RBC # BLD AUTO: 3.43 X10(6)UL
SODIUM SERPL-SCNC: 142 MMOL/L (ref 136–145)
TRIGL SERPL-MCNC: 419 MG/DL (ref 30–149)
UNIT VOLUME: 350 ML
WBC # BLD AUTO: 4.5 X10(3) UL (ref 4–11)

## 2024-06-11 PROCEDURE — 71045 X-RAY EXAM CHEST 1 VIEW: CPT | Performed by: INTERNAL MEDICINE

## 2024-06-11 PROCEDURE — 99233 SBSQ HOSP IP/OBS HIGH 50: CPT | Performed by: INTERNAL MEDICINE

## 2024-06-11 PROCEDURE — 99233 SBSQ HOSP IP/OBS HIGH 50: CPT | Performed by: OTHER

## 2024-06-11 RX ORDER — QUETIAPINE FUMARATE 100 MG/1
100 TABLET, FILM COATED ORAL 3 TIMES DAILY
Status: DISCONTINUED | OUTPATIENT
Start: 2024-06-11 | End: 2024-06-11

## 2024-06-11 RX ORDER — LORAZEPAM 2 MG/ML
0.5 INJECTION INTRAMUSCULAR EVERY 6 HOURS SCHEDULED
Status: DISCONTINUED | OUTPATIENT
Start: 2024-06-11 | End: 2024-06-11

## 2024-06-11 RX ORDER — VENLAFAXINE HYDROCHLORIDE 75 MG/1
75 CAPSULE, EXTENDED RELEASE ORAL NIGHTLY
Status: DISCONTINUED | OUTPATIENT
Start: 2024-06-11 | End: 2024-06-17

## 2024-06-11 RX ORDER — QUETIAPINE FUMARATE 200 MG/1
200 TABLET, FILM COATED ORAL 3 TIMES DAILY
Status: DISCONTINUED | OUTPATIENT
Start: 2024-06-12 | End: 2024-06-14

## 2024-06-11 RX ORDER — MAGNESIUM SULFATE HEPTAHYDRATE 40 MG/ML
2 INJECTION, SOLUTION INTRAVENOUS ONCE
Status: COMPLETED | OUTPATIENT
Start: 2024-06-11 | End: 2024-06-11

## 2024-06-11 RX ORDER — LORAZEPAM 2 MG/ML
1 INJECTION INTRAMUSCULAR EVERY 6 HOURS SCHEDULED
Status: DISCONTINUED | OUTPATIENT
Start: 2024-06-11 | End: 2024-06-17

## 2024-06-11 RX ORDER — PRAZOSIN HYDROCHLORIDE 2 MG/1
2 CAPSULE ORAL NIGHTLY
Status: DISCONTINUED | OUTPATIENT
Start: 2024-06-11 | End: 2024-06-19

## 2024-06-11 RX ORDER — HALOPERIDOL 5 MG/ML
5 INJECTION INTRAMUSCULAR EVERY 6 HOURS SCHEDULED
Status: DISCONTINUED | OUTPATIENT
Start: 2024-06-11 | End: 2024-06-17

## 2024-06-11 NOTE — PLAN OF CARE
Problem: RESPIRATORY - ADULT  Goal: Achieves optimal ventilation and oxygenation  Description: INTERVENTIONS:  - Assess for changes in respiratory status  - Assess for changes in mentation and behavior  - Position to facilitate oxygenation and minimize respiratory effort  - Oxygen supplementation based on oxygen saturation or ABGs  - Provide Smoking Cessation handout, if applicable  - Encourage broncho-pulmonary hygiene including cough, deep breathe, Incentive Spirometry  - Assess the need for suctioning and perform as needed  - Assess and instruct to report SOB or any respiratory difficulty  - Respiratory Therapy support as indicated  - Manage/alleviate anxiety  - Monitor for signs/symptoms of CO2 retention  Outcome: Progressing    Received patient intubated and on vent settings of VC/AC 16/430/+5/30%, ETT 7.5/ 21 @ the lip, priyanka.breath sound auscultated and suction provided as needed. Patient is sedated, no other changes/acute events on this shift, RT will continue to monitor the patient.

## 2024-06-11 NOTE — RESPIRATORY THERAPY NOTE
Pt received last night at 2300 on VC/AC rate 16, Vt 430, PEEP +5, FiO2 30% tolerating well. Pt has ETT 7.5, 21 at the lips. Bilat BS auscultated, pt suctioned as needed with small amounts of thin white secretions. No changes were made to the vent overnight. RT will continue to monitor.

## 2024-06-11 NOTE — PAYOR COMM NOTE
--------------  CONTINUED STAY REVIEW    Payor: [facility]  Subscriber #:  [id number]  Authorization Number: [id number]    Admit date: 6/9/24  Admit time: 11:49 AM    REVIEW DOCUMENTATION:  6/11/2024:   Pulmonology   Blood pressure 99/64, pulse 83, temperature 98.3 °F (36.8 °C), temperature source Temporal, resp. rate 17, weight 171 lb 4.8 oz (77.7 kg), last menstrual period 03/25/2024, SpO2 95%     Component Value Date     WBC 4.5 06/11/2024     HGB 8.9 06/11/2024     HCT 26.6 06/11/2024     PLT 87.0 06/11/2024     CREATSERUM 0.51 06/11/2024     BUN <5 06/11/2024      06/11/2024     K 3.5 06/11/2024      06/11/2024     CO2 22.0 06/11/2024     GLU 92 06/11/2024     CA 7.5 06/11/2024     MG 1.7 06/11/2024     PHOS 2.1 06/11/2024     PHOS 2.1 06/11/2024     CT scan of the chest-1. No acute pulmonary embolism through the segmental pulmonary arteries.   2. Multifocal pneumonia demonstrated by multifocal ground-glass opacities and consolidations involving the left greater than right lower lobes and left upper lobe.   3. Two 4 mm right upper lobe pulmonary nodules, which are favored to be infectious/inflammatory, however recommend follow-up CT chest in 6 months to monitor for resolution.   4. Mild right heart predominant cardiomegaly.   5. Borderline dilated main pulmonary artery, which can be seen in pulmonary artery hypertension.   6. Small pericardial effusion.   7. Hepatosplenomegaly.     Assessment and Plan:   1.  Septic shock-the patient now has gram-negative rods in the blood on culture.  Echo without vegetation.  Patient is yet requiring pressors.     Recommendations:  1.  Ongoing antibiotic and await identification of organism.     2.  Psychiatric-substance abuse disorder as well as history of bipolar disease status post recent prolonged rehab stay having been discharged 30 days ago.  The patient became delirious and agitated and had to be intubated.     Recommendations:  [facility] protocol-as per psychiatry evaluation, methadone initiated.     3.  Pregnancy-3 cm subchorionic hemorrhage, now with decreasing beta hCG.     Recommendations: As per obstetrics.     4.  DVT prophylaxis-would use SCDs in patient who has subchorionic hemorrhage at this juncture although the patient has an extensive history of thrombosis.     Recommendations: SCDs and would discuss risks of chemoprophylaxis anticoagulation with obstetrics.     5.  Social concerns.     6.  Respiratory failure-patient had to be intubated yesterday.  CT shows no pulmonary embolism.  There is multifocal pneumonia and two 4 mm right upper lobe pulmonary nodules.  The chest x-ray today looks good.  However, we will continue full ventilator support today until clinically stabilized further.     Recommendations: Full ventilator support and repeat CT scan of the chest in 6 months.  Ongoing antibiotic.  Srinivasan Saleh MD  Medical Director, Critical Care  6/11/2024  9:38 AM        MEDICATIONS ADMINISTERED IN LAST 1 DAY:  diphenhydrAMINE (Benadryl) 50 mg/mL  injection 50 mg       Date Action Dose Route User    6/10/2024 2022 Given 50 mg Intravenous Karrie Soria RN          famotidine (Pepcid) 20 mg/2mL injection 20 mg       Date Action Dose Route User    6/11/2024 0817 Given 20 mg Intravenous Iram Rogers RN    6/10/2024 2022 Given 20 mg Intravenous Karrie Soria RN          fentaNYL (Sublimaze) 50 mcg/mL injection 50 mcg       Date Action Dose Route User    6/11/2024 1343 Given 50 mcg Intravenous Iram Rogers RN    6/11/2024 1059 Given 50 mcg Intravenous Iram Rogers RN    6/11/2024 0952 Given 50 mcg Intravenous Iram Rogers RN    6/11/2024 0740 Given 50 mcg Intravenous Iram Rogers RN    6/11/2024 0636 Given 50 mcg Intravenous Karrie Soria RN    6/11/2024 0400 Given 50 mcg Intravenous Karrie Soria RN    6/11/2024 0243 Given 50 mcg Intravenous Karrie Soria RN    6/10/2024 2322 Given 50 mcg Intravenous Estella,  HANSEL Yoon    6/10/2024 2200 Given 50 mcg Intravenous Karrie Soria RN    6/10/2024 2022 Given 50 mcg Intravenous Karrie Soria RN    6/10/2024 1830 Given 50 mcg Intravenous Iram Rogers RN    6/10/2024 1737 Given 50 mcg Intravenous Iram Rogers RN          haloperidol lactate (Haldol) 5 MG/ML injection 2 mg       Date Action Dose Route User    6/11/2024 1147 Given 2 mg Intravenous Iram Rogers RN    6/11/2024 0611 Given 2 mg Intravenous Karrie Soria RN    6/11/2024 0045 Given 2 mg Intravenous Karrie Soria RN    6/10/2024 1723 Given 2 mg Intravenous Iram Rogers RN          LORazepam (Ativan) 2 mg/mL injection 0.5 mg       Date Action Dose Route User    6/11/2024 1149 Given 0.5 mg Intravenous Iram Rogers RN          magnesium sulfate in sterile water for injection 2 g/50mL IVPB premix 2 g       Date Action Dose Route User    6/11/2024 0946 New Bag 2 g Intravenous Iram Rogers RN          melatonin tab 3 mg       Date Action Dose Route User    6/10/2024 2021 Given 3 mg Oral Karrie Soria RN          meropenem (Merrem) 500 mg in sodium chloride 0.9% 100 mL IVPB-MBP       Date Action Dose Route User    6/11/2024 0617 New Bag 500 mg Intravenous Karrie Soria RN    6/10/2024 2307 New Bag 500 mg Intravenous Karrie Soria RN    6/10/2024 1517 New Bag 500 mg Intravenous Iram Rogers RN          methadone solution (DOLOPHINE) 10 mg/5mL       Date Action Dose Route User    6/11/2024 0817 Given 20 mg Oral Iram Rogers RN    6/10/2024 2127 Given 20 mg Oral Karrie Soria RN          norepinephrine (Levophed) 4 mg/250mL infusion premix       Date Action Dose Route User    6/11/2024 0600 Rate/Dose Verify 2 mcg/min Intravenous Karrie Soria RN    6/10/2024 2000 Rate/Dose Verify 2 mcg/min Intravenous Busbey, Karrie, RN          prazosin (Minipress) cap 4 mg       Date Action Dose Route User    6/10/2024 2012 Given 4 mg Oral Karrie Soria RN          propofol (Diprivan) 10 mg/mL infusion premix        Date Action Dose Route User    6/11/2024 1214 New Bag 60 mcg/kg/min × 77.2 kg (Dosing Weight) Intravenous Iram Rogers RN    6/11/2024 1032 Rate/Dose Change 55 mcg/kg/min × 77.2 kg (Dosing Weight) Intravenous Iram Rogers RN    6/11/2024 0853 New Bag 60 mcg/kg/min × 77.2 kg (Dosing Weight) Intravenous Iram Rogers RN    6/11/2024 0645 Rate/Dose Change 60 mcg/kg/min × 77.2 kg (Dosing Weight) Intravenous Karrie Soria RN    6/11/2024 0600 Rate/Dose Change 65 mcg/kg/min × 77.2 kg (Dosing Weight) Intravenous Karrie Soria RN    6/11/2024 0523 New Bag 70 mcg/kg/min × 77.2 kg (Dosing Weight) Intravenous Karrie Soria RN    6/11/2024 0250 New Bag 75 mcg/kg/min × 77.2 kg (Dosing Weight) Intravenous Karrie Soria RN    6/11/2024 0146 Rate/Dose Change 75 mcg/kg/min × 77.2 kg (Dosing Weight) Intravenous Karrie Soria RN    6/10/2024 2356 New Bag 80 mcg/kg/min × 77.2 kg (Dosing Weight) Intravenous aKrrie Soria RN    6/10/2024 2128 New Bag 85 mcg/kg/min × 77.2 kg (Dosing Weight) Intravenous Karrie Soria RN    6/10/2024 2000 Rate/Dose Verify 85 mcg/kg/min × 77.2 kg (Dosing Weight) Intravenous Karrie Soria RN    6/10/2024 1853 New Bag 85 mcg/kg/min × 77.2 kg (Dosing Weight) Intravenous Iram Rogers RN    6/10/2024 1851 Rate/Dose Change 85 mcg/kg/min × 77.2 kg (Dosing Weight) Intravenous Iram Rogers RN    6/10/2024 1847 Rate/Dose Change 80 mcg/kg/min × 77.2 kg (Dosing Weight) Intravenous Iram Rogers RN    6/10/2024 1832 Rate/Dose Change 85 mcg/kg/min × 77.2 kg (Dosing Weight) Intravenous Iram Rogers RN    6/10/2024 1827 Rate/Dose Change 75 mcg/kg/min × 77.2 kg (Dosing Weight) Intravenous Iram Rogers RN    6/10/2024 1740 Rate/Dose Change 80 mcg/kg/min × 77.2 kg (Dosing Weight) Intravenous Iram Rogers RN    6/10/2024 1619 New Bag 85 mcg/kg/min × 77.2 kg (Dosing Weight) Intravenous Iram Rogers RN    6/10/2024 1526 Rate/Dose Change 85 mcg/kg/min × 77.2 kg (Dosing Weight) Intravenous  Iram Rogers RN    6/10/2024 1417 New Bag 75 mcg/kg/min × 77.2 kg (Dosing Weight) Intravenous Iram Rogers RN    6/10/2024 1348 Rate/Dose Change 75 mcg/kg/min × 77.2 kg (Dosing Weight) Intravenous Iram Rogers RN          QUEtiapine (SEROquel) tab 200 mg       Date Action Dose Route User    6/10/2024 2024 Given 200 mg Oral Karrie Soria RN          QUEtiapine (SEROquel) tab 50 mg       Date Action Dose Route User    6/11/2024 0817 Given 50 mg Oral Iram Rogers RN    6/10/2024 1637 Given 50 mg Oral Iram Rogers RN          sodium chloride 0.9% infusion       Date Action Dose Route User    6/11/2024 0950 New Bag (none) Intravenous Iram Rogers RN    6/11/2024 0242 New Bag (none) Intravenous Karrie Soria RN    6/10/2024 2000 Rate/Dose Verify (none) Intravenous Karrie Soria RN          sodium phosphate 15 mmol in 0.9% NaCl 100mL IVPB premix       Date Action Dose Route User    6/11/2024 1057 Given 15 mmol Intravenous Iram Rogers RN          sodium phosphate 30 mmol in sodium chloride 0.9% 150 mL IVPB       Date Action Dose Route User    6/10/2024 1416 New Bag 30 mmol Intravenous Iram Rogers RN          venlafaxine ER (Effexor-XR) 24 hr cap 150 mg       Date Action Dose Route User    6/10/2024 2011 Given 150 mg Oral Karrie Soria RN            Vitals (last day)       Date/Time Temp Pulse Resp BP SpO2 Weight O2 Device O2 Flow Rate (L/min) [facility]    06/11/24 1300 -- 78 20 98/59 95 % -- Ventilator -- MW    06/11/24 1200 98.6 °F (37 °C) 83 20 97/62 95 % -- Ventilator -- MW    06/11/24 1100 -- 89 21 97/60 95 % -- Ventilator -- MW    06/11/24 1000 -- 81 19 96/61 94 % -- Ventilator 30 L/min MW    06/11/24 0900 -- 79 21 97/65 95 % -- Ventilator 30 L/min MW    06/11/24 0800 98.3 °F (36.8 °C) 83 17 99/64 95 % -- Ventilator -- MW    06/11/24 0700 -- 84 21 102/67 95 % -- Ventilator -- RB    06/11/24 0600 -- 81 20 99/62 95 % -- Ventilator -- RB    06/11/24 0500 -- 85 20 99/63 95 % -- Ventilator -- RB     06/11/24 0400 98.2 °F (36.8 °C) 91 24 99/63 96 % -- Ventilator -- RB    06/11/24 0300 -- 92 22 99/66 95 % -- Ventilator -- RB    06/11/24 0200 -- 96 21 97/67 95 % -- Ventilator -- RB    06/11/24 0100 -- 95 20 98/62 96 % -- Ventilator -- RB    06/11/24 0000 98.4 °F (36.9 °C) 96 20 96/63 97 % -- Ventilator -- RB    06/10/24 2300 -- 93 19 96/65 96 % 171 lb 4.8 oz Ventilator -- RB    06/10/24 2200 -- 90 21 95/64 96 % -- Ventilator -- RB    06/10/24 2100 -- 96 20 100/68 98 % -- Ventilator -- RB    06/10/24 2000 98.3 °F (36.8 °C) 89 18 99/69 99 % -- Ventilator -- RB    06/10/24 1900 -- 90 19 98/65 100 % -- -- -- MW    06/10/24 1800 -- 85 19 99/65 100 % -- Ventilator -- MW    06/10/24 1700 -- 85 19 103/75 99 % -- -- -- MW    06/10/24 1600 97.2 °F (36.2 °C) 83 19 106/74 100 % -- -- -- MW    06/10/24 1500 -- 77 19 92/78 100 % -- -- -- MW    06/10/24 1400 -- 73 18 104/78 99 % -- Ventilator -- MW    06/10/24 1317 97.6 °F (36.4 °C) 71 18 104/81 99 % -- -- -- MW    06/10/24 1300 -- 71 17 105/81 99 % -- Ventilator -- MW    06/10/24 1217 97.5 °F (36.4 °C) 71 16 101/76 98 % -- Ventilator -- MW    06/10/24 1200 -- 67 16 100/76 99 % -- Ventilator -- MW    06/10/24 1117 97.2 °F (36.2 °C) 70 16 99/77 100 % -- -- -- MW    06/10/24 1100 97 °F (36.1 °C) 72 17 102/76 100 % -- Ventilator -- MW    06/10/24 1045 97.4 °F (36.3 °C) 72 17 102/75 99 % -- -- -- MW    06/10/24 1032 97.3 °F (36.3 °C) 72 16 102/77 100 % -- -- -- MW    06/10/24 1017 97.2 °F (36.2 °C) 71 16 104/77 100 % -- -- -- MW    06/10/24 1000 -- 70 16 100/82 99 % -- -- -- MW    06/10/24 0900 -- 66 17 104/85 100 % -- -- -- MW    06/10/24 0800 98 °F (36.7 °C) 65 16 102/87 100 % -- Ventilator -- MW    06/10/24 0700 -- 67 18 105/87 100 % -- -- -- MW    06/10/24 0700 -- -- -- -- -- 168 lb 14 oz -- -- ZB    06/10/24 0600 -- 68 18 91/66 100 % -- -- -- ZB    06/10/24 0500 -- 67 17 91/68 100 % -- -- -- ZB    06/10/24 0400 97.3 °F (36.3 °C) 69 18 90/66 100 % -- Ventilator -- ZB    06/10/24  0300 -- 124 29 122/77 100 % -- Ventilator --     06/10/24 0200 -- 124 15 162/111 89 % -- Ventilator --     06/10/24 0100 -- 88 31 127/81 99 % -- None (Room air) --     06/10/24 0000 98.2 °F (36.8 °C) 108 20 102/63 92 % -- -- --        Blood Transfusion Record       Product Unit Status Volume Start End            Transfuse RBC       24  [id number]  [id number] Stopped 339.58 mL 06/10/24 1017 06/10/24 1300           FOR CONTINUED STAY REVIEW/APPROVAL OF INPT ADMISSION

## 2024-06-11 NOTE — PROGRESS NOTES
[facility]  part of [facility]    Progress Note      Assessment and Plan:   1.  Septic shock-the patient now has gram-negative rods in the blood on culture.  Echo without vegetation.  Patient is yet requiring pressors.     Recommendations:  1.  Ongoing antibiotic and await identification of organism.     2.  Psychiatric-substance abuse disorder as well as history of bipolar disease status post recent prolonged rehab stay having been discharged 30 days ago.  The patient became delirious and agitated and had to be intubated.     Recommendations: CIWA protocol-as per psychiatry evaluation, methadone initiated.     3.  Pregnancy-3 cm subchorionic hemorrhage, now with decreasing beta hCG.     Recommendations: As per obstetrics.     4.  DVT prophylaxis-would use SCDs in patient who has subchorionic hemorrhage at this juncture although the patient has an extensive history of thrombosis.     Recommendations: SCDs and would discuss risks of chemoprophylaxis anticoagulation with obstetrics.     5.  Social concerns.     6.  Respiratory failure-patient had to be intubated yesterday.  CT shows no pulmonary embolism.  There is multifocal pneumonia and two 4 mm right upper lobe pulmonary nodules.  The chest x-ray today looks good.  However, we will continue full ventilator support today until clinically stabilized further.    Recommendations: Full ventilator support and repeat CT scan of the chest in 6 months.  Ongoing antibiotic.      Subjective:   Latonia Barker is a(n) 29 year old female who is sedate on ventilator    Objective:   Blood pressure 99/64, pulse 83, temperature 98.3 °F (36.8 °C), temperature source Temporal, resp. rate 17, weight 171 lb 4.8 oz (77.7 kg), last menstrual period 03/25/2024, SpO2 95%, not currently breastfeeding.    Physical Exam sedate white female  HEENT examination is unremarkable with pupils equal round and reactive to light and accommodation.   Neck without adenopathy,  thyromegaly, JVD nor bruit.   Lungs diminished to auscultation and percussion.  Cardiac regular rate and rhythm no murmur.   Abdomen nontender, without hepatosplenomegaly and no mass appreciable.   Extremities without clubbing cyanosis nor edema.   Neurologic grossly intact with symmetric tone and strength and reflex.  Skin without gross abnormality     Results:     Lab Results   Component Value Date    WBC 4.5 06/11/2024    HGB 8.9 06/11/2024    HCT 26.6 06/11/2024    PLT 87.0 06/11/2024    CREATSERUM 0.51 06/11/2024    BUN <5 06/11/2024     06/11/2024    K 3.5 06/11/2024     06/11/2024    CO2 22.0 06/11/2024    GLU 92 06/11/2024    CA 7.5 06/11/2024    MG 1.7 06/11/2024    PHOS 2.1 06/11/2024    PHOS 2.1 06/11/2024     CT scan of the chest-1. No acute pulmonary embolism through the segmental pulmonary arteries.   2. Multifocal pneumonia demonstrated by multifocal ground-glass opacities and consolidations involving the left greater than right lower lobes and left upper lobe.   3. Two 4 mm right upper lobe pulmonary nodules, which are favored to be infectious/inflammatory, however recommend follow-up CT chest in 6 months to monitor for resolution.   4. Mild right heart predominant cardiomegaly.   5. Borderline dilated main pulmonary artery, which can be seen in pulmonary artery hypertension.   6. Small pericardial effusion.   7. Hepatosplenomegaly.       Srinivasan Saleh MD  Medical Director, Critical Care, [facility]  Medical Director, [facility]  Pager: [phone]

## 2024-06-11 NOTE — PROGRESS NOTES
[facility]  Cardiology Progress Note    Latonia Barker Patient Status:  Inpatient    1/3/1995 MRN [id number]   Location [facility] 2W/SW Attending Iram Marquez MD   Hosp Day # 2 PCP Agatha Mcmanus MD     Subjective     Intubated, sedated on Levophed 2.    Objective:   Patient Vitals for the past 24 hrs:   BP Temp Temp src Pulse Resp SpO2 Weight   24 0800 99/64 98.3 °F (36.8 °C) Temporal 83 17 95 % --   24 0700 102/67 -- -- 84 21 95 % --   24 0600 99/62 -- -- 81 20 95 % --   24 0500 99/63 -- -- 85 20 95 % --   24 0400 99/63 98.2 °F (36.8 °C) Temporal 91 24 96 % --   24 0300 99/66 -- -- 92 22 95 % --   24 0200 97/67 -- -- 96 21 95 % --   24 0100 98/62 -- -- 95 20 96 % --   24 0000 96/63 98.4 °F (36.9 °C) Temporal 96 20 97 % --   06/10/24 2300 96/65 -- -- 93 19 96 % 171 lb 4.8 oz (77.7 kg)   06/10/24 2200 95/64 -- -- 90 21 96 % --   06/10/24 2100 100/68 -- -- 96 20 98 % --   06/10/24 2000 99/69 98.3 °F (36.8 °C) Temporal 89 18 99 % --   06/10/24 1900 98/65 -- -- 90 19 100 % --   06/10/24 1800 99/65 -- -- 85 19 100 % --   06/10/24 1700 103/75 -- -- 85 19 99 % --   06/10/24 1600 106/74 97.2 °F (36.2 °C) Temporal 83 19 100 % --   06/10/24 1500 92/78 -- -- 77 19 100 % --   06/10/24 1400 104/78 -- -- 73 18 99 % --   06/10/24 1317 104/81 97.6 °F (36.4 °C) Temporal 71 18 99 % --   06/10/24 1300 105/81 -- -- 71 17 99 % --   06/10/24 1217 101/76 97.5 °F (36.4 °C) Temporal 71 16 98 % --   06/10/24 1200 100/76 -- -- 67 16 99 % --   06/10/24 1117 99/77 97.2 °F (36.2 °C) Temporal 70 16 100 % --   06/10/24 1100 102/76 97 °F (36.1 °C) Temporal 72 17 100 % --   06/10/24 1045 102/75 97.4 °F (36.3 °C) Temporal 72 17 99 % --   06/10/24 1032 102/77 97.3 °F (36.3 °C) Temporal 72 16 100 % --   06/10/24 1017 104/77 97.2 °F (36.2 °C) Temporal 71 16 100 % --   06/10/24 1000 100/82 -- -- 70 16 99 % --       Intake/Output:   Last 3 shifts:   Intake/Output                    06/09/24 0700 - 06/10/24 0659 06/10/24 0700 - 06/11/24 0659 06/11/24 0700 - 06/12/24 0659       Intake    P.O.  680  --  --    P.O. 680 -- --    I.V.  4707.2  4219.7  --    I.V. 192 100 --    Volume (mL) Propofol 151 911.9 --    Volume (mL) Dexmedetomidine 11.2 -- --    Volume (mL) Norepinephrine 104 228.8 --    Volume (mL) (sodium chloride 0.9 % IV bolus 2,184 mL) 2184 -- --    Volume (mL) (sodium chloride 0.9% infusion) 2065 2979 --    Blood  --  339.6  --    Volume (Transfuse RBC) -- 339.6 --    NG/GT  --  1135  --    Tube Feeding Flushes (mL) -- 670 --    Intake (mL) (NG/OG Tube Nasogastric Left nostril) -- 465 --    IV PIGGYBACK  --  200  --    Volume (mL) (meropenem (Merrem) 500 mg in sodium chloride 0.9% 100 mL IVPB-MBP) -- 200 --    Total Intake 5387.2 5894.3 --       Output    Urine  1300  2575  --    Urine 600 0 --    Urine Occurrence 3 x -- --    Output (mL) (Urethral Catheter) -- 2575 --    Output (mL) ([REMOVED] External Urinary Catheter) 700 -- --    Emesis/NG output  --  0  0    Emesis -- 0 0    Stool  --  --  --    Stool Count Calculated for I/O 2 x -- --    Total Output 1300 2575 0       Net I/O     4087.2 3319.3 0             Vent Settings: Vent Mode: VC/AC  FiO2 (%):  [30 %-40 %] 30 %  S RR:  [16] 16  S VT:  [430 mL] 430 mL  PEEP/CPAP (cm H2O):  [5 cm H20] 5 cm H20  MAP (cm H2O):  [7-9] 7      Scheduled Meds:    magnesium sulfate  2 g Intravenous Once    sodium phosphate  15 mmol Intravenous Once    meropenem  1 g Intravenous Q8H    midazolam  4 mg Intravenous Once    sodium chloride   Intravenous Once    Methadone HCl  20 mg Oral BID    haloperidol lactate  2 mg Intravenous 4 times per day    QUEtiapine  50 mg Oral TID    famotidine  20 mg Intravenous BID    QUEtiapine  200 mg Oral Nightly    prazosin  4 mg Oral Nightly    venlafaxine ER  150 mg Oral Nightly    melatonin  3 mg Oral Nightly       Continuous Infusions:    propofol 60 mcg/kg/min (06/11/24 0853)    dextrose 10%       norepinephrine 2 mcg/min (06/11/24 0600)    sodium chloride 125 mL/hr at 06/11/24 0242       Results:     Lab Results   Component Value Date    WBC 4.5 06/11/2024    HGB 8.9 (L) 06/11/2024    HCT 26.6 (L) 06/11/2024    PLT 87.0 (L) 06/11/2024    CREATSERUM 0.51 (L) 06/11/2024    BUN <5 (L) 06/11/2024     06/11/2024    K 3.5 06/11/2024     06/11/2024    CO2 22.0 06/11/2024    GLU 92 06/11/2024    CA 7.5 (L) 06/11/2024    ALB 2.9 (L) 06/10/2024    ALKPHO 101 (H) 06/10/2024    BILT 0.3 06/10/2024    TP 5.3 (L) 06/10/2024     (H) 06/10/2024    ALT 54 (H) 06/10/2024    PTT 29.2 05/30/2020    INR 1.9 (A) 08/12/2020    T4F 0.73 11/02/2011    TSH 2.080 02/13/2019    LIP 18 (L) 05/30/2020    DDIMER 0.47 02/28/2022    CRP <0.5 01/29/2015    MG 1.7 06/11/2024    PHOS 2.1 (L) 06/11/2024    PHOS 2.1 (L) 06/11/2024    TROP <0.045 05/30/2020    CK 55 03/29/2017    ETOH <3 06/09/2024       Recent Labs   Lab 06/09/24  0732 06/10/24  0544 06/11/24  0346   * 110* 92   BUN 15 6* <5*   CREATSERUM 1.10* 0.44* 0.51*   CA 9.5 8.0* 7.5*   * 141 142   K 3.4* 3.9  3.9 3.5    112 112   CO2 23.0 26.0 22.0     Recent Labs   Lab 06/09/24  0732 06/10/24  0544 06/10/24  1353 06/11/24  0346   RBC 3.39* 2.79*  --  3.43*   HGB 8.2* 6.9* 8.7* 8.9*   HCT 26.1* 20.9*  --  26.6*   MCV 77.0* 74.9*  --  77.6*   MCH 24.2* 24.7*  --  25.9*   MCHC 31.4 33.0  --  33.5   RDW 17.2* 17.5*  --  17.5*   NEPRELIM 4.38 4.97  --  3.07   WBC 5.2 6.4  --  4.5   PLT 85.0* 69.0*  --  87.0*       No results for input(s): \"BNPML\" in the last 168 hours.    No results for input(s): \"TROP\", \"CK\" in the last 168 hours.    XR CHEST AP PORTABLE  (CPT=71045)    Result Date: 6/11/2024  CONCLUSION: No new abnormality.  Minimal subsegmental atelectasis.    Dictated by (CST): Miguel A Serrano MD on 6/11/2024 at 8:00 AM     Finalized by (CST): Miguel A Serrano MD on 6/11/2024 at 8:05 AM          XR CHEST AP PORTABLE  (CPT=71045)    Result Date:  6/10/2024  PROCEDURE: XR CHEST AP PORTABLE  (CPT=71045) TIME: 236  COMPARISON: [facility], XR CHEST AP PORTABLE (CPT=71045), 6/10/2024, 2:18 AM.  INDICATIONS: Readjustment of ETT verify placement.  TECHNIQUE:   Single view.   Findings and impression:  ETT is now well positioned 3.5 cm above the dexter  Near complete re-expansion of the left lung  [facility] provided a prelim report for this exam. This final report has no significant discrepancies with the [facility] report.      Dictated by (CST): Steffen Godinez MD on 6/10/2024 at 9:06 AM     Finalized by (CST): Steffen Godinez MD on 6/10/2024 at 9:06 AM          XR CHEST AP PORTABLE  (CPT=71045)    Result Date: 6/10/2024  PROCEDURE: XR CHEST AP PORTABLE  (CPT=71045) TIME: 0218  COMPARISON: [facility], XR CHEST AP PORTABLE (CPT=71045), 6/10/2024, 2:36 AM.  [facility], XR CHEST AP PORTABLE (CPT=71045), 6/09/2024, 11:02 AM.  INDICATIONS: Verify correct NG tube placement.  TECHNIQUE:   Single view.   Findings and impression:  Malposition ETT located 2.2 centimeter deep into the right mainstem bronchus.  This should be repositioned  Near complete left lung atelectasis has developed  Enteric tube into the stomach.  Right IJ catheter in the mid SVC.  Clear right lung  [facility] provided a prelim report for this exam. This final report has no significant discrepancies with the [facility] report.     Dictated by (CST): Steffen Godinez MD on 6/10/2024 at 9:03 AM     Finalized by (CST): Steffen Godinez MD on 6/10/2024 at 9:05 AM          US LIVER (CPT=76705)    Result Date: 6/9/2024  CONCLUSION: Hepatomegaly.  Hepatic steatosis.     Dictated by (CST): Gabo Gu MD on 6/09/2024 at 12:49 PM     Finalized by (CST): Gabo Gu MD on 6/09/2024 at 12:51 PM          XR CHEST AP PORTABLE  (CPT=71045)    Result Date: 6/9/2024  CONCLUSION:  Right internal jugular central venous catheter, in expected positioning.  Unchanged left  retrocardiac opacity.    Dictated by (CST): Aziza Nath MD on 6/09/2024 at 11:38 AM     Finalized by (CST): Aziza Nath MD on 6/09/2024 at 11:38 AM          CT CHEST PE AORTA (IV ONLY) (CPT=71260)    Result Date: 6/9/2024  CONCLUSION:   1. No acute pulmonary embolism through the segmental pulmonary arteries. 2. Multifocal pneumonia demonstrated by multifocal ground-glass opacities and consolidations involving the left greater than right lower lobes and left upper lobe. 3. Two 4 mm right upper lobe pulmonary nodules, which are favored to be infectious/inflammatory, however recommend follow-up CT chest in 6 months to monitor for resolution. 4. Mild right heart predominant cardiomegaly. 5. Borderline dilated main pulmonary artery, which can be seen in pulmonary artery hypertension. 6. Small pericardial effusion. 7. Hepatosplenomegaly. 8. Lesser incidental findings described above.     Dictated by (CST): Kaz French MD on 6/09/2024 at 10:28 AM     Finalized by (CST): Kaz French MD on 6/09/2024 at 10:39 AM          US PREG EV ONLY (CPT=76817)    Result Date: 6/9/2024  CONCLUSION:   Single intrauterine pregnancy with a heart rate of 195 beats per minute and a crown-rump length of 3.4 cm, which corresponds to a gestational age of 10 weeks and 2 days.  A 3.2 cm subchorionic hemorrhage along the inferior aspect of the gestational sac.  A 1.9 cm left corpus luteal cyst.     Dictated by (CST): Kaz French MD on 6/09/2024 at 9:13 AM     Finalized by (CST): Kaz French MD on 6/09/2024 at 9:16 AM          XR CHEST AP PORTABLE  (CPT=71045)    Result Date: 6/9/2024  CONCLUSION:  Hazy left basilar opacity, which may be secondary to subsegmental atelectasis in the setting of low lung volumes.  Superimposed pneumonia is a differential in the appropriate clinical setting.     Dictated by (CST): Aziza Nath MD on 6/09/2024 at 8:14 AM     Finalized by (CST): Aziza Nath MD on 6/09/2024 at 8:16 AM              CARD ECHO 2D DOPPLER (CPT=93306)    Result Date: 2024  Transthoracic Echocardiogram Name:Latonia Barker Date: 2024 :  1995 Ht:  (62in)  BP: 117 / 87 MRN:  [id number]    Age:  29years    Wt:  (158lb) HR: 83bpm Loc:  [facility]       Gndr: F          BSA: 1.73m^2 Sonographer: Irish Kaplan Ordering:    Ernie Mcdaniel Consulting:  Juan José Angeles ---------------------------------------------------------------------------- History/Indications:  Fever, endocarditis. ---------------------------------------------------------------------------- Procedure information:  A transthoracic complete 2D study was performed. Additional evaluation included M-mode, complete spectral Doppler, and color Doppler.  Patient status:  Inpatient.  Location:  Bedside.    Comparison was made to the study of 10/05/2017.    This was a routine study.  Image quality was adequate. ECG rhythm:   Normal sinus ---------------------------------------------------------------------------- Conclusions: 1. Left ventricle: The cavity size was normal. Wall thickness was normal.    Systolic function was probably normal. The estimated ejection fraction    was 60-65%, by biplane method of disks. Wall motion is normal; there are    no regional wall motion abnormalities. Left ventricular diastolic    function parameters were normal. 2. Right ventricle: Not well visualized. The cavity size was at the upper    limits of normal. Systolic pressure was mildly increased. The RV pressure    during systole is 47mm Hg. 3. Aortic valve: There was no evidence of a vegetation. 4. Mitral valve: There was no evidence of vegetation. There was mild    regurgitation. 5. Tricuspid valve: The valve is structurally normal. There was    mild-moderate regurgitation. 6. Pulmonic valve: Not well visualized. The valve is structurally normal.    There was no significant regurgitation. Impressions:   No evidence of a vegetation. Recommendations:  Further evaluation with  KAVITA, if clinically indicated. * ---------------------------------------------------------------------------- * Findings: Left ventricle:  The cavity size was normal. Wall thickness was normal. Systolic function was probably normal. The estimated ejection fraction was 60-65%, by biplane method of disks. Wall motion is normal; there are no regional wall motion abnormalities. Left ventricular diastolic function parameters were normal. Left atrium:  The atrium was normal in size. Right ventricle:  Not well visualized. The cavity size was at the upper limits of normal. Systolic function was normal.  Systolic pressure was mildly increased. Right atrium:  The atrium was normal in size. Mitral valve:  The annulus was mildly calcified. Leaflet separation was normal.  There was no evidence of vegetation.  Doppler:  Transvalvular velocity was within the normal range. There was no evidence for stenosis. There was mild regurgitation.    The valve area by pressure half-time was 4.07cm^2. The valve area index by pressure half-time was 2.35cm^2/m^2. Aortic valve:  The valve was structurally normal. The valve was trileaflet. Cusp separation was normal.  There was no evidence of a vegetation. Doppler:  Transvalvular velocity was within the normal range. There was no evidence for stenosis. There was no significant regurgitation. Tricuspid valve:  The valve is structurally normal. Leaflet separation was normal.  Doppler:  Transvalvular velocity was within the normal range. There was no evidence for stenosis. There was mild-moderate regurgitation. Pulmonic valve:   Not well visualized. The valve is structurally normal. Cusp separation was normal.  Doppler:  Transvalvular velocity was within the normal range. There was no evidence for stenosis. There was no significant regurgitation. Pericardium:   There was no pericardial effusion. Aorta: Aortic root: The aortic root was normal-sized. Ascending aorta: The ascending aorta was normal.  Pulmonary arteries: The main pulmonary artery was normal-sized.  Systolic pressure could not be accurately estimated. Systemic veins:  Central venous respirophasic diameter changes are blunted (< 50%). Inferior vena cava: The IVC was normally collapsible and normal-sized. ---------------------------------------------------------------------------- Measurements  Left ventricle                  Value          Ref  LV end-diastolic volume,        91    ml       48 - 140  1-p A4C  LV ejection fraction, 1-p       64    %        46 - 78  A4C  Stroke volume, 1-p A4C          58    ml       ---------  LV end-diastolic                52    ml/m^2   30 - 82  volume/bsa, 1-p A4C  Stroke volume/bsa, 1-p A4C      34    ml/m^2   ---------  LV end-diastolic volume,        90    ml       46 - 106  2-p  LV end-systolic volume, 2-p     33    ml       14 - 42  LV ejection fraction, 2-p       63    %        54 - 74  Stroke volume, 2-p              57    ml       ---------  LV end-diastolic                52    ml/m^2   29 - 61  volume/bsa, 2-p  LV end-systolic volume/bsa,     19    ml/m^2   8 - 24  2-p  Stroke volume/bsa, 2-p          32.8  ml/m^2   ---------  LV e', lateral                  17.6  cm/sec   >=10.0  LV E/e', lateral                5              <=13  LV e', medial                   11.1  cm/sec   >=7.0  LV E/e', medial                 8              ---------  LV e', average                  14.4  cm/sec   ---------  LV E/e', average                6              <=14  Aortic root                     Value          Ref  Aortic root ID                  2.7   cm       2.4 - 3.4  Ascending aorta                 Value          Ref  Ascending aorta ID              3.2   cm       1.9 - 3.5  Left atrium                     Value          Ref  LA volume, S                    51    ml       22 - 52  LA volume/bsa, S                30    ml/m^2   16 - 34  LA volume, ES, 1-p A4C          46    ml       22 - 52  LA volume, ES, 1-p  A2C          52    ml       22 - 52  LA volume, ES, A/L              54    ml       ---------  LA volume/bsa, ES, A/L          31    ml/m^2   16 - 34  Mitral valve                    Value          Ref  Mitral E-wave peak velocity     0.88  m/sec    ---------  Mitral A-wave peak velocity     0.87  m/sec    ---------  Mitral deceleration time        184   ms       ---------  Mitral pressure half-time       54    ms       ---------  Mitral peak gradient, D         3     mm Hg    ---------  Mitral E/A ratio, peak          1              ---------  Mitral valve area, PHT, DP      4.07  cm^2     ---------  Mitral valve area/bsa, PHT,     2.35  cm^2/m^2 ---------  DP  Pulmonary artery                Value          Ref  PA pressure, S, DP              47    mm Hg    ---------  Tricuspid valve                 Value          Ref  Tricuspid regurg peak       (H) 3.08  m/sec    <=2.8  velocity  Tricuspid peak RV-RA            39    mm Hg    ---------  gradient  Systemic veins                  Value          Ref  Estimated CVP                   8     mm Hg    ---------  Inferior vena cava              Value          Ref  ID                          (H) 2.4   cm       <=2.1  Right ventricle                 Value          Ref  TAPSE, 2D                       2.38  cm       >=1.70  TAPSE, MM                       2.38  cm       >=1.70  RV pressure, S, DP              47    mm Hg    ---------  RV s', lateral                  15.4  cm/sec   >=9.5 Legend: (L)  and  (H)  kurtis values outside specified reference range. ---------------------------------------------------------------------------- Prepared and electronically signed by Ernie Mcdaniel 06/09/2024 14:55        Exam:     General: Intubated, sedated.  HEENT: No focal deficits.  Neck: No JVD, carotids 2+ no bruits.  Cardiac: Regular rate and rhythm, S1, S2 normal, no murmur  Lungs: Clear anterolaterally without wheezes, rales, rhonchi.   Abdomen: Soft  Extremities: Without clubbing,  cyanosis or edema.  Peripheral pulses are diminished  Skin: Warm and dry.     Assessment and Plan:    Assessment:     1.  Septic shock, improving but still on low-dose Levophed.  Stable heart rate and rhythm.  Echocardiogram shows normal left ventricular size and contractility.  No valvular vegetations noted. Pressor requirements have gone down.     2.  Substance abuse disorder     3.  10-week pregnancy     4.  Severe anemia, thrombocytopenia s/p transfusion     5.  Respiratory failure, now on ventilator.        Plan:     Continue to support blood pressure with Levophed as needed.     We will sign off, please contact us with any questions.    Charles Chapman MD  [facility]  6/11/2024

## 2024-06-11 NOTE — RESPIRATORY THERAPY NOTE
Pt on current vent settings VC/16/430/+5/30% ; ETT 7.5 @ 21 cm  Suctioned pt as needed throughout the day.  Pt tolerating and saturating appropriately.  RT to continue to monitor.

## 2024-06-11 NOTE — PLAN OF CARE
Received patient on propofol @ 85, weaning as tolerated. Was very agitated at start of shift, reaching for ETT and sitting up in bed despite restraints. PRN fentanyl and schedule meds given with improvement. Reduced stimulation in room. Restraints repositioned. Now able to follow simple commands and is resting comfortably. Updated mother on condition when she visited prior to going to work (stated she will stop by again after work in the am).    Vent settings unchanged. Minimal secretions.  Still requiring low dose levophed. MAP WNL.  Gibson with good urine output. Tolerating TF - now at goal.    No vaginal bleeding, when doing gibson care had scant old bloody/mucous output, none seen since that time.    Restraints continued for patient and line safety.        Addendum: Propofol able to be weaned to 65 mcg slowly throughout the night with the aid of prn fentanyl and scheduled haldol. Patient is sleeping well but easily wakes up and immediately reaches for ETT.      Problem: Patient Centered Care  Goal: Patient preferences are identified and integrated in the patient's plan of care  Description: Interventions:  - What would you like us to know as we care for you? From home with mother, is pregnant  - Provide timely, complete, and accurate information to patient/family  - Incorporate patient and family knowledge, values, beliefs, and cultural backgrounds into the planning and delivery of care  - Encourage patient/family to participate in care and decision-making at the level they choose  - Honor patient and family perspectives and choices  Outcome: Progressing     Problem: Patient/Family Goals  Goal: Patient/Family Long Term Goal  Description: Patient's Long Term Goal: discharge planning    Interventions:  - discharge planning when appropriate  - See additional Care Plan goals for specific interventions  Outcome: Progressing  Goal: Patient/Family Short Term Goal  Description: Patient's Short Term Goal: get  extubated    Interventions:   - extubation plans once stable and calm for extended periods of time without prn meds  - See additional Care Plan goals for specific interventions  Outcome: Progressing     Problem: CARDIOVASCULAR - ADULT  Goal: Maintains optimal cardiac output and hemodynamic stability  Description: INTERVENTIONS:  - Monitor vital signs, rhythm, and trends  - Monitor for bleeding, hypotension and signs of decreased cardiac output  - Evaluate effectiveness of vasoactive medications to optimize hemodynamic stability  - Monitor arterial and/or venous puncture sites for bleeding and/or hematoma  - Assess quality of pulses, skin color and temperature  - Assess for signs of decreased coronary artery perfusion - ex. Angina  - Evaluate fluid balance, assess for edema, trend weights  Outcome: Progressing  Goal: Absence of cardiac arrhythmias or at baseline  Description: INTERVENTIONS:  - Continuous cardiac monitoring, monitor vital signs, obtain 12 lead EKG if indicated  - Evaluate effectiveness of antiarrhythmic and heart rate control medications as ordered  - Initiate emergency measures for life threatening arrhythmias  - Monitor electrolytes and administer replacement therapy as ordered  Outcome: Progressing     Problem: RESPIRATORY - ADULT  Goal: Achieves optimal ventilation and oxygenation  Description: INTERVENTIONS:  - Assess for changes in respiratory status  - Assess for changes in mentation and behavior  - Position to facilitate oxygenation and minimize respiratory effort  - Oxygen supplementation based on oxygen saturation or ABGs  - Provide Smoking Cessation handout, if applicable  - Encourage broncho-pulmonary hygiene including cough, deep breathe, Incentive Spirometry  - Assess the need for suctioning and perform as needed  - Assess and instruct to report SOB or any respiratory difficulty  - Respiratory Therapy support as indicated  - Manage/alleviate anxiety  - Monitor for signs/symptoms of CO2  retention  Outcome: Progressing     Problem: NEUROLOGICAL - ADULT  Goal: Achieves stable or improved neurological status  Description: INTERVENTIONS  - Assess for and report changes in neurological status  - Initiate measures to prevent increased intracranial pressure  - Maintain blood pressure and fluid volume within ordered parameters to optimize cerebral perfusion and minimize risk of hemorrhage  - Monitor temperature, glucose, and sodium. Initiate appropriate interventions as ordered  Outcome: Progressing  Goal: Absence of seizures  Description: INTERVENTIONS  - Monitor for seizure activity  - Administer anti-seizure medications as ordered  - Monitor neurological status  Outcome: Progressing  Goal: Remains free of injury related to seizure activity  Description: INTERVENTIONS:  - Maintain airway, patient safety  and administer oxygen as ordered  - Monitor patient for seizure activity, document and report duration and description of seizure to MD/LIP  - If seizure occurs, turn patient to side and suction secretions as needed  - Reorient patient post seizure  - Seizure pads on all 4 side rails  - Instruct patient/family to notify RN of any seizure activity  - Instruct patient/family to call for assistance with activity based on assessment  Outcome: Progressing     Problem: Safety Risk - Non-Violent Restraints  Goal: Patient will remain free from self-harm  Description: INTERVENTIONS:  - Apply the least restrictive restraint to prevent harm  - Notify patient and family of reasons restraints applied  - Assess for any contributing factors to confusion (electrolyte disturbances, delirium, medications)  - Discontinue any unnecessary medical devices as soon as possible  - Assess the patient's physical comfort, circulation, skin condition, hydration, nutrition and elimination needs   - Reorient and redirection as needed  - Assess for the need to continue restraints  Outcome: Progressing     Problem: Delirium  Goal:  Minimize duration of delirium  Description: Interventions:  - Encourage use of hearing aids, eye glasses  - Promote highest level of mobility daily  - Provide frequent reorientation  - Promote wakefulness i.e. lights on, blinds open  - Promote sleep, encourage patient's normal rest cycle i.e. lights off, TV off, minimize noise and interruptions  - Encourage family to assist in orientation and promotion of home routines  Outcome: Progressing

## 2024-06-11 NOTE — PROGRESS NOTES
Patient is a 29 year old female with past medical history of acne, anxiety, bacterial endocarditis, bipolar disorder, chlamydia, decorative tattoo, depression, hepatitis B virus infection, history of blood clots, HPV infection, IV drug use, heroin and crack cocaine use, opioid dependence, subclavian vein thrombosis, and substance abuse who was admitted to the hospital for Septic shock (HCC): The patient has been demonstrating signs of being combative and is currently sedated.    Reason for Consultation:   Patient presented with heroin withdrawal, Iram Ahumada MD requested psychiatric consult for evaluation and advice.    Consult Duration     The patient seen for over 50-minute, follow-up evaluation, over 50% counseling and coordinating care addressing  septic shock and opioid withdrawal.  Record reviewed, communication with attending, communication with RN and patient seen face to face evaluation.     History of Present Illness:   Labs and imaging reviewed: BUN: <5, Creatinine: 0.51, Triglycerides: 419    Vital signs: BP: 96/61, HR: 89, RR: 21    The patient is well known to the psychiatry team from previous consult. The patient was last seen multiple times in 2017 and 2018.  Patient with a chronic history of opiate dependence mostly heroin IV and has been in methadone clinic.  Patient also with a diagnosis of bipolar disorder who responded treatment to Seroquel in the past.    Per communication with team, propofol use will slowly be decreased. Will need to monitor high triglyceride levels.    The patient seen today lethargic and agitated.    The patient is not alert and oriented to person, place, date and condition.     The patient is not demonstrating any stacy or psychosis  The patient denies auditory or visual hallucinations  The patient denies suicidal or homicidal ideation.    The patient has been demonstrating signs of combativeness prior to sedation.    Past Psychiatric/Medication History:  1. Prior  diagnoses: anxiety, bipolar disorder, depression, opioid dependence, substance abuse  2. Past psychiatric inpatient: Unknown  3. Past outpatient history: Unknown  4. Past suicide history: Unknown  5. 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 ×42 days    Bipolar disorder (HCC)    Chlamydia    Decorative tattoo    Depression    Hepatitis B virus infection    History of blood clots    Human papilloma virus infection    Intravenous drug abuse (HCC)    Heroin and crack cocaine    Opioid dependence (HCC)    Subclavian vein thrombosis (HCC)    Substance abuse (HCC)       Past Surgical History  Past Surgical History:   Procedure Laterality Date    Colonoscopy      Colonoscopy N/A 2017    Procedure: COLONOSCOPY;  Surgeon: Xu Day MD;  Location: [facility] ENDOSCOPY    Colposcopy, cervix w/upper adjacent vagina; w/biopsy(s), cervix            Other surgical history      wisdom teeth     Other surgical history      fasciotomy of right arm    Tonsillectomy      Upper gi endoscopy,exam         Family History  Family History   Problem Relation Age of Onset    Other (back pain) Father     Hypertension Mother     Hypertension Maternal Grandmother     Diabetes Maternal Grandfather     Prostate Cancer Paternal Grandfather         w/ metastasis       Social History  Social History     Socioeconomic History    Marital status: Single    Number of children: 0   Occupational History    Occupation: Unemployed   Tobacco Use    Smoking status:  Every Day     Current packs/day: 0.00     Average packs/day: 1 pack/day for 1 year (1.0 ttl pk-yrs)     Types: Cigarettes     Start date: 2014     Last attempt to quit: 2015     Years since quittin.3    Smokeless tobacco: Current    Tobacco comments:     Vaping   Vaping Use    Vaping status: Never Used   Substance and Sexual Activity    Alcohol use: Not Currently     Comment: social    Drug use: Yes     Types: Heroin, \"Crack\" cocaine, benzodiazepines, Other-see comments     Comment: Acid, mushrooms    Sexual activity: Yes     Partners: Male     Birth control/protection: Condom   Other Topics Concern    Caffeine Concern No     Comment:  2 cans Cola per day    Exercise Yes     Comment: hep    Reaction to local anesthetic No   Social History Narrative    The patient does not use an assistive device..      The patient does live in a home with stairs.           Current Medications:  Current Facility-Administered Medications   Medication Dose Route Frequency    sodium phosphate 15 mmol in 0.9% NaCl 100mL IVPB premix  15 mmol Intravenous Once    meropenem (Merrem) 1 g in sodium chloride 0.9% 100 mL IVPB-MBP  1 g Intravenous Q8H    QUEtiapine (SEROquel) tab 100 mg  100 mg Oral TID    venlafaxine ER (Effexor-XR) 24 hr cap 75 mg  75 mg Oral Nightly    prazosin (Minipress) cap 2 mg  2 mg Oral Nightly    LORazepam (Ativan) 2 mg/mL injection 0.5 mg  0.5 mg Intravenous 4 times per day    diphenhydrAMINE (Benadryl) 50 mg/mL  injection 50 mg  50 mg Intravenous Q4H PRN    midazolam (Versed) 2 MG/2ML injection 4 mg  4 mg Intravenous Once    propofol (Diprivan) 10 mg/mL infusion premix  5-100 mcg/kg/min (Dosing Weight) Intravenous Continuous    sodium chloride 0.9% infusion   Intravenous Once    methadone solution (DOLOPHINE) 10 mg/5mL  20 mg Oral BID    haloperidol lactate (Haldol) 5 MG/ML injection 2 mg  2 mg Intravenous 4 times per day    dextrose 10% infusion (TPN no rate)   Intravenous Continuous PRN    pancrelipase  (Lip-Prot-Amyl) (Zenpep) DR particles cap 10,000 Units  10,000 Units Per G Tube PRN    And    sodium bicarbonate tab 325 mg  325 mg Oral PRN    fentaNYL (Sublimaze) 50 mcg/mL injection 25 mcg  25 mcg Intravenous Q30 Min PRN    Or    fentaNYL (Sublimaze) 50 mcg/mL injection 50 mcg  50 mcg Intravenous Q30 Min PRN    famotidine (Pepcid) 20 mg/2mL injection 20 mg  20 mg Intravenous BID    norepinephrine (Levophed) 4 mg/250mL infusion premix  0.5-30 mcg/min Intravenous Continuous    sodium chloride 0.9% infusion   Intravenous Continuous    ondansetron (Zofran) 4 MG/2ML injection 4 mg  4 mg Intravenous Q6H PRN    prochlorperazine (Compazine) 10 MG/2ML injection 5 mg  5 mg Intravenous Q8H PRN    QUEtiapine (SEROquel) tab 200 mg  200 mg Oral Nightly    acetaminophen (Tylenol) tab 650 mg  650 mg Oral Q6H PRN    melatonin tab 3 mg  3 mg Oral Nightly     Medications Prior to Admission   Medication Sig    prazosin 1 MG Oral Cap Take 4 capsules (4 mg total) by mouth nightly. Takes 4mg nightly    buprenorphine-naloxone 8-2 MG Sublingual Film dissolve 1 film under the tongue 3 TIMES A DAY for maintenance    prenatal vitamin with DHA 27-0.8-228 MG Oral Cap Take 1 capsule by mouth daily.    [] nitrofurantoin monohydrate macro (MACROBID) 100 MG Oral Cap Take 1 capsule (100 mg total) by mouth 2 (two) times daily for 7 days.    QUEtiapine 200 MG Oral Tab Take 1 tablet (200 mg total) by mouth nightly.    [] Buprenorphine HCl-Naloxone HCl 8.6-2.1 MG Sublingual SL Tab Place 8.6 mg of buprenorphine under the tongue in the morning, at noon, and at bedtime.    pantoprazole 20 MG Oral Tab EC Take 1 tablet (20 mg total) by mouth every morning before breakfast.    gabapentin 300 MG Oral Cap Take 1 capsule (300 mg total) by mouth nightly.    Melatonin 3 MG Oral Cap Take 1 capsule (3 mg total) by mouth at bedtime.    Naloxone HCl (NARCAN) 4 MG/0.1ML Nasal Liquid 4 mg by Nasal route as needed. IF PATIENT REMAINS UNRESPONSIVE, REPEAT  DOSE IN OTHER NOSTRIL 2 TO 5 MINUTES AFTER FIRST DOSE (Patient not taking: Reported on 5/9/2024)    venlafaxine  MG Oral Capsule SR 24 Hr Take 1 capsule (150 mg total) by mouth every morning.    ondansetron (ZOFRAN) 4 mg tablet Take 1 tablet (4 mg total) by mouth every 8 (eight) hours as needed for Nausea. (Patient not taking: Reported on 5/24/2024)    cloNIDine 0.1 MG Oral Tab Take 1 tablet (0.1 mg total) by mouth 2 (two) times daily as needed. (Patient not taking: Reported on 5/24/2024)       Allergies  Allergies   Allergen Reactions    Amoxicillin HIVES    Vancomycin RASH and ITCHING    Clindamycin RASH       Review of Systems:   As by Admitting/Attending    Results:   Laboratory Data:  Lab Results   Component Value Date    WBC 4.5 06/11/2024    HGB 8.9 (L) 06/11/2024    HCT 26.6 (L) 06/11/2024    PLT 87.0 (L) 06/11/2024    CREATSERUM 0.51 (L) 06/11/2024    BUN <5 (L) 06/11/2024     06/11/2024    K 3.5 06/11/2024     06/11/2024    CO2 22.0 06/11/2024    GLU 92 06/11/2024    CA 7.5 (L) 06/11/2024    ALB 2.9 (L) 06/10/2024    ALKPHO 101 (H) 06/10/2024    TP 5.3 (L) 06/10/2024     (H) 06/10/2024    ALT 54 (H) 06/10/2024    PTT 29.2 05/30/2020    INR 1.9 (A) 08/12/2020    PTP 14.7 (H) 05/31/2020    T4F 0.73 11/02/2011    TSH 2.080 02/13/2019    LIP 18 (L) 05/30/2020    DDIMER 0.47 02/28/2022    CRP <0.5 01/29/2015    MG 1.7 06/11/2024    PHOS 2.1 (L) 06/11/2024    PHOS 2.1 (L) 06/11/2024    TROP <0.045 05/30/2020    CK 55 03/29/2017    ETOH <3 06/09/2024         Imaging:  XR CHEST AP PORTABLE  (CPT=71045)    Result Date: 6/11/2024  CONCLUSION: No new abnormality.  Minimal subsegmental atelectasis.    Dictated by (CST): Miguel A Serrano MD on 6/11/2024 at 8:00 AM     Finalized by (CST): Miguel A Serrano MD on 6/11/2024 at 8:05 AM          XR CHEST AP PORTABLE  (CPT=71045)    Result Date: 6/10/2024  PROCEDURE: XR CHEST AP PORTABLE  (CPT=71045) TIME: 236  COMPARISON: [facility], XR  CHEST AP PORTABLE (CPT=71045), 6/10/2024, 2:18 AM.  INDICATIONS: Readjustment of ETT verify placement.  TECHNIQUE:   Single view.   Findings and impression:  ETT is now well positioned 3.5 cm above the dexter  Near complete re-expansion of the left lung  [facility] provided a prelim report for this exam. This final report has no significant discrepancies with the [facility] report.      Dictated by (CST): Steffen Godinez MD on 6/10/2024 at 9:06 AM     Finalized by (CST): Steffen Godinez MD on 6/10/2024 at 9:06 AM          XR CHEST AP PORTABLE  (CPT=71045)    Result Date: 6/10/2024  PROCEDURE: XR CHEST AP PORTABLE  (CPT=71045) TIME: 0218  COMPARISON: [facility], XR CHEST AP PORTABLE (CPT=71045), 6/10/2024, 2:36 AM.  [facility], XR CHEST AP PORTABLE (CPT=71045), 6/09/2024, 11:02 AM.  INDICATIONS: Verify correct NG tube placement.  TECHNIQUE:   Single view.   Findings and impression:  Malposition ETT located 2.2 centimeter deep into the right mainstem bronchus.  This should be repositioned  Near complete left lung atelectasis has developed  Enteric tube into the stomach.  Right IJ catheter in the mid SVC.  Clear right lung  [facility] provided a prelim report for this exam. This final report has no significant discrepancies with the [facility] report.     Dictated by (CST): Steffen Godinez MD on 6/10/2024 at 9:03 AM     Finalized by (CST): Steffen Godinez MD on 6/10/2024 at 9:05 AM          US LIVER (CPT=76705)    Result Date: 6/9/2024  CONCLUSION: Hepatomegaly.  Hepatic steatosis.     Dictated by (CST): Gabo Gu MD on 6/09/2024 at 12:49 PM     Finalized by (CST): Gabo Gu MD on 6/09/2024 at 12:51 PM          XR CHEST AP PORTABLE  (CPT=71045)    Result Date: 6/9/2024  CONCLUSION:  Right internal jugular central venous catheter, in expected positioning.  Unchanged left retrocardiac opacity.    Dictated by (CST): Aziza Nath MD on 6/09/2024 at 11:38 AM     Finalized by (CST):  Aziza Nath MD on 6/09/2024 at 11:38 AM          CT CHEST PE AORTA (IV ONLY) (CPT=71260)    Result Date: 6/9/2024  CONCLUSION:   1. No acute pulmonary embolism through the segmental pulmonary arteries. 2. Multifocal pneumonia demonstrated by multifocal ground-glass opacities and consolidations involving the left greater than right lower lobes and left upper lobe. 3. Two 4 mm right upper lobe pulmonary nodules, which are favored to be infectious/inflammatory, however recommend follow-up CT chest in 6 months to monitor for resolution. 4. Mild right heart predominant cardiomegaly. 5. Borderline dilated main pulmonary artery, which can be seen in pulmonary artery hypertension. 6. Small pericardial effusion. 7. Hepatosplenomegaly. 8. Lesser incidental findings described above.     Dictated by (CST): Kaz French MD on 6/09/2024 at 10:28 AM     Finalized by (CST): Kaz French MD on 6/09/2024 at 10:39 AM          US PREG EV ONLY (CPT=76817)    Result Date: 6/9/2024  CONCLUSION:   Single intrauterine pregnancy with a heart rate of 195 beats per minute and a crown-rump length of 3.4 cm, which corresponds to a gestational age of 10 weeks and 2 days.  A 3.2 cm subchorionic hemorrhage along the inferior aspect of the gestational sac.  A 1.9 cm left corpus luteal cyst.     Dictated by (CST): Kaz French MD on 6/09/2024 at 9:13 AM     Finalized by (CST): Kaz French MD on 6/09/2024 at 9:16 AM          XR CHEST AP PORTABLE  (CPT=71045)    Result Date: 6/9/2024  CONCLUSION:  Hazy left basilar opacity, which may be secondary to subsegmental atelectasis in the setting of low lung volumes.  Superimposed pneumonia is a differential in the appropriate clinical setting.     Dictated by (CST): Aziza Nath MD on 6/09/2024 at 8:14 AM     Finalized by (CST): Aziza Nath MD on 6/09/2024 at 8:16 AM           Vital Signs:   Blood pressure 97/62, pulse 83, temperature 98.6 °F (37 °C), temperature source Temporal,  resp. rate 20, weight 77.7 kg (171 lb 4.8 oz), last menstrual period 03/25/2024, SpO2 95%, not currently breastfeeding.    Mental Status Exam:   Appearance: Stated age single, , female in hospital gown, laying down in hospital bed.   Psychomotor: Patient has been demonstrating lethargy due to sedation   Orientation: Patient is sedated   Gait: Not evaluated.  Attitude/Coorperation: Not cooperative due to sedation  Behavior: Unable to determine due to sedation  Speech: Unable to communicate verbally  Mood: Unable to express emotion  Affect: Blunted affect  Thought process: Unable to determine due to sedation  Thought content: No reports of suicidal or homicidal ideation  Perceptions: Unable to determine due to sedation  Concentration: Grossly impaired  Memory: Grossly impaired  Intellect: Average.  Judgment and Insight: Questionable.     Impression:     Opiate Withdrawal Syndrome.  Opiate Dependency.  Bipolar Disorder unspecified.  Septic shock (HCC)  Pneumonia of left lower lobe due to infectious organism  Subchorionic hematoma in first trimester, single or unspecified fetus (HCC)     The patient is a 29 year old  female, single, with past medical history of acne, anxiety, bacterial endocarditis, bipolar disorder, chlamydia, decorative tattoo, depression, hepatitis B virus infection, history of blood clots, HPV infection, IV drug use, heroin and crack cocaine use, opioid dependence, subclavian vein thrombosis, and substance abuse.  The patient is currently sedated.     6/11/24: Patient is sedated and exhibiting some agitation when in the room. Propofol will start to be decreased slowly.    Discussed risk and benefit, acknowledging the current symptom and severity.  At this point, I would recommend the following approach:     Focus on safety  Focus on education and support.  Focus on insight orientation helping the patient understand diagnosis and treatment plan.  Continue methadone 20 mg via NG tube  BID  Increase Haldol to 5 mg every 6 hours scheduled.  Increase Seroquel to 200 mg 3 times daily.  Continue venlafaxine to 75 mg  Continue prazosin 2 mg  Increase Ativan to 1 mg IV every 6 hours scheduled.  Lower propofol graduating  Coordinate plan with team    Orders This Visit:  Orders Placed This Encounter   Procedures    CBC With Differential With Platelet    Comp Metabolic Panel (14)    Urinalysis with Culture Reflex    Lactic Acid, Plasma    HCG, Beta Subunit (Quant Pregnancy Test)    Ethyl Alcohol    Acetaminophen (Tylenol), S    Salicylate, Serum    Drug Abuse Panel 11 screen    Manual differential    Lactic Acid Reflex Post Positive    CBC With Differential With Platelet    Comp Metabolic Panel (14)    Magnesium    Streptococcus Pneumoniae Ag, Urine    Legionella urine Ag serogrp 1    Potassium    Magnesium    Scan slide    MD BLOOD SMEAR CONSULT    Triglycerides    Basic Metabolic Panel (8)    Phosphorus    Phosphorus    CBC With Differential With Platelet    Phosphorus    Hemoglobin    Triglycerides    Magnesium    Phosphorus    Basic Metabolic Panel (8)    CBC With Differential With Platelet    Hepatic Function Panel (7)    Type and screen    ABORH (Blood Type)    Antibody Screen    Prepare RBC Once    SARS-CoV-2/Flu A and B/RSV by PCR (GeneXpert)    Blood Culture    Blood Culture PCR    Blood Culture    Blood Culture       Meds This Visit:  Requested Prescriptions      No prescriptions requested or ordered in this encounter       Luis De León MD  6/11/2024    Note to Patient: The 21st Century Cures Act makes medical notes like these available to patients in the interest of transparency. However, be advised this is a medical document. It is intended as peer to peer communication. It is written in medical language and may contain abbreviations or verbiage that are unfamiliar. It may appear blunt or direct. Medical documents are intended to carry relevant information, facts as evident, and the  clinical opinion of the practitioner. This note may have been transcribed using a voice dictation system. Voice recognition errors may occur. This should not be taken to alter the content or meaning of this note.

## 2024-06-11 NOTE — PLAN OF CARE
Patient remains intubated and sedated. Vital signs as charted. Restraints in place for safety. Mother updated on plan of care. All safety measures maintained.     Problem: Safety Risk - Non-Violent Restraints  Goal: Patient will remain free from self-harm  Description: INTERVENTIONS:  - Apply the least restrictive restraint to prevent harm  - Notify patient and family of reasons restraints applied  - Assess for any contributing factors to confusion (electrolyte disturbances, delirium, medications)  - Discontinue any unnecessary medical devices as soon as possible  - Assess the patient's physical comfort, circulation, skin condition, hydration, nutrition and elimination needs   - Reorient and redirection as needed  - Assess for the need to continue restraints  Outcome: Not Progressing     Problem: Patient Centered Care  Goal: Patient preferences are identified and integrated in the patient's plan of care  Description: Interventions:  - What would you like us to know as we care for you?   - Provide timely, complete, and accurate information to patient/family  - Incorporate patient and family knowledge, values, beliefs, and cultural backgrounds into the planning and delivery of care  - Encourage patient/family to participate in care and decision-making at the level they choose  - Honor patient and family perspectives and choices  Outcome: Progressing     Problem: Patient/Family Goals  Goal: Patient/Family Long Term Goal  Description: Patient's Long Term Goal:     Interventions  - See additional Care Plan goals for specific interventions  Outcome: Progressing  Goal: Patient/Family Short Term Goal  Description: Patient's Short Term Goal:    Interventions:  - See additional Care Plan goals for specific interventions  Outcome: Progressing     Problem: CARDIOVASCULAR - ADULT  Goal: Maintains optimal cardiac output and hemodynamic stability  Description: INTERVENTIONS:  - Monitor vital signs, rhythm, and trends  - Monitor  for bleeding, hypotension and signs of decreased cardiac output  - Evaluate effectiveness of vasoactive medications to optimize hemodynamic stability  - Monitor arterial and/or venous puncture sites for bleeding and/or hematoma  - Assess quality of pulses, skin color and temperature  - Assess for signs of decreased coronary artery perfusion - ex. Angina  - Evaluate fluid balance, assess for edema, trend weights  Outcome: Progressing  Goal: Absence of cardiac arrhythmias or at baseline  Description: INTERVENTIONS:  - Continuous cardiac monitoring, monitor vital signs, obtain 12 lead EKG if indicated  - Evaluate effectiveness of antiarrhythmic and heart rate control medications as ordered  - Initiate emergency measures for life threatening arrhythmias  - Monitor electrolytes and administer replacement therapy as ordered  Outcome: Progressing     Problem: RESPIRATORY - ADULT  Goal: Achieves optimal ventilation and oxygenation  Description: INTERVENTIONS:  - Assess for changes in respiratory status  - Assess for changes in mentation and behavior  - Position to facilitate oxygenation and minimize respiratory effort  - Oxygen supplementation based on oxygen saturation or ABGs  - Provide Smoking Cessation handout, if applicable  - Encourage broncho-pulmonary hygiene including cough, deep breathe, Incentive Spirometry  - Assess the need for suctioning and perform as needed  - Assess and instruct to report SOB or any respiratory difficulty  - Respiratory Therapy support as indicated  - Manage/alleviate anxiety  - Monitor for signs/symptoms of CO2 retention  Outcome: Progressing     Problem: NEUROLOGICAL - ADULT  Goal: Achieves stable or improved neurological status  Description: INTERVENTIONS  - Assess for and report changes in neurological status  - Initiate measures to prevent increased intracranial pressure  - Maintain blood pressure and fluid volume within ordered parameters to optimize cerebral perfusion and minimize  risk of hemorrhage  - Monitor temperature, glucose, and sodium. Initiate appropriate interventions as ordered  Outcome: Progressing  Goal: Absence of seizures  Description: INTERVENTIONS  - Monitor for seizure activity  - Administer anti-seizure medications as ordered  - Monitor neurological status  Outcome: Progressing  Goal: Remains free of injury related to seizure activity  Description: INTERVENTIONS:  - Maintain airway, patient safety  and administer oxygen as ordered  - Monitor patient for seizure activity, document and report duration and description of seizure to MD/LIP  - If seizure occurs, turn patient to side and suction secretions as needed  - Reorient patient post seizure  - Seizure pads on all 4 side rails  - Instruct patient/family to notify RN of any seizure activity  - Instruct patient/family to call for assistance with activity based on assessment  Outcome: Progressing     Problem: Delirium  Goal: Minimize duration of delirium  Description: Interventions:  - Encourage use of hearing aids, eye glasses  - Promote highest level of mobility daily  - Provide frequent reorientation  - Promote wakefulness i.e. lights on, blinds open  - Promote sleep, encourage patient's normal rest cycle i.e. lights off, TV off, minimize noise and interruptions  - Encourage family to assist in orientation and promotion of home routines  Outcome: Progressing

## 2024-06-11 NOTE — PROGRESS NOTES
Progress Note     Latonia Barker Patient Status:  Inpatient    1/3/1995 MRN [id number]   Location [facility] 2W/SW Attending Iram Marquez MD   Hosp Day # 2 PCP Agatha Mcmanus MD     Chief Complaint: septic shock, multifocal pna, bacteremia, severe opioid withdrawal    Subjective:   S: Patient remains somewhat restless despite propofol and fentanyl iv prn  Attempted extubation twice overnight    Currently sedated and intubated, minimally unresponsive    On TF    Review of Systems:   ALEX due to sedation    Objective:   Vital signs:  Temp:  [97 °F (36.1 °C)-98.4 °F (36.9 °C)] 98.2 °F (36.8 °C)  Pulse:  [66-96] 84  Resp:  [16-24] 21  BP: ()/(62-85) 102/67  SpO2:  [95 %-100 %] 95 %  FiO2 (%):  [30 %-40 %] 30 %    Wt Readings from Last 6 Encounters:   06/10/24 171 lb 4.8 oz (77.7 kg)   24 160 lb 9.6 oz (72.8 kg)   24 164 lb (74.4 kg)   24 172 lb (78 kg)   24 172 lb (78 kg)   23 170 lb (77.1 kg)         Physical Exam:    General: No acute distress. Alert , Intubated and sedated, central venous catheter in place     Respiratory: Clear to auscultation bilaterally. No wheezes. No rhonchi.  Cardiovascular: S1, S2. Regular rate and rhythm. No murmurs, rubs or gallops.   Abdomen: Soft, nontender, nondistended.  Positive bowel sounds. No rebound or guarding.  Neurologic:ALEX  Musculoskeletal:ALEX  Extremities: No edema.    Results:   Diagnostic Data:      Labs:    Labs Last 24 Hours:   BMP     CBC    Other     Na 142 Cl 112 BUN <5 Glu 92   Hb 8.9   PTT - Procal -   K 3.5 CO2 22.0 Cr 0.51   WBC 4.5 >< PLT 87.0  INR - CRP -   Renal Lytes Endo    Hct 26.6   Trop - D dim -   eGFR - Ca 7.5 POC Gluc  96    LFT   pBNP - Lactic -   eGFR AA - PO4 2.1; 2.1 A1c -   AST - APk - Prot -  LDL -     Mg 1.7 TSH -   ALT - T priyanka - Alb -        COVID-19 Lab Results    COVID-19  Lab Results   Component Value Date    COVID19 Not Detected 2024       Pro-Calcitonin  No results for input(s): \"PCT\"  in the last 168 hours.    Cardiac  No results for input(s): \"TROP\", \"PBNP\" in the last 168 hours.    Creatinine Kinase  No results for input(s): \"CK\" in the last 168 hours.    Inflammatory Markers  No results for input(s): \"CRP\", \"PINKY\", \"LDH\", \"DDIMER\" in the last 168 hours.    Imaging: Imaging data reviewed in Epic.    Medications:    midazolam  4 mg Intravenous Once    sodium chloride   Intravenous Once    Methadone HCl  20 mg Oral BID    haloperidol lactate  2 mg Intravenous 4 times per day    QUEtiapine  50 mg Oral TID    meropenem  500 mg Intravenous Q8H    famotidine  20 mg Intravenous BID    QUEtiapine  200 mg Oral Nightly    prazosin  4 mg Oral Nightly    venlafaxine ER  150 mg Oral Nightly    melatonin  3 mg Oral Nightly       Assessment & Plan:   ASSESSMENT / PLAN:     #Shock/likely septic/Acute hypoxic respiratory failure/ on 7L NC/Multifocal pna  Cxr suspicious for focal PNA L basilar / elevated LA- trend  Covid influenza ans RSV neg  Check strep U Ag and Legionella U Ag  ID history:    H/o bacterial endocarditis treated in March 2017 with prolonged hospital stay  H/o skin MRSA abscesses'2018 that required hospitalization  CTPE no PE but has multifocal PNA  -MAPS 40s on arrival  -on pressors and broad spectrum IV ceftriaxone in ED   Critical care/pulmonary on consult   ho MRSA- however has allergy to vancomycin- ID consulted for abx choice  Bcx IP  Cardiology consulted for TTE:  No evidence of a vegetation  - consult ID as well  -6/10: TTE neg for vegetation; Bcx 1/2 +ve GNR. Cpm- appr ID input c/uy IV merrem  6/11: attempted extubation twice overnight despite propofol and fentanyl IV prn. May require fentanyl gtt. Remains on levophed; on TF.       Transaminitis: mild- improving  Likely shock liver   US liver shows   Monitor LFT daily  Avoid hepatotoxins     Ho IVDU:   Reportedly on suboxone since March and doing well but had relapse 1 day PTA   Per mother pt was on 8.2 mg suboxone tid, seeyung MONTOYA at  [place] [facility] [phone]  -d/w psychiatry- ok to resume 8mg suboxone bid on admission  resume home dose of seroquel 200mg at bedtime  6/10: intubated and sedated due to severe agitation      10 Weeks 6 day viable IUP on US:   3.2 cm subchorionic hemorrhage along the inferior aspect of the gestational sac.   HCG down trending  Obgyne consulted in ED  Avoid a/c at this time     Bicytopenia: Hb stable post transfusion 6/10 8.9  TCP possibly 2/2 sepsis  Anemia possibly from 3.2 cm subchorionic hemorrhage?  Monitor  Hold off ac   -6/10: 1 U prbc transfused        RAMON: mild- improved  Likely 2/2 shock  On pressors, IVF  Monitor renal function daily and avoid nephrotoxins       Bipolar disorder  -psych consult (of note both mom and daughter vehemently refused in prior admission)  -zofran prn, seroquel 200mg qhs  -supportive  -SW        Chronic anticoagulation  H/o Recurrent venous thrombosis superficial and deep    (had followed in [facility] in the past)  - per last hematology fup note  \"She completed more than 6 months of anticoagulation with warfarin for what we consider a provoked DVT and states she is no longer using IV drugs in the upper extremities.  She is now pregnant.  Would recommend stopping anticoagulation as long as she is able to avoid IV drug use \"  -avoid a/c due to subchorionic hemorrhage on US   monitor PLT closely           Quality:  DVT Prophylaxis: hold off on ac due to  monitor PLT closely;   CODE status: full             .         Coordinated care with providers and counseling re: treatment plan and workup     Iram Marquez MD    Supplementary Documentation:

## 2024-06-11 NOTE — PROGRESS NOTES
INFECTIOUS DISEASE PROGRESS NOTE  [facility]  part of [facility] ID PROGRESS NOTE    Latonia Barker Patient Status:  Inpatient    1/3/1995 MRN [id number]   Location [facility] 2W/SW Attending Iram Marquez MD   Hosp Day # 2 PCP Agatha Mcmanus MD     Subjective:  ROS unable to be reviewed. Remains intubated and on low dose levophed.    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 Await blood cx ID.  -  Follow fever curve, wbc.  -  Reviewed labs, micro, imaging reports, available old records.  -  Case d/w RN.     History of Present Illness:  Latonia Barker is a 29 year old female with hx of active IVDU, hep C, bipolar dx, history of MRSA bacteremia and 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 She states she was on suboxone but unfortunately relapsed and has been using for the past 4 days. She states she was very weak and having headaches as well as neck stiffness. States she feels much better now. Mostly injects in her groin area. Denies sharing any needles.  Temp 99.3F on admission with normal wbc. Blood cx pending. Started on IV ceftriaxone and vanc.     Physical  Exam:  BP 99/64 (BP Location: Right arm)   Pulse 83   Temp 98.3 °F (36.8 °C) (Temporal)   Resp 17   Wt 171 lb 4.8 oz (77.7 kg)   LMP 03/25/2024 (Approximate)   SpO2 95%   BMI 31.33 kg/m²     Gen:   Intubated, sedated  HEENT:  ETT+, neck supple  CV/lungs:  Regular rate and rhythm, CTAB  Abdom:  Soft, no TTP  Skin/extrem:  No rashes, no c/c/e  :   Dyson draining yellow urine  Lines:  RIJ CVC+    Laboratory Data: Reviewed    Microbiology: Reviewed    Radiology: Reviewed      [facility]  [phone]  6/11/2024

## 2024-06-12 ENCOUNTER — APPOINTMENT (OUTPATIENT)
Dept: GENERAL RADIOLOGY | Facility: HOSPITAL | Age: 29
DRG: 831 | End: 2024-06-12
Attending: INTERNAL MEDICINE

## 2024-06-12 LAB
ALBUMIN SERPL-MCNC: 2.6 G/DL (ref 3.2–4.8)
ALP LIVER SERPL-CCNC: 135 U/L
ALT SERPL-CCNC: 40 U/L
ANION GAP SERPL CALC-SCNC: 5 MMOL/L (ref 0–18)
AST SERPL-CCNC: 51 U/L (ref ?–34)
BASOPHILS # BLD AUTO: 0.03 X10(3) UL (ref 0–0.2)
BASOPHILS NFR BLD AUTO: 0.5 %
BILIRUB DIRECT SERPL-MCNC: 0.7 MG/DL (ref ?–0.3)
BILIRUB SERPL-MCNC: 0.9 MG/DL (ref 0.3–1.2)
BUN BLD-MCNC: 7 MG/DL (ref 9–23)
BUN/CREAT SERPL: 15.9 (ref 10–20)
CALCIUM BLD-MCNC: 7.5 MG/DL (ref 8.7–10.4)
CHLORIDE SERPL-SCNC: 109 MMOL/L (ref 98–112)
CO2 SERPL-SCNC: 25 MMOL/L (ref 21–32)
CREAT BLD-MCNC: 0.44 MG/DL
DEPRECATED RDW RBC AUTO: 48.3 FL (ref 35.1–46.3)
EGFRCR SERPLBLD CKD-EPI 2021: 136 ML/MIN/1.73M2 (ref 60–?)
EOSINOPHIL # BLD AUTO: 0.11 X10(3) UL (ref 0–0.7)
EOSINOPHIL NFR BLD AUTO: 1.8 %
ERYTHROCYTE [DISTWIDTH] IN BLOOD BY AUTOMATED COUNT: 17.6 % (ref 11–15)
GLUCOSE BLD-MCNC: 85 MG/DL (ref 70–99)
GLUCOSE BLDC GLUCOMTR-MCNC: 109 MG/DL (ref 70–99)
GLUCOSE BLDC GLUCOMTR-MCNC: 131 MG/DL (ref 70–99)
GLUCOSE BLDC GLUCOMTR-MCNC: 91 MG/DL (ref 70–99)
GLUCOSE BLDC GLUCOMTR-MCNC: 95 MG/DL (ref 70–99)
HCT VFR BLD AUTO: 23.6 %
HGB BLD-MCNC: 8.2 G/DL
IMM GRANULOCYTES # BLD AUTO: 0.1 X10(3) UL (ref 0–1)
IMM GRANULOCYTES NFR BLD: 1.6 %
LYMPHOCYTES # BLD AUTO: 1.51 X10(3) UL (ref 1–4)
LYMPHOCYTES NFR BLD AUTO: 24.8 %
MAGNESIUM SERPL-MCNC: 1.7 MG/DL (ref 1.6–2.6)
MCH RBC QN AUTO: 25.9 PG (ref 26–34)
MCHC RBC AUTO-ENTMCNC: 34.7 G/DL (ref 31–37)
MCV RBC AUTO: 74.4 FL
MONOCYTES # BLD AUTO: 0.72 X10(3) UL (ref 0.1–1)
MONOCYTES NFR BLD AUTO: 11.8 %
NEUTROPHILS # BLD AUTO: 3.61 X10 (3) UL (ref 1.5–7.7)
NEUTROPHILS # BLD AUTO: 3.61 X10(3) UL (ref 1.5–7.7)
NEUTROPHILS NFR BLD AUTO: 59.5 %
OSMOLALITY SERPL CALC.SUM OF ELEC: 285 MOSM/KG (ref 275–295)
PHOSPHATE SERPL-MCNC: 4.2 MG/DL (ref 2.4–5.1)
PLATELET # BLD AUTO: 102 10(3)UL (ref 150–450)
PLATELETS.RETICULATED NFR BLD AUTO: 8.5 % (ref 0–7)
POTASSIUM SERPL-SCNC: 3.8 MMOL/L (ref 3.5–5.1)
PROT SERPL-MCNC: 4.9 G/DL (ref 5.7–8.2)
RBC # BLD AUTO: 3.17 X10(6)UL
SODIUM SERPL-SCNC: 139 MMOL/L (ref 136–145)
TRIGL SERPL-MCNC: 241 MG/DL (ref 30–149)
WBC # BLD AUTO: 6.1 X10(3) UL (ref 4–11)

## 2024-06-12 PROCEDURE — 99233 SBSQ HOSP IP/OBS HIGH 50: CPT | Performed by: INTERNAL MEDICINE

## 2024-06-12 PROCEDURE — 71045 X-RAY EXAM CHEST 1 VIEW: CPT | Performed by: INTERNAL MEDICINE

## 2024-06-12 PROCEDURE — 99233 SBSQ HOSP IP/OBS HIGH 50: CPT | Performed by: OTHER

## 2024-06-12 PROCEDURE — 99233 SBSQ HOSP IP/OBS HIGH 50: CPT | Performed by: HOSPITALIST

## 2024-06-12 RX ORDER — MAGNESIUM SULFATE HEPTAHYDRATE 40 MG/ML
2 INJECTION, SOLUTION INTRAVENOUS ONCE
Status: COMPLETED | OUTPATIENT
Start: 2024-06-12 | End: 2024-06-12

## 2024-06-12 RX ORDER — MIDAZOLAM HYDROCHLORIDE 1 MG/ML
1 INJECTION INTRAMUSCULAR; INTRAVENOUS EVERY 4 HOURS PRN
Status: DISCONTINUED | OUTPATIENT
Start: 2024-06-12 | End: 2024-06-16

## 2024-06-12 RX ORDER — DEXMEDETOMIDINE HYDROCHLORIDE 4 UG/ML
INJECTION, SOLUTION INTRAVENOUS CONTINUOUS
Status: DISCONTINUED | OUTPATIENT
Start: 2024-06-12 | End: 2024-06-13

## 2024-06-12 RX ORDER — DEXMEDETOMIDINE HYDROCHLORIDE 4 UG/ML
INJECTION, SOLUTION INTRAVENOUS CONTINUOUS
Status: DISCONTINUED | OUTPATIENT
Start: 2024-06-12 | End: 2024-06-12

## 2024-06-12 RX ORDER — FUROSEMIDE 10 MG/ML
40 INJECTION INTRAMUSCULAR; INTRAVENOUS ONCE
Status: COMPLETED | OUTPATIENT
Start: 2024-06-12 | End: 2024-06-12

## 2024-06-12 NOTE — PROGRESS NOTES
Patient is a 29 year old female with past medical history of acne, anxiety, bacterial endocarditis, bipolar disorder, chlamydia, decorative tattoo, depression, hepatitis B virus infection, history of blood clots, HPV infection, IV drug use, heroin and crack cocaine use, opioid dependence, subclavian vein thrombosis, and substance abuse who was admitted to the hospital for Septic shock (HCC): The patient has been demonstrating signs of being combative and is currently sedated.    Consult Duration     The patient seen for over 50-minute, follow-up evaluation, over 50% counseling and coordinating care addressing  septic shock and opioid withdrawal.  Record reviewed, communication with attending, communication with RN and patient seen face to face evaluation.     History of Present Illness:   Labs and imaging reviewed: BUN: 7, Creatinine: 0.44, Triglycerides: 241    Vital signs: BP: 92/57, Pulse: 72    The patient is well known to the psychiatry team from previous consult. The patient was last seen multiple times in 2017 and 2018.  Patient with a chronic history of opiate dependence mostly heroin IV and has been in methadone clinic.  Patient also with a diagnosis of bipolar disorder who responded treatment to Seroquel in the past.    Patient started on fentanyl drip yesterday with 60 mg additional to the propofol.  Per communication with team, propofol use has decreased to 30 mg and and they will try to continue decreasing. Patient will be extubated today. Staff is trying their best to avoid stimulation in the room. Patient has been exhibiting some agitation.    The patient seen today lethargic but awake and demonstrate acknowledgment of me.  Patient educated about the need for extubation and the need for cooperation with appropriate orientation and shortness..    The patient is alert and oriented to person, place, date and condition.   The patient is less frustrated and agitated since the increase of Seroquel.    The  patient denies suicidal or homicidal ideation.    The patient has been demonstrating signs of combativeness prior to sedation.    Past Psychiatric/Medication History:  1. Prior diagnoses: anxiety, bipolar disorder, depression, opioid dependence, substance abuse  2. Past psychiatric inpatient: Unknown  3. Past outpatient history: Unknown  4. Past suicide history: Unknown  5. 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 ×42 days    Bipolar disorder (HCC)    Chlamydia    Decorative tattoo    Depression    Hepatitis B virus infection    History of blood clots    Human papilloma virus infection    Intravenous drug abuse (HCC)    Heroin and crack cocaine    Opioid dependence (HCC)    Subclavian vein thrombosis (HCC)    Substance abuse (HCC)       Past Surgical History  Past Surgical History:   Procedure Laterality Date    Colonoscopy      Colonoscopy N/A 2017    Procedure: COLONOSCOPY;  Surgeon: Xu Day MD;  Location: [facility] ENDOSCOPY    Colposcopy, cervix w/upper adjacent vagina; w/biopsy(s), cervix            Other surgical history      wisdom teeth     Other surgical history      fasciotomy of right arm    Tonsillectomy      Upper gi endoscopy,exam         Family History  Family History   Problem Relation Age of Onset    Other (back pain) Father     Hypertension Mother     Hypertension Maternal Grandmother     Diabetes Maternal Grandfather     Prostate Cancer Paternal Grandfather         w/ metastasis       Social  History  Social History     Socioeconomic History    Marital status: Single    Number of children: 0   Occupational History    Occupation: Unemployed   Tobacco Use    Smoking status: Every Day     Current packs/day: 0.00     Average packs/day: 1 pack/day for 1 year (1.0 ttl pk-yrs)     Types: Cigarettes     Start date: 2014     Last attempt to quit: 2015     Years since quittin.3    Smokeless tobacco: Current    Tobacco comments:     Vaping   Vaping Use    Vaping status: Never Used   Substance and Sexual Activity    Alcohol use: Not Currently     Comment: social    Drug use: Yes     Types: Heroin, \"Crack\" cocaine, benzodiazepines, Other-see comments     Comment: Acid, mushrooms    Sexual activity: Yes     Partners: Male     Birth control/protection: Condom   Other Topics Concern    Caffeine Concern No     Comment:  2 cans Cola per day    Exercise Yes     Comment: hep    Reaction to local anesthetic No   Social History Narrative    The patient does not use an assistive device..      The patient does live in a home with stairs.           Current Medications:  Current Facility-Administered Medications   Medication Dose Route Frequency    dexmedeTOMIDine in sodium chloride 0.9% (Precedex) 400 mcg/100mL infusion premix  0.2-1.5 mcg/kg/hr (Dosing Weight) Intravenous Continuous    meropenem (Merrem) 1 g in sodium chloride 0.9% 100 mL IVPB-MBP  1 g Intravenous Q8H    venlafaxine ER (Effexor-XR) 24 hr cap 75 mg  75 mg Oral Nightly    prazosin (Minipress) cap 2 mg  2 mg Oral Nightly    fentaNYL in sodium chloride 0.9% (Sublimaze) 1000 mcg/100mL infusion premix   mcg/hr Intravenous Continuous    haloperidol lactate (Haldol) 5 MG/ML injection 5 mg  5 mg Intravenous 4 times per day    QUEtiapine (SEROquel) tab 200 mg  200 mg Oral TID    LORazepam (Ativan) 2 mg/mL injection 1 mg  1 mg Intravenous 4 times per day    diphenhydrAMINE (Benadryl) 50 mg/mL  injection 50 mg  50 mg Intravenous Q4H PRN    midazolam  (Versed) 2 MG/2ML injection 4 mg  4 mg Intravenous Once    propofol (Diprivan) 10 mg/mL infusion premix  5-67 mcg/kg/min (Dosing Weight) Intravenous Continuous    sodium chloride 0.9% infusion   Intravenous Once    methadone solution (DOLOPHINE) 10 mg/5mL  20 mg Oral BID    dextrose 10% infusion (TPN no rate)   Intravenous Continuous PRN    pancrelipase (Lip-Prot-Amyl) (Zenpep) DR particles cap 10,000 Units  10,000 Units Per G Tube PRN    And    sodium bicarbonate tab 325 mg  325 mg Oral PRN    famotidine (Pepcid) 20 mg/2mL injection 20 mg  20 mg Intravenous BID    norepinephrine (Levophed) 4 mg/250mL infusion premix  0.5-30 mcg/min Intravenous Continuous    ondansetron (Zofran) 4 MG/2ML injection 4 mg  4 mg Intravenous Q6H PRN    prochlorperazine (Compazine) 10 MG/2ML injection 5 mg  5 mg Intravenous Q8H PRN    QUEtiapine (SEROquel) tab 200 mg  200 mg Oral Nightly    acetaminophen (Tylenol) tab 650 mg  650 mg Oral Q6H PRN    melatonin tab 3 mg  3 mg Oral Nightly     Medications Prior to Admission   Medication Sig    prazosin 1 MG Oral Cap Take 4 capsules (4 mg total) by mouth nightly. Takes 4mg nightly    buprenorphine-naloxone 8-2 MG Sublingual Film dissolve 1 film under the tongue 3 TIMES A DAY for maintenance    prenatal vitamin with DHA 27-0.8-228 MG Oral Cap Take 1 capsule by mouth daily.    [] nitrofurantoin monohydrate macro (MACROBID) 100 MG Oral Cap Take 1 capsule (100 mg total) by mouth 2 (two) times daily for 7 days.    QUEtiapine 200 MG Oral Tab Take 1 tablet (200 mg total) by mouth nightly.    [] Buprenorphine HCl-Naloxone HCl 8.6-2.1 MG Sublingual SL Tab Place 8.6 mg of buprenorphine under the tongue in the morning, at noon, and at bedtime.    pantoprazole 20 MG Oral Tab EC Take 1 tablet (20 mg total) by mouth every morning before breakfast.    gabapentin 300 MG Oral Cap Take 1 capsule (300 mg total) by mouth nightly.    Melatonin 3 MG Oral Cap Take 1 capsule (3 mg total) by mouth at  bedtime.    Naloxone HCl (NARCAN) 4 MG/0.1ML Nasal Liquid 4 mg by Nasal route as needed. IF PATIENT REMAINS UNRESPONSIVE, REPEAT DOSE IN OTHER NOSTRIL 2 TO 5 MINUTES AFTER FIRST DOSE (Patient not taking: Reported on 5/9/2024)    venlafaxine  MG Oral Capsule SR 24 Hr Take 1 capsule (150 mg total) by mouth every morning.    ondansetron (ZOFRAN) 4 mg tablet Take 1 tablet (4 mg total) by mouth every 8 (eight) hours as needed for Nausea. (Patient not taking: Reported on 5/24/2024)    cloNIDine 0.1 MG Oral Tab Take 1 tablet (0.1 mg total) by mouth 2 (two) times daily as needed. (Patient not taking: Reported on 5/24/2024)       Allergies  Allergies   Allergen Reactions    Amoxicillin HIVES    Vancomycin RASH and ITCHING    Clindamycin RASH       Review of Systems:   As by Admitting/Attending    Results:   Laboratory Data:  Lab Results   Component Value Date    WBC 6.1 06/12/2024    HGB 8.2 (L) 06/12/2024    HCT 23.6 (L) 06/12/2024    .0 (L) 06/12/2024    CREATSERUM 0.44 (L) 06/12/2024    BUN 7 (L) 06/12/2024     06/12/2024    K 3.8 06/12/2024     06/12/2024    CO2 25.0 06/12/2024    GLU 85 06/12/2024    CA 7.5 (L) 06/12/2024    ALB 2.6 (L) 06/12/2024    ALKPHO 135 (H) 06/12/2024    TP 4.9 (L) 06/12/2024    AST 51 (H) 06/12/2024    ALT 40 06/12/2024    PTT 29.2 05/30/2020    INR 1.9 (A) 08/12/2020    PTP 14.7 (H) 05/31/2020    T4F 0.73 11/02/2011    TSH 2.080 02/13/2019    LIP 18 (L) 05/30/2020    DDIMER 0.47 02/28/2022    CRP <0.5 01/29/2015    MG 1.7 06/12/2024    PHOS 4.2 06/12/2024    TROP <0.045 05/30/2020    CK 55 03/29/2017    ETOH <3 06/09/2024         Imaging:  XR CHEST AP PORTABLE  (CPT=71045)    Result Date: 6/12/2024  CONCLUSION: Enlarging pleural effusions and pulmonary edema.    Dictated by (CST): Miguel A Serrano MD on 6/12/2024 at 7:25 AM     Finalized by (CST): Miguel A Serrano MD on 6/12/2024 at 7:33 AM          XR CHEST AP PORTABLE  (CPT=71045)    Result Date: 6/11/2024  CONCLUSION: No  new abnormality.  Minimal subsegmental atelectasis.    Dictated by (CST): Miguel A Serrano MD on 6/11/2024 at 8:00 AM     Finalized by (CST): Miguel A Serrano MD on 6/11/2024 at 8:05 AM          XR CHEST AP PORTABLE  (CPT=71045)    Result Date: 6/10/2024  PROCEDURE: XR CHEST AP PORTABLE  (CPT=71045) TIME: 236  COMPARISON: [facility], XR CHEST AP PORTABLE (CPT=71045), 6/10/2024, 2:18 AM.  INDICATIONS: Readjustment of ETT verify placement.  TECHNIQUE:   Single view.   Findings and impression:  ETT is now well positioned 3.5 cm above the dexter  Near complete re-expansion of the left lung  [facility] provided a prelim report for this exam. This final report has no significant discrepancies with the [facility] report.      Dictated by (CST): Steffen Godinez MD on 6/10/2024 at 9:06 AM     Finalized by (CST): Steffen Godinez MD on 6/10/2024 at 9:06 AM          XR CHEST AP PORTABLE  (CPT=71045)    Result Date: 6/10/2024  PROCEDURE: XR CHEST AP PORTABLE  (CPT=71045) TIME: 0218  COMPARISON: [facility], XR CHEST AP PORTABLE (CPT=71045), 6/10/2024, 2:36 AM.  [facility], XR CHEST AP PORTABLE (CPT=71045), 6/09/2024, 11:02 AM.  INDICATIONS: Verify correct NG tube placement.  TECHNIQUE:   Single view.   Findings and impression:  Malposition ETT located 2.2 centimeter deep into the right mainstem bronchus.  This should be repositioned  Near complete left lung atelectasis has developed  Enteric tube into the stomach.  Right IJ catheter in the mid SVC.  Clear right lung  [facility] provided a prelim report for this exam. This final report has no significant discrepancies with the [facility] report.     Dictated by (CST): Steffen Godinez MD on 6/10/2024 at 9:03 AM     Finalized by (CST): Steffen Godinez MD on 6/10/2024 at 9:05 AM          US LIVER (CPT=76705)    Result Date: 6/9/2024  CONCLUSION: Hepatomegaly.  Hepatic steatosis.     Dictated by (CST): Gabo Gu MD on 6/09/2024 at 12:49 PM      Finalized by (CST): Gabo Gu MD on 6/09/2024 at 12:51 PM           Vital Signs:   Blood pressure 92/57, pulse 72, temperature 97 °F (36.1 °C), temperature source Temporal, resp. rate 20, weight 85.7 kg (188 lb 15 oz), last menstrual period 03/25/2024, SpO2 94%, not currently breastfeeding.    Mental Status Exam:   Appearance: Stated age single, , female in hospital gown, laying down in hospital bed.   Psychomotor: Patient has been demonstrating lethargy due to sedation, otherwise is alert today with a decrease of sedation  Orientation: Patient is alert and oriented to self and place  Gait: Not evaluated.  Attitude/Coorperation: Patient nodding head acknowledging the need for her cooperation to perform extubation hopefully today.  Behavior: Episodes of restlessness and agitation  Speech: Unable to communicate verbally  Mood: Patient demonstrating alternation in her mood  Affect: Labile affect  Thought process: Disorganized and confused  Thought content: No reports of suicidal or homicidal ideation  Perceptions: Patient demonstrating response to internal stimuli  Concentration: Grossly impaired  Memory: Grossly impaired  Intellect: Average.  Judgment and Insight: Questionable.     Impression:     Opiate Withdrawal Syndrome.  Opiate Dependency.  Bipolar Disorder unspecified.  Septic shock (HCC)  Pneumonia of left lower lobe due to infectious organism  Subchorionic hematoma in first trimester, single or unspecified fetus (HCC)     The patient is a 29 year old  female, single, with past medical history of acne, anxiety, bacterial endocarditis, bipolar disorder, chlamydia, decorative tattoo, depression, hepatitis B virus infection, history of blood clots, HPV infection, IV drug use, heroin and crack cocaine use, opioid dependence, subclavian vein thrombosis, and substance abuse.  The patient is currently sedated.     6/11/24: Patient is sedated and exhibiting some agitation when in the room. Propofol  will start to be decreased slowly.    6/12/24: Patient in process of decreasing sedation and possible extubation.  Otherwise the patient is alert and oriented to self and place only nodding her head agree with the need for extubation and cooperation.    Discussed risk and benefit, acknowledging the current symptom and severity.  At this point, I would recommend the following approach:     Focus on safety  Focus on education and support.  Focus on insight orientation helping the patient understand diagnosis and treatment plan.  Continue methadone 20 mg via NG tube BID  Continue Haldol 5 mg every 6 hours scheduled.  Continue Seroquel 200 mg 3 times daily.  Continue venlafaxine to 75 mg  Continue prazosin 2 mg  Continue Ativan 1 mg IV every 6 hours scheduled.  Lower propofol graduating  Coordinate plan with team    Orders This Visit:  Orders Placed This Encounter   Procedures    CBC With Differential With Platelet    Comp Metabolic Panel (14)    Urinalysis with Culture Reflex    Lactic Acid, Plasma    HCG, Beta Subunit (Quant Pregnancy Test)    Ethyl Alcohol    Acetaminophen (Tylenol), S    Salicylate, Serum    Drug Abuse Panel 11 screen    Manual differential    Lactic Acid Reflex Post Positive    CBC With Differential With Platelet    Comp Metabolic Panel (14)    Magnesium    Streptococcus Pneumoniae Ag, Urine    Legionella urine Ag serogrp 1    Potassium    Magnesium    Scan slide    MD BLOOD SMEAR CONSULT    Triglycerides    Basic Metabolic Panel (8)    Phosphorus    Phosphorus    CBC With Differential With Platelet    Phosphorus    Hemoglobin    Triglycerides    Magnesium    Phosphorus    Basic Metabolic Panel (8)    CBC With Differential With Platelet    Hepatic Function Panel (7)    Magnesium    Type and screen    ABORH (Blood Type)    Antibody Screen    Prepare RBC Once    SARS-CoV-2/Flu A and B/RSV by PCR (GeneXpert)    Blood Culture    Blood Culture PCR    Blood Culture    Blood Culture       Meds This  Visit:  Requested Prescriptions      No prescriptions requested or ordered in this encounter       Luis De León MD  6/12/2024    Note to Patient: The 21st Century Cures Act makes medical notes like these available to patients in the interest of transparency. However, be advised this is a medical document. It is intended as peer to peer communication. It is written in medical language and may contain abbreviations or verbiage that are unfamiliar. It may appear blunt or direct. Medical documents are intended to carry relevant information, facts as evident, and the clinical opinion of the practitioner. This note may have been transcribed using a voice dictation system. Voice recognition errors may occur. This should not be taken to alter the content or meaning of this note.

## 2024-06-12 NOTE — PROGRESS NOTES
INFECTIOUS DISEASE PROGRESS NOTE  [facility]  part of [facility] ID PROGRESS NOTE    Latonia Barker Patient Status:  Inpatient    1/3/1995 MRN [id number]   Location [facility] 2W/SW Attending Iram Marquez MD   Hosp Day # 3 PCP Agatha Mcmanus MD     Subjective:  ROS unable to be reviewed. Remains intubated. Off pressors.    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    -  Blood cx noted. Will hold off on further imaging at this time.  -  Follow fever curve, wbc.  -  Reviewed labs, micro, imaging reports, available old records.  -  Case d/w RN.     History of Present Illness:  Latonia Barker is a 29 year old female with hx of active IVDU, hep C, bipolar dx, history of MRSA bacteremia and 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 She states she was on suboxone but unfortunately relapsed and has been using for the past 4 days. She states she was very weak and having headaches as well as neck stiffness. States she feels much better now. Mostly injects in her groin area. Denies sharing any needles.  Temp 99.3F on admission with normal  wbc. Blood cx pending. Started on IV ceftriaxone and vanc.     Physical Exam:  BP 92/57 (BP Location: Right arm)   Pulse 72   Temp 97 °F (36.1 °C) (Temporal)   Resp 20   Wt 188 lb 15 oz (85.7 kg)   LMP 03/25/2024 (Approximate)   SpO2 94%   BMI 34.56 kg/m²     Gen:   Intubated, sedated  HEENT:  ETT+, neck supple  CV/lungs:  Regular rate and rhythm, CTAB  Abdom:  Soft, no TTP  Skin/extrem:  No rashes, no c/c/e  :   Dyson draining yellow urine  Lines:  RIJ CVC+    Laboratory Data: Reviewed    Microbiology: Reviewed    Radiology: Reviewed      [facility]  [phone]  6/12/2024

## 2024-06-12 NOTE — RESPIRATORY THERAPY NOTE
Breathing trial started at 15:55,initially pt tolerated well,later pt start breathing 38-40,pt is begging for pain medicine and very agitated.  16:30 pt back to full support,will try tomorrow.

## 2024-06-12 NOTE — CONSULTS
Brief note from OB service:  Needs OB ultrasound prior to discharge to confirm continued viability. If vaginal bleeding occurs, please contact us to see pt again. Thank you. -- Dr. Paulino.

## 2024-06-12 NOTE — PROGRESS NOTES
[facility]  Progress Note     Latonia Barker  : 1/3/1995    Status: Inpatient  Day #: 3    Attending: Janes Levine MD  PCP: Agatha Mcmanus MD     Assessment and Plan:    Acute septic shock 2/2 multifocal pneumonia and Fusobacterium bacteremia  Acute hypoxic respiratory failure  -ID, pulm/critical care on consult  -CXR and CT chest reviewed. No PE. 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   -HCG down trending  -Ob consulted  -no vaginal bleed     Bicytopenia: Hb stable post transfusion 6/10 8.9  -TCP possibly 2/2 sepsis. improving  -Anemia possibly from acute blood loss related to 3.2 cm subchorionic hemorrhage?  -Hold off ac   -6/10: 1 U prbc transfused     RAMON: mild - improved  Likely 2/2 shock  On pressors, IVF  Monitor renal function daily and avoid nephrotoxins     Bipolar disorder  -psych consult (of note both mom and daughter vehemently refused in prior admission)  -zofran prn, seroquel 200mg qhs  -supportive  -SW     Chronic anticoagulation  H/o Recurrent venous thrombosis superficial and deep   -(had followed in [facility] in the past)  -per last hematology f/u note  \"She completed more than 6 months of  anticoagulation with warfarin for what we consider a provoked DVT and states she is no longer using IV drugs in the upper extremities.  She is now pregnant.  Would recommend stopping anticoagulation as long as she is able to avoid IV drug use \"  -avoid a/c due to subchorionic hemorrhage on US     DVT Mechanical Prophylaxis:   SCDs,    DVT Pharmacologic Prophylaxis   Medication   None               Subjective:      Initial Chief Complaint:  fever malaise    Patient is intubated      Objective:      Temp:  [97 °F (36.1 °C)-98.6 °F (37 °C)] 97 °F (36.1 °C)  Pulse:  [67-85] 72  Resp:  [18-25] 20  BP: ()/(50-69) 92/57  SpO2:  [93 %-99 %] 94 %  FiO2 (%):  [30 %] 30 %  General:  Intubated but opens eyes and moves  HEENT:  Normocephalic, atraumatic  Cardiac:  Regular rate, regular rhythm  Pulmonary:  Clear to auscultation bilaterally, respirations unlabored  Gastrointestinal:  Soft, non-tender, normal bowel sounds  Musculoskeletal:  No joint swelling  Extremities:  No edema, no cyanosis, no clubbing  Neurologic:  moving all ext  Psychiatric:  calm    Intake/Output Summary (Last 24 hours) at 6/12/2024 1339  Last data filed at 6/12/2024 0714  Gross per 24 hour   Intake 2666.9 ml   Output 1210 ml   Net 1456.9 ml         Recent Labs   Lab 06/10/24  0544 06/10/24  1353 06/11/24  0346 06/12/24  0318   WBC 6.4  --  4.5 6.1   HGB 6.9* 8.7* 8.9* 8.2*   HCT 20.9*  --  26.6* 23.6*   PLT 69.0*  --  87.0* 102.0*   RBC 2.79*  --  3.43* 3.17*   MCV 74.9*  --  77.6* 74.4*   MCH 24.7*  --  25.9* 25.9*   MCHC 33.0  --  33.5 34.7   RDW 17.5*  --  17.5* 17.6*   NEPRELIM 4.97  --  3.07 3.61     Recent Labs   Lab 06/09/24  0732 06/10/24  0544 06/11/24  0346 06/12/24  0318   BUN 15 6* <5* 7*   CREATSERUM 1.10* 0.44* 0.51* 0.44*   CA 9.5 8.0* 7.5* 7.5*   ALB 3.4 2.9*  --  2.6*   * 141 142 139   K 3.4* 3.9  3.9 3.5 3.8    112 112 109   CO2 23.0 26.0 22.0 25.0   * 110* 92 85   MG  --  1.4* 1.7 1.7   PHOS  --  1.5* 2.1*   2.1* 4.2   BILT 0.4 0.3  --  0.9   * 187*  --  51*   ALT 66* 54*  --  40   ALKPHO 162* 101*  --  135*   TP 6.2 5.3*  --  4.9*   ETOH <3  --   --   --        XR CHEST AP PORTABLE  (CPT=71045)    Result Date: 6/12/2024  CONCLUSION: Enlarging pleural effusions and pulmonary edema.    Dictated by (CST): Miguel A Serrano MD on 6/12/2024 at 7:25 AM     Finalized by (CST): Miguel A Serrano MD on 6/12/2024 at 7:33 AM          XR CHEST AP PORTABLE  (CPT=71045)    Result Date: 6/11/2024  CONCLUSION: No new abnormality.  Minimal subsegmental atelectasis.    Dictated by (CST): Miguel A Serrano MD on 6/11/2024 at 8:00 AM     Finalized by (CST): Miguel A Serrano MD on 6/11/2024 at 8:05 AM             Medications:   meropenem  1 g Intravenous Q8H    venlafaxine ER  75 mg Oral Nightly    prazosin  2 mg Oral Nightly    haloperidol lactate  5 mg Intravenous 4 times per day    QUEtiapine  200 mg Oral TID    LORazepam  1 mg Intravenous 4 times per day    midazolam  4 mg Intravenous Once    sodium chloride   Intravenous Once    Methadone HCl  20 mg Oral BID    famotidine  20 mg Intravenous BID    QUEtiapine  200 mg Oral Nightly    melatonin  3 mg Oral Nightly      PRN Meds:   diphenhydrAMINE    dextrose 10%    lipase-protease-amylase (Lip-Prot-Amyl) **AND** sodium bicarbonate    ondansetron    prochlorperazine    acetaminophen    Supplementary Documentation:        MDM High. Time spent on chart/note review, review of labs/imaging, , physical exam, discussion with staff, consultants, coordinating care, writing progress note, discussion of plan of care.      Janes Levine MD

## 2024-06-12 NOTE — PROGRESS NOTES
[facility]  part of [facility]    Progress Note      Assessment and Plan:   1.  Septic shock-Echo without vegetation.  Patient is yet requiring pressors.  Fusobacterium identified in the blood.     Recommendations:  1.  Ongoing antibiotic as per ID.     2.  Psychiatric-substance abuse disorder as well as history of bipolar disease status post recent prolonged rehab stay having been discharged 30 days ago.  The patient became delirious and agitated and had to be intubated.     Recommendations: CIWA protocol-as per psychiatry evaluation, methadone initiated.     3.  Pregnancy-3 cm subchorionic hemorrhage, now with decreasing beta hCG.     Recommendations: As per obstetrics.     4.  DVT prophylaxis-would use SCDs in patient who has subchorionic hemorrhage at this juncture although the patient has an extensive history of thrombosis.     Recommendations: SCDs and would discuss risks of chemoprophylaxis anticoagulation with obstetrics.     5.  Social concerns.     6.  Respiratory failure-septic shock and now with modest edema.  Will give Lasix.  Additionally, will transition off the propofol, initiate Precedex and try to progress to extubate while on the Precedex.    Recommendations: Ongoing antibiotic.  Will transition off the propofol, initiate Precedex and perform the weaning with spontaneous awakening and breathing trials.  Hopefully to extubate as soon as today.      Subjective:   Latonia Barker is a(n) 29 year old female who is sedate on ventilator    Objective:   Blood pressure 92/57, pulse 72, temperature 97 °F (36.1 °C), temperature source Temporal, resp. rate 20, weight 188 lb 15 oz (85.7 kg), last menstrual period 03/25/2024, SpO2 94%, not currently breastfeeding.    Physical Exam sedate white female  HEENT examination is unremarkable with pupils equal round and reactive to light and accommodation.   Neck without adenopathy, thyromegaly, JVD nor bruit.   Lungs diminished to auscultation  and percussion.  Cardiac regular rate and rhythm no murmur.   Abdomen nontender, without hepatosplenomegaly and no mass appreciable.   Extremities without clubbing cyanosis nor edema.   Neurologic grossly intact with symmetric tone and strength and reflex.  Skin without gross abnormality     Results:     Lab Results   Component Value Date    WBC 6.1 06/12/2024    HGB 8.2 06/12/2024    HCT 23.6 06/12/2024    .0 06/12/2024    CREATSERUM 0.44 06/12/2024    BUN 7 06/12/2024     06/12/2024    K 3.8 06/12/2024     06/12/2024    CO2 25.0 06/12/2024    GLU 85 06/12/2024    CA 7.5 06/12/2024    ALB 2.6 06/12/2024    ALKPHO 135 06/12/2024    BILT 0.9 06/12/2024    TP 4.9 06/12/2024    AST 51 06/12/2024    ALT 40 06/12/2024    MG 1.7 06/12/2024    PHOS 4.2 06/12/2024     CT scan of the chest-1. No acute pulmonary embolism through the segmental pulmonary arteries.   2. Multifocal pneumonia demonstrated by multifocal ground-glass opacities and consolidations involving the left greater than right lower lobes and left upper lobe.   3. Two 4 mm right upper lobe pulmonary nodules, which are favored to be infectious/inflammatory, however recommend follow-up CT chest in 6 months to monitor for resolution.   4. Mild right heart predominant cardiomegaly.   5. Borderline dilated main pulmonary artery, which can be seen in pulmonary artery hypertension.   6. Small pericardial effusion.   7. Hepatosplenomegaly.     Chest x-ray-mild edema    Srinivasan Saleh MD  Medical Director, Critical Care, [facility]  Medical Director, [facility]  Pager: [phone]

## 2024-06-12 NOTE — PLAN OF CARE
Alert and able to follow commands. Propofol weaned off. Precedex and fenantyl gtt on. SAT and SBT for 30 minutes. PRN fentanyl pushes. Reorientation and goal settings frequently introduced. CARISA wrist restraints and mitts for safety. All safety measures maintained. Patient and family educated on next steps.     Problem: CARDIOVASCULAR - ADULT  Goal: Maintains optimal cardiac output and hemodynamic stability  Description: INTERVENTIONS:  - Monitor vital signs, rhythm, and trends  - Monitor for bleeding, hypotension and signs of decreased cardiac output  - Evaluate effectiveness of vasoactive medications to optimize hemodynamic stability  - Monitor arterial and/or venous puncture sites for bleeding and/or hematoma  - Assess quality of pulses, skin color and temperature  - Assess for signs of decreased coronary artery perfusion - ex. Angina  - Evaluate fluid balance, assess for edema, trend weights  Outcome: Progressing  Goal: Absence of cardiac arrhythmias or at baseline  Description: INTERVENTIONS:  - Continuous cardiac monitoring, monitor vital signs, obtain 12 lead EKG if indicated  - Evaluate effectiveness of antiarrhythmic and heart rate control medications as ordered  - Initiate emergency measures for life threatening arrhythmias  - Monitor electrolytes and administer replacement therapy as ordered  Outcome: Progressing     Problem: RESPIRATORY - ADULT  Goal: Achieves optimal ventilation and oxygenation  Description: INTERVENTIONS:  - Assess for changes in respiratory status  - Assess for changes in mentation and behavior  - Position to facilitate oxygenation and minimize respiratory effort  - Oxygen supplementation based on oxygen saturation or ABGs  - Provide Smoking Cessation handout, if applicable  - Encourage broncho-pulmonary hygiene including cough, deep breathe, Incentive Spirometry  - Assess the need for suctioning and perform as needed  - Assess and instruct to report SOB or any respiratory  difficulty  - Respiratory Therapy support as indicated  - Manage/alleviate anxiety  - Monitor for signs/symptoms of CO2 retention  Outcome: Progressing     Problem: NEUROLOGICAL - ADULT  Goal: Achieves stable or improved neurological status  Description: INTERVENTIONS  - Assess for and report changes in neurological status  - Initiate measures to prevent increased intracranial pressure  - Maintain blood pressure and fluid volume within ordered parameters to optimize cerebral perfusion and minimize risk of hemorrhage  - Monitor temperature, glucose, and sodium. Initiate appropriate interventions as ordered  Outcome: Progressing  Goal: Absence of seizures  Description: INTERVENTIONS  - Monitor for seizure activity  - Administer anti-seizure medications as ordered  - Monitor neurological status  Outcome: Progressing     Problem: Safety Risk - Non-Violent Restraints  Goal: Patient will remain free from self-harm  Description: INTERVENTIONS:  - Apply the least restrictive restraint to prevent harm  - Notify patient and family of reasons restraints applied  - Assess for any contributing factors to confusion (electrolyte disturbances, delirium, medications)  - Discontinue any unnecessary medical devices as soon as possible  - Assess the patient's physical comfort, circulation, skin condition, hydration, nutrition and elimination needs   - Reorient and redirection as needed  - Assess for the need to continue restraints  Outcome: Progressing

## 2024-06-12 NOTE — PROGRESS NOTES
RESPIRATORY THERAPY MECHANICAL VENTILATION PROGRESS NOTE    Ventilator Weaning:  Patient meets criteria for weaning? no Weaning was attempted no.  Patient does not meet criteria for SBT today.    Current Ventilator Data:    Pt received on following vent settings, tolerating well. Suction small amount of clear thin secretions. No acute event/changes made today. RT will continue to monitor pt.     06/12/24 0835   Vent Information   Vent Mode VC/AC   Settings   FiO2 (%) 30 %   Resp Rate (Set) 16   Vt (Set, mL) 430 mL   Waveform Decelerating ramp   PEEP/CPAP (cm H2O) 5 cm H20   Peak Flow LPM 60   Trigger Sensitivity Flow (L/min) 1 L/min   Humidification Heater   H2O Bag Level 1/4 Full   Heater Temperature 98.6 °F (37 °C)   Readings   Total RR 19   Minute Ventilation (L/min) 8 L/min   Expiratory Tidal Volume 410 mL   PIP Observed (cm H2O) 18 cm H2O   I/E Ratio 1:4.2   Plateau Pressure (cm H2O) 15 cm H2O   Static Compliance (L/cm H2O) 40   Dynamic Compliance (L/cm H2O) 31 L/cm H2O   Airway Resistance 5.5   Alarms   High RR 40   Insp Pressure High (cm H2O) 40 cm H2O   Insp Pressure Low (cm H2O) 8 cm H2O   MV High (L/min) 22 L/min   MV Low (L/min) 3 L/min   Apnea Interval (sec) 20 seconds   Apnea Rate 16   Apnea Volume (mL) 430 mL   Spontaneous Breathing Trial   Spontaneous Breathing Trial Complete N   Is the FiO2 <= 0.5? (titrated for sats 92-94%) Y   Is the PEEP <= 5? Y   Is the RSBI <=104 N   Is the patient off pressor and narcotic / sedation drips? N   Is the patient free of ventricular arrhythmias in the past 24 hours? N   Is the patient's cough adequate? N   Is the patient alert (neuro), able to follow commands? N   Daily Screen Meets All Criteria No   ETT   Placement Date/Time: 06/10/24 0200   Airway Size: 7.5 mm  Cuffed: Cuffed  Insertion attempts: 1  Technique: Video laryngoscopy  Placement Verification: Colorimetric EtCO2 device  Placed By: ICU physician   Secured at (cm) 21 cm   Suctioned? Y   Measured From Lips    Secured Location Right   Secured by Commercial tube [email or web address]   Site Condition Atraumatic   Req'd equipment at bedside Bag mask

## 2024-06-12 NOTE — PLAN OF CARE
Pt intubated and sedated on prop and fent gtt. Attempted to wean down sedation, unsuccessful. Pt remains agitated attempting to pull at tubes and lines when sedation is titrated down. Bilateral wrist restraints and mittens remain for safety. Levo gtt off since midnight. Bed locked in lowest position, call light within reach. Safety maintained, family updated on plan of care.   Problem: Patient Centered Care  Goal: Patient preferences are identified and integrated in the patient's plan of care  Description: Interventions:  - What would you like us to know as we care for you?   - Provide timely, complete, and accurate information to patient/family  - Incorporate patient and family knowledge, values, beliefs, and cultural backgrounds into the planning and delivery of care  - Encourage patient/family to participate in care and decision-making at the level they choose  - Honor patient and family perspectives and choices  Outcome: Progressing    Problem: CARDIOVASCULAR - ADULT  Goal: Maintains optimal cardiac output and hemodynamic stability  Description: INTERVENTIONS:  - Monitor vital signs, rhythm, and trends  - Monitor for bleeding, hypotension and signs of decreased cardiac output  - Evaluate effectiveness of vasoactive medications to optimize hemodynamic stability  - Monitor arterial and/or venous puncture sites for bleeding and/or hematoma  - Assess quality of pulses, skin color and temperature  - Assess for signs of decreased coronary artery perfusion - ex. Angina  - Evaluate fluid balance, assess for edema, trend weights  Outcome: Progressing  Goal: Absence of cardiac arrhythmias or at baseline  Description: INTERVENTIONS:  - Continuous cardiac monitoring, monitor vital signs, obtain 12 lead EKG if indicated  - Evaluate effectiveness of antiarrhythmic and heart rate control medications as ordered  - Initiate emergency measures for life threatening arrhythmias  - Monitor electrolytes and administer replacement  therapy as ordered  Outcome: Progressing     Problem: RESPIRATORY - ADULT  Goal: Achieves optimal ventilation and oxygenation  Description: INTERVENTIONS:  - Assess for changes in respiratory status  - Assess for changes in mentation and behavior  - Position to facilitate oxygenation and minimize respiratory effort  - Oxygen supplementation based on oxygen saturation or ABGs  - Provide Smoking Cessation handout, if applicable  - Encourage broncho-pulmonary hygiene including cough, deep breathe, Incentive Spirometry  - Assess the need for suctioning and perform as needed  - Assess and instruct to report SOB or any respiratory difficulty  - Respiratory Therapy support as indicated  - Manage/alleviate anxiety  - Monitor for signs/symptoms of CO2 retention  Outcome: Progressing     Problem: NEUROLOGICAL - ADULT  Goal: Achieves stable or improved neurological status  Description: INTERVENTIONS  - Assess for and report changes in neurological status  - Initiate measures to prevent increased intracranial pressure  - Maintain blood pressure and fluid volume within ordered parameters to optimize cerebral perfusion and minimize risk of hemorrhage  - Monitor temperature, glucose, and sodium. Initiate appropriate interventions as ordered  Outcome: Progressing  Goal: Absence of seizures  Description: INTERVENTIONS  - Monitor for seizure activity  - Administer anti-seizure medications as ordered  - Monitor neurological status  Outcome: Progressing  Goal: Remains free of injury related to seizure activity  Description: INTERVENTIONS:  - Maintain airway, patient safety  and administer oxygen as ordered  - Monitor patient for seizure activity, document and report duration and description of seizure to MD/LIP  - If seizure occurs, turn patient to side and suction secretions as needed  - Reorient patient post seizure  - Seizure pads on all 4 side rails  - Instruct patient/family to notify RN of any seizure activity  - Instruct  patient/family to call for assistance with activity based on assessment  Outcome: Progressing     Problem: Delirium  Goal: Minimize duration of delirium  Description: Interventions:  - Encourage use of hearing aids, eye glasses  - Promote highest level of mobility daily  - Provide frequent reorientation  - Promote wakefulness i.e. lights on, blinds open  - Promote sleep, encourage patient's normal rest cycle i.e. lights off, TV off, minimize noise and interruptions  - Encourage family to assist in orientation and promotion of home routines  Outcome: Progressing     Problem: Safety Risk - Non-Violent Restraints  Goal: Patient will remain free from self-harm  Description: INTERVENTIONS:  - Apply the least restrictive restraint to prevent harm  - Notify patient and family of reasons restraints applied  - Assess for any contributing factors to confusion (electrolyte disturbances, delirium, medications)  - Discontinue any unnecessary medical devices as soon as possible  - Assess the patient's physical comfort, circulation, skin condition, hydration, nutrition and elimination needs   - Reorient and redirection as needed  - Assess for the need to continue restraints  Outcome: Not Progressing

## 2024-06-13 LAB
ALBUMIN SERPL-MCNC: 2.7 G/DL (ref 3.2–4.8)
ALBUMIN/GLOB SERPL: 1 {RATIO} (ref 1–2)
ALP LIVER SERPL-CCNC: 146 U/L
ALT SERPL-CCNC: 32 U/L
ANION GAP SERPL CALC-SCNC: 3 MMOL/L (ref 0–18)
AST SERPL-CCNC: 32 U/L (ref ?–34)
ATRIAL RATE: 77 BPM
ATRIAL RATE: 84 BPM
BASOPHILS # BLD AUTO: 0.02 X10(3) UL (ref 0–0.2)
BASOPHILS NFR BLD AUTO: 0.3 %
BILIRUB SERPL-MCNC: 1.5 MG/DL (ref 0.3–1.2)
BUN BLD-MCNC: 9 MG/DL (ref 9–23)
BUN/CREAT SERPL: 20 (ref 10–20)
CALCIUM BLD-MCNC: 8 MG/DL (ref 8.7–10.4)
CHLORIDE SERPL-SCNC: 106 MMOL/L (ref 98–112)
CO2 SERPL-SCNC: 26 MMOL/L (ref 21–32)
CREAT BLD-MCNC: 0.45 MG/DL
DEPRECATED RDW RBC AUTO: 46.8 FL (ref 35.1–46.3)
EGFRCR SERPLBLD CKD-EPI 2021: 135 ML/MIN/1.73M2 (ref 60–?)
EOSINOPHIL # BLD AUTO: 0.06 X10(3) UL (ref 0–0.7)
EOSINOPHIL NFR BLD AUTO: 0.8 %
ERYTHROCYTE [DISTWIDTH] IN BLOOD BY AUTOMATED COUNT: 16.9 % (ref 11–15)
GLOBULIN PLAS-MCNC: 2.7 G/DL (ref 2–3.5)
GLUCOSE BLD-MCNC: 117 MG/DL (ref 70–99)
GLUCOSE BLDC GLUCOMTR-MCNC: 116 MG/DL (ref 70–99)
GLUCOSE BLDC GLUCOMTR-MCNC: 130 MG/DL (ref 70–99)
HCT VFR BLD AUTO: 23.5 %
HGB BLD-MCNC: 7.8 G/DL
IMM GRANULOCYTES # BLD AUTO: 0.2 X10(3) UL (ref 0–1)
IMM GRANULOCYTES NFR BLD: 2.5 %
LYMPHOCYTES # BLD AUTO: 1.34 X10(3) UL (ref 1–4)
LYMPHOCYTES NFR BLD AUTO: 17 %
MAGNESIUM SERPL-MCNC: 1.5 MG/DL (ref 1.6–2.6)
MCH RBC QN AUTO: 25.2 PG (ref 26–34)
MCHC RBC AUTO-ENTMCNC: 33.2 G/DL (ref 31–37)
MCV RBC AUTO: 76.1 FL
MONOCYTES # BLD AUTO: 0.82 X10(3) UL (ref 0.1–1)
MONOCYTES NFR BLD AUTO: 10.4 %
NEUTROPHILS # BLD AUTO: 5.46 X10 (3) UL (ref 1.5–7.7)
NEUTROPHILS # BLD AUTO: 5.46 X10(3) UL (ref 1.5–7.7)
NEUTROPHILS NFR BLD AUTO: 69 %
OSMOLALITY SERPL CALC.SUM OF ELEC: 280 MOSM/KG (ref 275–295)
P AXIS: 30 DEGREES
P AXIS: 34 DEGREES
P-R INTERVAL: 136 MS
P-R INTERVAL: 142 MS
PHOSPHATE SERPL-MCNC: 4.2 MG/DL (ref 2.4–5.1)
PLATELET # BLD AUTO: 143 10(3)UL (ref 150–450)
PLATELETS.RETICULATED NFR BLD AUTO: 6.9 % (ref 0–7)
POTASSIUM SERPL-SCNC: 3.9 MMOL/L (ref 3.5–5.1)
PROT SERPL-MCNC: 5.4 G/DL (ref 5.7–8.2)
Q-T INTERVAL: 394 MS
Q-T INTERVAL: 420 MS
QRS DURATION: 72 MS
QRS DURATION: 80 MS
QTC CALCULATION (BEZET): 465 MS
QTC CALCULATION (BEZET): 475 MS
R AXIS: 60 DEGREES
R AXIS: 74 DEGREES
RBC # BLD AUTO: 3.09 X10(6)UL
SODIUM SERPL-SCNC: 135 MMOL/L (ref 136–145)
T AXIS: 48 DEGREES
T AXIS: 50 DEGREES
TRIGL SERPL-MCNC: 265 MG/DL (ref 30–149)
VENTRICULAR RATE: 77 BPM
VENTRICULAR RATE: 84 BPM
WBC # BLD AUTO: 7.9 X10(3) UL (ref 4–11)

## 2024-06-13 PROCEDURE — 95816 EEG AWAKE AND DROWSY: CPT | Performed by: OTHER

## 2024-06-13 PROCEDURE — 99233 SBSQ HOSP IP/OBS HIGH 50: CPT | Performed by: INTERNAL MEDICINE

## 2024-06-13 PROCEDURE — 99233 SBSQ HOSP IP/OBS HIGH 50: CPT | Performed by: HOSPITALIST

## 2024-06-13 RX ORDER — BISACODYL 10 MG
10 SUPPOSITORY, RECTAL RECTAL
Status: DISCONTINUED | OUTPATIENT
Start: 2024-06-13 | End: 2024-06-24

## 2024-06-13 RX ORDER — CHLORHEXIDINE GLUCONATE ORAL RINSE 1.2 MG/ML
15 SOLUTION DENTAL
Status: DISCONTINUED | OUTPATIENT
Start: 2024-06-13 | End: 2024-06-19

## 2024-06-13 RX ORDER — LORAZEPAM 2 MG/ML
INJECTION INTRAMUSCULAR
Status: DISCONTINUED
Start: 2024-06-13 | End: 2024-06-13

## 2024-06-13 RX ORDER — ACETAMINOPHEN 650 MG/1
650 SUPPOSITORY RECTAL EVERY 4 HOURS PRN
Status: DISCONTINUED | OUTPATIENT
Start: 2024-06-13 | End: 2024-06-24

## 2024-06-13 RX ORDER — POLYETHYLENE GLYCOL 3350 17 G/17G
17 POWDER, FOR SOLUTION ORAL DAILY PRN
Status: DISCONTINUED | OUTPATIENT
Start: 2024-06-13 | End: 2024-06-24

## 2024-06-13 RX ORDER — SENNOSIDES 8.8 MG/5ML
10 LIQUID ORAL NIGHTLY PRN
Status: DISCONTINUED | OUTPATIENT
Start: 2024-06-13 | End: 2024-06-24

## 2024-06-13 RX ORDER — ACETAMINOPHEN 325 MG/1
650 TABLET ORAL EVERY 4 HOURS PRN
Status: DISCONTINUED | OUTPATIENT
Start: 2024-06-13 | End: 2024-06-14

## 2024-06-13 RX ORDER — MAGNESIUM OXIDE 400 MG/1
800 TABLET ORAL ONCE
Status: COMPLETED | OUTPATIENT
Start: 2024-06-13 | End: 2024-06-13

## 2024-06-13 RX ORDER — LEVETIRACETAM 500 MG/5ML
500 INJECTION, SOLUTION, CONCENTRATE INTRAVENOUS EVERY 12 HOURS
Status: DISCONTINUED | OUTPATIENT
Start: 2024-06-13 | End: 2024-06-17

## 2024-06-13 RX ORDER — ACETAMINOPHEN 10 MG/ML
1000 INJECTION, SOLUTION INTRAVENOUS EVERY 6 HOURS PRN
Status: DISCONTINUED | OUTPATIENT
Start: 2024-06-13 | End: 2024-06-24

## 2024-06-13 RX ORDER — ACETAMINOPHEN 160 MG/5ML
650 SOLUTION ORAL EVERY 4 HOURS PRN
Status: DISCONTINUED | OUTPATIENT
Start: 2024-06-13 | End: 2024-06-24

## 2024-06-13 RX ORDER — ENEMA 19; 7 G/133ML; G/133ML
1 ENEMA RECTAL ONCE AS NEEDED
Status: DISCONTINUED | OUTPATIENT
Start: 2024-06-13 | End: 2024-06-24

## 2024-06-13 NOTE — PROGRESS NOTES
[facility]  part of [facility]    Progress Note      Assessment and Plan:   1.  Septic shock-Echo without vegetation.  Patient is yet requiring pressors.  Fusobacterium identified in the blood.     Recommendations:  1.  Ongoing antibiotic as per ID.     2.  Psychiatric-substance abuse disorder as well as history of bipolar disease status post recent prolonged rehab stay having been discharged 30 days ago.  The patient became delirious and agitated and had to be intubated.     Recommendations: CIWA protocol-as per psychiatry evaluation, methadone initiated.     3.  Pregnancy-3 cm subchorionic hemorrhage, now with decreasing beta hCG.     Recommendations: As per obstetrics.     4.  DVT prophylaxis-would use SCDs in patient who has subchorionic hemorrhage at this juncture although the patient has an extensive history of thrombosis.     Recommendations: SCDs and would discuss risks of chemoprophylaxis anticoagulation with obstetrics.     5.  Social concerns.     6.  Respiratory failure-I was hoping to progress to extubate the patient.  However, with a recent episode of transient asystole, will hold off this morning.  Has developed edema.  I    Recommendations: Ongoing antibiotic.  Propofol.  Morning chest x-ray with spontaneous awakening and breathing trials.    7.  Transient asystole-while on Precedex drip.  Discontinued.  I am less suspicious that the patient had a seizure as she woke to full wakefulness immediately after the long pause; however, I ordered Keppra and an EEG.  If the EEG is negative, would discontinue Keppra.    Subjective:   Latonia Barker is a(n) 29 year old female who is sedate on ventilator    Objective:   Blood pressure 101/59, pulse 67, temperature 98.9 °F (37.2 °C), temperature source Temporal, resp. rate 22, weight 184 lb 4.9 oz (83.6 kg), last menstrual period 03/25/2024, SpO2 94%, not currently breastfeeding.    Physical Exam sedate white female  HEENT examination is  unremarkable with pupils equal round and reactive to light and accommodation.   Neck without adenopathy, thyromegaly, JVD nor bruit.   Lungs diminished to auscultation and percussion.  Cardiac regular rate and rhythm no murmur.   Abdomen nontender, without hepatosplenomegaly and no mass appreciable.   Extremities without clubbing cyanosis nor edema.   Neurologic grossly intact with symmetric tone and strength and reflex when allowed to wake.  Skin without gross abnormality     Results:     Lab Results   Component Value Date    WBC 7.9 06/13/2024    HGB 7.8 06/13/2024    HCT 23.5 06/13/2024    .0 06/13/2024    CREATSERUM 0.45 06/13/2024    BUN 9 06/13/2024     06/13/2024    K 3.9 06/13/2024     06/13/2024    CO2 26.0 06/13/2024     06/13/2024    CA 8.0 06/13/2024    ALB 2.7 06/13/2024    ALKPHO 146 06/13/2024    BILT 1.5 06/13/2024    TP 5.4 06/13/2024    AST 32 06/13/2024    ALT 32 06/13/2024    MG 1.5 06/13/2024    PHOS 4.2 06/13/2024     CT scan of the chest-1. No acute pulmonary embolism through the segmental pulmonary arteries.   2. Multifocal pneumonia demonstrated by multifocal ground-glass opacities and consolidations involving the left greater than right lower lobes and left upper lobe.   3. Two 4 mm right upper lobe pulmonary nodules, which are favored to be infectious/inflammatory, however recommend follow-up CT chest in 6 months to monitor for resolution.   4. Mild right heart predominant cardiomegaly.   5. Borderline dilated main pulmonary artery, which can be seen in pulmonary artery hypertension.   6. Small pericardial effusion.   7. Hepatosplenomegaly.     Chest x-ray-mild edema    Srinivasan Saleh MD  Medical Director, Critical Care, [facility]  Medical Director, [facility]  Pager: [phone]

## 2024-06-13 NOTE — PLAN OF CARE
Pt intubated with Precedex gtt and Fentanyl gtt running. Pt experiencing frequent periods of agitation, attempted several times to self-extubate, thrashing in bed, throwing legs over side of bed. PRN fentanyl pushes given for breakthrough periods of agitation, see MAR. Dr. Levine notified of pt's restlessness/agitation, PRN Versed ordered. Fentanyl gtt titrated up to 100 mcg. Pt able to follow commands, but she continues to be impulsive with extremely poor safety awareness/judgement.     Around 0245, pt had bradyarrhythmia with brief period of asystole, Dr. Saleh notified. Precedex gtt off, transitioned to Propofol. EKG completed. EEG and Keppra Q12H ordered. Fentanyl titrated down to 25 mcg.      Vital signs stable, blood pressure normotensive, pt remained off pressor support.     Bed locked in lowest position. Frequent rounding performed.     Problem: RESPIRATORY - ADULT  Goal: Achieves optimal ventilation and oxygenation  Description: INTERVENTIONS:  - Assess for changes in respiratory status  - Assess for changes in mentation and behavior  - Position to facilitate oxygenation and minimize respiratory effort  - Oxygen supplementation based on oxygen saturation or ABGs  - Provide Smoking Cessation handout, if applicable  - Encourage broncho-pulmonary hygiene including cough, deep breathe, Incentive Spirometry  - Assess the need for suctioning and perform as needed  - Assess and instruct to report SOB or any respiratory difficulty  - Respiratory Therapy support as indicated  - Manage/alleviate anxiety  - Monitor for signs/symptoms of CO2 retention  Outcome: Progressing     Problem: CARDIOVASCULAR - ADULT  Goal: Maintains optimal cardiac output and hemodynamic stability  Description: INTERVENTIONS:  - Monitor vital signs, rhythm, and trends  - Monitor for bleeding, hypotension and signs of decreased cardiac output  - Evaluate effectiveness of vasoactive medications to optimize hemodynamic stability  - Monitor arterial  and/or venous puncture sites for bleeding and/or hematoma  - Assess quality of pulses, skin color and temperature  - Assess for signs of decreased coronary artery perfusion - ex. Angina  - Evaluate fluid balance, assess for edema, trend weights  Outcome: Progressing

## 2024-06-13 NOTE — RESPIRATORY THERAPY NOTE
Patient's Problems: Septic shock, now intubated    Respiratory Assessment :     Intubated patient is on ventilator/ full support.   Breath Sounds: were coarse and Rhonchi bilateral.   Secretions: Small  white and thick secretions.   Patient was suctioned as needed.       Ventilator Settings: AC/VC  RR 16 ,  , PEEP + 5, FIO2 40%      Airway: ETT 7.5  at 21 Lip      Plan of Care:  Patient needs to continue on ventilator, not SBT today. Patient does not met weaning criteria for SBT ; respiratory status is stable and patient is tolerating ventilator settings  well. Patient was suction as need it, RT to continue to monitor this patient.

## 2024-06-13 NOTE — PROGRESS NOTES
INFECTIOUS DISEASE PROGRESS NOTE  [facility]  part of [facility] ID PROGRESS NOTE    Latonia Barker Patient Status:  Inpatient    1/3/1995 MRN [id number]   Location [facility] 2W/SW Attending Iram Marquez MD   Hosp Day # 4 PCP Agatha Mcmanus MD     Subjective:  ROS unable to be reviewed. On EEG. Had brief period of asystole overnight.    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    -  Check US B jugular veins.  -  Blood cx noted. Will hold off on further imaging at this time.  -  Follow fever curve, wbc.  -  Reviewed labs, micro, imaging reports, available old records.  -  Case d/w mother, RN.     History of Present Illness:  Latonia Barker is a 29 year old female with hx of active IVDU, hep C, bipolar dx, history of MRSA bacteremia and 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 She states she was on suboxone but unfortunately relapsed and has been using for the past 4 days. She states she was very weak and having headaches as well as neck stiffness. States she feels much better now. Mostly injects in her groin area. Denies sharing  any needles.  Temp 99.3F on admission with normal wbc. Blood cx pending. Started on IV ceftriaxone and vanc.     Physical Exam:  BP 96/54 (BP Location: Right arm)   Pulse 79   Temp 98.9 °F (37.2 °C) (Temporal)   Resp 21   Wt 184 lb 4.9 oz (83.6 kg)   LMP 03/25/2024 (Approximate)   SpO2 93%   BMI 33.71 kg/m²     Gen:   Intubated, sedated  HEENT:  ETT+, neck supple  CV/lungs:  RRR, CTAB  Abdom:  Soft, no TTP  Skin/extrem:  No rashes, no c/c/e  :   Dyson draining yellow urine  Lines:  RIJ CVC+    Laboratory Data: Reviewed    Microbiology: Reviewed    Radiology: Reviewed      [facility]  [phone]  6/13/2024

## 2024-06-13 NOTE — PLAN OF CARE
Patient was received intubated on full vent support.  Intermittent agitation was noted. BS are coarse bilaterally, with moderate amount of thick secretions suctioned from ETT. Pplats  are within acceptable range. No acute respiratory events were noted this shift.    Vent settings : 7.5 @21             AC/VC 16/430/+5/40%    Problem: RESPIRATORY - ADULT  Goal: Achieves optimal ventilation and oxygenation  Description: INTERVENTIONS:  - Assess for changes in respiratory status  - Assess for changes in mentation and behavior  - Position to facilitate oxygenation and minimize respiratory effort  - Oxygen supplementation based on oxygen saturation or ABGs  - Provide Smoking Cessation handout, if applicable  - Encourage broncho-pulmonary hygiene including cough, deep breathe, Incentive Spirometry  - Assess the need for suctioning and perform as needed  - Assess and instruct to report SOB or any respiratory difficulty  - Respiratory Therapy support as indicated  - Manage/alleviate anxiety  - Monitor for signs/symptoms of CO2 retention  Outcome: Progressing

## 2024-06-13 NOTE — PLAN OF CARE
Pt remains in bilateral soft wrist restraints and bilateral mitt restraints. Safety maintained.     Problem: Safety Risk - Non-Violent Restraints  Goal: Patient will remain free from self-harm  Description: INTERVENTIONS:  - Apply the least restrictive restraint to prevent harm  - Notify patient and family of reasons restraints applied  - Assess for any contributing factors to confusion (electrolyte disturbances, delirium, medications)  - Discontinue any unnecessary medical devices as soon as possible  - Assess the patient's physical comfort, circulation, skin condition, hydration, nutrition and elimination needs   - Reorient and redirection as needed  - Assess for the need to continue restraints  Outcome: Not Progressing

## 2024-06-13 NOTE — PROGRESS NOTES
Critical care note    The patient demonstrated an 18-second pause while on the Precedex drip at a higher rate.  With this, there was transient rigor which resolved instantaneously with recovery of sinus rhythm.  I doubt this was a seizure.  She was then mouthing words immediately thereafter and following fully.  Will discontinue the Precedex and go back to the propofol at a lower dose as she is currently on fentanyl drip as well.  Will administer Keppra regardless at this moment and order an EEG.  Will anticipate discontinuance of the Keppra in the short-term if EEG is unrevealing.  Repeat EKG.    Talon Saleh MD

## 2024-06-13 NOTE — PROGRESS NOTES
[facility]  Progress Note     Latonia Barker  : 1/3/1995    Status: Inpatient  Day #: 4    Attending: Janes Levine MD  PCP: Agatha Mcmanus MD     Assessment and Plan:    Acute septic shock 2/2 multifocal pneumonia and Fusobacterium bacteremia  Acute hypoxic respiratory failure  -ID, pulm/critical care on consult  -CXR and CT chest reviewed. No PE. +multifocal pneumonia  -COVID neg  -urine strep and legionella neg  -intubated on vent  -pressors as needed - wean as able  -cont meropenem  -US b/l jugular veins per ID pending, r/o Lemierre's     Fusobacterium bacteremia  -h/o IVDA  -prior h/o MRSA bacteremia and 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  -TTE without endocarditis    Pulmonary edema  -lasix 40mg IV given   -CXR in AM    H/o IVDA  -Reportedly on suboxone since March and doing well but had relapse 1 day PTA   Per mother pt was on 8.2 mg suboxone tid, ruby MONTOYA at [facility] recovery [phone]  -cont meds per psych    Shock liver - 2/2 sepsis  -Transaminitis: mild - improving  -US liver shows HM and steatosis  -monitor LFT  -Avoid hepatotoxins    Pregnancy   - 10 Weeks 6 day viable IUP on US: 3.2 cm subchorionic hemorrhage along the inferior aspect of the gestational sac.   -HCG down trending  -Ob consulted  -no vaginal bleed - cont monitor  -repeat US prior to discharge    Bicytopenia: Hb stable post transfusion 6/10 8.9  -TCP possibly 2/2 sepsis. improving  -Anemia possibly from acute blood loss related to 3.2 cm subchorionic hemorrhage?  -Hold off ac   -6/10: 1 U prbc transfused     RAMON: mild - improved  Likely 2/2 shock  On pressors, IVF  Monitor renal function daily and avoid nephrotoxins     Bipolar disorder  -psych consult (of note both mom and daughter vehemently refused in prior admission)  -zofran prn, seroquel 200mg qhs  -supportive  -SW     Chronic anticoagulation  H/o Recurrent venous thrombosis superficial and deep   -(had followed in [place]  clinic Coumadin clinic in the past)  -per last hematology f/u note 2020 \"She completed more than 6 months of anticoagulation with warfarin for what we consider a provoked DVT and states she is no longer using IV drugs in the upper extremities.  She is now pregnant.  Would recommend stopping anticoagulation as long as she is able to avoid IV drug use \"  -avoid a/c due to subchorionic hemorrhage on US     DVT Mechanical Prophylaxis:   SCDs,    DVT Pharmacologic Prophylaxis   Medication   None               Subjective:      Initial Chief Complaint:  fever malaise    Patient is intubated      Objective:      Temp:  [97.9 °F (36.6 °C)-98.9 °F (37.2 °C)] 98.9 °F (37.2 °C)  Pulse:  [61-87] 72  Resp:  [19-27] 25  BP: ()/(53-85) 115/76  SpO2:  [92 %-99 %] 92 %  FiO2 (%):  [30 %-40 %] 40 %  General:  Intubated but opens eyes and moves  HEENT:  Normocephalic, atraumatic  Cardiac:  Regular rate, regular rhythm  Pulmonary:  Clear to auscultation bilaterally, respirations unlabored  Gastrointestinal:  Soft, non-tender, normal bowel sounds  Musculoskeletal:  No joint swelling  Extremities:  No edema, no cyanosis, no clubbing  Neurologic:  moving all ext  Psychiatric:  calm    Intake/Output Summary (Last 24 hours) at 6/13/2024 1354  Last data filed at 6/13/2024 1230  Gross per 24 hour   Intake 3401.5 ml   Output 3525 ml   Net -123.5 ml         Recent Labs   Lab 06/11/24  0346 06/12/24  0318 06/13/24  0331   WBC 4.5 6.1 7.9   HGB 8.9* 8.2* 7.8*   HCT 26.6* 23.6* 23.5*   PLT 87.0* 102.0* 143.0*   RBC 3.43* 3.17* 3.09*   MCV 77.6* 74.4* 76.1*   MCH 25.9* 25.9* 25.2*   MCHC 33.5 34.7 33.2   RDW 17.5* 17.6* 16.9*   NEPRELIM 3.07 3.61 5.46     Recent Labs   Lab 06/09/24  0732 06/09/24  0732 06/10/24  0544 06/11/24  0346 06/12/24  0318 06/13/24  0331   BUN 15  --  6* <5* 7* 9   CREATSERUM 1.10*  --  0.44* 0.51* 0.44* 0.45*   CA 9.5  --  8.0* 7.5* 7.5* 8.0*   ALB 3.4  --  2.9*  --  2.6* 2.7*   *  --  141 142 139 135*   K 3.4*   --  3.9  3.9 3.5 3.8 3.9     --  112 112 109 106   CO2 23.0  --  26.0 22.0 25.0 26.0   *  --  110* 92 85 117*   MG  --    < > 1.4* 1.7 1.7 1.5*   PHOS  --    < > 1.5* 2.1*  2.1* 4.2 4.2   BILT 0.4  --  0.3  --  0.9 1.5*   *  --  187*  --  51* 32   ALT 66*  --  54*  --  40 32   ALKPHO 162*  --  101*  --  135* 146*   TP 6.2  --  5.3*  --  4.9* 5.4*   ETOH <3  --   --   --   --   --     < > = values in this interval not displayed.       XR CHEST AP PORTABLE  (CPT=71045)    Result Date: 6/12/2024  CONCLUSION: Enlarging pleural effusions and pulmonary edema.    Dictated by (CST): Miguel A Serrano MD on 6/12/2024 at 7:25 AM     Finalized by (CST): Miguel A Serrano MD on 6/12/2024 at 7:33 AM         EKG 12 Lead    Result Date: 6/13/2024  Normal sinus rhythm Cannot rule out Anterior infarct , age undetermined Abnormal ECG When compared with ECG of 12-JUN-2024 15:37, No significant change was found Confirmed by DRISS CASTANO ([id number]) on 6/13/2024 10:44:55 AM    EKG 12 Lead    Result Date: 6/13/2024  Normal sinus rhythm Normal ECG When compared with ECG of 09-JUN-2024 07:19, T wave inversion no longer evident in Inferior leads T wave inversion no longer evident in Anterior leads QT has lengthened Confirmed by THOM FOX, NING ([id number]) on 6/13/2024 10:27:22 AM   Medications:   levETIRAcetam  500 mg Intravenous Q12H    chlorhexidine gluconate  15 mL Mouth/Throat BID@0800,2000    meropenem  1 g Intravenous Q8H    venlafaxine ER  75 mg Oral Nightly    prazosin  2 mg Oral Nightly    haloperidol lactate  5 mg Intravenous 4 times per day    QUEtiapine  200 mg Oral TID    LORazepam  1 mg Intravenous 4 times per day    Methadone HCl  20 mg Oral BID    famotidine  20 mg Intravenous BID    QUEtiapine  200 mg Oral Nightly    melatonin  3 mg Oral Nightly      PRN Meds:   acetaminophen **OR** acetaminophen **OR** acetaminophen **OR** acetaminophen    polyethylene glycol (PEG 3350)    senna    bisacodyl    fleet enema     fentaNYL    midazolam    diphenhydrAMINE    dextrose 10%    lipase-protease-amylase (Lip-Prot-Amyl) **AND** sodium bicarbonate    ondansetron    prochlorperazine    acetaminophen    Supplementary Documentation:        MDM High. Time spent on chart/note review, review of labs/imaging, , physical exam, discussion with staff, consultants, coordinating care, writing progress note, discussion of plan of care.      Janes Levine MD

## 2024-06-13 NOTE — PROGRESS NOTES
06/12/24 1956   Vent Information   Vent Mode VC/AC   Settings   FiO2 (%) 40 %   Resp Rate (Set) 16   Vt (Set, mL) 430 mL   Waveform Decelerating ramp   PEEP/CPAP (cm H2O) 5 cm H20   PEEP Low (cm H2O) 2 cm H2O   Peak Flow LPM 60   Trigger Sensitivity Flow (L/min) 1 L/min   Trigger Sensitivity Pressure (cm H2O) 3 cm H2O   Humidification Heater   H2O Bag Level Filled   Heater Temperature 98.6 °F (37 °C)   Readings   Total RR 20   Minute Ventilation (L/min) 8.8 L/min   Expiratory Tidal Volume 510 mL   PIP Observed (cm H2O) 17 cm H2O   MAP (cm H2O) 8   I/E Ratio 1:4   Plateau Pressure (cm H2O) 17 cm H2O   Static Compliance (L/cm H2O) 30   Dynamic Compliance (L/cm H2O) 28 L/cm H2O

## 2024-06-13 NOTE — PROCEDURES
EEG report    REFERRING PHYSICIAN: Janes Levine MD    PCP and phone number:  Agatha Mcmanus MD  [phone]    TECHNIQUE: 21 channels of EEG, 2 channels of EOG, and 1 channel of EKG were recorded utilizing the International 10/20 System. The recording was performed in a digitized monopolar referential format and playback was reformatted into various referential and bipolar montages utilizing appropriate filter settings. Automatic seizure and spike detection programs were utilized throughout the recording.  Video was recorded during the study    CLINICAL DATA:  Patient is sent for the evaluation of possible seizures.    MEDICATION:  Continuous Medications:   propofol Stopped (06/13/24 0757)    fentanyl 30 mcg/hr (06/13/24 0756)    dextrose 10%      norepinephrine Stopped (06/12/24 0100)     Scheduled Medications:  No current outpatient medications on file.  PRN Medications:    acetaminophen **OR** acetaminophen **OR** acetaminophen **OR** acetaminophen    polyethylene glycol (PEG 3350)    senna    bisacodyl    fleet enema    fentaNYL    midazolam    diphenhydrAMINE    dextrose 10%    lipase-protease-amylase (Lip-Prot-Amyl) **AND** sodium bicarbonate    ondansetron    prochlorperazine    acetaminophen    ACTIVATION:  Hyperventilation: Not done  Photic stimulation: Not done  Sleep: No clear sleep architecture was seen.    BACKGROUND    Background slow activity was seen. Throughout the record, a moderate amount of low to medium voltage, polymorphic, 4-6 Hz slow wave activity was seen diffusely over both hemispheres without localization or lateralization. No clear posterior dominant rhythm was seen.  Semblance of sleep spindles were seen.    EEG ABNORMALITY  None    IMPRESSION:    This is an abnormal EEG. Diffuse slowing was present continuously. These waveforms are not considered epileptiform in nature. This constellation of findings indicates a moderate degree of cerebral dysfunction consistent with metabolic/hypoxic  encephalopathy or bilateral structural process or a medication effect. No focal, lateralized, or epileptiform features are noted.

## 2024-06-13 NOTE — DIETARY NOTE
ADULT NUTRITION REASSESSMENT    Pt is at high nutrition risk.  Pt does not meet malnutrition criteria.    RECOMMENDATIONS TO MD: See Nutrition Intervention for Enteral Nutrition (EN) rx. EN adjusted for change in lipid kcals via Propofol.     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 Findings: pneumonia, 10 weeks pregnant with subchorionic hemorrhage of the gestational sac,  mild RAMON, respiratory failure intubated 6/10PMH sig for Bipolar, IV drug abuse.   Pt assessed due to consult for enteral nutrition. Pt has NG tube in place for EN access. Sedated on high dose Propofol (39.4 ml/hr= ~ 1040 kcals/lipids) and TG today at 308. Weight hx: fairly stable and pt is obese @ BMI 30.89 kg/m2. Physical exam:  Pt appears well nourished. +BM noted. Diet Hx: per RN mother left for home to sleep as works days and other family members to return later today. RD will obtain diet hx later when family is here.   EN start with high protein polymeric formula + additional modular protein supplements to meet needs. Elevated TG- monitor as value >400 mg/dl  will prompt need to adjust to alternate sedation if continues to need or per Pharmacy recommendations.   6/13/24 Update: Not  extubated today. Propofol resumed (13.9 ml/hr= ~ 366 kcals via lipids). Mg replaced. Prn bowel regime ordered/APN as last BM . RD adjusted EN rx to meet nutrition needs. See orders.   FOOD/NUTRITION RELATED HISTORY:  Appetite: Good  Intake: NPO, EN support.   Intake Meeting Needs: yes via EN.   Percent Meals Eaten (last 3 days)       None           Food Allergies: No Known Food Allergies (NKFA)  Cultural/Ethnic/Quaker Preferences: Not Obtained    GASTROINTESTINAL: +BM  formed medium brown. NG tube in place.   PRN bowel regime in place.     MEDICATIONS: reviewed    propofol 30 mcg/kg/min (24 1022)    fentanyl 30 mcg/hr (24 0756)    dextrose 10%      norepinephrine Stopped (24 0100)        levETIRAcetam  500 mg Intravenous Q12H    chlorhexidine gluconate  15 mL Mouth/Throat BID@0800,2000    meropenem  1 g Intravenous Q8H    venlafaxine ER  75 mg Oral Nightly    prazosin  2 mg Oral Nightly    haloperidol lactate  5 mg Intravenous 4 times per day    QUEtiapine  200 mg Oral TID    LORazepam  1 mg Intravenous 4 times per day    Methadone HCl  20 mg Oral BID    famotidine  20 mg Intravenous BID    QUEtiapine  200 mg Oral Nightly    melatonin  3 mg Oral Nightly   PRN meds noted. (No bowel regime presently)     LABS: reviewed Mg replaced/Lyte protocol (non cardiac)  EN order set in place.  T on 24 (high value last 419 on )  Na low: will decrease FWF.   Recent Labs     06/10/24  0544 24  0346 24  0318 24  0331   * 92 85 117*   BUN 6* <5* 7* 9   CREATSERUM 0.44* 0.51* 0.44* 0.45*   CA 8.0* 7.5* 7.5* 8.0*   MG 1.4* 1.7 1.7 1.5*    142 139 135*   K 3.9  3.9 3.5 3.8 3.9    112 109 106   CO2 26.0 22.0 25.0 26.0   PHOS 1.5* 2.1*  2.1* 4.2 4.2   OSMOCALC 290  --  285 280       NUTRITION RELATED PHYSICAL FINDINGS:  - Nutrition Focused Physical Exam (NFPE): well nourished per visual exam  - Fluid Accumulation: +1 Bilateral Upper extremity and Hand (s)  and none  see RN documentation for details  - Skin Integrity: intact see RN documentation for details    ANTHROPOMETRICS:  HT:  5'2\"   WT: 83.6 kg (184 lb 4.9 oz)  admit wt: 170 lbs. Wt up likely d/t fluid gains.   BMI: Body mass index is 33.71 kg/m².  BMI CLASSIFICATION: 30-34.9 kg/m2 - obesity class I  IBW: 110  lbs        153 % IBW  Usual Body Wt: 170-175  lbs 6/9/24 and  3/10/23.     97 % UBW    WEIGHT HISTORY:  Patient Weight(s) for the past 336 hrs:   Weight   06/13/24 0400 83.6 kg (184 lb 4.9 oz)   06/12/24 0800 85.7 kg (188 lb 15 oz)   06/12/24 0400 81.9 kg (180 lb 8.9 oz)   06/10/24 2300 77.7 kg (171 lb 4.8 oz)   06/10/24 0700 76.6 kg (168 lb 14 oz)   06/09/24 1159 77.2 kg (170 lb 3.1 oz)   06/09/24 0715 72 kg (158 lb 11.7 oz)     Wt Readings from Last 10 Encounters:   06/13/24 83.6 kg (184 lb 4.9 oz)   05/24/24 72.8 kg (160 lb 9.6 oz)   04/16/24 74.4 kg (164 lb)   02/19/24 78 kg (172 lb)   02/05/24 78 kg (172 lb)   08/01/23 77.1 kg (170 lb)   04/26/23 79.6 kg (175 lb 6.4 oz)   04/10/23 78.5 kg (173 lb)   03/10/23 79.7 kg (175 lb 9.6 oz)   12/13/22 75.1 kg (165 lb 9.6 oz)     NUTRITION DIAGNOSIS/PROBLEM:   Inadequate oral intake related to Lack of or limited access to food in the setting of intubation and inability to swallow as evidenced by NPO, 0 nutrition intake  NUTRITION DIAGNOSIS PROGRESS:  Improvement (unresolved) on EN to goal.   NUTRITION INTERVENTION:     NUTRITION PRESCRIPTION:   Estimated Nutrition needs: --dosing wt of 76.6 kg - wt taken on 6/10/24  Calories: 1786  calories/day (24 calories per kg Dosing wt) ([place] state equation)   Protein:  g protein/day (1.2-2.0 g protein/kg Ideal body wt (IBW))  Fluid Needs: ~35 ml/kg adjusted IBW for obesity (57 kg) : 1995 ml (maintenance). Adjust per clinical status. Increased now for hypotension, sepsis (IVF NS @125 ml/hr )    - Diet:   No orders of the defined types were placed in this encounter.   - EN rx via NG tube: Promote goal 65 ml/hr X ave  22 hr infusion time, ,FWF: 30 ml q 4 hrs to start (180 ml)= 1430  kcals (1796 total kcals with non-nutrition kcals included) 90 g protein, 1200 ml h20, (1380 ml total h20 including FWF), >100% RDI's, 101% veronica goal and 100% protein goal.   - Medical Food Supplements-NP0  - Vitamin and mineral supplements: none  - Feeding assistance: NPO  - Nutrition education: not appropriate at this time -intubated.   - Coordination of nutrition care: collaboration with other providers and discussed in Care Rounds   - Discharge and transfer of nutrition care to new setting or provider: monitor plans    MONITOR AND EVALUATE/NUTRITION GOALS:  - Food and Nutrient Intake:      Monitor: for PO initiation post extubation if deemed safe.   - Food and Nutrient Administration:      Monitor: tolerance to enteral nutrition, adequacy of enteral nutrition, for enteral nutrition adjustment, and propofol rate  - Anthropometric Measurement:    Monitor weight  - Nutrition Goals:      allow wt loss due to fluid losses, EN + non-nutritive kcal >80% energy needs, labs within acceptable limits, and support body systems, TG wnl on Propofol.     DIETITIAN FOLLOW UP: RD to follow and monitor nutrition status    Vangie Walsh RD, LDN, CNSC ([id number])

## 2024-06-14 ENCOUNTER — APPOINTMENT (OUTPATIENT)
Dept: GENERAL RADIOLOGY | Facility: HOSPITAL | Age: 29
DRG: 831 | End: 2024-06-14
Attending: INTERNAL MEDICINE

## 2024-06-14 ENCOUNTER — APPOINTMENT (OUTPATIENT)
Dept: ULTRASOUND IMAGING | Facility: HOSPITAL | Age: 29
DRG: 831 | End: 2024-06-14
Attending: PHYSICIAN ASSISTANT

## 2024-06-14 LAB
ANION GAP SERPL CALC-SCNC: 2 MMOL/L (ref 0–18)
BACTERIA BLD CULT: POSITIVE
BACTERIA BLD CULT: POSITIVE
BASOPHILS # BLD: 0.09 X10(3) UL (ref 0–0.2)
BASOPHILS NFR BLD: 1 %
BUN BLD-MCNC: 9 MG/DL (ref 9–23)
BUN/CREAT SERPL: 22.5 (ref 10–20)
CALCIUM BLD-MCNC: 8 MG/DL (ref 8.7–10.4)
CHLORIDE SERPL-SCNC: 105 MMOL/L (ref 98–112)
CO2 SERPL-SCNC: 28 MMOL/L (ref 21–32)
CREAT BLD-MCNC: 0.4 MG/DL
DEPRECATED RDW RBC AUTO: 47.3 FL (ref 35.1–46.3)
EGFRCR SERPLBLD CKD-EPI 2021: 139 ML/MIN/1.73M2 (ref 60–?)
EOSINOPHIL # BLD: 0.27 X10(3) UL (ref 0–0.7)
EOSINOPHIL NFR BLD: 3 %
ERYTHROCYTE [DISTWIDTH] IN BLOOD BY AUTOMATED COUNT: 16.9 % (ref 11–15)
GLUCOSE BLD-MCNC: 97 MG/DL (ref 70–99)
HCT VFR BLD AUTO: 22.1 %
HGB BLD-MCNC: 7.2 G/DL
LYMPHOCYTES NFR BLD: 0.9 X10(3) UL (ref 1–4)
LYMPHOCYTES NFR BLD: 10 %
MAGNESIUM SERPL-MCNC: 1.6 MG/DL (ref 1.6–2.6)
MCH RBC QN AUTO: 25.1 PG (ref 26–34)
MCHC RBC AUTO-ENTMCNC: 32.6 G/DL (ref 31–37)
MCV RBC AUTO: 77 FL
MONOCYTES # BLD: 0.81 X10(3) UL (ref 0.1–1)
MONOCYTES NFR BLD: 9 %
MYELOCYTES # BLD: 0.09 X10(3) UL
MYELOCYTES NFR BLD: 1 %
NEUTROPHILS # BLD AUTO: 5.72 X10 (3) UL (ref 1.5–7.7)
NEUTROPHILS NFR BLD: 75 %
NEUTS BAND NFR BLD: 1 %
NEUTS HYPERSEG # BLD: 6.84 X10(3) UL (ref 1.5–7.7)
OSMOLALITY SERPL CALC.SUM OF ELEC: 279 MOSM/KG (ref 275–295)
PLATELET # BLD AUTO: 167 10(3)UL (ref 150–450)
PLATELET MORPHOLOGY: NORMAL
POTASSIUM SERPL-SCNC: 4.3 MMOL/L (ref 3.5–5.1)
RBC # BLD AUTO: 2.87 X10(6)UL
SODIUM SERPL-SCNC: 135 MMOL/L (ref 136–145)
TOTAL CELLS COUNTED BLD: 100
WBC # BLD AUTO: 9 X10(3) UL (ref 4–11)

## 2024-06-14 PROCEDURE — 93970 EXTREMITY STUDY: CPT | Performed by: PHYSICIAN ASSISTANT

## 2024-06-14 PROCEDURE — 99233 SBSQ HOSP IP/OBS HIGH 50: CPT | Performed by: HOSPITALIST

## 2024-06-14 PROCEDURE — 99291 CRITICAL CARE FIRST HOUR: CPT | Performed by: INTERNAL MEDICINE

## 2024-06-14 PROCEDURE — 99233 SBSQ HOSP IP/OBS HIGH 50: CPT | Performed by: OTHER

## 2024-06-14 PROCEDURE — 71045 X-RAY EXAM CHEST 1 VIEW: CPT | Performed by: INTERNAL MEDICINE

## 2024-06-14 RX ORDER — METHADONE HYDROCHLORIDE 10 MG/5ML
10 SOLUTION ORAL 2 TIMES DAILY
Status: DISCONTINUED | OUTPATIENT
Start: 2024-06-14 | End: 2024-06-15

## 2024-06-14 RX ORDER — MAGNESIUM SULFATE HEPTAHYDRATE 40 MG/ML
2 INJECTION, SOLUTION INTRAVENOUS ONCE
Status: COMPLETED | OUTPATIENT
Start: 2024-06-14 | End: 2024-06-14

## 2024-06-14 NOTE — RESPIRATORY THERAPY NOTE
Problem: RESPIRATORY - ADULT  Goal: Achieves optimal ventilation and oxygenation  Description: INTERVENTIONS:  - Assess for changes in respiratory status  - Assess for changes in mentation and behavior  - Position to facilitate oxygenation and minimize respiratory effort  - Oxygen supplementation based on oxygen saturation or ABGs  - Provide Smoking Cessation handout, if applicable  - Encourage broncho-pulmonary hygiene including cough, deep breathe, Incentive Spirometry  - Assess the need for suctioning and perform as needed  - Assess and instruct to report SOB or any respiratory difficulty  - Respiratory Therapy support as indicated  - Manage/alleviate anxiety  - Monitor for signs/symptoms of CO2 retention  Outcome: Progressing    Patient received on vent:       06/14/24 0810   Vent Information   Vent Mode VC/AC   Settings   FiO2 (%) 40 %   Resp Rate (Set) 16   Vt (Set, mL) 430 mL   Waveform Decelerating ramp   PEEP/CPAP (cm H2O) 5 cm H20     Bilateral BS auscultated, suction provided as needed. Sputum sample collected for lab with the help of the RN. No changes made. Will continue care.

## 2024-06-14 NOTE — PROGRESS NOTES
Patient is a 29 year old female with past medical history of acne, anxiety, bacterial endocarditis, bipolar disorder, chlamydia, decorative tattoo, depression, hepatitis B virus infection, history of blood clots, HPV infection, IV drug use, heroin and crack cocaine use, opioid dependence, subclavian vein thrombosis, and substance abuse who was admitted to the hospital for Septic shock (HCC): The patient has been demonstrating signs of being combative and is currently sedated.    Consult Duration     The patient seen for over 50-minute, follow-up evaluation, over 50% counseling and coordinating care addressing  septic shock and opioid withdrawal.  Record reviewed, communication with attending, communication with RN and patient seen face to face evaluation.     History of Present Illness:   Labs and imaging reviewed: Creatinine: 0.40, Triglycerides: 265    Vital signs: BP: 101/59, Pulse: 78    Per communication with staff they administered fentanyl - 2 boluses, 75 mg. Staff stopped Precedex and are administering 30 mg propofol with plans to continue decreasing. Staff indicates patient receives nutrition from NG tube and urine output is good. Patient has had no bowel movement. Scan of neck was performed, but no blood clots were found. Patient is 10wks pregnant and will have an ultrasound of the fetus upon discharge. Patient remains restless.    The patient seen today lethargic. Patient told that we are here to support her and express how difficult it must be for her to not be able to voice what she is feeling. Patient remains intubated.    The patient continues to display signs of agitation and restlessness.     The patient has been demonstrating signs of combativeness prior to sedation.    Past Psychiatric/Medication History:  1. Prior diagnoses: anxiety, bipolar disorder, depression, opioid dependence, substance abuse  2. Past psychiatric inpatient: Unknown  3. Past outpatient history: Unknown  4. Past suicide  history: Unknown  5. 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 ×42 days    Bipolar disorder (HCC)    Chlamydia    Decorative tattoo    Depression    Hepatitis B virus infection    History of blood clots    Human papilloma virus infection    Intravenous drug abuse (HCC)    Heroin and crack cocaine    Opioid dependence (HCC)    Subclavian vein thrombosis (HCC)    Substance abuse (HCC)       Past Surgical History  Past Surgical History:   Procedure Laterality Date    Colonoscopy      Colonoscopy N/A 2017    Procedure: COLONOSCOPY;  Surgeon: Xu Day MD;  Location: [facility] ENDOSCOPY    Colposcopy, cervix w/upper adjacent vagina; w/biopsy(s), cervix            Other surgical history      wisdom teeth     Other surgical history      fasciotomy of right arm    Tonsillectomy      Upper gi endoscopy,exam         Family History  Family History   Problem Relation Age of Onset    Other (back pain) Father     Hypertension Mother     Hypertension Maternal Grandmother     Diabetes Maternal Grandfather     Prostate Cancer Paternal Grandfather         w/ metastasis       Social History  Social History     Socioeconomic History    Marital status: Single    Number of children: 0   Occupational History    Occupation: Unemployed   Tobacco Use    Smoking status: Every Day     Current packs/day: 0.00     Average packs/day: 1 pack/day for 1 year (1.0 ttl pk-yrs)     Types: Cigarettes     Start date: 2014     Last attempt to quit:  2015     Years since quittin.3    Smokeless tobacco: Current    Tobacco comments:     Vaping   Vaping Use    Vaping status: Never Used   Substance and Sexual Activity    Alcohol use: Not Currently     Comment: social    Drug use: Yes     Types: Heroin, \"Crack\" cocaine, benzodiazepines, Other-see comments     Comment: Acid, mushrooms    Sexual activity: Yes     Partners: Male     Birth control/protection: Condom   Other Topics Concern    Caffeine Concern No     Comment:  2 cans Cola per day    Exercise Yes     Comment: hep    Reaction to local anesthetic No   Social History Narrative    The patient does not use an assistive device..      The patient does live in a home with stairs.           Current Medications:  Current Facility-Administered Medications   Medication Dose Route Frequency    QUEtiapine (SEROquel) tab 300 mg  300 mg Oral TID    levETIRAcetam (Keppra) 500 mg/5mL injection 500 mg  500 mg Intravenous Q12H    propofol (Diprivan) 10 mg/mL infusion premix  5-40 mcg/kg/min (Dosing Weight) Intravenous Continuous    acetaminophen (Tylenol) 160 MG/5ML oral liquid 650 mg  650 mg Oral Q4H PRN    Or    acetaminophen (Tylenol) rectal suppository 650 mg  650 mg Rectal Q4H PRN    Or    acetaminophen (Ofirmev) 10 mg/mL infusion premix 1,000 mg  1,000 mg Intravenous Q6H PRN    polyethylene glycol (PEG 3350) (Miralax) 17 g oral packet 17 g  17 g Oral Daily PRN    senna (Senokot) 8.8 MG/5ML oral syrup 17.6 mg  10 mL Oral Nightly PRN    bisacodyl (Dulcolax) 10 MG rectal suppository 10 mg  10 mg Rectal Daily PRN    fleet enema (Fleet) 7-19 GM/118ML rectal enema 133 mL  1 enema Rectal Once PRN    chlorhexidine gluconate (Peridex) 0.12 % oral solution 15 mL  15 mL Mouth/Throat BID@0800,2000    fentaNYL (Sublimaze) 50 mcg/mL injection 50 mcg  50 mcg Intravenous Q30 Min PRN    midazolam (Versed) 2 MG/2ML injection 1 mg  1 mg Intravenous Q4H PRN    meropenem (Merrem) 1 g in sodium chloride 0.9% 100 mL IVPB-MBP  1 g  Intravenous Q8H    venlafaxine ER (Effexor-XR) 24 hr cap 75 mg  75 mg Oral Nightly    prazosin (Minipress) cap 2 mg  2 mg Oral Nightly    fentaNYL in sodium chloride 0.9% (Sublimaze) 1000 mcg/100mL infusion premix   mcg/hr Intravenous Continuous    haloperidol lactate (Haldol) 5 MG/ML injection 5 mg  5 mg Intravenous 4 times per day    QUEtiapine (SEROquel) tab 200 mg  200 mg Oral TID    LORazepam (Ativan) 2 mg/mL injection 1 mg  1 mg Intravenous 4 times per day    diphenhydrAMINE (Benadryl) 50 mg/mL  injection 50 mg  50 mg Intravenous Q4H PRN    methadone solution (DOLOPHINE) 10 mg/5mL  20 mg Oral BID    dextrose 10% infusion (TPN no rate)   Intravenous Continuous PRN    pancrelipase (Lip-Prot-Amyl) (Zenpep) DR particles cap 10,000 Units  10,000 Units Per G Tube PRN    And    sodium bicarbonate tab 325 mg  325 mg Oral PRN    famotidine (Pepcid) 20 mg/2mL injection 20 mg  20 mg Intravenous BID    ondansetron (Zofran) 4 MG/2ML injection 4 mg  4 mg Intravenous Q6H PRN    prochlorperazine (Compazine) 10 MG/2ML injection 5 mg  5 mg Intravenous Q8H PRN    QUEtiapine (SEROquel) tab 200 mg  200 mg Oral Nightly    acetaminophen (Tylenol) tab 650 mg  650 mg Oral Q6H PRN    melatonin tab 3 mg  3 mg Oral Nightly     Medications Prior to Admission   Medication Sig    prazosin 1 MG Oral Cap Take 4 capsules (4 mg total) by mouth nightly. Takes 4mg nightly    buprenorphine-naloxone 8-2 MG Sublingual Film dissolve 1 film under the tongue 3 TIMES A DAY for maintenance    prenatal vitamin with DHA 27-0.8-228 MG Oral Cap Take 1 capsule by mouth daily.    [] nitrofurantoin monohydrate macro (MACROBID) 100 MG Oral Cap Take 1 capsule (100 mg total) by mouth 2 (two) times daily for 7 days.    QUEtiapine 200 MG Oral Tab Take 1 tablet (200 mg total) by mouth nightly.    [] Buprenorphine HCl-Naloxone HCl 8.6-2.1 MG Sublingual SL Tab Place 8.6 mg of buprenorphine under the tongue in the morning, at noon, and at bedtime.     pantoprazole 20 MG Oral Tab EC Take 1 tablet (20 mg total) by mouth every morning before breakfast.    gabapentin 300 MG Oral Cap Take 1 capsule (300 mg total) by mouth nightly.    Melatonin 3 MG Oral Cap Take 1 capsule (3 mg total) by mouth at bedtime.    Naloxone HCl (NARCAN) 4 MG/0.1ML Nasal Liquid 4 mg by Nasal route as needed. IF PATIENT REMAINS UNRESPONSIVE, REPEAT DOSE IN OTHER NOSTRIL 2 TO 5 MINUTES AFTER FIRST DOSE (Patient not taking: Reported on 5/9/2024)    venlafaxine  MG Oral Capsule SR 24 Hr Take 1 capsule (150 mg total) by mouth every morning.    ondansetron (ZOFRAN) 4 mg tablet Take 1 tablet (4 mg total) by mouth every 8 (eight) hours as needed for Nausea. (Patient not taking: Reported on 5/24/2024)    cloNIDine 0.1 MG Oral Tab Take 1 tablet (0.1 mg total) by mouth 2 (two) times daily as needed. (Patient not taking: Reported on 5/24/2024)       Allergies  Allergies   Allergen Reactions    Amoxicillin HIVES    Vancomycin RASH and ITCHING    Clindamycin RASH       Review of Systems:   As by Admitting/Attending    Results:   Laboratory Data:  Lab Results   Component Value Date    WBC 9.0 06/14/2024    HGB 7.2 (L) 06/14/2024    HCT 22.1 (L) 06/14/2024    .0 06/14/2024    CREATSERUM 0.40 (L) 06/14/2024    BUN 9 06/14/2024     (L) 06/14/2024    K 4.3 06/14/2024     06/14/2024    CO2 28.0 06/14/2024    GLU 97 06/14/2024    CA 8.0 (L) 06/14/2024    ALB 2.7 (L) 06/13/2024    ALKPHO 146 (H) 06/13/2024    TP 5.4 (L) 06/13/2024    AST 32 06/13/2024    ALT 32 06/13/2024    PTT 29.2 05/30/2020    INR 1.9 (A) 08/12/2020    PTP 14.7 (H) 05/31/2020    T4F 0.73 11/02/2011    TSH 2.080 02/13/2019    LIP 18 (L) 05/30/2020    DDIMER 0.47 02/28/2022    CRP <0.5 01/29/2015    MG 1.6 06/14/2024    PHOS 4.2 06/13/2024    TROP <0.045 05/30/2020    CK 55 03/29/2017    ETOH <3 06/09/2024         Imaging:  US VENOUS DOPPLER ARM BILAT - DIAG IMG (CPT=93970)    Result Date: 6/14/2024  CONCLUSION:   No  evidence of deep venous thrombosis.  Right IJ central venous catheter.    Dictated by (CST): Kaz French MD on 6/14/2024 at 10:57 AM     Finalized by (CST): Kaz French MD on 6/14/2024 at 11:00 AM          XR CHEST AP PORTABLE  (CPT=71045)    Result Date: 6/14/2024  CONCLUSION:   Enlarging right upper lobe airspace opacity, which may reflect pneumonia, atelectasis, or pulmonary edema.  The remaining opacities in the bilateral perihilar regions and bilateral lower lobes are not significantly changed.  No significant change in the bilateral pleural effusions.     Dictated by (CST): Kaz French MD on 6/14/2024 at 7:38 AM     Finalized by (CST): Kaz French MD on 6/14/2024 at 7:49 AM          XR CHEST AP PORTABLE  (CPT=71045)    Result Date: 6/12/2024  CONCLUSION: Enlarging pleural effusions and pulmonary edema.    Dictated by (CST): Miguel A Serrano MD on 6/12/2024 at 7:25 AM     Finalized by (CST): Miguel A Serrano MD on 6/12/2024 at 7:33 AM           Vital Signs:   Blood pressure 103/58, pulse 88, temperature 97.5 °F (36.4 °C), temperature source Temporal, resp. rate 22, weight 83.6 kg (184 lb 4.9 oz), last menstrual period 03/25/2024, SpO2 98%, not currently breastfeeding.    Mental Status Exam:   Appearance: Stated age single, , female in hospital gown, laying down in hospital bed.   Psychomotor: Patient has been demonstrating lethargy due to sedation  Orientation: Patient is alert and oriented to self   Gait: Not evaluated.  Attitude/Coorperation: Limited cooperation due to sedation  Behavior: Episodes of restlessness and agitation  Speech: Unable to communicate verbally  Mood: Unable to express emotion  Affect: Blunted affect  Thought process: Disorganized and confused  Thought content: No reports of suicidal or homicidal ideation  Perceptions: Patient demonstrating response to internal stimuli  Concentration: Grossly impaired  Memory: Grossly impaired  Intellect: Average.  Judgment and  Insight: Questionable.     Impression:     Opiate Withdrawal Syndrome.  Opiate Dependency.  Bipolar Disorder unspecified.  Septic shock (HCC)  Pneumonia of left lower lobe due to infectious organism  Subchorionic hematoma in first trimester, single or unspecified fetus (HCC)     The patient is a 29 year old  female, single, with past medical history of acne, anxiety, bacterial endocarditis, bipolar disorder, chlamydia, decorative tattoo, depression, hepatitis B virus infection, history of blood clots, HPV infection, IV drug use, heroin and crack cocaine use, opioid dependence, subclavian vein thrombosis, and substance abuse.  The patient is currently sedated.     6/11/24: Patient is sedated and exhibiting some agitation when in the room. Propofol will start to be decreased slowly.    6/12/24: Patient in process of decreasing sedation and possible extubation.  Otherwise the patient is alert and oriented to self and place only nodding her head agree with the need for extubation and cooperation.    6/14/24: Patient continues to exhibit some agitation and restlessness. Patient is still sedated and remains intubated.    Discussed risk and benefit, acknowledging the current symptom and severity.  At this point, I would recommend the following approach:     Focus on safety  Focus on education and support.  Focus on insight orientation helping the patient understand diagnosis and treatment plan.  Lower methadone to 10 mg via NG tube BID  Continue Haldol 5 mg every 6 hours scheduled.  Increase Seroquel to 300 mg 3 times daily.  Continue venlafaxine to 75 mg  Continue prazosin 2 mg  Continue Ativan 1 mg IV every 6 hours scheduled.  Lower propofol graduating  Coordinate plan with team    Orders This Visit:  Orders Placed This Encounter   Procedures    CBC With Differential With Platelet    Comp Metabolic Panel (14)    Urinalysis with Culture Reflex    Lactic Acid, Plasma    HCG, Beta Subunit (Quant Pregnancy Test)     Ethyl Alcohol    Acetaminophen (Tylenol), S    Salicylate, Serum    Drug Abuse Panel 11 screen    Manual differential    Lactic Acid Reflex Post Positive    CBC With Differential With Platelet    Comp Metabolic Panel (14)    Magnesium    Streptococcus Pneumoniae Ag, Urine    Legionella urine Ag serogrp 1    Potassium    Magnesium    Scan slide    MD BLOOD SMEAR CONSULT    Triglycerides    Basic Metabolic Panel (8)    Phosphorus    Phosphorus    CBC With Differential With Platelet    Phosphorus    Hemoglobin    Triglycerides    Magnesium    Phosphorus    Basic Metabolic Panel (8)    CBC With Differential With Platelet    Hepatic Function Panel (7)    Magnesium    CBC With Differential With Platelet    Comp Metabolic Panel (14)    Phosphorus    Magnesium    Basic Metabolic Panel (8)    CBC With Differential With Platelet    Manual differential    Magnesium    Basic Metabolic Panel (8)    CBC With Differential With Platelet    Type and screen    ABORH (Blood Type)    Antibody Screen    Prepare RBC Once    SARS-CoV-2/Flu A and B/RSV by PCR (GeneXpert)    Blood Culture    Blood Culture PCR    Blood Culture    Blood Culture    Sputum culture       Meds This Visit:  Requested Prescriptions      No prescriptions requested or ordered in this encounter       Luis De León MD  6/14/2024    Note to Patient: The 21st Century Cures Act makes medical notes like these available to patients in the interest of transparency. However, be advised this is a medical document. It is intended as peer to peer communication. It is written in medical language and may contain abbreviations or verbiage that are unfamiliar. It may appear blunt or direct. Medical documents are intended to carry relevant information, facts as evident, and the clinical opinion of the practitioner. This note may have been transcribed using a voice dictation system. Voice recognition errors may occur. This should not be taken to alter the content or meaning of this  note.

## 2024-06-14 NOTE — PROGRESS NOTES
INFECTIOUS DISEASE PROGRESS NOTE  [facility]  part of [facility] ID PROGRESS NOTE    Latonia Barker Patient Status:  Inpatient    1/3/1995 MRN [id number]   Location [facility] 2W/SW Attending Iram Marquez MD   Hosp Day # 5 PCP Agatha Mcmanus MD     Subjective:  ROS unable to be reviewed. No fever.  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    -  f/u ultrasound jugular veins  -plan at least 2 weeks iv abx as long are repeat blood cx (6/10) remain nevative.      History of Present Illness:  Latonia Barker is a 29 year old female with hx of active IVDU, hep C, bipolar dx, history of MRSA bacteremia and 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 She states she was on suboxone but unfortunately relapsed and has been using for the past 4 days. She states she was very weak and having headaches as well as neck stiffness. States she feels much better now. Mostly injects in her groin area. Denies sharing any needles.  Temp 99.3F on admission with normal wbc. Blood cx pending. Started on IV ceftriaxone and vanc.      Physical Exam:  /63   Pulse 93   Temp 97.5 °F (36.4 °C) (Temporal)   Resp (!) 28   Wt 184 lb 4.9 oz (83.6 kg)   LMP 03/25/2024 (Approximate)   SpO2 94%   BMI 33.71 kg/m²     Gen:   Intubated, sedated  HEENT:  ETT+, neck supple  CV/lungs:  RRR, CTAB  Abdom:  Soft, no TTP  Skin/extrem:  No rashes, no c/c/e  :   Dyson draining yellow urine  Lines:  RIJ CVC+    Laboratory Data: Reviewed    Microbiology: Reviewed    Radiology: Reviewed      [facility]  [phone]  6/13/2024

## 2024-06-14 NOTE — PLAN OF CARE
Problem: Patient Centered Care  Goal: Patient preferences are identified and integrated in the patient's plan of care  Description: Interventions:  - What would you like us to know as we care for you?   - Provide timely, complete, and accurate information to patient/family  - Incorporate patient and family knowledge, values, beliefs, and cultural backgrounds into the planning and delivery of care  - Encourage patient/family to participate in care and decision-making at the level they choose  - Honor patient and family perspectives and choices  Outcome: Progressing     Problem: Patient/Family Goals  Goal: Patient/Family Long Term Goal  Description: Patient's Long Term Goal:     Interventions:  -   - See additional Care Plan goals for specific interventions  Outcome: Progressing  Goal: Patient/Family Short Term Goal  Description: Patient's Short Term Goal:     Interventions:   -   - See additional Care Plan goals for specific interventions  Outcome: Progressing     Problem: CARDIOVASCULAR - ADULT  Goal: Maintains optimal cardiac output and hemodynamic stability  Description: INTERVENTIONS:  - Monitor vital signs, rhythm, and trends  - Monitor for bleeding, hypotension and signs of decreased cardiac output  - Evaluate effectiveness of vasoactive medications to optimize hemodynamic stability  - Monitor arterial and/or venous puncture sites for bleeding and/or hematoma  - Assess quality of pulses, skin color and temperature  - Assess for signs of decreased coronary artery perfusion - ex. Angina  - Evaluate fluid balance, assess for edema, trend weights  Outcome: Progressing  Goal: Absence of cardiac arrhythmias or at baseline  Description: INTERVENTIONS:  - Continuous cardiac monitoring, monitor vital signs, obtain 12 lead EKG if indicated  - Evaluate effectiveness of antiarrhythmic and heart rate control medications as ordered  - Initiate emergency measures for life threatening arrhythmias  - Monitor electrolytes and  administer replacement therapy as ordered  Outcome: Progressing     Problem: RESPIRATORY - ADULT  Goal: Achieves optimal ventilation and oxygenation  Description: INTERVENTIONS:  - Assess for changes in respiratory status  - Assess for changes in mentation and behavior  - Position to facilitate oxygenation and minimize respiratory effort  - Oxygen supplementation based on oxygen saturation or ABGs  - Provide Smoking Cessation handout, if applicable  - Encourage broncho-pulmonary hygiene including cough, deep breathe, Incentive Spirometry  - Assess the need for suctioning and perform as needed  - Assess and instruct to report SOB or any respiratory difficulty  - Respiratory Therapy support as indicated  - Manage/alleviate anxiety  - Monitor for signs/symptoms of CO2 retention  Outcome: Progressing     Problem: NEUROLOGICAL - ADULT  Goal: Achieves stable or improved neurological status  Description: INTERVENTIONS  - Assess for and report changes in neurological status  - Initiate measures to prevent increased intracranial pressure  - Maintain blood pressure and fluid volume within ordered parameters to optimize cerebral perfusion and minimize risk of hemorrhage  - Monitor temperature, glucose, and sodium. Initiate appropriate interventions as ordered  Outcome: Progressing  Goal: Absence of seizures  Description: INTERVENTIONS  - Monitor for seizure activity  - Administer anti-seizure medications as ordered  - Monitor neurological status  Outcome: Progressing  Goal: Remains free of injury related to seizure activity  Description: INTERVENTIONS:  - Maintain airway, patient safety  and administer oxygen as ordered  - Monitor patient for seizure activity, document and report duration and description of seizure to MD/LIP  - If seizure occurs, turn patient to side and suction secretions as needed  - Reorient patient post seizure  - Seizure pads on all 4 side rails  - Instruct patient/family to notify RN of any seizure  activity  - Instruct patient/family to call for assistance with activity based on assessment  Outcome: Progressing     Problem: Safety Risk - Non-Violent Restraints  Goal: Patient will remain free from self-harm  Description: INTERVENTIONS:  - Apply the least restrictive restraint to prevent harm  - Notify patient and family of reasons restraints applied  - Assess for any contributing factors to confusion (electrolyte disturbances, delirium, medications)  - Discontinue any unnecessary medical devices as soon as possible  - Assess the patient's physical comfort, circulation, skin condition, hydration, nutrition and elimination needs   - Reorient and redirection as needed  - Assess for the need to continue restraints  Outcome: Progressing     Problem: Delirium  Goal: Minimize duration of delirium  Description: Interventions:  - Encourage use of hearing aids, eye glasses  - Promote highest level of mobility daily  - Provide frequent reorientation  - Promote wakefulness i.e. lights on, blinds open  - Promote sleep, encourage patient's normal rest cycle i.e. lights off, TV off, minimize noise and interruptions  - Encourage family to assist in orientation and promotion of home routines  Outcome: Progressing

## 2024-06-14 NOTE — PROGRESS NOTES
[facility]  part of [facility]    Progress Note    Latonia Barker Patient Status:  Inpatient    1/3/1995 MRN [id number]   Location [facility] 2W/SW Attending Janes Levine MD   Hosp Day # 5 PCP Agatha Mcmanus MD       Subjective:     Unable to perform ROS    Patient was seen and examined  Intubated on mechanical ventilation  Comparative and agitated and not cooperative  Sedated with a propofol  Mild tracheal secretion    Objective:   Blood pressure 122/76, pulse 98, temperature 97.5 °F (36.4 °C), temperature source Temporal, resp. rate (!) 40, weight 184 lb 4.9 oz (83.6 kg), last menstrual period 2024, SpO2 97%, not currently breastfeeding.  Physical Exam  Vitals and nursing note reviewed.   Constitutional:       General: She is in acute distress.      Appearance: She is ill-appearing.   HENT:      Head: Atraumatic.      Nose: Nose normal.      Mouth/Throat:      Mouth: Mucous membranes are moist.   Eyes:      General: No scleral icterus.  Cardiovascular:      Rate and Rhythm: Normal rate.      Heart sounds:      No gallop.   Pulmonary:      Effort: Respiratory distress present.      Breath sounds: No stridor. Rales present. No wheezing or rhonchi.   Abdominal:      General: Abdomen is flat. Bowel sounds are normal.      Palpations: Abdomen is soft.      Tenderness: There is no guarding.   Musculoskeletal:      Cervical back: Neck supple. No rigidity.      Right lower leg: No edema.      Left lower leg: No edema.   Skin:     General: Skin is dry.   Neurological:      Comments: Intubated on mechanical ventilation  Moves extremity and agitated and combative         Results:   Lab Results   Component Value Date    WBC 9.0 2024    HGB 7.2 (L) 2024    HCT 22.1 (L) 2024    .0 2024    CREATSERUM 0.40 (L) 2024    BUN 9 2024     (L) 2024    K 4.3 2024     2024    CO2 28.0 2024    GLU 97 2024    CA 8.0 (L)  06/14/2024    ALB 2.7 (L) 06/13/2024    ALKPHO 146 (H) 06/13/2024    BILT 1.5 (H) 06/13/2024    TP 5.4 (L) 06/13/2024    AST 32 06/13/2024    ALT 32 06/13/2024    PTT 29.2 05/30/2020    INR 1.9 (A) 08/12/2020    T4F 0.73 11/02/2011    TSH 2.080 02/13/2019    LIP 18 (L) 05/30/2020    DDIMER 0.47 02/28/2022    CRP <0.5 01/29/2015    MG 1.6 06/14/2024    PHOS 4.2 06/13/2024    TROP <0.045 05/30/2020    TROPHS 4 02/28/2022    CK 55 03/29/2017    ETOH <3 06/09/2024       US VENOUS DOPPLER ARM BILAT - DIAG IMG (CPT=93970)    Result Date: 6/14/2024  CONCLUSION:   No evidence of deep venous thrombosis.  Right IJ central venous catheter.    Dictated by (CST): Kaz French MD on 6/14/2024 at 10:57 AM     Finalized by (CST): Kaz French MD on 6/14/2024 at 11:00 AM          XR CHEST AP PORTABLE  (CPT=71045)    Result Date: 6/14/2024  CONCLUSION:   Enlarging right upper lobe airspace opacity, which may reflect pneumonia, atelectasis, or pulmonary edema.  The remaining opacities in the bilateral perihilar regions and bilateral lower lobes are not significantly changed.  No significant change in the bilateral pleural effusions.     Dictated by (CST): Kaz French MD on 6/14/2024 at 7:38 AM     Finalized by (CST): Kaz French MD on 6/14/2024 at 7:49 AM         EKG 12 Lead    Result Date: 6/13/2024  Normal sinus rhythm Cannot rule out Anterior infarct , age undetermined Abnormal ECG When compared with ECG of 12-JUN-2024 15:37, No significant change was found Confirmed by DRISS CASTANO ([id number]) on 6/13/2024 10:44:55 AM    EKG 12 Lead    Result Date: 6/13/2024  Normal sinus rhythm Normal ECG When compared with ECG of 09-JUN-2024 07:19, T wave inversion no longer evident in Inferior leads T wave inversion no longer evident in Anterior leads QT has lengthened Confirmed by THOM FOX, NING ([id number]) on 6/13/2024 10:27:22 AM     Assessment & Plan:      1-septic shock and bacteremia with Fusobacterium  Echo without  vegetation  IV antibiotics with meropenem and ID following    2-substance/heroin abuse with underlying psych problem with bipolar with recent prolonged rehab stay  Significant delirium and agitation  Now intubated on mechanical ventilation and sedated  Psych following    3-pneumonia with infiltrate in the right lung  Check sputum culture  Continue with antibiotics with meropenem    4-acute respiratory failure required intubation and mechanical ventilation  Chest x-ray with extensive infiltrate in the right lung  Vent support and management and FiO2 at 40% with PEEP of 5 now  Not stable to extubate    5-pregnancy at 12 weeks  OB/GYN following    6-status post sinus pause/transient asystole possible related to Precedex drip which was discontinued  LVEF 60%  Now in normal sinus rhythm      7-DVT prophylaxis with SCD  Avoid anticoagulation with history of subarachnoid hemorrhage    8-nutrition  Tolerating NG tube feeding at the goal      D/w mother at bedside at length / all questions were answered  Critical and high risk   D/w staff   35 min cct                    Skye Loco MD  6/14/2024

## 2024-06-14 NOTE — PLAN OF CARE
PRN Versed  X 3, PRN fentanyl Bolus X 4, PRN Benadryl  X1,Restarted Fentanyl gtt, US of Neck completed- Negative for DVT, Seroquel increased to 300 mg TID, sputum CX collected     Problem: CARDIOVASCULAR - ADULT  Goal: Maintains optimal cardiac output and hemodynamic stability  Description: INTERVENTIONS:  - Monitor vital signs, rhythm, and trends  - Monitor for bleeding, hypotension and signs of decreased cardiac output  - Evaluate effectiveness of vasoactive medications to optimize hemodynamic stability  - Monitor arterial and/or venous puncture sites for bleeding and/or hematoma  - Assess quality of pulses, skin color and temperature  - Assess for signs of decreased coronary artery perfusion - ex. Angina  - Evaluate fluid balance, assess for edema, trend weights  Outcome: Progressing  Goal: Absence of cardiac arrhythmias or at baseline  Description: INTERVENTIONS:  - Continuous cardiac monitoring, monitor vital signs, obtain 12 lead EKG if indicated  - Evaluate effectiveness of antiarrhythmic and heart rate control medications as ordered  - Initiate emergency measures for life threatening arrhythmias  - Monitor electrolytes and administer replacement therapy as ordered  Outcome: Progressing     Problem: RESPIRATORY - ADULT  Goal: Achieves optimal ventilation and oxygenation  Description: INTERVENTIONS:  - Assess for changes in respiratory status  - Assess for changes in mentation and behavior  - Position to facilitate oxygenation and minimize respiratory effort  - Oxygen supplementation based on oxygen saturation or ABGs  - Provide Smoking Cessation handout, if applicable  - Encourage broncho-pulmonary hygiene including cough, deep breathe, Incentive Spirometry  - Assess the need for suctioning and perform as needed  - Assess and instruct to report SOB or any respiratory difficulty  - Respiratory Therapy support as indicated  - Manage/alleviate anxiety  - Monitor for signs/symptoms of CO2 retention  Outcome:  Progressing     Problem: Safety Risk - Non-Violent Restraints  Goal: Patient will remain free from self-harm  Description: INTERVENTIONS:  - Apply the least restrictive restraint to prevent harm  - Notify patient and family of reasons restraints applied  - Assess for any contributing factors to confusion (electrolyte disturbances, delirium, medications)  - Discontinue any unnecessary medical devices as soon as possible  - Assess the patient's physical comfort, circulation, skin condition, hydration, nutrition and elimination needs   - Reorient and redirection as needed  - Assess for the need to continue restraints  Outcome: Progressing     Problem: NEUROLOGICAL - ADULT  Goal: Achieves stable or improved neurological status  Description: INTERVENTIONS  - Assess for and report changes in neurological status  - Initiate measures to prevent increased intracranial pressure  - Maintain blood pressure and fluid volume within ordered parameters to optimize cerebral perfusion and minimize risk of hemorrhage  - Monitor temperature, glucose, and sodium. Initiate appropriate interventions as ordered  Outcome: Not Progressing     Problem: Delirium  Goal: Minimize duration of delirium  Description: Interventions:  - Encourage use of hearing aids, eye glasses  - Promote highest level of mobility daily  - Provide frequent reorientation  - Promote wakefulness i.e. lights on, blinds open  - Promote sleep, encourage patient's normal rest cycle i.e. lights off, TV off, minimize noise and interruptions  - Encourage family to assist in orientation and promotion of home routines  Outcome: Not Progressing

## 2024-06-14 NOTE — PROGRESS NOTES
[facility]  Progress Note     Latonia Barker  : 1/3/1995    Status: Inpatient  Day #: 5    Attending: Janes Levine MD  PCP: Agatha Mcmanus MD     Assessment and Plan:    Acute septic shock 2/2 multifocal pneumonia and Fusobacterium bacteremia  Acute hypoxic respiratory failure  -ID, pulm/critical care on consult  -CXR and CT chest reviewed. No PE. +multifocal pneumonia  -COVID neg  -urine strep and legionella neg  -intubated on vent  -pressors as needed - wean as able  -cont meropenem  -US b/l jugular veins, r/o Lemierre's - no thrombus    Fusobacterium bacteremia  -h/o IVDA  -prior h/o MRSA bacteremia and 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  -TTE without endocarditis    Pulmonary edema  -lasix 40mg IV given 6/12    H/o IVDA  -Reportedly on suboxone since March and doing well but had relapse 1 day PTA   Per mother pt was on 8.2 mg suboxone tid, ruby MONTOYA at [facility] recovery [phone]  -cont meds per psych    Shock liver - 2/2 sepsis  -Transaminitis: mild - improving  -US liver shows HM and steatosis  -monitor LFT  -Avoid hepatotoxins    Pregnancy   - 10 Weeks 6 day viable IUP on US: 3.2 cm subchorionic hemorrhage along the inferior aspect of the gestational sac.   -HCG down trending  -Ob consulted  -no vaginal bleed - cont monitor  -repeat US prior to discharge    Bicytopenia: Hb stable post transfusion 6/10 8.9  -TCP possibly 2/2 sepsis. improving  -Anemia possibly from acute blood loss related to 3.2 cm subchorionic hemorrhage?  -Hold off ac   -6/10: 1 U prbc transfused     RAMON: mild - improved  Likely 2/2 shock  On pressors, IVF  Monitor renal function daily and avoid nephrotoxins     Bipolar disorder  -psych consult (of note both mom and daughter vehemently refused in prior admission)  -zofran prn, seroquel 200mg qhs  -supportive  -SW     Chronic anticoagulation  H/o Recurrent venous thrombosis superficial and deep   -(had followed in [facility]  clinic in the past)  -per last hematology f/u note 2020 \"She completed more than 6 months of anticoagulation with warfarin for what we consider a provoked DVT and states she is no longer using IV drugs in the upper extremities.  She is now pregnant.  Would recommend stopping anticoagulation as long as she is able to avoid IV drug use \"  -avoid a/c due to subchorionic hemorrhage on US     DVT Mechanical Prophylaxis:   SCDs,    DVT Pharmacologic Prophylaxis   Medication   None               Subjective:      Initial Chief Complaint:  fever malaise    Patient is intubated      Objective:      Temp:  [97.5 °F (36.4 °C)-99.1 °F (37.3 °C)] 97.5 °F (36.4 °C)  Pulse:  [] 88  Resp:  [18-40] 22  BP: ()/(52-76) 103/58  SpO2:  [93 %-100 %] 98 %  FiO2 (%):  [40 %] 40 %  General:  Intubated but opens eyes and moves  HEENT:  Normocephalic, atraumatic  Cardiac:  Regular rate, regular rhythm  Pulmonary:  Clear to auscultation bilaterally, respirations unlabored  Gastrointestinal:  Soft, non-tender, normal bowel sounds  Musculoskeletal:  No joint swelling  Extremities:  No edema, no cyanosis, no clubbing  Neurologic:  moving all ext  Psychiatric:  calm    Intake/Output Summary (Last 24 hours) at 6/14/2024 1237  Last data filed at 6/14/2024 0411  Gross per 24 hour   Intake 2474 ml   Output 2400 ml   Net 74 ml         Recent Labs   Lab 06/12/24  0318 06/13/24  0331 06/14/24  0414   WBC 6.1 7.9 9.0   HGB 8.2* 7.8* 7.2*   HCT 23.6* 23.5* 22.1*   .0* 143.0* 167.0   RBC 3.17* 3.09* 2.87*   MCV 74.4* 76.1* 77.0*   MCH 25.9* 25.2* 25.1*   MCHC 34.7 33.2 32.6   RDW 17.6* 16.9* 16.9*   NEPRELIM 3.61 5.46 5.72     Recent Labs   Lab 06/09/24  0732 06/09/24  0732 06/10/24  0544 06/11/24  0346 06/12/24  0318 06/13/24  0331 06/14/24  0414   BUN 15  --  6* <5* 7* 9 9   CREATSERUM 1.10*  --  0.44* 0.51* 0.44* 0.45* 0.40*   CA 9.5  --  8.0* 7.5* 7.5* 8.0* 8.0*   ALB 3.4  --  2.9*  --  2.6* 2.7*  --    *  --  141 142 139 135*  135*   K 3.4*  --  3.9  3.9 3.5 3.8 3.9 4.3     --  112 112 109 106 105   CO2 23.0  --  26.0 22.0 25.0 26.0 28.0   *  --  110* 92 85 117* 97   MG  --    < > 1.4* 1.7 1.7 1.5* 1.6   PHOS  --    < > 1.5* 2.1*  2.1* 4.2 4.2  --    BILT 0.4  --  0.3  --  0.9 1.5*  --    *  --  187*  --  51* 32  --    ALT 66*  --  54*  --  40 32  --    ALKPHO 162*  --  101*  --  135* 146*  --    TP 6.2  --  5.3*  --  4.9* 5.4*  --    ETOH <3  --   --   --   --   --   --     < > = values in this interval not displayed.       US VENOUS DOPPLER ARM BILAT - DIAG IMG (CPT=93970)    Result Date: 6/14/2024  CONCLUSION:   No evidence of deep venous thrombosis.  Right IJ central venous catheter.    Dictated by (CST): Kaz French MD on 6/14/2024 at 10:57 AM     Finalized by (CST): Kaz French MD on 6/14/2024 at 11:00 AM          XR CHEST AP PORTABLE  (CPT=71045)    Result Date: 6/14/2024  CONCLUSION:   Enlarging right upper lobe airspace opacity, which may reflect pneumonia, atelectasis, or pulmonary edema.  The remaining opacities in the bilateral perihilar regions and bilateral lower lobes are not significantly changed.  No significant change in the bilateral pleural effusions.     Dictated by (CST): Kaz French MD on 6/14/2024 at 7:38 AM     Finalized by (CST): Kaz French MD on 6/14/2024 at 7:49 AM         EKG 12 Lead    Result Date: 6/13/2024  Normal sinus rhythm Cannot rule out Anterior infarct , age undetermined Abnormal ECG When compared with ECG of 12-JUN-2024 15:37, No significant change was found Confirmed by DRISS CASTANO ([id number]) on 6/13/2024 10:44:55 AM    EKG 12 Lead    Result Date: 6/13/2024  Normal sinus rhythm Normal ECG When compared with ECG of 09-JUN-2024 07:19, T wave inversion no longer evident in Inferior leads T wave inversion no longer evident in Anterior leads QT has lengthened Confirmed by THOM FOX CASH ([id number]) on 6/13/2024 10:27:22 AM   Medications:   QUEtiapine  300 mg Oral TID     levETIRAcetam  500 mg Intravenous Q12H    chlorhexidine gluconate  15 mL Mouth/Throat BID@0800,2000    meropenem  1 g Intravenous Q8H    venlafaxine ER  75 mg Oral Nightly    prazosin  2 mg Oral Nightly    haloperidol lactate  5 mg Intravenous 4 times per day    QUEtiapine  200 mg Oral TID    LORazepam  1 mg Intravenous 4 times per day    Methadone HCl  20 mg Oral BID    famotidine  20 mg Intravenous BID    QUEtiapine  200 mg Oral Nightly    melatonin  3 mg Oral Nightly      PRN Meds:   [DISCONTINUED] acetaminophen **OR** acetaminophen **OR** acetaminophen **OR** acetaminophen    polyethylene glycol (PEG 3350)    senna    bisacodyl    fleet enema    fentaNYL    midazolam    diphenhydrAMINE    dextrose 10%    lipase-protease-amylase (Lip-Prot-Amyl) **AND** sodium bicarbonate    ondansetron    prochlorperazine    acetaminophen    Supplementary Documentation:        MDM High. Time spent on chart/note review, review of labs/imaging, , physical exam, discussion with staff, consultants, coordinating care, writing progress note, discussion of plan of care.      Janes Levine MD

## 2024-06-14 NOTE — PAYOR COMM NOTE
--------------  CONTINUED STAY REVIEW    Payor: [facility]  Subscriber #:  [id number]  Authorization Number: [id number]    Admit date: 6/9/24  Admit time: 11:49 AM    Admitting Physician: Iram Marquez MD  Attending Physician:  Janes Levine MD  Primary Care Physician: Agatha Mcmanus MD    REVIEW DOCUMENTATION:    6/14/24    Pulmonary       Unable to perform ROS     Patient was seen and examined  Intubated on mechanical ventilation  Comparative and agitated and not cooperative  Sedated with a propofol  Mild tracheal secretion           Objective:  Blood pressure 122/76, pulse 98, temperature 97.5 °F (36.4 °C), temperature source Temporal, resp. rate (!) 40, weight 184 lb 4.9 oz (83.6 kg), last menstrual period 03/25/2024, SpO2 97%, not currently breastfeeding.  Physical Exam  Vitals and nursing note reviewed.   Constitutional:       General: She is in acute distress.      Appearance: She is ill-appearing.   HENT:      Head: Atraumatic.      Nose: Nose normal.      Mouth/Throat:      Mouth: Mucous membranes are moist.   Eyes:      General: No scleral icterus.  Cardiovascular:      Rate and Rhythm: Normal rate.      Heart sounds:      No gallop.   Pulmonary:      Effort: Respiratory distress present.      Breath sounds: No stridor. Rales present. No wheezing or rhonchi.   Abdominal:      General: Abdomen is flat. Bowel sounds are normal.      Palpations: Abdomen is soft.      Tenderness: There is no guarding.   Musculoskeletal:      Cervical back: Neck supple. No rigidity.      Right lower leg: No edema.      Left lower leg: No edema.   Skin:     General: Skin is dry.   Neurological:      Comments: Intubated on mechanical ventilation  Moves extremity and agitated and combative         Assessment & Plan:  1-septic shock and bacteremia with Fusobacterium  Echo without vegetation  IV antibiotics with meropenem and ID following     2-substance/heroin abuse with underlying psych problem with  bipolar with recent prolonged rehab stay  Significant delirium and agitation  Now intubated on mechanical ventilation and sedated  Psych following     3-pneumonia with infiltrate in the right lung  Check sputum culture  Continue with antibiotics with meropenem     4-acute respiratory failure required intubation and mechanical ventilation  Chest x-ray with extensive infiltrate in the right lung  Vent support and management and FiO2 at 40% with PEEP of 5 now  Not stable to extubate     5-pregnancy at 12 weeks  OB/GYN following     6-status post sinus pause/transient asystole possible related to Precedex drip which was discontinued  LVEF 60%  Now in normal sinus rhythm        7-DVT prophylaxis with SCD  Avoid anticoagulation with history of subarachnoid hemorrhage     8-nutrition  Tolerating NG tube feeding at the goal        D/w mother at bedside at length / all questions were answered  Critical and high risk         US VENOUS DOPPLER ARM BILAT - DIAG IMG (CPT=93970)     Result Date: 6/14/2024  CONCLUSION:                                                                                            No evidence of deep venous thrombosis.  Right IJ central venous catheter.    Dictated by (CST): Kaz French MD on 6/14/2024 at 10:57 AM     Finalized by (CST): Kaz French MD on 6/14/2024 at 11:00 AM           XR CHEST AP PORTABLE  (CPT=71045)     Result Date: 6/14/2024  CONCLUSION:                                                                                            Enlarging right upper lobe airspace opacity, which may reflect pneumonia, atelectasis, or pulmonary edema.  The remaining opacities in the bilateral perihilar regions and bilateral lower lobes are not significantly changed.  No significant change in the bilateral pleural effusions.     Dictated by (CST): Kaz French MD on 6/14/2024 at 7:38 AM     Finalized by (CST): Kaz French MD on 6/14/2024 at 7:49 AM         EKG 12 Lead     Result Date:  6/13/2024  Normal sinus rhythm Cannot rule out Anterior infarct , age undetermined Abnormal ECG When compared with ECG of 12-JUN-2024 15:37, No significant change was found Confirmed by DRISS CASTANO ([id number]) on 6/13/2024 10:44:55 AM      6/14/24 Hospitalist  Assessment and Plan:     Acute septic shock 2/2 multifocal pneumonia and Fusobacterium bacteremia  Acute hypoxic respiratory failure  -ID, pulm/critical care on consult  -CXR and CT chest reviewed. No PE. +multifocal pneumonia  -COVID neg  -urine strep and legionella neg  -intubated on vent  -pressors as needed - wean as able  -cont meropenem  -US b/l jugular veins, r/o Lemierre's - no thrombus    Nursing    PRN Versed  X 3, PRN fentanyl Bolus X 4, PRN Benadryl  X1,Restarted Fentanyl gtt, US of Neck completed- Negative for DVT, Seroquel increased to 300 mg TID, sputum CX collected       6/14 ID    ASSESSMENT:     Antibiotics: Meropenem  ceftriaxone, daptomycin, cefepime     # Acute fever in an active IVDU pt with Fusobacterium bacteremia ?GI source, Lemierre's less likely   - UA with only 1-5wbc, most recent urine cx on 5/21/24 with E coli  - CT chest neg for PE but has multifocal GGOs and consolidations     # Multifocal pneumonia  # 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    -  f/u ultrasound jugular veins  -plan at least 2 weeks iv abx as long are repeat blood cx (6/10) remain nevative.        Collected 6/9/2024 07:34       Status: Final result    Specimen Information: Blood,peripheral   0 Result Notes  BLOOD CULTURE RESULT Positive      Fusobacterium necrophorum Abnormal          Anaerobic  Bottle Volume - 5 mL    Anaerobic Bottle Time to Detection - 16 hr  42 min            BLOOD CULTURE SMEAR  Abnormal   Positive Blood Culture      Gram Negative Rods              Resulting Agency: [facility] ([facility])           Specimen Collected: 06/09/24 07:34 Last Resulted: 06/14/24 07:52          Collected 6/9/2024 07:32       Status: Final result    Specimen Information: Blood,peripheral   0 Result Notes  BLOOD CULTURE RESULT Positive      Fusobacterium necrophorum Abnormal          Anaerobic Bottle Volume - 13 mL    Anaerobic Bottle Time to Detection - 25 hr  54 min            BLOOD CULTURE SMEAR  Abnormal   Positive Blood Culture      Gram Negative Rods              Resulting Agency: [facility] ([facility])           Specimen Collected: 06/09/24 07:32 Last Resulted: 06/14/24 07:52            [facility]   Lab 06/12/24  0318 06/13/24  0331 06/14/24  0414   WBC 6.1 7.9 9.0   HGB 8.2* 7.8* 7.2*   HCT 23.6* 23.5* 22.1*   .0* 143.0* 167.0   RBC 3.17* 3.09* 2.87*   MCV 74.4* 76.1* 77.0*   MCH 25.9* 25.2* 25.1*   MCHC 34.7 33.2 32.6   RDW 17.6* 16.9* 16.9*   NEPRELIM 3.61 5.46 5.72                Recent Labs   Lab 06/09/24  0732 06/09/24  0732 06/10/24  0544 06/11/24  0346 06/12/24 0318 06/13/24  0331 06/14/24  0414   BUN 15  --  6* <5* 7* 9 9   CREATSERUM 1.10*  --  0.44* 0.51* 0.44* 0.45* 0.40*   CA 9.5  --  8.0* 7.5* 7.5* 8.0* 8.0*   ALB 3.4  --  2.9*  --  2.6* 2.7*  --    *  --  141 142 139 135* 135*   K 3.4*  --  3.9  3.9 3.5 3.8 3.9 4.3     --  112 112 109 106 105   CO2 23.0  --  26.0 22.0 25.0 26.0 28.0   *  --  110* 92 85 117* 97   MG  --    < > 1.4* 1.7 1.7 1.5* 1.6   PHOS  --    < > 1.5* 2.1*  2.1* 4.2 4.2  --    BILT 0.4  --  0.3  --  0.9 1.5*  --    *  --  187*  --  51* 32  --    ALT 66*  --  54*  --  40 32  --    ALKPHO 162*  --  101*  --  135* 146*  --    TP 6.2  --  5.3*  --  4.9* 5.4*  --    ETOH <3  --   --   --   --   --   --     < > = values in this interval not  displayed.         MEDICATIONS ADMINISTERED IN LAST 1 DAY:  chlorhexidine gluconate (Peridex) 0.12 % oral solution 15 mL       Date Action Dose Route User    6/14/2024 0741 Given 15 mL Mouth/Throat Desiree Ignacio RN    6/13/2024 2001 Given 15 mL Mouth/Throat Blanca Andujar RN          diphenhydrAMINE (Benadryl) 50 mg/mL  injection 50 mg       Date Action Dose Route User    6/14/2024 1415 Given 50 mg Intravenous Desiree Ignacio RN          famotidine (Pepcid) 20 mg/2mL injection 20 mg       Date Action Dose Route User    6/14/2024 0740 Given 20 mg Intravenous Desiree Ignacio RN    6/13/2024 2002 Given 20 mg Intravenous Blanca Andujar RN          fentaNYL in sodium chloride 0.9% (Sublimaze) 1000 mcg/100mL infusion premix       Date Action Dose Route User    6/14/2024 1415 Hi-Risk Rate/Dose Change 100 mcg/hr Intravenous Desiree Ignacio RN    6/14/2024 1050 Hi-Risk Rate/Dose Change 75 mcg/hr Intravenous Desiree Ignacio RN    6/14/2024 0954 Hi-Risk Rate/Dose Change 50 mcg/hr Intravenous Desiree Ignacio RN    6/14/2024 0824 New Bag 25 mcg/hr Intravenous Judie Ludwig RN          fentaNYL (Sublimaze) 50 mcg/mL injection 50 mcg       Date Action Dose Route User    6/14/2024 1354 Given 50 mcg Intravenous Desiree Ignacio RN    6/14/2024 1210 Given 50 mcg Intravenous Judie Ludwig RN    6/14/2024 1050 Given 50 mcg Intravenous Desiree Ignacio RN    6/14/2024 0950 Given 50 mcg Intravenous Desiree Ignacio RN    6/14/2024 0312 Given 50 mcg Intravenous Dariel Parker RN    6/13/2024 2232 Given 50 mcg Intravenous Blanca Andujar RN    6/13/2024 2129 Given 50 mcg Intravenous Blanca Andujar RN    6/13/2024 1846 Given 50 mcg Intravenous Blanca Andujar RN    6/13/2024 1750 Given 50 mcg Intravenous Blanca Andujar, HANSEL          haloperidol lactate (Haldol) 5 MG/ML injection 5 mg       Date Action Dose Route User    6/14/2024 1704 Given 5 mg Intravenous Desiree Ignacio RN    6/14/2024 1125 Given 5 mg Intravenous Ignacio,  HANSEL Ramírez    6/14/2024 0500 Given 5 mg Intravenous ParkerDariel resendiz RN    6/13/2024 2313 Given 5 mg Intravenous ParkerDariel becker RN          levETIRAcetam (Keppra) 500 mg/5mL injection 500 mg       Date Action Dose Route User    6/14/2024 1415 Given 500 mg Intravenous Desiree Ignacio RN    6/14/2024 0311 Given 500 mg Intravenous ParkerDariel resendiz RN          LORazepam (Ativan) 2 mg/mL injection 1 mg       Date Action Dose Route User    6/14/2024 1704 Given 1 mg Intravenous Desiree Ignacio RN    6/14/2024 1126 Given 1 mg Intravenous Desiree Ignacio RN    6/14/2024 0500 Given 1 mg Intravenous Dariel Parker RN    6/13/2024 2313 Given 1 mg Intravenous Dariel Parker RN          magnesium sulfate in sterile water for injection 2 g/50mL IVPB premix 2 g       Date Action Dose Route User    6/14/2024 0600 New Bag 2 g Intravenous Dariel Parker RN          melatonin tab 3 mg       Date Action Dose Route User    6/13/2024 2001 Given 3 mg Oral Blanca Andujar RN          meropenem (Merrem) 1 g in sodium chloride 0.9% 100 mL IVPB-MBP       Date Action Dose Route User    6/14/2024 1333 New Bag 1 g Intravenous Desiree Ignacio RN    6/14/2024 0532 New Bag 1 g Intravenous Dariel Parker RN    6/13/2024 2130 New Bag 1 g Intravenous Blanca Andujar RN          methadone solution (DOLOPHINE) 10 mg/5mL       Date Action Dose Route User    6/14/2024 1013 Given 20 mg Oral Desiree Ignacio RN    6/13/2024 2029 Given 20 mg Oral Blanca Andujar RN          midazolam (Versed) 2 MG/2ML injection 1 mg       Date Action Dose Route User    6/14/2024 1606 Given 1 mg Intravenous Desiree Ignacio RN    6/14/2024 1106 Given 1 mg Intravenous Desiree Ignacio RN    6/14/2024 0726 Given 1 mg Intravenous Desiree Ignacio RN    6/14/2024 0056 Given 1 mg Intravenous Dariel Parker RN    6/13/2024 2238 Given 1 mg Intravenous Blanca Andujar RN          prazosin (Minipress) cap 2 mg       Date Action Dose Route User    6/13/2024 2001 Given 2 mg Oral Con  HANSEL Rios          propofol (Diprivan) 10 mg/mL infusion premix       Date Action Dose Route User    6/14/2024 1415 Rate/Dose Change 35 mcg/kg/min × 77.2 kg (Dosing Weight) Intravenous Desiree Ignacio RN    6/14/2024 1333 New Bag 30 mcg/kg/min × 77.2 kg (Dosing Weight) Intravenous Desiree Ignacio RN    6/14/2024 1200 Rate/Dose Change 30 mcg/kg/min × 77.2 kg (Dosing Weight) Intravenous IgnacioDesiree, RN    6/14/2024 1050 Rate/Dose Change 35 mcg/kg/min × 77.2 kg (Dosing Weight) Intravenous Desiree Ignacio RN    6/14/2024 1000 Rate/Dose Change 40 mcg/kg/min × 77.2 kg (Dosing Weight) Intravenous Desiree Ignacio RN    6/14/2024 0900 Rate/Dose Change 35 mcg/kg/min × 77.2 kg (Dosing Weight) Intravenous Desiree Ignacio RN    6/14/2024 0851 New Bag 40 mcg/kg/min × 77.2 kg (Dosing Weight) Intravenous Desiree Ignacio RN    6/14/2024 0439 New Bag 35 mcg/kg/min × 77.2 kg (Dosing Weight) Intravenous Dariel Parker RN    6/13/2024 2347 New Bag 40 mcg/kg/min × 77.2 kg (Dosing Weight) Intravenous Dariel Parker RN    6/13/2024 2030 Rate/Dose Change 35 mcg/kg/min × 77.2 kg (Dosing Weight) Intravenous Blanca Andujar RN    6/13/2024 2015 New Bag 40 mcg/kg/min × 77.2 kg (Dosing Weight) Intravenous Blanca Andujar RN          QUEtiapine (SEROquel) tab 200 mg       Date Action Dose Route User    6/13/2024 2002 Given 200 mg Oral Blanca Andujar RN          QUEtiapine (SEROquel) tab 200 mg       Date Action Dose Route User    6/14/2024 0740 Given 200 mg Oral Desiree Ignacio RN    6/13/2024 2001 Given 200 mg Oral Blanca Andujar RN          venlafaxine ER (Effexor-XR) 24 hr cap 75 mg       Date Action Dose Route User    6/13/2024 2001 Given 75 mg Oral Blanca Andujar RN          QUEtiapine (SEROquel) tab 300 mg       Date Action Dose Route User    6/14/2024 1705 Given 300 mg Oral Desiree Ignacio RN            Vitals (last day)       Date/Time Temp Pulse Resp BP SpO2 Weight O2 Device O2 Flow Rate (L/min) [facility]    06/14/24 1700 -- 76 21  101/56 93 % -- Ventilator -- RA    06/14/24 1600 -- 72 20 100/59 93 % -- Ventilator --     06/14/24 1500 -- 73 19 101/57 94 % -- -- --     06/14/24 1400 -- 93 28 110/63 94 % -- -- --     06/14/24 1300 -- 78 22 101/59 93 % -- -- --     06/14/24 1200 -- 88 22 103/58 98 % -- -- --     06/14/24 1100 -- 108 29 67/59 95 % -- Ventilator --     06/14/24 1000 -- 98 40 122/76 97 % -- -- --     06/14/24 0900 -- 79 22 102/56 98 % -- -- --     06/14/24 0800 97.5 °F (36.4 °C) 93 29 105/58 96 % -- Ventilator --     06/14/24 0700 -- 73 21 103/54 99 % -- -- --     06/14/24 0600 -- 75 22 100/55 99 % -- Ventilator --     06/14/24 0500 -- 82 31 109/70 98 % -- -- --     06/14/24 0400 99.1 °F (37.3 °C) 82 28 103/65 98 % -- -- --     06/14/24 0300 -- 73 23 99/55 97 % -- -- --     06/14/24 0200 -- 75 24 97/53 97 % -- -- --     06/14/24 0100 -- 79 23 96/57 96 % -- Ventilator --     06/14/24 0000 -- 82 24 96/55 97 % -- Ventilator --     06/13/24 2300 99 °F (37.2 °C) 82 22 97/57 98 % -- -- --     06/13/24 2200 -- 79 26 101/57 97 % -- Ventilator --     06/13/24 2100 -- 78 26 102/60 96 % -- Ventilator --     06/13/24 2000 98.6 °F (37 °C) 71 22 100/54 97 % -- Ventilator --     06/13/24 1900 -- 80 22 97/52 96 % -- Ventilator -- VM    06/13/24 1800 -- 86 22 107/74 94 % -- Ventilator -- VM    06/13/24 1700 -- 76 24 98/53 96 % -- Ventilator -- VM    06/13/24 1600 98.8 °F (37.1 °C) 89 29 104/58 96 % -- Ventilator -- VM    06/13/24 1500 -- 75 22 97/55 95 % -- Ventilator -- VM    06/13/24 1400 -- 98 18 114/75 100 % -- Ventilator -- VM    06/13/24 1300 -- 69 23 98/55 93 % -- Ventilator -- VM    06/13/24 1200 98.9 °F (37.2 °C) 69 22 97/54 92 % -- Ventilator -- VM    06/13/24 1100 -- 72 23 96/52 93 % -- Ventilator -- VM    06/13/24 1022 -- 72 25 115/76 92 % -- Ventilator -- VM    06/13/24 1000 -- 73 25 99/53 91 % -- Ventilator -- VM    06/13/24 0900 -- 79 21 96/54 93 % -- Ventilator -- VM    06/13/24 0800 -- 80 23  92/54 98 % -- Ventilator -- VM    06/13/24 0700 -- 67 22 101/59 94 % -- Ventilator -- VM    06/13/24 0600 -- 66 23 98/53 95 % -- Ventilator -- HB    06/13/24 0500 -- 61 23 97/53 95 % -- Ventilator -- HB    06/13/24 0400 98.9 °F (37.2 °C) 65 24 95/55 92 % 184 lb 4.9 oz Ventilator -- HB    06/13/24 0300 -- 66 25 108/70 92 % -- Ventilator -- HB    06/13/24 0200 -- 63 23 110/74 93 % -- Ventilator -- HB    06/13/24 0100 -- 64 22 108/78 93 % -- Ventilator -- HB    06/13/24 0000 98.4 °F (36.9 °C) 62 25 104/73 93 % -- Ventilator -- HB          Blood Transfusion Record       Product Unit Status Volume Start End            Transfuse RBC       24  [id number]  [id number] Stopped 339.58 mL 06/10/24 1017 06/10/24 1300

## 2024-06-15 ENCOUNTER — APPOINTMENT (OUTPATIENT)
Dept: GENERAL RADIOLOGY | Facility: HOSPITAL | Age: 29
DRG: 831 | End: 2024-06-15
Attending: INTERNAL MEDICINE

## 2024-06-15 LAB
ANION GAP SERPL CALC-SCNC: <0 MMOL/L (ref 0–18)
BASOPHILS # BLD AUTO: 0.03 X10(3) UL (ref 0–0.2)
BASOPHILS NFR BLD AUTO: 0.4 %
BUN BLD-MCNC: 10 MG/DL (ref 9–23)
BUN/CREAT SERPL: 25 (ref 10–20)
CALCIUM BLD-MCNC: 8.2 MG/DL (ref 8.7–10.4)
CHLORIDE SERPL-SCNC: 106 MMOL/L (ref 98–112)
CO2 SERPL-SCNC: 29 MMOL/L (ref 21–32)
CREAT BLD-MCNC: 0.4 MG/DL
DEPRECATED RDW RBC AUTO: 48.3 FL (ref 35.1–46.3)
EGFRCR SERPLBLD CKD-EPI 2021: 139 ML/MIN/1.73M2 (ref 60–?)
EOSINOPHIL # BLD AUTO: 0.19 X10(3) UL (ref 0–0.7)
EOSINOPHIL NFR BLD AUTO: 2.4 %
ERYTHROCYTE [DISTWIDTH] IN BLOOD BY AUTOMATED COUNT: 16.9 % (ref 11–15)
GLUCOSE BLD-MCNC: 93 MG/DL (ref 70–99)
HCT VFR BLD AUTO: 21.3 %
HGB BLD-MCNC: 7 G/DL
IMM GRANULOCYTES # BLD AUTO: 0.35 X10(3) UL (ref 0–1)
IMM GRANULOCYTES NFR BLD: 4.3 %
LYMPHOCYTES # BLD AUTO: 1.56 X10(3) UL (ref 1–4)
LYMPHOCYTES NFR BLD AUTO: 19.3 %
MAGNESIUM SERPL-MCNC: 1.7 MG/DL (ref 1.6–2.6)
MCH RBC QN AUTO: 25.3 PG (ref 26–34)
MCHC RBC AUTO-ENTMCNC: 32.9 G/DL (ref 31–37)
MCV RBC AUTO: 76.9 FL
MONOCYTES # BLD AUTO: 0.8 X10(3) UL (ref 0.1–1)
MONOCYTES NFR BLD AUTO: 9.9 %
NEUTROPHILS # BLD AUTO: 5.15 X10 (3) UL (ref 1.5–7.7)
NEUTROPHILS # BLD AUTO: 5.15 X10(3) UL (ref 1.5–7.7)
NEUTROPHILS NFR BLD AUTO: 63.7 %
OSMOLALITY SERPL CALC.SUM OF ELEC: 277 MOSM/KG (ref 275–295)
PLATELET # BLD AUTO: 188 10(3)UL (ref 150–450)
POTASSIUM SERPL-SCNC: 4.2 MMOL/L (ref 3.5–5.1)
RBC # BLD AUTO: 2.77 X10(6)UL
SODIUM SERPL-SCNC: 134 MMOL/L (ref 136–145)
WBC # BLD AUTO: 8.1 X10(3) UL (ref 4–11)

## 2024-06-15 PROCEDURE — 99232 SBSQ HOSP IP/OBS MODERATE 35: CPT | Performed by: NURSE PRACTITIONER

## 2024-06-15 PROCEDURE — 99233 SBSQ HOSP IP/OBS HIGH 50: CPT | Performed by: HOSPITALIST

## 2024-06-15 PROCEDURE — 99291 CRITICAL CARE FIRST HOUR: CPT | Performed by: INTERNAL MEDICINE

## 2024-06-15 PROCEDURE — 71045 X-RAY EXAM CHEST 1 VIEW: CPT | Performed by: INTERNAL MEDICINE

## 2024-06-15 RX ORDER — METHADONE HYDROCHLORIDE 10 MG/5ML
5 SOLUTION ORAL 2 TIMES DAILY
Status: DISCONTINUED | OUTPATIENT
Start: 2024-06-15 | End: 2024-06-17

## 2024-06-15 RX ORDER — MAGNESIUM SULFATE HEPTAHYDRATE 40 MG/ML
2 INJECTION, SOLUTION INTRAVENOUS ONCE
Status: COMPLETED | OUTPATIENT
Start: 2024-06-15 | End: 2024-06-15

## 2024-06-15 NOTE — PLAN OF CARE
Patient sedated & intubated. Restraints continued. Extremely agitated intermittently throughout the night. Propofol gtt increased. All safety measured maintained.     Problem: CARDIOVASCULAR - ADULT  Goal: Maintains optimal cardiac output and hemodynamic stability  Description: INTERVENTIONS:  - Monitor vital signs, rhythm, and trends  - Monitor for bleeding, hypotension and signs of decreased cardiac output  - Evaluate effectiveness of vasoactive medications to optimize hemodynamic stability  - Monitor arterial and/or venous puncture sites for bleeding and/or hematoma  - Assess quality of pulses, skin color and temperature  - Assess for signs of decreased coronary artery perfusion - ex. Angina  - Evaluate fluid balance, assess for edema, trend weights  Outcome: Progressing  Goal: Absence of cardiac arrhythmias or at baseline  Description: INTERVENTIONS:  - Continuous cardiac monitoring, monitor vital signs, obtain 12 lead EKG if indicated  - Evaluate effectiveness of antiarrhythmic and heart rate control medications as ordered  - Initiate emergency measures for life threatening arrhythmias  - Monitor electrolytes and administer replacement therapy as ordered  Outcome: Progressing     Problem: RESPIRATORY - ADULT  Goal: Achieves optimal ventilation and oxygenation  Description: INTERVENTIONS:  - Assess for changes in respiratory status  - Assess for changes in mentation and behavior  - Position to facilitate oxygenation and minimize respiratory effort  - Oxygen supplementation based on oxygen saturation or ABGs  - Provide Smoking Cessation handout, if applicable  - Encourage broncho-pulmonary hygiene including cough, deep breathe, Incentive Spirometry  - Assess the need for suctioning and perform as needed  - Assess and instruct to report SOB or any respiratory difficulty  - Respiratory Therapy support as indicated  - Manage/alleviate anxiety  - Monitor for signs/symptoms of CO2 retention  Outcome: Progressing      Problem: NEUROLOGICAL - ADULT  Goal: Absence of seizures  Description: INTERVENTIONS  - Monitor for seizure activity  - Administer anti-seizure medications as ordered  - Monitor neurological status  Outcome: Progressing  Goal: Remains free of injury related to seizure activity  Description: INTERVENTIONS:  - Maintain airway, patient safety  and administer oxygen as ordered  - Monitor patient for seizure activity, document and report duration and description of seizure to MD/LIP  - If seizure occurs, turn patient to side and suction secretions as needed  - Reorient patient post seizure  - Seizure pads on all 4 side rails  - Instruct patient/family to notify RN of any seizure activity  - Instruct patient/family to call for assistance with activity based on assessment  Outcome: Progressing     Problem: NEUROLOGICAL - ADULT  Goal: Achieves stable or improved neurological status  Description: INTERVENTIONS  - Assess for and report changes in neurological status  - Initiate measures to prevent increased intracranial pressure  - Maintain blood pressure and fluid volume within ordered parameters to optimize cerebral perfusion and minimize risk of hemorrhage  - Monitor temperature, glucose, and sodium. Initiate appropriate interventions as ordered  Outcome: Not Progressing     Problem: Safety Risk - Non-Violent Restraints  Goal: Patient will remain free from self-harm  Description: INTERVENTIONS:  - Apply the least restrictive restraint to prevent harm  - Notify patient and family of reasons restraints applied  - Assess for any contributing factors to confusion (electrolyte disturbances, delirium, medications)  - Discontinue any unnecessary medical devices as soon as possible  - Assess the patient's physical comfort, circulation, skin condition, hydration, nutrition and elimination needs   - Reorient and redirection as needed  - Assess for the need to continue restraints  6/15/2024 0133 by Nell Angel,  RN  Outcome: Not Progressing  6/15/2024 0132 by Nell Angel RN  Outcome: Not Progressing

## 2024-06-15 NOTE — CM/SW NOTE
Sabrina remains intubated and restless.  She is on medication to help with her agitation and restlessness.    CM will continue to follow and assist with dc planning needs.    Deirdre Carballo MBA BSN RN CRRN   RN Case Manager  [phone]

## 2024-06-15 NOTE — PROGRESS NOTES
Received PT intubated with a (7.5) ETT secured at (21) on vent with the following settings; BS heard bilaterally, SXN provided as needed. No changes made, RT to continue to monitor.        06/15/24 0626   Vent Information   Vent Mode VC/AC   Settings   FiO2 (%) 40 %   Resp Rate (Set) 16   Vt (Set, mL) 430 mL   Waveform Decelerating ramp   PEEP/CPAP (cm H2O) 5 cm H20   PEEP Low (cm H2O) 2 cm H2O   Peak Flow LPM 70   Trigger Sensitivity Flow (L/min) 1 L/min   Humidification Heater   H2O Bag Level 1/4 Full   Heater Temperature 98.6 °F (37 °C)   Readings   Total RR 18   Minute Ventilation (L/min) 7.9 L/min   Expiratory Tidal Volume 420 mL   PIP Observed (cm H2O) 16 cm H2O   MAP (cm H2O) 7   Plateau Pressure (cm H2O) 16 cm H2O   Static Compliance (L/cm H2O) 38   Dynamic Compliance (L/cm H2O) 39 L/cm H2O

## 2024-06-15 NOTE — PLAN OF CARE
Problem: Safety Risk - Non-Violent Restraints  Goal: Patient will remain free from self-harm  Description: INTERVENTIONS:  - Apply the least restrictive restraint to prevent harm  - Notify patient and family of reasons restraints applied  - Assess for any contributing factors to confusion (electrolyte disturbances, delirium, medications)  - Discontinue any unnecessary medical devices as soon as possible  - Assess the patient's physical comfort, circulation, skin condition, hydration, nutrition and elimination needs   - Reorient and redirection as needed  - Assess for the need to continue restraints  Outcome: Not Progressing

## 2024-06-15 NOTE — PROGRESS NOTES
[facility]  part of [facility]    Progress Note    Latonia Barker Patient Status:  Inpatient    1/3/1995 MRN [id number]   Location [facility] 2W/SW Attending Janes Levine MD   Hosp Day # 6 PCP Agatha Mcmanus MD       Subjective:     Unable to perform ROS    Seen and examined  Sedated and intubated / agitated and combative and no cooperative   Copious oral and tracheal secretions   No ROS from pt    Objective:   Blood pressure 100/58, pulse 75, temperature 97 °F (36.1 °C), temperature source Temporal, resp. rate 20, weight 185 lb 6.5 oz (84.1 kg), last menstrual period 2024, SpO2 91%, not currently breastfeeding.  Physical Exam  Constitutional:       General: She is in acute distress.      Appearance: She is ill-appearing.   HENT:      Head: Atraumatic.      Mouth/Throat:      Mouth: Mucous membranes are moist.      Pharynx: Posterior oropharyngeal erythema present.   Cardiovascular:      Rate and Rhythm: Normal rate.      Heart sounds:      No gallop.   Pulmonary:      Effort: Respiratory distress present.      Breath sounds: No stridor. Rhonchi and rales present. No wheezing.   Abdominal:      General: Abdomen is flat. Bowel sounds are normal.      Palpations: Abdomen is soft.      Tenderness: There is no guarding.   Musculoskeletal:      Comments: Mild pitting edema    Neurological:      Comments: Sedated , agitated and combative   Moves all extremities          Results:   Lab Results   Component Value Date    WBC 8.1 06/15/2024    HGB 7.0 (L) 06/15/2024    HCT 21.3 (L) 06/15/2024    .0 06/15/2024    CREATSERUM 0.40 (L) 06/15/2024    BUN 10 06/15/2024     (L) 06/15/2024    K 4.2 06/15/2024     06/15/2024    CO2 29.0 06/15/2024    GLU 93 06/15/2024    CA 8.2 (L) 06/15/2024    ALB 2.7 (L) 2024    ALKPHO 146 (H) 2024    BILT 1.5 (H) 2024    TP 5.4 (L) 2024    AST 32 2024    ALT 32 2024    PTT 29.2 2020    INR 1.9 (A)  08/12/2020    T4F 0.73 11/02/2011    TSH 2.080 02/13/2019    LIP 18 (L) 05/30/2020    DDIMER 0.47 02/28/2022    CRP <0.5 01/29/2015    MG 1.7 06/15/2024    PHOS 4.2 06/13/2024    TROP <0.045 05/30/2020    TROPHS 4 02/28/2022    CK 55 03/29/2017    ETOH <3 06/09/2024       XR CHEST AP PORTABLE  (CPT=71045)    Result Date: 6/15/2024  CONCLUSION:  Unchanged mild cardiomegaly with interstitial pulmonary edema.  Moderate right pleural effusion, mildly increased.  Multifocal airspace opacities throughout the right lung, similar to prior.  Unchanged left retrocardiac opacity.    Dictated by (CST): Aziza Nath MD on 6/15/2024 at 9:14 AM     Finalized by (CST): Aziza Nath MD on 6/15/2024 at 9:16 AM          US VENOUS DOPPLER ARM BILAT - DIAG IMG (CPT=93970)    Result Date: 6/14/2024  CONCLUSION:   No evidence of deep venous thrombosis.  Right IJ central venous catheter.    Dictated by (CST): Kaz French MD on 6/14/2024 at 10:57 AM     Finalized by (CST): Kaz French MD on 6/14/2024 at 11:00 AM          XR CHEST AP PORTABLE  (CPT=71045)    Result Date: 6/14/2024  CONCLUSION:   Enlarging right upper lobe airspace opacity, which may reflect pneumonia, atelectasis, or pulmonary edema.  The remaining opacities in the bilateral perihilar regions and bilateral lower lobes are not significantly changed.  No significant change in the bilateral pleural effusions.     Dictated by (CST): Kaz French MD on 6/14/2024 at 7:38 AM     Finalized by (CST): Kaz French MD on 6/14/2024 at 7:49 AM               Assessment & Plan:     1-septic shock and bacteremia with Fusobacterium  Echo without vegetation  HD better / not on pressors now   IV antibiotics with meropenem and ID following     2-substance/heroin abuse with underlying psych problem with bipolar with recent prolonged rehab stay  Significant delirium and agitation  Now intubated on mechanical ventilation and sedated  Psych following     3-pneumonia   CXR  bilateral infiltrate R>L   Check sputum culture  Continue with antibiotics with meropenem     4-acute respiratory failure required intubation and mechanical ventilation  CXR increase edema / infiltrate   Vent support and management and FiO2 at 40% with PEEP of 5 now  Not stable to extubate , very agitated and not cooperative and copious secretions    I/O positive ++ / lasix prn      5-pregnancy at 12 weeks  OB/GYN following     6-status post sinus pause/transient asystole possible related to Precedex drip which was discontinued  LVEF 60%  Now in normal sinus rhythm        7-DVT prophylaxis with SCD  Avoid anticoagulation with history of subarachnoid hemorrhage     8-nutrition  Tolerating NG tube feeding at the goal    9- Bipolar disorder and h/o drug abuse   Pych following        Critical and high risk complex   D/w staff   35 min cct                   Skye Loco MD  6/15/2024

## 2024-06-15 NOTE — PLAN OF CARE
Pt is sedated and ETT to vent with below settings.  Vent Mode: VC/AC  FiO2 (%):  [40 %] 40 %  S RR:  [16] 16  S VT:  [430 mL] 430 mL  PEEP/CPAP (cm H2O):  [5 cm H20] 5 cm H20  MAP (cm H2O):  [7-8] 7  Pt remains on fentanyl gtt and propofol gtt. Pt remains on tube feeds. Pt remains in bilateral soft wrist restraints and bilateral mittens. Pt given PRN versed for agitation, see eMAR. Pt safety maintained, bed in lowest position, and bed alarm on. Pt family updated and all questions answered.     Problem: Patient Centered Care  Goal: Patient preferences are identified and integrated in the patient's plan of care  Description: Interventions:  - Provide timely, complete, and accurate information to patient/family  - Incorporate patient and family knowledge, values, beliefs, and cultural backgrounds into the planning and delivery of care  - Encourage patient/family to participate in care and decision-making at the level they choose  - Honor patient and family perspectives and choices  Outcome: Progressing     Problem: Patient/Family Goals  Goal: Patient/Family Long Term Goal  Description: Patient's Long Term Goal: rehab    Interventions:  - See additional Care Plan goals for specific interventions  Outcome: Progressing  Goal: Patient/Family Short Term Goal  Description: Patient's Short Term Goal: extubated     Interventions:   - SBT   - See additional Care Plan goals for specific interventions  Outcome: Progressing     Problem: CARDIOVASCULAR - ADULT  Goal: Maintains optimal cardiac output and hemodynamic stability  Description: INTERVENTIONS:  - Monitor vital signs, rhythm, and trends  - Monitor for bleeding, hypotension and signs of decreased cardiac output  - Evaluate effectiveness of vasoactive medications to optimize hemodynamic stability  - Monitor arterial and/or venous puncture sites for bleeding and/or hematoma  - Assess quality of pulses, skin color and temperature  - Assess for signs of decreased coronary  artery perfusion - ex. Angina  - Evaluate fluid balance, assess for edema, trend weights  Outcome: Progressing  Goal: Absence of cardiac arrhythmias or at baseline  Description: INTERVENTIONS:  - Continuous cardiac monitoring, monitor vital signs, obtain 12 lead EKG if indicated  - Evaluate effectiveness of antiarrhythmic and heart rate control medications as ordered  - Initiate emergency measures for life threatening arrhythmias  - Monitor electrolytes and administer replacement therapy as ordered  Outcome: Progressing     Problem: RESPIRATORY - ADULT  Goal: Achieves optimal ventilation and oxygenation  Description: INTERVENTIONS:  - Assess for changes in respiratory status  - Assess for changes in mentation and behavior  - Position to facilitate oxygenation and minimize respiratory effort  - Oxygen supplementation based on oxygen saturation or ABGs  - Provide Smoking Cessation handout, if applicable  - Encourage broncho-pulmonary hygiene including cough, deep breathe, Incentive Spirometry  - Assess the need for suctioning and perform as needed  - Assess and instruct to report SOB or any respiratory difficulty  - Respiratory Therapy support as indicated  - Manage/alleviate anxiety  - Monitor for signs/symptoms of CO2 retention  Outcome: Progressing     Problem: NEUROLOGICAL - ADULT  Goal: Achieves stable or improved neurological status  Description: INTERVENTIONS  - Assess for and report changes in neurological status  - Initiate measures to prevent increased intracranial pressure  - Maintain blood pressure and fluid volume within ordered parameters to optimize cerebral perfusion and minimize risk of hemorrhage  - Monitor temperature, glucose, and sodium. Initiate appropriate interventions as ordered  Outcome: Progressing  Goal: Absence of seizures  Description: INTERVENTIONS  - Monitor for seizure activity  - Administer anti-seizure medications as ordered  - Monitor neurological status  Outcome: Progressing  Goal:  Remains free of injury related to seizure activity  Description: INTERVENTIONS:  - Maintain airway, patient safety  and administer oxygen as ordered  - Monitor patient for seizure activity, document and report duration and description of seizure to MD/LIP  - If seizure occurs, turn patient to side and suction secretions as needed  - Reorient patient post seizure  - Seizure pads on all 4 side rails  - Instruct patient/family to notify RN of any seizure activity  - Instruct patient/family to call for assistance with activity based on assessment  Outcome: Progressing     Problem: Safety Risk - Non-Violent Restraints  Goal: Patient will remain free from self-harm  Description: INTERVENTIONS:  - Apply the least restrictive restraint to prevent harm  - Notify patient and family of reasons restraints applied  - Assess for any contributing factors to confusion (electrolyte disturbances, delirium, medications)  - Discontinue any unnecessary medical devices as soon as possible  - Assess the patient's physical comfort, circulation, skin condition, hydration, nutrition and elimination needs   - Reorient and redirection as needed  - Assess for the need to continue restraints  6/15/2024 1618 by Brenda Kinsey RN  Outcome: Progressing  6/15/2024 1036 by Brenda Kinsey RN  Outcome: Progressing     Problem: Delirium  Goal: Minimize duration of delirium  Description: Interventions:  - Encourage use of hearing aids, eye glasses  - Promote highest level of mobility daily  - Provide frequent reorientation  - Promote wakefulness i.e. lights on, blinds open  - Promote sleep, encourage patient's normal rest cycle i.e. lights off, TV off, minimize noise and interruptions  - Encourage family to assist in orientation and promotion of home routines  Outcome: Progressing

## 2024-06-15 NOTE — PROGRESS NOTES
[facility]  Progress Note     Latonia Barker  : 1/3/1995    Status: Inpatient  Day #: 6    Attending: Janes Levine MD  PCP: Agatha Mcmanus MD     Assessment and Plan:    Acute septic shock 2/2 multifocal pneumonia and Fusobacterium bacteremia  Acute hypoxic respiratory failure  -ID, pulm/critical care on consult  -CXR and CT chest reviewed. No PE. 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   -HCG down trending  -Ob consulted  -no vaginal bleed - cont monitor  -repeat US prior to discharge    Bicytopenia: plt normalized. Anemia persists  -TCP possibly 2/2 sepsis. improving  -Anemia possibly from acute blood loss related to 3.2 cm subchorionic hemorrhage?  -Hold off ac   -6/10: 1 U prbc transfused  -transfuse if hgb<7     RAMON: mild - improved  Likely 2/2 shock  On pressors, IVF  Monitor renal function daily and avoid nephrotoxins     Bipolar disorder  -psych consult (of note both mom and daughter vehemently refused in prior admission)  -zofran prn, seroquel 200mg qhs  -supportive  -SW     Chronic anticoagulation  H/o Recurrent venous thrombosis superficial and deep   -(had followed in  [facility] in the past)  -per last hematology f/u note 2020 \"She completed more than 6 months of anticoagulation with warfarin for what we consider a provoked DVT and states she is no longer using IV drugs in the upper extremities.  She is now pregnant.  Would recommend stopping anticoagulation as long as she is able to avoid IV drug use \"  -avoid a/c due to subchorionic hemorrhage on US     DVT Mechanical Prophylaxis:   SCDs,    DVT Pharmacologic Prophylaxis   Medication   None               Subjective:      Initial Chief Complaint:  fever malaise    Patient is intubated      Objective:      Temp:  [97.9 °F (36.6 °C)-98.9 °F (37.2 °C)] 97.9 °F (36.6 °C)  Pulse:  [] 74  Resp:  [17-32] 19  BP: ()/() 100/56  SpO2:  [89 %-98 %] 92 %  FiO2 (%):  [40 %] 40 %  General:  Intubated but opens eyes and moves  HEENT:  Normocephalic, atraumatic  Cardiac:  Regular rate, regular rhythm  Pulmonary:  Clear to auscultation bilaterally, respirations unlabored  Gastrointestinal:  Soft, non-tender, normal bowel sounds  Musculoskeletal:  No joint swelling  Extremities:  No edema, no cyanosis, no clubbing  Neurologic:  moving all ext  Psychiatric:  calm    Intake/Output Summary (Last 24 hours) at 6/15/2024 1203  Last data filed at 6/15/2024 1048  Gross per 24 hour   Intake 2483.25 ml   Output 5875 ml   Net -3391.75 ml         Recent Labs   Lab 06/13/24  0331 06/14/24  0414 06/15/24  0557   WBC 7.9 9.0 8.1   HGB 7.8* 7.2* 7.0*   HCT 23.5* 22.1* 21.3*   .0* 167.0 188.0   RBC 3.09* 2.87* 2.77*   MCV 76.1* 77.0* 76.9*   MCH 25.2* 25.1* 25.3*   MCHC 33.2 32.6 32.9   RDW 16.9* 16.9* 16.9*   NEPRELIM 5.46 5.72 5.15     Recent Labs   Lab 06/09/24  0732 06/09/24  0732 06/10/24  0544 06/11/24  0346 06/12/24  0318 06/13/24  0331 06/14/24  0414 06/15/24  0557   BUN 15  --  6* <5* 7* 9 9 10   CREATSERUM 1.10*  --  0.44* 0.51* 0.44* 0.45* 0.40* 0.40*   CA 9.5  --  8.0* 7.5* 7.5* 8.0* 8.0* 8.2*   ALB 3.4   --  2.9*  --  2.6* 2.7*  --   --    *  --  141 142 139 135* 135* 134*   K 3.4*  --  3.9  3.9 3.5 3.8 3.9 4.3 4.2     --  112 112 109 106 105 106   CO2 23.0  --  26.0 22.0 25.0 26.0 28.0 29.0   *  --  110* 92 85 117* 97 93   MG  --    < > 1.4* 1.7 1.7 1.5* 1.6 1.7   PHOS  --    < > 1.5* 2.1*  2.1* 4.2 4.2  --   --    BILT 0.4  --  0.3  --  0.9 1.5*  --   --    *  --  187*  --  51* 32  --   --    ALT 66*  --  54*  --  40 32  --   --    ALKPHO 162*  --  101*  --  135* 146*  --   --    TP 6.2  --  5.3*  --  4.9* 5.4*  --   --    ETOH <3  --   --   --   --   --   --   --     < > = values in this interval not displayed.       XR CHEST AP PORTABLE  (CPT=71045)    Result Date: 6/15/2024  CONCLUSION:  Unchanged mild cardiomegaly with interstitial pulmonary edema.  Moderate right pleural effusion, mildly increased.  Multifocal airspace opacities throughout the right lung, similar to prior.  Unchanged left retrocardiac opacity.    Dictated by (CST): Aziza Nath MD on 6/15/2024 at 9:14 AM     Finalized by (CST): Aziza Nath MD on 6/15/2024 at 9:16 AM          US VENOUS DOPPLER ARM BILAT - DIAG IMG (CPT=93970)    Result Date: 6/14/2024  CONCLUSION:   No evidence of deep venous thrombosis.  Right IJ central venous catheter.    Dictated by (CST): Kza French MD on 6/14/2024 at 10:57 AM     Finalized by (CST): Kaz French MD on 6/14/2024 at 11:00 AM          XR CHEST AP PORTABLE  (CPT=71045)    Result Date: 6/14/2024  CONCLUSION:   Enlarging right upper lobe airspace opacity, which may reflect pneumonia, atelectasis, or pulmonary edema.  The remaining opacities in the bilateral perihilar regions and bilateral lower lobes are not significantly changed.  No significant change in the bilateral pleural effusions.     Dictated by (CST): Kaz French MD on 6/14/2024 at 7:38 AM     Finalized by (CST): Kaz French MD on 6/14/2024 at 7:49 AM             Medications:   Methadone HCl  5 mg  Oral BID    QUEtiapine  300 mg Oral TID    levETIRAcetam  500 mg Intravenous Q12H    chlorhexidine gluconate  15 mL Mouth/Throat BID@0800,2000    meropenem  1 g Intravenous Q8H    venlafaxine ER  75 mg Oral Nightly    prazosin  2 mg Oral Nightly    haloperidol lactate  5 mg Intravenous 4 times per day    LORazepam  1 mg Intravenous 4 times per day    famotidine  20 mg Intravenous BID    melatonin  3 mg Oral Nightly      PRN Meds:   [DISCONTINUED] acetaminophen **OR** acetaminophen **OR** acetaminophen **OR** acetaminophen    polyethylene glycol (PEG 3350)    senna    bisacodyl    fleet enema    fentaNYL    midazolam    diphenhydrAMINE    dextrose 10%    lipase-protease-amylase (Lip-Prot-Amyl) **AND** sodium bicarbonate    ondansetron    prochlorperazine    acetaminophen    Supplementary Documentation:        MDM High. Time spent on chart/note review, review of labs/imaging, , physical exam, discussion with staff, consultants, coordinating care, writing progress note, discussion of plan of care.      Janes Levine MD

## 2024-06-16 ENCOUNTER — APPOINTMENT (OUTPATIENT)
Dept: GENERAL RADIOLOGY | Facility: HOSPITAL | Age: 29
DRG: 831 | End: 2024-06-16
Attending: INTERNAL MEDICINE

## 2024-06-16 LAB
ANION GAP SERPL CALC-SCNC: 4 MMOL/L (ref 0–18)
BASOPHILS # BLD AUTO: 0.03 X10(3) UL (ref 0–0.2)
BASOPHILS NFR BLD AUTO: 0.4 %
BUN BLD-MCNC: 10 MG/DL (ref 9–23)
BUN/CREAT SERPL: 23.3 (ref 10–20)
CALCIUM BLD-MCNC: 8.2 MG/DL (ref 8.7–10.4)
CHLORIDE SERPL-SCNC: 104 MMOL/L (ref 98–112)
CO2 SERPL-SCNC: 29 MMOL/L (ref 21–32)
CREAT BLD-MCNC: 0.43 MG/DL
DEPRECATED RDW RBC AUTO: 47.8 FL (ref 35.1–46.3)
EGFRCR SERPLBLD CKD-EPI 2021: 137 ML/MIN/1.73M2 (ref 60–?)
EOSINOPHIL # BLD AUTO: 0.16 X10(3) UL (ref 0–0.7)
EOSINOPHIL NFR BLD AUTO: 2.1 %
ERYTHROCYTE [DISTWIDTH] IN BLOOD BY AUTOMATED COUNT: 16.7 % (ref 11–15)
GLUCOSE BLD-MCNC: 97 MG/DL (ref 70–99)
HCT VFR BLD AUTO: 24.5 %
HGB BLD-MCNC: 8 G/DL
IMM GRANULOCYTES # BLD AUTO: 0.21 X10(3) UL (ref 0–1)
IMM GRANULOCYTES NFR BLD: 2.8 %
LYMPHOCYTES # BLD AUTO: 1.53 X10(3) UL (ref 1–4)
LYMPHOCYTES NFR BLD AUTO: 20.3 %
MAGNESIUM SERPL-MCNC: 1.7 MG/DL (ref 1.6–2.6)
MCH RBC QN AUTO: 25.7 PG (ref 26–34)
MCHC RBC AUTO-ENTMCNC: 32.7 G/DL (ref 31–37)
MCV RBC AUTO: 78.8 FL
MONOCYTES # BLD AUTO: 0.48 X10(3) UL (ref 0.1–1)
MONOCYTES NFR BLD AUTO: 6.4 %
NEUTROPHILS # BLD AUTO: 5.11 X10 (3) UL (ref 1.5–7.7)
NEUTROPHILS # BLD AUTO: 5.11 X10(3) UL (ref 1.5–7.7)
NEUTROPHILS NFR BLD AUTO: 68 %
OSMOLALITY SERPL CALC.SUM OF ELEC: 283 MOSM/KG (ref 275–295)
PLATELET # BLD AUTO: 230 10(3)UL (ref 150–450)
POTASSIUM SERPL-SCNC: 4.2 MMOL/L (ref 3.5–5.1)
RBC # BLD AUTO: 3.11 X10(6)UL
SODIUM SERPL-SCNC: 137 MMOL/L (ref 136–145)
WBC # BLD AUTO: 7.5 X10(3) UL (ref 4–11)

## 2024-06-16 PROCEDURE — 71045 X-RAY EXAM CHEST 1 VIEW: CPT | Performed by: INTERNAL MEDICINE

## 2024-06-16 PROCEDURE — 99233 SBSQ HOSP IP/OBS HIGH 50: CPT | Performed by: INTERNAL MEDICINE

## 2024-06-16 PROCEDURE — 99233 SBSQ HOSP IP/OBS HIGH 50: CPT | Performed by: HOSPITALIST

## 2024-06-16 RX ORDER — SENNOSIDES 8.8 MG/5ML
5 LIQUID ORAL 2 TIMES DAILY
Status: DISCONTINUED | OUTPATIENT
Start: 2024-06-16 | End: 2024-06-22

## 2024-06-16 RX ORDER — MIDAZOLAM HYDROCHLORIDE 1 MG/ML
2 INJECTION INTRAMUSCULAR; INTRAVENOUS EVERY 4 HOURS PRN
Status: DISCONTINUED | OUTPATIENT
Start: 2024-06-16 | End: 2024-06-20

## 2024-06-16 RX ORDER — MAGNESIUM SULFATE HEPTAHYDRATE 40 MG/ML
2 INJECTION, SOLUTION INTRAVENOUS ONCE
Status: COMPLETED | OUTPATIENT
Start: 2024-06-16 | End: 2024-06-16

## 2024-06-16 NOTE — PLAN OF CARE
Remains on restraints for safety concerns, patient attempting to remove medical equipment. Family aware and updated on care plan.     Problem: Safety Risk - Non-Violent Restraints  Goal: Patient will remain free from self-harm  Description: INTERVENTIONS:  - Apply the least restrictive restraint to prevent harm  - Notify patient and family of reasons restraints applied  - Assess for any contributing factors to confusion (electrolyte disturbances, delirium, medications)  - Discontinue any unnecessary medical devices as soon as possible  - Assess the patient's physical comfort, circulation, skin condition, hydration, nutrition and elimination needs   - Reorient and redirection as needed  - Assess for the need to continue restraints  Outcome: Not Progressing

## 2024-06-16 NOTE — PLAN OF CARE
Patient intubated and sedated. Patient remains on fentanyl gtt see EMAR and prop gtt.  Vent Mode: VC/AC  FiO2 (%):  [40 %] 40 %  S RR:  [16] 16  S VT:  [430 mL] 430 mL  PEEP/CPAP (cm H2O):  [5 cm H20] 5 cm H20  MAP (cm H2O):  [7-8] 8  Family at bedside updated on care plan. Safety measures maintained.     Problem: CARDIOVASCULAR - ADULT  Goal: Maintains optimal cardiac output and hemodynamic stability  Description: INTERVENTIONS:  - Monitor vital signs, rhythm, and trends  - Monitor for bleeding, hypotension and signs of decreased cardiac output  - Evaluate effectiveness of vasoactive medications to optimize hemodynamic stability  - Monitor arterial and/or venous puncture sites for bleeding and/or hematoma  - Assess quality of pulses, skin color and temperature  - Assess for signs of decreased coronary artery perfusion - ex. Angina  - Evaluate fluid balance, assess for edema, trend weights  Outcome: Progressing  Goal: Absence of cardiac arrhythmias or at baseline  Description: INTERVENTIONS:  - Continuous cardiac monitoring, monitor vital signs, obtain 12 lead EKG if indicated  - Evaluate effectiveness of antiarrhythmic and heart rate control medications as ordered  - Initiate emergency measures for life threatening arrhythmias  - Monitor electrolytes and administer replacement therapy as ordered  Outcome: Progressing     Problem: RESPIRATORY - ADULT  Goal: Achieves optimal ventilation and oxygenation  Description: INTERVENTIONS:  - Assess for changes in respiratory status  - Assess for changes in mentation and behavior  - Position to facilitate oxygenation and minimize respiratory effort  - Oxygen supplementation based on oxygen saturation or ABGs  - Provide Smoking Cessation handout, if applicable  - Encourage broncho-pulmonary hygiene including cough, deep breathe, Incentive Spirometry  - Assess the need for suctioning and perform as needed  - Assess and instruct to report SOB or any respiratory difficulty  -  Respiratory Therapy support as indicated  - Manage/alleviate anxiety  - Monitor for signs/symptoms of CO2 retention  Outcome: Progressing     Problem: NEUROLOGICAL - ADULT  Goal: Achieves stable or improved neurological status  Description: INTERVENTIONS  - Assess for and report changes in neurological status  - Initiate measures to prevent increased intracranial pressure  - Maintain blood pressure and fluid volume within ordered parameters to optimize cerebral perfusion and minimize risk of hemorrhage  - Monitor temperature, glucose, and sodium. Initiate appropriate interventions as ordered  Outcome: Progressing  Goal: Absence of seizures  Description: INTERVENTIONS  - Monitor for seizure activity  - Administer anti-seizure medications as ordered  - Monitor neurological status  Outcome: Progressing  Goal: Remains free of injury related to seizure activity  Description: INTERVENTIONS:  - Maintain airway, patient safety  and administer oxygen as ordered  - Monitor patient for seizure activity, document and report duration and description of seizure to MD/LIP  - If seizure occurs, turn patient to side and suction secretions as needed  - Reorient patient post seizure  - Seizure pads on all 4 side rails  - Instruct patient/family to notify RN of any seizure activity  - Instruct patient/family to call for assistance with activity based on assessment  Outcome: Progressing     Problem: Safety Risk - Non-Violent Restraints  Goal: Patient will remain free from self-harm  Description: INTERVENTIONS:  - Apply the least restrictive restraint to prevent harm  - Notify patient and family of reasons restraints applied  - Assess for any contributing factors to confusion (electrolyte disturbances, delirium, medications)  - Discontinue any unnecessary medical devices as soon as possible  - Assess the patient's physical comfort, circulation, skin condition, hydration, nutrition and elimination needs   - Reorient and redirection as  needed  - Assess for the need to continue restraints  6/16/2024 1225 by Anny Pressley, RN  Outcome: Progressing  6/16/2024 1054 by Anny Pressley, RN  Outcome: Not Progressing     Problem: Delirium  Goal: Minimize duration of delirium  Description: Interventions:  - Encourage use of hearing aids, eye glasses  - Promote highest level of mobility daily  - Provide frequent reorientation  - Promote wakefulness i.e. lights on, blinds open  - Promote sleep, encourage patient's normal rest cycle i.e. lights off, TV off, minimize noise and interruptions  - Encourage family to assist in orientation and promotion of home routines  Outcome: Progressing

## 2024-06-16 NOTE — PROGRESS NOTES
[facility]  Progress Note     Latonia Barker  : 1/3/1995    Status: Inpatient  Day #: 7    Attending: Janes Levine MD  PCP: Agatha Mcmanus MD     Assessment and Plan:    Acute septic shock 2/2 multifocal pneumonia and Fusobacterium bacteremia  Acute hypoxic respiratory failure  -ID, pulm/critical care on consult  -CXR and CT chest reviewed. No PE. 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   -HCG down trending  -Ob consulted  -no vaginal bleed - cont monitor  -repeat US prior to discharge    Bicytopenia: plt normalized. Anemia persists - better today  -TCP possibly 2/2 sepsis. improving  -Anemia possibly from acute blood loss related to 3.2 cm subchorionic hemorrhage?  -Hold off ac   -6/10: 1 U prbc transfused  -transfuse if hgb<7     RAMON: mild - improved  Likely 2/2 shock  On pressors, IVF  Monitor renal function daily and avoid nephrotoxins     Bipolar disorder  -psych consult (of note both mom and daughter vehemently refused in prior admission)  -zofran prn, seroquel 200mg qhs  -supportive  -SW     Chronic anticoagulation  H/o Recurrent venous thrombosis superficial and deep   -(had  followed in [facility] in the past)  -per last hematology f/u note 2020 \"She completed more than 6 months of anticoagulation with warfarin for what we consider a provoked DVT and states she is no longer using IV drugs in the upper extremities.  She is now pregnant.  Would recommend stopping anticoagulation as long as she is able to avoid IV drug use \"  -avoid a/c due to subchorionic hemorrhage on US     DVT Mechanical Prophylaxis:   SCDs,    DVT Pharmacologic Prophylaxis   Medication   None               Subjective:      Initial Chief Complaint:  fever malaise    Patient is intubated      Objective:      Temp:  [97 °F (36.1 °C)-98.1 °F (36.7 °C)] 98 °F (36.7 °C)  Pulse:  [66-93] 89  Resp:  [18-26] 19  BP: ()/(54-72) 101/62  SpO2:  [91 %-98 %] 98 %  FiO2 (%):  [40 %] 40 %  General:  Intubated but opens eyes and moves  HEENT:  Normocephalic, atraumatic  Cardiac:  Regular rate, regular rhythm  Pulmonary:  Clear to auscultation bilaterally, respirations unlabored  Gastrointestinal:  Soft, non-tender, normal bowel sounds  Musculoskeletal:  No joint swelling  Extremities:  No edema, no cyanosis, no clubbing  Neurologic:  moving all ext  Psychiatric:  calm    Intake/Output Summary (Last 24 hours) at 6/16/2024 0847  Last data filed at 6/16/2024 0732  Gross per 24 hour   Intake 2137.1 ml   Output 4115 ml   Net -1977.9 ml         Recent Labs   Lab 06/14/24  0414 06/15/24  0557 06/16/24  0525   WBC 9.0 8.1 7.5   HGB 7.2* 7.0* 8.0*   HCT 22.1* 21.3* 24.5*   .0 188.0 230.0   RBC 2.87* 2.77* 3.11*   MCV 77.0* 76.9* 78.8*   MCH 25.1* 25.3* 25.7*   MCHC 32.6 32.9 32.7   RDW 16.9* 16.9* 16.7*   NEPRELIM 5.72 5.15 5.11     Recent Labs   Lab 06/10/24  0544 06/11/24  0346 06/12/24  0318 06/13/24  0331 06/14/24  0414 06/15/24  0557 06/16/24  0525   BUN 6* <5* 7* 9 9 10 10   CREATSERUM 0.44* 0.51* 0.44* 0.45* 0.40* 0.40* 0.43*   CA 8.0* 7.5* 7.5* 8.0* 8.0* 8.2* 8.2*   ALB 2.9*  --  2.6* 2.7*  --   --    --     142 139 135* 135* 134* 137   K 3.9  3.9 3.5 3.8 3.9 4.3 4.2 4.2    112 109 106 105 106 104   CO2 26.0 22.0 25.0 26.0 28.0 29.0 29.0   * 92 85 117* 97 93 97   MG 1.4* 1.7 1.7 1.5* 1.6 1.7 1.7   PHOS 1.5* 2.1*  2.1* 4.2 4.2  --   --   --    BILT 0.3  --  0.9 1.5*  --   --   --    *  --  51* 32  --   --   --    ALT 54*  --  40 32  --   --   --    ALKPHO 101*  --  135* 146*  --   --   --    TP 5.3*  --  4.9* 5.4*  --   --   --        XR CHEST AP PORTABLE  (CPT=71045)    Result Date: 6/16/2024  CONCLUSION: Moderate interstitial edema, slightly improved but not resolved since 6/15/2024.  Small right and trace left pleural effusions have also slightly improved.  Retrocardiac air space density which may represent atelectasis versus pneumonia.  Dictated by (CST): Syd Smith MD on 6/16/2024 at 7:35 AM     Finalized by (CST): Syd Smith MD on 6/16/2024 at 7:37 AM          XR CHEST AP PORTABLE  (CPT=71045)    Result Date: 6/15/2024  CONCLUSION:  Unchanged mild cardiomegaly with interstitial pulmonary edema.  Moderate right pleural effusion, mildly increased.  Multifocal airspace opacities throughout the right lung, similar to prior.  Unchanged left retrocardiac opacity.    Dictated by (CST): Aziza Nath MD on 6/15/2024 at 9:14 AM     Finalized by (CST): Aziza Nath MD on 6/15/2024 at 9:16 AM          US VENOUS DOPPLER ARM BILAT - DIAG IMG (CPT=93970)    Result Date: 6/14/2024  CONCLUSION:   No evidence of deep venous thrombosis.  Right IJ central venous catheter.    Dictated by (CST): Kaz French MD on 6/14/2024 at 10:57 AM     Finalized by (CST): Kaz French MD on 6/14/2024 at 11:00 AM             Medications:   magnesium sulfate  2 g Intravenous Once    Methadone HCl  5 mg Oral BID    QUEtiapine  300 mg Oral TID    levETIRAcetam  500 mg Intravenous Q12H    chlorhexidine gluconate  15 mL Mouth/Throat BID@0800,2000    meropenem  1 g Intravenous Q8H    venlafaxine ER  75  mg Oral Nightly    prazosin  2 mg Oral Nightly    haloperidol lactate  5 mg Intravenous 4 times per day    LORazepam  1 mg Intravenous 4 times per day    famotidine  20 mg Intravenous BID    melatonin  3 mg Oral Nightly      PRN Meds:   [DISCONTINUED] acetaminophen **OR** acetaminophen **OR** acetaminophen **OR** acetaminophen    polyethylene glycol (PEG 3350)    senna    bisacodyl    fleet enema    fentaNYL    midazolam    diphenhydrAMINE    dextrose 10%    lipase-protease-amylase (Lip-Prot-Amyl) **AND** sodium bicarbonate    ondansetron    prochlorperazine    acetaminophen    Supplementary Documentation:        MDM High. Time spent on chart/note review, review of labs/imaging, , physical exam, discussion with staff, consultants, coordinating care, writing progress note, discussion of plan of care.      Janes Levine MD

## 2024-06-16 NOTE — PLAN OF CARE
Problem: Safety Risk - Non-Violent Restraints  Goal: Patient will remain free from self-harm  Description: INTERVENTIONS:  - Apply the least restrictive restraint to prevent harm  - Notify patient and family of reasons restraints applied  - Assess for any contributing factors to confusion (electrolyte disturbances, delirium, medications)  - Discontinue any unnecessary medical devices as soon as possible  - Assess the patient's physical comfort, circulation, skin condition, hydration, nutrition and elimination needs   - Reorient and redirection as needed  - Assess for the need to continue restraints  Outcome: Not Progressing      given to family

## 2024-06-16 NOTE — PLAN OF CARE
Pt is intubated and sedated    Prn meds were given   Safety measure were maintain and on restrain.     Problem: Patient Centered Care  Goal: Patient preferences are identified and integrated in the patient's plan of care  Description: Interventions:  - What would you like us to know as we care for you?   - Provide timely, complete, and accurate information to patient/family  - Incorporate patient and family knowledge, values, beliefs, and cultural backgrounds into the planning and delivery of care  - Encourage patient/family to participate in care and decision-making at the level they choose  - Honor patient and family perspectives and choices  Outcome: Progressing     Problem: Patient/Family Goals  Goal: Patient/Family Long Term Goal  Description: Patient's Long Term Goal:     Interventions:  -   - See additional Care Plan goals for specific interventions  Outcome: Progressing  Goal: Patient/Family Short Term Goal  Description: Patient's Short Term Goal:     Interventions:   -   - See additional Care Plan goals for specific interventions  Outcome: Progressing     Problem: CARDIOVASCULAR - ADULT  Goal: Maintains optimal cardiac output and hemodynamic stability  Description: INTERVENTIONS:  - Monitor vital signs, rhythm, and trends  - Monitor for bleeding, hypotension and signs of decreased cardiac output  - Evaluate effectiveness of vasoactive medications to optimize hemodynamic stability  - Monitor arterial and/or venous puncture sites for bleeding and/or hematoma  - Assess quality of pulses, skin color and temperature  - Assess for signs of decreased coronary artery perfusion - ex. Angina  - Evaluate fluid balance, assess for edema, trend weights  Outcome: Progressing  Goal: Absence of cardiac arrhythmias or at baseline  Description: INTERVENTIONS:  - Continuous cardiac monitoring, monitor vital signs, obtain 12 lead EKG if indicated  - Evaluate effectiveness of antiarrhythmic and heart rate control medications as  ordered  - Initiate emergency measures for life threatening arrhythmias  - Monitor electrolytes and administer replacement therapy as ordered  Outcome: Progressing     Problem: RESPIRATORY - ADULT  Goal: Achieves optimal ventilation and oxygenation  Description: INTERVENTIONS:  - Assess for changes in respiratory status  - Assess for changes in mentation and behavior  - Position to facilitate oxygenation and minimize respiratory effort  - Oxygen supplementation based on oxygen saturation or ABGs  - Provide Smoking Cessation handout, if applicable  - Encourage broncho-pulmonary hygiene including cough, deep breathe, Incentive Spirometry  - Assess the need for suctioning and perform as needed  - Assess and instruct to report SOB or any respiratory difficulty  - Respiratory Therapy support as indicated  - Manage/alleviate anxiety  - Monitor for signs/symptoms of CO2 retention  Outcome: Progressing     Problem: NEUROLOGICAL - ADULT  Goal: Achieves stable or improved neurological status  Description: INTERVENTIONS  - Assess for and report changes in neurological status  - Initiate measures to prevent increased intracranial pressure  - Maintain blood pressure and fluid volume within ordered parameters to optimize cerebral perfusion and minimize risk of hemorrhage  - Monitor temperature, glucose, and sodium. Initiate appropriate interventions as ordered  Outcome: Progressing  Goal: Absence of seizures  Description: INTERVENTIONS  - Monitor for seizure activity  - Administer anti-seizure medications as ordered  - Monitor neurological status  Outcome: Progressing  Goal: Remains free of injury related to seizure activity  Description: INTERVENTIONS:  - Maintain airway, patient safety  and administer oxygen as ordered  - Monitor patient for seizure activity, document and report duration and description of seizure to MD/LIP  - If seizure occurs, turn patient to side and suction secretions as needed  - Reorient patient post  seizure  - Seizure pads on all 4 side rails  - Instruct patient/family to notify RN of any seizure activity  - Instruct patient/family to call for assistance with activity based on assessment  Outcome: Progressing     Problem: Safety Risk - Non-Violent Restraints  Goal: Patient will remain free from self-harm  Description: INTERVENTIONS:  - Apply the least restrictive restraint to prevent harm  - Notify patient and family of reasons restraints applied  - Assess for any contributing factors to confusion (electrolyte disturbances, delirium, medications)  - Discontinue any unnecessary medical devices as soon as possible  - Assess the patient's physical comfort, circulation, skin condition, hydration, nutrition and elimination needs   - Reorient and redirection as needed  - Assess for the need to continue restraints  6/16/2024 0751 by Janis Plasencia RN  Outcome: Progressing  6/16/2024 0601 by Janis Plasencia RN  Outcome: Not Progressing     Problem: Delirium  Goal: Minimize duration of delirium  Description: Interventions:  - Encourage use of hearing aids, eye glasses  - Promote highest level of mobility daily  - Provide frequent reorientation  - Promote wakefulness i.e. lights on, blinds open  - Promote sleep, encourage patient's normal rest cycle i.e. lights off, TV off, minimize noise and interruptions  - Encourage family to assist in orientation and promotion of home routines  Outcome: Progressing

## 2024-06-16 NOTE — PROGRESS NOTES
[facility]  part of [facility]    Progress Note    Latonia Barker Patient Status:  Inpatient    1/3/1995 MRN [id number]   Location [facility] 2W/SW Attending Janes Levine MD   Hosp Day # 7 PCP Agatha Mcmanus MD         Subjective:     Unable to perform ROS    Sedated on mechanical ventilation with episode of severe agitation and combative behavior and become tachypneic with desaturation with moderate oral and endotracheal secretion  Moves all extremities  No further review of systems since patient on the vent sedated  Patient was seen and examined on several occasions today and discussed with the staff also on several occasions  Objective:   Blood pressure (!) 86/59, pulse 79, temperature 97.7 °F (36.5 °C), temperature source Temporal, resp. rate 19, weight 185 lb 6.5 oz (84.1 kg), last menstrual period 2024, SpO2 96%, not currently breastfeeding.  Physical Exam  Constitutional:       General: She is in acute distress.      Appearance: She is ill-appearing.   HENT:      Head: Atraumatic.      Mouth/Throat:      Mouth: Mucous membranes are moist.   Eyes:      General: No scleral icterus.  Cardiovascular:      Rate and Rhythm: Normal rate.      Heart sounds:      No gallop.   Pulmonary:      Effort: Respiratory distress present.      Breath sounds: No stridor. Rales present. No wheezing or rhonchi.   Abdominal:      General: Abdomen is flat. Bowel sounds are normal.      Palpations: Abdomen is soft.      Tenderness: There is no guarding.   Musculoskeletal:      Cervical back: No rigidity.      Right lower leg: No edema.      Left lower leg: No edema.   Skin:     General: Skin is dry.   Neurological:      General: No focal deficit present.         Results:   Lab Results   Component Value Date    WBC 7.5 2024    HGB 8.0 (L) 2024    HCT 24.5 (L) 2024    .0 2024    CREATSERUM 0.43 (L) 2024    BUN 10 2024     2024    K 4.2  06/16/2024     06/16/2024    CO2 29.0 06/16/2024    GLU 97 06/16/2024    CA 8.2 (L) 06/16/2024    ALB 2.7 (L) 06/13/2024    ALKPHO 146 (H) 06/13/2024    BILT 1.5 (H) 06/13/2024    TP 5.4 (L) 06/13/2024    AST 32 06/13/2024    ALT 32 06/13/2024    PTT 29.2 05/30/2020    INR 1.9 (A) 08/12/2020    T4F 0.73 11/02/2011    TSH 2.080 02/13/2019    LIP 18 (L) 05/30/2020    DDIMER 0.47 02/28/2022    CRP <0.5 01/29/2015    MG 1.7 06/16/2024    PHOS 4.2 06/13/2024    TROP <0.045 05/30/2020    TROPHS 4 02/28/2022    CK 55 03/29/2017    ETOH <3 06/09/2024       XR CHEST AP PORTABLE  (CPT=71045)    Result Date: 6/16/2024  CONCLUSION: Moderate interstitial edema, slightly improved but not resolved since 6/15/2024.  Small right and trace left pleural effusions have also slightly improved.  Retrocardiac air space density which may represent atelectasis versus pneumonia.  Dictated by (CST): Syd Smith MD on 6/16/2024 at 7:35 AM     Finalized by (CST): Syd Smith MD on 6/16/2024 at 7:37 AM          XR CHEST AP PORTABLE  (CPT=71045)    Result Date: 6/15/2024  CONCLUSION:  Unchanged mild cardiomegaly with interstitial pulmonary edema.  Moderate right pleural effusion, mildly increased.  Multifocal airspace opacities throughout the right lung, similar to prior.  Unchanged left retrocardiac opacity.    Dictated by (CST): Aziza Nath MD on 6/15/2024 at 9:14 AM     Finalized by (CST): Aziza Nath MD on 6/15/2024 at 9:16 AM               Assessment & Plan:      1-septic shock and bacteremia with Fusobacterium  Echo without vegetation  HD better / not on pressors now   IV antibiotics with meropenem and ID following     2-substance/heroin abuse with underlying psych problem with bipolar with recent prolonged rehab stay  Significant delirium and agitation  Now intubated on mechanical ventilation and sedated  Psych following     3-pneumonia   CXR today with some improvement   Continue with antibiotics with meropenem      4-acute respiratory failure required intubation and mechanical ventilation  Vent support and management and FiO2 at 40% with PEEP of 5 now  Not stable to extubate , very agitated and not cooperative and copious secretions    Wean down sedation       5-pregnancy at 12 weeks  OB/GYN following     6-status post sinus pause/transient asystole possible related to Precedex drip which was discontinued  LVEF 60%  Now in normal sinus rhythm        7-DVT prophylaxis with SCD  Not on anticoagulation with history of subarachnoid hemorrhage     8-nutrition  Tolerating NG tube feeding at the goal     9- Bipolar disorder and h/o drug abuse   Pych following         D/w staff / high riska dn complex           Skye Loco MD  6/16/2024

## 2024-06-16 NOTE — RESPIRATORY THERAPY NOTE
Pt received last night at 2300 on VC/AC rate 16, Vt 430, PEEP +5, FiO2 40% tolerating well. ETT 7.5, 21 at the lips. Bilat BS auscultated, pt suctioned as needed with small amounts of thick white secretions in line. Pt has large amounts of thick white oral secretions. No changes were made to the vent overnight. RT will continue to monitor.

## 2024-06-16 NOTE — RESPIRATORY THERAPY NOTE
Problem: RESPIRATORY - ADULT  Goal: Achieves optimal ventilation and oxygenation  Description: INTERVENTIONS:  - Assess for changes in respiratory status  - Assess for changes in mentation and behavior  - Position to facilitate oxygenation and minimize respiratory effort  - Oxygen supplementation based on oxygen saturation or ABGs  - Provide Smoking Cessation handout, if applicable  - Encourage broncho-pulmonary hygiene including cough, deep breathe, Incentive Spirometry  - Assess the need for suctioning and perform as needed  - Assess and instruct to report SOB or any respiratory difficulty  - Respiratory Therapy support as indicated  - Manage/alleviate anxiety  - Monitor for signs/symptoms of CO2 retention  Outcome: Progressing    Patient received on vent:       06/16/24 1735   Vent Information   Vent Mode VC/AC   Settings   FiO2 (%) 40 %   Resp Rate (Set) 16   Vt (Set, mL) 430 mL   Waveform Decelerating ramp   PEEP/CPAP (cm H2O) 5 cm H20     Bilateral BS auscultated, suction provided as needed. No changes made throughout the shift. RT will continue to monitor.

## 2024-06-17 ENCOUNTER — APPOINTMENT (OUTPATIENT)
Dept: GENERAL RADIOLOGY | Facility: HOSPITAL | Age: 29
DRG: 831 | End: 2024-06-17
Attending: INTERNAL MEDICINE

## 2024-06-17 LAB
ANION GAP SERPL CALC-SCNC: 4 MMOL/L (ref 0–18)
BASOPHILS # BLD AUTO: 0.04 X10(3) UL (ref 0–0.2)
BASOPHILS NFR BLD AUTO: 0.5 %
BUN BLD-MCNC: 10 MG/DL (ref 9–23)
BUN/CREAT SERPL: 27.8 (ref 10–20)
CALCIUM BLD-MCNC: 8.3 MG/DL (ref 8.7–10.4)
CHLORIDE SERPL-SCNC: 106 MMOL/L (ref 98–112)
CO2 SERPL-SCNC: 28 MMOL/L (ref 21–32)
CREAT BLD-MCNC: 0.36 MG/DL
DEPRECATED RDW RBC AUTO: 47.8 FL (ref 35.1–46.3)
EGFRCR SERPLBLD CKD-EPI 2021: 143 ML/MIN/1.73M2 (ref 60–?)
EOSINOPHIL # BLD AUTO: 0.15 X10(3) UL (ref 0–0.7)
EOSINOPHIL NFR BLD AUTO: 1.9 %
ERYTHROCYTE [DISTWIDTH] IN BLOOD BY AUTOMATED COUNT: 16.6 % (ref 11–15)
GLUCOSE BLD-MCNC: 100 MG/DL (ref 70–99)
HCT VFR BLD AUTO: 22.9 %
HGB BLD-MCNC: 7.4 G/DL
IMM GRANULOCYTES # BLD AUTO: 0.23 X10(3) UL (ref 0–1)
IMM GRANULOCYTES NFR BLD: 2.9 %
LYMPHOCYTES # BLD AUTO: 1.69 X10(3) UL (ref 1–4)
LYMPHOCYTES NFR BLD AUTO: 21.7 %
MAGNESIUM SERPL-MCNC: 1.7 MG/DL (ref 1.6–2.6)
MCH RBC QN AUTO: 25.5 PG (ref 26–34)
MCHC RBC AUTO-ENTMCNC: 32.3 G/DL (ref 31–37)
MCV RBC AUTO: 79 FL
MONOCYTES # BLD AUTO: 0.56 X10(3) UL (ref 0.1–1)
MONOCYTES NFR BLD AUTO: 7.2 %
NEUTROPHILS # BLD AUTO: 5.13 X10 (3) UL (ref 1.5–7.7)
NEUTROPHILS # BLD AUTO: 5.13 X10(3) UL (ref 1.5–7.7)
NEUTROPHILS NFR BLD AUTO: 65.8 %
OSMOLALITY SERPL CALC.SUM OF ELEC: 285 MOSM/KG (ref 275–295)
PHOSPHATE SERPL-MCNC: 4.2 MG/DL (ref 2.4–5.1)
PLATELET # BLD AUTO: 270 10(3)UL (ref 150–450)
POTASSIUM SERPL-SCNC: 4.1 MMOL/L (ref 3.5–5.1)
RBC # BLD AUTO: 2.9 X10(6)UL
SODIUM SERPL-SCNC: 138 MMOL/L (ref 136–145)
TRIGL SERPL-MCNC: 253 MG/DL (ref 30–149)
WBC # BLD AUTO: 7.8 X10(3) UL (ref 4–11)

## 2024-06-17 PROCEDURE — 99233 SBSQ HOSP IP/OBS HIGH 50: CPT | Performed by: HOSPITALIST

## 2024-06-17 PROCEDURE — 99233 SBSQ HOSP IP/OBS HIGH 50: CPT | Performed by: OTHER

## 2024-06-17 PROCEDURE — 71045 X-RAY EXAM CHEST 1 VIEW: CPT | Performed by: INTERNAL MEDICINE

## 2024-06-17 PROCEDURE — 99291 CRITICAL CARE FIRST HOUR: CPT | Performed by: INTERNAL MEDICINE

## 2024-06-17 RX ORDER — LORAZEPAM 2 MG/ML
2 INJECTION INTRAMUSCULAR ONCE AS NEEDED
Status: COMPLETED | OUTPATIENT
Start: 2024-06-17 | End: 2024-06-18

## 2024-06-17 RX ORDER — QUETIAPINE FUMARATE 200 MG/1
400 TABLET, FILM COATED ORAL 3 TIMES DAILY
Status: DISCONTINUED | OUTPATIENT
Start: 2024-06-17 | End: 2024-06-17

## 2024-06-17 RX ORDER — METHADONE HYDROCHLORIDE 10 MG/5ML
10 SOLUTION ORAL EVERY 8 HOURS
Status: DISCONTINUED | OUTPATIENT
Start: 2024-06-17 | End: 2024-06-17

## 2024-06-17 RX ORDER — VENLAFAXINE HYDROCHLORIDE 37.5 MG/1
37.5 CAPSULE, EXTENDED RELEASE ORAL NIGHTLY
Status: DISCONTINUED | OUTPATIENT
Start: 2024-06-17 | End: 2024-06-19

## 2024-06-17 RX ORDER — LORAZEPAM 2 MG/ML
INJECTION INTRAMUSCULAR
Status: COMPLETED
Start: 2024-06-17 | End: 2024-06-17

## 2024-06-17 RX ORDER — HALOPERIDOL 5 MG/ML
5 INJECTION INTRAMUSCULAR EVERY 6 HOURS PRN
Status: DISCONTINUED | OUTPATIENT
Start: 2024-06-17 | End: 2024-06-18

## 2024-06-17 RX ORDER — LACTULOSE 10 G/15ML
30 SOLUTION ORAL ONCE
Status: COMPLETED | OUTPATIENT
Start: 2024-06-17 | End: 2024-06-17

## 2024-06-17 RX ORDER — MAGNESIUM SULFATE HEPTAHYDRATE 40 MG/ML
2 INJECTION, SOLUTION INTRAVENOUS ONCE
Status: COMPLETED | OUTPATIENT
Start: 2024-06-17 | End: 2024-06-17

## 2024-06-17 RX ORDER — HALOPERIDOL 5 MG/ML
5 INJECTION INTRAMUSCULAR ONCE AS NEEDED
Status: COMPLETED | OUTPATIENT
Start: 2024-06-17 | End: 2024-06-18

## 2024-06-17 RX ORDER — FUROSEMIDE 10 MG/ML
40 INJECTION INTRAMUSCULAR; INTRAVENOUS ONCE
Status: COMPLETED | OUTPATIENT
Start: 2024-06-17 | End: 2024-06-17

## 2024-06-17 RX ORDER — LORAZEPAM 2 MG/ML
2 INJECTION INTRAMUSCULAR ONCE
Status: DISCONTINUED | OUTPATIENT
Start: 2024-06-17 | End: 2024-06-18

## 2024-06-17 RX ORDER — BISACODYL 10 MG
10 SUPPOSITORY, RECTAL RECTAL ONCE
Status: COMPLETED | OUTPATIENT
Start: 2024-06-17 | End: 2024-06-17

## 2024-06-17 RX ORDER — LORAZEPAM 2 MG/ML
2 INJECTION INTRAMUSCULAR EVERY 6 HOURS SCHEDULED
Status: DISCONTINUED | OUTPATIENT
Start: 2024-06-17 | End: 2024-06-19

## 2024-06-17 RX ORDER — METHADONE HYDROCHLORIDE 10 MG/5ML
20 SOLUTION ORAL EVERY 8 HOURS
Status: DISCONTINUED | OUTPATIENT
Start: 2024-06-17 | End: 2024-06-24

## 2024-06-17 RX ORDER — DIPHENHYDRAMINE HYDROCHLORIDE 12.5 MG/5ML
25 SOLUTION ORAL 3 TIMES DAILY
Status: DISCONTINUED | OUTPATIENT
Start: 2024-06-17 | End: 2024-06-24

## 2024-06-17 RX ORDER — HYDROXYZINE HYDROCHLORIDE 25 MG/1
25 TABLET, FILM COATED ORAL 3 TIMES DAILY
Status: DISCONTINUED | OUTPATIENT
Start: 2024-06-17 | End: 2024-06-17

## 2024-06-17 NOTE — PLAN OF CARE
Patient remains intubated/sedated. Oral secretions copious. Patient severely restless this AM - x3 Fentanyl boluses given PRN, x1 versed IVP given PRN, x1 Ativan one-time dose given. Medications adjusted per psych MD. Lasix 40mg x1 dose given with exceptional UOP. Magnesium replaced per protocol. Senna, colace, Lactulose, and dulcolax suppository given today with x1 extra large hard/formed BM this evening - gibson/kurt care and full linen change done. Tube feedings continued. Propofol and fentanyl gtts continued - unable to wean d/t pt restlessness. Patient mom updated on status, questions answered. Safety measures maintained.    Problem: CARDIOVASCULAR - ADULT  Goal: Maintains optimal cardiac output and hemodynamic stability  Description: INTERVENTIONS:  - Monitor vital signs, rhythm, and trends  - Monitor for bleeding, hypotension and signs of decreased cardiac output  - Evaluate effectiveness of vasoactive medications to optimize hemodynamic stability  - Monitor arterial and/or venous puncture sites for bleeding and/or hematoma  - Assess quality of pulses, skin color and temperature  - Assess for signs of decreased coronary artery perfusion - ex. Angina  - Evaluate fluid balance, assess for edema, trend weights  Outcome: Progressing  Goal: Absence of cardiac arrhythmias or at baseline  Description: INTERVENTIONS:  - Continuous cardiac monitoring, monitor vital signs, obtain 12 lead EKG if indicated  - Evaluate effectiveness of antiarrhythmic and heart rate control medications as ordered  - Initiate emergency measures for life threatening arrhythmias  - Monitor electrolytes and administer replacement therapy as ordered  Outcome: Progressing     Problem: RESPIRATORY - ADULT  Goal: Achieves optimal ventilation and oxygenation  Description: INTERVENTIONS:  - Assess for changes in respiratory status  - Assess for changes in mentation and behavior  - Position to facilitate oxygenation and minimize respiratory effort  -  Oxygen supplementation based on oxygen saturation or ABGs  - Provide Smoking Cessation handout, if applicable  - Encourage broncho-pulmonary hygiene including cough, deep breathe, Incentive Spirometry  - Assess the need for suctioning and perform as needed  - Assess and instruct to report SOB or any respiratory difficulty  - Respiratory Therapy support as indicated  - Manage/alleviate anxiety  - Monitor for signs/symptoms of CO2 retention  Outcome: Progressing     Problem: NEUROLOGICAL - ADULT  Goal: Achieves stable or improved neurological status  Description: INTERVENTIONS  - Assess for and report changes in neurological status  - Initiate measures to prevent increased intracranial pressure  - Maintain blood pressure and fluid volume within ordered parameters to optimize cerebral perfusion and minimize risk of hemorrhage  - Monitor temperature, glucose, and sodium. Initiate appropriate interventions as ordered  Outcome: Progressing  Goal: Absence of seizures  Description: INTERVENTIONS  - Monitor for seizure activity  - Administer anti-seizure medications as ordered  - Monitor neurological status  Outcome: Progressing  Goal: Remains free of injury related to seizure activity  Description: INTERVENTIONS:  - Maintain airway, patient safety  and administer oxygen as ordered  - Monitor patient for seizure activity, document and report duration and description of seizure to MD/LIP  - If seizure occurs, turn patient to side and suction secretions as needed  - Reorient patient post seizure  - Seizure pads on all 4 side rails  - Instruct patient/family to notify RN of any seizure activity  - Instruct patient/family to call for assistance with activity based on assessment  Outcome: Progressing     Problem: Safety Risk - Non-Violent Restraints  Goal: Patient will remain free from self-harm  Description: INTERVENTIONS:  - Apply the least restrictive restraint to prevent harm  - Notify patient and family of reasons restraints  applied  - Assess for any contributing factors to confusion (electrolyte disturbances, delirium, medications)  - Discontinue any unnecessary medical devices as soon as possible  - Assess the patient's physical comfort, circulation, skin condition, hydration, nutrition and elimination needs   - Reorient and redirection as needed  - Assess for the need to continue restraints  6/17/2024 1855 by Pascale Abreu RN  Outcome: Progressing  6/17/2024 0802 by Pascale Abreu RN  Outcome: Progressing     Problem: Delirium  Goal: Minimize duration of delirium  Description: Interventions:  - Encourage use of hearing aids, eye glasses  - Promote highest level of mobility daily  - Provide frequent reorientation  - Promote wakefulness i.e. lights on, blinds open  - Promote sleep, encourage patient's normal rest cycle i.e. lights off, TV off, minimize noise and interruptions  - Encourage family to assist in orientation and promotion of home routines  Outcome: Progressing

## 2024-06-17 NOTE — RESPIRATORY THERAPY NOTE
Patient received intubated and on full vent support with the following settings AC 16/430/40%/+5. Bilateral BS heard on auscultation. Suction provided as needed.  Patient not appropriate for SBT.

## 2024-06-17 NOTE — PROGRESS NOTES
[facility]  Progress Note     Latonia Barker  : 1/3/1995    Status: Inpatient  Day #: 8    Attending: Janes Levine MD  PCP: Agatha Mcmanus MD     Assessment and Plan:    Acute septic shock 2/2 multifocal pneumonia and Fusobacterium bacteremia  Acute hypoxic respiratory failure  -ID, pulm/critical care on consult  -CXR and CT chest reviewed. No PE. 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   -HCG down trending  -Ob consulted  -no vaginal bleed - cont monitor  -repeat US prior to discharge    Bicytopenia: plt normalized. Anemia persists - better today  -TCP possibly 2/2 sepsis. improving  -Anemia possibly from acute blood loss related to 3.2 cm subchorionic hemorrhage?  -Hold off ac   -6/10: 1 U prbc transfused  -transfuse if hgb<7     RAMON: mild - improved  Likely 2/2 shock  On pressors, IVF  Monitor renal function daily and avoid nephrotoxins     Bipolar disorder  -psych consult (of note both mom and daughter vehemently refused in prior admission)  -zofran prn, seroquel 200mg qhs  -supportive  -SW     Chronic anticoagulation  H/o Recurrent venous thrombosis superficial and deep   -(had  followed in [facility] in the past)  -per last hematology f/u note 2020 \"She completed more than 6 months of anticoagulation with warfarin for what we consider a provoked DVT and states she is no longer using IV drugs in the upper extremities.  She is now pregnant.  Would recommend stopping anticoagulation as long as she is able to avoid IV drug use \"  -avoid a/c due to subchorionic hemorrhage on US     DVT Mechanical Prophylaxis:   SCDs,    DVT Pharmacologic Prophylaxis   Medication   None               Subjective:      Initial Chief Complaint:  fever malaise    Patient is intubated      Objective:      Temp:  [97.5 °F (36.4 °C)-98.2 °F (36.8 °C)] 98.1 °F (36.7 °C)  Pulse:  [] 81  Resp:  [18-34] 20  BP: ()/(60-91) 112/77  SpO2:  [91 %-99 %] 99 %  FiO2 (%):  [40 %] 40 %  General:  Intubated but opens eyes and moves  HEENT:  Normocephalic, atraumatic  Cardiac:  Regular rate, regular rhythm  Pulmonary:  Clear to auscultation bilaterally, respirations unlabored  Gastrointestinal:  Soft, non-tender, normal bowel sounds  Musculoskeletal:  No joint swelling  Extremities:  No edema, no cyanosis, no clubbing  Neurologic:  moving all ext  Psychiatric:  calm    Intake/Output Summary (Last 24 hours) at 6/17/2024 1236  Last data filed at 6/17/2024 1112  Gross per 24 hour   Intake 3089.75 ml   Output 4000 ml   Net -910.25 ml         Recent Labs   Lab 06/15/24  0557 06/16/24  0525 06/17/24  0335   WBC 8.1 7.5 7.8   HGB 7.0* 8.0* 7.4*   HCT 21.3* 24.5* 22.9*   .0 230.0 270.0   RBC 2.77* 3.11* 2.90*   MCV 76.9* 78.8* 79.0*   MCH 25.3* 25.7* 25.5*   MCHC 32.9 32.7 32.3   RDW 16.9* 16.7* 16.6*   NEPRELIM 5.15 5.11 5.13     Recent Labs   Lab 06/12/24  0318 06/13/24  0331 06/14/24  0414 06/15/24  0557 06/16/24  0525 06/17/24  0335   BUN 7* 9   < > 10 10 10   CREATSERUM 0.44* 0.45*   < > 0.40* 0.43* 0.36*   CA 7.5* 8.0*   < > 8.2* 8.2* 8.3*   ALB 2.6* 2.7*  --   --   --   --     135*   < >  134* 137 138   K 3.8 3.9   < > 4.2 4.2 4.1    106   < > 106 104 106   CO2 25.0 26.0   < > 29.0 29.0 28.0   GLU 85 117*   < > 93 97 100*   MG 1.7 1.5*   < > 1.7 1.7 1.7   PHOS 4.2 4.2  --   --   --  4.2   BILT 0.9 1.5*  --   --   --   --    AST 51* 32  --   --   --   --    ALT 40 32  --   --   --   --    ALKPHO 135* 146*  --   --   --   --    TP 4.9* 5.4*  --   --   --   --     < > = values in this interval not displayed.       XR CHEST AP PORTABLE  (CPT=71045)    Result Date: 6/17/2024  CONCLUSION:  1. Cardiomegaly. 2. Support tubes and catheters are satisfactory. 3. Extensive bilateral mixed multifocal airspace opacification with interval progression.  4. Small-moderate bilateral pleural effusions have progressed    Dictated by (CST): Khadar Baxter MD on 6/17/2024 at 9:15 AM     Finalized by (CST): Khadar Baxter MD on 6/17/2024 at 9:19 AM          XR CHEST AP PORTABLE  (CPT=71045)    Result Date: 6/17/2024  PROCEDURE: XR CHEST AP PORTABLE  (CPT=71045) TIME: 829  COMPARISON: [facility], XR CHEST AP PORTABLE (CPT=71045), 6/14/2024, 5:28 AM.  [facility], XR CHEST AP PORTABLE (CPT=71045), 6/10/2024, 2:18 AM.  [facility], CT CHEST PE AORTA (IV ONLY) (CPT=71260), 6/09/2024, 9:50 AM.  [facility], XR CHEST AP PORTABLE (CPT=71045), 6/17/2024, 7:12 AM.  INDICATIONS: Shortness of breath.  TECHNIQUE:   Single view.   Findings and impression:  There has been no significant change  Stable well-positioned support devices.  Stable heart size.  Normal vascular pedicle  Persistent small consolidation in the upper right lung and confluent bibasilar opacities from atelectasis/effusion or lung base consolidation  There is no pneumothorax     Dictated by (CST): Steffen Godinez MD on 6/17/2024 at 8:44 AM     Finalized by (CST): Steffen Godinez MD on 6/17/2024 at 8:48 AM          XR CHEST AP PORTABLE  (CPT=71045)    Result Date: 6/16/2024  CONCLUSION: Moderate  interstitial edema, slightly improved but not resolved since 6/15/2024.  Small right and trace left pleural effusions have also slightly improved.  Retrocardiac air space density which may represent atelectasis versus pneumonia.  Dictated by (CST): Syd Smith MD on 6/16/2024 at 7:35 AM     Finalized by (CST): Syd Smith MD on 6/16/2024 at 7:37 AM             Medications:   LORazepam  2 mg Intravenous 4 times per day    LORazepam  2 mg Intravenous Once    QUEtiapine  300 mg Per NG Tube TID    Methadone HCl  10 mg Oral Q8H    bisacodyl  10 mg Rectal Once    furosemide  40 mg Intravenous Once    senna  5 mL Per NG Tube BID    docusate  100 mg Per NG Tube BID    chlorhexidine gluconate  15 mL Mouth/Throat BID@0800,2000    meropenem  1 g Intravenous Q8H    venlafaxine ER  75 mg Oral Nightly    prazosin  2 mg Oral Nightly    famotidine  20 mg Intravenous BID    melatonin  3 mg Oral Nightly      PRN Meds:   LORazepam    haloperidol lactate    haloperidol lactate    midazolam    [DISCONTINUED] acetaminophen **OR** acetaminophen **OR** acetaminophen **OR** acetaminophen    polyethylene glycol (PEG 3350)    senna    bisacodyl    fleet enema    fentaNYL    diphenhydrAMINE    dextrose 10%    lipase-protease-amylase (Lip-Prot-Amyl) **AND** sodium bicarbonate    ondansetron    prochlorperazine    acetaminophen    Supplementary Documentation:        [facility]. Time spent on chart/note review, review of labs/imaging, , physical exam, discussion with staff, consultants, coordinating care, writing progress note, discussion of plan of care.      Janes Levine MD

## 2024-06-17 NOTE — DIETARY NOTE
ADULT NUTRITION REASSESSMENT    Pt is at high nutrition risk.  Pt does not meet malnutrition criteria.    RECOMMENDATIONS TO MD: See Nutrition Intervention for Enteral Nutrition (EN) rx. EN adjusted for change in lipid kcals via Propofol.     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 Findings: pneumonia, 10 weeks pregnant with subchorionic hemorrhage of the gestational sac,  mild RAMON, respiratory failure intubated 6/10PMH sig for Bipolar, IV drug abuse.   Pt assessed due to consult for enteral nutrition. Pt has NG tube in place for EN access. Sedated on high dose Propofol (39.4 ml/hr= ~ 1040 kcals/lipids) and TG today at 308. Weight hx: fairly stable and pt is obese @ BMI 30.89 kg/m2. Physical exam:  Pt appears well nourished. +BM noted. Diet Hx: per RN mother left for home to sleep as works days and other family members to return later today. RD will obtain diet hx later when family is here.   EN start with high protein polymeric formula + additional modular protein supplements to meet needs. Elevated TG- monitor as value >400 mg/dl  will prompt need to adjust to alternate sedation if continues to need or per Pharmacy recommendations.   6/13/24 Update: Not  extubated today. Propofol resumed (13.9 ml/hr= ~ 366 kcals via lipids). Mg replaced. Prn bowel regime ordered/APN as last BM 6/9. RD adjusted EN rx to meet nutrition needs. See orders.   6/17/24 Update: Continued intubation.Possible early ARDS/pneumonia at rounds.  Propofol Intake increased with increase in lipid kcals. +Constipation and appropriate meds given. (Fentanyl- increases risk for constipation) EN adjusted to accommodate increase in lipid kcals to avoid overfeeding.     FOOD/NUTRITION RELATED HISTORY:  Appetite: Good  Intake: NPO, EN support.   Intake Meeting Needs: yes via EN.   Percent Meals Eaten (last 3 days)       None           Food Allergies: No Known Food Allergies (NKFA)  Cultural/Ethnic/Religion Preferences: Not Obtained    GASTROINTESTINAL: +BM 6/9 formed medium brown. NG tube in place.  6/13 PRN bowel regime in place. 6/17 meds for constipation adjusted.     MEDICATIONS: reviewed Propofol @ 23 ml/hr= ~600 kcals from lipids.    propofol 50 mcg/kg/min (06/17/24 1300)    fentanyl 80 mcg/hr (06/17/24 1300)    dextrose 10%          LORazepam  2 mg Intravenous 4 times per day    LORazepam  2 mg Intravenous Once    QUEtiapine  300 mg Per NG Tube TID    bisacodyl  10 mg Rectal Once    Methadone HCl  20 mg Oral Q8H    hydrOXYzine  25 mg Oral TID    venlafaxine ER  37.5 mg Oral Nightly    senna  5 mL Per NG Tube BID    docusate  100 mg Per NG Tube BID    chlorhexidine gluconate  15 mL Mouth/Throat BID@0800,2000    meropenem  1 g Intravenous Q8H    prazosin  2 mg Oral Nightly    famotidine  20 mg Intravenous BID    melatonin  3 mg Oral Nightly   PRN meds noted. (No bowel regime presently)     LABS: reviewed 6/17 Trig 253-adequate lipid clearance. Na wnl   Recent Labs     06/15/24  0557 06/16/24  0525 06/17/24  0335   GLU 93 97 100*   BUN 10 10 10   CREATSERUM 0.40* 0.43* 0.36*   CA 8.2* 8.2* 8.3*   MG 1.7 1.7 1.7   * 137 138   K 4.2 4.2 4.1    104 106   CO2 29.0 29.0 28.0   PHOS  --   --  4.2    [facility] [phone]       NUTRITION RELATED PHYSICAL FINDINGS:  - Nutrition Focused Physical Exam (NFPE): well nourished per visual exam  - Fluid Accumulation: +1 Bilateral Upper extremity and Hand (s) and none  see RN documentation for details  - Skin Integrity: intact see RN documentation for details    ANTHROPOMETRICS:  HT:  5'2\"   WT: 84.1 kg (185 lb 6.5 oz)  admit wt: 170 lbs. Wt up likely d/t fluid gains.   BMI: Body mass index is 33.91 kg/m².  BMI CLASSIFICATION: 30-34.9 kg/m2 - obesity class I  IBW: 110  lbs        153 % IBW  Usual Body Wt: 170-175  lbs 6/9/24 and  3/10/23.     97 % UBW    WEIGHT HISTORY:  Patient Weight(s) for the past 336 hrs:   Weight   06/15/24 0400 84.1 kg (185 lb 6.5 oz)   06/13/24 0400 83.6 kg (184 lb 4.9 oz)   06/12/24 0800 85.7 kg (188 lb 15 oz)   06/12/24 0400 81.9 kg (180 lb 8.9 oz)   06/10/24 2300 77.7 kg (171 lb 4.8 oz)   06/10/24 0700 76.6 kg (168 lb 14 oz)   06/09/24 1159 77.2 kg (170 lb 3.1 oz)   06/09/24 0715 72 kg (158 lb 11.7 oz)     Wt Readings from Last 10 Encounters:   06/15/24 84.1 kg (185 lb 6.5 oz)   05/24/24 72.8 kg (160 lb 9.6 oz)   04/16/24 74.4 kg (164 lb)   02/19/24 78 kg (172 lb)   02/05/24 78 kg (172 lb)   08/01/23 77.1 kg (170 lb)   04/26/23 79.6 kg (175 lb 6.4 oz)   04/10/23 78.5 kg (173 lb)   03/10/23 79.7 kg (175 lb 9.6 oz)   12/13/22 75.1 kg (165 lb 9.6 oz)     NUTRITION DIAGNOSIS/PROBLEM:   Inadequate oral intake related to Lack of or limited access to food in the setting of intubation and inability to swallow as evidenced by NPO, 0 nutrition intake  NUTRITION DIAGNOSIS PROGRESS:  Improvement (unresolved) on EN to goal.   NUTRITION INTERVENTION:     NUTRITION PRESCRIPTION:   Estimated Nutrition needs: --dosing wt of 76.6 kg - wt taken on 6/10/24  Calories: 1786  calories/day (24 calories per kg Dosing wt) ([place] state equation)   Protein:  g protein/day (1.2-2.0 g protein/kg Ideal body wt (IBW))  Fluid Needs: ~35 ml/kg adjusted IBW for obesity  (57 kg) : 1995 ml (maintenance). Adjust per clinical status.     - Diet:   No orders of the defined types were placed in this encounter.   - EN rx via NG tube: Promote goal 55 ml/hr X ave 22 hr infusion time, ,FWF: 30 ml q 4 hrs to start (180 ml)= 1210  kcals (1810 total kcals with non-nutrition kcals included) 76 g protein, 1016 ml h20, (1196 ml total h20 including FWF), >100% RDI's, 102% veronica goal and 100% protein goal.  Additional fluid via meds.   - Medical Food Supplements-NP0  - Vitamin and mineral supplements: none  - Feeding assistance: NPO  - Nutrition education: not appropriate at this time -intubated.   - Coordination of nutrition care: collaboration with other providers and discussed in Care Rounds   - Discharge and transfer of nutrition care to new setting or provider: monitor plans    MONITOR AND EVALUATE/NUTRITION GOALS:  - Food and Nutrient Intake:      Monitor: for PO initiation post extubation if deemed safe.   - Food and Nutrient Administration:      Monitor: tolerance to enteral nutrition, adequacy of enteral nutrition, for enteral nutrition adjustment, and propofol rate  - Anthropometric Measurement:    Monitor weight  - Nutrition Goals:      allow wt loss due to fluid losses, EN + non-nutritive kcal >80% energy needs, labs within acceptable limits, and support body systems, TG wnl on Propofol. Alleviate constipation.     DIETITIAN FOLLOW UP: RD to follow and monitor nutrition status    Vangie Walsh RD, LDN, CNSC ([id number])

## 2024-06-17 NOTE — SPIRITUAL CARE NOTE
Spiritual Care Visit Note    Patient Name: Latonia Barker Date of Spiritual Care Visit: 24   : 1/3/1995 Primary Dx: Septic shock (HCC)       Referred By: Referral From: ARJUN Peace    Summary:  received referral for visit from ARJUN dowell. Writer attempted to visit but patient was sedated, no family present.      - Spiritual Care:  remains available as needed for follow up.    Spiritual Care support can be requested via an [facility] consult.   For urgent/immediate needs, please contact the On Call  at: [place]: ext [phone]    Chaplain Afshin.

## 2024-06-17 NOTE — PLAN OF CARE
Pt intubated and sedated, had episode of severe agitation, awake and following simple commands.  Remains on fentanyl ggt, Fio 2 @ 40%, large copious secretion, SR, VSS, tolerating tube feeding, no BM,  Q2 reposition, and frequent safety education.       Problem: Patient Centered Care  Goal: Patient preferences are identified and integrated in the patient's plan of care  Description: Interventions:  - What would you like us to know as we care for you?  ALEX     - Provide timely, complete, and accurate information to patient/family  - Incorporate patient and family knowledge, values, beliefs, and cultural backgrounds into the planning and delivery of care  - Encourage patient/family to participate in care and decision-making at the level they choose  - Honor patient and family perspectives and choices  Outcome: Not Progressing     Problem: Patient/Family Goals  Goal: Patient/Family Long Term Goal  Description: Patient's Long Term Goal:     Interventions:  -   - See additional Care Plan goals for specific interventions  Outcome: Not Progressing  Goal: Patient/Family Short Term Goal  Description: Patient's Short Term Goal:     Interventions:   -   - See additional Care Plan goals for specific interventions  Outcome: Not Progressing     Problem: CARDIOVASCULAR - ADULT  Goal: Maintains optimal cardiac output and hemodynamic stability  Description: INTERVENTIONS:  - Monitor vital signs, rhythm, and trends  - Monitor for bleeding, hypotension and signs of decreased cardiac output  - Evaluate effectiveness of vasoactive medications to optimize hemodynamic stability  - Monitor arterial and/or venous puncture sites for bleeding and/or hematoma  - Assess quality of pulses, skin color and temperature  - Assess for signs of decreased coronary artery perfusion - ex. Angina  - Evaluate fluid balance, assess for edema, trend weights  Outcome: Not Progressing  Goal: Absence of cardiac arrhythmias or at baseline  Description:  INTERVENTIONS:  - Continuous cardiac monitoring, monitor vital signs, obtain 12 lead EKG if indicated  - Evaluate effectiveness of antiarrhythmic and heart rate control medications as ordered  - Initiate emergency measures for life threatening arrhythmias  - Monitor electrolytes and administer replacement therapy as ordered  Outcome: Not Progressing     Problem: RESPIRATORY - ADULT  Goal: Achieves optimal ventilation and oxygenation  Description: INTERVENTIONS:  - Assess for changes in respiratory status  - Assess for changes in mentation and behavior  - Position to facilitate oxygenation and minimize respiratory effort  - Oxygen supplementation based on oxygen saturation or ABGs  - Provide Smoking Cessation handout, if applicable  - Encourage broncho-pulmonary hygiene including cough, deep breathe, Incentive Spirometry  - Assess the need for suctioning and perform as needed  - Assess and instruct to report SOB or any respiratory difficulty  - Respiratory Therapy support as indicated  - Manage/alleviate anxiety  - Monitor for signs/symptoms of CO2 retention  Outcome: Not Progressing     Problem: NEUROLOGICAL - ADULT  Goal: Achieves stable or improved neurological status  Description: INTERVENTIONS  - Assess for and report changes in neurological status  - Initiate measures to prevent increased intracranial pressure  - Maintain blood pressure and fluid volume within ordered parameters to optimize cerebral perfusion and minimize risk of hemorrhage  - Monitor temperature, glucose, and sodium. Initiate appropriate interventions as ordered  Outcome: Not Progressing  Goal: Absence of seizures  Description: INTERVENTIONS  - Monitor for seizure activity  - Administer anti-seizure medications as ordered  - Monitor neurological status  Outcome: Not Progressing  Goal: Remains free of injury related to seizure activity  Description: INTERVENTIONS:  - Maintain airway, patient safety  and administer oxygen as ordered  - Monitor  patient for seizure activity, document and report duration and description of seizure to MD/LIP  - If seizure occurs, turn patient to side and suction secretions as needed  - Reorient patient post seizure  - Seizure pads on all 4 side rails  - Instruct patient/family to notify RN of any seizure activity  - Instruct patient/family to call for assistance with activity based on assessment  Outcome: Not Progressing     Problem: Safety Risk - Non-Violent Restraints  Goal: Patient will remain free from self-harm  Description: INTERVENTIONS:  - Apply the least restrictive restraint to prevent harm  - Notify patient and family of reasons restraints applied  - Assess for any contributing factors to confusion (electrolyte disturbances, delirium, medications)  - Discontinue any unnecessary medical devices as soon as possible  - Assess the patient's physical comfort, circulation, skin condition, hydration, nutrition and elimination needs   - Reorient and redirection as needed  - Assess for the need to continue restraints  6/17/2024 0636 by Janis Plasencia RN  Outcome: Not Progressing  6/16/2024 2239 by Janis Plasencia RN  Outcome: Not Progressing     Problem: Delirium  Goal: Minimize duration of delirium  Description: Interventions:  - Encourage use of hearing aids, eye glasses  - Promote highest level of mobility daily  - Provide frequent reorientation  - Promote wakefulness i.e. lights on, blinds open  - Promote sleep, encourage patient's normal rest cycle i.e. lights off, TV off, minimize noise and interruptions  - Encourage family to assist in orientation and promotion of home routines  Outcome: Not Progressing

## 2024-06-17 NOTE — PROGRESS NOTES
INFECTIOUS DISEASE PROGRESS NOTE  [facility]  part of [facility] ID PROGRESS NOTE    Latonia Barker Patient Status:  Inpatient    1/3/1995 MRN [id number]   Location [facility] 2W/SW Attending Iram Marquez MD   Hosp Day # 8 PCP Agatha Mcmanus MD     Subjective:  ROS unable to be reviewed. Remains intubated and sedated. RN notes a lot of oral secretions.    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  -  Follow fever curve, wbc.  -  Reviewed imaging, labs, micro reports.  -  Case d/w family, RN.      History of Present Illness:  Latonia Barker is a 29 year old female with hx of active IVDU, hep C, bipolar dx, history of MRSA bacteremia and 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 She states she was on suboxone but unfortunately relapsed and has been using for the past 4 days. She states she was very weak and having headaches as well as neck stiffness. States she feels much better now. Mostly injects in her groin area. Denies sharing any needles.  Temp 99.3F on admission with normal wbc.  Blood cx pending. Started on IV ceftriaxone and vanc.     Physical Exam:  BP (!) 134/91 (BP Location: Right arm)   Pulse 110   Temp 97.7 °F (36.5 °C) (Temporal)   Resp 26   Wt 185 lb 6.5 oz (84.1 kg)   LMP 03/25/2024 (Approximate)   SpO2 96%   BMI 33.91 kg/m²     Gen:   Intubated, sedated  HEENT:  ETT+, neck supple  CV/lungs:  RRR, CTAB  Abdom:  Soft, no TTP  Skin/extrem:  No rashes, no c/c/e  :   Dyson draining yellow urine  Lines:  RIJ CVC+    Laboratory Data: Reviewed    Microbiology: Reviewed    Radiology: Reviewed      [facility]  [phone]  6/17/2024

## 2024-06-17 NOTE — PAYOR COMM NOTE
--------------  CONTINUED STAY REVIEW-----REQUESTING ADDITIONAL DAY       Payor: [facility]  Subscriber #:  [id number]  Authorization Number: [id number]    Admit date: 24  Admit time: 11:49 AM    Assessment and Plan:     Acute septic shock 2/2 multifocal pneumonia and Fusobacterium bacteremia  Acute hypoxic respiratory failure  -ID, pulm/critical care on consult  -CXR and CT chest reviewed. No PE. 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   -HCG down trending  -Ob consulted  -no vaginal bleed - cont monitor  -repeat US prior to discharge     Bicytopenia: plt normalized. Anemia persists - better today  -TCP possibly 2/2 sepsis. improving  -Anemia possibly from acute blood loss related to 3.2 cm subchorionic hemorrhage?  -Hold off ac   -6/10: 1 U prbc transfused  -transfuse if hgb<7     RAMON: mild - improved  Likely 2/2 shock  On pressors, IVF  Monitor renal function daily and avoid nephrotoxins     Bipolar disorder  -psych consult (of note both mom and daughter vehemently refused in prior admission)  -zofran prn, seroquel 200mg qhs  -supportive  -SW     Chronic  anticoagulation  H/o Recurrent venous thrombosis superficial and deep 2017  -(had followed in [facility] in the past)  -per last hematology f/u note 2020 \"She completed more than 6 months of anticoagulation with warfarin for what we consider a provoked DVT and states she is no longer using IV drugs in the upper extremities.  She is now pregnant.  Would recommend stopping anticoagulation as long as she is able to avoid IV drug use \"  -avoid a/c due to subchorionic hemorrhage on US     DVT Mechanical Prophylaxis:   SCDs,    DVT Pharmacologic Prophylaxis   Medication   None               Subjective:       Initial Chief Complaint:  fever malaise     Patient is intubated        Objective:       Temp:  [97.5 °F (36.4 °C)-98.2 °F (36.8 °C)] 98.1 °F (36.7 °C)  Pulse:  [] 81  Resp:  [18-34] 20  BP: ()/(60-91) 112/77  SpO2:  [91 %-99 %] 99 %  FiO2 (%):  [40 %] 40 %  General:  Intubated but opens eyes and moves  HEENT:  Normocephalic, atraumatic  Cardiac:  Regular rate, regular rhythm  Pulmonary:  Clear to auscultation bilaterally, respirations unlabored  Gastrointestinal:  Soft, non-tender, normal bowel sounds  Musculoskeletal:  No joint swelling  Extremities:  No edema, no cyanosis, no clubbing  Neurologic:  moving all ext  Psychiatric:  calm     Intake/Output Summary (Last 24 hours) at 6/17/2024 1236  Last data filed at 6/17/2024 1112      Gross per 24 hour   Intake 3089.75 ml   Output 4000 ml   Net -910.25 ml                  Recent Labs   Lab 06/15/24  0557 06/16/24  0525 06/17/24  0335   WBC 8.1 7.5 7.8   HGB 7.0* 8.0* 7.4*   HCT 21.3* 24.5* 22.9*   .0 230.0 270.0   RBC 2.77* 3.11* 2.90*   MCV 76.9* 78.8* 79.0*   MCH 25.3* 25.7* 25.5*   MCHC 32.9 32.7 32.3   RDW 16.9* 16.7* 16.6*   NEPRELIM 5.15 5.11 5.13               Recent Labs   Lab 06/12/24  0318 06/13/24  0331 06/14/24  0414 06/15/24  0557 06/16/24  0525 06/17/24  0335   BUN 7* 9   < > 10 10 10   CREATSERUM 0.44* 0.45*   < >  0.40* 0.43* 0.36*   CA 7.5* 8.0*   < > 8.2* 8.2* 8.3*   ALB 2.6* 2.7*  --   --   --   --     135*   < > 134* 137 138   K 3.8 3.9   < > 4.2 4.2 4.1    106   < > 106 104 106   CO2 25.0 26.0   < > 29.0 29.0 28.0   GLU 85 117*   < > 93 97 100*   MG 1.7 1.5*   < > 1.7 1.7 1.7   PHOS 4.2 4.2  --   --   --  4.2   BILT 0.9 1.5*  --   --   --   --    AST 51* 32  --   --   --   --    ALT 40 32  --   --   --   --    ALKPHO 135* 146*  --   --   --   --    TP 4.9* 5.4*  --   --   --   --     < > = values in this interval not displayed.         XR CHEST AP PORTABLE  (CPT=71045)     Result Date: 6/17/2024  CONCLUSION:                                                                                           1. Cardiomegaly. 2. Support tubes and catheters are satisfactory. 3. Extensive bilateral mixed multifocal airspace opacification with interval progression.  4. Small-moderate bilateral pleural effusions have progressed    Dictated by (CST): Khadar Baxter MD on 6/17/2024 at 9:15 AM     Finalized by (CST): Khadar Baxter MD on 6/17/2024 at 9:19 AM           XR CHEST AP PORTABLE  (CPT=71045)     Result Date: 6/17/2024  PROCEDURE:                                                                                           XR CHEST AP PORTABLE  (CPT=71045) TIME:                                                             829  COMPARISON:       [facility], XR CHEST AP PORTABLE (CPT=71045), 6/14/2024, 5:28 AM.  [facility], XR CHEST AP PORTABLE (CPT=71045), 6/10/2024, 2:18 AM.  [facility], CT CHEST PE AORTA (IV ONLY) (CPT=71260), 6/09/2024, 9:50 AM.  [facility], XR CHEST AP PORTABLE (CPT=71045), 6/17/2024, 7:12 AM.  INDICATIONS:     Shortness of breath.  TECHNIQUE:                                                                           Single view.   Findings and impression:  There has been no significant change  Stable well-positioned support devices.   Stable heart size.  Normal vascular pedicle  Persistent small consolidation in the upper right lung and confluent bibasilar opacities from atelectasis/effusion or lung base consolidation  There is no pneumothorax     Dictated by (CST): Steffen Godinez MD on 6/17/2024 at 8:44 AM     Finalized by (CST): Steffen Godinez MD on 6/17/2024 at 8:48 AM           XR CHEST AP PORTABLE  (CPT=71045)     Result Date: 6/16/2024  CONCLUSION:                                                                                          Moderate interstitial edema, slightly improved but not resolved since 6/15/2024.  Small right and trace left pleural effusions have also slightly improved.  Retrocardiac air space density which may represent atelectasis versus pneumonia.  Dictated by (CST): Syd Smith MD on 6/16/2024 at 7:35 AM     Finalized by (CST): Syd Smith MD on 6/16/2024 at 7:37 AM             Medications:   LORazepam  2 mg Intravenous 4 times per day    LORazepam  2 mg Intravenous Once    QUEtiapine  300 mg Per NG Tube TID    Methadone HCl  10 mg Oral Q8H    bisacodyl  10 mg Rectal Once    furosemide  40 mg Intravenous Once    senna  5 mL Per NG Tube BID    docusate  100 mg Per NG Tube BID    chlorhexidine gluconate  15 mL Mouth/Throat BID@0800,2000    meropenem  1 g Intravenous Q8H    venlafaxine ER  75 mg Oral Nightly    prazosin  2 mg Oral Nightly    famotidine  20 mg Intravenous BID    melatonin  3 mg Oral Nightly      PRN Meds:   LORazepam    haloperidol lactate    haloperidol lactate    midazolam    [DISCONTINUED] acetaminophen **OR** acetaminophen **OR** acetaminophen **OR** acetaminophen    polyethylene glycol (PEG 3350)    senna    bisacodyl    fleet enema    fentaNYL    diphenhydrAMINE    dextrose 10%    lipase-protease-amylase (Lip-Prot-Amyl) **AND** sodium bicarbonate    ondansetron    prochlorperazine    acetaminophen     Supplementary Documentation:          [facility]. Time spent on chart/note review, review of  labs/imaging, , physical exam, discussion with staff, consultants, coordinating care, writing progress note, discussion of plan of care.        Janes Levine MD      MEDICATIONS ADMINISTERED IN LAST 1 DAY:  chlorhexidine gluconate (Peridex) 0.12 % oral solution 15 mL       Date Action Dose Route User    6/17/2024 0946 Given 15 mL Mouth/Throat Pascale Abreu RN    6/16/2024 2007 Given 15 mL Mouth/Throat Janis Plasencia RN          docusate (Colace) 50 MG/5ML oral solution 100 mg       Date Action Dose Route User    6/17/2024 0946 Given 100 mg Per NG Tube Pascale Abreu RN    6/16/2024 2007 Given 100 mg Per NG Tube Janis Plasencia RN          famotidine (Pepcid) 20 mg/2mL injection 20 mg       Date Action Dose Route User    6/17/2024 0946 Given 20 mg Intravenous Pascale Aberu RN    6/16/2024 2007 Given 20 mg Intravenous Janis Plasencia RN          fentaNYL in sodium chloride 0.9% (Sublimaze) 1000 mcg/100mL infusion premix       Date Action Dose Route User    6/17/2024 1300 Hi-Risk Rate/Dose Verify 80 mcg/hr Intravenous Pascale Abreu RN    6/17/2024 1235 New Bag 80 mcg/hr Intravenous Pascale Abreu RN    6/17/2024 0922 Hi-Risk Rate/Dose Change 80 mcg/hr Intravenous Saman Purdy RN    6/17/2024 0551 Hi-Risk Rate/Dose Verify 70 mcg/hr Intravenous Елена Mullins RN    6/17/2024 0500 Hi-Risk Rate/Dose Verify 70 mcg/hr Intravenous Елена Mullins RN    6/17/2024 0309 New Bag 70 mcg/hr Intravenous Janis Plasencia RN    6/16/2024 2332 Hi-Risk Rate/Dose Change 70 mcg/hr Intravenous Janis Plasencia RN    6/16/2024 2200 Hi-Risk Rate/Dose Verify 75 mcg/hr Intravenous Елена Mullins RN    6/16/2024 2000 Hi-Risk Rate/Dose Verify 75 mcg/hr Intravenous Елена Mullins RN          fentaNYL (Sublimaze) 50 mcg/mL injection 50 mcg       Date Action Dose Route User    6/17/2024 0921 Given 50 mcg Intravenous Saman Purdy RN    6/17/2024 0839 Given 50 mcg Intravenous Saman Purdy RN    6/17/2024 0712 Given 50 mcg Intravenous  Pascale Abreu RN    6/17/2024 0424 Given 50 mcg Intravenous Janis Plasencia RN    6/16/2024 2211 Given 50 mcg Intravenous Janis Plasencia RN    6/16/2024 1650 Given 50 mcg Intravenous Pascale Abreu RN          fentaNYL (Sublimaze) 50 mcg/mL injection 25 mcg       Date Action Dose Route User    6/17/2024 1428 Given 25 mcg Intravenous Pascale Abreu RN          furosemide (Lasix) 10 mg/mL injection 40 mg       Date Action Dose Route User    6/17/2024 1257 Given 40 mg Intravenous Pascale Abreu RN          haloperidol lactate (Haldol) 5 MG/ML injection 5 mg       Date Action Dose Route User    6/17/2024 0554 Given 5 mg Intravenous Janis Plasencia RN    6/16/2024 2325 Given 5 mg Intravenous Janis Plasencia RN    6/16/2024 1804 Given 5 mg Intravenous Anny Pressley RN          lactulose (CHRONULAC) 10 GM/15ML solution 30 g       Date Action Dose Route User    6/17/2024 1106 Given 30 g Oral Pascale Abreu RN          levETIRAcetam (Keppra) 500 mg/5mL injection 500 mg       Date Action Dose Route User    6/17/2024 0223 Given 500 mg Intravenous Janis Plasencia RN          LORazepam (Ativan) 2 mg/mL injection 1 mg       Date Action Dose Route User    6/17/2024 0554 Given 1 mg Intravenous Janis Plasencia RN    6/16/2024 2325 Given 1 mg Intravenous Janis Plasencia RN    6/16/2024 1804 Given 1 mg Intravenous Anny Pressley RN          LORazepam (Ativan) 2 mg/mL injection 2 mg       Date Action Dose Route User    6/17/2024 1257 Given 2 mg Intravenous Pascale Abreu RN          LORazepam (Ativan) 2 mg/mL injection       Date Action Dose Route User    6/17/2024 0825 Given 2 mg (none) Saman Purdy RN          magnesium sulfate in sterile water for injection 2 g/50mL IVPB premix 2 g       Date Action Dose Route User    6/17/2024 1106 New Bag 2 g Intravenous Pascale Abreu RN          melatonin tab 3 mg       Date Action Dose Route User    6/16/2024 2007 Given 3 mg Oral Janis Plasencia RN          meropenem (Merrem) 1 g in sodium chloride 0.9%  100 mL IVPB-MBP       Date Action Dose Route User    6/17/2024 1430 New Bag 1 g Intravenous Pascale Abreu RN    6/17/2024 0554 New Bag 1 g Intravenous Janis Plasencia RN    6/16/2024 2136 New Bag 1 g Intravenous Janis Plasencia RN          methadone solution (DOLOPHINE) 10 mg/5mL       Date Action Dose Route User    6/16/2024 2004 Given 5 mg Oral Janis Plasencia RN          methadone solution (DOLOPHINE) 10 mg/5mL       Date Action Dose Route User    6/17/2024 1043 Given 10 mg Oral Pascale Abreu RN          midazolam (Versed) 2 MG/2ML injection 2 mg       Date Action Dose Route User    6/17/2024 1437 Given 2 mg Intravenous Pascale Abreu RN    6/17/2024 0705 Given 2 mg Intravenous Елена Mullins RN    6/17/2024 0104 Given 2 mg Intravenous Janis Plasencia RN    6/16/2024 2026 Given 2 mg Intravenous Janis Plasencia RN    6/16/2024 1622 Given 2 mg Intravenous Anny Pressley RN          polyethylene glycol (PEG 3350) (Miralax) 17 g oral packet 17 g       Date Action Dose Route User    6/16/2024 1510 Given 17 g Oral Pascale Abreu RN          prazosin (Minipress) cap 2 mg       Date Action Dose Route User    6/16/2024 2007 Given 2 mg Oral Janis Plasencia RN          propofol (Diprivan) 10 mg/mL infusion premix       Date Action Dose Route User    6/17/2024 1300 Rate/Dose Verify 50 mcg/kg/min × 77.2 kg (Dosing Weight) Intravenous Pascale Abreu RN    6/17/2024 1112 New Bag 50 mcg/kg/min × 77.2 kg (Dosing Weight) Intravenous Pascale Abreu RN    6/17/2024 0701 New Bag 50 mcg/kg/min × 77.2 kg (Dosing Weight) Intravenous Елена Mullins RN    6/17/2024 0559 Rate/Dose Change 50 mcg/kg/min × 77.2 kg (Dosing Weight) Intravenous Janis Plasencia RN    6/17/2024 0500 Rate/Dose Verify 55 mcg/kg/min × 77.2 kg (Dosing Weight) Intravenous Елена Mullins RN    6/17/2024 0358 Rate/Dose Change 55 mcg/kg/min × 77.2 kg (Dosing Weight) Intravenous Janis Plasencia RN    6/17/2024 0321 New Bag 50 mcg/kg/min × 77.2 kg (Dosing Weight) Intravenous Luis Angel  HANSEL Bruce    6/17/2024 0235 Rate/Dose Change 50 mcg/kg/min × 77.2 kg (Dosing Weight) Intravenous Janis Plasencia RN    6/17/2024 0046 New Bag 50 mcg/kg/min × 77.2 kg (Dosing Weight) Intravenous Janis Plasencia RN    6/16/2024 2332 Rate/Dose Change 55 mcg/kg/min × 77.2 kg (Dosing Weight) Intravenous Janis Plasencia RN    6/16/2024 2217 New Bag 60 mcg/kg/min × 77.2 kg (Dosing Weight) Intravenous Janis Plasencia RN    6/16/2024 2200 Rate/Dose Verify 60 mcg/kg/min × 77.2 kg (Dosing Weight) Intravenous Елена Mullins RN    6/16/2024 2000 Rate/Dose Verify 60 mcg/kg/min × 77.2 kg (Dosing Weight) Intravenous Елена Mullins RN    6/16/2024 1928 New Bag 60 mcg/kg/min × 77.2 kg (Dosing Weight) Intravenous Janis Plasencia RN    6/16/2024 1651 New Bag 60 mcg/kg/min × 77.2 kg (Dosing Weight) Intravenous Pascale Abreu RN          senna (Senokot) 8.8 MG/5ML oral syrup 8.8 mg       Date Action Dose Route User    6/17/2024 0946 Given 8.8 mg Per NG Tube Pascale Abreu RN    6/16/2024 2007 Given 8.8 mg Per NG Tube Janis Plasencia RN          venlafaxine ER (Effexor-XR) 24 hr cap 75 mg       Date Action Dose Route User    6/16/2024 2007 Given 75 mg Oral Janis Plasencia RN          QUEtiapine (SEROquel) tab 300 mg       Date Action Dose Route User    6/16/2024 2007 Given 300 mg Oral Janis Plasencia RN    6/16/2024 1510 Given 300 mg Oral Pascale Abreu RN            Vitals (last day)       Date/Time Temp Pulse Resp BP SpO2 Weight O2 Device O2 Flow Rate (L/min) Who    06/17/24 1300 -- 85 19 125/93 99 % -- Ventilator -- KR    06/17/24 1200 98.1 °F (36.7 °C) 81 20 112/77 99 % -- Ventilator -- KR    06/17/24 1100 -- 80 24 123/80 98 % -- Ventilator -- KR    06/17/24 1000 -- 91 23 114/74 99 % -- Ventilator -- KR    06/17/24 0900 -- 110 26 134/91 96 % -- Ventilator -- KR    06/17/24 0800 98.2 °F (36.8 °C) 112 29 144/90 97 % -- Ventilator -- KR    06/17/24 0600 -- -- -- -- -- -- Ventilator --     06/17/24 0500 -- 76 20 100/62 99 % -- Ventilator --     06/17/24 0400 --  91 23 112/71 98 % -- Ventilator -- KH    06/17/24 0300 -- 72 19 95/63 99 % -- Ventilator -- SW    06/17/24 0200 -- 68 18 100/66 99 % -- Ventilator -- KH    06/17/24 0100 -- 80 21 99/67 99 % -- Ventilator -- KH    06/17/24 0000 97.7 °F (36.5 °C) -- -- -- -- -- -- -- SW    06/17/24 0000 -- 71 20 103/63 97 % -- Ventilator -- KH    06/16/24 2300 -- 75 19 104/64 97 % -- Ventilator -- KH    06/16/24 2200 -- 70 20 103/60 98 % -- Ventilator -- KH    06/16/24 2100 -- 75 20 104/60 99 % -- Ventilator -- SW    06/16/24 2000 97.5 °F (36.4 °C) 94 21 111/70 98 % -- Ventilator -- SW    06/16/24 1900 -- 97 24 108/73 96 % -- Ventilator -- KR    06/16/24 1800 -- 102 29 116/77 94 % -- Ventilator -- LG    06/16/24 1700 -- 92 22 116/69 96 % -- Ventilator -- LG    06/16/24 1600 98 °F (36.7 °C) 93 23 116/70 96 % -- Ventilator -- LG    06/16/24 1500 -- 108 34 106/80 92 % -- Ventilator -- LG    06/16/24 1400 -- 96 24 116/74 91 % -- Ventilator -- LG    06/16/24 1300 -- 87 29 106/91 93 % -- Ventilator -- LG    06/16/24 1200 98.1 °F (36.7 °C) 86 24 114/72 97 % -- Ventilator -- LG    06/16/24 1100 -- 79 19 86/59 96 % -- Ventilator -- LG    06/16/24 1000 -- 75 19 98/61 96 % -- Ventilator -- LG    06/16/24 0900 -- 82 20 100/63 98 % -- Ventilator -- LG    06/16/24 0800 97.7 °F (36.5 °C) 79 20 103/61 93 % -- Ventilator -- LG    06/16/24 0600 -- 89 19 101/62 98 % -- Ventilator --     06/16/24 0500 -- 80 19 107/61 96 % -- Ventilator --     06/16/24 0400 -- 88 26 108/67 95 % -- Ventilator --     06/16/24 0300 -- 80 19 103/57 98 % -- Ventilator -- KH    06/16/24 0200 -- 83 23 106/72 94 % -- Ventilator --     06/16/24 0100 -- 83 20 102/56 98 % -- Ventilator --     06/16/24 0000 98 °F (36.7 °C) 93 22 110/70 -- -- -- --     06/16/24 0000 -- -- -- -- 95 % -- Ventilator --           CIWA Scores (since admission)       None          Blood Transfusion Record       Product Unit Status Volume Start End            Transfuse RBC       24  [id number]   [id number] Stopped 339.58 mL 06/10/24 1017 06/10/24 1300

## 2024-06-17 NOTE — PROGRESS NOTES
[facility]  part of [facility]    Progress Note      Assessment and Plan:   1.  Septic shock-Echo without vegetation.  Patient is yet requiring pressors.  Fusobacterium identified in the blood.     Recommendations:  1.  Ongoing antibiotic as per ID.     2.  Psychiatric-substance abuse disorder as well as history of bipolar disease status post recent prolonged rehab stay having been discharged 30 days ago.  The patient is currently on methadone, Seroquel, propofol, fentanyl drips and pushes.  Discussed with psychiatry and they are adjusting medications.  The patient is very active in bed today.     Recommendations: CIWA protocol-as per psychiatry evaluation, methadone initiated.     3.  Pregnancy-3 cm subchorionic hemorrhage, now with decreasing beta hCG.     Recommendations: As per obstetrics.     4.  DVT prophylaxis-would use SCDs in patient who has subchorionic hemorrhage at this juncture although the patient has an extensive history of thrombosis.     Recommendations: SCDs and would discuss risks of chemoprophylaxis anticoagulation with obstetrics.     5.  Social concerns.     6.  Respiratory failure-the patient may have developed ARDS associated with the gram-negative bacteremia.  May have a component of noncardiogenic edema.  Also covered for aspiration pneumonia.  Infiltrates are extensive today with some focality in the right upper lobe.  Will forego the breathing trial this morning.    Recommendations: Ongoing antibiotic.  Propofol.  Morning chest x-ray with spontaneous awakening and breathing trials.    7.  Transient asystole-while on Precedex drip.  Discontinued.  I am less suspicious that the patient had a seizure as she woke to full wakefulness immediately after the long pause; however, I ordered Keppra and an EEG.  If the EEG is negative, would discontinue Keppra.    Subjective:   Latonia Barker is a(n) 29 year old female who is sedate on ventilator    Objective:   Blood pressure  123/80, pulse 80, temperature 98.2 °F (36.8 °C), temperature source Temporal, resp. rate 24, weight 185 lb 6.5 oz (84.1 kg), last menstrual period 03/25/2024, SpO2 98%, not currently breastfeeding.    Physical Exam sedate white female  HEENT examination is unremarkable with pupils equal round and reactive to light and accommodation.   Neck without adenopathy, thyromegaly, JVD nor bruit.   Lungs diminished to auscultation and percussion.  Cardiac regular rate and rhythm no murmur.   Abdomen nontender, without hepatosplenomegaly and no mass appreciable.   Extremities without clubbing cyanosis nor edema.   Neurologic grossly intact with symmetric tone and strength and reflex when allowed to wake.  Skin without gross abnormality     Results:     Lab Results   Component Value Date    WBC 7.8 06/17/2024    HGB 7.4 06/17/2024    HCT 22.9 06/17/2024    .0 06/17/2024    CREATSERUM 0.36 06/17/2024    BUN 10 06/17/2024     06/17/2024    K 4.1 06/17/2024     06/17/2024    CO2 28.0 06/17/2024     06/17/2024    CA 8.3 06/17/2024    MG 1.7 06/17/2024    PHOS 4.2 06/17/2024     CT scan of the chest-1. No acute pulmonary embolism through the segmental pulmonary arteries.   2. Multifocal pneumonia demonstrated by multifocal ground-glass opacities and consolidations involving the left greater than right lower lobes and left upper lobe.   3. Two 4 mm right upper lobe pulmonary nodules, which are favored to be infectious/inflammatory, however recommend follow-up CT chest in 6 months to monitor for resolution.   4. Mild right heart predominant cardiomegaly.   5. Borderline dilated main pulmonary artery, which can be seen in pulmonary artery hypertension.   6. Small pericardial effusion.   7. Hepatosplenomegaly.     Chest x-ray-mild edema    Srinivasan Saleh MD  Medical Director, Critical Care, [facility]  Medical Director, [facility]  Pager: [phone]  >35 min crit care

## 2024-06-18 ENCOUNTER — APPOINTMENT (OUTPATIENT)
Dept: GENERAL RADIOLOGY | Facility: HOSPITAL | Age: 29
DRG: 831 | End: 2024-06-18
Attending: INTERNAL MEDICINE

## 2024-06-18 LAB
ANION GAP SERPL CALC-SCNC: 6 MMOL/L (ref 0–18)
BASOPHILS # BLD AUTO: 0.06 X10(3) UL (ref 0–0.2)
BASOPHILS NFR BLD AUTO: 0.6 %
BUN BLD-MCNC: 13 MG/DL (ref 9–23)
BUN/CREAT SERPL: 31.7 (ref 10–20)
CALCIUM BLD-MCNC: 8.3 MG/DL (ref 8.7–10.4)
CHLORIDE SERPL-SCNC: 103 MMOL/L (ref 98–112)
CO2 SERPL-SCNC: 27 MMOL/L (ref 21–32)
CREAT BLD-MCNC: 0.41 MG/DL
DEPRECATED RDW RBC AUTO: 46.4 FL (ref 35.1–46.3)
EGFRCR SERPLBLD CKD-EPI 2021: 138 ML/MIN/1.73M2 (ref 60–?)
EOSINOPHIL # BLD AUTO: 0.14 X10(3) UL (ref 0–0.7)
EOSINOPHIL NFR BLD AUTO: 1.5 %
ERYTHROCYTE [DISTWIDTH] IN BLOOD BY AUTOMATED COUNT: 16.2 % (ref 11–15)
GLUCOSE BLD-MCNC: 99 MG/DL (ref 70–99)
HCT VFR BLD AUTO: 24.6 %
HGB BLD-MCNC: 7.9 G/DL
IMM GRANULOCYTES # BLD AUTO: 0.21 X10(3) UL (ref 0–1)
IMM GRANULOCYTES NFR BLD: 2.2 %
LYMPHOCYTES # BLD AUTO: 1.96 X10(3) UL (ref 1–4)
LYMPHOCYTES NFR BLD AUTO: 20.5 %
MAGNESIUM SERPL-MCNC: 1.6 MG/DL (ref 1.6–2.6)
MCH RBC QN AUTO: 25.1 PG (ref 26–34)
MCHC RBC AUTO-ENTMCNC: 32.1 G/DL (ref 31–37)
MCV RBC AUTO: 78.1 FL
MONOCYTES # BLD AUTO: 0.63 X10(3) UL (ref 0.1–1)
MONOCYTES NFR BLD AUTO: 6.6 %
NEUTROPHILS # BLD AUTO: 6.57 X10 (3) UL (ref 1.5–7.7)
NEUTROPHILS # BLD AUTO: 6.57 X10(3) UL (ref 1.5–7.7)
NEUTROPHILS NFR BLD AUTO: 68.6 %
OSMOLALITY SERPL CALC.SUM OF ELEC: 282 MOSM/KG (ref 275–295)
PLATELET # BLD AUTO: 338 10(3)UL (ref 150–450)
POTASSIUM SERPL-SCNC: 3.8 MMOL/L (ref 3.5–5.1)
RBC # BLD AUTO: 3.15 X10(6)UL
SODIUM SERPL-SCNC: 136 MMOL/L (ref 136–145)
WBC # BLD AUTO: 9.6 X10(3) UL (ref 4–11)

## 2024-06-18 PROCEDURE — 71045 X-RAY EXAM CHEST 1 VIEW: CPT | Performed by: INTERNAL MEDICINE

## 2024-06-18 PROCEDURE — 99233 SBSQ HOSP IP/OBS HIGH 50: CPT | Performed by: INTERNAL MEDICINE

## 2024-06-18 PROCEDURE — 99233 SBSQ HOSP IP/OBS HIGH 50: CPT | Performed by: HOSPITALIST

## 2024-06-18 PROCEDURE — 99233 SBSQ HOSP IP/OBS HIGH 50: CPT | Performed by: OTHER

## 2024-06-18 RX ORDER — FUROSEMIDE 10 MG/ML
40 INJECTION INTRAMUSCULAR; INTRAVENOUS ONCE
Status: COMPLETED | OUTPATIENT
Start: 2024-06-18 | End: 2024-06-18

## 2024-06-18 RX ORDER — MAGNESIUM SULFATE HEPTAHYDRATE 40 MG/ML
2 INJECTION, SOLUTION INTRAVENOUS ONCE
Status: COMPLETED | OUTPATIENT
Start: 2024-06-18 | End: 2024-06-18

## 2024-06-18 RX ORDER — LORAZEPAM 2 MG/ML
2 INJECTION INTRAMUSCULAR EVERY 6 HOURS PRN
Status: DISCONTINUED | OUTPATIENT
Start: 2024-06-18 | End: 2024-06-24

## 2024-06-18 RX ORDER — HALOPERIDOL 5 MG/ML
1 INJECTION INTRAMUSCULAR EVERY 6 HOURS PRN
Status: DISCONTINUED | OUTPATIENT
Start: 2024-06-18 | End: 2024-06-21

## 2024-06-18 NOTE — PLAN OF CARE
Problem: Patient Centered Care  Goal: Patient preferences are identified and integrated in the patient's plan of care  Description: Interventions:  - What would you like us to know as we care for you?   - Provide timely, complete, and accurate information to patient/family  - Incorporate patient and family knowledge, values, beliefs, and cultural backgrounds into the planning and delivery of care  - Encourage patient/family to participate in care and decision-making at the level they choose  - Honor patient and family perspectives and choices  Outcome: Not Progressing     Problem: CARDIOVASCULAR - ADULT  Goal: Maintains optimal cardiac output and hemodynamic stability  Description: INTERVENTIONS:  - Monitor vital signs, rhythm, and trends  - Monitor for bleeding, hypotension and signs of decreased cardiac output  - Evaluate effectiveness of vasoactive medications to optimize hemodynamic stability  - Monitor arterial and/or venous puncture sites for bleeding and/or hematoma  - Assess quality of pulses, skin color and temperature  - Assess for signs of decreased coronary artery perfusion - ex. Angina  - Evaluate fluid balance, assess for edema, trend weights  Outcome: Not Progressing  Goal: Absence of cardiac arrhythmias or at baseline  Description: INTERVENTIONS:  - Continuous cardiac monitoring, monitor vital signs, obtain 12 lead EKG if indicated  - Evaluate effectiveness of antiarrhythmic and heart rate control medications as ordered  - Initiate emergency measures for life threatening arrhythmias  - Monitor electrolytes and administer replacement therapy as ordered  Outcome: Not Progressing     Problem: RESPIRATORY - ADULT  Goal: Achieves optimal ventilation and oxygenation  Description: INTERVENTIONS:  - Assess for changes in respiratory status  - Assess for changes in mentation and behavior  - Position to facilitate oxygenation and minimize respiratory effort  - Oxygen supplementation based on oxygen  saturation or ABGs  - Provide Smoking Cessation handout, if applicable  - Encourage broncho-pulmonary hygiene including cough, deep breathe, Incentive Spirometry  - Assess the need for suctioning and perform as needed  - Assess and instruct to report SOB or any respiratory difficulty  - Respiratory Therapy support as indicated  - Manage/alleviate anxiety  - Monitor for signs/symptoms of CO2 retention  Outcome: Not Progressing     Problem: NEUROLOGICAL - ADULT  Goal: Achieves stable or improved neurological status  Description: INTERVENTIONS  - Assess for and report changes in neurological status  - Initiate measures to prevent increased intracranial pressure  - Maintain blood pressure and fluid volume within ordered parameters to optimize cerebral perfusion and minimize risk of hemorrhage  - Monitor temperature, glucose, and sodium. Initiate appropriate interventions as ordered  Outcome: Not Progressing  Goal: Absence of seizures  Description: INTERVENTIONS  - Monitor for seizure activity  - Administer anti-seizure medications as ordered  - Monitor neurological status  Outcome: Not Progressing  Goal: Remains free of injury related to seizure activity  Description: INTERVENTIONS:  - Maintain airway, patient safety  and administer oxygen as ordered  - Monitor patient for seizure activity, document and report duration and description of seizure to MD/LIP  - If seizure occurs, turn patient to side and suction secretions as needed  - Reorient patient post seizure  - Seizure pads on all 4 side rails  - Instruct patient/family to notify RN of any seizure activity  - Instruct patient/family to call for assistance with activity based on assessment  Outcome: Not Progressing     Problem: Safety Risk - Non-Violent Restraints  Goal: Patient will remain free from self-harm  Description: INTERVENTIONS:  - Apply the least restrictive restraint to prevent harm  - Notify patient and family of reasons restraints applied  - Assess for  any contributing factors to confusion (electrolyte disturbances, delirium, medications)  - Discontinue any unnecessary medical devices as soon as possible  - Assess the patient's physical comfort, circulation, skin condition, hydration, nutrition and elimination needs   - Reorient and redirection as needed  - Assess for the need to continue restraints  6/18/2024 0638 by Елена Mullins RN  Outcome: Not Progressing     Problem: Delirium  Goal: Minimize duration of delirium  Description: Interventions:  - Encourage use of hearing aids, eye glasses  - Promote highest level of mobility daily  - Provide frequent reorientation  - Promote wakefulness i.e. lights on, blinds open  - Promote sleep, encourage patient's normal rest cycle i.e. lights off, TV off, minimize noise and interruptions  - Encourage family to assist in orientation and promotion of home routines  Outcome: Progressing      Patient intubated and sedated. Following commands but very agitated/restless throughout the night. On Fentanyl and propofol.  Haldol and versed PRN. Attempting to wean off drips but patient becomes tachypneic, tachycardic and very very agitated. Bilateral soft wrist restraints and mittens. On 30% fi02. Copious oral secretions. SR. VSS. Tolerating tube feeds. No BM. Passing gas. Dyson in place. Repositioning q2hrs.

## 2024-06-18 NOTE — PROGRESS NOTES
[facility]  part of [facility]    Progress Note      Assessment and Plan:   1.  Septic shock-Echo without vegetation.  Patient is yet requiring pressors.  Fusobacterium identified in the blood.     Recommendations:  1.  Ongoing antibiotic as per ID.     2.  Psychiatric-substance abuse disorder as well as history of bipolar disease status post recent prolonged rehab stay having been discharged 30 days ago.  The patient is currently on methadone, Seroquel, propofol, fentanyl drips and pushes.  Discussed with psychiatry and they are adjusting medications.  The patient is very active in bed today.     Recommendations: CIWA protocol-as per psychiatry evaluation, methadone initiated.     3.  Pregnancy-3 cm subchorionic hemorrhage, now with decreasing beta hCG.     Recommendations: As per obstetrics.     4.  DVT prophylaxis-would use SCDs in patient who has subchorionic hemorrhage at this juncture although the patient has an extensive history of thrombosis.     Recommendations: SCDs and would discuss risks of chemoprophylaxis anticoagulation with obstetrics.     5.  Social concerns.     6.  Respiratory failure-the patient may have developed ARDS associated with the gram-negative bacteremia.  May have a component of noncardiogenic edema.  Also covered for aspiration pneumonia.  Chest x-ray this morning yet shows significant opacification.  Will use Lasix twice today and then hopefully progress to breathing trial tomorrow and work toward extubation.    Recommendations: Ongoing antibiotic.  Propofol.  Morning chest x-ray with spontaneous awakening and breathing trials.    7.  Transient asystole-while on Precedex drip.  Discontinued.  I am less suspicious that the patient had a seizure as she woke to full wakefulness immediately after the long pause; however, I ordered Keppra and an EEG.  If the EEG is negative, would discontinue Keppra.    Subjective:   Latonia Barker is a(n) 29 year old female who is  sedate on ventilator    Objective:   Blood pressure 103/71, pulse 87, temperature 97.7 °F (36.5 °C), temperature source Temporal, resp. rate 20, weight 166 lb 14.2 oz (75.7 kg), last menstrual period 03/25/2024, SpO2 99%, not currently breastfeeding.    Physical Exam sedate white female  HEENT examination is unremarkable with pupils equal round and reactive to light and accommodation.   Neck without adenopathy, thyromegaly, JVD nor bruit.   Lungs diminished to auscultation and percussion.  Cardiac regular rate and rhythm no murmur.   Abdomen nontender, without hepatosplenomegaly and no mass appreciable.   Extremities without clubbing cyanosis nor edema.   Neurologic grossly intact with symmetric tone and strength and reflex when allowed to wake.  Skin without gross abnormality     Results:     Lab Results   Component Value Date    WBC 9.6 06/18/2024    HGB 7.9 06/18/2024    HCT 24.6 06/18/2024    .0 06/18/2024    CREATSERUM 0.41 06/18/2024    BUN 13 06/18/2024     06/18/2024    K 3.8 06/18/2024     06/18/2024    CO2 27.0 06/18/2024    GLU 99 06/18/2024    CA 8.3 06/18/2024    MG 1.6 06/18/2024     CT scan of the chest-1. No acute pulmonary embolism through the segmental pulmonary arteries.   2. Multifocal pneumonia demonstrated by multifocal ground-glass opacities and consolidations involving the left greater than right lower lobes and left upper lobe.   3. Two 4 mm right upper lobe pulmonary nodules, which are favored to be infectious/inflammatory, however recommend follow-up CT chest in 6 months to monitor for resolution.   4. Mild right heart predominant cardiomegaly.   5. Borderline dilated main pulmonary artery, which can be seen in pulmonary artery hypertension.   6. Small pericardial effusion.   7. Hepatosplenomegaly.     Chest x-ray-mild edema    Srinivasan Saleh MD  Medical Director, Critical Care, [facility]  Medical Director, [facility]  Pager: [phone]  >35 min crit  care

## 2024-06-18 NOTE — PROGRESS NOTES
INFECTIOUS DISEASE PROGRESS NOTE  [facility]  part of [facility] ID PROGRESS NOTE    Latonia Barker Patient Status:  Inpatient    1/3/1995 MRN [id number]   Location [facility] 2W/SW Attending Iram Marquez MD   Hosp Day # 9 PCP Agatha Mcmanus MD     Subjective:  ROS unable to be reviewed. 30% FiO2 on ventilator. Lasix to be given today.    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  -  Follow fever curve, wbc.  -  Reviewed imaging, labs, micro reports.  -  Case d/w RN.    History of Present Illness:  Latonia Barker is a 29 year old female with hx of active IVDU, hep C, bipolar dx, history of MRSA bacteremia and 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 She states she was on suboxone but unfortunately relapsed and has been using for the past 4 days. She states she was very weak and having headaches as well as neck stiffness. States she feels much better now. Mostly injects in her groin area. Denies sharing any needles.  Temp 99.3F on admission with normal wbc. Blood cx pending. Started on  IV ceftriaxone and vanc.     Physical Exam:  /71 (BP Location: Left arm)   Pulse 87   Temp 97.7 °F (36.5 °C) (Temporal)   Resp 20   Wt 166 lb 14.2 oz (75.7 kg)   LMP 03/25/2024 (Approximate)   SpO2 99%   BMI 30.52 kg/m²     Gen:   Intubated, sedated  HEENT:  ETT+, neck supple  CV/lungs:  RRR, CTAB  Abdom:  Soft, no TTP  Skin/extrem:  No rashes, no c/c/e  :   Dyson draining yellow urine  Lines:  RIJ CVC+    Laboratory Data: Reviewed    Microbiology: Reviewed    Radiology: Reviewed      [facility]  [phone]  6/18/2024

## 2024-06-18 NOTE — PROGRESS NOTES
[facility]  Progress Note     Latonia Barker  : 1/3/1995    Status: Inpatient  Day #: 9    Attending: Janes Levine MD  PCP: Agatha Mcmanus MD     Assessment and Plan:    Acute septic shock 2/2 multifocal pneumonia and Fusobacterium bacteremia  Acute hypoxic respiratory failure  -ID, pulm/critical care on consult  -CXR and CT chest reviewed. No PE. +multifocal pneumonia  -COVID neg  -urine strep and legionella neg  -intubated on vent  -pressors as needed - wean as able  -cont meropenem  -US b/l jugular veins, r/o Lemierre's - no thrombus    Fusobacterium bacteremia  -h/o IVDA  -prior h/o MRSA bacteremia and 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  -TTE without endocarditis    Pulmonary edema  -lasix 40mg IV again today    H/o IVDA  -Reportedly on suboxone since March and doing well but had relapse 1 day PTA   Per mother pt was on 8.2 mg suboxone tid, ruby MONTOYA at [facility] recovery [phone]  -cont meds per psych    Shock liver - 2/2 sepsis  -Transaminitis: mild - improving  -US liver shows HM and steatosis  -monitor LFT  -Avoid hepatotoxins    Pregnancy   - 10 Weeks 6 day viable IUP on US: 3.2 cm subchorionic hemorrhage along the inferior aspect of the gestational sac.   -HCG down trending  -Ob consulted  -no vaginal bleed - cont monitor  -repeat US prior to discharge    Bicytopenia: plt normalized. Anemia persists - better today  -TCP possibly 2/2 sepsis. improving  -Anemia possibly from acute blood loss related to 3.2 cm subchorionic hemorrhage?  -Hold off ac   -6/10: 1 U prbc transfused  -transfuse if hgb<7     RAMON: mild - improved  Likely 2/2 shock  On pressors, IVF  Monitor renal function daily and avoid nephrotoxins     Bipolar disorder  -psych consult (of note both mom and daughter vehemently refused in prior admission)  -zofran prn, seroquel 200mg qhs  -supportive  -SW     Chronic anticoagulation  H/o Recurrent venous thrombosis superficial and deep   -(had  followed in [facility] in the past)  -per last hematology f/u note 2020 \"She completed more than 6 months of anticoagulation with warfarin for what we consider a provoked DVT and states she is no longer using IV drugs in the upper extremities.  She is now pregnant.  Would recommend stopping anticoagulation as long as she is able to avoid IV drug use \"  -avoid a/c due to subchorionic hemorrhage on US     DVT Mechanical Prophylaxis:   SCDs,    DVT Pharmacologic Prophylaxis   Medication   None               Subjective:      Initial Chief Complaint:  fever malaise    Patient is intubated      Objective:      Temp:  [97.7 °F (36.5 °C)-98.6 °F (37 °C)] 97.7 °F (36.5 °C)  Pulse:  [] 87  Resp:  [18-24] 20  BP: ()/(58-93) 103/71  SpO2:  [97 %-100 %] 99 %  FiO2 (%):  [30 %-40 %] 30 %  General:  Intubated but opens eyes and moves  HEENT:  Normocephalic, atraumatic  Cardiac:  Regular rate, regular rhythm  Pulmonary:  Clear to auscultation bilaterally, respirations unlabored  Gastrointestinal:  Soft, non-tender, normal bowel sounds  Musculoskeletal:  No joint swelling  Extremities:  No edema, no cyanosis, no clubbing  Neurologic:  moving all ext  Psychiatric:  calm    Intake/Output Summary (Last 24 hours) at 6/18/2024 1031  Last data filed at 6/18/2024 1030  Gross per 24 hour   Intake 2807.4 ml   Output 5425 ml   Net -2617.6 ml         Recent Labs   Lab 06/16/24  0525 06/17/24  0335 06/18/24  0442   WBC 7.5 7.8 9.6   HGB 8.0* 7.4* 7.9*   HCT 24.5* 22.9* 24.6*   .0 270.0 338.0   RBC 3.11* 2.90* 3.15*   MCV 78.8* 79.0* 78.1*   MCH 25.7* 25.5* 25.1*   MCHC 32.7 32.3 32.1   RDW 16.7* 16.6* 16.2*   NEPRELIM 5.11 5.13 6.57     Recent Labs   Lab 06/12/24  0318 06/13/24  0331 06/14/24  0414 06/16/24  0525 06/17/24  0335 06/18/24  0442   BUN 7* 9   < > 10 10 13   CREATSERUM 0.44* 0.45*   < > 0.43* 0.36* 0.41*   CA 7.5* 8.0*   < > 8.2* 8.3* 8.3*   ALB 2.6* 2.7*  --   --   --   --     135*   <  > 137 138 136   K 3.8 3.9   < > 4.2 4.1 3.8    106   < > 104 106 103   CO2 25.0 26.0   < > 29.0 28.0 27.0   GLU 85 117*   < > 97 100* 99   MG 1.7 1.5*   < > 1.7 1.7 1.6   PHOS 4.2 4.2  --   --  4.2  --    BILT 0.9 1.5*  --   --   --   --    AST 51* 32  --   --   --   --    ALT 40 32  --   --   --   --    ALKPHO 135* 146*  --   --   --   --    TP 4.9* 5.4*  --   --   --   --     < > = values in this interval not displayed.       XR CHEST AP PORTABLE  (CPT=71045)    Result Date: 6/18/2024  CONCLUSION:  1. Cardiomegaly. 2. Support tubes and catheters are satisfactory. 3. Extensive bilateral mixed multifocal airspace opacification right worse than left.  4. Small bilateral effusions    Dictated by (CST): Khadar Baxter MD on 6/18/2024 at 8:18 AM     Finalized by (CST): Khadar Baxter MD on 6/18/2024 at 8:32 AM          XR CHEST AP PORTABLE  (CPT=71045)    Result Date: 6/17/2024  CONCLUSION:  1. Cardiomegaly. 2. Support tubes and catheters are satisfactory. 3. Extensive bilateral mixed multifocal airspace opacification with interval progression.  4. Small-moderate bilateral pleural effusions have progressed    Dictated by (CST): Khadar Baxter MD on 6/17/2024 at 9:15 AM     Finalized by (CST): Khadar Baxter MD on 6/17/2024 at 9:19 AM          XR CHEST AP PORTABLE  (CPT=71045)    Result Date: 6/17/2024  PROCEDURE: XR CHEST AP PORTABLE  (CPT=71045) TIME: 829  COMPARISON: [facility], XR CHEST AP PORTABLE (CPT=71045), 6/14/2024, 5:28 AM.  [facility], XR CHEST AP PORTABLE (CPT=71045), 6/10/2024, 2:18 AM.  [facility], CT CHEST PE AORTA (IV ONLY) (CPT=71260), 6/09/2024, 9:50 AM.  [facility], XR CHEST AP PORTABLE (CPT=71045), 6/17/2024, 7:12 AM.  INDICATIONS: Shortness of breath.  TECHNIQUE:   Single view.   Findings and impression:  There has been no significant change  Stable well-positioned support devices.  Stable heart size.  Normal  vascular pedicle  Persistent small consolidation in the upper right lung and confluent bibasilar opacities from atelectasis/effusion or lung base consolidation  There is no pneumothorax     Dictated by (CST): Steffen Godinez MD on 6/17/2024 at 8:44 AM     Finalized by (CST): Steffen Godinez MD on 6/17/2024 at 8:48 AM             Medications:   furosemide  40 mg Intravenous Once    meropenem  500 mg Intravenous Q8H    LORazepam  2 mg Intravenous 4 times per day    QUEtiapine  300 mg Per NG Tube TID    Methadone HCl  20 mg Oral Q8H    venlafaxine ER  37.5 mg Oral Nightly    diphenhydrAMINE  25 mg Oral TID    senna  5 mL Per NG Tube BID    docusate  100 mg Per NG Tube BID    chlorhexidine gluconate  15 mL Mouth/Throat BID@0800,2000    prazosin  2 mg Oral Nightly    famotidine  20 mg Intravenous BID    melatonin  3 mg Oral Nightly      PRN Meds:   LORazepam    haloperidol lactate    haloperidol lactate    fentaNYL    midazolam    [DISCONTINUED] acetaminophen **OR** acetaminophen **OR** acetaminophen **OR** acetaminophen    polyethylene glycol (PEG 3350)    senna    bisacodyl    fleet enema    dextrose 10%    lipase-protease-amylase (Lip-Prot-Amyl) **AND** sodium bicarbonate    ondansetron    prochlorperazine    acetaminophen    Supplementary Documentation:        [facility]. Time spent on chart/note review, review of labs/imaging, , physical exam, discussion with staff, consultants, coordinating care, writing progress note, discussion of plan of care.      Janes Levine MD

## 2024-06-18 NOTE — PLAN OF CARE
Pt sedated and vented. Vitals stable. Pt on propofol and fentanyl gtt, titrated per protocol. Pt extremely agitated and restless, PRNs given. No SAT or SAT per Dr. Saleh, possibly tomorrow. Soft wrist restraints in place for pt safety. Gibson in place and gibson care provided. Pt mother updated on plan of care and all questions answered.         Problem: CARDIOVASCULAR - ADULT  Goal: Maintains optimal cardiac output and hemodynamic stability  Description: INTERVENTIONS:  - Monitor vital signs, rhythm, and trends  - Monitor for bleeding, hypotension and signs of decreased cardiac output  - Evaluate effectiveness of vasoactive medications to optimize hemodynamic stability  - Monitor arterial and/or venous puncture sites for bleeding and/or hematoma  - Assess quality of pulses, skin color and temperature  - Assess for signs of decreased coronary artery perfusion - ex. Angina  - Evaluate fluid balance, assess for edema, trend weights  Outcome: Progressing     Problem: RESPIRATORY - ADULT  Goal: Achieves optimal ventilation and oxygenation  Description: INTERVENTIONS:  - Assess for changes in respiratory status  - Assess for changes in mentation and behavior  - Position to facilitate oxygenation and minimize respiratory effort  - Oxygen supplementation based on oxygen saturation or ABGs  - Provide Smoking Cessation handout, if applicable  - Encourage broncho-pulmonary hygiene including cough, deep breathe, Incentive Spirometry  - Assess the need for suctioning and perform as needed  - Assess and instruct to report SOB or any respiratory difficulty  - Respiratory Therapy support as indicated  - Manage/alleviate anxiety  - Monitor for signs/symptoms of CO2 retention  Outcome: Progressing     Problem: Safety Risk - Non-Violent Restraints  Goal: Patient will remain free from self-harm  Description: INTERVENTIONS:  - Apply the least restrictive restraint to prevent harm  - Notify patient and family of reasons restraints  applied  - Assess for any contributing factors to confusion (electrolyte disturbances, delirium, medications)  - Discontinue any unnecessary medical devices as soon as possible  - Assess the patient's physical comfort, circulation, skin condition, hydration, nutrition and elimination needs   - Reorient and redirection as needed  - Assess for the need to continue restraints  6/18/2024 1833 by Sylvie Maher, RN  Outcome: Progressing  6/18/2024 0841 by Sylvie Maher RN  Outcome: Progressing     Problem: Delirium  Goal: Minimize duration of delirium  Description: Interventions:  - Encourage use of hearing aids, eye glasses  - Promote highest level of mobility daily  - Provide frequent reorientation  - Promote wakefulness i.e. lights on, blinds open  - Promote sleep, encourage patient's normal rest cycle i.e. lights off, TV off, minimize noise and interruptions  - Encourage family to assist in orientation and promotion of home routines  Outcome: Progressing

## 2024-06-18 NOTE — PROGRESS NOTES
Patient is a 29 year old female with past medical history of acne, anxiety, bacterial endocarditis, bipolar disorder, chlamydia, decorative tattoo, depression, hepatitis B virus infection, history of blood clots, HPV infection, IV drug use, heroin and crack cocaine use, opioid dependence, subclavian vein thrombosis, and substance abuse who was admitted to the hospital for Septic shock (HCC): The patient has been demonstrating signs of being combative and is currently sedated.    Consult Duration     The patient seen for over 50-minute, follow-up evaluation, over 50% counseling and coordinating care addressing  septic shock and opioid withdrawal.  Record reviewed, communication with attending, communication with RN and patient seen face to face evaluation.     History of Present Illness:   Communicating with the team, the patient remains intubated and sedated otherwise continues be very restless and agitated. The patient is on 75 mg Fentanyl and the methadone dose has been increased.     The patient receiving benadryl 25 mg TID, ativan 2 mg QID, methadone 10 mg q 8 hours, prazosin 2 mg, Seroquel 300 mg TID, Effexor 37.5 mg.     According to the team titrating fentanyl per protocol because of extreme agitation and restlessness with the need to more as needed.    The patient continue to be sedated today during evaluation but displays sign of alternation in her mood and cognition with increased restlessness and agitation on lowering fentanyl.    We agree with the team to provide history of Haldol/Ativan IM with Benadryl IV every 6 hours scheduled to lower fentanyl and establish cognition sedation differently.    The patient continues to display high tolerance to medication and challenged to lower narcotic.    Past Psychiatric/Medication History:  1. Prior diagnoses: anxiety, bipolar disorder, depression, opioid dependence, substance abuse  2. Past psychiatric inpatient: Unknown  3. Past outpatient history: Unknown  4.  Past suicide history: Unknown  5. 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 ×42 days    Bipolar disorder (HCC)    Chlamydia    Decorative tattoo    Depression    Hepatitis B virus infection    History of blood clots    Human papilloma virus infection    Intravenous drug abuse (HCC)    Heroin and crack cocaine    Opioid dependence (HCC)    Subclavian vein thrombosis (HCC)    Substance abuse (HCC)       Past Surgical History  Past Surgical History:   Procedure Laterality Date    Colonoscopy      Colonoscopy N/A 2017    Procedure: COLONOSCOPY;  Surgeon: Xu Day MD;  Location: [facility] ENDOSCOPY    Colposcopy, cervix w/upper adjacent vagina; w/biopsy(s), cervix            Other surgical history      wisdom teeth     Other surgical history      fasciotomy of right arm    Tonsillectomy      Upper gi endoscopy,exam         Family History  Family History   Problem Relation Age of Onset    Other (back pain) Father     Hypertension Mother     Hypertension Maternal Grandmother     Diabetes Maternal Grandfather     Prostate Cancer Paternal Grandfather         w/ metastasis       Social History  Social History     Socioeconomic History    Marital status: Single    Number of children: 0   Occupational History    Occupation: Unemployed   Tobacco Use    Smoking status: Every Day     Current packs/day: 0.00     Average packs/day: 1 pack/day for 1 year (1.0 ttl pk-yrs)     Types: Cigarettes     Start date: 2014     Last attempt  to quit: 2015     Years since quittin.4    Smokeless tobacco: Current    Tobacco comments:     Vaping   Vaping Use    Vaping status: Never Used   Substance and Sexual Activity    Alcohol use: Not Currently     Comment: social    Drug use: Yes     Types: Heroin, \"Crack\" cocaine, benzodiazepines, Other-see comments     Comment: Acid, mushrooms    Sexual activity: Yes     Partners: Male     Birth control/protection: Condom   Other Topics Concern    Caffeine Concern No     Comment:  2 cans Cola per day    Exercise Yes     Comment: hep    Reaction to local anesthetic No   Social History Narrative    The patient does not use an assistive device..      The patient does live in a home with stairs.           Current Medications:  Current Facility-Administered Medications   Medication Dose Route Frequency    furosemide (Lasix) 10 mg/mL injection 40 mg  40 mg Intravenous Once    meropenem (Merrem) 500 mg in sodium chloride 0.9% 100 mL IVPB-MBP  500 mg Intravenous Q8H    LORazepam (Ativan) 2 mg/mL injection 2 mg  2 mg Intramuscular Q6H PRN    haloperidol lactate (Haldol) 5 MG/ML injection 1 mg  1 mg Intramuscular Q6H PRN    LORazepam (Ativan) 2 mg/mL injection 2 mg  2 mg Intravenous 4 times per day    QUEtiapine (SEROquel) tab 300 mg  300 mg Per NG Tube TID    methadone solution (DOLOPHINE) 10 mg/5mL  20 mg Oral Q8H    fentaNYL (Sublimaze) 50 mcg/mL injection 25 mcg  25 mcg Intravenous Q30 Min PRN    venlafaxine ER (Effexor-XR) 24 hr cap 37.5 mg  37.5 mg Oral Nightly    diphenhydrAMINE (Benadryl) 12.5 MG/5ML oral liquid 25 mg  25 mg Oral TID    midazolam (Versed) 2 MG/2ML injection 2 mg  2 mg Intravenous Q4H PRN    senna (Senokot) 8.8 MG/5ML oral syrup 8.8 mg  5 mL Per NG Tube BID    docusate (Colace) 50 MG/5ML oral solution 100 mg  100 mg Per NG Tube BID    propofol (Diprivan) 10 mg/mL infusion premix  5-40 mcg/kg/min (Dosing Weight) Intravenous Continuous    acetaminophen (Tylenol) 160 MG/5ML oral liquid 650 mg  650  mg Oral Q4H PRN    Or    acetaminophen (Tylenol) rectal suppository 650 mg  650 mg Rectal Q4H PRN    Or    acetaminophen (Ofirmev) 10 mg/mL infusion premix 1,000 mg  1,000 mg Intravenous Q6H PRN    polyethylene glycol (PEG 3350) (Miralax) 17 g oral packet 17 g  17 g Oral Daily PRN    senna (Senokot) 8.8 MG/5ML oral syrup 17.6 mg  10 mL Oral Nightly PRN    bisacodyl (Dulcolax) 10 MG rectal suppository 10 mg  10 mg Rectal Daily PRN    fleet enema (Fleet) 7-19 GM/118ML rectal enema 133 mL  1 enema Rectal Once PRN    chlorhexidine gluconate (Peridex) 0.12 % oral solution 15 mL  15 mL Mouth/Throat BID@0800,2000    prazosin (Minipress) cap 2 mg  2 mg Oral Nightly    fentaNYL in sodium chloride 0.9% (Sublimaze) 1000 mcg/100mL infusion premix   mcg/hr Intravenous Continuous    dextrose 10% infusion (TPN no rate)   Intravenous Continuous PRN    pancrelipase (Lip-Prot-Amyl) (Zenpep) DR particles cap 10,000 Units  10,000 Units Per G Tube PRN    And    sodium bicarbonate tab 325 mg  325 mg Oral PRN    famotidine (Pepcid) 20 mg/2mL injection 20 mg  20 mg Intravenous BID    ondansetron (Zofran) 4 MG/2ML injection 4 mg  4 mg Intravenous Q6H PRN    prochlorperazine (Compazine) 10 MG/2ML injection 5 mg  5 mg Intravenous Q8H PRN    acetaminophen (Tylenol) tab 650 mg  650 mg Oral Q6H PRN    melatonin tab 3 mg  3 mg Oral Nightly     Medications Prior to Admission   Medication Sig    prazosin 1 MG Oral Cap Take 4 capsules (4 mg total) by mouth nightly. Takes 4mg nightly    buprenorphine-naloxone 8-2 MG Sublingual Film dissolve 1 film under the tongue 3 TIMES A DAY for maintenance    prenatal vitamin with DHA 27-0.8-228 MG Oral Cap Take 1 capsule by mouth daily.    [] nitrofurantoin monohydrate macro (MACROBID) 100 MG Oral Cap Take 1 capsule (100 mg total) by mouth 2 (two) times daily for 7 days.    QUEtiapine 200 MG Oral Tab Take 1 tablet (200 mg total) by mouth nightly.    [] Buprenorphine HCl-Naloxone HCl 8.6-2.1  MG Sublingual SL Tab Place 8.6 mg of buprenorphine under the tongue in the morning, at noon, and at bedtime.    pantoprazole 20 MG Oral Tab EC Take 1 tablet (20 mg total) by mouth every morning before breakfast.    gabapentin 300 MG Oral Cap Take 1 capsule (300 mg total) by mouth nightly.    Melatonin 3 MG Oral Cap Take 1 capsule (3 mg total) by mouth at bedtime.    Naloxone HCl (NARCAN) 4 MG/0.1ML Nasal Liquid 4 mg by Nasal route as needed. IF PATIENT REMAINS UNRESPONSIVE, REPEAT DOSE IN OTHER NOSTRIL 2 TO 5 MINUTES AFTER FIRST DOSE (Patient not taking: Reported on 5/9/2024)    venlafaxine  MG Oral Capsule SR 24 Hr Take 1 capsule (150 mg total) by mouth every morning.    ondansetron (ZOFRAN) 4 mg tablet Take 1 tablet (4 mg total) by mouth every 8 (eight) hours as needed for Nausea. (Patient not taking: Reported on 5/24/2024)    cloNIDine 0.1 MG Oral Tab Take 1 tablet (0.1 mg total) by mouth 2 (two) times daily as needed. (Patient not taking: Reported on 5/24/2024)       Allergies  Allergies   Allergen Reactions    Amoxicillin HIVES    Vancomycin RASH and ITCHING    Clindamycin RASH       Review of Systems:   As by Admitting/Attending    Results:   Laboratory Data:  Lab Results   Component Value Date    WBC 9.6 06/18/2024    HGB 7.9 (L) 06/18/2024    HCT 24.6 (L) 06/18/2024    .0 06/18/2024    CREATSERUM 0.41 (L) 06/18/2024    BUN 13 06/18/2024     06/18/2024    K 3.8 06/18/2024     06/18/2024    CO2 27.0 06/18/2024    GLU 99 06/18/2024    CA 8.3 (L) 06/18/2024    ALB 2.7 (L) 06/13/2024    ALKPHO 146 (H) 06/13/2024    TP 5.4 (L) 06/13/2024    AST 32 06/13/2024    ALT 32 06/13/2024    PTT 29.2 05/30/2020    INR 1.9 (A) 08/12/2020    PTP 14.7 (H) 05/31/2020    T4F 0.73 11/02/2011    TSH 2.080 02/13/2019    LIP 18 (L) 05/30/2020    DDIMER 0.47 02/28/2022    CRP <0.5 01/29/2015    MG 1.6 06/18/2024    PHOS 4.2 06/17/2024    TROP <0.045 05/30/2020    CK 55 03/29/2017    ETOH <3 06/09/2024          Imaging:  XR CHEST AP PORTABLE  (CPT=71045)    Result Date: 6/18/2024  CONCLUSION:  1. Cardiomegaly. 2. Support tubes and catheters are satisfactory. 3. Extensive bilateral mixed multifocal airspace opacification right worse than left.  4. Small bilateral effusions    Dictated by (CST): Khadar Baxter MD on 6/18/2024 at 8:18 AM     Finalized by (CST): Khadar Baxter MD on 6/18/2024 at 8:32 AM          XR CHEST AP PORTABLE  (CPT=71045)    Result Date: 6/17/2024  CONCLUSION:  1. Cardiomegaly. 2. Support tubes and catheters are satisfactory. 3. Extensive bilateral mixed multifocal airspace opacification with interval progression.  4. Small-moderate bilateral pleural effusions have progressed    Dictated by (CST): Khadar Baxter MD on 6/17/2024 at 9:15 AM     Finalized by (CST): Khadar Baxter MD on 6/17/2024 at 9:19 AM          XR CHEST AP PORTABLE  (CPT=71045)    Result Date: 6/17/2024  PROCEDURE: XR CHEST AP PORTABLE  (CPT=71045) TIME: 829  COMPARISON: [facility], XR CHEST AP PORTABLE (CPT=71045), 6/14/2024, 5:28 AM.  [facility], XR CHEST AP PORTABLE (CPT=71045), 6/10/2024, 2:18 AM.  [facility], CT CHEST PE AORTA (IV ONLY) (CPT=71260), 6/09/2024, 9:50 AM.  [facility], XR CHEST AP PORTABLE (CPT=71045), 6/17/2024, 7:12 AM.  INDICATIONS: Shortness of breath.  TECHNIQUE:   Single view.   Findings and impression:  There has been no significant change  Stable well-positioned support devices.  Stable heart size.  Normal vascular pedicle  Persistent small consolidation in the upper right lung and confluent bibasilar opacities from atelectasis/effusion or lung base consolidation  There is no pneumothorax     Dictated by (CST): Steffen Godinez MD on 6/17/2024 at 8:44 AM     Finalized by (CST): Steffen Godinez MD on 6/17/2024 at 8:48 AM          XR CHEST AP PORTABLE  (CPT=71045)    Result Date: 6/16/2024  CONCLUSION: Moderate interstitial edema,  slightly improved but not resolved since 6/15/2024.  Small right and trace left pleural effusions have also slightly improved.  Retrocardiac air space density which may represent atelectasis versus pneumonia.  Dictated by (CST): Syd Smith MD on 6/16/2024 at 7:35 AM     Finalized by (CST): Syd Smith MD on 6/16/2024 at 7:37 AM           Vital Signs:   Blood pressure 103/71, pulse 87, temperature 97.7 °F (36.5 °C), temperature source Temporal, resp. rate 20, weight 75.7 kg (166 lb 14.2 oz), last menstrual period 03/25/2024, SpO2 99%, not currently breastfeeding.    Mental Status Exam:   Appearance: Stated age single, , female in hospital gown, laying down in hospital bed.  Patient intubated and sedated  Psychomotor: Patient has been demonstrating lethargy due to sedation  Orientation: Patient is sedated today.    Gait: Not evaluated.  Attitude/Coorperation: Limited cooperation due to sedation  Behavior: Episodes of restlessness and agitation  Speech: Unable to communicate verbally  Mood: Unable to express emotion  Affect: Blunted affect  Thought process: Disorganized and confused  Thought content: No reports of suicidal or homicidal ideation  Perceptions: Patient demonstrating response to internal stimuli  Concentration: Grossly impaired  Memory: Grossly impaired  Intellect: Average.  Judgment and Insight: Questionable.     Impression:     Opiate Withdrawal Syndrome.  Opiate Dependency.  Bipolar Disorder unspecified.  Septic shock (HCC)  Pneumonia of left lower lobe due to infectious organism  Subchorionic hematoma in first trimester, single or unspecified fetus (HCC)     The patient is a 29 year old  female, single, with past medical history of acne, anxiety, bacterial endocarditis, bipolar disorder, chlamydia, decorative tattoo, depression, hepatitis B virus infection, history of blood clots, HPV infection, IV drug use, heroin and crack cocaine use, opioid dependence, subclavian vein  thrombosis, and substance abuse.  The patient is currently sedated.     6/11/24: Patient is sedated and exhibiting some agitation when in the room. Propofol will start to be decreased slowly.    6/12/24: Patient in process of decreasing sedation and possible extubation.  Otherwise the patient is alert and oriented to self and place only nodding her head agree with the need for extubation and cooperation.    6/14/24: Patient continues to exhibit some agitation and restlessness. Patient is still sedated and remains intubated.    6/17/2024: The patient continued demonstrating alternation in her mood and cognition with indication for high sedation.  Patient in the place where she need to start tapering sedation down in preparation for extubation.  Discussed risk and benefit, acknowledging the current symptom and severity.  At this point, I would recommend the following approach:     6/18/2024: Patient continues to stay sedated and intubated. Patient continues to exhibit signs of restlessness and agitation.     Focus on safety  Focus on education and support.  Focus on insight orientation helping the patient understand diagnosis and treatment plan.  Continue methadone 20 mg via NG tube BID  Continue Benadryl 25 mg IV 3 times daily.  Restart Haldol 5 mg IM every 6 hours.  Continue Seroquel 300 mg 3 times daily.  Continue venlafaxine to 37.5 mg daily  Discontinue prazosin 2 mg  Continue Ativan 2 mg IV every 6 hours scheduled.  Add Haldol 5 mg, Ativan 2 mg IM, Benadryl 25 mg IV Q6hrs together - monitor if helpful for patient  Lower propofol and fentanyl graduating  Coordinate plan with team    Orders This Visit:  Orders Placed This Encounter   Procedures    CBC With Differential With Platelet    Comp Metabolic Panel (14)    Urinalysis with Culture Reflex    Lactic Acid, Plasma    HCG, Beta Subunit (Quant Pregnancy Test)    Ethyl Alcohol    Acetaminophen (Tylenol), S    Salicylate, Serum    Drug Abuse Panel 11 screen     Manual differential    Lactic Acid Reflex Post Positive    CBC With Differential With Platelet    Comp Metabolic Panel (14)    Magnesium    Streptococcus Pneumoniae Ag, Urine    Legionella urine Ag serogrp 1    Potassium    Magnesium    Scan slide    MD BLOOD SMEAR CONSULT    Triglycerides    Basic Metabolic Panel (8)    Phosphorus    Phosphorus    CBC With Differential With Platelet    Phosphorus    Hemoglobin    Triglycerides    Magnesium    Phosphorus    Basic Metabolic Panel (8)    CBC With Differential With Platelet    Hepatic Function Panel (7)    Magnesium    CBC With Differential With Platelet    Comp Metabolic Panel (14)    Phosphorus    Magnesium    Basic Metabolic Panel (8)    CBC With Differential With Platelet    Manual differential    Magnesium    Basic Metabolic Panel (8)    CBC With Differential With Platelet    Magnesium    CBC With Differential With Platelet    Basic Metabolic Panel (8)    Magnesium    CBC With Differential With Platelet    Basic Metabolic Panel (8)    Triglycerides    Phosphorus    Magnesium    Basic Metabolic Panel (8)    CBC With Differential With Platelet    Magnesium    CBC With Differential With Platelet    Basic Metabolic Panel (8)    Basic Metabolic Panel (8)    CBC With Differential With Platelet    Type and screen    ABORH (Blood Type)    Antibody Screen    Prepare RBC Once    SARS-CoV-2/Flu A and B/RSV by PCR (GeneXpert)    Blood Culture    Blood Culture PCR    Blood Culture    Blood Culture    Sputum culture       Meds This Visit:  Requested Prescriptions      No prescriptions requested or ordered in this encounter       Luis De León MD  6/18/2024    Note to Patient: The 21st Century Cures Act makes medical notes like these available to patients in the interest of transparency. However, be advised this is a medical document. It is intended as peer to peer communication. It is written in medical language and may contain abbreviations or verbiage that are unfamiliar.  It may appear blunt or direct. Medical documents are intended to carry relevant information, facts as evident, and the clinical opinion of the practitioner. This note may have been transcribed using a voice dictation system. Voice recognition errors may occur. This should not be taken to alter the content or meaning of this note.

## 2024-06-18 NOTE — RESPIRATORY THERAPY NOTE
Pt received last night at 1900 on VC/AC ventilation, rate 16, Vt 430, PEEP +5, FiO2 30%. Tolerating well. ETT 7.5, 21 at the lips. Bilat BS auscultated, pt suctioned as needed with small to moderate chantel of thick white secretions. No changes were made to the vent overnight. RT will continue to monitor.

## 2024-06-18 NOTE — PLAN OF CARE
Problem: RESPIRATORY - ADULT  Goal: Achieves optimal ventilation and oxygenation  Description: INTERVENTIONS:  - Assess for changes in respiratory status  - Assess for changes in mentation and behavior  - Position to facilitate oxygenation and minimize respiratory effort  - Oxygen supplementation based on oxygen saturation or ABGs  - Provide Smoking Cessation handout, if applicable  - Encourage broncho-pulmonary hygiene including cough, deep breathe, Incentive Spirometry  - Assess the need for suctioning and perform as needed  - Assess and instruct to report SOB or any respiratory difficulty  - Respiratory Therapy support as indicated  - Manage/alleviate anxiety  - Monitor for signs/symptoms of CO2 retention  Outcome: Progressing    Received patient intubated and on vent settings of VC/AC 16/430/+5/30%, ETT 7.5/ 21 @ the lip, [email or web address] sound auscultated and suction provided as needed. Patient is sedated, not a candidate for SBT, plan to do weaning  trial tomorrow, no other changes/acute events on this shift, RT will continue to monitor the patient.

## 2024-06-18 NOTE — PROGRESS NOTES
Patient is a 29 year old female with past medical history of acne, anxiety, bacterial endocarditis, bipolar disorder, chlamydia, decorative tattoo, depression, hepatitis B virus infection, history of blood clots, HPV infection, IV drug use, heroin and crack cocaine use, opioid dependence, subclavian vein thrombosis, and substance abuse who was admitted to the hospital for Septic shock (HCC): The patient has been demonstrating signs of being combative and is currently sedated.    Consult Duration     The patient seen for over 50-minute, follow-up evaluation, over 50% counseling and coordinating care addressing  septic shock and opioid withdrawal.  Record reviewed, communication with attending, communication with RN and patient seen face to face evaluation.     History of Present Illness:   Staffing the case with the team today indicating that the patient has been required high dosage of fentanyl for sedation and it is hard to extubate.  Also discovering some pneumonia and the patient might not be able to extubate today but it is indicative to adjust medication and taper fentanyl.    Patient continued to be restless and difficulty with sedation and high tolerance.  Staff indicated symptom of agitation upon tapering down sedative medication.    Patient is very sedated today with difficult to communicate.    The patient continues to display signs of alternation in mood, cognition with episodes of restlessness and agitation.    Past Psychiatric/Medication History:  1. Prior diagnoses: anxiety, bipolar disorder, depression, opioid dependence, substance abuse  2. Past psychiatric inpatient: Unknown  3. Past outpatient history: Unknown  4. Past suicide history: Unknown  5. 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 ×42 days    Bipolar disorder (HCC)    Chlamydia    Decorative tattoo    Depression    Hepatitis B virus infection    History of blood clots    Human papilloma virus infection    Intravenous drug abuse (HCC)    Heroin and crack cocaine    Opioid dependence (HCC)    Subclavian vein thrombosis (HCC)    Substance abuse (HCC)       Past Surgical History  Past Surgical History:   Procedure Laterality Date    Colonoscopy      Colonoscopy N/A 2017    Procedure: COLONOSCOPY;  Surgeon: Xu Day MD;  Location: [facility] ENDOSCOPY    Colposcopy, cervix w/upper adjacent vagina; w/biopsy(s), cervix            Other surgical history      wisdom teeth     Other surgical history      fasciotomy of right arm    Tonsillectomy      Upper gi endoscopy,exam         Family History  Family History   Problem Relation Age of Onset    Other (back pain) Father     Hypertension Mother     Hypertension Maternal Grandmother     Diabetes Maternal Grandfather     Prostate Cancer Paternal Grandfather         w/ metastasis       Social History  Social History     Socioeconomic History    Marital status: Single    Number of children: 0   Occupational History    Occupation: Unemployed   Tobacco Use    Smoking status: Every Day     Current packs/day: 0.00     Average packs/day: 1 pack/day for 1 year (1.0 ttl pk-yrs)     Types: Cigarettes     Start date: 2014     Last attempt to quit: 2015     Years since quittin.4    Smokeless tobacco: Current    Tobacco comments:     Vaping   Vaping Use    Vaping status: Never Used   Substance and Sexual Activity    Alcohol use: Not Currently     Comment: social    Drug use: Yes     Types: Heroin,  \"Crack\" cocaine, benzodiazepines, Other-see comments     Comment: Acid, mushrooms    Sexual activity: Yes     Partners: Male     Birth control/protection: Condom   Other Topics Concern    Caffeine Concern No     Comment:  2 cans Cola per day    Exercise Yes     Comment: hep    Reaction to local anesthetic No   Social History Narrative    The patient does not use an assistive device..      The patient does live in a home with stairs.           Current Medications:  Current Facility-Administered Medications   Medication Dose Route Frequency    LORazepam (Ativan) 2 mg/mL injection 2 mg  2 mg Intravenous 4 times per day    LORazepam (Ativan) 2 mg/mL injection 2 mg  2 mg Intravenous Once    LORazepam (Ativan) 2 mg/mL injection 2 mg  2 mg Intramuscular Once PRN    haloperidol lactate (Haldol) 5 MG/ML injection 5 mg  5 mg Intramuscular Once PRN    QUEtiapine (SEROquel) tab 300 mg  300 mg Per NG Tube TID    haloperidol lactate (Haldol) 5 MG/ML injection 5 mg  5 mg Intravenous Q6H PRN    methadone solution (DOLOPHINE) 10 mg/5mL  20 mg Oral Q8H    fentaNYL (Sublimaze) 50 mcg/mL injection 25 mcg  25 mcg Intravenous Q30 Min PRN    venlafaxine ER (Effexor-XR) 24 hr cap 37.5 mg  37.5 mg Oral Nightly    diphenhydrAMINE (Benadryl) 12.5 MG/5ML oral liquid 25 mg  25 mg Oral TID    midazolam (Versed) 2 MG/2ML injection 2 mg  2 mg Intravenous Q4H PRN    senna (Senokot) 8.8 MG/5ML oral syrup 8.8 mg  5 mL Per NG Tube BID    docusate (Colace) 50 MG/5ML oral solution 100 mg  100 mg Per NG Tube BID    propofol (Diprivan) 10 mg/mL infusion premix  5-40 mcg/kg/min (Dosing Weight) Intravenous Continuous    acetaminophen (Tylenol) 160 MG/5ML oral liquid 650 mg  650 mg Oral Q4H PRN    Or    acetaminophen (Tylenol) rectal suppository 650 mg  650 mg Rectal Q4H PRN    Or    acetaminophen (Ofirmev) 10 mg/mL infusion premix 1,000 mg  1,000 mg Intravenous Q6H PRN    polyethylene glycol (PEG 3350) (Miralax) 17 g oral packet 17 g  17 g Oral Daily PRN     senna (Senokot) 8.8 MG/5ML oral syrup 17.6 mg  10 mL Oral Nightly PRN    bisacodyl (Dulcolax) 10 MG rectal suppository 10 mg  10 mg Rectal Daily PRN    fleet enema (Fleet) 7-19 GM/118ML rectal enema 133 mL  1 enema Rectal Once PRN    chlorhexidine gluconate (Peridex) 0.12 % oral solution 15 mL  15 mL Mouth/Throat BID@0800,2000    meropenem (Merrem) 1 g in sodium chloride 0.9% 100 mL IVPB-MBP  1 g Intravenous Q8H    prazosin (Minipress) cap 2 mg  2 mg Oral Nightly    fentaNYL in sodium chloride 0.9% (Sublimaze) 1000 mcg/100mL infusion premix   mcg/hr Intravenous Continuous    dextrose 10% infusion (TPN no rate)   Intravenous Continuous PRN    pancrelipase (Lip-Prot-Amyl) (Zenpep) DR particles cap 10,000 Units  10,000 Units Per G Tube PRN    And    sodium bicarbonate tab 325 mg  325 mg Oral PRN    famotidine (Pepcid) 20 mg/2mL injection 20 mg  20 mg Intravenous BID    ondansetron (Zofran) 4 MG/2ML injection 4 mg  4 mg Intravenous Q6H PRN    prochlorperazine (Compazine) 10 MG/2ML injection 5 mg  5 mg Intravenous Q8H PRN    acetaminophen (Tylenol) tab 650 mg  650 mg Oral Q6H PRN    melatonin tab 3 mg  3 mg Oral Nightly     Medications Prior to Admission   Medication Sig    prazosin 1 MG Oral Cap Take 4 capsules (4 mg total) by mouth nightly. Takes 4mg nightly    buprenorphine-naloxone 8-2 MG Sublingual Film dissolve 1 film under the tongue 3 TIMES A DAY for maintenance    prenatal vitamin with DHA 27-0.8-228 MG Oral Cap Take 1 capsule by mouth daily.    [] nitrofurantoin monohydrate macro (MACROBID) 100 MG Oral Cap Take 1 capsule (100 mg total) by mouth 2 (two) times daily for 7 days.    QUEtiapine 200 MG Oral Tab Take 1 tablet (200 mg total) by mouth nightly.    [] Buprenorphine HCl-Naloxone HCl 8.6-2.1 MG Sublingual SL Tab Place 8.6 mg of buprenorphine under the tongue in the morning, at noon, and at bedtime.    pantoprazole 20 MG Oral Tab EC Take 1 tablet (20 mg total) by mouth every morning  before breakfast.    gabapentin 300 MG Oral Cap Take 1 capsule (300 mg total) by mouth nightly.    Melatonin 3 MG Oral Cap Take 1 capsule (3 mg total) by mouth at bedtime.    Naloxone HCl (NARCAN) 4 MG/0.1ML Nasal Liquid 4 mg by Nasal route as needed. IF PATIENT REMAINS UNRESPONSIVE, REPEAT DOSE IN OTHER NOSTRIL 2 TO 5 MINUTES AFTER FIRST DOSE (Patient not taking: Reported on 5/9/2024)    venlafaxine  MG Oral Capsule SR 24 Hr Take 1 capsule (150 mg total) by mouth every morning.    ondansetron (ZOFRAN) 4 mg tablet Take 1 tablet (4 mg total) by mouth every 8 (eight) hours as needed for Nausea. (Patient not taking: Reported on 5/24/2024)    cloNIDine 0.1 MG Oral Tab Take 1 tablet (0.1 mg total) by mouth 2 (two) times daily as needed. (Patient not taking: Reported on 5/24/2024)       Allergies  Allergies   Allergen Reactions    Amoxicillin HIVES    Vancomycin RASH and ITCHING    Clindamycin RASH       Review of Systems:   As by Admitting/Attending    Results:   Laboratory Data:  Lab Results   Component Value Date    WBC 7.8 06/17/2024    HGB 7.4 (L) 06/17/2024    HCT 22.9 (L) 06/17/2024    .0 06/17/2024    CREATSERUM 0.36 (L) 06/17/2024    BUN 10 06/17/2024     06/17/2024    K 4.1 06/17/2024     06/17/2024    CO2 28.0 06/17/2024     (H) 06/17/2024    CA 8.3 (L) 06/17/2024    ALB 2.7 (L) 06/13/2024    ALKPHO 146 (H) 06/13/2024    TP 5.4 (L) 06/13/2024    AST 32 06/13/2024    ALT 32 06/13/2024    PTT 29.2 05/30/2020    INR 1.9 (A) 08/12/2020    PTP 14.7 (H) 05/31/2020    T4F 0.73 11/02/2011    TSH 2.080 02/13/2019    LIP 18 (L) 05/30/2020    DDIMER 0.47 02/28/2022    CRP <0.5 01/29/2015    MG 1.7 06/17/2024    PHOS 4.2 06/17/2024    TROP <0.045 05/30/2020    CK 55 03/29/2017    ETOH <3 06/09/2024         Imaging:  XR CHEST AP PORTABLE  (CPT=71045)    Result Date: 6/17/2024  CONCLUSION:  1. Cardiomegaly. 2. Support tubes and catheters are satisfactory. 3. Extensive bilateral mixed  multifocal airspace opacification with interval progression.  4. Small-moderate bilateral pleural effusions have progressed    Dictated by (CST): Khadar Baxter MD on 6/17/2024 at 9:15 AM     Finalized by (CST): Khadar Baxter MD on 6/17/2024 at 9:19 AM          XR CHEST AP PORTABLE  (CPT=71045)    Result Date: 6/17/2024  PROCEDURE: XR CHEST AP PORTABLE  (CPT=71045) TIME: 829  COMPARISON: [facility], XR CHEST AP PORTABLE (CPT=71045), 6/14/2024, 5:28 AM.  [facility], XR CHEST AP PORTABLE (CPT=71045), 6/10/2024, 2:18 AM.  [facility], CT CHEST PE AORTA (IV ONLY) (CPT=71260), 6/09/2024, 9:50 AM.  [facility], XR CHEST AP PORTABLE (CPT=71045), 6/17/2024, 7:12 AM.  INDICATIONS: Shortness of breath.  TECHNIQUE:   Single view.   Findings and impression:  There has been no significant change  Stable well-positioned support devices.  Stable heart size.  Normal vascular pedicle  Persistent small consolidation in the upper right lung and confluent bibasilar opacities from atelectasis/effusion or lung base consolidation  There is no pneumothorax     Dictated by (CST): Steffen Godinez MD on 6/17/2024 at 8:44 AM     Finalized by (CST): Steffen Godinez MD on 6/17/2024 at 8:48 AM          XR CHEST AP PORTABLE  (CPT=71045)    Result Date: 6/16/2024  CONCLUSION: Moderate interstitial edema, slightly improved but not resolved since 6/15/2024.  Small right and trace left pleural effusions have also slightly improved.  Retrocardiac air space density which may represent atelectasis versus pneumonia.  Dictated by (CST): Syd Smith MD on 6/16/2024 at 7:35 AM     Finalized by (CST): Syd Smith MD on 6/16/2024 at 7:37 AM          XR CHEST AP PORTABLE  (CPT=71045)    Result Date: 6/15/2024  CONCLUSION:  Unchanged mild cardiomegaly with interstitial pulmonary edema.  Moderate right pleural effusion, mildly increased.  Multifocal airspace opacities throughout the right  lung, similar to prior.  Unchanged left retrocardiac opacity.    Dictated by (CST): Aziza Nath MD on 6/15/2024 at 9:14 AM     Finalized by (CST): Aziza Nath MD on 6/15/2024 at 9:16 AM           Vital Signs:   Blood pressure 99/63, pulse 78, temperature 97.9 °F (36.6 °C), temperature source Temporal, resp. rate 19, weight 84.1 kg (185 lb 6.5 oz), last menstrual period 03/25/2024, SpO2 99%, not currently breastfeeding.    Mental Status Exam:   Appearance: Stated age single, , female in hospital gown, laying down in hospital bed.   Psychomotor: Patient has been demonstrating lethargy due to sedation  Orientation: Patient is alert and oriented to self   Gait: Not evaluated.  Attitude/Coorperation: Limited cooperation due to sedation  Behavior: Episodes of restlessness and agitation  Speech: Unable to communicate verbally  Mood: Unable to express emotion  Affect: Blunted affect  Thought process: Disorganized and confused  Thought content: No reports of suicidal or homicidal ideation  Perceptions: Patient demonstrating response to internal stimuli  Concentration: Grossly impaired  Memory: Grossly impaired  Intellect: Average.  Judgment and Insight: Questionable.     Impression:     Opiate Withdrawal Syndrome.  Opiate Dependency.  Bipolar Disorder unspecified.  Septic shock (HCC)  Pneumonia of left lower lobe due to infectious organism  Subchorionic hematoma in first trimester, single or unspecified fetus (HCC)     The patient is a 29 year old  female, single, with past medical history of acne, anxiety, bacterial endocarditis, bipolar disorder, chlamydia, decorative tattoo, depression, hepatitis B virus infection, history of blood clots, HPV infection, IV drug use, heroin and crack cocaine use, opioid dependence, subclavian vein thrombosis, and substance abuse.  The patient is currently sedated.     6/11/24: Patient is sedated and exhibiting some agitation when in the room. Propofol will start  to be decreased slowly.    6/12/24: Patient in process of decreasing sedation and possible extubation.  Otherwise the patient is alert and oriented to self and place only nodding her head agree with the need for extubation and cooperation.    6/14/24: Patient continues to exhibit some agitation and restlessness. Patient is still sedated and remains intubated.    6/17/2024: The patient continued demonstrating alternation in her mood and cognition with indication for high sedation.  Patient in the place where she need to start tapering sedation down in preparation for extubation.  Discussed risk and benefit, acknowledging the current symptom and severity.  At this point, I would recommend the following approach:     Focus on safety  Focus on education and support.  Focus on insight orientation helping the patient understand diagnosis and treatment plan.  Increase methadone to 20 mg via NG tube BID  Start Benadryl 25 mg 3 times daily.  Haldol discontinued by the medical team.  Continue Seroquel 300 mg 3 times daily.  Lower venlafaxine to 37.5 mg daily  Continue prazosin 2 mg  Continue Ativan 2 mg IV every 6 hours scheduled.  Lower propofol graduating  Coordinate plan with team    Orders This Visit:  Orders Placed This Encounter   Procedures    CBC With Differential With Platelet    Comp Metabolic Panel (14)    Urinalysis with Culture Reflex    Lactic Acid, Plasma    HCG, Beta Subunit (Quant Pregnancy Test)    Ethyl Alcohol    Acetaminophen (Tylenol), S    Salicylate, Serum    Drug Abuse Panel 11 screen    Manual differential    Lactic Acid Reflex Post Positive    CBC With Differential With Platelet    Comp Metabolic Panel (14)    Magnesium    Streptococcus Pneumoniae Ag, Urine    Legionella urine Ag serogrp 1    Potassium    Magnesium    Scan slide    MD BLOOD SMEAR CONSULT    Triglycerides    Basic Metabolic Panel (8)    Phosphorus    Phosphorus    CBC With Differential With Platelet    Phosphorus    Hemoglobin     Triglycerides    Magnesium    Phosphorus    Basic Metabolic Panel (8)    CBC With Differential With Platelet    Hepatic Function Panel (7)    Magnesium    CBC With Differential With Platelet    Comp Metabolic Panel (14)    Phosphorus    Magnesium    Basic Metabolic Panel (8)    CBC With Differential With Platelet    Manual differential    Magnesium    Basic Metabolic Panel (8)    CBC With Differential With Platelet    Magnesium    CBC With Differential With Platelet    Basic Metabolic Panel (8)    Magnesium    CBC With Differential With Platelet    Basic Metabolic Panel (8)    Triglycerides    Phosphorus    Magnesium    Basic Metabolic Panel (8)    CBC With Differential With Platelet    Type and screen    ABORH (Blood Type)    Antibody Screen    Prepare RBC Once    SARS-CoV-2/Flu A and B/RSV by PCR (GeneXpert)    Blood Culture    Blood Culture PCR    Blood Culture    Blood Culture    Sputum culture       Meds This Visit:  Requested Prescriptions      No prescriptions requested or ordered in this encounter       Luis De León MD  6/14/2024    Note to Patient: The 21st Century Cures Act makes medical notes like these available to patients in the interest of transparency. However, be advised this is a medical document. It is intended as peer to peer communication. It is written in medical language and may contain abbreviations or verbiage that are unfamiliar. It may appear blunt or direct. Medical documents are intended to carry relevant information, facts as evident, and the clinical opinion of the practitioner. This note may have been transcribed using a voice dictation system. Voice recognition errors may occur. This should not be taken to alter the content or meaning of this note.

## 2024-06-19 ENCOUNTER — APPOINTMENT (OUTPATIENT)
Dept: GENERAL RADIOLOGY | Facility: HOSPITAL | Age: 29
DRG: 831 | End: 2024-06-19
Attending: INTERNAL MEDICINE

## 2024-06-19 LAB
ANION GAP SERPL CALC-SCNC: 6 MMOL/L (ref 0–18)
BASOPHILS # BLD AUTO: 0.07 X10(3) UL (ref 0–0.2)
BASOPHILS NFR BLD AUTO: 0.6 %
BUN BLD-MCNC: 15 MG/DL (ref 9–23)
BUN/CREAT SERPL: 34.1 (ref 10–20)
CALCIUM BLD-MCNC: 8.8 MG/DL (ref 8.7–10.4)
CHLORIDE SERPL-SCNC: 100 MMOL/L (ref 98–112)
CO2 SERPL-SCNC: 28 MMOL/L (ref 21–32)
CREAT BLD-MCNC: 0.44 MG/DL
DEPRECATED RDW RBC AUTO: 44.5 FL (ref 35.1–46.3)
EGFRCR SERPLBLD CKD-EPI 2021: 136 ML/MIN/1.73M2 (ref 60–?)
EOSINOPHIL # BLD AUTO: 0.1 X10(3) UL (ref 0–0.7)
EOSINOPHIL NFR BLD AUTO: 0.9 %
ERYTHROCYTE [DISTWIDTH] IN BLOOD BY AUTOMATED COUNT: 16 % (ref 11–15)
GLUCOSE BLD-MCNC: 99 MG/DL (ref 70–99)
HCT VFR BLD AUTO: 24.9 %
HGB BLD-MCNC: 8.1 G/DL
IMM GRANULOCYTES # BLD AUTO: 0.24 X10(3) UL (ref 0–1)
IMM GRANULOCYTES NFR BLD: 2.1 %
LYMPHOCYTES # BLD AUTO: 1.67 X10(3) UL (ref 1–4)
LYMPHOCYTES NFR BLD AUTO: 14.5 %
MAGNESIUM SERPL-MCNC: 1.7 MG/DL (ref 1.6–2.6)
MCH RBC QN AUTO: 25 PG (ref 26–34)
MCHC RBC AUTO-ENTMCNC: 32.5 G/DL (ref 31–37)
MCV RBC AUTO: 76.9 FL
MONOCYTES # BLD AUTO: 0.74 X10(3) UL (ref 0.1–1)
MONOCYTES NFR BLD AUTO: 6.4 %
NEUTROPHILS # BLD AUTO: 8.68 X10 (3) UL (ref 1.5–7.7)
NEUTROPHILS # BLD AUTO: 8.68 X10(3) UL (ref 1.5–7.7)
NEUTROPHILS NFR BLD AUTO: 75.5 %
OSMOLALITY SERPL CALC.SUM OF ELEC: 279 MOSM/KG (ref 275–295)
PLATELET # BLD AUTO: 385 10(3)UL (ref 150–450)
POTASSIUM SERPL-SCNC: 3.9 MMOL/L (ref 3.5–5.1)
RBC # BLD AUTO: 3.24 X10(6)UL
SODIUM SERPL-SCNC: 134 MMOL/L (ref 136–145)
WBC # BLD AUTO: 11.5 X10(3) UL (ref 4–11)

## 2024-06-19 PROCEDURE — 71045 X-RAY EXAM CHEST 1 VIEW: CPT | Performed by: INTERNAL MEDICINE

## 2024-06-19 PROCEDURE — 99233 SBSQ HOSP IP/OBS HIGH 50: CPT | Performed by: OTHER

## 2024-06-19 PROCEDURE — 99291 CRITICAL CARE FIRST HOUR: CPT | Performed by: INTERNAL MEDICINE

## 2024-06-19 PROCEDURE — 99233 SBSQ HOSP IP/OBS HIGH 50: CPT | Performed by: HOSPITALIST

## 2024-06-19 RX ORDER — LURASIDONE HYDROCHLORIDE 20 MG/1
20 TABLET, FILM COATED ORAL
Status: DISCONTINUED | OUTPATIENT
Start: 2024-06-19 | End: 2024-06-20

## 2024-06-19 RX ORDER — FUROSEMIDE 10 MG/ML
20 INJECTION INTRAMUSCULAR; INTRAVENOUS ONCE
Status: COMPLETED | OUTPATIENT
Start: 2024-06-19 | End: 2024-06-19

## 2024-06-19 RX ORDER — LORAZEPAM 2 MG/ML
1 INJECTION INTRAMUSCULAR EVERY 6 HOURS SCHEDULED
Status: DISCONTINUED | OUTPATIENT
Start: 2024-06-20 | End: 2024-06-20

## 2024-06-19 NOTE — PROGRESS NOTES
[facility]  part of [facility]    Progress Note      Assessment and Plan:   1.  Septic shock-Echo without vegetation.  Patient is yet requiring pressors.  Fusobacterium identified in the blood.     Recommendations:  1.  Ongoing antibiotic as per ID.     2.  Psychiatric-substance abuse disorder as well as history of bipolar disease status post recent prolonged rehab stay having been discharged 30 days ago.  The patient is currently on methadone, Seroquel, fentanyl drips and pushes.  Discussed with psychiatry and they are adjusting medications.  The patient is very active in bed today.     Recommendations: CIWA protocol-as per psychiatry evaluation, methadone initiated.  Will discontinue the fentanyl drip and the propofol and progressed to extubate.  Will use fentanyl pushes.     3.  Pregnancy-3 cm subchorionic hemorrhage, now with decreasing beta hCG.     Recommendations: As per obstetrics.     4.  DVT prophylaxis-would use SCDs in patient who has subchorionic hemorrhage at this juncture although the patient has an extensive history of thrombosis.     Recommendations: SCDs and would discuss risks of chemoprophylaxis anticoagulation with obstetrics.     5.  Social concerns.     6.  Respiratory failure-the patient may have developed ARDS associated with the gram-negative bacteremia.  May have a component of noncardiogenic edema.  Also covered for aspiration pneumonia.  The x-ray still shows bilateral haziness.  She is getting ongoing antibiotic.  We stopped the propofol and fentanyl drip and progressed to extubate and she is doing well.    Recommendations: Ongoing antibiotic.  Pulmonary toilet.    7.  Transient asystole-while on Precedex drip.  Discontinued.    Subjective:   Latonia Barker is a(n) 29 year old female who is sedate on ventilator    Objective:   Blood pressure 115/78, pulse 119, temperature 98.8 °F (37.1 °C), temperature source Temporal, resp. rate (!) 33, weight 166 lb 14.2 oz (75.7  kg), last menstrual period 03/25/2024, SpO2 93%, not currently breastfeeding.    Physical Exam sedate white female  HEENT examination is unremarkable with pupils equal round and reactive to light and accommodation.   Neck without adenopathy, thyromegaly, JVD nor bruit.   Lungs diminished to auscultation and percussion.  Cardiac regular rate and rhythm no murmur.   Abdomen nontender, without hepatosplenomegaly and no mass appreciable.   Extremities without clubbing cyanosis nor edema.   Neurologic grossly intact with symmetric tone and strength and reflex when allowed to wake.  Skin without gross abnormality     Results:     Lab Results   Component Value Date    WBC 11.5 06/19/2024    HGB 8.1 06/19/2024    HCT 24.9 06/19/2024    .0 06/19/2024    CREATSERUM 0.44 06/19/2024    BUN 15 06/19/2024     06/19/2024    K 3.9 06/19/2024     06/19/2024    CO2 28.0 06/19/2024    GLU 99 06/19/2024    CA 8.8 06/19/2024    MG 1.7 06/19/2024     CT scan of the chest-1. No acute pulmonary embolism through the segmental pulmonary arteries.   2. Multifocal pneumonia demonstrated by multifocal ground-glass opacities and consolidations involving the left greater than right lower lobes and left upper lobe.   3. Two 4 mm right upper lobe pulmonary nodules, which are favored to be infectious/inflammatory, however recommend follow-up CT chest in 6 months to monitor for resolution.   4. Mild right heart predominant cardiomegaly.   5. Borderline dilated main pulmonary artery, which can be seen in pulmonary artery hypertension.   6. Small pericardial effusion.   7. Hepatosplenomegaly.     Chest x-ray-mild edema    Srinivasan Saleh MD  Medical Director, Critical Care, [facility]  Medical Director, [facility]  Pager: [phone]  >35 min crit care

## 2024-06-19 NOTE — PROGRESS NOTES
Pt successfully extubated at 0830 this morning. Dr. Saleh and RT's at bedside. Fentanyl gtt and propofol gtt off. Pt lethargic but A&O x3. Soft wrist restraints and mittens in place for pt safety. NG still in place with tube feedings infusing.

## 2024-06-19 NOTE — CM/SW NOTE
Care Progression Note:  Length of stay: 10  GMLOS:   Avoidable Delays:   Code Status: FULL    Acute Medical Issue/Factors:   Septic shock due to multifocal pneumonia and Fusobacterium bacteremia-Pt now extubated on 4L via venturi mask. IV Merrem ordered q 8 hrs. WBC trending up to 11.5 this morning.    IV drug abuse/Bipolar disorder-Psych consulted. Pt confused and restless today. Currently restrained for safety. Currently on IV Methadone 2/2 withdrawal. IV Ativan, PO Latuda, and PO Seroquel scheduled. IV Haldol ordered PRN for agitation and restlessness.    Shock liver/RAMON-resolving.    Pregnancy-OB following. Will have repeat US prior to dc.    Pt will likely require inpatient substance abuse or psych tx at dc. Psych liaison will continue to follow patient's progress. CM will defer dc planning to psych team at this time.    Discharge Barriers: Physical/emotional and/or cognitive functioning   Expected discharge date: TBD   Expected next site of care: Inpatient Psych.     Plan: TBD    / available for discharge planning.     NOHEMY Bhardwaj    [phone]

## 2024-06-19 NOTE — PLAN OF CARE
Problem: Safety Risk - Non-Violent Restraints  Goal: Patient will remain free from self-harm  Description: INTERVENTIONS:  - Apply the least restrictive restraint to prevent harm  - Notify patient and family of reasons restraints applied  - Assess for any contributing factors to confusion (electrolyte disturbances, delirium, medications)  - Discontinue any unnecessary medical devices as soon as possible  - Assess the patient's physical comfort, circulation, skin condition, hydration, nutrition and elimination needs   - Reorient and redirection as needed  - Assess for the need to continue restraints  6/19/2024 0747 by Sylvie Maher, RN  Outcome: Progressing  6/18/2024 1833 by Sylvie Maher, RN  Outcome: Progressing

## 2024-06-19 NOTE — PROGRESS NOTES
[facility]  part of [facility]    Progress Note    Latonia Barker Patient Status:  Inpatient    1/3/1995 MRN [id number]   Location [facility] 2W/SW Attending Raheem Monet MD   Hosp Day # 10 PCP Agatha Mcmanus MD       Subjective:     Pt recently extubated.  Says she feels better but is sleepy and does not answer other questions.    Objective:   Blood pressure 132/78, pulse 117, temperature 98.8 °F (37.1 °C), temperature source Temporal, resp. rate (!) 46, weight 166 lb 14.2 oz (75.7 kg), last menstrual period 2024, SpO2 92%, not currently breastfeeding.    Gen:   NAD.  Sleepy.  CV:   RRR, no m/g/r  Pulm:   CTA anterior exam  In restraints  Abd:   +bs, soft, NT, ND  LE:   No c/c/e  Neuro:   nonfocal    Results:     Lab Results   Component Value Date    WBC 11.5 (H) 2024    HGB 8.1 (L) 2024    HCT 24.9 (L) 2024    .0 2024    CREATSERUM 0.44 (L) 2024    BUN 15 2024     (L) 2024    K 3.9 2024     2024    CO2 28.0 2024    GLU 99 2024    CA 8.8 2024    ALB 2.7 (L) 2024    ALKPHO 146 (H) 2024    BILT 1.5 (H) 2024    TP 5.4 (L) 2024    AST 32 2024    ALT 32 2024    PTT 29.2 2020    INR 1.9 (A) 2020    T4F 0.73 2011    TSH 2.080 2019    LIP 18 (L) 2020    DDIMER 0.47 2022    CRP <0.5 2015    MG 1.7 2024    PHOS 4.2 2024    TROP <0.045 2020    CK 55 2017    ETOH <3 2024       XR CHEST AP PORTABLE  (CPT=71045)    Result Date: 2024  CONCLUSION:  1. Borderline heart size.  2. Support tubes and catheters are in satisfactory position. 3. Bilateral mixed alveolar and interstitial multifocal airspace opacification, slight progression.  4. Bilateral pleural effusions.    Dictated by (CST): Khadar Baxter MD on 2024 at 7:54 AM     Finalized by (CST): Khadar Baxter MD on  2024 at 8:00 AM          XR CHEST AP PORTABLE  (CPT=71045)    Result Date: 2024  CONCLUSION:  1. Cardiomegaly. 2. Support tubes and catheters are satisfactory. 3. Extensive bilateral mixed multifocal airspace opacification right worse than left.  4. Small bilateral effusions    Dictated by (CST): Khadar Baxter MD on 2024 at 8:18 AM     Finalized by (CST): Khadar Baxter MD on 2024 at 8:32 AM               Assessment and Plan:     Acute septic shock due to multifocal pneumonia and Fusobacterium bacteremia  No longer septic.  Acute hypoxic respiratory failure  Extubated .  - ID, pulm/critical care on consult  - CXR and CT chest reviewed. No PE. 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  - TTE without endocarditis  - cont abx as above  - ID was consulted     Pulmonary edema  - lasix 20mg IV today     H/o IVDA  - Reportedly on suboxone since March and doing well but had relapse 1 day PTA   Per mother pt was on 8.2 mg suboxone tid, ruby MONTOYA at [facility] recovery [phone]  - cont meds per psych  - pt on methadone     Shock liver - due to sepsis  -Transaminitis: mild - resolved  - US liver shows HM and steatosis  - Avoid hepatotoxins     Pregnancy   -  10 Weeks 6 day viable IUP on US: 3.2 cm subchorionic hemorrhage along the inferior aspect of the gestational sac.   - HCG down trending  - OB on consult  - no vaginal bleed - cont monitor  - repeat US prior to discharge     Bicytopenia: plt normalized. Anemia persists - better today  - TCP possibly due to sepsis. improving  - Anemia possibly from acute blood loss related to 3.2 cm subchorionic hemorrhage?  - Hold off ac   - 6/10: 1 U prbc transfused  - transfuse if  hgb<7     RAMON: mild - improved  Likely 2/2 shock  On pressors  Monitor renal function daily and avoid nephrotoxins     Bipolar disorder  - psych consult (of note both mom and daughter vehemently refused in prior admission)  - zofran prn, seroquel 200mg qhs  - supportive  - SW  - cont meds with adjustment per psych     Hx of chronic anticoagulation  H/o Recurrent venous thrombosis superficial and deep 2017  - (had followed in [facility] in the past)  - per last hematology f/u note 2020 \"She completed more than 6 months of anticoagulation with warfarin for what we consider a provoked DVT and states she is no longer using IV drugs in the upper extremities.  She is now pregnant.  Would recommend stopping anticoagulation as long as she is able to avoid IV drug use \"  - avoid a/c due to subchorionic hemorrhage on US     dvt proph:    SCDs    Code status:    Full       MDM:    High Raheem Gordon MD  6/19/2024

## 2024-06-19 NOTE — PROGRESS NOTES
06/19/24 0954   VISIT TYPE   SLP Inpatient Visit Type (Documentation Required) Attempted Evaluation   FOLLOW UP/PLAN   Follow Up Needed (Documentation Required) Yes   SLP Follow-up Date 06/20/24     SLP orders received and acknowledged. SLP attempt this AM. Pt intubated 6/10/24 and extubated on 6/19/24 @0830. Per SLP protocol, SLP to evaluate 24 hours s/p extubation.    Thank you.    Hallie Fitch M.S. CCC-SLP  Speech Language Pathologist  Phone Number Ext. [phone]

## 2024-06-19 NOTE — PROGRESS NOTES
Patient is a 29 year old female with past medical history of acne, anxiety, bacterial endocarditis, bipolar disorder, chlamydia, decorative tattoo, depression, hepatitis B virus infection, history of blood clots, HPV infection, IV drug use, heroin and crack cocaine use, opioid dependence, subclavian vein thrombosis, and substance abuse who was admitted to the hospital for Septic shock (HCC): The patient has been demonstrating signs of being combative and is currently sedated.    Consult Duration     The patient seen for over 50-minute, follow-up evaluation, over 50% counseling and coordinating care addressing  septic shock and opioid withdrawal.  Record reviewed, communication with attending, communication with RN and patient seen face to face evaluation.     History of Present Illness:   Communicating with the team, the patient was extubated this morning. The patient has been very agitated and not alert and oriented. Patient is on NG tube. Patient is not on Fentanyl or Propofol anymore.    The patient receiving benadryl 25 mg TID, ativan 2 mg QID, methadone 10 mg q 8 hours, prazosin 2 mg, Seroquel 300 mg TID, Effexor 37.5 mg.     Vital Signs: BP: 115/78, Pulse 119, RR: 23    The patient seen today laying in hospital bed. Patient is not able to speak clearly. Patient told that she is not detoxing and to not worry. Patient told her job is to rest.    The patient continues to display high tolerance to medication and challenged to lower narcotic.    Past Psychiatric/Medication History:  1. Prior diagnoses: anxiety, bipolar disorder, depression, opioid dependence, substance abuse  2. Past psychiatric inpatient: Unknown  3. Past outpatient history: Unknown  4. Past suicide history: Unknown  5. Medication history: Suboxone 8/2 3x daily    Social History:   Heroin use  Smoking: cigarettes everyday, 1 pack year history  Vaping  Alcohol use socially    Family History:  Father: back pain  Mother: Hypertension  Maternal  Grandmother: Hypertension  Maternal Grandfather: Diabetes  Paternal Grandfather\" Prostate Cancer with metastasis  Medical History:   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 treated with daptomycin ×42 days    Bipolar disorder (HCC)    Chlamydia    Decorative tattoo    Depression    Hepatitis B virus infection    History of blood clots    Human papilloma virus infection    Intravenous drug abuse (HCC)    Heroin and crack cocaine    Opioid dependence (HCC)    Subclavian vein thrombosis (HCC)    Substance abuse (HCC)       Past Surgical History  Past Surgical History:   Procedure Laterality Date    Colonoscopy      Colonoscopy N/A 2017    Procedure: COLONOSCOPY;  Surgeon: Xu Day MD;  Location: [facility] ENDOSCOPY    Colposcopy, cervix w/upper adjacent vagina; w/biopsy(s), cervix            Other surgical history      wisdom teeth     Other surgical history      fasciotomy of right arm    Tonsillectomy      Upper gi endoscopy,exam         Family History  Family History   Problem Relation Age of Onset    Other (back pain) Father     Hypertension Mother     Hypertension Maternal Grandmother     Diabetes Maternal Grandfather     Prostate Cancer Paternal Grandfather         w/ metastasis       Social History  Social History     Socioeconomic History    Marital status: Single    Number of children: 0   Occupational History    Occupation: Unemployed   Tobacco Use    Smoking status: Every Day     Current packs/day: 0.00     Average packs/day: 1 pack/day for 1 year (1.0 ttl pk-yrs)     Types: Cigarettes     Start date: 2014     Last attempt to quit: 2015     Years since quittin.4    Smokeless tobacco: Current    Tobacco comments:     Vaping   Vaping Use    Vaping status: Never Used   Substance and Sexual Activity    Alcohol use: Not Currently     Comment: social    Drug use: Yes      Types: Heroin, \"Crack\" cocaine, benzodiazepines, Other-see comments     Comment: Acid, mushrooms    Sexual activity: Yes     Partners: Male     Birth control/protection: Condom   Other Topics Concern    Caffeine Concern No     Comment:  2 cans Cola per day    Exercise Yes     Comment: hep    Reaction to local anesthetic No   Social History Narrative    The patient does not use an assistive device..      The patient does live in a home with stairs.           Current Medications:  Current Facility-Administered Medications   Medication Dose Route Frequency    lurasidone (Latuda) tab 20 mg  20 mg Oral Daily with breakfast    meropenem (Merrem) 500 mg in sodium chloride 0.9% 100 mL IVPB-MBP  500 mg Intravenous Q8H    LORazepam (Ativan) 2 mg/mL injection 2 mg  2 mg Intramuscular Q6H PRN    haloperidol lactate (Haldol) 5 MG/ML injection 1 mg  1 mg Intramuscular Q6H PRN    LORazepam (Ativan) 2 mg/mL injection 2 mg  2 mg Intravenous 4 times per day    QUEtiapine (SEROquel) tab 300 mg  300 mg Per NG Tube TID    methadone solution (DOLOPHINE) 10 mg/5mL  20 mg Oral Q8H    fentaNYL (Sublimaze) 50 mcg/mL injection 25 mcg  25 mcg Intravenous Q30 Min PRN    diphenhydrAMINE (Benadryl) 12.5 MG/5ML oral liquid 25 mg  25 mg Oral TID    midazolam (Versed) 2 MG/2ML injection 2 mg  2 mg Intravenous Q4H PRN    senna (Senokot) 8.8 MG/5ML oral syrup 8.8 mg  5 mL Per NG Tube BID    docusate (Colace) 50 MG/5ML oral solution 100 mg  100 mg Per NG Tube BID    acetaminophen (Tylenol) 160 MG/5ML oral liquid 650 mg  650 mg Oral Q4H PRN    Or    acetaminophen (Tylenol) rectal suppository 650 mg  650 mg Rectal Q4H PRN    Or    acetaminophen (Ofirmev) 10 mg/mL infusion premix 1,000 mg  1,000 mg Intravenous Q6H PRN    polyethylene glycol (PEG 3350) (Miralax) 17 g oral packet 17 g  17 g Oral Daily PRN    senna (Senokot) 8.8 MG/5ML oral syrup 17.6 mg  10 mL Oral Nightly PRN    bisacodyl (Dulcolax) 10 MG rectal suppository 10 mg  10 mg Rectal Daily PRN     fleet enema (Fleet) 7-19 GM/118ML rectal enema 133 mL  1 enema Rectal Once PRN    dextrose 10% infusion (TPN no rate)   Intravenous Continuous PRN    pancrelipase (Lip-Prot-Amyl) (Zenpep) DR particles cap 10,000 Units  10,000 Units Per G Tube PRN    And    sodium bicarbonate tab 325 mg  325 mg Oral PRN    famotidine (Pepcid) 20 mg/2mL injection 20 mg  20 mg Intravenous BID    ondansetron (Zofran) 4 MG/2ML injection 4 mg  4 mg Intravenous Q6H PRN    prochlorperazine (Compazine) 10 MG/2ML injection 5 mg  5 mg Intravenous Q8H PRN    acetaminophen (Tylenol) tab 650 mg  650 mg Oral Q6H PRN    melatonin tab 3 mg  3 mg Oral Nightly     Medications Prior to Admission   Medication Sig    prazosin 1 MG Oral Cap Take 4 capsules (4 mg total) by mouth nightly. Takes 4mg nightly    buprenorphine-naloxone 8-2 MG Sublingual Film dissolve 1 film under the tongue 3 TIMES A DAY for maintenance    prenatal vitamin with DHA 27-0.8-228 MG Oral Cap Take 1 capsule by mouth daily.    [] nitrofurantoin monohydrate macro (MACROBID) 100 MG Oral Cap Take 1 capsule (100 mg total) by mouth 2 (two) times daily for 7 days.    QUEtiapine 200 MG Oral Tab Take 1 tablet (200 mg total) by mouth nightly.    [] Buprenorphine HCl-Naloxone HCl 8.6-2.1 MG Sublingual SL Tab Place 8.6 mg of buprenorphine under the tongue in the morning, at noon, and at bedtime.    pantoprazole 20 MG Oral Tab EC Take 1 tablet (20 mg total) by mouth every morning before breakfast.    gabapentin 300 MG Oral Cap Take 1 capsule (300 mg total) by mouth nightly.    Melatonin 3 MG Oral Cap Take 1 capsule (3 mg total) by mouth at bedtime.    Naloxone HCl (NARCAN) 4 MG/0.1ML Nasal Liquid 4 mg by Nasal route as needed. IF PATIENT REMAINS UNRESPONSIVE, REPEAT DOSE IN OTHER NOSTRIL 2 TO 5 MINUTES AFTER FIRST DOSE (Patient not taking: Reported on 2024)    venlafaxine  MG Oral Capsule SR 24 Hr Take 1 capsule (150 mg total) by mouth every morning.    ondansetron  (ZOFRAN) 4 mg tablet Take 1 tablet (4 mg total) by mouth every 8 (eight) hours as needed for Nausea. (Patient not taking: Reported on 5/24/2024)    cloNIDine 0.1 MG Oral Tab Take 1 tablet (0.1 mg total) by mouth 2 (two) times daily as needed. (Patient not taking: Reported on 5/24/2024)       Allergies  Allergies   Allergen Reactions    Amoxicillin HIVES    Vancomycin RASH and ITCHING    Clindamycin RASH       Review of Systems:   As by Admitting/Attending    Results:   Laboratory Data:  Lab Results   Component Value Date    WBC 11.5 (H) 06/19/2024    HGB 8.1 (L) 06/19/2024    HCT 24.9 (L) 06/19/2024    .0 06/19/2024    CREATSERUM 0.44 (L) 06/19/2024    BUN 15 06/19/2024     (L) 06/19/2024    K 3.9 06/19/2024     06/19/2024    CO2 28.0 06/19/2024    GLU 99 06/19/2024    CA 8.8 06/19/2024    ALB 2.7 (L) 06/13/2024    ALKPHO 146 (H) 06/13/2024    TP 5.4 (L) 06/13/2024    AST 32 06/13/2024    ALT 32 06/13/2024    PTT 29.2 05/30/2020    INR 1.9 (A) 08/12/2020    PTP 14.7 (H) 05/31/2020    T4F 0.73 11/02/2011    TSH 2.080 02/13/2019    LIP 18 (L) 05/30/2020    DDIMER 0.47 02/28/2022    CRP <0.5 01/29/2015    MG 1.7 06/19/2024    PHOS 4.2 06/17/2024    TROP <0.045 05/30/2020    CK 55 03/29/2017    ETOH <3 06/09/2024         Imaging:  XR CHEST AP PORTABLE  (CPT=71045)    Result Date: 6/19/2024  CONCLUSION:  1. Borderline heart size.  2. Support tubes and catheters are in satisfactory position. 3. Bilateral mixed alveolar and interstitial multifocal airspace opacification, slight progression.  4. Bilateral pleural effusions.    Dictated by (CST): Khadar Baxter MD on 6/19/2024 at 7:54 AM     Finalized by (CST): Khadar Baxter MD on 6/19/2024 at 8:00 AM          XR CHEST AP PORTABLE  (CPT=71045)    Result Date: 6/18/2024  CONCLUSION:  1. Cardiomegaly. 2. Support tubes and catheters are satisfactory. 3. Extensive bilateral mixed multifocal airspace opacification right worse than left.  4. Small  bilateral effusions    Dictated by (CST): Khadar Baxter MD on 6/18/2024 at 8:18 AM     Finalized by (CST): Khadar Baxter MD on 6/18/2024 at 8:32 AM          XR CHEST AP PORTABLE  (CPT=71045)    Result Date: 6/17/2024  CONCLUSION:  1. Cardiomegaly. 2. Support tubes and catheters are satisfactory. 3. Extensive bilateral mixed multifocal airspace opacification with interval progression.  4. Small-moderate bilateral pleural effusions have progressed    Dictated by (CST): Khadar Baxter MD on 6/17/2024 at 9:15 AM     Finalized by (CST): Khadar Baxter MD on 6/17/2024 at 9:19 AM          XR CHEST AP PORTABLE  (CPT=71045)    Result Date: 6/17/2024  PROCEDURE: XR CHEST AP PORTABLE  (CPT=71045) TIME: 829  COMPARISON: [facility], XR CHEST AP PORTABLE (CPT=71045), 6/14/2024, 5:28 AM.  [facility], XR CHEST AP PORTABLE (CPT=71045), 6/10/2024, 2:18 AM.  [facility], CT CHEST PE AORTA (IV ONLY) (CPT=71260), 6/09/2024, 9:50 AM.  [facility], XR CHEST AP PORTABLE (CPT=71045), 6/17/2024, 7:12 AM.  INDICATIONS: Shortness of breath.  TECHNIQUE:   Single view.   Findings and impression:  There has been no significant change  Stable well-positioned support devices.  Stable heart size.  Normal vascular pedicle  Persistent small consolidation in the upper right lung and confluent bibasilar opacities from atelectasis/effusion or lung base consolidation  There is no pneumothorax     Dictated by (CST): Steffen Godinez MD on 6/17/2024 at 8:44 AM     Finalized by (CST): Steffen Godinez MD on 6/17/2024 at 8:48 AM           Vital Signs:   Blood pressure 115/78, pulse 119, temperature 98.8 °F (37.1 °C), temperature source Temporal, resp. rate (!) 33, weight 75.7 kg (166 lb 14.2 oz), last menstrual period 03/25/2024, SpO2 93%, not currently breastfeeding.    Mental Status Exam:   Appearance: Stated age single, , female in hospital gown, laying down in hospital  bed.  Patient extubated.  Psychomotor: Patient has been demonstrating some psychomotor agitation  Orientation: Patient is oriented to self and location.    Gait: Not evaluated.  Attitude/Coorperation: Limited cooperation due to lethargy and agitation  Behavior: Episodes of restlessness and agitation  Speech: Limited communication verbally  Mood: Unable to express emotion  Affect: Restless and irritable affect  Thought process: Disorganized and confused  Thought content: No reports of suicidal or homicidal ideation  Perceptions: Patient demonstrating response to internal stimuli  Concentration: Grossly impaired  Memory: Grossly impaired  Intellect: Average.  Judgment and Insight: Questionable.     Impression:     Opiate Withdrawal Syndrome.  Opiate Dependency.  Bipolar Disorder unspecified.  Septic shock (HCC)  Pneumonia of left lower lobe due to infectious organism  Subchorionic hematoma in first trimester, single or unspecified fetus (HCC)     The patient is a 29 year old  female, single, with past medical history of acne, anxiety, bacterial endocarditis, bipolar disorder, chlamydia, decorative tattoo, depression, hepatitis B virus infection, history of blood clots, HPV infection, IV drug use, heroin and crack cocaine use, opioid dependence, subclavian vein thrombosis, and substance abuse.  The patient is currently sedated.     6/11/24: Patient is sedated and exhibiting some agitation when in the room. Propofol will start to be decreased slowly.    6/12/24: Patient in process of decreasing sedation and possible extubation.  Otherwise the patient is alert and oriented to self and place only nodding her head agree with the need for extubation and cooperation.    6/14/24: Patient continues to exhibit some agitation and restlessness. Patient is still sedated and remains intubated.    6/17/2024: The patient continued demonstrating alternation in her mood and cognition with indication for high sedation.  Patient  in the place where she need to start tapering sedation down in preparation for extubation.  Discussed risk and benefit, acknowledging the current symptom and severity.  At this point, I would recommend the following approach:     6/18/2024: Patient continues to stay sedated and intubated. Patient continues to exhibit signs of restlessness and agitation.     6/19/2024: Patient is seen extubated. Patient showing some signs of agitation, but is awake and able to understand conversation.  Provided assurance and support.    Focus on safety  Focus on education and support.  Focus on insight orientation helping the patient understand diagnosis and treatment plan.  Continue methadone 20 mg via NG tube BID  Continue Benadryl 25 mg IV 3 times daily.  Continue Haldol 5 mg IM every 6 hours.  Continue Seroquel 300 mg 3 times daily.  Discontinue venlafaxine to 37.5 mg daily  Discontinue prazosin 2 mg  Lower Ativan to 1 mg IV every 6 hours scheduled.  Add Latuda 20 mg with breakfast  Coordinate plan with team    Orders This Visit:  Orders Placed This Encounter   Procedures    CBC With Differential With Platelet    Comp Metabolic Panel (14)    Urinalysis with Culture Reflex    Lactic Acid, Plasma    HCG, Beta Subunit (Quant Pregnancy Test)    Ethyl Alcohol    Acetaminophen (Tylenol), S    Salicylate, Serum    Drug Abuse Panel 11 screen    Manual differential    Lactic Acid Reflex Post Positive    CBC With Differential With Platelet    Comp Metabolic Panel (14)    Magnesium    Streptococcus Pneumoniae Ag, Urine    Legionella urine Ag serogrp 1    Potassium    Magnesium    Scan slide    MD BLOOD SMEAR CONSULT    Triglycerides    Basic Metabolic Panel (8)    Phosphorus    Phosphorus    CBC With Differential With Platelet    Phosphorus    Hemoglobin    Triglycerides    Magnesium    Phosphorus    Basic Metabolic Panel (8)    CBC With Differential With Platelet    Hepatic Function Panel (7)    Magnesium    CBC With Differential With  Platelet    Comp Metabolic Panel (14)    Phosphorus    Magnesium    Basic Metabolic Panel (8)    CBC With Differential With Platelet    Manual differential    Magnesium    Basic Metabolic Panel (8)    CBC With Differential With Platelet    Magnesium    CBC With Differential With Platelet    Basic Metabolic Panel (8)    Magnesium    CBC With Differential With Platelet    Basic Metabolic Panel (8)    Phosphorus    Magnesium    Basic Metabolic Panel (8)    CBC With Differential With Platelet    Magnesium    CBC With Differential With Platelet    Basic Metabolic Panel (8)    CBC With Differential With Platelet    Basic Metabolic Panel (8)    Hepatitis C RNA Qnt w/ Reflex to Genotype    Basic Metabolic Panel (8)    CBC With Differential With Platelet    Type and screen    ABORH (Blood Type)    Antibody Screen    Prepare RBC Once    SARS-CoV-2/Flu A and B/RSV by PCR (GeneXpert)    Blood Culture    Blood Culture PCR    Blood Culture    Blood Culture    Sputum culture       Meds This Visit:  Requested Prescriptions      No prescriptions requested or ordered in this encounter       Luis De León MD  6/19/2024    Note to Patient: The 21st Century Cures Act makes medical notes like these available to patients in the interest of transparency. However, be advised this is a medical document. It is intended as peer to peer communication. It is written in medical language and may contain abbreviations or verbiage that are unfamiliar. It may appear blunt or direct. Medical documents are intended to carry relevant information, facts as evident, and the clinical opinion of the practitioner. This note may have been transcribed using a voice dictation system. Voice recognition errors may occur. This should not be taken to alter the content or meaning of this note.

## 2024-06-19 NOTE — RESPIRATORY THERAPY NOTE
Patient received intubated on MV. SBT started and extubated with MD at bedside. Placed on venturi mask 4L31%. Pt. Tolerated well. Respiratory status is stable.

## 2024-06-19 NOTE — PROGRESS NOTES
INFECTIOUS DISEASE PROGRESS NOTE  [facility]  part of [facility] ID PROGRESS NOTE    Latonia Barker Patient Status:  Inpatient    1/3/1995 MRN [id number]   Location [facility] 2W/SW Attending Iram Marquez MD   Hosp Day # 10 PCP Agatha Mcmanus MD     Subjective:  ROS unable to be reviewed. Plans for extubation.    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  -  Follow fever curve, wbc.  -  Reviewed imaging, labs, micro reports.  -  Case d/w RN.    History of Present Illness:  Latonia Barker is a 29 year old female with hx of active IVDU, hep C, bipolar dx, history of MRSA bacteremia and 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 She states she was on suboxone but unfortunately relapsed and has been using for the past 4 days. She states she was very weak and having headaches as well as neck stiffness. States she feels much better now. Mostly injects in her groin area. Denies sharing any needles.  Temp 99.3F on admission with normal wbc. Blood cx pending. Started on IV ceftriaxone and vanc.      Physical Exam:  /78   Pulse 119   Temp 98.8 °F (37.1 °C) (Temporal)   Resp (!) 33   Wt 166 lb 14.2 oz (75.7 kg)   LMP 03/25/2024 (Approximate)   SpO2 93%   BMI 30.52 kg/m²     Gen:   Intubated, sedated  HEENT:  ETT+, neck supple  CV/lungs:  Regular rate and rhythm, CTAB  Abdom:  Soft, no TTP  Skin/extrem:  No rashes, no c/c/e  :   Dyson draining yellow urine  Lines:  RIJ CVC+    Laboratory Data: Reviewed    Microbiology: Reviewed    Radiology: Reviewed      [facility]  [phone]  6/19/2024

## 2024-06-19 NOTE — RESPIRATORY THERAPY NOTE
Patient received on ventilator. Patient requires ventilator support and is not a candidate for SBT. Bilateral breath sounds auscultated. Suction provided when indicated. No acute events. RT will continue to monitor.       06/19/24 0400   Vent Information   Vent Mode VC/AC   Settings   FiO2 (%) 30 %   Resp Rate (Set) 16   Vt (Set, mL) 430 mL   Waveform Decelerating ramp   PEEP/CPAP (cm H2O) 5 cm H20

## 2024-06-19 NOTE — PLAN OF CARE
Patient sedated & vented. Propofol & fentanyl drip; titrated per protocol. Pt extremely restless and agitated all night; PRN's given with little improvement. Wrist restraints and mitts in place/continued for safety. All safety measures maintained.   Problem: CARDIOVASCULAR - ADULT  Goal: Maintains optimal cardiac output and hemodynamic stability  Description: INTERVENTIONS:  - Monitor vital signs, rhythm, and trends  - Monitor for bleeding, hypotension and signs of decreased cardiac output  - Evaluate effectiveness of vasoactive medications to optimize hemodynamic stability  - Monitor arterial and/or venous puncture sites for bleeding and/or hematoma  - Assess quality of pulses, skin color and temperature  - Assess for signs of decreased coronary artery perfusion - ex. Angina  - Evaluate fluid balance, assess for edema, trend weights  Outcome: Progressing  Goal: Absence of cardiac arrhythmias or at baseline  Description: INTERVENTIONS:  - Continuous cardiac monitoring, monitor vital signs, obtain 12 lead EKG if indicated  - Evaluate effectiveness of antiarrhythmic and heart rate control medications as ordered  - Initiate emergency measures for life threatening arrhythmias  - Monitor electrolytes and administer replacement therapy as ordered  Outcome: Progressing     Problem: RESPIRATORY - ADULT  Goal: Achieves optimal ventilation and oxygenation  Description: INTERVENTIONS:  - Assess for changes in respiratory status  - Assess for changes in mentation and behavior  - Position to facilitate oxygenation and minimize respiratory effort  - Oxygen supplementation based on oxygen saturation or ABGs  - Provide Smoking Cessation handout, if applicable  - Encourage broncho-pulmonary hygiene including cough, deep breathe, Incentive Spirometry  - Assess the need for suctioning and perform as needed  - Assess and instruct to report SOB or any respiratory difficulty  - Respiratory Therapy support as indicated  - Manage/alleviate  anxiety  - Monitor for signs/symptoms of CO2 retention  Outcome: Progressing     Problem: NEUROLOGICAL - ADULT  Goal: Absence of seizures  Description: INTERVENTIONS  - Monitor for seizure activity  - Administer anti-seizure medications as ordered  - Monitor neurological status  Outcome: Progressing  Goal: Remains free of injury related to seizure activity  Description: INTERVENTIONS:  - Maintain airway, patient safety  and administer oxygen as ordered  - Monitor patient for seizure activity, document and report duration and description of seizure to MD/LIP  - If seizure occurs, turn patient to side and suction secretions as needed  - Reorient patient post seizure  - Seizure pads on all 4 side rails  - Instruct patient/family to notify RN of any seizure activity  - Instruct patient/family to call for assistance with activity based on assessment  Outcome: Progressing     Problem: NEUROLOGICAL - ADULT  Goal: Achieves stable or improved neurological status  Description: INTERVENTIONS  - Assess for and report changes in neurological status  - Initiate measures to prevent increased intracranial pressure  - Maintain blood pressure and fluid volume within ordered parameters to optimize cerebral perfusion and minimize risk of hemorrhage  - Monitor temperature, glucose, and sodium. Initiate appropriate interventions as ordered  Outcome: Not Progressing     Problem: Safety Risk - Non-Violent Restraints  Goal: Patient will remain free from self-harm  Description: INTERVENTIONS:  - Apply the least restrictive restraint to prevent harm  - Notify patient and family of reasons restraints applied  - Assess for any contributing factors to confusion (electrolyte disturbances, delirium, medications)  - Discontinue any unnecessary medical devices as soon as possible  - Assess the patient's physical comfort, circulation, skin condition, hydration, nutrition and elimination needs   - Reorient and redirection as needed  - Assess for the  need to continue restraints  6/19/2024 0337 by Nell Angel, RN  Outcome: Not Progressing  6/19/2024 0337 by Nell Angel, RN  Outcome: Not Progressing

## 2024-06-20 ENCOUNTER — APPOINTMENT (OUTPATIENT)
Dept: GENERAL RADIOLOGY | Facility: HOSPITAL | Age: 29
DRG: 831 | End: 2024-06-20
Attending: INTERNAL MEDICINE

## 2024-06-20 LAB
ANION GAP SERPL CALC-SCNC: 5 MMOL/L (ref 0–18)
BASOPHILS # BLD AUTO: 0.05 X10(3) UL (ref 0–0.2)
BASOPHILS NFR BLD AUTO: 0.5 %
BUN BLD-MCNC: 15 MG/DL (ref 9–23)
BUN/CREAT SERPL: 35.7 (ref 10–20)
CALCIUM BLD-MCNC: 9.2 MG/DL (ref 8.7–10.4)
CHLORIDE SERPL-SCNC: 104 MMOL/L (ref 98–112)
CO2 SERPL-SCNC: 28 MMOL/L (ref 21–32)
CREAT BLD-MCNC: 0.42 MG/DL
DEPRECATED RDW RBC AUTO: 44.1 FL (ref 35.1–46.3)
EGFRCR SERPLBLD CKD-EPI 2021: 137 ML/MIN/1.73M2 (ref 60–?)
EOSINOPHIL # BLD AUTO: 0.08 X10(3) UL (ref 0–0.7)
EOSINOPHIL NFR BLD AUTO: 0.7 %
ERYTHROCYTE [DISTWIDTH] IN BLOOD BY AUTOMATED COUNT: 16 % (ref 11–15)
GLUCOSE BLD-MCNC: 96 MG/DL (ref 70–99)
HCT VFR BLD AUTO: 26.3 %
HGB BLD-MCNC: 8.9 G/DL
IMM GRANULOCYTES # BLD AUTO: 0.22 X10(3) UL (ref 0–1)
IMM GRANULOCYTES NFR BLD: 2 %
LYMPHOCYTES # BLD AUTO: 2.18 X10(3) UL (ref 1–4)
LYMPHOCYTES NFR BLD AUTO: 20.1 %
MCH RBC QN AUTO: 25.7 PG (ref 26–34)
MCHC RBC AUTO-ENTMCNC: 33.8 G/DL (ref 31–37)
MCV RBC AUTO: 76 FL
MONOCYTES # BLD AUTO: 0.75 X10(3) UL (ref 0.1–1)
MONOCYTES NFR BLD AUTO: 6.9 %
NEUTROPHILS # BLD AUTO: 7.58 X10 (3) UL (ref 1.5–7.7)
NEUTROPHILS # BLD AUTO: 7.58 X10(3) UL (ref 1.5–7.7)
NEUTROPHILS NFR BLD AUTO: 69.8 %
OSMOLALITY SERPL CALC.SUM OF ELEC: 285 MOSM/KG (ref 275–295)
PLATELET # BLD AUTO: 437 10(3)UL (ref 150–450)
POTASSIUM SERPL-SCNC: 3.7 MMOL/L (ref 3.5–5.1)
RBC # BLD AUTO: 3.46 X10(6)UL
SODIUM SERPL-SCNC: 137 MMOL/L (ref 136–145)
WBC # BLD AUTO: 10.9 X10(3) UL (ref 4–11)

## 2024-06-20 PROCEDURE — 99233 SBSQ HOSP IP/OBS HIGH 50: CPT | Performed by: HOSPITALIST

## 2024-06-20 PROCEDURE — 71045 X-RAY EXAM CHEST 1 VIEW: CPT | Performed by: INTERNAL MEDICINE

## 2024-06-20 PROCEDURE — 99233 SBSQ HOSP IP/OBS HIGH 50: CPT | Performed by: INTERNAL MEDICINE

## 2024-06-20 PROCEDURE — 99233 SBSQ HOSP IP/OBS HIGH 50: CPT | Performed by: OTHER

## 2024-06-20 RX ORDER — LORAZEPAM 2 MG/ML
0.5 INJECTION INTRAMUSCULAR EVERY 6 HOURS SCHEDULED
Status: DISCONTINUED | OUTPATIENT
Start: 2024-06-20 | End: 2024-06-24

## 2024-06-20 RX ORDER — LURASIDONE HYDROCHLORIDE 20 MG/1
40 TABLET, FILM COATED ORAL
Status: DISCONTINUED | OUTPATIENT
Start: 2024-06-21 | End: 2024-06-24

## 2024-06-20 NOTE — SLP NOTE
ADULT SWALLOWING EVALUATION    ASSESSMENT    ASSESSMENT/OVERALL IMPRESSION:    Proper PPE worn. Hands sanitized upon entrance/exit Pt room.       BSE ordered 2/2 Pt S/P extubation on 6/19/24 (intubated 6/10/24). Pt admitted 6/09/24 with septic shock. Pt on solid/thin liquids at home . PMH includes GERD, opiate/heroin/fentynal withdrawal, bipolar; currrently pregnant. No PMH of dysphagia at [facility]. Currently, Pt NPO with N-G tube feeding.     CXR 6/20/24:  CONCLUSION:   1. Lines/tubes in stable customary positioning.   2. Partial improvement in bilateral pulmonary opacities and small bilateral pleural effusions, which could relate to improving mild CHF/fluid overload although a superimposed multifocal infectious/inflammatory process cannot be excluded.       Pt fairly alert with overall weakness and fatigue. Pt on 4L/min 02 NC, afebrile and assessed sitting upright in bed (after consulting with RN). Pt participated. Learning preference verbal. No verbal or non-verbal indication of pain. Vocal quality/intensity weak. Oral motor exam revealed overall reduced labial and lingual skills for rate, ROM and strength. Functional dentition. Pt was fed pureed, mildly-thick and thin liquid trials. Bilabial seal adequate; no anterior loss. Lingual skills slow and uncoordinated for bolus formation and preparation of pureed trials; increased ALFIE. Oral cavity grossly clear post swallows.     Pharyngeal response appeared t2-3 sec delayed per hyolaryngeal elevation to completion (considered weak in strength/rise to palpation). No overt CSA on pureed nor mildly-thick liquids via tsp. Throat clearing S/P all trials thin liquid via tsp.   Overall, swallow function appeared weak and uncoordinated. Sp02 ~97% during this assessment.        IMPRESSIONS:    Pt presents with functional oral swallow and possible pharyngeal dysfunction. Collaborated with RN regarding Pt's swallowing plan of care. BSE results/recommendations discussed with  Pt/family. Pt/family v/u/fair understanding; would benefit from additional reinforcement. Swallowing precautions written on white board in room for reinforcement/carry-over of skills for Pt, family and staff. Call light within Pt's reach upon SLP discharge from room.          PLAN:    To f/u x4 sessions to ensure safe intake of new initiated diet and reinforce swallowing/aspiration precautions. To monitor for new CXR results. Diet upgrade as tolerated. VFSS IF appropriate. Family education as available.                                                                                                                                         **FEED Pt only when FULLY ALERT**         RECOMMENDATIONS   Diet Recommendations - Solids: Puree  Diet Recommendations - Liquids: Nectar thick liquids/ Mildly thick (via tsp)    Compensatory Strategies Recommended: No straws;Liquids via spoon  Aspiration Precautions: Upright position;Slow rate;No straw (*FEED PT ONLY WHEN FULLY ALERT\")  Medication Administration Recommendations: Crushed in puree  Treatment Plan/Recommendations: Aspiration precautions    HISTORY   MEDICAL HISTORY  Reason for Referral:  (S/P prolonged intubation)    Problem List  Principal Problem:    Septic shock (HCC)  Active Problems:    Opiate dependence, continuous (HCC)    Pneumonia of left lower lobe due to infectious organism    Subchorionic hematoma in first trimester, single or unspecified fetus (HCC)    Opiate use    Opiate withdrawal (HCC)    Moderate mixed bipolar I disorder (HCC)    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 treated with daptomycin ×42 days    Bipolar disorder (HCC)    Chlamydia    Decorative tattoo    Depression    Hepatitis B virus infection    History of blood clots    Human papilloma virus infection    Intravenous drug abuse (HCC)    Heroin and crack cocaine    Opioid dependence (HCC)     Subclavian vein thrombosis (HCC)    Substance abuse (HCC)       Prior Living Situation:  (from home)  Diet Prior to Admission: Regular;Thin liquids  Precautions: Aspiration    Patient/Family Goals: to eat    SWALLOWING HISTORY  Current Diet Consistency: NPO    OBJECTIVE   ORAL MOTOR EXAMINATION  Dentition: Natural;Functional  Symmetry: Within Functional Limits  Strength:  (overall reduced)  Range of Motion:  (overall reduced)  Rate of Motion: Reduced    Voice Quality: Weak  Respiratory Status: Unlabored  Consistencies Trialed: Nectar thick liquids;Thin liquids;Puree  Method of Presentation: Spoon;Staff/Clinician assistance  Patient Positioning: Upright;Midline    Oral Phase of Swallow: Impaired  Bolus Formation: Impaired  Bolus Propulsion: Impaired    Pharyngeal Phase of Swallow: Impaired  Laryngeal Elevation Timing: Appears impaired  Laryngeal Elevation Strength: Appears impaired  Laryngeal Elevation Coordination: Appears impaired  (Please note: Silent aspiration cannot be evaluated clinically. Videofluoroscopic Swallow Study is required to rule-out silent aspiration.)    GOALS  Goal #1 The patient will tolerate PUREED consistency and MILDLY-THICK via TSP liquids without overt signs or symptoms of aspiration with 100 % accuracy over 1-2 session(s).  In Progress   Goal #2 The patient/family/caregiver will demonstrate understanding and implementation of aspiration precautions and swallow strategies independently over 4 session(s).    In Progress   Goal #3 The patient will utilize compensatory strategies as outlined by  BSSE (clinical evaluation) including Slow rate, Small bites, CONTROLLED liquids,  No straws, Upright 90 degrees with 1:1 feeding/moderate  assistance 90 % of the time across 4 sessions.  In Progress   Goal #4 The patient will tolerate trial upgrade of MINCED & MOIST/BITE SIZE/SOFT/SOLID consistency and THIN liquids without overt signs or symptoms of aspiration with 100 % accuracy over 2-3 session(s).     In Progress   FOLLOW UP  Treatment Plan/Recommendations: Aspiration precautions  Number of Visits to Meet Established Goals: 4  Follow Up Needed (Documentation Required): Yes  SLP Follow-up Date: 06/20/24    Thank you for your referral.   If you have any questions, please contact   Susan Valente M.S. PAO/SLP  Speech-Language Pathologist  [facility]  #[phone]

## 2024-06-20 NOTE — PHYSICAL THERAPY NOTE
PHYSICAL THERAPY EVALUATION - INPATIENT     Room Number: 217/217-A  Evaluation Date: 6/20/2024  Type of Evaluation: Initial   Physician Order: PT Eval and Treat    Presenting Problem: septic shock  Co-Morbidities : IV drug abuse  Reason for Therapy: Mobility Dysfunction and Discharge Planning    PHYSICAL THERAPY ASSESSMENT   Patient is a 29 year old female admitted 6/9/2024 for septic shock.  Prior to admission, patient's baseline is septic shock, pneumonia, intubated 6/10-6/19,   Patient is currently functioning below baseline with bed mobility, transfers, gait, stair negotiation, maintaining seated position, standing prolonged periods, and performing household tasks.  Patient is requiring maximum assist as a result of the following impairments: decreased functional strength, decreased muscular endurance, cognitive deficits (arousal, orientation, command following), medical status, and increased O2 needs from baseline.  Physical Therapy will continue to follow for duration of hospitalization.    Patient will benefit from continued skilled PT Services upon discharge however anticipate discharge needs remain undetermined as medical plan continues to be established.     PLAN  PT Treatment Plan: Bed mobility;Body mechanics;Endurance;Energy conservation;Patient education;Gait training;Strengthening;Stair training;Transfer training;Balance training  Rehab Potential : Fair  Frequency (Obs): 5x/week    PHYSICAL THERAPY MEDICAL/SOCIAL HISTORY     Problem List  Principal Problem:    Septic shock (HCC)  Active Problems:    Opiate dependence, continuous (HCC)    Pneumonia of left lower lobe due to infectious organism    Subchorionic hematoma in first trimester, single or unspecified fetus (HCC)    Opiate use    Opiate withdrawal (HCC)    Moderate mixed bipolar I disorder (HCC)      HOME SITUATION  Home Situation  Type of Home: House  Home Layout: Multi-level  Stairs to Enter : 4  Railing: Yes  Lives With: Alone  Drives:  Yes  Patient Owned Equipment: None  Patient Regularly Uses: None     Prior Level of [place]: independent    SUBJECTIVE  \"Can I go out for a cigarette?\"     PHYSICAL THERAPY EXAMINATION   OBJECTIVE  Precautions: Bed/chair alarm;Restraints (NG tube)  Fall Risk: High fall risk    WEIGHT BEARING RESTRICTION  Weight Bearing Restriction: None    PAIN ASSESSMENT  Ratin    COGNITION  Overall Cognitive Status:  Impaired  Arousal/Alertness:  inconsistent responses to stimuli and lethargic  Orientation Level:  oriented to place and oriented to person  Following Commands:  follows one-step commands inconsistently, follows one step commands with increased time, and follows one step commands with repetition    RANGE OF MOTION AND STRENGTH ASSESSMENT  Upper extremity ROM and strength are within functional limits.  Lower extremity ROM is within functional limits.  Lower extremity strength is within functional limits.    BALANCE  Static Sitting: Not tested  Dynamic Sitting: Not tested  Static Standing: Not tested  Dynamic Standing: Not tested    ACTIVITY TOLERANCE  Pulse: 118  Heart Rate Source: Monitor    O2 WALK  Oxygen Therapy  SPO2% Ambulation on Oxygen: 100 (after activity)  Ambulation oxygen flow (liters per minute): 5    AM-PAC '6-Clicks' INPATIENT SHORT FORM - BASIC MOBILITY  How much difficulty does the patient currently have...  Patient Difficulty: Turning over in bed (including adjusting bedclothes, sheets and blankets)?: A Lot   Patient Difficulty: Sitting down on and standing up from a chair with arms (e.g., wheelchair, bedside commode, etc.): Unable   Patient Difficulty: Moving from lying on back to sitting on the side of the bed?: A Lot   How much help from another person does the patient currently need...   Help from Another: Moving to and from a bed to a chair (including a wheelchair)?: Total   Help from Another: Need to walk in hospital room?: Total   Help from Another: Climbing 3-5 steps with a railing?:  Total     AM-PAC Score:  Raw Score: 8   Approx Degree of Impairment: 86.62%   Standardized Score (AM-PAC Scale): 28.58   CMS Modifier (G-Code): CM    FUNCTIONAL ABILITY STATUS  Functional Mobility/Gait Assessment  Gait Assistance: Not tested  Long sitting: Minimum assist  Scooting: Maximum assist    Pt received resting in bed, remained lethargic and not receptive to education but agreeable to activity. Pt slightly impulsive and occasionally attempts to pull at lines when restraints removed. Min A for long sitting in bed with trunk away from bed. Max A to scoot and reposition in bed. Pt able to perform AAROM of BUE and BLE; demos shoulder flexion and SLR x 10 bilaterally. Not following safety commands therefore OOB mobility deferred. Pt was left resting in bed with wrist restraints donned, needs within reach, bed alarm on, VSS, handoff to RN complete.     Exercise/Education Provided:  Bed mobility  Body mechanics  Functional activity tolerated  Posture    The patient's Approx Degree of Impairment: 86.62% has been calculated based on documentation in the [facility] '6 clicks' Inpatient Basic Mobility Short Form.  Research supports that patients with this level of impairment may benefit from LTAC however anticipate progression to be able to discharge home safely with additional support with medical stabilization/improvement; will continue to assess pt this admission and update anticipate discharge needs.  Final disposition will be made by interdisciplinary medical team.    Patient End of Session: In bed;Needs met;RN aware of session/findings;Call light within reach;All patient questions and concerns addressed;Alarm set;Restraints    CURRENT GOALS  Goals to be met by: 6/20/24  Patient Goal Patient's self-stated goal is: none stated   Goal #1 Patient is able to demonstrate supine - sit EOB @ level: supervision     Goal #1   Current Status    Goal #2 Patient is able to demonstrate transfers Sit to/from Stand at assistance  level: supervision with  none or least restrictive assistive device     Goal #2  Current Status    Goal #3 Patient is able to ambulate 150 feet with assist device:  none or least restrictive assistive device  at assistance level: supervision   Goal #3   Current Status    Goal #4 Patient will negotiate 4 stairs/one curb w/ assistive device and supervision   Goal #4   Current Status    Goal #5 Patient to demonstrate independence with home activity/exercise instructions provided to patient in preparation for discharge.   Goal #5   Current Status    Goal #6    Goal #6  Current Status      Patient Evaluation Complexity Level:  History Moderate - 1 or 2 personal factors and/or co-morbidities   Examination of body systems Moderate - addressing a total of 3 or more elements   Clinical Presentation  Moderate - Evolving   Clinical Decision Making  Moderate Complexity     Therapeutic Activity:  10 minutes

## 2024-06-20 NOTE — PROGRESS NOTES
[facility]  part of [facility]    Progress Note      Assessment and Plan:   1.  Septic shock-Echo without vegetation.  Patient is yet requiring pressors.  Fusobacterium identified in the blood.     Recommendations:  1.  Ongoing antibiotic as per ID.     2.  Psychiatric-substance abuse disorder as well as history of bipolar disease status post recent prolonged rehab stay having been discharged 30 days ago.  The patient is currently on methadone, Seroquel, fentanyl pushes.  Agitation is improved.     Recommendations: CIWA protocol-as per psychiatry evaluation, methadone initiated.  Chest x-ray is somewhat improved today.  Will use fentanyl pushes.     3.  Pregnancy-3 cm subchorionic hemorrhage, now with decreasing beta hCG.     Recommendations: As per obstetrics.     4.  DVT prophylaxis-would use SCDs in patient who has subchorionic hemorrhage at this juncture although the patient has an extensive history of thrombosis.     Recommendations: SCDs and would discuss risks of chemoprophylaxis anticoagulation with obstetrics.     5.  Social concerns.     6.  Respiratory failure-the patient may have developed ARDS associated with the gram-negative bacteremia.  May have a component of noncardiogenic edema.  Also covered for aspiration pneumonia.  The x-ray still shows bilateral haziness.  She is getting ongoing antibiotic.  We stopped the propofol and fentanyl drip and progressed to extubate and she is doing well.  Chest x-ray is improved today.    Recommendations: Ongoing antibiotic.  Pulmonary toilet.    7.  Transient asystole-while on Precedex drip.  Discontinued.    8.  Nutrition-will check swallowing and get the NG tube out.  Advance diet.    Subjective:   Latonia Barker is a(n) 29 year old female who is much more cooperative.    Objective:   Blood pressure 116/80, pulse 116, temperature 97.2 °F (36.2 °C), temperature source Temporal, resp. rate 18, weight 166 lb 14.2 oz (75.7 kg), last menstrual  period 03/25/2024, SpO2 99%, not currently breastfeeding.    Physical Exam sedate white female  HEENT examination is unremarkable with pupils equal round and reactive to light and accommodation.   Neck without adenopathy, thyromegaly, JVD nor bruit.   Lungs diminished to auscultation and percussion.  Cardiac regular rate and rhythm no murmur.   Abdomen nontender, without hepatosplenomegaly and no mass appreciable.   Extremities without clubbing cyanosis nor edema.   Neurologic grossly intact with symmetric tone and strength and reflex when allowed to wake.  Skin without gross abnormality     Results:     Lab Results   Component Value Date    WBC 10.9 06/20/2024    HGB 8.9 06/20/2024    HCT 26.3 06/20/2024    .0 06/20/2024    CREATSERUM 0.42 06/20/2024    BUN 15 06/20/2024     06/20/2024    K 3.7 06/20/2024     06/20/2024    CO2 28.0 06/20/2024    GLU 96 06/20/2024    CA 9.2 06/20/2024     CT scan of the chest-1. No acute pulmonary embolism through the segmental pulmonary arteries.   2. Multifocal pneumonia demonstrated by multifocal ground-glass opacities and consolidations involving the left greater than right lower lobes and left upper lobe.   3. Two 4 mm right upper lobe pulmonary nodules, which are favored to be infectious/inflammatory, however recommend follow-up CT chest in 6 months to monitor for resolution.   4. Mild right heart predominant cardiomegaly.   5. Borderline dilated main pulmonary artery, which can be seen in pulmonary artery hypertension.   6. Small pericardial effusion.   7. Hepatosplenomegaly.     Chest x-ray-mild edema    Srinivasan Saleh MD  Medical Director, Critical Care, [facility]  Medical Director, [facility]  Pager: [phone]  >35 min crit care

## 2024-06-20 NOTE — PAYOR COMM NOTE
[facility] 6/20    CONTINUED STAY REVIEW    Payor: [facility]  Subscriber #:  [id number]  Authorization Number: [id number]    Admit date: 6/9/24  Admit time: 11:49 AM    MEDICATIONS ADMINISTERED IN LAST 1 DAY:  diphenhydrAMINE (Benadryl) 12.5 MG/5ML oral liquid 25 mg       Date Action Dose Route User    6/20/2024 1506 Given 25 mg Oral Lucie Resendez RN    6/20/2024 0808 Given 25 mg Oral Lucie Resendez RN    6/19/2024 2030 Given 25 mg Oral Dariel Parker RN          docusate (Colace) 50 MG/5ML oral solution 100 mg       Date Action Dose Route User    6/20/2024 0809 Given 100 mg Per NG Tube Lucie Resendez RN    6/19/2024 2030 Given 100 mg Per NG Tube Dariel Parker RN          famotidine (Pepcid) 20 mg/2mL injection 20 mg       Date Action Dose Route User    6/20/2024 0809 Given 20 mg Intravenous Lucie Resendez RN    6/19/2024 2030 Given 20 mg Intravenous Dariel Parker RN          fentaNYL (Sublimaze) 50 mcg/mL injection 25 mcg       Date Action Dose Route User    6/20/2024 0809 Given 25 mcg Intravenous Lucie Resendez RN    6/20/2024 0321 Given 25 mcg Intravenous Dariel Parker RN    6/20/2024 0117 Given 25 mcg Intravenous Dariel Parker RN          haloperidol lactate (Haldol) 5 MG/ML injection 1 mg       Date Action Dose Route User    6/19/2024 2128 Given 1 mg Intramuscular (Right Deltoid) Dariel Parker RN          LORazepam (Ativan) 2 mg/mL injection 2 mg       Date Action Dose Route User    6/19/2024 1800 Given 2 mg Intravenous Sylvie Maher RN          LORazepam (Ativan) 2 mg/mL injection 2 mg       Date Action Dose Route User    6/19/2024 2127 Given 2 mg Intramuscular (Right Deltoid) Dariel Parker RN          LORazepam (Ativan) 2 mg/mL injection 1 mg       Date Action Dose Route User    6/20/2024 1132 Given 1 mg Intravenous Lucie Resendez RN    6/20/2024 0532 Given 1 mg Intravenous ParkerDariel RN    6/20/2024 0031 Given 1 mg Intravenous ParkerDariel RN           lurasidone (Latuda) tab 20 mg       Date Action Dose Route User    6/20/2024 0809 Given 20 mg Oral Lucie Resendez RN          melatonin tab 3 mg       Date Action Dose Route User    6/19/2024 2030 Given 3 mg Oral Dariel Parker RN          meropenem (Merrem) 500 mg in sodium chloride 0.9% 100 mL IVPB-MBP       Date Action Dose Route User    6/20/2024 1508 New Bag 500 mg Intravenous Lucie Resendez RN    6/20/2024 0531 New Bag 500 mg Intravenous Dariel Parker RN    6/19/2024 2132 New Bag 500 mg Intravenous Dariel Parker RN          methadone solution (DOLOPHINE) 10 mg/5mL       Date Action Dose Route User    6/20/2024 1532 Given 20 mg Oral Lucie Resendez RN    6/20/2024 0601 Given 20 mg Oral Dariel Parker RN    6/19/2024 2100 Given 20 mg Oral Dariel Parker RN          midazolam (Versed) 2 MG/2ML injection 2 mg       Date Action Dose Route User    6/20/2024 0400 Given 2 mg Intravenous Dariel Parker RN    6/19/2024 2051 Given 2 mg Intravenous Dariel Parker RN          senna (Senokot) 8.8 MG/5ML oral syrup 8.8 mg       Date Action Dose Route User    6/20/2024 0907 Given 8.8 mg Per NG Tube Lucie Resendez RN    6/19/2024 2030 Given 8.8 mg Per NG Tube Dariel Parker RN          QUEtiapine (SEROquel) tab 300 mg       Date Action Dose Route User    6/20/2024 1506 Given 300 mg Per NG Tube Lucie Resendez RN    6/20/2024 0809 Given 300 mg Per NG Tube Lucie Resendez RN    6/19/2024 2031 Given 300 mg Per NG Tube Dariel Parker RN            Vitals (last day)       Date/Time Temp Pulse Resp BP SpO2 Weight O2 Device O2 Flow Rate (L/min) [facility]    06/20/24 1600 98.3 °F (36.8 °C) 90 31 116/70 -- -- Nasal cannula 4 L/min AS    06/20/24 1400 -- 117 24 114/63 100 % -- -- -- AS    06/20/24 1200 97.3 °F (36.3 °C) 97 24 107/68 100 % -- Nasal cannula 4 L/min AS    06/20/24 1002 -- 118 -- -- -- -- -- -- DT    06/20/24 1000 -- 116 18 116/80 99 % -- -- -- AS    06/20/24 0800 97.2 °F (36.2 °C) 101 18 110/69 97 % -- Nasal  cannula 4 L/min AS    06/20/24 0700 -- 192 19 -- 79 % -- -- -- AS    06/20/24 0400 97.1 °F (36.2 °C) 106 22 107/72 100 % -- -- -- JR    06/20/24 0200 -- 115 27 119/75 90 % -- Nasal cannula 4 L/min JR    06/20/24 0000 98 °F (36.7 °C) 108 23 113/86 92 % -- Nasal cannula 4 L/min JR    06/19/24 2200 -- 99 19 134/89 90 % -- Nasal cannula 4 L/min JR    06/19/24 2100 -- 109 24 132/80 92 % -- -- -- JR    06/19/24 2000 97.9 °F (36.6 °C) 93 25 123/86 95 % -- Nasal cannula 4 L/min JR    06/19/24 1900 -- 98 22 112/80 95 % -- -- -- JR    06/19/24 1800 -- 105 27 142/97 92 % -- Venturi mask 4 L/min OM    06/19/24 1700 -- 138 24 137/86 95 % -- Venturi mask 4 L/min OM    06/19/24 1500 -- 108 40 113/95 93 % -- Venturi mask 4 L/min OM    06/19/24 1400 -- 118 35 -- 93 % -- -- -- OM    06/19/24 1300 -- 117 32 124/86 100 % -- Venturi mask 4 L/min OM    06/19/24 1200 -- 125 27 128/76 -- -- -- -- OM    06/19/24 1100 -- 123 33 123/75 97 % -- Venturi mask 4 L/min OM    06/19/24 1000 -- 119 33 115/78 93 % -- Venturi mask 4 L/min OM    06/19/24 0900 -- 117 46 132/78 92 % -- Venturi mask 4 L/min OM    06/19/24 0825 -- -- -- -- -- -- Venturi mask 4 L/min     06/19/24 0800 -- 83 17 98/60 99 % -- Ventilator -- OM    06/19/24 0700 -- 98 18 98/72 97 % -- Ventilator -- OM    06/19/24 0600 -- 82 18 103/65 98 % -- Ventilator -- CT    06/19/24 0500 -- 92 18 103/62 99 % -- Ventilator -- CT    06/19/24 0400 98.8 °F (37.1 °C) 120 23 130/69 99 % -- Ventilator -- CT    06/19/24 0300 -- 108 27 115/78 97 % -- Ventilator -- CT    06/19/24 0200 -- 84 19 103/61 98 % -- Ventilator -- CT    06/19/24 0100 -- 76 17 103/64 98 % -- Ventilator -- CT    06/19/24 0000 98.1 °F (36.7 °C) 83 19 101/59 98 % -- Ventilator -- CT         Blood Transfusion Record       Product Unit Status Volume Start End            Transfuse RBC       24  [id number]  [id number] Stopped 339.58 mL 06/10/24 1017 06/10/24 1300              6/20 PULMONARY NOTE        Assessment and Plan:   1.   Septic shock-Echo without vegetation.  Patient is yet requiring pressors.  Fusobacterium identified in the blood.     Recommendations:  1.  Ongoing antibiotic as per ID.     2.  Psychiatric-substance abuse disorder as well as history of bipolar disease status post recent prolonged rehab stay having been discharged 30 days ago.  The patient is currently on methadone, Seroquel, fentanyl pushes.  Agitation is improved.     Recommendations: CIWA protocol-as per psychiatry evaluation, methadone initiated.  Chest x-ray is somewhat improved today.  Will use fentanyl pushes.     3.  Pregnancy-3 cm subchorionic hemorrhage, now with decreasing beta hCG.     Recommendations: As per obstetrics.     4.  DVT prophylaxis-would use SCDs in patient who has subchorionic hemorrhage at this juncture although the patient has an extensive history of thrombosis.     Recommendations: SCDs and would discuss risks of chemoprophylaxis anticoagulation with obstetrics.     5.  Social concerns.     6.  Respiratory failure-the patient may have developed ARDS associated with the gram-negative bacteremia.  May have a component of noncardiogenic edema.  Also covered for aspiration pneumonia.  The x-ray still shows bilateral haziness.  She is getting ongoing antibiotic.  We stopped the propofol and fentanyl drip and progressed to extubate and she is doing well.  Chest x-ray is improved today.     Recommendations: Ongoing antibiotic.  Pulmonary toilet.     7.  Transient asystole-while on Precedex drip.  Discontinued.     8.  Nutrition-will check swallowing and get the NG tube out.  Advance diet.     Subjective:   Latonia Barker is a(n) 29 year old female who is much more cooperative.     Objective:   Blood pressure 116/80, pulse 116, temperature 97.2 °F (36.2 °C), temperature source Temporal, resp. rate 18, weight 166 lb 14.2 oz (75.7 kg), last menstrual period 03/25/2024, SpO2 99%, not currently breastfeeding.     Physical Exam sedate white  female  HEENT examination is unremarkable with pupils equal round and reactive to light and accommodation.   Neck without adenopathy, thyromegaly, JVD nor bruit.   Lungs diminished to auscultation and percussion.  Cardiac regular rate and rhythm no murmur.   Abdomen nontender, without hepatosplenomegaly and no mass appreciable.   Extremities without clubbing cyanosis nor edema.   Neurologic grossly intact with symmetric tone and strength and reflex when allowed to wake.  Skin without gross abnormality     Results:            Lab Results   Component Value Date     WBC 10.9 06/20/2024     HGB 8.9 06/20/2024     HCT 26.3 06/20/2024     .0 06/20/2024     CREATSERUM 0.42 06/20/2024     BUN 15 06/20/2024      06/20/2024     K 3.7 06/20/2024      06/20/2024     CO2 28.0 06/20/2024     GLU 96 06/20/2024     CA 9.2 06/20/2024      CT scan of the chest-1. No acute pulmonary embolism through the segmental pulmonary arteries.   2. Multifocal pneumonia demonstrated by multifocal ground-glass opacities and consolidations involving the left greater than right lower lobes and left upper lobe.   3. Two 4 mm right upper lobe pulmonary nodules, which are favored to be infectious/inflammatory, however recommend follow-up CT chest in 6 months to monitor for resolution.   4. Mild right heart predominant cardiomegaly.   5. Borderline dilated main pulmonary artery, which can be seen in pulmonary artery hypertension.   6. Small pericardial effusion.   7. Hepatosplenomegaly.      Chest x-ray-mild edema    6/20 PSYCH NOTE    History of Present Illness:   Communicating with the team, the patient is relatively calm today. Staff indicating patient received lots of PRNs last night and remains off of the fentanyl and propofol. Patient has been cleared for diet. Patient communicating she will not pull her wires. Staff indicating her mom does not want her to know that she is receiving methadone.     The patient receiving benadryl  25 mg TID, ativan 2 mg QID, methadone 10 mg q 8 hours, prazosin 2 mg, Seroquel 300 mg TID, Effexor 37.5 mg.      Vital Signs: BP: 107/68, Pulse 97     The patient seen today laying in hospital bed. Patient stating she is \"doing better\" and that she \"did not want to die.\" Patient stating her recent episode was just a \"relapse.\" Patient stating at her last overdose she had 3 bags of mostly Fentanyl and some heroin with 1 needle.    The patient continues to display high tolerance to medication and challenged to lower narcotic.       Impression:      Opiate Withdrawal Syndrome.  Opiate Dependency.  Bipolar Disorder unspecified.  Septic shock (HCC)  Pneumonia of left lower lobe due to infectious organism  Subchorionic hematoma in first trimester, single or unspecified fetus (HCC)     The patient is a 29 year old  female, single, with past medical history of acne, anxiety, bacterial endocarditis, bipolar disorder, chlamydia, decorative tattoo, depression, hepatitis B virus infection, history of blood clots, HPV infection, IV drug use, heroin and crack cocaine use, opioid dependence, subclavian vein thrombosis, and substance abuse.  The patient is currently sedated.      6/11/24: Patient is sedated and exhibiting some agitation when in the room. Propofol will start to be decreased slowly.     6/12/24: Patient in process of decreasing sedation and possible extubation.  Otherwise the patient is alert and oriented to self and place only nodding her head agree with the need for extubation and cooperation.     6/14/24: Patient continues to exhibit some agitation and restlessness. Patient is still sedated and remains intubated.     6/17/2024: The patient continued demonstrating alternation in her mood and cognition with indication for high sedation.  Patient in the place where she need to start tapering sedation down in preparation for extubation.  Discussed risk and benefit, acknowledging the current symptom and  severity.  At this point, I would recommend the following approach:      6/18/2024: Patient continues to stay sedated and intubated. Patient continues to exhibit signs of restlessness and agitation.      6/19/2024: Patient is seen extubated. Patient showing some signs of agitation, but is awake and able to understand conversation.      6/20/2024: Patient is able to communicate more today. Patient admitting she did not want to die and showing that she will cooperate with staff regarding her care.     Provided assurance and support.     Focus on safety  Focus on education and support.  Focus on insight orientation helping the patient understand diagnosis and treatment plan.  Continue methadone 20 mg via NG tube BID  Continue Benadryl 25 mg IV 3 times daily.  Continue Haldol 5 mg IM every 6 hours.  Continue Seroquel 300 mg 3 times daily.  Discontinue venlafaxine to 37.5 mg daily  Discontinue prazosin 2 mg  Lower Ativan to 0.5 mg IV every 6 hours scheduled.  Increase Latuda to 40 mg with breakfast  Coordinate plan with team     6/20 HOSPITALIST NOTE    Subjective:      Pt sleepy and hardly answers questions.     Objective:   Blood pressure 107/68, pulse 97, temperature 97.3 °F (36.3 °C), temperature source Temporal, resp. rate 24, weight 166 lb 14.2 oz (75.7 kg), last menstrual period 03/25/2024, SpO2 100%      Assessment and Plan:      Acute septic shock due to multifocal pneumonia and Fusobacterium bacteremia  No longer septic.  Acute hypoxic respiratory failure  Extubated 6/19.  Currently on 4L o2.  - ID, pulm/critical care on consult  - CXR and CT chest reviewed. No PE. 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  No lasix today.     H/o IVDA  - Reportedly on suboxone since March and doing well but had relapse 1 day PTA   Per mother pt was on 8.2 mg suboxone tid, ruby MONTOYA at [facility] recovery [phone]  - cont meds per psych  - pt on methadone  - wean sedating meds     Shock liver - due to sepsis  -Transaminitis: mild - resolved  - US liver shows HM and steatosis  - Avoid hepatotoxins     Pregnancy   -  10 Weeks 6 day viable IUP on US: 3.2 cm subchorionic hemorrhage along the inferior aspect of the gestational sac.   - HCG down trending  - OB on consult  - no vaginal bleed - cont monitor  - repeat US prior to discharge     Bicytopenia: plt normalized. Anemia persists - better today  - TCP possibly due to sepsis. improving  - Anemia possibly from acute blood loss related to 3.2 cm subchorionic hemorrhage?  - Hold off ac   - 6/10: 1 U prbc transfused  - transfuse if hgb<7     RAMON: mild - resolved  Likely due to shock  Off pressors  Monitor renal function daily and avoid nephrotoxins     Bipolar disorder  - psych consult (of note both mom and daughter vehemently refused in prior admission)  - zofran prn   - SW  - cont meds with adjustment per psych  - wean sedating meds     Hx of chronic anticoagulation  H/o Recurrent venous thrombosis superficial and deep   - (had followed in [facility] in the past)  - per last hematology f/u note  \"She completed more than 6 months of anticoagulation with warfarin for what we consider a provoked DVT and states she is no longer using IV drugs in the upper extremities.  She is now pregnant.  Would recommend stopping anticoagulation as long as she is able to avoid IV drug use \"  - avoid a/c due to subchorionic hemorrhage on US     dvt proph:    SCDs

## 2024-06-20 NOTE — PROGRESS NOTES
Patient is a 29 year old female with past medical history of acne, anxiety, bacterial endocarditis, bipolar disorder, chlamydia, decorative tattoo, depression, hepatitis B virus infection, history of blood clots, HPV infection, IV drug use, heroin and crack cocaine use, opioid dependence, subclavian vein thrombosis, and substance abuse who was admitted to the hospital for Septic shock (HCC): The patient has been demonstrating signs of being combative and is currently sedated.    Consult Duration     The patient seen for over 50-minute, follow-up evaluation, over 50% counseling and coordinating care addressing  septic shock and opioid withdrawal.  Record reviewed, communication with attending, communication with RN and patient seen face to face evaluation.     History of Present Illness:   Communicating with the team, the patient is relatively calm today. Staff indicating patient received lots of PRNs last night and remains off of the fentanyl and propofol. Patient has been cleared for diet. Patient communicating she will not pull her wires. Staff indicating her mom does not want her to know that she is receiving methadone.    The patient receiving benadryl 25 mg TID, ativan 2 mg QID, methadone 10 mg q 8 hours, prazosin 2 mg, Seroquel 300 mg TID, Effexor 37.5 mg.     Vital Signs: BP: 107/68, Pulse 97    The patient seen today laying in hospital bed. Patient stating she is \"doing better\" and that she \"did not want to die.\" Patient stating her recent episode was just a \"relapse.\" Patient stating at her last overdose she had 3 bags of mostly Fentanyl and some heroin with 1 needle.     The patient continues to display high tolerance to medication and challenged to lower narcotic.    Past Psychiatric/Medication History:  1. Prior diagnoses: anxiety, bipolar disorder, depression, opioid dependence, substance abuse  2. Past psychiatric inpatient: Unknown  3. Past outpatient history: Unknown  4. Past suicide history:  Unknown  5. 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 ×42 days    Bipolar disorder (HCC)    Chlamydia    Decorative tattoo    Depression    Hepatitis B virus infection    History of blood clots    Human papilloma virus infection    Intravenous drug abuse (HCC)    Heroin and crack cocaine    Opioid dependence (HCC)    Subclavian vein thrombosis (HCC)    Substance abuse (HCC)       Past Surgical History  Past Surgical History:   Procedure Laterality Date    Colonoscopy      Colonoscopy N/A 2017    Procedure: COLONOSCOPY;  Surgeon: Xu Day MD;  Location: [facility] ENDOSCOPY    Colposcopy, cervix w/upper adjacent vagina; w/biopsy(s), cervix            Other surgical history      wisdom teeth     Other surgical history      fasciotomy of right arm    Tonsillectomy      Upper gi endoscopy,exam         Family History  Family History   Problem Relation Age of Onset    Other (back pain) Father     Hypertension Mother     Hypertension Maternal Grandmother     Diabetes Maternal Grandfather     Prostate Cancer Paternal Grandfather         w/ metastasis       Social History  Social History     Socioeconomic History    Marital status: Single    Number of children: 0   Occupational History    Occupation: Unemployed   Tobacco Use    Smoking status: Every Day     Current packs/day: 0.00     Average packs/day: 1 pack/day for 1 year (1.0 ttl pk-yrs)     Types: Cigarettes     Start date: 2014     Last attempt to quit: 2015      Years since quittin.4    Smokeless tobacco: Current    Tobacco comments:     Vaping   Vaping Use    Vaping status: Never Used   Substance and Sexual Activity    Alcohol use: Not Currently     Comment: social    Drug use: Yes     Types: Heroin, \"Crack\" cocaine, benzodiazepines, Other-see comments     Comment: Acid, mushrooms    Sexual activity: Yes     Partners: Male     Birth control/protection: Condom   Other Topics Concern    Caffeine Concern No     Comment:  2 cans Cola per day    Exercise Yes     Comment: hep    Reaction to local anesthetic No   Social History Narrative    The patient does not use an assistive device..      The patient does live in a home with stairs.           Current Medications:  Current Facility-Administered Medications   Medication Dose Route Frequency    lurasidone (Latuda) tab 20 mg  20 mg Oral Daily with breakfast    LORazepam (Ativan) 2 mg/mL injection 1 mg  1 mg Intravenous 4 times per day    meropenem (Merrem) 500 mg in sodium chloride 0.9% 100 mL IVPB-MBP  500 mg Intravenous Q8H    LORazepam (Ativan) 2 mg/mL injection 2 mg  2 mg Intramuscular Q6H PRN    haloperidol lactate (Haldol) 5 MG/ML injection 1 mg  1 mg Intramuscular Q6H PRN    QUEtiapine (SEROquel) tab 300 mg  300 mg Per NG Tube TID    methadone solution (DOLOPHINE) 10 mg/5mL  20 mg Oral Q8H    fentaNYL (Sublimaze) 50 mcg/mL injection 25 mcg  25 mcg Intravenous Q30 Min PRN    diphenhydrAMINE (Benadryl) 12.5 MG/5ML oral liquid 25 mg  25 mg Oral TID    midazolam (Versed) 2 MG/2ML injection 2 mg  2 mg Intravenous Q4H PRN    senna (Senokot) 8.8 MG/5ML oral syrup 8.8 mg  5 mL Per NG Tube BID    docusate (Colace) 50 MG/5ML oral solution 100 mg  100 mg Per NG Tube BID    acetaminophen (Tylenol) 160 MG/5ML oral liquid 650 mg  650 mg Oral Q4H PRN    Or    acetaminophen (Tylenol) rectal suppository 650 mg  650 mg Rectal Q4H PRN    Or    acetaminophen (Ofirmev) 10 mg/mL infusion premix 1,000 mg  1,000 mg Intravenous Q6H PRN     polyethylene glycol (PEG 3350) (Miralax) 17 g oral packet 17 g  17 g Oral Daily PRN    senna (Senokot) 8.8 MG/5ML oral syrup 17.6 mg  10 mL Oral Nightly PRN    bisacodyl (Dulcolax) 10 MG rectal suppository 10 mg  10 mg Rectal Daily PRN    fleet enema (Fleet) 7-19 GM/118ML rectal enema 133 mL  1 enema Rectal Once PRN    dextrose 10% infusion (TPN no rate)   Intravenous Continuous PRN    pancrelipase (Lip-Prot-Amyl) (Zenpep) DR particles cap 10,000 Units  10,000 Units Per G Tube PRN    And    sodium bicarbonate tab 325 mg  325 mg Oral PRN    famotidine (Pepcid) 20 mg/2mL injection 20 mg  20 mg Intravenous BID    ondansetron (Zofran) 4 MG/2ML injection 4 mg  4 mg Intravenous Q6H PRN    prochlorperazine (Compazine) 10 MG/2ML injection 5 mg  5 mg Intravenous Q8H PRN    acetaminophen (Tylenol) tab 650 mg  650 mg Oral Q6H PRN    melatonin tab 3 mg  3 mg Oral Nightly     Medications Prior to Admission   Medication Sig    prazosin 1 MG Oral Cap Take 4 capsules (4 mg total) by mouth nightly. Takes 4mg nightly    buprenorphine-naloxone 8-2 MG Sublingual Film dissolve 1 film under the tongue 3 TIMES A DAY for maintenance    prenatal vitamin with DHA 27-0.8-228 MG Oral Cap Take 1 capsule by mouth daily.    [] nitrofurantoin monohydrate macro (MACROBID) 100 MG Oral Cap Take 1 capsule (100 mg total) by mouth 2 (two) times daily for 7 days.    QUEtiapine 200 MG Oral Tab Take 1 tablet (200 mg total) by mouth nightly.    [] Buprenorphine HCl-Naloxone HCl 8.6-2.1 MG Sublingual SL Tab Place 8.6 mg of buprenorphine under the tongue in the morning, at noon, and at bedtime.    pantoprazole 20 MG Oral Tab EC Take 1 tablet (20 mg total) by mouth every morning before breakfast.    gabapentin 300 MG Oral Cap Take 1 capsule (300 mg total) by mouth nightly.    Melatonin 3 MG Oral Cap Take 1 capsule (3 mg total) by mouth at bedtime.    Naloxone HCl (NARCAN) 4 MG/0.1ML Nasal Liquid 4 mg by Nasal route as needed. IF PATIENT REMAINS  UNRESPONSIVE, REPEAT DOSE IN OTHER NOSTRIL 2 TO 5 MINUTES AFTER FIRST DOSE (Patient not taking: Reported on 5/9/2024)    venlafaxine  MG Oral Capsule SR 24 Hr Take 1 capsule (150 mg total) by mouth every morning.    ondansetron (ZOFRAN) 4 mg tablet Take 1 tablet (4 mg total) by mouth every 8 (eight) hours as needed for Nausea. (Patient not taking: Reported on 5/24/2024)    cloNIDine 0.1 MG Oral Tab Take 1 tablet (0.1 mg total) by mouth 2 (two) times daily as needed. (Patient not taking: Reported on 5/24/2024)       Allergies  Allergies   Allergen Reactions    Amoxicillin HIVES    Vancomycin RASH and ITCHING    Clindamycin RASH       Review of Systems:   As by Admitting/Attending    Results:   Laboratory Data:  Lab Results   Component Value Date    WBC 10.9 06/20/2024    HGB 8.9 (L) 06/20/2024    HCT 26.3 (L) 06/20/2024    .0 06/20/2024    CREATSERUM 0.42 (L) 06/20/2024    BUN 15 06/20/2024     06/20/2024    K 3.7 06/20/2024     06/20/2024    CO2 28.0 06/20/2024    GLU 96 06/20/2024    CA 9.2 06/20/2024    ALB 2.7 (L) 06/13/2024    ALKPHO 146 (H) 06/13/2024    TP 5.4 (L) 06/13/2024    AST 32 06/13/2024    ALT 32 06/13/2024    PTT 29.2 05/30/2020    INR 1.9 (A) 08/12/2020    PTP 14.7 (H) 05/31/2020    T4F 0.73 11/02/2011    TSH 2.080 02/13/2019    LIP 18 (L) 05/30/2020    DDIMER 0.47 02/28/2022    CRP <0.5 01/29/2015    MG 1.7 06/19/2024    PHOS 4.2 06/17/2024    TROP <0.045 05/30/2020    CK 55 03/29/2017    ETOH <3 06/09/2024         Imaging:  XR CHEST AP PORTABLE  (CPT=71045)    Result Date: 6/20/2024  CONCLUSION:  1. Lines/tubes in stable customary positioning. 2. Partial improvement in bilateral pulmonary opacities and small bilateral pleural effusions, which could relate to improving mild CHF/fluid overload although a superimposed multifocal infectious/inflammatory process cannot be excluded.    Dictated by (CST): William Jerome MD on 6/20/2024 at 7:54 AM     Finalized by (CST):  William Jerome MD on 6/20/2024 at 7:57 AM          XR CHEST AP PORTABLE  (CPT=71045)    Result Date: 6/19/2024  CONCLUSION:  1. Borderline heart size.  2. Support tubes and catheters are in satisfactory position. 3. Bilateral mixed alveolar and interstitial multifocal airspace opacification, slight progression.  4. Bilateral pleural effusions.    Dictated by (CST): Khadar Baxter MD on 6/19/2024 at 7:54 AM     Finalized by (CST): Khadar Baxter MD on 6/19/2024 at 8:00 AM          XR CHEST AP PORTABLE  (CPT=71045)    Result Date: 6/18/2024  CONCLUSION:  1. Cardiomegaly. 2. Support tubes and catheters are satisfactory. 3. Extensive bilateral mixed multifocal airspace opacification right worse than left.  4. Small bilateral effusions    Dictated by (CST): Khadar Baxter MD on 6/18/2024 at 8:18 AM     Finalized by (CST): Khadar Baxter MD on 6/18/2024 at 8:32 AM           Vital Signs:   Blood pressure 116/80, pulse 116, temperature 97.2 °F (36.2 °C), temperature source Temporal, resp. rate 18, weight 75.7 kg (166 lb 14.2 oz), last menstrual period 03/25/2024, SpO2 99%, not currently breastfeeding.    Mental Status Exam:   Appearance: Stated age single, , female in hospital gown, laying down in hospital bed.  Patient extubated.NG tube. Restrain.  Psychomotor: Patient has been demonstrating some psychomotor agitation  Orientation: Patient is oriented to self and location.    Gait: Not evaluated.  Attitude/Coorperation: Patient attempting to be cooperative with interview.  Behavior: Episodes of restlessness and agitation   Speech: Some communication verbally, limited  Mood: Patient stating she is \"doing better\"  Affect: Restless and blunted affect  Thought process: Disorganized and some confusion  Thought content: No reports of suicidal or homicidal ideation  Perceptions: Patient demonstrating response to internal stimuli  Concentration: Grossly impaired  Memory: Grossly  impaired  Intellect: Average.  Judgment and Insight: Questionable.     Impression:     Opiate Withdrawal Syndrome.  Opiate Dependency.  Bipolar Disorder unspecified.  Septic shock (HCC)  Pneumonia of left lower lobe due to infectious organism  Subchorionic hematoma in first trimester, single or unspecified fetus (HCC)     The patient is a 29 year old  female, single, with past medical history of acne, anxiety, bacterial endocarditis, bipolar disorder, chlamydia, decorative tattoo, depression, hepatitis B virus infection, history of blood clots, HPV infection, IV drug use, heroin and crack cocaine use, opioid dependence, subclavian vein thrombosis, and substance abuse.  The patient is currently sedated.     6/11/24: Patient is sedated and exhibiting some agitation when in the room. Propofol will start to be decreased slowly.    6/12/24: Patient in process of decreasing sedation and possible extubation.  Otherwise the patient is alert and oriented to self and place only nodding her head agree with the need for extubation and cooperation.    6/14/24: Patient continues to exhibit some agitation and restlessness. Patient is still sedated and remains intubated.    6/17/2024: The patient continued demonstrating alternation in her mood and cognition with indication for high sedation.  Patient in the place where she need to start tapering sedation down in preparation for extubation.  Discussed risk and benefit, acknowledging the current symptom and severity.  At this point, I would recommend the following approach:     6/18/2024: Patient continues to stay sedated and intubated. Patient continues to exhibit signs of restlessness and agitation.     6/19/2024: Patient is seen extubated. Patient showing some signs of agitation, but is awake and able to understand conversation.     6/20/2024: Patient is able to communicate more today. Patient admitting she did not want to die and showing that she will cooperate with  staff regarding her care.     Provided assurance and support.    Focus on safety  Focus on education and support.  Focus on insight orientation helping the patient understand diagnosis and treatment plan.  Continue methadone 20 mg via NG tube BID  Continue Benadryl 25 mg IV 3 times daily.  Continue Haldol 5 mg IM every 6 hours.  Continue Seroquel 300 mg 3 times daily.  Discontinue venlafaxine to 37.5 mg daily  Discontinue prazosin 2 mg  Lower Ativan to 0.5 mg IV every 6 hours scheduled.  Increase Latuda to 40 mg with breakfast  Coordinate plan with team    Orders This Visit:  Orders Placed This Encounter   Procedures    CBC With Differential With Platelet    Comp Metabolic Panel (14)    Urinalysis with Culture Reflex    Lactic Acid, Plasma    HCG, Beta Subunit (Quant Pregnancy Test)    Ethyl Alcohol    Acetaminophen (Tylenol), S    Salicylate, Serum    Drug Abuse Panel 11 screen    Manual differential    Lactic Acid Reflex Post Positive    CBC With Differential With Platelet    Comp Metabolic Panel (14)    Magnesium    Streptococcus Pneumoniae Ag, Urine    Legionella urine Ag serogrp 1    Potassium    Magnesium    Scan slide    MD BLOOD SMEAR CONSULT    Triglycerides    Basic Metabolic Panel (8)    Phosphorus    Phosphorus    CBC With Differential With Platelet    Phosphorus    Hemoglobin    Triglycerides    Magnesium    Phosphorus    Basic Metabolic Panel (8)    CBC With Differential With Platelet    Hepatic Function Panel (7)    Magnesium    CBC With Differential With Platelet    Comp Metabolic Panel (14)    Phosphorus    Magnesium    Basic Metabolic Panel (8)    CBC With Differential With Platelet    Manual differential    Magnesium    Basic Metabolic Panel (8)    CBC With Differential With Platelet    Magnesium    CBC With Differential With Platelet    Basic Metabolic Panel (8)    Magnesium    CBC With Differential With Platelet    Basic Metabolic Panel (8)    Phosphorus    Magnesium    Basic Metabolic Panel (8)     CBC With Differential With Platelet    Magnesium    CBC With Differential With Platelet    Basic Metabolic Panel (8)    CBC With Differential With Platelet    Basic Metabolic Panel (8)    Hepatitis C RNA Qnt w/ Reflex to Genotype    Basic Metabolic Panel (8)    CBC With Differential With Platelet    Type and screen    ABORH (Blood Type)    Antibody Screen    Prepare RBC Once    SARS-CoV-2/Flu A and B/RSV by PCR (GeneXpert)    Blood Culture    Blood Culture PCR    Blood Culture    Blood Culture    Sputum culture       Meds This Visit:  Requested Prescriptions      No prescriptions requested or ordered in this encounter       Luis De León MD  6/20/2024    Note to Patient: The 21st Century Cures Act makes medical notes like these available to patients in the interest of transparency. However, be advised this is a medical document. It is intended as peer to peer communication. It is written in medical language and may contain abbreviations or verbiage that are unfamiliar. It may appear blunt or direct. Medical documents are intended to carry relevant information, facts as evident, and the clinical opinion of the practitioner. This note may have been transcribed using a voice dictation system. Voice recognition errors may occur. This should not be taken to alter the content or meaning of this note.

## 2024-06-20 NOTE — PROGRESS NOTES
[facility]  part of [facility]    Progress Note    Latonia Barker Patient Status:  Inpatient    1/3/1995 MRN [id number]   Location [facility] 2W/SW Attending Raheem Monet MD   Hosp Day # 11 PCP Agatha Mcmanus MD       Subjective:     Pt sleepy and hardly answers questions.    Objective:   Blood pressure 107/68, pulse 97, temperature 97.3 °F (36.3 °C), temperature source Temporal, resp. rate 24, weight 166 lb 14.2 oz (75.7 kg), last menstrual period 2024, SpO2 100%, not currently breastfeeding.    Gen:   NAD.  Sleepy.  CV:   RRR, no m/g/r  Pulm:   CTA anterior exam  Abd:   +bs, soft, NT, ND  LE:   No c/c/e  Neuro:   nonfocal    Results:     Lab Results   Component Value Date    WBC 10.9 2024    HGB 8.9 (L) 2024    HCT 26.3 (L) 2024    .0 2024    CREATSERUM 0.42 (L) 2024    BUN 15 2024     2024    K 3.7 2024     2024    CO2 28.0 2024    GLU 96 2024    CA 9.2 2024    ALB 2.7 (L) 2024    ALKPHO 146 (H) 2024    BILT 1.5 (H) 2024    TP 5.4 (L) 2024    AST 32 2024    ALT 32 2024    PTT 29.2 2020    INR 1.9 (A) 2020    T4F 0.73 2011    TSH 2.080 2019    LIP 18 (L) 2020    DDIMER 0.47 2022    CRP <0.5 2015    MG 1.7 2024    PHOS 4.2 2024    TROP <0.045 2020    CK 55 2017    ETOH <3 2024       XR CHEST AP PORTABLE  (CPT=71045)    Result Date: 2024  CONCLUSION:  1. Lines/tubes in stable customary positioning. 2. Partial improvement in bilateral pulmonary opacities and small bilateral pleural effusions, which could relate to improving mild CHF/fluid overload although a superimposed multifocal infectious/inflammatory process cannot be excluded.    Dictated by (CST): William Jerome MD on 2024 at 7:54 AM     Finalized by (CST): William Jerome MD on 2024 at 7:57 AM           XR CHEST AP PORTABLE  (CPT=71045)    Result Date: 2024  CONCLUSION:  1. Borderline heart size.  2. Support tubes and catheters are in satisfactory position. 3. Bilateral mixed alveolar and interstitial multifocal airspace opacification, slight progression.  4. Bilateral pleural effusions.    Dictated by (CST): Khadar Baxter MD on 2024 at 7:54 AM     Finalized by (CST): Khadar Baxter MD on 2024 at 8:00 AM               Assessment and Plan:     Acute septic shock due to multifocal pneumonia and Fusobacterium bacteremia  No longer septic.  Acute hypoxic respiratory failure  Extubated .  Currently on 4L o2.  - ID, pulm/critical care on consult  - CXR and CT chest reviewed. No PE. +multifocal pneumonia  CXR imprved this am  - COVID neg  - urine strep and legionella neg  - off pressors  - cont meropenem  - US b/l jugular veins, r/o Lemierre's - no thrombus     Fusobacterium bacteremia  - h/o IVDA  - prior h/o MRSA bacteremia and 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  - TTE without endocarditis  - cont abx as above  - ID was consulted     Pulmonary edema  Improved.  No lasix today.     H/o IVDA  - Reportedly on suboxone since March and doing well but had relapse 1 day PTA   Per mother pt was on 8.2 mg suboxone tid, ruby MONTOYA at [facility] recovery [phone]  - cont meds per psych  - pt on methadone  - wean sedating meds     Shock liver - due to sepsis  -Transaminitis: mild - resolved  - US liver shows HM and steatosis  - Avoid hepatotoxins     Pregnancy   -  10 Weeks 6 day viable IUP on US: 3.2 cm subchorionic hemorrhage along the inferior aspect of the gestational sac.   - HCG down trending  - OB on consult  - no vaginal bleed - cont monitor  - repeat US prior to discharge     Bicytopenia: plt normalized. Anemia persists - better today  - TCP possibly due to sepsis. improving  - Anemia possibly from acute blood loss related to 3.2 cm subchorionic  hemorrhage?  - Hold off ac   - 6/10: 1 U prbc transfused  - transfuse if hgb<7     RAMON: mild - resolved  Likely due to shock  Off pressors  Monitor renal function daily and avoid nephrotoxins     Bipolar disorder  - psych consult (of note both mom and daughter vehemently refused in prior admission)  - zofran prn   - SW  - cont meds with adjustment per psych  - wean sedating meds     Hx of chronic anticoagulation  H/o Recurrent venous thrombosis superficial and deep 2017  - (had followed in [facility] in the past)  - per last hematology f/u note 2020 \"She completed more than 6 months of anticoagulation with warfarin for what we consider a provoked DVT and states she is no longer using IV drugs in the upper extremities.  She is now pregnant.  Would recommend stopping anticoagulation as long as she is able to avoid IV drug use \"  - avoid a/c due to subchorionic hemorrhage on US     dvt proph:    SCDs     Code status:    Full       MDM:    High Raheem Gordon MD  6/20/2024

## 2024-06-20 NOTE — CM/SW NOTE
06/20/24 1700   CM/ Referral Data   Referral Source    Reason for Referral Discharge planning   Informant Clinical Staff Member   Medical Hx   Does patient have an established PCP? Yes   Significant Past Medical/Mental Health Hx Hx of recent substance abuse stay   Patient Info   Patient lives with Parent(s)   Patient Status Prior to Admission   Independent with ADLs and Mobility Yes   Discharge Needs   Anticipated D/C needs To be determined   Services Requested   Submitted to [facility] Yes       Latonia was extubated on 6/19/24.  Therapy needs still to be determined.  CM sent [facility] review early as Latonia may need YOLY stay.  CM to send LTAC referrals for their input on LTAC as a possible dc plan.    Deirdre Carballo MBA BSN RN CRRN   RN Case Manager  [phone]

## 2024-06-20 NOTE — OCCUPATIONAL THERAPY NOTE
OCCUPATIONAL THERAPY EVALUATION - INPATIENT     Room Number: 217/217-A  Evaluation Date: 6/20/2024  Type of Evaluation: Initial  Presenting Problem: Septic shock- pt is pregnant    Physician Order: IP Consult to Occupational Therapy  Reason for Therapy: ADL/IADL Dysfunction and Discharge Planning    OCCUPATIONAL THERAPY ASSESSMENT   Patient is a 29 year old female admitted 6/9/2024 for acute septic shock 2/2 multifocal PNA; respiratory failure, pulmonary edema; pt is currently >10 weeks pregnant; pending US prior to d/c; hx of bipolar disorder (denied by patient and mother in past admissions- noted by hospitalist).  Prior to admission, patient's baseline is independent .  Patient is currently functioning significantly below baseline with all basic self care and functional mobility.  Patient is requiring up to dependent assist as a result of the following impairments: decreased functional strength, decreased functional reach, decreased endurance, pain, impaired seated and standing balance, impaired coordination, impaired motor planning, decreased muscular endurance, difficulty maintaining precautions, cognitive deficits (poor processing, insight, judgement, problem solving, disorientation), medical status, decreased compliance/participation, decreased insight to deficits, and decreased safety awareness. Occupational Therapy will continue to follow for duration of hospitalization.    Patient will benefit from continued skilled OT Services, However, d/c recommendations remain undetermined as medical plan continues to be established.       PLAN  OT Treatment Plan: Balance activities;Energy conservation/work simplification techniques;ADL training;Functional transfer training;Endurance training;Patient/Family education;Patient/Family training;Compensatory technique education  OT Device Recommendations: TBD    OCCUPATIONAL THERAPY MEDICAL/SOCIAL HISTORY   Problem List   Principal Problem:    Septic shock (HCC)  Active  Problems:    Opiate dependence, continuous (HCC)    Pneumonia of left lower lobe due to infectious organism    Subchorionic hematoma in first trimester, single or unspecified fetus (HCC)    Opiate use    Opiate withdrawal (HCC)    Moderate mixed bipolar I disorder (HCC)    HOME SITUATION  Type of Home: House    SUBJECTIVE  Can you take me outside for a cigarette      OCCUPATIONAL THERAPY EXAMINATION   OBJECTIVE  Precautions: Bed/chair alarm (NG, wrist restraints)  Fall Risk: High fall risk    WEIGHT BEARING RESTRICTION     PAIN ASSESSMENT  Ratin    COGNITION  Impaired    RANGE OF MOTION   Upper extremity ROM is within functional limits     STRENGTH ASSESSMENT  Upper extremity strength is within functional limits     ACTIVITIES OF DAILY LIVING ASSESSMENT  AM-PAC ‘6-Clicks’ Inpatient Daily Activity Short Form  How much help from another person does the patient currently need…  -   Putting on and taking off regular lower body clothing?: Total  -   Bathing (including washing, rinsing, drying)?: Total  -   Toileting, which includes using toilet, bedpan or urinal? : Total  -   Putting on and taking off regular upper body clothing?: Total  -   Taking care of personal grooming such as brushing teeth?: Total  -   Eating meals?: Total    AM-PAC Score:  Score: 6  Approx Degree of Impairment: 100%  Standardized Score (AM-PAC Scale): 17.07  CMS Modifier (G-Code): CN       BED MOBILITY  Rolling: Maximum Assist    BALANCE ASSESSMENT     FUNCTIONAL ADL ASSESSMENT  Eating: Dependent  Grooming Seated: Maximum Assist  Bathing Seated: Maximum Assist  UB Dressing Seated: Maximum Assist  LB Dressing Seated: Dependent  Toileting Seated: Dependent       Skilled Therapy Provided: Patient slightly impulsive and pulling at lines when restraints removed; disoriented and confused; able to long sit with trunk away from bed with Min A; reuqired Max A to boost; total care for all mobility related ADLS; NG tube for feeding; did not progress pt  OOB 2/2 cognitive status/safety; returned to bed and ensured all needs in reach.    EDUCATION PROVIDED  Patient: Role of Occupational Therapy; Plan of Care; Discharge Recommendations  Patient's Response to Education: Demonstrates Disinterest    The patient's Approx Degree of Impairment: 100% has been calculated based on documentation in the [facility] '6 clicks' Inpatient Daily Activity Short Form.  Research supports that patients with this level of impairment may benefit from LTC, however, pt is very independent at baseline; anticipate as pt improves medically, she will hopefully progress to level where she is able to d/c home with family support and possibly HH therapy; will continue to work with patient and update recommendations as she progresses medically and functionally. .  Final disposition will be made by interdisciplinary medical team.    Patient End of Session: In bed;Needs met;Call light within reach;RN aware of session/findings;Restraints    OT Goals  Patient self-stated goal is: no goals stated     Patient will complete LE dressing with SBA  Comment:     Patient will complete toilet transfer with SBA  Comment:     Patient will complete self care task at sink level with SBA   Comment:     Comment:         Goals  on: 7/15  Frequency:3-5x week     Patient Evaluation Complexity Level:   Occupational Profile/Medical History HIGH - Extensive review of history including review of medical or therapy records    Specific performance deficits impacting engagement in ADL/IADL HIGH  5+ performance deficits    Client Assessment/Performance Deficits HIGH - Comorbidities and significant modifications of tasks    Clinical Decision Making HIGH - Analysis of occupational profile, comprehensive assessments, multiple treatment options    Overall Complexity HIGH     OT Session Time: 10 minutes  Self-Care Home Management: 0 minutes  Therapeutic Activity: 10 minutes    Evert Ireland, Occupational Therapist, OTR/L ext [phone]

## 2024-06-21 ENCOUNTER — APPOINTMENT (OUTPATIENT)
Dept: CT IMAGING | Facility: HOSPITAL | Age: 29
DRG: 831 | End: 2024-06-21
Attending: HOSPITALIST

## 2024-06-21 ENCOUNTER — APPOINTMENT (OUTPATIENT)
Dept: GENERAL RADIOLOGY | Facility: HOSPITAL | Age: 29
DRG: 831 | End: 2024-06-21
Attending: INTERNAL MEDICINE

## 2024-06-21 LAB
ANION GAP SERPL CALC-SCNC: 7 MMOL/L (ref 0–18)
BASOPHILS # BLD AUTO: 0.11 X10(3) UL (ref 0–0.2)
BASOPHILS NFR BLD AUTO: 1.1 %
BUN BLD-MCNC: 9 MG/DL (ref 9–23)
BUN/CREAT SERPL: 23.7 (ref 10–20)
CALCIUM BLD-MCNC: 9.1 MG/DL (ref 8.7–10.4)
CHLORIDE SERPL-SCNC: 105 MMOL/L (ref 98–112)
CO2 SERPL-SCNC: 21 MMOL/L (ref 21–32)
CREAT BLD-MCNC: 0.38 MG/DL
DEPRECATED RDW RBC AUTO: 48.9 FL (ref 35.1–46.3)
EGFRCR SERPLBLD CKD-EPI 2021: 141 ML/MIN/1.73M2 (ref 60–?)
EOSINOPHIL # BLD AUTO: 0.04 X10(3) UL (ref 0–0.7)
EOSINOPHIL NFR BLD AUTO: 0.4 %
ERYTHROCYTE [DISTWIDTH] IN BLOOD BY AUTOMATED COUNT: 16.4 % (ref 11–15)
GLUCOSE BLD-MCNC: 87 MG/DL (ref 70–99)
HCT VFR BLD AUTO: 31.3 %
HGB BLD-MCNC: 9.2 G/DL
IMM GRANULOCYTES # BLD AUTO: 0.17 X10(3) UL (ref 0–1)
IMM GRANULOCYTES NFR BLD: 1.7 %
LYMPHOCYTES # BLD AUTO: 1.43 X10(3) UL (ref 1–4)
LYMPHOCYTES NFR BLD AUTO: 14.4 %
MCH RBC QN AUTO: 24.9 PG (ref 26–34)
MCHC RBC AUTO-ENTMCNC: 29.4 G/DL (ref 31–37)
MCV RBC AUTO: 84.6 FL
MONOCYTES # BLD AUTO: 0.66 X10(3) UL (ref 0.1–1)
MONOCYTES NFR BLD AUTO: 6.6 %
NEUTROPHILS # BLD AUTO: 7.54 X10 (3) UL (ref 1.5–7.7)
NEUTROPHILS # BLD AUTO: 7.54 X10(3) UL (ref 1.5–7.7)
NEUTROPHILS NFR BLD AUTO: 75.8 %
OSMOLALITY SERPL CALC.SUM OF ELEC: 274 MOSM/KG (ref 275–295)
PLATELET # BLD AUTO: 417 10(3)UL (ref 150–450)
PLATELETS.RETICULATED NFR BLD AUTO: 4.9 % (ref 0–7)
POTASSIUM SERPL-SCNC: 4 MMOL/L (ref 3.5–5.1)
RBC # BLD AUTO: 3.7 X10(6)UL
SODIUM SERPL-SCNC: 133 MMOL/L (ref 136–145)
WBC # BLD AUTO: 10 X10(3) UL (ref 4–11)

## 2024-06-21 PROCEDURE — 70450 CT HEAD/BRAIN W/O DYE: CPT | Performed by: HOSPITALIST

## 2024-06-21 PROCEDURE — 99233 SBSQ HOSP IP/OBS HIGH 50: CPT | Performed by: INTERNAL MEDICINE

## 2024-06-21 PROCEDURE — 90792 PSYCH DIAG EVAL W/MED SRVCS: CPT | Performed by: NURSE PRACTITIONER

## 2024-06-21 PROCEDURE — 99233 SBSQ HOSP IP/OBS HIGH 50: CPT | Performed by: HOSPITALIST

## 2024-06-21 PROCEDURE — 71045 X-RAY EXAM CHEST 1 VIEW: CPT | Performed by: INTERNAL MEDICINE

## 2024-06-21 RX ORDER — FOLIC ACID 1 MG/1
1 TABLET ORAL DAILY
Status: DISCONTINUED | OUTPATIENT
Start: 2024-06-22 | End: 2024-06-24

## 2024-06-21 RX ORDER — HALOPERIDOL 5 MG/ML
5 INJECTION INTRAMUSCULAR EVERY 6 HOURS PRN
Status: DISCONTINUED | OUTPATIENT
Start: 2024-06-21 | End: 2024-06-24

## 2024-06-21 NOTE — PLAN OF CARE
Problem: Patient Centered Care  Goal: Patient preferences are identified and integrated in the patient's plan of care  Description: Interventions:  - What would you like us to know as we care for you?   - Provide timely, complete, and accurate information to patient/family  - Incorporate patient and family knowledge, values, beliefs, and cultural backgrounds into the planning and delivery of care  - Encourage patient/family to participate in care and decision-making at the level they choose  - Honor patient and family perspectives and choices  Outcome: Progressing     Problem: Patient/Family Goals  Goal: Patient/Family Long Term Goal  Description: Patient's Long Term Goal:     Interventions:  -   - See additional Care Plan goals for specific interventions  Outcome: Progressing  Goal: Patient/Family Short Term Goal  Description: Patient's Short Term Goal:     Interventions:   -   - See additional Care Plan goals for specific interventions  Outcome: Progressing     Problem: CARDIOVASCULAR - ADULT  Goal: Maintains optimal cardiac output and hemodynamic stability  Description: INTERVENTIONS:  - Monitor vital signs, rhythm, and trends  - Monitor for bleeding, hypotension and signs of decreased cardiac output  - Evaluate effectiveness of vasoactive medications to optimize hemodynamic stability  - Monitor arterial and/or venous puncture sites for bleeding and/or hematoma  - Assess quality of pulses, skin color and temperature  - Assess for signs of decreased coronary artery perfusion - ex. Angina  - Evaluate fluid balance, assess for edema, trend weights  Outcome: Progressing  Goal: Absence of cardiac arrhythmias or at baseline  Description: INTERVENTIONS:  - Continuous cardiac monitoring, monitor vital signs, obtain 12 lead EKG if indicated  - Evaluate effectiveness of antiarrhythmic and heart rate control medications as ordered  - Initiate emergency measures for life threatening arrhythmias  - Monitor electrolytes and  administer replacement therapy as ordered  Outcome: Progressing     Problem: RESPIRATORY - ADULT  Goal: Achieves optimal ventilation and oxygenation  Description: INTERVENTIONS:  - Assess for changes in respiratory status  - Assess for changes in mentation and behavior  - Position to facilitate oxygenation and minimize respiratory effort  - Oxygen supplementation based on oxygen saturation or ABGs  - Provide Smoking Cessation handout, if applicable  - Encourage broncho-pulmonary hygiene including cough, deep breathe, Incentive Spirometry  - Assess the need for suctioning and perform as needed  - Assess and instruct to report SOB or any respiratory difficulty  - Respiratory Therapy support as indicated  - Manage/alleviate anxiety  - Monitor for signs/symptoms of CO2 retention  Outcome: Progressing     Problem: NEUROLOGICAL - ADULT  Goal: Achieves stable or improved neurological status  Description: INTERVENTIONS  - Assess for and report changes in neurological status  - Initiate measures to prevent increased intracranial pressure  - Maintain blood pressure and fluid volume within ordered parameters to optimize cerebral perfusion and minimize risk of hemorrhage  - Monitor temperature, glucose, and sodium. Initiate appropriate interventions as ordered  Outcome: Progressing  Goal: Absence of seizures  Description: INTERVENTIONS  - Monitor for seizure activity  - Administer anti-seizure medications as ordered  - Monitor neurological status  Outcome: Progressing  Goal: Remains free of injury related to seizure activity  Description: INTERVENTIONS:  - Maintain airway, patient safety  and administer oxygen as ordered  - Monitor patient for seizure activity, document and report duration and description of seizure to MD/LIP  - If seizure occurs, turn patient to side and suction secretions as needed  - Reorient patient post seizure  - Seizure pads on all 4 side rails  - Instruct patient/family to notify RN of any seizure  activity  - Instruct patient/family to call for assistance with activity based on assessment  Outcome: Progressing     Problem: Delirium  Goal: Minimize duration of delirium  Description: Interventions:  - Encourage use of hearing aids, eye glasses  - Promote highest level of mobility daily  - Provide frequent reorientation  - Promote wakefulness i.e. lights on, blinds open  - Promote sleep, encourage patient's normal rest cycle i.e. lights off, TV off, minimize noise and interruptions  - Encourage family to assist in orientation and promotion of home routines  Outcome: Progressing

## 2024-06-21 NOTE — PROGRESS NOTES
[facility]  part of [facility]     Progress Note    Latonia Barker Patient Status:  Inpatient    1/3/1995 MRN [id number]   Location [facility] 2W/SW Attending Raheem Monet MD   Hosp Day # 12 PCP Agatha Mcmanus MD       Subjective:   Patient seen and examined.  States she wants to go home soon.  States she feels better overall.  Denies significant dyspnea    Objective:   Blood pressure 114/63, pulse 112, temperature 97.6 °F (36.4 °C), temperature source Temporal, resp. rate 24, weight 166 lb 14.2 oz (75.7 kg), last menstrual period 2024, SpO2 91%, not currently breastfeeding.  Intake/Output:   Last 3 shifts: I/O last 3 completed shifts:  In: 1097 [P.O.:180; I.V.:165; NG/GT:752]  Out: 1899 [Urine:1899]   This shift: I/O this shift:  In: -   Out: 350 [Urine:350]     Vent Settings:      Hemodynamic parameters (last 24 hours):      Scheduled Meds:   Current Facility-Administered Medications   Medication Dose Route Frequency    [START ON 2024] folic acid (Folvite) tab 1 mg  1 mg Oral Daily    lurasidone (Latuda) tab 40 mg  40 mg Oral Daily with breakfast    LORazepam (Ativan) 2 mg/mL injection 0.5 mg  0.5 mg Intravenous 4 times per day    meropenem (Merrem) 500 mg in sodium chloride 0.9% 100 mL IVPB-MBP  500 mg Intravenous Q8H    LORazepam (Ativan) 2 mg/mL injection 2 mg  2 mg Intramuscular Q6H PRN    haloperidol lactate (Haldol) 5 MG/ML injection 1 mg  1 mg Intramuscular Q6H PRN    QUEtiapine (SEROquel) tab 300 mg  300 mg Per NG Tube TID    methadone solution (DOLOPHINE) 10 mg/5mL  20 mg Oral Q8H    diphenhydrAMINE (Benadryl) 12.5 MG/5ML oral liquid 25 mg  25 mg Oral TID    senna (Senokot) 8.8 MG/5ML oral syrup 8.8 mg  5 mL Per NG Tube BID    docusate (Colace) 50 MG/5ML oral solution 100 mg  100 mg Per NG Tube BID    acetaminophen (Tylenol) 160 MG/5ML oral liquid 650 mg  650 mg Oral Q4H PRN    Or    acetaminophen (Tylenol) rectal suppository 650 mg  650 mg Rectal Q4H PRN     Or    acetaminophen (Ofirmev) 10 mg/mL infusion premix 1,000 mg  1,000 mg Intravenous Q6H PRN    polyethylene glycol (PEG 3350) (Miralax) 17 g oral packet 17 g  17 g Oral Daily PRN    senna (Senokot) 8.8 MG/5ML oral syrup 17.6 mg  10 mL Oral Nightly PRN    bisacodyl (Dulcolax) 10 MG rectal suppository 10 mg  10 mg Rectal Daily PRN    fleet enema (Fleet) 7-19 GM/118ML rectal enema 133 mL  1 enema Rectal Once PRN    dextrose 10% infusion (TPN no rate)   Intravenous Continuous PRN    pancrelipase (Lip-Prot-Amyl) (Zenpep) DR particles cap 10,000 Units  10,000 Units Per G Tube PRN    And    sodium bicarbonate tab 325 mg  325 mg Oral PRN    famotidine (Pepcid) 20 mg/2mL injection 20 mg  20 mg Intravenous BID    ondansetron (Zofran) 4 MG/2ML injection 4 mg  4 mg Intravenous Q6H PRN    prochlorperazine (Compazine) 10 MG/2ML injection 5 mg  5 mg Intravenous Q8H PRN    acetaminophen (Tylenol) tab 650 mg  650 mg Oral Q6H PRN    melatonin tab 3 mg  3 mg Oral Nightly       Continuous Infusions:    dextrose 10%         Physical Exam  Constitutional: no acute distress  Eyes: PERRL  ENT: nares pateint  Neck: supple, no JVD  Cardio: RRR, S1 S2  Respiratory: clear to auscultation bilaterally, no wheezing, rales, rhonchi, crackles  GI: abdomen soft, non tender, active bowel sounds, no organomegaly  Extremities: no clubbing, cyanosis, edema  Neurologic: no gross motor deficits  Skin: warm, dry      Results:     Lab Results   Component Value Date    WBC 10.0 06/21/2024    HGB 9.2 06/21/2024    HCT 31.3 06/21/2024    .0 06/21/2024    CREATSERUM 0.38 06/21/2024    BUN 9 06/21/2024     06/21/2024    K 4.0 06/21/2024     06/21/2024    CO2 21.0 06/21/2024    GLU 87 06/21/2024    CA 9.1 06/21/2024       XR CHEST AP PORTABLE  (CPT=71045)    Result Date: 6/21/2024  CONCLUSION:  1. Status post removal of the enteric tube and central venous catheter. 2. Worsening bilateral pulmonary opacities.  Small pleural effusions, left  larger than right, with mild worsening on the left.  These findings could relate to worsening of moderate CHF/fluid overload and/or a superimposed multifocal infectious/inflammatory process.    Dictated by (CST): William Jerome MD on 6/21/2024 at 7:20 AM     Finalized by (CST): William Jerome MD on 6/21/2024 at 7:22 AM          XR CHEST AP PORTABLE  (CPT=71045)    Result Date: 6/20/2024  CONCLUSION:  1. Lines/tubes in stable customary positioning. 2. Partial improvement in bilateral pulmonary opacities and small bilateral pleural effusions, which could relate to improving mild CHF/fluid overload although a superimposed multifocal infectious/inflammatory process cannot be excluded.    Dictated by (CST): William Jerome MD on 6/20/2024 at 7:54 AM     Finalized by (CST): William Jerome MD on 6/20/2024 at 7:57 AM                 Assessment   1.  Septic shock, improved  3.  Fusobacterium bacteremia  3.  Pulmonary edema  4.  Possible pneumonia  5.  Acute hypoxemic respiratory failure, improved  6.  History of IV drug abuse  7.  Anemia  8.  Bipolar disorder  9.  Prior VTE  10.  Pregnancy     Plan   -Patient presents with evidence of septic shock with Fusobacterium bacteremia.  Concern for possible pneumonia contributing factor  -Weaned off oxygen at this time.  Tolerating extubation on 6/19/2024  -Echo with no evidence of vegetation seen  -Antibiotics per ID recommendations  -Weaned off pressor support  -Hold off on further diuresis today  -History of IV drug abuse.  -Patient with pregnancy and evidence of subchorionic hemorrhage along inferior aspect of gestational sac hCG downtrending.  Further recommendations per OB  -Monitor anemia at this time  -DVT prophylaxis: SCDs  -Possible transfer to floor    Renetta Martinez DO  Pulmonary Critical Care Medicine  [facility]
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
## 2024-06-21 NOTE — PROGRESS NOTES
[facility]  part of [facility]    Progress Note    Latonia Barker Patient Status:  Inpatient    1/3/1995 MRN [id number]   Location [facility] 2W/SW Attending Raheem Monet MD   Hosp Day # 12 PCP Agatha Mcmanus MD       Subjective:     Sleeping.  Does not answer questions.  Nods head to say she is ok.    Objective:   Blood pressure 118/75, pulse (!) 121, temperature 98.8 °F (37.1 °C), temperature source Temporal, resp. rate (!) 34, weight 166 lb 14.2 oz (75.7 kg), last menstrual period 2024, SpO2 93%, not currently breastfeeding.    Gen:   NAD.  Sleepy.  CV:   RRR, no m/g/r  Pulm:   CTA bilat  Abd:   +bs, soft, NT, ND  LE:   No c/c/e  Neuro:   nonfocal    Results:     Lab Results   Component Value Date    WBC 10.9 2024    HGB 8.9 (L) 2024    HCT 26.3 (L) 2024    .0 2024    CREATSERUM 0.38 (L) 2024    BUN 9 2024     (L) 2024    K 4.0 2024     2024    CO2 21.0 2024    GLU 87 2024    CA 9.1 2024    ALB 2.7 (L) 2024    ALKPHO 146 (H) 2024    BILT 1.5 (H) 2024    TP 5.4 (L) 2024    AST 32 2024    ALT 32 2024    PTT 29.2 2020    INR 1.9 (A) 2020    T4F 0.73 2011    TSH 2.080 2019    LIP 18 (L) 2020    DDIMER 0.47 2022    CRP <0.5 2015    MG 1.7 2024    PHOS 4.2 2024    TROP <0.045 2020    CK 55 2017    ETOH <3 2024       XR CHEST AP PORTABLE  (CPT=71045)    Result Date: 2024  CONCLUSION:  1. Status post removal of the enteric tube and central venous catheter. 2. Worsening bilateral pulmonary opacities.  Small pleural effusions, left larger than right, with mild worsening on the left.  These findings could relate to worsening of moderate CHF/fluid overload and/or a superimposed multifocal infectious/inflammatory process.    Dictated by (CST): William Jerome MD on 2024 at 7:20  AM     Finalized by (CST): William Jerome MD on 2024 at 7:22 AM          XR CHEST AP PORTABLE  (CPT=71045)    Result Date: 2024  CONCLUSION:  1. Lines/tubes in stable customary positioning. 2. Partial improvement in bilateral pulmonary opacities and small bilateral pleural effusions, which could relate to improving mild CHF/fluid overload although a superimposed multifocal infectious/inflammatory process cannot be excluded.    Dictated by (CST): William Jerome MD on 2024 at 7:54 AM     Finalized by (CST): William Jerome MD on 2024 at 7:57 AM               Assessment and Plan:     Acute septic shock due to multifocal pneumonia and Fusobacterium bacteremia  No longer septic.  Acute hypoxic respiratory failure  Extubated .  Currently on room air.  - ID, pulm/critical care on consult  - CXR and CT chest reviewed. No PE. 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  No lasix today.     H/o IVDA  - Reportedly on suboxone since March and doing well but had relapse 1 day PTA   Per mother pt was on 8.2 mg suboxone tid, ruby MONTOYA at [facility] recovery [phone]  - cont meds per psych  - pt on methadone  - wean sedating meds     Shock liver - due to sepsis  -Transaminitis: mild - resolved  - US liver shows HM and steatosis  - Avoid hepatotoxins     Pregnancy   -  10 Weeks 6 day viable IUP on US: 3.2 cm subchorionic hemorrhage along the inferior aspect of the gestational sac.   - HCG down trending  - OB on consult  - no vaginal bleed - cont monitor  - repeat US prior to discharge     Bicytopenia: plt normalized. Anemia  persists - better today  - TCP possibly due to sepsis. improving  - Anemia possibly from acute blood loss related to 3.2 cm subchorionic hemorrhage?  - Hold off ac   - 6/10: 1 U prbc transfused  - transfuse if hgb<7  - follow cbc     RAMON: mild - resolved  Likely due to shock  Off pressors  - follow bmp     Bipolar disorder  - psych consult (of note both mom and daughter vehemently refused in prior admission)  - zofran prn   - SW  - cont meds with adjustment per psych  - wean sedating meds     Hx of chronic anticoagulation  H/o Recurrent venous thrombosis superficial and deep 2017  - (had followed in [facility] in the past)  - per last hematology f/u note 2020 \"She completed more than 6 months of anticoagulation with warfarin for what we consider a provoked DVT and states she is no longer using IV drugs in the upper extremities.  She is now pregnant.  Would recommend stopping anticoagulation as long as she is able to avoid IV drug use \"  - avoid a/c due to subchorionic hemorrhage on US     dvt proph:    SCDs     Code status:    Full       MDM:    High Raheem Gordon MD  6/21/2024

## 2024-06-21 NOTE — PROGRESS NOTES
PCCU  Critical Care APRN Progress Note    NAME: Latonia Barker - ROOM: 217/217-A - MRN: [id number] - Age: 29 year old - :1/3/1995    Called to bedside for patient becoming aggressive with nursing staff and wanting to leave.  Public safety at bedside to assist.  She is requesting to leave and trying to reach her mother to pick her up.  Primary care- Dr Monet notified, deferring to psych service at this time.   Will await for psychiatry advise.    Updated Dr Martinez        OBJECTIVE  Vitals:  /63 (BP Location: Right arm)   Pulse 112   Temp 97.6 °F (36.4 °C) (Temporal)   Resp 24   Wt 166 lb 14.2 oz (75.7 kg)   LMP 2024 (Approximate)   SpO2 91%   BMI 30.52 kg/m²                    Twyla Hi APRN/CNP  Critical Care  2024   2:35 PM     Addendum-  per psych, no P&C needed, sitter at bedside.  Pt more calm this afternoon.

## 2024-06-21 NOTE — PLAN OF CARE
Problem: Patient Centered Care  Goal: Patient preferences are identified and integrated in the patient's plan of care  Description: Interventions:  - What would you like us to know as we care for you?   - Provide timely, complete, and accurate information to patient/family  - Incorporate patient and family knowledge, values, beliefs, and cultural backgrounds into the planning and delivery of care  - Encourage patient/family to participate in care and decision-making at the level they choose  - Honor patient and family perspectives and choices  Outcome: Progressing     Problem: Patient/Family Goals  Goal: Patient/Family Long Term Goal  Description: Patient's Long Term Goal:     Interventions:  -   - See additional Care Plan goals for specific interventions  Outcome: Progressing  Goal: Patient/Family Short Term Goal  Description: Patient's Short Term Goal:     Interventions:   -   - See additional Care Plan goals for specific interventions  Outcome: Progressing     Problem: CARDIOVASCULAR - ADULT  Goal: Maintains optimal cardiac output and hemodynamic stability  Description: INTERVENTIONS:  - Monitor vital signs, rhythm, and trends  - Monitor for bleeding, hypotension and signs of decreased cardiac output  - Evaluate effectiveness of vasoactive medications to optimize hemodynamic stability  - Monitor arterial and/or venous puncture sites for bleeding and/or hematoma  - Assess quality of pulses, skin color and temperature  - Assess for signs of decreased coronary artery perfusion - ex. Angina  - Evaluate fluid balance, assess for edema, trend weights  Outcome: Progressing  Goal: Absence of cardiac arrhythmias or at baseline  Description: INTERVENTIONS:  - Continuous cardiac monitoring, monitor vital signs, obtain 12 lead EKG if indicated  - Evaluate effectiveness of antiarrhythmic and heart rate control medications as ordered  - Initiate emergency measures for life threatening arrhythmias  - Monitor electrolytes and  administer replacement therapy as ordered  Outcome: Progressing     Problem: RESPIRATORY - ADULT  Goal: Achieves optimal ventilation and oxygenation  Description: INTERVENTIONS:  - Assess for changes in respiratory status  - Assess for changes in mentation and behavior  - Position to facilitate oxygenation and minimize respiratory effort  - Oxygen supplementation based on oxygen saturation or ABGs  - Provide Smoking Cessation handout, if applicable  - Encourage broncho-pulmonary hygiene including cough, deep breathe, Incentive Spirometry  - Assess the need for suctioning and perform as needed  - Assess and instruct to report SOB or any respiratory difficulty  - Respiratory Therapy support as indicated  - Manage/alleviate anxiety  - Monitor for signs/symptoms of CO2 retention  Outcome: Progressing     Problem: NEUROLOGICAL - ADULT  Goal: Achieves stable or improved neurological status  Description: INTERVENTIONS  - Assess for and report changes in neurological status  - Initiate measures to prevent increased intracranial pressure  - Maintain blood pressure and fluid volume within ordered parameters to optimize cerebral perfusion and minimize risk of hemorrhage  - Monitor temperature, glucose, and sodium. Initiate appropriate interventions as ordered  Outcome: Progressing     Problem: Delirium  Goal: Minimize duration of delirium  Description: Interventions:  - Encourage use of hearing aids, eye glasses  - Promote highest level of mobility daily  - Provide frequent reorientation  - Promote wakefulness i.e. lights on, blinds open  - Promote sleep, encourage patient's normal rest cycle i.e. lights off, TV off, minimize noise and interruptions  - Encourage family to assist in orientation and promotion of home routines  Outcome: Progressing

## 2024-06-21 NOTE — PROGRESS NOTES
INFECTIOUS DISEASE PROGRESS NOTE  [facility]  part of [facility] ID PROGRESS NOTE    Latonia Barker Patient Status:  Inpatient    1/3/1995 MRN [id number]   Location [facility] 2W/SW Attending Iram Marquez MD   Hosp Day # 12 PCP Agatha Mcmanus MD     Subjective:  ROS reviewed. Upset that she is still in the hospital. On modified diet.    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  -  Follow fever curve, wbc.  -  Reviewed imaging, labs, micro reports.  -  Case d/w RN.  -  ID will continue to follow along peripherally.    History of Present Illness:  Latonia Barker is a 29 year old female with hx of active IVDU, hep C, bipolar dx, history of MRSA bacteremia and 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 She states she was on suboxone but unfortunately relapsed and has been using for the past 4 days. She states she was very weak and having headaches as well as neck stiffness. States she feels much better now. Mostly injects in her groin area. Denies sharing any needles.  Temp 99.3F on  admission with normal wbc. Blood cx pending. Started on IV ceftriaxone and vanc.     Physical Exam:  /75   Pulse (!) 121   Temp 98.8 °F (37.1 °C) (Temporal)   Resp (!) 34   Wt 166 lb 14.2 oz (75.7 kg)   LMP 03/25/2024 (Approximate)   SpO2 93%   BMI 30.52 kg/m²     Gen:   Intubated, sedated  HEENT:  ETT+, neck supple  CV/lungs:  RRR, CTAB  Abdom:  Soft, no TTP  Skin/extrem:  No rashes, no c/c/e  Lines:  PIV+    Laboratory Data: Reviewed    Microbiology: Reviewed    Radiology: Reviewed      [facility]  [phone]  6/21/2024

## 2024-06-21 NOTE — PAYOR COMM NOTE
--------------  CONTINUED STAY REVIEW-----REQUESTING ADDITIONAL DAY 6/21      Payor: [facility]  Subscriber #:  [id number]  Authorization Number: [id number]    Admit date: 6/9/24  Admit time: 11:49 AM    REVIEW DOCUMENTATION:  Subjective:      Sleeping.  Does not answer questions.  Nods head to say she is ok.     Objective:   Blood pressure 118/75, pulse (!) 121, temperature 98.8 °F (37.1 °C), temperature source Temporal, resp. rate (!) 34, weight 166 lb 14.2 oz (75.7 kg), last menstrual period 03/25/2024, SpO2 93%, not currently breastfeeding.     Gen:   NAD.  Sleepy.  CV:   RRR, no m/g/r  Pulm:   CTA bilat  Abd:   +bs, soft, NT, ND  LE:   No c/c/e  Neuro:   nonfocal     Results:            Lab Results   Component Value Date     WBC 10.9 06/20/2024     HGB 8.9 (L) 06/20/2024     HCT 26.3 (L) 06/20/2024     .0 06/20/2024     CREATSERUM 0.38 (L) 06/21/2024     BUN 9 06/21/2024      (L) 06/21/2024     K 4.0 06/21/2024      06/21/2024     CO2 21.0 06/21/2024     GLU 87 06/21/2024     CA 9.1 06/21/2024     ALB 2.7 (L) 06/13/2024     ALKPHO 146 (H) 06/13/2024     BILT 1.5 (H) 06/13/2024     TP 5.4 (L) 06/13/2024     AST 32 06/13/2024     ALT 32 06/13/2024     PTT 29.2 05/30/2020     INR 1.9 (A) 08/12/2020     T4F 0.73 11/02/2011     TSH 2.080 02/13/2019     LIP 18 (L) 05/30/2020     DDIMER 0.47 02/28/2022     CRP <0.5 01/29/2015     MG 1.7 06/19/2024     PHOS 4.2 06/17/2024     TROP <0.045 05/30/2020     CK 55 03/29/2017     ETOH <3 06/09/2024         XR CHEST AP PORTABLE  (CPT=71045)     Result Date: 6/21/2024  CONCLUSION:         1. Status post removal of the enteric tube and central venous catheter. 2. Worsening bilateral pulmonary opacities.  Small pleural effusions, left larger than right, with mild worsening on the left.  These findings could relate to worsening of moderate CHF/fluid overload and/or a superimposed multifocal infectious/inflammatory process.     Dictated by (CST): William Jerome MD on 2024 at 7:20 AM     Finalized by (CST): William Jerome MD on 2024 at 7:22 AM           XR CHEST AP PORTABLE  (CPT=71045)     Result Date: 2024  CONCLUSION:         1. Lines/tubes in stable customary positioning. 2. Partial improvement in bilateral pulmonary opacities and small bilateral pleural effusions, which could relate to improving mild CHF/fluid overload although a superimposed multifocal infectious/inflammatory process cannot be excluded.    Dictated by (CST): William Jerome MD on 2024 at 7:54 AM     Finalized by (CST): William Jerome MD on 2024 at 7:57 AM                Assessment and Plan:      Acute septic shock due to multifocal pneumonia and Fusobacterium bacteremia  No longer septic.  Acute hypoxic respiratory failure  Extubated .  Currently on room air.  - ID, pulm/critical care on consult  - CXR and CT chest reviewed. No PE. 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  No lasix today.     H/o IVDA  - Reportedly on suboxone since March and doing well but had relapse 1 day PTA   Per mother pt was on 8.2 mg suboxone tid, ruby MONTOYA at [facility] recovery [phone]  - cont meds per psych  - pt on methadone  - wean sedating meds     Shock liver - due to sepsis  -Transaminitis: mild - resolved  - US liver shows HM and steatosis  - Avoid hepatotoxins     Pregnancy   -  10 Weeks 6 day viable IUP on US: 3.2 cm subchorionic hemorrhage along the inferior aspect of the gestational sac.   - HCG down trending  - OB on consult  - no vaginal bleed - cont  monitor  - repeat US prior to discharge     Bicytopenia: plt normalized. Anemia persists - better today  - TCP possibly due to sepsis. improving  - Anemia possibly from acute blood loss related to 3.2 cm subchorionic hemorrhage?  - Hold off ac   - 6/10: 1 U prbc transfused  - transfuse if hgb<7  - follow cbc     RAMON: mild - resolved  Likely due to shock  Off pressors  - follow bmp     Bipolar disorder  - psych consult (of note both mom and daughter vehemently refused in prior admission)  - zofran prn   - SW  - cont meds with adjustment per psych  - wean sedating meds     Hx of chronic anticoagulation  H/o Recurrent venous thrombosis superficial and deep 2017  - (had followed in [facility] in the past)  - per last hematology f/u note 2020 \"She completed more than 6 months of anticoagulation with warfarin for what we consider a provoked DVT and states she is no longer using IV drugs in the upper extremities.  She is now pregnant.  Would recommend stopping anticoagulation as long as she is able to avoid IV drug use \"  - avoid a/c due to subchorionic hemorrhage on US     dvt proph:    SCDs     Code status:    Full        MDM:    High Raheem Gordon MD  6/21/2024         MEDICATIONS ADMINISTERED IN LAST 1 DAY:  acetaminophen (Ofirmev) 10 mg/mL infusion premix 1,000 mg       Date Action Dose Route User    6/21/2024 0445 New Bag 1,000 mg Intravenous Dariel Parker RN          acetaminophen (Tylenol) 160 MG/5ML oral liquid 650 mg       Date Action Dose Route User    6/21/2024 0052 Given 650 mg Oral Dariel Parker RN          diphenhydrAMINE (Benadryl) 12.5 MG/5ML oral liquid 25 mg       Date Action Dose Route User    6/21/2024 0831 Given 25 mg Oral Lucie Resendez RN    6/20/2024 2052 Given 25 mg Oral Dariel Parker RN          docusate (Colace) 50 MG/5ML oral solution 100 mg       Date Action Dose Route User    6/21/2024 0831 Given 100 mg Per NG Tube Lucie Resendez RN    6/20/2024 2052 Given  100 mg Per NG Tube Dariel Parker RN          famotidine (Pepcid) 20 mg/2mL injection 20 mg       Date Action Dose Route User    6/21/2024 0833 Given 20 mg Intravenous Lucie Resendez RN    6/20/2024 2052 Given 20 mg Intravenous Dariel Parker RN          haloperidol lactate (Haldol) 5 MG/ML injection 1 mg       Date Action Dose Route User    6/21/2024 1504 Given 5 mg Intramuscular (Right Deltoid) Judie Ludwig RN    6/21/2024 0451 Given 1 mg Intramuscular (Right Deltoid) Dariel Parker RN          LORazepam (Ativan) 2 mg/mL injection 2 mg       Date Action Dose Route User    6/21/2024 1504 Given 2 mg Intramuscular (Right Deltoid) Judie Ludwig RN          LORazepam (Ativan) 2 mg/mL injection 0.5 mg       Date Action Dose Route User    6/21/2024 1305 Given 0.5 mg Intravenous Lucie Resendez RN    6/21/2024 0451 Given 0.5 mg Intravenous Dariel Parker RN    6/20/2024 2330 Given 0.5 mg Intravenous Dariel Parker RN    6/20/2024 1838 Given 0.5 mg Intravenous Lucie Resendez RN          lurasidone (Latuda) tab 40 mg       Date Action Dose Route User    6/21/2024 0832 Given 40 mg Oral Lucie Resendez RN          melatonin tab 3 mg       Date Action Dose Route User    6/20/2024 2051 Given 3 mg Oral Dariel Parker RN          meropenem (Merrem) 500 mg in sodium chloride 0.9% 100 mL IVPB-MBP       Date Action Dose Route User    6/21/2024 0530 New Bag 500 mg Intravenous ParkerDariel becker RN    6/20/2024 2130 New Bag 500 mg Intravenous Dariel Parker RN          methadone solution (DOLOPHINE) 10 mg/5mL       Date Action Dose Route User    6/21/2024 0512 Given 20 mg Oral Dariel Parker RN    6/20/2024 2100 Given 20 mg Oral Dariel Parker RN    6/20/2024 1532 Given 20 mg Oral Lucie Resendez RN          senna (Senokot) 8.8 MG/5ML oral syrup 8.8 mg       Date Action Dose Route User    6/21/2024 0934 Given 8.8 mg Per NG Tube Lucie Resendez RN    6/20/2024 2104 Given 8.8 mg Per NG Tube Dariel Parker, RN           QUEtiapine (SEROquel) tab 300 mg       Date Action Dose Route User    6/21/2024 0832 Given 300 mg Per NG Tube Lucie Resendez, RN    6/20/2024 2051 Given 300 mg Per NG Tube Dariel Parker, RN            Vitals (last day)       Date/Time Temp Pulse Resp BP SpO2 Weight O2 Device O2 Flow Rate (L/min) [facility]    06/21/24 1400 -- 121 25 116/84 92 % -- -- -- AS    06/21/24 1200 97.6 °F (36.4 °C) 112 24 114/63 91 % -- None (Room air) -- AS    06/21/24 1000 -- 121 34 118/75 -- -- -- -- AS    06/21/24 0800 98.8 °F (37.1 °C) 87 15 123/95 93 % -- None (Room air) -- AS    06/21/24 0700 -- 77 27 -- -- -- -- -- AS    06/21/24 0600 -- 92 23 101/57 90 % -- None (Room air) --     06/21/24 0400 97.8 °F (36.6 °C) 106 19 106/60 100 % -- None (Room air) --     06/21/24 0200 -- 99 15 109/75 95 % -- None (Room air) --     06/21/24 0100 -- 125 26 -- 85 % -- -- -- AS    06/21/24 0000 98 °F (36.7 °C) 111 18 103/66 95 % -- None (Room air) --     06/20/24 2000 -- 104 22 97/69 100 % -- Nasal cannula 2 L/min     06/20/24 1800 -- 104 20 109/72 100 % -- -- -- AS    06/20/24 1600 98.3 °F (36.8 °C) 90 31 116/70 -- -- Nasal cannula 2 L/min AS    06/20/24 1400 -- 117 24 114/63 100 % -- -- -- AS    06/20/24 1200 97.3 °F (36.3 °C) 97 24 107/68 100 % -- Nasal cannula 4 L/min AS    06/20/24 1100 -- 100 19 -- 99 % -- -- -- AS    06/20/24 1002 -- 118 -- -- -- -- -- -- DT    06/20/24 1000 -- 116 18 116/80 99 % -- -- -- AS    06/20/24 0800 97.2 °F (36.2 °C) 101 18 110/69 97 % -- Nasal cannula 4 L/min AS    06/20/24 0700 -- 192 19 -- 79 % -- -- -- AS    06/20/24 0400 97.1 °F (36.2 °C) 106 22 107/72 100 % -- -- -- JR    06/20/24 0200 -- 115 27 119/75 90 % -- Nasal cannula 4 L/min JR    06/20/24 0000 98 °F (36.7 °C) 108 23 113/86 92 % -- Nasal cannula 4 L/min           CIWA Scores (since admission)       None          Blood Transfusion Record       Product Unit Status Volume Start End            Transfuse RBC       24  [id number]  [id number] Stopped  339.58 mL 06/10/24 1017 06/10/24 1300

## 2024-06-21 NOTE — SLP NOTE
SPEECH DAILY NOTE - INPATIENT    ASSESSMENT & PLAN   ASSESSMENT    Proper PPE worn. Hands sanitized upon entrance/exit Pt room.       Pt alert, afebrile and on room air. Pt seen for swallow analysis per BSE recommendations (after consulting with RN). Pt agreed to participate. Pt's preferred method of learning verbal. Pt upright in bed; observed with current diet of pureed/mildly-thick liquids for monitoring diet tolerance. Additionally, Pt seen with solid and thin liquid trials for candidacy of diet upgrade. Swallowing precautions/strategies discussed; mild cues needed for execution. Increased mastication duration on solids. Functional lingual skills for bolus control pureed consistency; increased ALFIE on solids. No significant oral retention. Pharyngeal response appeared to trigger within 1-2 sec per hyolaryngeal elevation to completion (functional rise/strength per palpation). No overt CSA on solid, pureed, mildly-thick and thin liquids. Diet upgraded to BITE SIZE/THIN liquids/straw okay/pills whole with thin liquids- to be taken individually. Collaborated with RN regarding Pt's swallowing plan of care. Per RN, Pt with good tolerance of current diet. No report of difficulty taking meds.  Sp02 ~95% during this session. Call light within Pt's reach upon SLP discharge from room.      CXR 6/21/24:  CONCLUSION:   1. Status post removal of the enteric tube and central venous catheter.   2. Worsening bilateral pulmonary opacities.  Small pleural effusions, left larger than right, with mild worsening on the left.  These findings could relate to worsening of moderate CHF/fluid overload and/or a superimposed multifocal   infectious/inflammatory process.       PLAN:    To f/u x2-3 sessions to ensure safe intake of NEW UPGRADED diet and reinforce swallowing/aspiration precautions. To monitor for new CXR results. Diet upgrade to continue to be upgraded as tolerated. VFSS IF appropriate. Family education as available.      Diet  Recommendations - Solids: Mechanical soft chopped/ Soft & Bite Sized  Diet Recommendations - Liquids: Thin Liquids    Compensatory Strategies Recommended: Slow rate  Aspiration Precautions: Upright position;Slow rate;Small bites and sips  Medication Administration Recommendations: One pill at a time (with thin liquids)    Patient Experiencing Pain: No    Treatment Plan  Treatment Plan/Recommendations: Aspiration precautions    Interdisciplinary Communication: Discussed with RN  Plan posted at bedside    GOALS  Goal #1 The patient will tolerate PUREED consistency and MILDLY-THICK via TSP liquids without overt signs or symptoms of aspiration with 100 % accuracy over 1-2 session(s).    No CSA on current diet.       GOAL MET 6/21      Goal #2 The patient/family/caregiver will demonstrate understanding and implementation of aspiration precautions and swallow strategies independently over 4 session(s).    Swallowing precautions posted in room.     In Progress   Goal #3 The patient will utilize compensatory strategies as outlined by  BSSE (clinical evaluation) including Slow rate, Small bites, CONTROLLED liquids, straws okay, Upright 90 degrees with 1:1 feeding/moderate  assistance 90 % of the time across 4 sessions.    Pt self-fed. Mild cues for small sips/bites.     In Progress   Goal #4 The patient will tolerate trial upgrade of MINCED & MOIST/BITE SIZE/SOFT/SOLID consistency and THIN liquids without overt signs or symptoms of aspiration with 100 % accuracy over 2-3 session(s).    Diet upgraded to BITE SIZE/THIN liquids 6/21/24. To f/u for tolerance and possible upgrade to solids.     In Progress   FOLLOW UP  Follow Up Needed (Documentation Required): Yes  SLP Follow-up Date: 06/23/24  Number of Visits to Meet Established Goals: 4    Session: 1    If you have any questions, please contact   Susan Valente M.S. [facility]/SLP  Speech-Language Pathologist  [facility], [place]  #[phone]

## 2024-06-21 NOTE — DIETARY NOTE
ADULT NUTRITION REASSESSMENT    Pt is at moderate nutrition risk.  Pt does not meet malnutrition criteria.      RECOMMENDATIONS TO MD: CPM. RD added daily oral nutrition supplement (ONS) to help increase nutrient intake.     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 Findings: pneumonia, 10 weeks pregnant with subchorionic hemorrhage of the gestational sac,  mild RAMON, respiratory failure intubated 6/10PMH sig for Bipolar, IV drug abuse.   Pt assessed due to consult for enteral nutrition. Pt has NG tube in place for EN access. Sedated on high dose Propofol (39.4 ml/hr= ~ 1040 kcals/lipids) and TG today at 308. Weight hx: fairly stable and pt is obese @ BMI 30.89 kg/m2. Physical exam:  Pt appears well nourished. +BM noted. Diet Hx: per RN mother left for home to sleep as works days and other family members to return later today. RD will obtain diet hx later when family is here.   EN start with high protein polymeric formula + additional modular protein supplements to meet needs. Elevated TG- monitor as value >400 mg/dl  will prompt need to adjust to alternate sedation if continues to need or per Pharmacy recommendations.   6/13/24 Update: Not extubated today.  Propofol resumed (13.9 ml/hr= ~ 366 kcals via lipids). Mg replaced. Prn bowel regime ordered/APN as last BM . RD adjusted EN rx to meet nutrition needs. See orders.   24 Update: Continued intubation.Possible early ARDS/pneumonia at rounds.  Propofol Intake increased with increase in lipid kcals. +Constipation and appropriate meds given. (Fentanyl- increases risk for constipation) EN adjusted to accommodate increase in lipid kcals to avoid overfeeding.   24 Update: Extubated  and tube feeds discontinued. Diet advanced  to modified consistency and mildly thick liquids.  Today upgraded to general chopped consistency with thin liquids. Overall po intake has been fair. Visited pt who was resting in the dark. Pt did open eyes. Pt stated was not hungry for lunch now but agreed to a nutrition supplement this afternoon and then went back to sleep. RD adding daily CIB shake pt agreed on to start this pm. RD added daily Folic Acid as pt   10 weeks 6 day viable IUP on US. OB on consult    FOOD/NUTRITION RELATED HISTORY:  Appetite: Good PTA.   Intake:  EN support. Off  and diet advanced .   Intake Meeting Needs: yes ib tube feeds;  no but maximize with ONS>   Percent Meals Eaten (last 3 days)       Date/Time Percent Meals Eaten (%)    24 1221 50 %    24 1821 50 %           Food Allergies: No Known Food Allergies (NKFA)  Cultural/Ethnic/Hindu Preferences: Not Obtained    GASTROINTESTINAL: +BM  formed hard  extra large. .   PRN bowel regime in place.  meds for constipation adjusted. Daily Senna and colace.     MEDICATIONS: reviewed  last lasix.      lurasidone  40 mg Oral Daily with breakfast    LORazepam  0.5 mg Intravenous 4 times per day    meropenem  500 mg Intravenous Q8H    QUEtiapine  300 mg Per NG Tube TID    Methadone HCl  20 mg Oral Q8H    diphenhydrAMINE  25 mg Oral TID    senna  5 mL Per NG Tube BID    docusate  100 mg Per NG Tube BID    famotidine   20 mg Intravenous BID    melatonin  3 mg Oral Nightly   PRN meds noted.     LABS: reviewed Hyponatremia, hypoosmolar.   Recent Labs     06/18/24  0442 06/19/24  0558 06/20/24  0407 06/21/24  0751   GLU 99 99 96 87   BUN 13 15 15 9   CREATSERUM 0.41* 0.44* 0.42* 0.38*   CA 8.3* 8.8 9.2 9.1   MG 1.6 1.7  --   --     134* 137 133*   K 3.8 3.9 3.7 4.0    100 104 105   CO2 27.0 28.0 28.0 21.0   OSMOCALC 282 279 285 274*       NUTRITION RELATED PHYSICAL FINDINGS:  - Nutrition Focused Physical Exam (NFPE): well nourished per visual exam  - Fluid Accumulation: B/L UE and hands non-pitting see RN documentation for details  - Skin Integrity: intact see RN documentation for details    ANTHROPOMETRICS:  HT:  5'2\"   WT: 75.7 kg (166 lb 14.2 oz)  admit wt: 170 lbs. Wt up likely d/t fluid gains.   BMI: Body mass index is 30.52 kg/m².  BMI CLASSIFICATION: 30-34.9 kg/m2 - obesity class I  IBW: 110  lbs        153 % IBW  Usual Body Wt: 170-175  lbs 6/9/24 and  3/10/23.     97 % UBW    WEIGHT HISTORY:  Patient Weight(s) for the past 336 hrs:   Weight   06/18/24 0428 75.7 kg (166 lb 14.2 oz)   06/15/24 0400 84.1 kg (185 lb 6.5 oz)   06/13/24 0400 83.6 kg (184 lb 4.9 oz)   06/12/24 0800 85.7 kg (188 lb 15 oz)   06/12/24 0400 81.9 kg (180 lb 8.9 oz)   06/10/24 2300 77.7 kg (171 lb 4.8 oz)   06/10/24 0700 76.6 kg (168 lb 14 oz)   06/09/24 1159 77.2 kg (170 lb 3.1 oz)   06/09/24 0715 72 kg (158 lb 11.7 oz)     Wt Readings from Last 10 Encounters:   06/18/24 75.7 kg (166 lb 14.2 oz)   05/24/24 72.8 kg (160 lb 9.6 oz)   04/16/24 74.4 kg (164 lb)   02/19/24 78 kg (172 lb)   02/05/24 78 kg (172 lb)   08/01/23 77.1 kg (170 lb)   04/26/23 79.6 kg (175 lb 6.4 oz)   04/10/23 78.5 kg (173 lb)   03/10/23 79.7 kg (175 lb 9.6 oz)   12/13/22 75.1 kg (165 lb 9.6 oz)     NUTRITION DIAGNOSIS/PROBLEM:   Inadequate oral intake related to Lack of or limited access to food in the setting of intubation and inability to swallow as evidenced by NPO,  0 nutrition intake  NUTRITION DIAGNOSIS PROGRESS:  Improvement (unresolved) on po diet  NUTRITION INTERVENTION:     NUTRITION PRESCRIPTION:   Estimated Nutrition needs: --dosing wt of 75.7 kg - wt taken on 6/18/24  Calories: 8560-8872  calories/day (25-30 calories per kg Dosing wt   Protein:  g protein/day (1.2-2.0 g protein/kg Ideal body wt (IBW))  Fluid Needs: ~35 ml/kg adjusted IBW for obesity (57 kg) : 1995 ml (maintenance). Adjust per clinical status.     - Diet:       Procedures    Regular/General diet Fluid Consistency: Thin Liquids ; Texture Consistency: Chopped / Soft & Bite Sized; Is Patient on Accuchecks? No   goal.    - Medical Food Supplements-- [place] Instant Breakfast shake daily karthik ((511 kcals, 17 g protein)   - Vitamin and mineral supplements: folic acid daily  - Feeding assistance: independent.   - Nutrition education: not at this time.   Coordination of nutrition care: collaboration with other providers and discussed in Care Rounds   - Discharge and transfer of nutrition care to new setting or provider: monitor plans    MONITOR AND EVALUATE/NUTRITION GOALS:  - Food and Nutrient Intake:      Monitor: adequacy of PO intake, tolerance of PO intake, and adequacy of supplement intake post extubation if deemed safe.   - Anthropometric Measurement:    Monitor weight  - Nutrition Goals:      allow wt loss due to fluid losses, PO and supplement greater than 75% of needs, labs within acceptable limits, and support body systems   DIETITIAN FOLLOW UP: RD to follow and monitor nutrition status    Vangie Walsh RD, LDN, [facility] ([id number])

## 2024-06-22 LAB
GLUCOSE BLDC GLUCOMTR-MCNC: 93 MG/DL (ref 70–99)
HCV LOG10 2: 6.58 LOG10 IU/ML
HEP C GENOTYPE 2: 3
HEP C QN 2: NORMAL IU/ML

## 2024-06-22 PROCEDURE — 99232 SBSQ HOSP IP/OBS MODERATE 35: CPT | Performed by: INTERNAL MEDICINE

## 2024-06-22 PROCEDURE — 99233 SBSQ HOSP IP/OBS HIGH 50: CPT | Performed by: HOSPITALIST

## 2024-06-22 RX ORDER — SENNOSIDES 8.6 MG
8.6 TABLET ORAL 2 TIMES DAILY
Status: DISCONTINUED | OUTPATIENT
Start: 2024-06-22 | End: 2024-06-24
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
## 2024-06-22 NOTE — PLAN OF CARE
Picked up patient from [facility]. Patient A/Ox3. Sitter at bedside. Dickson vest in place. On room air. Vital signs as charted. Purewick intact. Pain management, see MAR. Call light within reach. Bed in low and locked position. Hourly rounds complete.   Problem: RESPIRATORY - ADULT  Goal: Achieves optimal ventilation and oxygenation  Description: INTERVENTIONS:  - Assess for changes in respiratory status  - Assess for changes in mentation and behavior  - Position to facilitate oxygenation and minimize respiratory effort  - Oxygen supplementation based on oxygen saturation or ABGs  - Provide Smoking Cessation handout, if applicable  - Encourage broncho-pulmonary hygiene including cough, deep breathe, Incentive Spirometry  - Assess the need for suctioning and perform as needed  - Assess and instruct to report SOB or any respiratory difficulty  - Respiratory Therapy support as indicated  - Manage/alleviate anxiety  - Monitor for signs/symptoms of CO2 retention  Outcome: Progressing     Problem: NEUROLOGICAL - ADULT  Goal: Achieves stable or improved neurological status  Description: INTERVENTIONS  - Assess for and report changes in neurological status  - Initiate measures to prevent increased intracranial pressure  - Maintain blood pressure and fluid volume within ordered parameters to optimize cerebral perfusion and minimize risk of hemorrhage  - Monitor temperature, glucose, and sodium. Initiate appropriate interventions as ordered  Outcome: Not Progressing  Goal: Absence of seizures  Description: INTERVENTIONS  - Monitor for seizure activity  - Administer anti-seizure medications as ordered  - Monitor neurological status  Outcome: Progressing     Problem: Safety Risk - Non-Violent Restraints  Goal: Patient will remain free from self-harm  Description: INTERVENTIONS:  - Apply the least restrictive restraint to prevent harm  - Notify patient and family of reasons restraints applied  - Assess for any contributing factors  to confusion (electrolyte disturbances, delirium, medications)  - Discontinue any unnecessary medical devices as soon as possible  - Assess the patient's physical comfort, circulation, skin condition, hydration, nutrition and elimination needs   - Reorient and redirection as needed  - Assess for the need to continue restraints  Outcome: Not Progressing
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
## 2024-06-22 NOTE — PLAN OF CARE
Pt calm and cooperative overnight. Went for CT head scan, see results. No acute events. VSS. Pt refusing AM labs this morning, phlebotomy to re-attempt later.  Updated mom on pt care. Hourly nursing rounds made. Yamilex coronado maintained for pt safety.     Problem: Patient Centered Care  Goal: Patient preferences are identified and integrated in the patient's plan of care  Description: Interventions:  - What would you like us to know as we care for you?   - Provide timely, complete, and accurate information to patient/family  - Incorporate patient and family knowledge, values, beliefs, and cultural backgrounds into the planning and delivery of care  - Encourage patient/family to participate in care and decision-making at the level they choose  - Honor patient and family perspectives and choices  Outcome: Progressing    Problem: CARDIOVASCULAR - ADULT  Goal: Maintains optimal cardiac output and hemodynamic stability  Description: INTERVENTIONS:  - Monitor vital signs, rhythm, and trends  - Monitor for bleeding, hypotension and signs of decreased cardiac output  - Evaluate effectiveness of vasoactive medications to optimize hemodynamic stability  - Monitor arterial and/or venous puncture sites for bleeding and/or hematoma  - Assess quality of pulses, skin color and temperature  - Assess for signs of decreased coronary artery perfusion - ex. Angina  - Evaluate fluid balance, assess for edema, trend weights  Outcome: Progressing  Goal: Absence of cardiac arrhythmias or at baseline  Description: INTERVENTIONS:  - Continuous cardiac monitoring, monitor vital signs, obtain 12 lead EKG if indicated  - Evaluate effectiveness of antiarrhythmic and heart rate control medications as ordered  - Initiate emergency measures for life threatening arrhythmias  - Monitor electrolytes and administer replacement therapy as ordered  Outcome: Progressing     Problem: RESPIRATORY - ADULT  Goal: Achieves optimal ventilation and  oxygenation  Description: INTERVENTIONS:  - Assess for changes in respiratory status  - Assess for changes in mentation and behavior  - Position to facilitate oxygenation and minimize respiratory effort  - Oxygen supplementation based on oxygen saturation or ABGs  - Provide Smoking Cessation handout, if applicable  - Encourage broncho-pulmonary hygiene including cough, deep breathe, Incentive Spirometry  - Assess the need for suctioning and perform as needed  - Assess and instruct to report SOB or any respiratory difficulty  - Respiratory Therapy support as indicated  - Manage/alleviate anxiety  - Monitor for signs/symptoms of CO2 retention  Outcome: Progressing     Problem: NEUROLOGICAL - ADULT  Goal: Achieves stable or improved neurological status  Description: INTERVENTIONS  - Assess for and report changes in neurological status  - Initiate measures to prevent increased intracranial pressure  - Maintain blood pressure and fluid volume within ordered parameters to optimize cerebral perfusion and minimize risk of hemorrhage  - Monitor temperature, glucose, and sodium. Initiate appropriate interventions as ordered  Outcome: Progressing  Goal: Absence of seizures  Description: INTERVENTIONS  - Monitor for seizure activity  - Administer anti-seizure medications as ordered  - Monitor neurological status  Outcome: Progressing  Goal: Remains free of injury related to seizure activity  Description: INTERVENTIONS:  - Maintain airway, patient safety  and administer oxygen as ordered  - Monitor patient for seizure activity, document and report duration and description of seizure to MD/LIP  - If seizure occurs, turn patient to side and suction secretions as needed  - Reorient patient post seizure  - Seizure pads on all 4 side rails  - Instruct patient/family to notify RN of any seizure activity  - Instruct patient/family to call for assistance with activity based on assessment  Outcome: Progressing     Problem:  Delirium  Goal: Minimize duration of delirium  Description: Interventions:  - Encourage use of hearing aids, eye glasses  - Promote highest level of mobility daily  - Provide frequent reorientation  - Promote wakefulness i.e. lights on, blinds open  - Promote sleep, encourage patient's normal rest cycle i.e. lights off, TV off, minimize noise and interruptions  - Encourage family to assist in orientation and promotion of home routines  Outcome: Progressing     Problem: Safety Risk - Non-Violent Restraints  Goal: Patient will remain free from self-harm  Description: INTERVENTIONS:  - Apply the least restrictive restraint to prevent harm  - Notify patient and family of reasons restraints applied  - Assess for any contributing factors to confusion (electrolyte disturbances, delirium, medications)  - Discontinue any unnecessary medical devices as soon as possible  - Assess the patient's physical comfort, circulation, skin condition, hydration, nutrition and elimination needs   - Reorient and redirection as needed  - Assess for the need to continue restraints  Outcome: Progressing

## 2024-06-22 NOTE — PROGRESS NOTES
[facility]  part of [facility]     Progress Note    Latonia Barker Patient Status:  Inpatient    1/3/1995 MRN [id number]   Location [facility] 2W/SW Attending Raheem Monet MD   Hosp Day # 13 PCP Agatha Mcmanus MD       Subjective:   Patient seen and examined.  States she wants to go home soon.  Admits to some chest wall discomfort.    Objective:   Blood pressure 109/67, pulse 117, temperature 98.1 °F (36.7 °C), temperature source Temporal, resp. rate (!) 31, weight 165 lb 12.6 oz (75.2 kg), last menstrual period 2024, SpO2 96%, not currently breastfeeding.  Intake/Output:   Last 3 shifts: I/O last 3 completed shifts:  In: 904.8 [P.O.:75; IV PIGGYBACK:829.8]  Out:  [Urine:]   This shift: I/O this shift:  In: 100 [P.O.:100]  Out: -      Vent Settings:      Hemodynamic parameters (last 24 hours):      Scheduled Meds:   Current Facility-Administered Medications   Medication Dose Route Frequency    sennosides (Senokot) tab 8.6 mg  8.6 mg Oral BID    folic acid (Folvite) tab 1 mg  1 mg Oral Daily    haloperidol lactate (Haldol) 5 MG/ML injection 5 mg  5 mg Intramuscular Q6H PRN    lurasidone (Latuda) tab 40 mg  40 mg Oral Daily with breakfast    LORazepam (Ativan) 2 mg/mL injection 0.5 mg  0.5 mg Intravenous 4 times per day    meropenem (Merrem) 500 mg in sodium chloride 0.9% 100 mL IVPB-MBP  500 mg Intravenous Q8H    LORazepam (Ativan) 2 mg/mL injection 2 mg  2 mg Intramuscular Q6H PRN    QUEtiapine (SEROquel) tab 300 mg  300 mg Per NG Tube TID    methadone solution (DOLOPHINE) 10 mg/5mL  20 mg Oral Q8H    diphenhydrAMINE (Benadryl) 12.5 MG/5ML oral liquid 25 mg  25 mg Oral TID    docusate (Colace) 50 MG/5ML oral solution 100 mg  100 mg Per NG Tube BID    acetaminophen (Tylenol) 160 MG/5ML oral liquid 650 mg  650 mg Oral Q4H PRN    Or    acetaminophen (Tylenol) rectal suppository 650 mg  650 mg Rectal Q4H PRN    Or    acetaminophen (Ofirmev) 10 mg/mL infusion premix 1,000  mg  1,000 mg Intravenous Q6H PRN    polyethylene glycol (PEG 3350) (Miralax) 17 g oral packet 17 g  17 g Oral Daily PRN    senna (Senokot) 8.8 MG/5ML oral syrup 17.6 mg  10 mL Oral Nightly PRN    bisacodyl (Dulcolax) 10 MG rectal suppository 10 mg  10 mg Rectal Daily PRN    fleet enema (Fleet) 7-19 GM/118ML rectal enema 133 mL  1 enema Rectal Once PRN    dextrose 10% infusion (TPN no rate)   Intravenous Continuous PRN    pancrelipase (Lip-Prot-Amyl) (Zenpep) DR particles cap 10,000 Units  10,000 Units Per G Tube PRN    And    sodium bicarbonate tab 325 mg  325 mg Oral PRN    famotidine (Pepcid) 20 mg/2mL injection 20 mg  20 mg Intravenous BID    ondansetron (Zofran) 4 MG/2ML injection 4 mg  4 mg Intravenous Q6H PRN    prochlorperazine (Compazine) 10 MG/2ML injection 5 mg  5 mg Intravenous Q8H PRN    acetaminophen (Tylenol) tab 650 mg  650 mg Oral Q6H PRN    melatonin tab 3 mg  3 mg Oral Nightly       Continuous Infusions:    dextrose 10%         Physical Exam  Constitutional: no acute distress  Eyes: PERRL  ENT: nares pateint  Neck: supple, no JVD  Cardio: RRR, S1 S2  Respiratory: clear to auscultation bilaterally, no wheezing, rales, rhonchi, crackles  GI: abdomen soft, non tender, active bowel sounds, no organomegaly  Extremities: no clubbing, cyanosis, edema  Neurologic: no gross motor deficits  Skin: warm, dry      Results:     Lab Results   Component Value Date    WBC 10.0 06/21/2024    HGB 9.2 06/21/2024    HCT 31.3 06/21/2024    .0 06/21/2024       CT BRAIN OR HEAD (48477)    Result Date: 6/21/2024  CONCLUSION:  1. Negative unenhanced CT brain.    Dictated by (CST): Daljit Diaz MD on 6/21/2024 at 9:57 PM     Finalized by (CST): Daljit Diaz MD on 6/21/2024 at 9:59 PM          XR CHEST AP PORTABLE  (CPT=71045)    Result Date: 6/21/2024  CONCLUSION:  1. Status post removal of the enteric tube and central venous catheter. 2. Worsening bilateral pulmonary opacities.  Small pleural effusions, left  larger than right, with mild worsening on the left.  These findings could relate to worsening of moderate CHF/fluid overload and/or a superimposed multifocal infectious/inflammatory process.    Dictated by (CST): William Jerome MD on 6/21/2024 at 7:20 AM     Finalized by (CST): William Jerome MD on 6/21/2024 at 7:22 AM                 Assessment   1.  Septic shock, improved  3.  Fusobacterium bacteremia  3.  Pulmonary edema  4.  Possible pneumonia  5.  Acute hypoxemic respiratory failure, improved  6.  History of IV drug abuse  7.  Anemia  8.  Bipolar disorder  9.  Prior VTE  10.  Pregnancy     Plan   -Patient presents with evidence of septic shock with Fusobacterium bacteremia.  Concern for possible pneumonia contributing factor  -Weaned off oxygen at this time.  Tolerating extubation on 6/19/2024  -Echo with no evidence of vegetation seen  -Antibiotics per ID recommendations  -Weaned off pressor support  -Hold off on further diuresis today  -History of IV drug abuse.  -Patient with pregnancy and evidence of subchorionic hemorrhage along inferior aspect of gestational sac hCG downtrending.  Further recommendations per OB  -Monitor anemia at this time  -Pain control  -DVT prophylaxis: SCDs  -Discussed with mother    Renetta Martinez, DO  Pulmonary Critical Care Medicine  [facility]

## 2024-06-22 NOTE — PROGRESS NOTES
[facility]  part of [facility]    Progress Note    Latonia Barker Patient Status:  Inpatient    1/3/1995 MRN [id number]   Location [facility] 2W/SW Attending Raheem Monet MD   Hosp Day # 13 PCP Agatha Mcmanus MD       Subjective:     Pt was violent with nurse yesterday and wanted to leave AMA.  Pt refusing to wear oxygen  despite o2 sat in 80s.  Notes some SOB.  Sleepy.    Objective:   Blood pressure 92/65, pulse 110, temperature 97.5 °F (36.4 °C), temperature source Temporal, resp. rate 25, weight 165 lb 12.6 oz (75.2 kg), last menstrual period 2024, SpO2 (!) 88%, not currently breastfeeding.    Gen:   NAD.  Sleepy.  CV:   RRR, no m/g/r  Pulm:   CTA bilat  Abd:   +bs, soft, NT, ND  LE:   No c/c/e  Neuro:   nonfocal    Results:     Lab Results   Component Value Date    WBC 10.0 2024    HGB 9.2 (L) 2024    HCT 31.3 (L) 2024    .0 2024    CREATSERUM 0.38 (L) 2024    BUN 9 2024     (L) 2024    K 4.0 2024     2024    CO2 21.0 2024    GLU 87 2024    CA 9.1 2024    ALB 2.7 (L) 2024    ALKPHO 146 (H) 2024    BILT 1.5 (H) 2024    TP 5.4 (L) 2024    AST 32 2024    ALT 32 2024    PTT 29.2 2020    INR 1.9 (A) 2020    T4F 0.73 2011    TSH 2.080 2019    LIP 18 (L) 2020    DDIMER 0.47 2022    CRP <0.5 2015    MG 1.7 2024    PHOS 4.2 2024    TROP <0.045 2020    CK 55 2017    ETOH <3 2024       CT BRAIN OR HEAD (43803)    Result Date: 2024  CONCLUSION:  1. Negative unenhanced CT brain.    Dictated by (CST): Daljit Diaz MD on 2024 at 9:57 PM     Finalized by (CST): Daljit Diaz MD on 2024 at 9:59 PM          XR CHEST AP PORTABLE  (CPT=71045)    Result Date: 2024  CONCLUSION:  1. Status post removal of the enteric tube and central venous catheter. 2. Worsening bilateral  pulmonary opacities.  Small pleural effusions, left larger than right, with mild worsening on the left.  These findings could relate to worsening of moderate CHF/fluid overload and/or a superimposed multifocal infectious/inflammatory process.    Dictated by (CST): William Jerome MD on 2024 at 7:20 AM     Finalized by (CST): William Jerome MD on 2024 at 7:22 AM               Assessment and Plan:     Acute septic shock due to multifocal pneumonia and Fusobacterium bacteremia  No longer septic.  Acute hypoxic respiratory failure  Extubated .  Currently on room air.  - ID, pulm/critical care on consult  - CXR and CT chest reviewed. No PE. 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  No lasix today.     H/o IVDA  - Reportedly on suboxone since March and doing well but had relapse 1 day PTA   Per mother pt was on 8.2 mg suboxone tid, ruby MONTOYA at [facility] recovery [phone]  - cont meds per psych  - pt on methadone  - wean sedating meds     Shock liver - due to sepsis  -Transaminitis: mild - resolved  - US liver shows HM and steatosis  - Avoid hepatotoxins     Pregnancy   -  10 Weeks 6 day viable IUP on US: 3.2 cm subchorionic hemorrhage along the inferior aspect of the gestational sac.   - HCG down trending  - OB on consult  - no vaginal bleed - cont monitor  - repeat US prior to discharge     Bicytopenia: plt normalized. Anemia persists - better today  - TCP possibly due to sepsis. improving  - Anemia possibly from acute blood loss related to 3.2 cm subchorionic hemorrhage?  - Hold off ac   - 6/10: 1 U prbc transfused  -  transfuse if hgb<7  - follow cbc     RAMON: mild - resolved  Likely due to shock  Off pressors  - follow bmp     Bipolar disorder  - psych consult (of note both mom and daughter vehemently refused in prior admission)  - zofran prn   - SW  - cont meds with adjustment per psych     Hx of chronic anticoagulation  H/o Recurrent venous thrombosis superficial and deep 2017  - (had followed in [facility]madin clinic in the past)  - per last hematology f/u note 2020 \"She completed more than 6 months of anticoagulation with warfarin for what we consider a provoked DVT and states she is no longer using IV drugs in the upper extremities.  She is now pregnant.  Would recommend stopping anticoagulation as long as she is able to avoid IV drug use \"  - avoid a/c due to subchorionic hemorrhage on US    Wanting to leave AMA.  If psychiatry deems pt is decisional then pt can leave AMA.  - cont sitter for now     dvt proph:    SCDs     Code status:    Full       MDM:    High Raheem Gordon MD  6/22/2024

## 2024-06-22 NOTE — PLAN OF CARE
Patient calm throughout shift. Refusing to wear oxygen despite satting mid 80s. Educated on risks of not wearing o2 and patient still refusing. Patient also refusing bloodwork after numerous attempts and education from phleb and RN. Dr Monet notified of refusals. Mom and grandma at bedside throughout shift and updated on plan of care. Safety sitter at bedside throughout shift also.     Problem: Patient Centered Care  Goal: Patient preferences are identified and integrated in the patient's plan of care  Description: Interventions:  - Provide timely, complete, and accurate information to patient/family  - Incorporate patient and family knowledge, values, beliefs, and cultural backgrounds into the planning and delivery of care  - Encourage patient/family to participate in care and decision-making at the level they choose  - Honor patient and family perspectives and choices  Outcome: Progressing     Problem: CARDIOVASCULAR - ADULT  Goal: Maintains optimal cardiac output and hemodynamic stability  Description: INTERVENTIONS:  - Monitor vital signs, rhythm, and trends  - Monitor for bleeding, hypotension and signs of decreased cardiac output  - Evaluate effectiveness of vasoactive medications to optimize hemodynamic stability  - Monitor arterial and/or venous puncture sites for bleeding and/or hematoma  - Assess quality of pulses, skin color and temperature  - Assess for signs of decreased coronary artery perfusion - ex. Angina  - Evaluate fluid balance, assess for edema, trend weights  Outcome: Progressing  Goal: Absence of cardiac arrhythmias or at baseline  Description: INTERVENTIONS:  - Continuous cardiac monitoring, monitor vital signs, obtain 12 lead EKG if indicated  - Evaluate effectiveness of antiarrhythmic and heart rate control medications as ordered  - Initiate emergency measures for life threatening arrhythmias  - Monitor electrolytes and administer replacement therapy as ordered  Outcome: Progressing      Problem: RESPIRATORY - ADULT  Goal: Achieves optimal ventilation and oxygenation  Description: INTERVENTIONS:  - Assess for changes in respiratory status  - Assess for changes in mentation and behavior  - Position to facilitate oxygenation and minimize respiratory effort  - Oxygen supplementation based on oxygen saturation or ABGs  - Provide Smoking Cessation handout, if applicable  - Encourage broncho-pulmonary hygiene including cough, deep breathe, Incentive Spirometry  - Assess the need for suctioning and perform as needed  - Assess and instruct to report SOB or any respiratory difficulty  - Respiratory Therapy support as indicated  - Manage/alleviate anxiety  - Monitor for signs/symptoms of CO2 retention  Outcome: Progressing     Problem: NEUROLOGICAL - ADULT  Goal: Achieves stable or improved neurological status  Description: INTERVENTIONS  - Assess for and report changes in neurological status  - Initiate measures to prevent increased intracranial pressure  - Maintain blood pressure and fluid volume within ordered parameters to optimize cerebral perfusion and minimize risk of hemorrhage  - Monitor temperature, glucose, and sodium. Initiate appropriate interventions as ordered  Outcome: Progressing  Goal: Absence of seizures  Description: INTERVENTIONS  - Monitor for seizure activity  - Administer anti-seizure medications as ordered  - Monitor neurological status  Outcome: Progressing  Goal: Remains free of injury related to seizure activity  Description: INTERVENTIONS:  - Maintain airway, patient safety  and administer oxygen as ordered  - Monitor patient for seizure activity, document and report duration and description of seizure to MD/LIP  - If seizure occurs, turn patient to side and suction secretions as needed  - Reorient patient post seizure  - Seizure pads on all 4 side rails  - Instruct patient/family to notify RN of any seizure activity  - Instruct patient/family to call for assistance with  activity based on assessment  Outcome: Progressing     Problem: Delirium  Goal: Minimize duration of delirium  Description: Interventions:  - Encourage use of hearing aids, eye glasses  - Promote highest level of mobility daily  - Provide frequent reorientation  - Promote wakefulness i.e. lights on, blinds open  - Promote sleep, encourage patient's normal rest cycle i.e. lights off, TV off, minimize noise and interruptions  - Encourage family to assist in orientation and promotion of home routines  Outcome: Progressing     Problem: Safety Risk - Non-Violent Restraints  Goal: Patient will remain free from self-harm  Description: INTERVENTIONS:  - Apply the least restrictive restraint to prevent harm  - Notify patient and family of reasons restraints applied  - Assess for any contributing factors to confusion (electrolyte disturbances, delirium, medications)  - Discontinue any unnecessary medical devices as soon as possible  - Assess the patient's physical comfort, circulation, skin condition, hydration, nutrition and elimination needs   - Reorient and redirection as needed  - Assess for the need to continue restraints  6/22/2024 1611 by Evi Boyer, RN  Outcome: Progressing  6/22/2024 0950 by Evi Boyer, RN  Outcome: Progressing

## 2024-06-23 PROCEDURE — 99233 SBSQ HOSP IP/OBS HIGH 50: CPT | Performed by: HOSPITALIST

## 2024-06-23 PROCEDURE — 99232 SBSQ HOSP IP/OBS MODERATE 35: CPT | Performed by: INTERNAL MEDICINE

## 2024-06-23 NOTE — PROGRESS NOTES
[facility]    Progress Note    Latonia Barker Patient Status:  Inpatient    1/3/1995 MRN [id number]   Location [facility] 2W/SW Attending Raheem Monet MD   Hosp Day # 14 PCP Agatha Mcmanus MD       Subjective:     Talking more.  Wants different consistency food.  Encouraged to walk more.  Wants to be discharged.    Objective:   Blood pressure (!) 105/94, pulse 106, temperature 98.2 °F (36.8 °C), temperature source Temporal, resp. rate 23, weight 165 lb 12.6 oz (75.2 kg), last menstrual period 2024, SpO2 90%, not currently breastfeeding.    Gen:   NAD.  A and O x 3  CV:   RRR, no m/g/r  Pulm:   CTA bilat  Abd:   +bs, soft, NT, ND  LE:   No c/c/e  Neuro:   nonfocal    Results:     Lab Results   Component Value Date    WBC 10.0 2024    HGB 9.2 (L) 2024    HCT 31.3 (L) 2024    .0 2024    CREATSERUM 0.38 (L) 2024    BUN 9 2024     (L) 2024    K 4.0 2024     2024    CO2 21.0 2024    GLU 87 2024    CA 9.1 2024    ALB 2.7 (L) 2024    ALKPHO 146 (H) 2024    BILT 1.5 (H) 2024    TP 5.4 (L) 2024    AST 32 2024    ALT 32 2024    PTT 29.2 2020    INR 1.9 (A) 2020    T4F 0.73 2011    TSH 2.080 2019    LIP 18 (L) 2020    DDIMER 0.47 2022    CRP <0.5 2015    MG 1.7 2024    PHOS 4.2 2024    TROP <0.045 2020    CK 55 2017    ETOH <3 2024       CT BRAIN OR HEAD (05917)    Result Date: 2024  CONCLUSION:  1. Negative unenhanced CT brain.    Dictated by (CST): Daljit Diaz MD on 2024 at 9:57 PM     Finalized by (CST): Daljit Diaz MD on 2024 at 9:59 PM               Assessment and Plan:     Acute septic shock due to multifocal pneumonia and Fusobacterium bacteremia  No longer septic.  Acute hypoxic respiratory failure - resolved  Extubated .  Currently on room  air.  - ID, pulm/critical care on consult  - CXR and CT chest reviewed. No PE. 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     H/o IVDA  - Reportedly on suboxone since March and doing well but had relapse 1 day PTA   Per mother pt was on 8.2 mg suboxone tid, ruby MONTOYA at [facility] recovery [phone]  - cont meds per psych  - pt on methadone  - wean sedating meds     Shock liver - due to sepsis  -Transaminitis: mild - resolved  - US liver shows HM and steatosis  - Avoid hepatotoxins     Pregnancy   -  10 Weeks 6 day viable IUP on US: 3.2 cm subchorionic hemorrhage along the inferior aspect of the gestational sac.   - HCG down trending  - OB on consult  - no vaginal bleed - cont monitor  - repeat US prior to discharge     Bicytopenia: plt normalized. Anemia persists - better today  - TCP possibly due to sepsis. improving  - Anemia possibly from acute blood loss related to 3.2 cm subchorionic hemorrhage?  - Hold off ac   - 6/10: 1 U prbc transfused  - transfuse if hgb<7  - follow cbc     RAMON: mild - resolved  Likely due to shock  Off pressors     Bipolar disorder  - psych consult (of note both mom and daughter vehemently refused in prior admission)  - zofran prn   - SW  - cont meds with adjustment per psych     Hx of chronic anticoagulation  H/o Recurrent venous thrombosis superficial and deep   - (had followed in [facility] Coumadin clinic in the past)  - per last hematology f/u note  \"She completed more than 6 months of anticoagulation with warfarin for what we consider a provoked DVT and states she is no longer using IV drugs in the  upper extremities.  She is now pregnant.  Would recommend stopping anticoagulation as long as she is able to avoid IV drug use \"  - avoid a/c due to subchorionic hemorrhage on US     DC planning.     dvt proph:    SCDs     Code status:    Full       MDM:    High Raheem Gordon MD  6/23/2024

## 2024-06-23 NOTE — PLAN OF CARE
Pt posey vest restraint maintained,  new order on chart. Sitter maintained in room. Prn haldol.  Sched ativan. Methadone as sched from pharmacy q 8 hrs. Pt mom was in after work this am. Pt was extremely agitated, she refused to be straight sophia on night shift.\ post bladder scan of 500.  2 assist up to bathroom. Chg bath given.  Teeth brushed. Much emotional support. Pt is now 1 assist up to bathroom..  pt is 13 weeks pregnant with history of multiple pregnancies.  Recent detox from rehab center.  Freq checks to ensure pt is clean and dry. She did not want female exter cathetor. Bed alarm on sitter at bedside.  Much emotinoal support to pt mom she came back after work to visit. She will be back in am.   Problem: Patient Centered Care  Goal: Patient preferences are identified and integrated in the patient's plan of care  Description: Interventions:  - What would you like us to know as we care for you?   - Provide timely, complete, and accurate information to patient/family  - Incorporate patient and family knowledge, values, beliefs, and cultural backgrounds into the planning and delivery of care  - Encourage patient/family to participate in care and decision-making at the level they choose  - Honor patient and family perspectives and choices  Outcome: Progressing     Problem: Patient/Family Goals  Goal: Patient/Family Long Term Goal  Description: Patient's Long Term Goal:    Interventions:    - See additional Care Plan goals for specific interventions  Outcome: Progressing  Goal: Patient/Family Short Term Goal  Description: Patient's Short Term Goal:    Interventions:     - See additional Care Plan goals for specific interventions  Outcome: Progressing     Problem: CARDIOVASCULAR - ADULT  Goal: Maintains optimal cardiac output and hemodynamic stability  Description: INTERVENTIONS:  - Monitor vital signs, rhythm, and trends  - Monitor for bleeding, hypotension and signs of decreased cardiac output  - Evaluate  effectiveness of vasoactive medications to optimize hemodynamic stability  - Monitor arterial and/or venous puncture sites for bleeding and/or hematoma  - Assess quality of pulses, skin color and temperature  - Assess for signs of decreased coronary artery perfusion - ex. Angina  - Evaluate fluid balance, assess for edema, trend weights  Outcome: Progressing  Goal: Absence of cardiac arrhythmias or at baseline  Description: INTERVENTIONS:  - Continuous cardiac monitoring, monitor vital signs, obtain 12 lead EKG if indicated  - Evaluate effectiveness of antiarrhythmic and heart rate control medications as ordered  - Initiate emergency measures for life threatening arrhythmias  - Monitor electrolytes and administer replacement therapy as ordered  Outcome: Progressing     Problem: RESPIRATORY - ADULT  Goal: Achieves optimal ventilation and oxygenation  Description: INTERVENTIONS:  - Assess for changes in respiratory status  - Assess for changes in mentation and behavior  - Position to facilitate oxygenation and minimize respiratory effort  - Oxygen supplementation based on oxygen saturation or ABGs  - Provide Smoking Cessation handout, if applicable  - Encourage broncho-pulmonary hygiene including cough, deep breathe, Incentive Spirometry  - Assess the need for suctioning and perform as needed  - Assess and instruct to report SOB or any respiratory difficulty  - Respiratory Therapy support as indicated  - Manage/alleviate anxiety  - Monitor for signs/symptoms of CO2 retention  Outcome: Progressing     Problem: NEUROLOGICAL - ADULT  Goal: Achieves stable or improved neurological status  Description: INTERVENTIONS  - Assess for and report changes in neurological status  - Initiate measures to prevent increased intracranial pressure  - Maintain blood pressure and fluid volume within ordered parameters to optimize cerebral perfusion and minimize risk of hemorrhage  - Monitor temperature, glucose, and sodium. Initiate  appropriate interventions as ordered  Outcome: Progressing  Goal: Absence of seizures  Description: INTERVENTIONS  - Monitor for seizure activity  - Administer anti-seizure medications as ordered  - Monitor neurological status  Outcome: Progressing  Goal: Remains free of injury related to seizure activity  Description: INTERVENTIONS:  - Maintain airway, patient safety  and administer oxygen as ordered  - Monitor patient for seizure activity, document and report duration and description of seizure to MD/LIP  - If seizure occurs, turn patient to side and suction secretions as needed  - Reorient patient post seizure  - Seizure pads on all 4 side rails  - Instruct patient/family to notify RN of any seizure activity  - Instruct patient/family to call for assistance with activity based on assessment  Outcome: Progressing     Problem: Delirium  Goal: Minimize duration of delirium  Description: Interventions:  - Encourage use of hearing aids, eye glasses  - Promote highest level of mobility daily  - Provide frequent reorientation  - Promote wakefulness i.e. lights on, blinds open  - Promote sleep, encourage patient's normal rest cycle i.e. lights off, TV off, minimize noise and interruptions  - Encourage family to assist in orientation and promotion of home routines  Outcome: Progressing     Problem: Safety Risk - Non-Violent Restraints  Goal: Patient will remain free from self-harm  Description: INTERVENTIONS:  - Apply the least restrictive restraint to prevent harm  - Notify patient and family of reasons restraints applied  - Assess for any contributing factors to confusion (electrolyte disturbances, delirium, medications)  - Discontinue any unnecessary medical devices as soon as possible  - Assess the patient's physical comfort, circulation, skin condition, hydration, nutrition and elimination needs   - Reorient and redirection as needed  - Assess for the need to continue restraints  Outcome: Progressing

## 2024-06-23 NOTE — PROGRESS NOTES
[facility]  part of [facility]     Progress Note    Latonia Barker Patient Status:  Inpatient    1/3/1995 MRN [id number]   Location [facility] 2W/SW Attending Raheem Monet MD   Hosp Day # 14 PCP Agatha Mcmanus MD       Subjective:   Patient seen and examined.  Denies dyspnea or pain.  Anxious.    Objective:   Blood pressure 104/68, pulse 101, temperature 97.7 °F (36.5 °C), temperature source Temporal, resp. rate 19, weight 165 lb 12.6 oz (75.2 kg), last menstrual period 2024, SpO2 90%, not currently breastfeeding.  Intake/Output:   Last 3 shifts: I/O last 3 completed shifts:  In: 1834.8 [P.O.:905; IV PIGGYBACK:929.8]  Out: 650 [Urine:650]   This shift: No intake/output data recorded.     Vent Settings:      Hemodynamic parameters (last 24 hours):      Scheduled Meds:   Current Facility-Administered Medications   Medication Dose Route Frequency    sennosides (Senokot) tab 8.6 mg  8.6 mg Oral BID    folic acid (Folvite) tab 1 mg  1 mg Oral Daily    haloperidol lactate (Haldol) 5 MG/ML injection 5 mg  5 mg Intramuscular Q6H PRN    lurasidone (Latuda) tab 40 mg  40 mg Oral Daily with breakfast    LORazepam (Ativan) 2 mg/mL injection 0.5 mg  0.5 mg Intravenous 4 times per day    meropenem (Merrem) 500 mg in sodium chloride 0.9% 100 mL IVPB-MBP  500 mg Intravenous Q8H    LORazepam (Ativan) 2 mg/mL injection 2 mg  2 mg Intramuscular Q6H PRN    QUEtiapine (SEROquel) tab 300 mg  300 mg Per NG Tube TID    methadone solution (DOLOPHINE) 10 mg/5mL  20 mg Oral Q8H    diphenhydrAMINE (Benadryl) 12.5 MG/5ML oral liquid 25 mg  25 mg Oral TID    docusate (Colace) 50 MG/5ML oral solution 100 mg  100 mg Per NG Tube BID    acetaminophen (Tylenol) 160 MG/5ML oral liquid 650 mg  650 mg Oral Q4H PRN    Or    acetaminophen (Tylenol) rectal suppository 650 mg  650 mg Rectal Q4H PRN    Or    acetaminophen (Ofirmev) 10 mg/mL infusion premix 1,000 mg  1,000 mg Intravenous Q6H PRN    polyethylene glycol  (PEG 3350) (Miralax) 17 g oral packet 17 g  17 g Oral Daily PRN    senna (Senokot) 8.8 MG/5ML oral syrup 17.6 mg  10 mL Oral Nightly PRN    bisacodyl (Dulcolax) 10 MG rectal suppository 10 mg  10 mg Rectal Daily PRN    fleet enema (Fleet) 7-19 GM/118ML rectal enema 133 mL  1 enema Rectal Once PRN    dextrose 10% infusion (TPN no rate)   Intravenous Continuous PRN    pancrelipase (Lip-Prot-Amyl) (Zenpep) DR particles cap 10,000 Units  10,000 Units Per G Tube PRN    And    sodium bicarbonate tab 325 mg  325 mg Oral PRN    famotidine (Pepcid) 20 mg/2mL injection 20 mg  20 mg Intravenous BID    ondansetron (Zofran) 4 MG/2ML injection 4 mg  4 mg Intravenous Q6H PRN    prochlorperazine (Compazine) 10 MG/2ML injection 5 mg  5 mg Intravenous Q8H PRN    acetaminophen (Tylenol) tab 650 mg  650 mg Oral Q6H PRN    melatonin tab 3 mg  3 mg Oral Nightly       Continuous Infusions:    dextrose 10%         Physical Exam  Constitutional: no acute distress  Eyes: PERRL  ENT: nares pateint  Neck: supple, no JVD  Cardio: RRR, S1 S2  Respiratory: clear to auscultation bilaterally, no wheezing, rales, rhonchi, crackles  GI: abdomen soft, non tender, active bowel sounds, no organomegaly  Extremities: no clubbing, cyanosis, edema  Neurologic: no gross motor deficits  Skin: warm, dry      Results:            CT BRAIN OR HEAD (22402)    Result Date: 6/21/2024  CONCLUSION:  1. Negative unenhanced CT brain.    Dictated by (CST): Daljit Diaz MD on 6/21/2024 at 9:57 PM     Finalized by (CST): Daljit Diaz MD on 6/21/2024 at 9:59 PM                 Assessment   1.  Septic shock, improved  3.  Fusobacterium bacteremia  3.  Pulmonary edema  4.  Possible pneumonia  5.  Acute hypoxemic respiratory failure, improved  6.  History of IV drug abuse  7.  Anemia  8.  Bipolar disorder  9.  Prior VTE  10.  Pregnancy     Plan   -Patient presents with evidence of septic shock with Fusobacterium bacteremia.  Concern for possible pneumonia contributing  factor  -Weaned off oxygen at this time.  Tolerating extubation on 6/19/2024  -Echo with no evidence of vegetation seen  -Antibiotics per ID recommendations  -Weaned off pressor support  -Hold off on further diuresis today  -History of IV drug abuse.  -Patient with pregnancy and evidence of subchorionic hemorrhage along inferior aspect of gestational sac hCG downtrending.  Further recommendations per OB  -Monitor anemia at this time  -Pain control  -DVT prophylaxis: SCDs  -Discussed with mother    Renetta Martinez DO  Pulmonary Critical Care Medicine  [facility]

## 2024-06-23 NOTE — PLAN OF CARE
Pt wanted to leave AMA, Dr. Martinez notified and spoke with pt. Mom emphasizes that she does not want pt to leave AMA despite psych stating that pt is decisional. Notified psych. Pt's mom came to see pt and pt eventually agreeable with mom about staying in the hospital. Pt has been calm and cooperative since. O2 saturation mid 80s, still refusing nasal cannula. Bladder scan x1. Refused straight cath and states \"try again when I'm awake.\" Hourly nursing rounds made. Yamilex vest maintained. Safety sitter with pt.     Problem: Patient Centered Care  Goal: Patient preferences are identified and integrated in the patient's plan of care  Description: Interventions:  - What would you like us to know as we care for you?   - Provide timely, complete, and accurate information to patient/family  - Incorporate patient and family knowledge, values, beliefs, and cultural backgrounds into the planning and delivery of care  - Encourage patient/family to participate in care and decision-making at the level they choose  - Honor patient and family perspectives and choices  Outcome: Progressing     Problem: CARDIOVASCULAR - ADULT  Goal: Maintains optimal cardiac output and hemodynamic stability  Description: INTERVENTIONS:  - Monitor vital signs, rhythm, and trends  - Monitor for bleeding, hypotension and signs of decreased cardiac output  - Evaluate effectiveness of vasoactive medications to optimize hemodynamic stability  - Monitor arterial and/or venous puncture sites for bleeding and/or hematoma  - Assess quality of pulses, skin color and temperature  - Assess for signs of decreased coronary artery perfusion - ex. Angina  - Evaluate fluid balance, assess for edema, trend weights  Outcome: Progressing  Goal: Absence of cardiac arrhythmias or at baseline  Description: INTERVENTIONS:  - Continuous cardiac monitoring, monitor vital signs, obtain 12 lead EKG if indicated  - Evaluate effectiveness of antiarrhythmic and heart rate control  medications as ordered  - Initiate emergency measures for life threatening arrhythmias  - Monitor electrolytes and administer replacement therapy as ordered  Outcome: Progressing     Problem: RESPIRATORY - ADULT  Goal: Achieves optimal ventilation and oxygenation  Description: INTERVENTIONS:  - Assess for changes in respiratory status  - Assess for changes in mentation and behavior  - Position to facilitate oxygenation and minimize respiratory effort  - Oxygen supplementation based on oxygen saturation or ABGs  - Provide Smoking Cessation handout, if applicable  - Encourage broncho-pulmonary hygiene including cough, deep breathe, Incentive Spirometry  - Assess the need for suctioning and perform as needed  - Assess and instruct to report SOB or any respiratory difficulty  - Respiratory Therapy support as indicated  - Manage/alleviate anxiety  - Monitor for signs/symptoms of CO2 retention  Outcome: Progressing     Problem: NEUROLOGICAL - ADULT  Goal: Achieves stable or improved neurological status  Description: INTERVENTIONS  - Assess for and report changes in neurological status  - Initiate measures to prevent increased intracranial pressure  - Maintain blood pressure and fluid volume within ordered parameters to optimize cerebral perfusion and minimize risk of hemorrhage  - Monitor temperature, glucose, and sodium. Initiate appropriate interventions as ordered  Outcome: Progressing  Goal: Absence of seizures  Description: INTERVENTIONS  - Monitor for seizure activity  - Administer anti-seizure medications as ordered  - Monitor neurological status  Outcome: Progressing  Goal: Remains free of injury related to seizure activity  Description: INTERVENTIONS:  - Maintain airway, patient safety  and administer oxygen as ordered  - Monitor patient for seizure activity, document and report duration and description of seizure to MD/LIP  - If seizure occurs, turn patient to side and suction secretions as needed  - Reorient  patient post seizure  - Seizure pads on all 4 side rails  - Instruct patient/family to notify RN of any seizure activity  - Instruct patient/family to call for assistance with activity based on assessment  Outcome: Progressing     Problem: Delirium  Goal: Minimize duration of delirium  Description: Interventions:  - Encourage use of hearing aids, eye glasses  - Promote highest level of mobility daily  - Provide frequent reorientation  - Promote wakefulness i.e. lights on, blinds open  - Promote sleep, encourage patient's normal rest cycle i.e. lights off, TV off, minimize noise and interruptions  - Encourage family to assist in orientation and promotion of home routines  Outcome: Progressing     Problem: Safety Risk - Non-Violent Restraints  Goal: Patient will remain free from self-harm  Description: INTERVENTIONS:  - Apply the least restrictive restraint to prevent harm  - Notify patient and family of reasons restraints applied  - Assess for any contributing factors to confusion (electrolyte disturbances, delirium, medications)  - Discontinue any unnecessary medical devices as soon as possible  - Assess the patient's physical comfort, circulation, skin condition, hydration, nutrition and elimination needs   - Reorient and redirection as needed  - Assess for the need to continue restraints  Outcome: Progressing

## 2024-06-24 ENCOUNTER — APPOINTMENT (OUTPATIENT)
Dept: ULTRASOUND IMAGING | Facility: HOSPITAL | Age: 29
DRG: 831 | End: 2024-06-24
Attending: HOSPITALIST

## 2024-06-24 VITALS
RESPIRATION RATE: 16 BRPM | TEMPERATURE: 98 F | SYSTOLIC BLOOD PRESSURE: 92 MMHG | WEIGHT: 154.31 LBS | OXYGEN SATURATION: 90 % | DIASTOLIC BLOOD PRESSURE: 63 MMHG | BODY MASS INDEX: 28 KG/M2 | HEART RATE: 84 BPM

## 2024-06-24 LAB
ANION GAP SERPL CALC-SCNC: 10 MMOL/L (ref 0–18)
BASOPHILS # BLD AUTO: 0.1 X10(3) UL (ref 0–0.2)
BASOPHILS NFR BLD AUTO: 1.2 %
BUN BLD-MCNC: 7 MG/DL (ref 9–23)
BUN/CREAT SERPL: 15.9 (ref 10–20)
CALCIUM BLD-MCNC: 8.8 MG/DL (ref 8.7–10.4)
CHLORIDE SERPL-SCNC: 102 MMOL/L (ref 98–112)
CO2 SERPL-SCNC: 22 MMOL/L (ref 21–32)
CREAT BLD-MCNC: 0.44 MG/DL
DEPRECATED RDW RBC AUTO: 45.4 FL (ref 35.1–46.3)
EGFRCR SERPLBLD CKD-EPI 2021: 136 ML/MIN/1.73M2 (ref 60–?)
EOSINOPHIL # BLD AUTO: 0.12 X10(3) UL (ref 0–0.7)
EOSINOPHIL NFR BLD AUTO: 1.4 %
ERYTHROCYTE [DISTWIDTH] IN BLOOD BY AUTOMATED COUNT: 16.3 % (ref 11–15)
GLUCOSE BLD-MCNC: 101 MG/DL (ref 70–99)
HCT VFR BLD AUTO: 29.4 %
HGB BLD-MCNC: 9.1 G/DL
IMM GRANULOCYTES # BLD AUTO: 0.12 X10(3) UL (ref 0–1)
IMM GRANULOCYTES NFR BLD: 1.4 %
LYMPHOCYTES # BLD AUTO: 1.74 X10(3) UL (ref 1–4)
LYMPHOCYTES NFR BLD AUTO: 20.6 %
MCH RBC QN AUTO: 24.4 PG (ref 26–34)
MCHC RBC AUTO-ENTMCNC: 31 G/DL (ref 31–37)
MCV RBC AUTO: 78.8 FL
MONOCYTES # BLD AUTO: 0.52 X10(3) UL (ref 0.1–1)
MONOCYTES NFR BLD AUTO: 6.2 %
NEUTROPHILS # BLD AUTO: 5.85 X10 (3) UL (ref 1.5–7.7)
NEUTROPHILS # BLD AUTO: 5.85 X10(3) UL (ref 1.5–7.7)
NEUTROPHILS NFR BLD AUTO: 69.2 %
OSMOLALITY SERPL CALC.SUM OF ELEC: 276 MOSM/KG (ref 275–295)
PLATELET # BLD AUTO: 440 10(3)UL (ref 150–450)
POTASSIUM SERPL-SCNC: 3.9 MMOL/L (ref 3.5–5.1)
RBC # BLD AUTO: 3.73 X10(6)UL
SODIUM SERPL-SCNC: 134 MMOL/L (ref 136–145)
WBC # BLD AUTO: 8.5 X10(3) UL (ref 4–11)

## 2024-06-24 PROCEDURE — 99239 HOSP IP/OBS DSCHRG MGMT >30: CPT | Performed by: HOSPITALIST

## 2024-06-24 PROCEDURE — 99233 SBSQ HOSP IP/OBS HIGH 50: CPT | Performed by: OTHER

## 2024-06-24 PROCEDURE — 99233 SBSQ HOSP IP/OBS HIGH 50: CPT | Performed by: INTERNAL MEDICINE

## 2024-06-24 PROCEDURE — 76815 OB US LIMITED FETUS(S): CPT | Performed by: HOSPITALIST

## 2024-06-24 RX ORDER — LURASIDONE HYDROCHLORIDE 40 MG/1
40 TABLET, FILM COATED ORAL
Qty: 30 TABLET | Refills: 1 | Status: SHIPPED | OUTPATIENT
Start: 2024-06-25

## 2024-06-24 RX ORDER — CLONIDINE 0.2 MG/24H
1 PATCH, EXTENDED RELEASE TRANSDERMAL WEEKLY
Status: DISCONTINUED | OUTPATIENT
Start: 2024-06-24 | End: 2024-06-24

## 2024-06-24 RX ORDER — QUETIAPINE FUMARATE 300 MG/1
300 TABLET, FILM COATED ORAL 3 TIMES DAILY
Qty: 90 TABLET | Refills: 1 | Status: SHIPPED | OUTPATIENT
Start: 2024-06-24

## 2024-06-24 RX ORDER — METHADONE HYDROCHLORIDE 10 MG/5ML
20 SOLUTION ORAL 2 TIMES DAILY
Qty: 200 ML | Refills: 0 | Status: SHIPPED | OUTPATIENT
Start: 2024-06-24 | End: 2024-06-24

## 2024-06-24 RX ORDER — METHADONE HYDROCHLORIDE 10 MG/5ML
20 SOLUTION ORAL 2 TIMES DAILY
Status: DISCONTINUED | OUTPATIENT
Start: 2024-06-24 | End: 2024-06-24

## 2024-06-24 NOTE — PROGRESS NOTES
[facility] met with patient at bedside to discuss discharge plan. Patient reports she has had an IOP start date prior to being hospitalized at [place].     Patient contacted [facility] in [place] to schedule an intake appt for PHP and subutex/medication management. Patient has an appt scheduled for tomorrow, 6/25/2024 @ 0800am. Pt to bring her insurance card, ID, and will complete urine drug screen on arrival.     Safety Plan completed with pt. Printed and provided pt with copy.    Pt provided with contact information and address for [facility].     Pt agreeable to plan and had no further questions or concerns.    Justina TURCIOS, MARGAUX  [id number]

## 2024-06-24 NOTE — OCCUPATIONAL THERAPY NOTE
OCCUPATIONAL THERAPY TREATMENT NOTE - INPATIENT        Room Number: 217/217-A  Presenting Problem: Septic shock- pt is pregnant    Problem List  Principal Problem:    Septic shock (HCC)  Active Problems:    Opiate dependence, continuous (HCC)    Pneumonia of left lower lobe due to infectious organism    Subchorionic hematoma in first trimester, single or unspecified fetus (HCC)    Opiate use    Opiate withdrawal (HCC)    Moderate mixed bipolar I disorder (HCC)      OCCUPATIONAL THERAPY ASSESSMENT   Patient demonstrates good  progress this session, goals remain in progress.    Patient continues to function below baseline with adls and functional mobility.   Contributing factors to remaining limitations include decreased functional strength, impaired dynamic balance balance, decreased insight to deficits, and decreased safety awareness,lethargy  Next session anticipate patient to progress toileting, bathing, upper body dressing, lower body dressing, and grooming.  Occupational Therapy will continue to follow patient for duration of hospitalization.    Patient continues to benefit from continued skilled OT services. Discharge recommendation at this time is undetermined pending medical status    PLAN  OT Treatment Plan: Balance activities;Functional transfer training;ADL training;Endurance training;Patient/Family education;Patient/Family training  OT Device Recommendations: TBD    SUBJECTIVE  \"Am I going home?\"    OBJECTIVE  Precautions: Bed/chair alarm (sitter)    PAIN ASSESSMENT  Ratin    ACTIVITY TOLERANCE  Good  /69 supine;102/76 w/ activity      O2 SATURATIONS  93% w/ Activity on room air    ACTIVITIES OF DAILY LIVING ASSESSMENT  AM-PAC ‘6-Clicks’ Inpatient Daily Activity Short Form  How much help from another person does the patient currently need…  -   Putting on and taking off regular lower body clothing?: A Little  -   Bathing (including washing, rinsing, drying)?: A Lot  -   Toileting, which  includes using toilet, bedpan or urinal? : A Little  -   Putting on and taking off regular upper body clothing?: A Little  -   Taking care of personal grooming such as brushing teeth?: A Little  -   Eating meals?: A Little    AM-PAC Score:  Score: 17  Approx Degree of Impairment: 50.11%  Standardized Score (AM-PAC Scale): 37.26  CMS Modifier (G-Code): CK    FUNCTIONAL ADL ASSESSMENT  Eating: setup assist  Grooming: min assist  UB Dressing: min assist  LB Dressing: min assist  Toileting: na    Skilled Therapy Provided: RN contacted prior to start of care. Treatment coordinated w/ PT. Pt agreeable to participation in therapy. Pt received in bed asleep but easily aroused to name;sitter at bedside.  Pt currently requires close supervision to transfer supine<>sit at eob. Pt performing sit<>stand transfers w/ cga. Pt ambulating in the [place] w/o ad and min a. Pt unsteady during ambulating and reaching out for counters for support. No significant lob noted w/ dynamic activity. Pt requiring increased time to process and initiate response or follow commands. Speech at times difficult to understand    At end of session pt returned to bed and left w/ all needs in reach and alarm on;sitter at bedside. RN aware of pt's status and performance in therapy      EDUCATION PROVIDED  Patient: Plan of Care; Functional Transfer Techniques; Fall Prevention  Patient's Response to Education: Requires Further Education    The patient's Approx Degree of Impairment: 50.11% has been calculated based on documentation in the [facility] '6 clicks' Inpatient Daily Activity Short Form.  Research supports that patients with this level of impairment may benefit from IRF.  Final disposition will be made by interdisciplinary medical team.    Patient End of Session: In bed;Needs met;Call light within reach;RN aware of session/findings;All patient questions and concerns addressed (sitter at bedside)    OT Goals:   Patient will complete LE dressing with SBA   Comment: pt required min a    Patient will complete toilet transfer with SBA  Comment: pt required min a    Patient will complete self care task at sink level with SBA   Comment:na             Goals  on: 7/15  Frequency:3-5x week     Therapeutic Activity: 15 minutes

## 2024-06-24 NOTE — PHYSICAL THERAPY NOTE
PHYSICAL THERAPY TREATMENT NOTE - INPATIENT     Room Number: 217/217-A       Presenting Problem: septic shock  Co-Morbidities : IV drug abuse    Problem List  Principal Problem:    Septic shock (HCC)  Active Problems:    Opiate dependence, continuous (HCC)    Pneumonia of left lower lobe due to infectious organism    Subchorionic hematoma in first trimester, single or unspecified fetus (HCC)    Opiate use    Opiate withdrawal (HCC)    Moderate mixed bipolar I disorder (HCC)      PHYSICAL THERAPY ASSESSMENT   Patient demonstrates good  progress this session, goals  remain in progress.    Patient continues to function below baseline with bed mobility, transfers, gait, stair negotiation, standing prolonged periods, and performing household tasks.  Contributing factors to remaining limitations include decreased functional strength, impaired dynamic standing balance, impaired motor planning, cognitive deficits (delayed processing, safety awareness), and medical status.  Next session anticipate patient to progress bed mobility, transfers, gait, and standing prolonged periods.  Physical Therapy will continue to follow patient for duration of hospitalization.    Patient will benefit from continued skilled PT Services upon discharge however anticipate discharge needs remain undetermined as medical plan continues to be established and pt status changing daily.     PLAN  PT Treatment Plan: Bed mobility;Body mechanics;Endurance;Energy conservation;Patient education;Gait training;Strengthening;Stair training;Transfer training;Balance training  Frequency (Obs): 5x/week    SUBJECTIVE  Agreeable to activity.     OBJECTIVE  Precautions: Bed/chair alarm (sitter)    WEIGHT BEARING RESTRICTION  none    PAIN ASSESSMENT   Ratin    BALANCE  Static Sitting: Good  Dynamic Sitting: Fair  Static Standing: Fair -  Dynamic Standing: Poor +    ACTIVITY TOLERANCE  Pulse: 109  Heart Rate Source: Monitor  BP: 102/76  BP Location: Right arm  BP  Method: Automatic  Patient Position: Sitting     O2 WALK  Oxygen Therapy  SPO2% Ambulation on Room Air: 93    AM-PAC '6-Clicks' INPATIENT SHORT FORM - BASIC MOBILITY  How much difficulty does the patient currently have...  Patient Difficulty: Turning over in bed (including adjusting bedclothes, sheets and blankets)?: A Little   Patient Difficulty: Sitting down on and standing up from a chair with arms (e.g., wheelchair, bedside commode, etc.): A Little   Patient Difficulty: Moving from lying on back to sitting on the side of the bed?: A Little   How much help from another person does the patient currently need...   Help from Another: Moving to and from a bed to a chair (including a wheelchair)?: A Little   Help from Another: Need to walk in hospital room?: A Little   Help from Another: Climbing 3-5 steps with a railing?: A Lot     AM-PAC Score:  Raw Score: 17   Approx Degree of Impairment: 50.57%   Standardized Score (AM-PAC Scale): 42.13   CMS Modifier (G-Code): CK    FUNCTIONAL ABILITY STATUS  Functional Mobility/Gait Assessment  Gait Assistance: Minimum assistance  Distance (ft): 80  Assistive Device: None  Pattern: Shuffle (slow and very unsteady, unable to maintain straight path, frequent reaching for support in hallway for balance, no LOB, delayed processing with commands (i.e. \"turn to the right\"))  Supine to Sit: stand-by assist  Sit to Supine: contact guard assist  Sit to Stand: contact guard assist    Additional information: RN approved PT tx. Pt received resting in bed and agreeable to activity. Sitter at bedside. Introduced self and role, educated on goals for today. Pt pleasant and cooperative, continues to have delayed processing and slight mumbled/slurred speech but follows most commands. Supine>sit at EOB with SBA, STS transfer without assistive device and CGA, and hallway ambulation without assistive device and min A. Overall very unsteady gait but no overt LOB. Returned to room and was left resting  in bed with needs within reach, VSS, sitter present, handoff to RN complete. Overall improvement in level of assist compared to previous session.    The patient's Approx Degree of Impairment: 50.57% has been calculated based on documentation in the [facility] '6 clicks' Inpatient Daily Activity Short Form.  Research supports that patients with this level of impairment may benefit from LTAC however anticipate progression to be able to discharge home safely with additional support with medical stabilization/improvement; will continue to assess pt this admission and update anticipate discharge needs.  Final disposition will be made by interdisciplinary medical team.      Patient End of Session: In bed;Needs met;Call light within reach;RN aware of session/findings;With  staff;All patient questions and concerns addressed    CURRENT GOALS   Goals to be met by: 6/20/24  Patient Goal Patient's self-stated goal is: none stated   Goal #1 Patient is able to demonstrate supine - sit EOB @ level: supervision      Goal #1   Current Status  SBA   Goal #2 Patient is able to demonstrate transfers Sit to/from Stand at assistance level: supervision with  none or least restrictive assistive device      Goal #2  Current Status  CGA    Goal #3 Patient is able to ambulate 150 feet with assist device:  none or least restrictive assistive device  at assistance level: supervision   Goal #3   Current Status  80 ft without assistive device, min A    Goal #4 Patient will negotiate 4 stairs/one curb w/ assistive device and supervision   Goal #4   Current Status  NT   Goal #5 Patient to demonstrate independence with home activity/exercise instructions provided to patient in preparation for discharge.   Goal #5   Current Status  in progress   Goal #6     Goal #6  Current Status        Therapeutic Activity: 15 minutes

## 2024-06-24 NOTE — PAYOR COMM NOTE
--------------  CONTINUED STAY REVIEW----REQUESTING ADDITIONAL DAY 6/23      Payor: [facility]  Subscriber #:  [id number]  Authorization Number: [id number]    Admit date: 6/9/24  Admit time: 11:49 AM    Subjective:      Talking more.  Wants different consistency food.  Encouraged to walk more.  Wants to be discharged.     Objective:   Blood pressure (!) 105/94, pulse 106, temperature 98.2 °F (36.8 °C), temperature source Temporal, resp. rate 23, weight 165 lb 12.6 oz (75.2 kg), last menstrual period 03/25/2024, SpO2 90%, not currently breastfeeding.     Gen:   NAD.  A and O x 3  CV:   RRR, no m/g/r  Pulm:   CTA bilat  Abd:   +bs, soft, NT, ND  LE:   No c/c/e  Neuro:   nonfocal     Results:            Lab Results   Component Value Date     WBC 10.0 06/21/2024     HGB 9.2 (L) 06/21/2024     HCT 31.3 (L) 06/21/2024     .0 06/21/2024     CREATSERUM 0.38 (L) 06/21/2024     BUN 9 06/21/2024      (L) 06/21/2024     K 4.0 06/21/2024      06/21/2024     CO2 21.0 06/21/2024     GLU 87 06/21/2024     CA 9.1 06/21/2024     ALB 2.7 (L) 06/13/2024     ALKPHO 146 (H) 06/13/2024     BILT 1.5 (H) 06/13/2024     TP 5.4 (L) 06/13/2024     AST 32 06/13/2024     ALT 32 06/13/2024     PTT 29.2 05/30/2020     INR 1.9 (A) 08/12/2020     T4F 0.73 11/02/2011     TSH 2.080 02/13/2019     LIP 18 (L) 05/30/2020     DDIMER 0.47 02/28/2022     CRP <0.5 01/29/2015     MG 1.7 06/19/2024     PHOS 4.2 06/17/2024     TROP <0.045 05/30/2020     CK 55 03/29/2017     ETOH <3 06/09/2024         CT BRAIN OR HEAD (26889)     Result Date: 6/21/2024  CONCLUSION:         1. Negative unenhanced CT brain.    Dictated by (CST): Daljit Diaz MD on 6/21/2024 at 9:57 PM     Finalized by (CST): Daljit Diaz MD on 6/21/2024 at 9:59 PM                Assessment and Plan:      Acute septic shock due to multifocal pneumonia and Fusobacterium bacteremia  No longer septic.  Acute hypoxic respiratory failure -  resolved  Extubated .  Currently on room air.  - ID, pulm/critical care on consult  - CXR and CT chest reviewed. No PE. 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     H/o IVDA  - Reportedly on suboxone since March and doing well but had relapse 1 day PTA   Per mother pt was on 8.2 mg suboxone tid, ruby MONTOYA at [facility] recovery [phone]  - cont meds per psych  - pt on methadone  - wean sedating meds     Shock liver - due to sepsis  -Transaminitis: mild - resolved  - US liver shows HM and steatosis  - Avoid hepatotoxins     Pregnancy   -  10 Weeks 6 day viable IUP on US: 3.2 cm subchorionic hemorrhage along the inferior aspect of the gestational sac.   - HCG down trending  - OB on consult  - no vaginal bleed - cont monitor  - repeat US prior to discharge     Bicytopenia: plt normalized. Anemia persists - better today  - TCP possibly due to sepsis. improving  - Anemia possibly from acute blood loss related to 3.2 cm subchorionic hemorrhage?  - Hold off ac   - 6/10: 1 U prbc transfused  - transfuse if hgb<7  - follow cbc     RAMON: mild - resolved  Likely due to shock  Off pressors     Bipolar disorder  - psych consult (of note both mom and daughter vehemently refused in prior admission)  - zofran prn   - SW  - cont meds with adjustment per psych     Hx of chronic anticoagulation  H/o Recurrent venous thrombosis superficial and deep   - (had followed in [facility] in the past)  - per last hematology f/u note  \"She completed more than 6 months of anticoagulation with warfarin for what we consider a provoked DVT and  states she is no longer using IV drugs in the upper extremities.  She is now pregnant.  Would recommend stopping anticoagulation as long as she is able to avoid IV drug use \"  - avoid a/c due to subchorionic hemorrhage on US     DC planning.     dvt proph:    SCDs     Code status:    Full        MDM:    High Raheem Gordon MD  6/23/2024        MEDICATIONS ADMINISTERED IN LAST 1 DAY:  cloNIDine (Catapres) 0.2 MG/24HR patch 1 patch       Date Action Dose Route User    6/24/2024 1100 Patch Applied 1 patch Transdermal (Left Upper Arm) Yvonne Damon RN          diphenhydrAMINE (Benadryl) 12.5 MG/5ML oral liquid 25 mg       Date Action Dose Route User    6/24/2024 0757 Given 25 mg Oral Yvonne Damon RN    6/23/2024 2013 Given 25 mg Oral Bita Clemons RN          docusate (Colace) 50 MG/5ML oral solution 100 mg       Date Action Dose Route User    6/23/2024 2013 Given 100 mg Per NG Tube Bita Clemons RN          famotidine (Pepcid) 20 mg/2mL injection 20 mg       Date Action Dose Route User    6/24/2024 0749 Given 20 mg Intravenous Yvonne Damon RN    6/23/2024 2013 Given 20 mg Intravenous Bita Clemons RN          folic acid (Folvite) tab 1 mg       Date Action Dose Route User    6/24/2024 0748 Given 1 mg Oral Yvonne Damon RN          LORazepam (Ativan) 2 mg/mL injection 2 mg       Date Action Dose Route User    6/23/2024 2013 Given 2 mg Intramuscular (Other) Bita Clemons RN          LORazepam (Ativan) 2 mg/mL injection 0.5 mg       Date Action Dose Route User    6/24/2024 0517 Given 0.5 mg Intravenous Bita Clemons RN    6/23/2024 2326 Given 0.5 mg Intravenous Bita Clemons RN    6/23/2024 1732 Given 0.5 mg Intravenous Rehana Nesbitt RN          lurasidone (Latuda) tab 40 mg       Date Action Dose Route User    6/24/2024 0748 Given 40 mg Oral Yvonne Damon RN          melatonin tab 3 mg       Date Action Dose Route User    6/23/2024 2013 Given 3 mg Oral Bita Clemons, RN           meropenem (Merrem) 500 mg in sodium chloride 0.9% 100 mL IVPB-MBP       Date Action Dose Route User    6/24/2024 1430 New Bag 500 mg Intravenous Yvonne Damon RN    6/24/2024 0516 New Bag 500 mg Intravenous Bita Clemons RN    6/23/2024 2209 New Bag 500 mg Intravenous Bita Clemons RN          methadone solution (DOLOPHINE) 10 mg/5mL       Date Action Dose Route User    6/24/2024 0517 Given 20 mg Oral Bita Clemons RN    6/23/2024 2023 Given 20 mg Oral Bita Clemons RN          sennosides (Senokot) tab 8.6 mg       Date Action Dose Route User    6/24/2024 0748 Given 8.6 mg Oral Yvonne Damon RN    6/23/2024 2013 Given 8.6 mg Oral Bita Clemons RN          QUEtiapine (SEROquel) tab 300 mg       Date Action Dose Route User    6/24/2024 0748 Given 300 mg Per NG Tube Yvonne Damon RN    6/23/2024 2013 Given 300 mg Per NG Tube Bita Clemons RN    6/23/2024 1600 Given 300 mg Per NG Tube Rehana Nesbitt, RN            Vitals (last day)       Date/Time Temp Pulse Resp BP SpO2 Weight O2 Device O2 Flow Rate (L/min) [facility]    06/24/24 1200 98 °F (36.7 °C) 84 16 92/63 90 % -- None (Room air) --     06/24/24 1000 -- 106 24 104/76 90 % -- None (Room air) --     06/24/24 0952 -- 109 -- 102/76 -- -- -- --     06/24/24 0800 97.1 °F (36.2 °C) 88 17 86/63 89 % -- None (Room air) --     06/24/24 0600 -- 81 28 95/53 86 % 154 lb 5.2 oz None (Room air) --     06/24/24 0400 97.3 °F (36.3 °C) 81 20 96/55 90 % -- None (Room air) --     06/24/24 0200 -- 99 17 93/59 86 % -- None (Room air) --     06/24/24 0000 97.6 °F (36.4 °C) 82 18 95/64 87 % -- None (Room air) --     06/23/24 2200 -- 83 22 99/64 89 % -- -- --     06/23/24 2000 97.9 °F (36.6 °C) 106 24 123/93 93 % -- None (Room air) --     06/23/24 1800 -- 98 19 98/66 93 % -- None (Room air) --     06/23/24 1600 97.4 °F (36.3 °C) 80 18 102/60 92 % -- None (Room air) --     06/23/24 1400 -- 106 23 105/94 90 % -- None (Room air) --     06/23/24 1200  97.9 °F (36.6 °C) 95 24 106/72 90 % -- None (Room air) --     06/23/24 1000 -- 105 19 105/68 91 % -- None (Room air) -- HS    06/23/24 0900 -- -- -- -- 91 % -- None (Room air) -- HS    06/23/24 0800 98.1 °F (36.7 °C) 101 19 104/68 93 % -- None (Room air) --     06/23/24 0600 -- 101 19 95/61 90 % -- None (Room air) --     06/23/24 0500 -- 105 19 109/69 92 % -- -- --     06/23/24 0400 97.7 °F (36.5 °C) 107 19 109/69 85 % -- -- --     06/23/24 0200 -- 108 26 97/61 89 % -- -- --     06/23/24 0000 98 °F (36.7 °C) 111 21 98/65 100 % -- None (Room air) -- RK          CIWA Scores (since admission)       None          Blood Transfusion Record       Product Unit Status Volume Start End            Transfuse RBC       24  [id number]  [id number] Stopped 339.58 mL 06/10/24 1017 06/10/24 1300

## 2024-06-24 NOTE — PROGRESS NOTES
[facility]  part of [facility]    Progress Note      Assessment and Plan:   1.  Septic shock-Echo without vegetation.  Patient is yet requiring pressors.  Fusobacterium identified in the blood.     Recommendations:  1.  Ongoing antibiotic as per ID.     2.  Psychiatric-substance abuse disorder as well as history of bipolar disease status post recent prolonged rehab stay having been discharged 30 days ago.  The patient is currently on methadone, Seroquel, fentanyl pushes.  Agitation is improved.  I discussed the issues with psychiatry today.  I believe she would be best served by going to rehab.     Recommendations: CIWA protocol-as per psychiatry evaluation, methadone initiated.  Rehab evaluation     3.  Pregnancy-3 cm subchorionic hemorrhage, now with decreasing beta hCG.  I discussed with obstetrics earlier today Dr. Jalloh.  Patient may be best served with outpatient [facility] follow-up for the pregnancy.     Recommendations: As per obstetrics.  Consideration to [place] referral.     4.  DVT prophylaxis-would use SCDs in patient who has subchorionic hemorrhage at this juncture although the patient has an extensive history of thrombosis.     Recommendations: SCDs and would discuss risks of chemoprophylaxis anticoagulation with obstetrics.     5.  Social concerns.     6.  Respiratory failure-the patient may have developed ARDS associated with the gram-negative bacteremia.  May have a component of noncardiogenic edema.  Also covered for aspiration pneumonia.  The x-ray still shows bilateral haziness.  She is getting ongoing antibiotic.      Recommendations: Ongoing antibiotic.  Pulmonary toilet.  Morning chest x-ray.    7.  Transient asystole-while on Precedex drip.  Discontinued.    8.  Nutrition-diet    Subjective:   Latonia Barker is a(n) 29 year old female who is much more cooperative.    Objective:   Blood pressure 104/76, pulse 106, temperature 97.1 °F (36.2 °C), temperature source  Temporal, resp. rate 24, weight 154 lb 5.2 oz (70 kg), last menstrual period 03/25/2024, SpO2 90%, not currently breastfeeding.    Physical Exam sedate white female  HEENT examination is unremarkable with pupils equal round and reactive to light and accommodation.   Neck without adenopathy, thyromegaly, JVD nor bruit.   Lungs diminished to auscultation and percussion.  Cardiac regular rate and rhythm no murmur.   Abdomen nontender, without hepatosplenomegaly and no mass appreciable.   Extremities without clubbing cyanosis nor edema.   Neurologic grossly intact with symmetric tone and strength and reflex when allowed to wake.  Skin without gross abnormality     Results:     Lab Results   Component Value Date    WBC 8.5 06/24/2024    HGB 9.1 06/24/2024    HCT 29.4 06/24/2024    .0 06/24/2024    CREATSERUM 0.44 06/24/2024    BUN 7 06/24/2024     06/24/2024    K 3.9 06/24/2024     06/24/2024    CO2 22.0 06/24/2024     06/24/2024    CA 8.8 06/24/2024     CT scan of the chest-1. No acute pulmonary embolism through the segmental pulmonary arteries.   2. Multifocal pneumonia demonstrated by multifocal ground-glass opacities and consolidations involving the left greater than right lower lobes and left upper lobe.   3. Two 4 mm right upper lobe pulmonary nodules, which are favored to be infectious/inflammatory, however recommend follow-up CT chest in 6 months to monitor for resolution.   4. Mild right heart predominant cardiomegaly.   5. Borderline dilated main pulmonary artery, which can be seen in pulmonary artery hypertension.   6. Small pericardial effusion.   7. Hepatosplenomegaly.     Chest x-ray-mild edema    Srinivasan Saleh MD  Medical Director, Critical Care, [facility]  Medical Director, [facility]  Pager: [phone]  >35 min crit care

## 2024-06-24 NOTE — PROGRESS NOTES
Our group was notified by Dr Saleh that discharge planning is starting for patient.  Will order pelvic ultrasound to document viability of pregnancy.

## 2024-06-24 NOTE — PLAN OF CARE
Pt slept the majority of the night. Calm and cooperative. Ambulated to bathroom w/ x1 assist. No acute events overnight. Still refuses to wear nasal cannula. Sitter present in the room. Frequent nursing rounds made.     Problem: Patient Centered Care  Goal: Patient preferences are identified and integrated in the patient's plan of care  Description: Interventions:  - What would you like us to know as we care for you?   - Provide timely, complete, and accurate information to patient/family  - Incorporate patient and family knowledge, values, beliefs, and cultural backgrounds into the planning and delivery of care  - Encourage patient/family to participate in care and decision-making at the level they choose  - Honor patient and family perspectives and choices  Outcome: Progressing     Problem: CARDIOVASCULAR - ADULT  Goal: Maintains optimal cardiac output and hemodynamic stability  Description: INTERVENTIONS:  - Monitor vital signs, rhythm, and trends  - Monitor for bleeding, hypotension and signs of decreased cardiac output  - Evaluate effectiveness of vasoactive medications to optimize hemodynamic stability  - Monitor arterial and/or venous puncture sites for bleeding and/or hematoma  - Assess quality of pulses, skin color and temperature  - Assess for signs of decreased coronary artery perfusion - ex. Angina  - Evaluate fluid balance, assess for edema, trend weights  Outcome: Progressing  Goal: Absence of cardiac arrhythmias or at baseline  Description: INTERVENTIONS:  - Continuous cardiac monitoring, monitor vital signs, obtain 12 lead EKG if indicated  - Evaluate effectiveness of antiarrhythmic and heart rate control medications as ordered  - Initiate emergency measures for life threatening arrhythmias  - Monitor electrolytes and administer replacement therapy as ordered  Outcome: Progressing     Problem: RESPIRATORY - ADULT  Goal: Achieves optimal ventilation and oxygenation  Description: INTERVENTIONS:  -  Assess for changes in respiratory status  - Assess for changes in mentation and behavior  - Position to facilitate oxygenation and minimize respiratory effort  - Oxygen supplementation based on oxygen saturation or ABGs  - Provide Smoking Cessation handout, if applicable  - Encourage broncho-pulmonary hygiene including cough, deep breathe, Incentive Spirometry  - Assess the need for suctioning and perform as needed  - Assess and instruct to report SOB or any respiratory difficulty  - Respiratory Therapy support as indicated  - Manage/alleviate anxiety  - Monitor for signs/symptoms of CO2 retention  Outcome: Progressing     Problem: NEUROLOGICAL - ADULT  Goal: Achieves stable or improved neurological status  Description: INTERVENTIONS  - Assess for and report changes in neurological status  - Initiate measures to prevent increased intracranial pressure  - Maintain blood pressure and fluid volume within ordered parameters to optimize cerebral perfusion and minimize risk of hemorrhage  - Monitor temperature, glucose, and sodium. Initiate appropriate interventions as ordered  Outcome: Progressing  Goal: Absence of seizures  Description: INTERVENTIONS  - Monitor for seizure activity  - Administer anti-seizure medications as ordered  - Monitor neurological status  Outcome: Progressing  Goal: Remains free of injury related to seizure activity  Description: INTERVENTIONS:  - Maintain airway, patient safety  and administer oxygen as ordered  - Monitor patient for seizure activity, document and report duration and description of seizure to MD/LIP  - If seizure occurs, turn patient to side and suction secretions as needed  - Reorient patient post seizure  - Seizure pads on all 4 side rails  - Instruct patient/family to notify RN of any seizure activity  - Instruct patient/family to call for assistance with activity based on assessment  Outcome: Progressing     Problem: Delirium  Goal: Minimize duration of  delirium  Description: Interventions:  - Encourage use of hearing aids, eye glasses  - Promote highest level of mobility daily  - Provide frequent reorientation  - Promote wakefulness i.e. lights on, blinds open  - Promote sleep, encourage patient's normal rest cycle i.e. lights off, TV off, minimize noise and interruptions  - Encourage family to assist in orientation and promotion of home routines  Outcome: Progressing     Problem: Safety Risk - Non-Violent Restraints  Goal: Patient will remain free from self-harm  Description: INTERVENTIONS:  - Apply the least restrictive restraint to prevent harm  - Notify patient and family of reasons restraints applied  - Assess for any contributing factors to confusion (electrolyte disturbances, delirium, medications)  - Discontinue any unnecessary medical devices as soon as possible  - Assess the patient's physical comfort, circulation, skin condition, hydration, nutrition and elimination needs   - Reorient and redirection as needed  - Assess for the need to continue restraints  Outcome: Progressing
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
## 2024-06-24 NOTE — SLP NOTE
SPEECH DAILY NOTE - INPATIENT    ASSESSMENT & PLAN   ASSESSMENT  PPE REQUIRED. THIS THERAPIST WORE GLOVES, DROPLET MASK, AND GOGGLES FOR DURATION OF EVALUATION. HANDS WASHED UPON ENTRANCE/EXIT.    SLP f/u for ongoing dysphagia tx/meal assessment per recommendations of bite sized/thin per upgrade. RN reports pt tolerates diet and medication well with no overt clinical s/s aspiration. Pt denies any swallowing challenges.     Pt positioned upright in bed. Pt afebrile, tolerating room air with oxygen status 97 prior to the start of oral trials. SLP reviewed aspiration precautions and safe swallowing compensatory strategies with the patient. Safe swallow guidelines remain written on the white board in purple. Diet recommendations remain on the whiteboard in the room. Patient v/u but would benefit from continued education. Provided minimal assistance, pt tolerates trials of advanced solids and thin liquids via straw with no overt clinical signs/symptoms of aspiration. Mildly prolonged mastication observed due to missing teeth, however, improved from previous sessions. Oxygen status remained >95 t/o the entire session. Respiratory Rate WFL. Oral/buccal cavities clear of residue following liquid rinse. Recommend upgrade to easy to chew solids and continued thin liquids.    PLAN: SLP to f/u x2 meal assessments, monitor CXR, and VFSS if clinically warranted.     Diet Recommendations - Solids: Soft/ Easy to chew  Diet Recommendations - Liquids: Thin Liquids    Compensatory Strategies Recommended: Slow rate  Aspiration Precautions: Upright position;Slow rate;Small bites and sips  Medication Administration Recommendations: One pill at a time (with thin liquids)    Patient Experiencing Pain: No      Treatment Plan  Treatment Plan/Recommendations: Aspiration precautions    Interdisciplinary Communication: Discussed with RN  Recommendations posted at bedside            GOALS  Goal #1 The patient will tolerate PUREED consistency and  MILDLY-THICK via TSP liquids without overt signs or symptoms of aspiration with 100 % accuracy over 1-2 session(s).    No CSA on current diet.       GOAL MET 6/21      Goal #2 The patient/family/caregiver will demonstrate understanding and implementation of aspiration precautions and swallow strategies independently over 4 session(s).    Swallowing precautions posted in room.     In Progress   Goal #3 The patient will utilize compensatory strategies as outlined by  BSSE (clinical evaluation) including Slow rate, Small bites, CONTROLLED liquids, straws okay, Upright 90 degrees with 1:1 feeding/moderate  assistance 90 % of the time across 4 sessions.    Pt self-fed. Mild cues for small sips/bites.     In Progress   Goal #4 The patient will tolerate trial upgrade of MINCED & MOIST/BITE SIZE/SOFT/SOLID consistency and THIN liquids without overt signs or symptoms of aspiration with 100 % accuracy over 2-3 session(s).    Diet upgraded to EASY TO CHEW/THIN liquids 6/24/24. To f/u for tolerance and possible upgrade to solids.     In Progress     FOLLOW UP  Follow Up Needed (Documentation Required): Yes  SLP Follow-up Date: 06/25/24  Number of Visits to Meet Established Goals: 4    Session: 2    If you have any questions, please contact CYNTHIA Ferreira M.S. CCC-SLP  Speech Language Pathologist  Phone Number Ext. [phone]
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
## 2024-06-24 NOTE — PROGRESS NOTES
Patient is a 29 year old female with past medical history of acne, anxiety, bacterial endocarditis, bipolar disorder, chlamydia, decorative tattoo, depression, hepatitis B virus infection, history of blood clots, HPV infection, IV drug use, heroin and crack cocaine use, opioid dependence, subclavian vein thrombosis, and substance abuse who was admitted to the hospital for Septic shock (HCC): The patient has been demonstrating signs of being combative and is currently sedated.    Consult Duration     The patient seen for over 50-minute, follow-up evaluation, over 50% counseling and coordinating care addressing  septic shock and opioid withdrawal.  Record reviewed, communication with attending, communication with RN and patient seen face to face evaluation.     History of Present Illness:   Communicating with the team, the patient is relatively calm today.  Otherwise she has no straightforward discharge planning.    Communicate with the patient today who presented calm and polite.  \"I do not want to be with core on the street all the time and improvement and I want to be mother\".    The patient tried to explain that she has been continuing to do better this time.  Patient educated about the condition altogether and the challenges in her emotion and behavior and the extensive sedation helping her indicating that she believe the need to inpatient psych facility for more stabilization before she ended up in rehab please the patient reporting that she can go home and there is a rehab will place where he can take her immediately and to [facility] in the past.    The patient today repeatedly denying suicidal ideation, intention or plan.  Patient denies any withdrawal symptoms otherwise she does know she is on methadone and she cannot be on methadone after she leaves the hospital educating her to have couple more days to transition methadone to Suboxone.  Patient reporting that she would like to be off of Suboxone and  methadone?    Patient reported to engage her family and going to inpatient rehab with dual diagnosis.  Psych liaison Corinne engage with social family discussion and find it reasonable and safe for both of them to be discharged from the hospital.    Will discontinue methadone from 20 mg to 40 mg and then discontinue officially upon discharge which have been today.  Latuda has been increased for being safe mood stabilizer in pregnant woman.    Patient offered multiple help otherwise psych liaison establish safety plan and referral and we will set.    Past Psychiatric/Medication History:  1. Prior diagnoses: anxiety, bipolar disorder, depression, opioid dependence, substance abuse  2. Past psychiatric inpatient: Unknown  3. Past outpatient history: Unknown  4. Past suicide history: Unknown  5. 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 ×42 days    Bipolar disorder (HCC)    Chlamydia    Decorative tattoo    Depression    Hepatitis B virus infection    History of blood clots    Human papilloma virus infection    Intravenous drug abuse (HCC)    Heroin and crack cocaine    Opioid dependence (HCC)    Subclavian vein thrombosis (HCC)    Substance abuse (HCC)       Past Surgical History  Past Surgical History:   Procedure Laterality Date    Colonoscopy      Colonoscopy N/A 2017    Procedure: COLONOSCOPY;  Surgeon: Xu Day MD;  Location: [facility] ENDOSCOPY    Colposcopy, cervix w/upper adjacent vagina; w/biopsy(s), cervix             Other surgical history      wisdom teeth     Other surgical history      fasciotomy of right arm    Tonsillectomy  2009    Upper gi endoscopy,exam  2013       Family History  Family History   Problem Relation Age of Onset    Other (back pain) Father     Hypertension Mother     Hypertension Maternal Grandmother     Diabetes Maternal Grandfather     Prostate Cancer Paternal Grandfather         w/ metastasis       Social History  Social History     Socioeconomic History    Marital status: Single    Number of children: 0   Occupational History    Occupation: Unemployed   Tobacco Use    Smoking status: Every Day     Current packs/day: 0.00     Average packs/day: 1 pack/day for 1 year (1.0 ttl pk-yrs)     Types: Cigarettes     Start date: 2014     Last attempt to quit: 2015     Years since quittin.4    Smokeless tobacco: Current    Tobacco comments:     Vaping   Vaping Use    Vaping status: Never Used   Substance and Sexual Activity    Alcohol use: Not Currently     Comment: social    Drug use: Yes     Types: Heroin, \"Crack\" cocaine, benzodiazepines, Other-see comments     Comment: Acid, mushrooms    Sexual activity: Yes     Partners: Male     Birth control/protection: Condom   Other Topics Concern    Caffeine Concern No     Comment:  2 cans Cola per day    Exercise Yes     Comment: hep    Reaction to local anesthetic No   Social History Narrative    The patient does not use an assistive device..      The patient does live in a home with stairs.           Current Medications:  Current Facility-Administered Medications   Medication Dose Route Frequency    methadone solution (DOLOPHINE) 10 mg/5mL  20 mg Oral BID    cloNIDine (Catapres) 0.2 MG/24HR patch 1 patch  1 patch Transdermal Weekly    sennosides (Senokot) tab 8.6 mg  8.6 mg Oral BID    folic acid (Folvite) tab 1 mg  1 mg Oral Daily    haloperidol lactate (Haldol) 5 MG/ML injection 5 mg  5 mg Intramuscular Q6H PRN    lurasidone (Latuda) tab 40 mg  40 mg  Oral Daily with breakfast    meropenem (Merrem) 500 mg in sodium chloride 0.9% 100 mL IVPB-MBP  500 mg Intravenous Q8H    LORazepam (Ativan) 2 mg/mL injection 2 mg  2 mg Intramuscular Q6H PRN    QUEtiapine (SEROquel) tab 300 mg  300 mg Per NG Tube TID    docusate (Colace) 50 MG/5ML oral solution 100 mg  100 mg Per NG Tube BID    acetaminophen (Tylenol) 160 MG/5ML oral liquid 650 mg  650 mg Oral Q4H PRN    Or    acetaminophen (Tylenol) rectal suppository 650 mg  650 mg Rectal Q4H PRN    Or    acetaminophen (Ofirmev) 10 mg/mL infusion premix 1,000 mg  1,000 mg Intravenous Q6H PRN    polyethylene glycol (PEG 3350) (Miralax) 17 g oral packet 17 g  17 g Oral Daily PRN    senna (Senokot) 8.8 MG/5ML oral syrup 17.6 mg  10 mL Oral Nightly PRN    bisacodyl (Dulcolax) 10 MG rectal suppository 10 mg  10 mg Rectal Daily PRN    fleet enema (Fleet) 7-19 GM/118ML rectal enema 133 mL  1 enema Rectal Once PRN    dextrose 10% infusion (TPN no rate)   Intravenous Continuous PRN    pancrelipase (Lip-Prot-Amyl) (Zenpep) DR particles cap 10,000 Units  10,000 Units Per G Tube PRN    And    sodium bicarbonate tab 325 mg  325 mg Oral PRN    famotidine (Pepcid) 20 mg/2mL injection 20 mg  20 mg Intravenous BID    ondansetron (Zofran) 4 MG/2ML injection 4 mg  4 mg Intravenous Q6H PRN    prochlorperazine (Compazine) 10 MG/2ML injection 5 mg  5 mg Intravenous Q8H PRN    acetaminophen (Tylenol) tab 650 mg  650 mg Oral Q6H PRN    melatonin tab 3 mg  3 mg Oral Nightly     Medications Prior to Admission   Medication Sig    prazosin 1 MG Oral Cap Take 4 capsules (4 mg total) by mouth nightly. Takes 4mg nightly    buprenorphine-naloxone 8-2 MG Sublingual Film dissolve 1 film under the tongue 3 TIMES A DAY for maintenance    prenatal vitamin with DHA 27-0.8-228 MG Oral Cap Take 1 capsule by mouth daily.    [] nitrofurantoin monohydrate macro (MACROBID) 100 MG Oral Cap Take 1 capsule (100 mg total) by mouth 2 (two) times daily for 7 days.     QUEtiapine 200 MG Oral Tab Take 1 tablet (200 mg total) by mouth nightly.    [] Buprenorphine HCl-Naloxone HCl 8.6-2.1 MG Sublingual SL Tab Place 8.6 mg of buprenorphine under the tongue in the morning, at noon, and at bedtime.    pantoprazole 20 MG Oral Tab EC Take 1 tablet (20 mg total) by mouth every morning before breakfast.    gabapentin 300 MG Oral Cap Take 1 capsule (300 mg total) by mouth nightly.    Melatonin 3 MG Oral Cap Take 1 capsule (3 mg total) by mouth at bedtime.    Naloxone HCl (NARCAN) 4 MG/0.1ML Nasal Liquid 4 mg by Nasal route as needed. IF PATIENT REMAINS UNRESPONSIVE, REPEAT DOSE IN OTHER NOSTRIL 2 TO 5 MINUTES AFTER FIRST DOSE (Patient not taking: Reported on 2024)    venlafaxine  MG Oral Capsule SR 24 Hr Take 1 capsule (150 mg total) by mouth every morning.    ondansetron (ZOFRAN) 4 mg tablet Take 1 tablet (4 mg total) by mouth every 8 (eight) hours as needed for Nausea. (Patient not taking: Reported on 2024)    cloNIDine 0.1 MG Oral Tab Take 1 tablet (0.1 mg total) by mouth 2 (two) times daily as needed. (Patient not taking: Reported on 2024)       Allergies  Allergies   Allergen Reactions    Amoxicillin HIVES    Vancomycin RASH and ITCHING    Clindamycin RASH       Review of Systems:   As by Admitting/Attending    Results:   Laboratory Data:  Lab Results   Component Value Date    WBC 8.5 2024    HGB 9.1 (L) 2024    HCT 29.4 (L) 2024    .0 2024    CREATSERUM 0.44 (L) 2024    BUN 7 (L) 2024     (L) 2024    K 3.9 2024     2024    CO2 22.0 2024     (H) 2024    CA 8.8 2024    ALB 2.7 (L) 2024    ALKPHO 146 (H) 2024    TP 5.4 (L) 2024    AST 32 2024    ALT 32 2024    PTT 29.2 2020    INR 1.9 (A) 2020    PTP 14.7 (H) 2020    T4F 0.73 2011    TSH 2.080 2019    LIP 18 (L) 2020    DDIMER 0.47 2022    CRP  <0.5 01/29/2015    MG 1.7 06/19/2024    PHOS 4.2 06/17/2024    TROP <0.045 05/30/2020    CK 55 03/29/2017    ETOH <3 06/09/2024         Imaging:  CT BRAIN OR HEAD (70065)    Result Date: 6/21/2024  CONCLUSION:  1. Negative unenhanced CT brain.    Dictated by (CST): Daljit Diaz MD on 6/21/2024 at 9:57 PM     Finalized by (CST): Daljit Diaz MD on 6/21/2024 at 9:59 PM           Vital Signs:   Blood pressure 92/63, pulse 84, temperature 98 °F (36.7 °C), temperature source Temporal, resp. rate 16, weight 70 kg (154 lb 5.2 oz), last menstrual period 03/25/2024, SpO2 90%, not currently breastfeeding.    Mental Status Exam:   Appearance: Stated age single, , female in hospital gown, laying down in hospital bed.    Psychomotor: Patient has been demonstrating some psychomotor restlessness  Orientation: Patient is oriented to self and location.    Gait: Not evaluated.  Attitude/Coorperation: Patient attempting to be cooperative with interview.  Behavior: Episodes of restlessness but overall cooperative.  Speech: Appropriate cognitive and verbal communication.  Mood: Patient stating she is \"doing better\"  Affect: Restless and blunted affect  Thought process: Disorganized .  Thought content: No reports of suicidal or homicidal ideation  Perceptions: Patient demonstrating response to internal stimuli  Concentration: Grossly impaired  Memory: Grossly impaired  Intellect: Average.  Judgment and Insight: Questionable.     Impression:     Opiate Withdrawal Syndrome.  Opiate Dependency.  Bipolar Disorder unspecified.  Septic shock (HCC)  Pneumonia of left lower lobe due to infectious organism  Subchorionic hematoma in first trimester, single or unspecified fetus (HCC)     The patient is a 29 year old  female, single, with past medical history of acne, anxiety, bacterial endocarditis, bipolar disorder, chlamydia, decorative tattoo, depression, hepatitis B virus infection, history of blood clots, HPV infection,  IV drug use, heroin and crack cocaine use, opioid dependence, subclavian vein thrombosis, and substance abuse.  The patient is currently sedated.     6/11/24: Patient is sedated and exhibiting some agitation when in the room. Propofol will start to be decreased slowly.    6/12/24: Patient in process of decreasing sedation and possible extubation.  Otherwise the patient is alert and oriented to self and place only nodding her head agree with the need for extubation and cooperation.    6/14/24: Patient continues to exhibit some agitation and restlessness. Patient is still sedated and remains intubated.    6/17/2024: The patient continued demonstrating alternation in her mood and cognition with indication for high sedation.  Patient in the place where she need to start tapering sedation down in preparation for extubation.  Discussed risk and benefit, acknowledging the current symptom and severity.  At this point, I would recommend the following approach:     6/18/2024: Patient continues to stay sedated and intubated. Patient continues to exhibit signs of restlessness and agitation.     6/19/2024: Patient is seen extubated. Patient showing some signs of agitation, but is awake and able to understand conversation.     6/20/2024: Patient is able to communicate more today. Patient admitting she did not want to die and showing that she will cooperate with staff regarding her care.     6/24/2024: The patient requesting to be discharged home and denying any safety concerns.  Having phone call with patient and mom establishing understanding with desire to go home with no safety concerns and removal from the medical doctor Provided assurance and support.    Focus on safety.  Appropriate to be discharged home  Focus on education and support.  Focus on insight orientation helping the patient understand diagnosis and treatment plan.  Discontinue lorazepam, methadone and Benadryl.  Continue Benadryl 25 mg IV 3 times  daily.  Continue Haldol 5 mg IM every 6 hours.  Continue Seroquel 300 mg 3 times daily.  Continue Seroquel 300 mg 3 times daily  Continue Latuda 40 mg with breakfast  Coordinate plan with team    Orders This Visit:  Orders Placed This Encounter   Procedures    CBC With Differential With Platelet    Comp Metabolic Panel (14)    Urinalysis with Culture Reflex    Lactic Acid, Plasma    HCG, Beta Subunit (Quant Pregnancy Test)    Ethyl Alcohol    Acetaminophen (Tylenol), S    Salicylate, Serum    Drug Abuse Panel 11 screen    Manual differential    Lactic Acid Reflex Post Positive    CBC With Differential With Platelet    Comp Metabolic Panel (14)    Magnesium    Streptococcus Pneumoniae Ag, Urine    Legionella urine Ag serogrp 1    Potassium    Magnesium    Scan slide    MD BLOOD SMEAR CONSULT    Triglycerides    Basic Metabolic Panel (8)    Phosphorus    Phosphorus    CBC With Differential With Platelet    Phosphorus    Hemoglobin    Triglycerides    Magnesium    Phosphorus    Basic Metabolic Panel (8)    CBC With Differential With Platelet    Hepatic Function Panel (7)    Magnesium    CBC With Differential With Platelet    Comp Metabolic Panel (14)    Phosphorus    Magnesium    Basic Metabolic Panel (8)    CBC With Differential With Platelet    Manual differential    Magnesium    Basic Metabolic Panel (8)    CBC With Differential With Platelet    Magnesium    CBC With Differential With Platelet    Basic Metabolic Panel (8)    Magnesium    CBC With Differential With Platelet    Basic Metabolic Panel (8)    Phosphorus    Magnesium    Basic Metabolic Panel (8)    CBC With Differential With Platelet    Magnesium    CBC With Differential With Platelet    Basic Metabolic Panel (8)    CBC With Differential With Platelet    Basic Metabolic Panel (8)    Hepatitis C RNA Qnt w/ Reflex to Genotype    Basic Metabolic Panel (8)    CBC With Differential With Platelet    Scan slide    HCV Genotype Reflex    Basic Metabolic Panel (8)     CBC With Differential With Platelet    Basic Metabolic Panel (8)    CBC With Differential With Platelet    Type and screen    ABORH (Blood Type)    Antibody Screen    Prepare RBC Once    SARS-CoV-2/Flu A and B/RSV by PCR (GeneXpert)    Blood Culture    Blood Culture PCR    Blood Culture    Blood Culture    Sputum culture       Meds This Visit:  Requested Prescriptions      No prescriptions requested or ordered in this encounter       Luis De León MD  6/24/2024    Note to Patient: The 21st Century Cures Act makes medical notes like these available to patients in the interest of transparency. However, be advised this is a medical document. It is intended as peer to peer communication. It is written in medical language and may contain abbreviations or verbiage that are unfamiliar. It may appear blunt or direct. Medical documents are intended to carry relevant information, facts as evident, and the clinical opinion of the practitioner. This note may have been transcribed using a voice dictation system. Voice recognition errors may occur. This should not be taken to alter the content or meaning of this note.

## 2024-06-24 NOTE — PROGRESS NOTES
[facility] met with patient at bedside to discuss discharge plan. Patient reports she has had an IOP start date prior to being hospitalized at [place].     Patient contacted [facility] in [place] to schedule an intake appt for PHP and subutex/medication management. Patient has an appt scheduled for tomorrow, 6/25/2024 @ 0800am. Pt to bring her insurance card, ID, and will complete urine drug screen on arrival.     Safety Plan completed with pt. Printed and provided pt with copy.    Pt provided with contact information and address for [facility].     Pt agreeable to plan and had no further questions or concerns.    Contacted [place] [employer]: [facility] spoke with Officer Fortunato Westbrook who took the report on 6/21. [place] [employer] will need to be notified of pt discharge.     [place] [employer] Officer Fortunato Westbrook (direct)#: [phone].    MARGAUX Villalta  [id number]

## 2024-06-25 ENCOUNTER — TELEPHONE (OUTPATIENT)
Dept: OBGYN CLINIC | Facility: CLINIC | Age: 29
End: 2024-06-25

## 2024-06-25 ENCOUNTER — PATIENT OUTREACH (OUTPATIENT)
Dept: CASE MANAGEMENT | Age: 29
End: 2024-06-25

## 2024-06-25 ENCOUNTER — TELEPHONE (OUTPATIENT)
Dept: INTERNAL MEDICINE UNIT | Facility: HOSPITAL | Age: 29
End: 2024-06-25

## 2024-06-25 ENCOUNTER — TELEPHONE (OUTPATIENT)
Dept: PULMONOLOGY | Facility: CLINIC | Age: 29
End: 2024-06-25

## 2024-06-25 DIAGNOSIS — J18.9 PNEUMONIA OF LEFT LOWER LOBE DUE TO INFECTIOUS ORGANISM: ICD-10-CM

## 2024-06-25 DIAGNOSIS — R65.21 SEPTIC SHOCK (HCC): Primary | ICD-10-CM

## 2024-06-25 DIAGNOSIS — O41.8X10 SUBCHORIONIC HEMATOMA IN FIRST TRIMESTER, SINGLE OR UNSPECIFIED FETUS (HCC): ICD-10-CM

## 2024-06-25 DIAGNOSIS — J96.01 ACUTE HYPOXIC RESPIRATORY FAILURE (HCC): ICD-10-CM

## 2024-06-25 DIAGNOSIS — O46.8X1 SUBCHORIONIC HEMATOMA IN FIRST TRIMESTER, SINGLE OR UNSPECIFIED FETUS (HCC): ICD-10-CM

## 2024-06-25 DIAGNOSIS — Z3A.12 12 WEEKS GESTATION OF PREGNANCY (HCC): ICD-10-CM

## 2024-06-25 DIAGNOSIS — Z02.9 ENCOUNTERS FOR UNSPECIFIED ADMINISTRATIVE PURPOSE: ICD-10-CM

## 2024-06-25 DIAGNOSIS — A41.9 SEPTIC SHOCK (HCC): Primary | ICD-10-CM

## 2024-06-25 PROCEDURE — 1111F DSCHRG MED/CURRENT MED MERGE: CPT

## 2024-06-25 RX ORDER — AZITHROMYCIN 250 MG/1
TABLET, FILM COATED ORAL
Qty: 6 TABLET | Refills: 0 | Status: SHIPPED | OUTPATIENT
Start: 2024-06-25 | End: 2024-06-28

## 2024-06-25 RX ORDER — DOXYCYCLINE 100 MG/1
100 CAPSULE ORAL 2 TIMES DAILY
Qty: 20 CAPSULE | Refills: 0 | Status: SHIPPED | OUTPATIENT
Start: 2024-06-25 | End: 2024-06-25

## 2024-06-25 NOTE — TELEPHONE ENCOUNTER
13w1d. Patient calling for follow up appointment with Dr. Christianson. Patient was hospitalized for acute fever, multifocal pneumonia and heroin use. Patient requesting to see Dr. Christianson only to discuss transfer of care to [facility] possibly. Patient and mother accepts appointment with Dr. Christianson on 6/27 at 210 pm at [place].   Patient c/o back pain, recommendations for Tylenol ES per the bottle instructions, warm shower, heating pad and prenatal stretching. Recommendations to call back with spotting/vaginal bleeding, cramping, leakage of fluid or pain 7/10 or greater that does not go away with Tylenol.

## 2024-06-25 NOTE — TELEPHONE ENCOUNTER
Discharge 6/24/24 from hospital  Mother states it hurts for patient to breathe, it is difficult for her to even talk, she is having chest/lung pain, can't talk for very long, not eating very much (soup, popsicle), has back pain, & fatigue. Per Brandie (mom) there is no way it is cleared up (pneumonia) & she has got to have something to make her feel better (antibiotic). She voiced frustration. States patient can't go to ER due to no trespassing and is taking 2 Tylenol & Seroquel. Reassurance provided. Aware RN will follow up after discussing with MD.

## 2024-06-25 NOTE — TELEPHONE ENCOUNTER
I called obstetrics to see if doxycycline can be used in this scenario, possible bronchitis.  I seem to remember that that class of drugs may be contraindicated.  I told the mother not to  a prescription for doxycycline until I get back to them.  The patient needs to see obstetrics promptly now after discharge as well as primary care.

## 2024-06-25 NOTE — PROGRESS NOTES
Initial Post Discharge Follow Up   Discharge Date: 6/24/24  Contact Date: 6/25/2024    Consent Verification:  Assessment Completed With: Other: Brandie Robbins, pt's mother Permission received per patient?  written  HIPAA Verified?  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 Patient's mother states that Latonia is not doing well, she should not have been discharged. Brandie states Latonia complains of pain on inspiration, a shooting stabbing pain and it is difficult for her to talk. Brandie states Latonia was sent home without any antibiotics, Brandie states that she put a call in to Dr Saleh, pulmonologist to see if he could order an antibiotic for her. Dr Saleh told her he needs to check with OB/Gyne to make sure the antibiotic will be ok for the baby, then he will call Brandie back and let her know. Brandie states that Latonia does not have a fever/chills, no cough, no shortness of breath, however, does have pain with inspiration as stated above. Brandie states that Latonia does not have any abdominal pain, no vomiting, however, was nauseated this morning, not eating much. [facility] instructed Brandie to encourage Laotnia to change positions frequently, walk as tolerated, rest when needed, stay hydrated and continue to take up to ten deep breaths an hour while awake, or if watching television take a deep breath with every commercial. [facility] reviewed discharge instructions, medications, S&S of infection/blood clots/reoccurrence of pneumonia with Brandie, she verbalized understanding of these. Brandie denies any further questions or needs at this time.  Do you have any pain since  discharge?  Yes  Where: chest with inhalation   Rating on pain scale 1-10, 10 being the worst pain you have ever experienced, what is current pain: 8/10  Alleviating factors: Tylenol  Aggravating factors: no  Is the pain manageable at home? Yes  Are you able to perform normal daily activities of living as you have prior to your hospital stay (dressing, bathing, ambulating to the bathroom, etc)? yes  If No, What are some barriers or concerns?  Still weak  (NCM) Was patient given a different diet per AVS? no, prior to discharge patient was on a small bite size, pureed food diet. Brandie states Latonia is not eating very much.      Medications:   CHANGE how you take:  QUEtiapine (SEROquel)  STOP taking:  Buprenorphine HCl-Naloxone HCl 8.6-2.1 MG Subl  buprenorphine-naloxone 8-2 MG Film (Suboxone)  cloNIDine 0.1 MG Tabs (Catapres)  gabapentin 300 MG Caps (Neurontin)  Naloxone HCl 4 MG/0.1ML Liqd (Narcan)  nitrofurantoin monohydrate macro 100 MG Caps (Macrobid)  ondansetron 4 mg tablet (Zofran)  pantoprazole 20 MG Tbec (Protonix)  prazosin 1 MG Caps (Minipress)  venlafaxine  MG Cp24 (Effexor-XR)  Review your updated medication list below.  Current Outpatient Medications   Medication Sig Dispense Refill    Doxycycline Monohydrate 100 MG Oral Cap Take 1 capsule (100 mg total) by mouth 2 (two) times daily. 20 capsule 0    QUEtiapine 300 MG Oral Tab Take 1 tablet (300 mg total) by mouth 3 (three) times daily. 90 tablet 1    lurasidone 40 MG Oral Tab Take 1 tablet (40 mg total) by mouth daily with breakfast. 30 tablet 1    prenatal vitamin with DHA 27-0.8-228 MG Oral Cap Take 1 capsule by mouth daily.      Melatonin 3 MG Oral Cap Take 1 capsule (3 mg total) by mouth at bedtime. 30 capsule 0     Were there any changes to your current medication(s) noted on the AVS? Yes  If so, were these medication changes discussed with you prior to leaving the hospital? Yes  If a new medication was prescribed:    Was the new  medication's purpose & side effects reviewed? Yes  Do you have any questions about your new medication? No  Did you  your discharge medications when you left the hospital? No, Brandie states there is a problem getting the Latuda. NC will call the pharmacy.  Let's go over your medications together to make sure we are not missing anything. Medications Reviewed  Are there any reasons that keep you from taking your medication as prescribed? No  Are you having any concerns with constipation? No  NCM placed a call to the [facility]'s pharmacy and spoke with the pharmacist who states that the Latuda was ordered and should be in tomorrow. Pharmacist states she will check around to some other stores to see if anyone has Latuda in stock and she will call the patient or her mother to let them know  NCM reviewed all discharge medications with Brandie, Brandie insists that Latonia needs to be on Suboxone, NCM explained it was discontinued and instructed her to discuss with Latonia's OB/Gyne.      Discharge medications reviewed/discussed/and reconciled against outpatient medications with patient.  Any changes or updates to medications sent to PCP.  Patient Acknowledged     Referrals/orders at D/C:  Referrals/orders placed at D/C? no    DME ordered at D/C? No      Discharge orders, AVS reviewed and discussed with patient. Any changes or updates to orders sent to PCP.  Patient Acknowledged      SDOH:   Transportation:    Transportation Needs: No Transportation Needs (6/25/2024)    Transportation Needs     Lack of Transportation: No     Car Seat: Not on file       Financial Strain:    Financial Resource Strain: Not on file       Diagnosis specifics:   Pneumonia:   Tell me what you understand about the signs and symptoms of pneumonia reoccurrence? NC reviewed S&S of reoccurrence of pneumonia with Brandie, she verbalized understanding of these. [facility] instructed Brandie to encourage Latonia to take up to ten deep breaths  an hour while awake or if she is watching television to take a deep breath with every commercial, this is to help prevent reoccurrence of pneumonia.  (guide to discuss: fever, chills, shaking, chest pain, productive cough, difficulty breathing)      Follow up appointments:      Your appointments       Date & Time Appointment Department (Center)    Jun 27, 2024 2:10 PM CDT Return OB with Caesar Christianson DO [facility], [address], [place] - OB/GYN ([facility] [place])              [facility] [address], [place] - OB/GYN  [facility] [place]  [address]  [place] [id number] [phone]            TCC  Was TCC ordered: No      PCP (If no TCC appointment)  Does patient already have a PCP appointment scheduled? No  NCM Scheduled PCP office HFU Non-TCM appointment with patient on 7/2/2024 at 10:00 am with NONI Byrnes      Specialist    Does the patient have any other follow up appointment(s) needing to be scheduled? No  Does the patient need assistance scheduling appointment(s): No  Future Appointments   Date Time Provider Department Center   6/27/2024  2:10 PM Caesar Christianson DO [facility] [place]   7/2/2024 10:00 AM [facility]   Brandie reports that Latonia had an appointment this morning 6/25/2024 at [facility] in [place] for an intake appt for PHP and subutex/medication management. Brandie  States that she brought Latonia there, however, she states Latonia was supposed to stay there for three hours. Brandie states she told the staff that Latonia was too sick to be there for three hours and brought her home.     Is there any reason as to why you cannot make your appointment(s)?  No     Needs post D/C:   Now that you are home, are there any needs or concerns you need addressed before your next visit with your PCP?  (DME, meds, questions, etc.): No    Interventions by NCM:   NCM reviewed medications,  discharge instructions, S&S of infection/blood clots. Patient instructed to report any new or worsening symptoms, when to call the doctor and when to call 911. Patient verbalized understanding of these. NCM instructed patient to call PCP with any questions or needs, she states she will.      [facility] referral placed:    Not Applicable    BOOK BY DATE: 7/8/2024

## 2024-06-25 NOTE — TELEPHONE ENCOUNTER
I canceled the doxycycline and ordered a Z-Fernando.   I personally performed the service described in the documentation recorded by the scribe in my presence, and it accurately and completely records my words and actions.
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
## 2024-06-25 NOTE — TELEPHONE ENCOUNTER
Received fax from [employer] for quetiapine. Patient's insurance only covers 1 tablet 2x a day and prescription written for 1 tablet 3x a day. Dr. Raheem Gordon notified.

## 2024-06-25 NOTE — TELEPHONE ENCOUNTER
Patients mother called regarding her daughter. States she was hospitalized two weeks ago and saw Dr. Saleh when she was admitted in the hospital. Patient's mother states her daughter is having trouble breathing and has a lot of pain on her chest. Patient's mother would like tp speak with Dr. Saleh or a nurse as soon as possible. Please advise.

## 2024-06-25 NOTE — TELEPHONE ENCOUNTER
Patient 13 wks pregnant. Patient was hospitalized mom calling patient for follow up apt. Patient saw Dr Christianson in hospital. Mom said Dr Christianson said he might transfer her to [place]. Mom wants patient to see Dr Christianson only. Patient with mom

## 2024-06-26 ENCOUNTER — APPOINTMENT (OUTPATIENT)
Dept: GENERAL RADIOLOGY | Facility: HOSPITAL | Age: 29
End: 2024-06-26
Attending: INTERNAL MEDICINE
Payer: MEDICAID

## 2024-06-26 ENCOUNTER — APPOINTMENT (OUTPATIENT)
Dept: ULTRASOUND IMAGING | Facility: HOSPITAL | Age: 29
End: 2024-06-26
Attending: HOSPITALIST
Payer: MEDICAID

## 2024-06-26 ENCOUNTER — APPOINTMENT (OUTPATIENT)
Dept: ULTRASOUND IMAGING | Facility: HOSPITAL | Age: 29
End: 2024-06-26
Attending: INTERNAL MEDICINE
Payer: MEDICAID

## 2024-06-26 ENCOUNTER — HOSPITAL ENCOUNTER (INPATIENT)
Facility: HOSPITAL | Age: 29
LOS: 2 days | Discharge: LEFT BEFORE DISCHARGE | End: 2024-06-28
Attending: EMERGENCY MEDICINE | Admitting: HOSPITALIST
Payer: MEDICAID

## 2024-06-26 ENCOUNTER — APPOINTMENT (OUTPATIENT)
Dept: GENERAL RADIOLOGY | Facility: HOSPITAL | Age: 29
End: 2024-06-26
Attending: EMERGENCY MEDICINE
Payer: MEDICAID

## 2024-06-26 DIAGNOSIS — J90 PLEURAL EFFUSION: Primary | ICD-10-CM

## 2024-06-26 LAB
ANION GAP SERPL CALC-SCNC: 8 MMOL/L (ref 0–18)
ATRIAL RATE: 106 BPM
B-HCG UR QL: POSITIVE
BASOPHILS # BLD AUTO: 0.04 X10(3) UL (ref 0–0.2)
BASOPHILS NFR BLD AUTO: 0.5 %
BASOPHILS NFR PLR: 0 %
BUN BLD-MCNC: <5 MG/DL (ref 9–23)
CALCIUM BLD-MCNC: 9.2 MG/DL (ref 8.7–10.4)
CHLORIDE SERPL-SCNC: 103 MMOL/L (ref 98–112)
CO2 SERPL-SCNC: 25 MMOL/L (ref 21–32)
CREAT BLD-MCNC: 0.43 MG/DL
DEPRECATED RDW RBC AUTO: 45.5 FL (ref 35.1–46.3)
EGFRCR SERPLBLD CKD-EPI 2021: 137 ML/MIN/1.73M2 (ref 60–?)
EOSINOPHIL # BLD AUTO: 0.06 X10(3) UL (ref 0–0.7)
EOSINOPHIL NFR BLD AUTO: 0.7 %
EOSINOPHIL NFR PLR: 0 %
ERYTHROCYTE [DISTWIDTH] IN BLOOD BY AUTOMATED COUNT: 16.2 % (ref 11–15)
GLUCOSE BLD-MCNC: 116 MG/DL (ref 70–99)
GLUCOSE PLR-MCNC: 112 MG/DL
HCT VFR BLD AUTO: 27.1 %
HGB BLD-MCNC: 8.7 G/DL
IMM GRANULOCYTES # BLD AUTO: 0.14 X10(3) UL (ref 0–1)
IMM GRANULOCYTES NFR BLD: 1.7 %
LDH FLD L TO P-CCNC: 257 U/L
LYMPHOCYTES # BLD AUTO: 1.04 X10(3) UL (ref 1–4)
LYMPHOCYTES NFR BLD AUTO: 12.3 %
LYMPHOCYTES NFR PLR: 7 %
MCH RBC QN AUTO: 25.3 PG (ref 26–34)
MCHC RBC AUTO-ENTMCNC: 32.1 G/DL (ref 31–37)
MCV RBC AUTO: 78.8 FL
MONOCYTES # BLD AUTO: 0.37 X10(3) UL (ref 0.1–1)
MONOCYTES NFR BLD AUTO: 4.4 %
MONOS+MACROS NFR PLR: 1 %
NEUTROPHILS # BLD AUTO: 6.8 X10 (3) UL (ref 1.5–7.7)
NEUTROPHILS # BLD AUTO: 6.8 X10(3) UL (ref 1.5–7.7)
NEUTROPHILS NFR BLD AUTO: 80.4 %
NEUTROPHILS NFR PLR: 92 %
P AXIS: 12 DEGREES
P-R INTERVAL: 132 MS
PLATELET # BLD AUTO: 413 10(3)UL (ref 150–450)
POTASSIUM SERPL-SCNC: 4 MMOL/L (ref 3.5–5.1)
PROT PLR-MCNC: 5.4 G/DL
Q-T INTERVAL: 340 MS
QRS DURATION: 74 MS
QTC CALCULATION (BEZET): 451 MS
R AXIS: 33 DEGREES
RBC # BLD AUTO: 3.44 X10(6)UL
RBC # FLD: ABNORMAL /CUMM (ref ?–1)
SARS-COV-2 RNA RESP QL NAA+PROBE: NOT DETECTED
SODIUM SERPL-SCNC: 136 MMOL/L (ref 136–145)
T AXIS: 20 DEGREES
TOTAL CELLS COUNTED FLD: 100
TOTAL CELLS COUNTED PLR: ABNORMAL /CUMM (ref ?–1)
VENTRICULAR RATE: 106 BPM
WBC # BLD AUTO: 8.5 X10(3) UL (ref 4–11)
WBC # PLR: NORMAL /CUMM

## 2024-06-26 PROCEDURE — 93970 EXTREMITY STUDY: CPT | Performed by: HOSPITALIST

## 2024-06-26 PROCEDURE — 32555 ASPIRATE PLEURA W/ IMAGING: CPT | Performed by: INTERNAL MEDICINE

## 2024-06-26 PROCEDURE — 71045 X-RAY EXAM CHEST 1 VIEW: CPT | Performed by: EMERGENCY MEDICINE

## 2024-06-26 PROCEDURE — 99223 1ST HOSP IP/OBS HIGH 75: CPT | Performed by: INTERNAL MEDICINE

## 2024-06-26 PROCEDURE — 0W9B3ZX DRAINAGE OF LEFT PLEURAL CAVITY, PERCUTANEOUS APPROACH, DIAGNOSTIC: ICD-10-PCS | Performed by: INTERNAL MEDICINE

## 2024-06-26 PROCEDURE — 99223 1ST HOSP IP/OBS HIGH 75: CPT | Performed by: HOSPITALIST

## 2024-06-26 RX ORDER — ACETAMINOPHEN 500 MG
1000 TABLET ORAL ONCE
Status: COMPLETED | OUTPATIENT
Start: 2024-06-26 | End: 2024-06-26

## 2024-06-26 RX ORDER — SODIUM CHLORIDE 9 MG/ML
INJECTION, SOLUTION INTRAVENOUS CONTINUOUS
Status: DISCONTINUED | OUTPATIENT
Start: 2024-06-26 | End: 2024-06-28

## 2024-06-26 RX ORDER — MELATONIN
3 NIGHTLY
Status: DISCONTINUED | OUTPATIENT
Start: 2024-06-26 | End: 2024-06-28

## 2024-06-26 RX ORDER — CHOLECALCIFEROL (VITAMIN D3) 25 MCG
1 TABLET,CHEWABLE ORAL DAILY
Status: DISCONTINUED | OUTPATIENT
Start: 2024-06-26 | End: 2024-06-28

## 2024-06-26 RX ORDER — VENLAFAXINE HYDROCHLORIDE 150 MG/1
300 CAPSULE, EXTENDED RELEASE ORAL DAILY
COMMUNITY

## 2024-06-26 RX ORDER — ACETAMINOPHEN 500 MG
500 TABLET ORAL EVERY 4 HOURS PRN
Status: DISCONTINUED | OUTPATIENT
Start: 2024-06-26 | End: 2024-06-26

## 2024-06-26 RX ORDER — BUPRENORPHINE HYDROCHLORIDE AND NALOXONE HYDROCHLORIDE DIHYDRATE 8; 2 MG/1; MG/1
1 TABLET SUBLINGUAL 3 TIMES DAILY
COMMUNITY

## 2024-06-26 RX ORDER — PRAZOSIN HYDROCHLORIDE 2 MG/1
4 CAPSULE ORAL NIGHTLY
Status: DISCONTINUED | OUTPATIENT
Start: 2024-06-26 | End: 2024-06-28

## 2024-06-26 RX ORDER — KETOROLAC TROMETHAMINE 30 MG/ML
30 INJECTION, SOLUTION INTRAMUSCULAR; INTRAVENOUS ONCE
Status: DISCONTINUED | OUTPATIENT
Start: 2024-06-26 | End: 2024-06-26

## 2024-06-26 RX ORDER — QUETIAPINE FUMARATE 100 MG/1
300 TABLET, FILM COATED ORAL 3 TIMES DAILY
Status: DISCONTINUED | OUTPATIENT
Start: 2024-06-26 | End: 2024-06-28

## 2024-06-26 RX ORDER — PRAZOSIN HYDROCHLORIDE 1 MG/1
4 CAPSULE ORAL NIGHTLY
COMMUNITY

## 2024-06-26 RX ORDER — GABAPENTIN 300 MG/1
300 CAPSULE ORAL 2 TIMES DAILY
COMMUNITY

## 2024-06-26 RX ORDER — DIPHENHYDRAMINE HYDROCHLORIDE 50 MG/ML
25 INJECTION INTRAMUSCULAR; INTRAVENOUS EVERY 4 HOURS PRN
Status: DISCONTINUED | OUTPATIENT
Start: 2024-06-26 | End: 2024-06-28

## 2024-06-26 RX ORDER — ACETAMINOPHEN 500 MG
500 TABLET ORAL EVERY 4 HOURS PRN
Status: DISCONTINUED | OUTPATIENT
Start: 2024-06-26 | End: 2024-06-28

## 2024-06-26 RX ORDER — BUPRENORPHINE 8 MG/1
8 TABLET SUBLINGUAL 2 TIMES DAILY
Status: DISCONTINUED | OUTPATIENT
Start: 2024-06-26 | End: 2024-06-28

## 2024-06-26 RX ORDER — VENLAFAXINE HYDROCHLORIDE 150 MG/1
300 CAPSULE, EXTENDED RELEASE ORAL DAILY
Status: DISCONTINUED | OUTPATIENT
Start: 2024-06-27 | End: 2024-06-28

## 2024-06-26 RX ORDER — PROCHLORPERAZINE EDISYLATE 5 MG/ML
5 INJECTION INTRAMUSCULAR; INTRAVENOUS EVERY 8 HOURS PRN
Status: DISCONTINUED | OUTPATIENT
Start: 2024-06-26 | End: 2024-06-26

## 2024-06-26 RX ORDER — GABAPENTIN 300 MG/1
300 CAPSULE ORAL 2 TIMES DAILY
Status: DISCONTINUED | OUTPATIENT
Start: 2024-06-26 | End: 2024-06-28

## 2024-06-26 RX ORDER — ONDANSETRON 2 MG/ML
4 INJECTION INTRAMUSCULAR; INTRAVENOUS EVERY 6 HOURS PRN
Status: DISCONTINUED | OUTPATIENT
Start: 2024-06-26 | End: 2024-06-28

## 2024-06-26 RX ORDER — LURASIDONE HYDROCHLORIDE 20 MG/1
40 TABLET, FILM COATED ORAL
Status: DISCONTINUED | OUTPATIENT
Start: 2024-06-27 | End: 2024-06-28

## 2024-06-26 RX ORDER — DIPHENHYDRAMINE HYDROCHLORIDE 50 MG/ML
25 INJECTION INTRAMUSCULAR; INTRAVENOUS ONCE
Status: COMPLETED | OUTPATIENT
Start: 2024-06-26 | End: 2024-06-26

## 2024-06-26 NOTE — PROCEDURES
[facility]  part of [facility]     Procedure Note  24    Latonia Barker Patient Status:  Emergency    1/3/1995 MRN [id number]   Location Health system EMERGENCY DEPARTMENT Attending Shruthi Patel MD   Hosp Day # 0 PCP Agatha Mcmanus MD     Procedure: Ultrasound-guided left-sided thoracentesis    Pre-Procedure Diagnosis: Pleural effusion    Post-Procedure Diagnosis: 500 cc of cloudy yellow fluid aspirated    Anesthesia:  Local    Finding and procedure: The left posterior axillary line was cleaned, draped, anesthetized and 500 cc of cloudy yellow fluid was aspirated.  Chest x-ray is pending.    Specimens: Sent    Blood Loss: None    Tourniquet Time: None  Complications:  None  Drains: None    Secondary Diagnosis: None    Srinivasan Saleh MD  Medical Director, Critical Care, [facility]  Medical Director, [facility]  Pager: [phone]

## 2024-06-26 NOTE — PROGRESS NOTES
Patient received alert, oriented. Vitals stable. C/o left sided chest pain r/t thoracentesis, md aware. Oriented to the room and the use of call light, able to make needs known. Fall precautions in place.

## 2024-06-26 NOTE — ED INITIAL ASSESSMENT (HPI)
Pt to ED via EMS from home with complaints of SOB, CP, difficulty walking. Was discharged from ICU on 6/24 after being admitted on vent for pneumonia and septic shock. Also approx 13 weeks pregnant. Was put on zithro yesterday

## 2024-06-26 NOTE — PROGRESS NOTES
Unable to obtain blood cultures. Charge RN Ne notified of need for phlebotomy. Awaiting phlebotomy for blood cultures to start abx.

## 2024-06-26 NOTE — H&P
[facility]    PATIENT'S NAME: ASHLEY PEREIRA   ATTENDING PHYSICIAN: Ronnie Villarreal MD   PATIENT ACCOUNT#:   [id number]    LOCATION:  [address]  MEDICAL RECORD #:   [id number]       YOB: 1995  ADMISSION DATE:       06/26/2024    HISTORY AND PHYSICAL EXAMINATION    CHIEF COMPLAINT:  Pleural effusion, rule out empyema.    HISTORY OF PRESENT ILLNESS:  The patient is a 29-year-old  female with history of IV heroin drug abuse, was hospitalized earlier this month with multifocal pneumonia and acute hypoxemic respiratory failure requiring endotracheal intubation, complicated by septic shock and acute renal failure, treated with IV meropenem.  The patient also had an ultrasound initially upon admission which showed subchorionic hemorrhage 3.2 cm, which was not evident prior to discharge on another ultrasound.  The patient was followed by ID, Pulmonary, and Gynecology Service.  The patient reported that she was on Suboxone prior to her recent admission and she relapsed 2 days prior to her admission.  Today, in the emergency room upon arrival she presented with shortness of breath, fever, and night sweats and chills.  CBC was unremarkable, hemoglobin 8.7, no leukocytes or left shift.  Chemistry was unremarkable.  Chest x-ray showed moderate-sized left pleural effusion with pulmonary opacity left mid and lower lung zones, rightward axis imaging secondary to compressive atelectasis related to effusion, small right apical subpleural opacity likely representing loculated effusion and there is an adjacent small pulmonary opacity.  The opacity could represent atelectasis or pneumonia, and this region otherwise shows improved aeration.  The patient was seen by Pulmonary and had left-sided pleurocentesis.  Fluid was cloudy in color, sent for cytology, laboratory studies, and cultures.  The patient was initiated on IV meropenem.    PAST MEDICAL HISTORY:  Per the record, the patient has  history of bipolar affective disorder, severe anxiety and depression, and heroin IV drug abuse, has been on Suboxone, relapsed recently.  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    PAST SURGICAL HISTORY:  Right arm fasciotomy and tonsillectomy.    MEDICATIONS:  Please see medication reconciliation list.    ALLERGIES:  Per the record, amoxicillin and clindamycin.  Vancomycin also is listed, but I am not sure if this is red man syndrome or an actual allergy.    FAMILY HISTORY:  Mother had hypertension.    SOCIAL HISTORY:  Chronic tobacco use.  Heroin IV drug abuse.  No alcohol use.  Lives with her family.    GYNECOLOGICAL HISTORY:  The patient is 12 weeks and 1 day pregnant according to an ultrasound done on June 24, just 2 days ago.    REVIEW OF SYSTEMS:  The patient said since she left the hospital she shot heroin in her right groin last night.  Also, she has been having pain in both calves.  She has been progressively short of breath and having night sweats, fevers up to 102 at home, with pleuritic chest pain.  No cough.  Other 12-point review of systems negative.      PHYSICAL EXAMINATION:    GENERAL:  Alert, oriented to time, place, and person, mild distress.  VITAL SIGNS:  Temperature 97.9, pulse 90, respiratory rate 20, blood pressure 95/76, pulse ox 93% on room air.  HEENT:  Atraumatic.  Oropharynx clear.  Multiple facial piercings.  NECK:  Supple.  No lymphadenopathy.  Trachea midline.  Full range of motion.  LUNGS:  Clear to auscultation bilaterally.  Normal respiratory effort.  Diminished breathing sounds on the left lung base.  HEART:  Regular rate and rhythm.  S1 and S2 auscultated.  No murmur.  ABDOMEN:  Soft, nondistended.  No tenderness.  Positive bowel sounds.  EXTREMITIES:  Some calf tenderness noted on the right side.  No leg swelling.  Also noted IV drug site from use of heroin last  night on the right groin.  Left groin old scars from IV drug use.  NEUROLOGIC:  Motor and sensory intact.      ASSESSMENT AND PLAN:    1.   Ongoing IV heroin drug abuse.  2.   Left pleural effusion.  Rule out empyema.  Recent history of multifocal pneumonia.  3.   Anemia of pregnancy.  4.   A 12-week life gestation.    The patient will be admitted to general medical floor.  Started her on IV meropenem in the emergency room.  Obtain venous Doppler study both lower extremities.  Avoid anticoagulation for DVT prophylaxis considering recent subchorionic hemorrhage found on an ultrasound on June 9.  Also, the patient will need gram-positive coverage and MRSA coverage.  Will leave it to ID and Gynecology to further discuss.  Monitor respiratory status and hemodynamic status.  Follow up on blood cultures.  Further recommendations to follow.    Dictated By Xuan Blancas MD  d: 06/26/2024 12:03:05  t: 06/26/2024 12:10:53  Job [id number]  FB/

## 2024-06-26 NOTE — ED QUICK NOTES
Pt urinating using bedpan. Manasa care completed. Gown and linens changed. Purewick applied. Updated on current POC, denies further needs at this time. Call light within reach.

## 2024-06-26 NOTE — ED QUICK NOTES
Orders for admission, patient is aware of plan and ready to go upstairs. Any questions, please call ED RN Arlen at extension [phone].     Patient Covid vaccination status: Fully vaccinated     COVID Test Ordered in ED: Rapid SARS-CoV-2 by PCR    COVID Suspicion at Admission: N/A    Running Infusions:  None    Mental Status/LOC at time of transport: A&Ox4    Other pertinent information:   CIWA score: N/A   NIH score:  N/A

## 2024-06-26 NOTE — CONSULTS
[facility]  part of [facility]    Consult Note    Date:  2024  Date of Admission:  2024    Chief Complaint:   Latonia Barker is a(n) 29 year old female with chest discomfort and dyspnea.    HPI:   The patient is well-known to me from her recent hospitalization.  She has a history of IV heroin abuse.  She was admitted with withdrawal state as well as Fusobacterium bacteremia.  She had a prolonged ICU course.  She had respiratory failure and possible aspiration pneumonitis.  She required mechanical ventilation for prolonged period of time.  She was discharged to an incarcerated status briefly and now has been home for several days but returns to the emergency room with chest discomfort and dyspnea.  There is no personal history of deep venous thrombosis.  She admits to low-grade fever yesterday.  She called the office send I had sent in prescription for azithromycin; however, this had not been initiated.  There is no hemoptysis.  She does have cough.  Chest x-ray demonstrated moderate left pleural effusion which was new compared to before and I drained 500 cc of cloudy yellow fluid.    history     Past Medical History:    Acne    Anxiety    Bacterial endocarditis (HCC)    SBE pulmonic valve secondary to MRSA treated with daptomycin ×42 days    Bipolar disorder (HCC)    Chlamydia    Decorative tattoo    Depression    Hepatitis B virus infection    History of blood clots    Human papilloma virus infection    Intravenous drug abuse (HCC)    Heroin and crack cocaine    Opioid dependence (HCC)    Subclavian vein thrombosis (HCC)    Substance abuse (HCC)     Past Surgical History:   Procedure Laterality Date    Colonoscopy      Colonoscopy N/A 2017    Procedure: COLONOSCOPY;  Surgeon: Xu Day MD;  Location: [facility] ENDOSCOPY    Colposcopy, cervix w/upper adjacent vagina; w/biopsy(s), cervix            Other surgical history      wisdom teeth     Other surgical  history      fasciotomy of right arm    Tonsillectomy  2009    Upper gi endoscopy,exam  2013     Family History   Problem Relation Age of Onset    Other (back pain) Father     Hypertension Mother     Hypertension Maternal Grandmother     Diabetes Maternal Grandfather     Prostate Cancer Paternal Grandfather         w/ metastasis     Social History: Single, 1 child, history of 1 pack/day tobacco, no alcohol, currently unemployed  Social History     Socioeconomic History    Marital status: Single    Number of children: 0   Occupational History    Occupation: Unemployed   Tobacco Use    Smoking status: Every Day     Current packs/day: 0.00     Average packs/day: 1 pack/day for 1 year (1.0 ttl pk-yrs)     Types: Cigarettes     Start date: 2014     Last attempt to quit: 2015     Years since quittin.4    Smokeless tobacco: Current    Tobacco comments:     Vaping   Vaping Use    Vaping status: Never Used   Substance and Sexual Activity    Alcohol use: Not Currently     Comment: social    Drug use: Yes     Types: Heroin, \"Crack\" cocaine, benzodiazepines, Other-see comments     Comment: Acid, mushrooms    Sexual activity: Yes     Partners: Male     Birth control/protection: Condom   Other Topics Concern    Caffeine Concern No     Comment:  2 cans Cola per day    Exercise Yes     Comment: hep    Reaction to local anesthetic No   Social History Narrative    The patient does not use an assistive device..      The patient does live in a home with stairs.     Social Determinants of Health     Transportation Needs: No Transportation Needs (2024)    Transportation Needs     Lack of Transportation: No     Allergies/Medications:   Allergies:   Allergies   Allergen Reactions    Amoxicillin HIVES    Vancomycin RASH and ITCHING    Clindamycin RASH     (Not in a hospital admission)      Review of Systems:   Review of Systems:  Vision normal. Ear nose and throat normal. Bowel normal. Bladder function normal. No  depression. No thyroid disease. No lymphatic system concerns.  No rash. Muscles and joints unremarkable. No weight loss no weight gain.    Physical Exam:   Vital Signs:  Blood pressure 95/63, pulse 87, temperature 97.9 °F (36.6 °C), temperature source Oral, resp. rate 24, last menstrual period 03/25/2024, SpO2 95%, not currently breastfeeding.     Alert white female  HEENT examination is unremarkable with pupils equal round and reactive to light and accommodation.   Neck without adenopathy, thyromegaly, JVD nor bruit.   Lungs diminished breath sounds left base to auscultation and percussion.  Cardiac regular rate and rhythm no murmur.   Abdomen nontender, without hepatosplenomegaly and no mass appreciable.   Extremities without clubbing cyanosis nor edema.   Neurologic grossly intact with symmetric tone and strength and reflex.  Skin without gross abnormality    Results:     Lab Results   Component Value Date    WBC 8.5 06/26/2024    HGB 8.7 06/26/2024    HCT 27.1 06/26/2024    .0 06/26/2024    CREATSERUM 0.43 06/26/2024    BUN <5 06/26/2024     06/26/2024    K 4.0 06/26/2024     06/26/2024    CO2 25.0 06/26/2024     06/26/2024    CA 9.2 06/26/2024     Chest x-ray-patchy right upper lobe infiltrate and left lower lobe consolidation with moderate pleural effusion    Assessment/Plan:   1.  Acute respiratory syndrome-patient has worsening infiltrate and effusion at the left lung base consistent with pneumonia.  I drained 500 cc of pleural fluid, likely parapneumonic.  Hopefully not empyema.  Will await analyses.  Need to cover for healthcare acquired infections.    Recommendations:  1.  Empiric antibiotic as per ID  2.  Blood cultures  3.  Repeat chest x-ray  4.  Streptococcal and Legionella urinary antigens  5.  Will follow clinically.    2.  History of heroin abuse with recent withdrawal state.    3.  DVT prophylaxis-subcutaneous heparin    4.  Anemia-per primary care    5.   Pregnancy-obstetrics to reevaluate as this is known to be complicated by a subchorionic hematoma.    6.  DVT prophylaxis-SCDs    6.  Hepatitis C    7.  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    I am delighted to assist with Latonia's care.      Srinivasan Saleh MD  Medical Director, Critical Care, [facility]  Medical Director, [facility]  Pager: [phone]

## 2024-06-26 NOTE — ED PROVIDER NOTES
Patient Seen in: [facility] Emergency Department    History     Chief Complaint   Patient presents with    Shortness Of Breath       HPI    History is provided by patient/independent historian: Patient, EMS  29 year old female with with another history of drug abuse, recent admission for pneumonia and bacteremia, status post a course of antibiotics, recently restarted his sleep yesterday per mom secondary to having shortness of breath, here with complaints of shortness of breath, chest pain, difficulty walking for the past 2 days.  Of note, she is also 13 weeks pregnant per EMS.  No cough or fevers.    History reviewed.   Past Medical History:    Acne    Anxiety    Bacterial endocarditis (HCC)    SBE pulmonic valve secondary to MRSA treated with daptomycin ×42 days    Bipolar disorder (HCC)    Chlamydia    Decorative tattoo    Depression    Hepatitis B virus infection    History of blood clots    Human papilloma virus infection    Intravenous drug abuse (HCC)    Heroin and crack cocaine    Opioid dependence (HCC)    Subclavian vein thrombosis (HCC)    Substance abuse (HCC)         History reviewed.   Past Surgical History:   Procedure Laterality Date    Colonoscopy      Colonoscopy N/A 2017    Procedure: COLONOSCOPY;  Surgeon: Xu Day MD;  Location: [facility] ENDOSCOPY    Colposcopy, cervix w/upper adjacent vagina; w/biopsy(s), cervix            Other surgical history      wisdom teeth     Other surgical history      fasciotomy of right arm    Tonsillectomy      Upper gi endoscopy,exam           Home Medications reviewed :  (Not in a hospital admission)        History reviewed.   Social History     Socioeconomic History    Marital status: Single    Number of children: 0   Occupational History    Occupation: Unemployed   Tobacco Use    Smoking status: Every Day     Current packs/day: 0.00     Average packs/day: 1 pack/day for 1 year (1.0 ttl pk-yrs)     Types: Cigarettes      Start date: 2014     Last attempt to quit: 2015     Years since quittin.4    Smokeless tobacco: Current    Tobacco comments:     Vaping   Vaping Use    Vaping status: Never Used   Substance and Sexual Activity    Alcohol use: Not Currently     Comment: social    Drug use: Yes     Types: Heroin, \"Crack\" cocaine, benzodiazepines, Other-see comments     Comment: Acid, mushrooms    Sexual activity: Yes     Partners: Male     Birth control/protection: Condom   Other Topics Concern    Caffeine Concern No     Comment:  2 cans Cola per day    Exercise Yes     Comment: hep    Reaction to local anesthetic No         ROS  Review of Systems   Respiratory:  Positive for shortness of breath.    Cardiovascular:  Positive for chest pain.   All other systems reviewed and are negative.     All other pertinent organ systems are reviewed and are negative.      Physical Exam     ED Triage Vitals   BP 24 0933 104/74   Pulse 24 0933 110   Resp 24 0933 20   Temp 24 0936 97.9 °F (36.6 °C)   Temp src 24 0936 Oral   SpO2 24 0933 95 %   O2 Device 24 0933 None (Room air)     Vital signs reviewed.      Physical Exam  Vitals and nursing note reviewed.   Cardiovascular:      Pulses: Normal pulses.   Pulmonary:      Effort: No respiratory distress.      Comments: Conversational dyspnea, no accessory muscle usage, speaking in complete sentences  Abdominal:      General: There is no distension.   Neurological:      Mental Status: She is alert.         ED Course       Labs:     Labs Reviewed   BASIC METABOLIC PANEL (8) - Abnormal; Notable for the following components:       Result Value    Glucose 116 (*)     BUN <5 (*)     Creatinine 0.43 (*)     All other components within normal limits   POCT PREGNANCY URINE - Abnormal; Notable for the following components:    POCT Urine Pregnancy Positive (*)     All other components within normal limits   CBC W/ DIFFERENTIAL - Abnormal; Notable for the  following components:    RBC 3.44 (*)     HGB 8.7 (*)     HCT 27.1 (*)     MCV 78.8 (*)     MCH 25.3 (*)     RDW 16.2 (*)     All other components within normal limits   RAPID SARS-COV-2 BY PCR - Normal   CBC WITH DIFFERENTIAL WITH PLATELET    Narrative:     The following orders were created for panel order CBC With Differential With Platelet.                  Procedure                               Abnormality         Status                                     ---------                               -----------         ------                                     CBC W/ DIFFERENTIAL[[id number]]          Abnormal            Final result                                                 Please view results for these tests on the individual orders.   LDH PLEURAL FLUID    Narrative:     A reference range has not been established for this body fluid type.                                       TOTAL PROTEIN PLEURAL FLUID    Narrative:     A reference range has not been established for this body fluid type.                                       GLUCOSE PLEURAL FLUID    Narrative:     A reference range has not been established for this body fluid type.                                       CELL COUNT WITH DIFFERENTIAL, PLEURAL FLUID   CYTOLOGY FLUIDS   RAINBOW DRAW LAVENDER   RAINBOW DRAW LIGHT GREEN   RAINBOW DRAW BLUE   BLOOD CULTURE   BLOOD CULTURE   BODY FLUID CULT,AEROBIC AND ANAEROBIC   ED/MRSA SCREEN BY PCR-CC         My EKG Interpretation: EKG    Rate, intervals and axes as noted on EKG Report.  Rate: 106  Rhythm: Sinus Rhythm  Reading: abnormal for rate, normal for rhythm, no acute ST changes           As reviewed and Interpreted by me      Imaging Results Available and Reviewed while in ED:   US THORACENTESIS GUIDED LEFT (CPT=32555)    Result Date: 6/26/2024  CONCLUSION:  1. Left pleural effusion.    Dictated by (CST): Daljit Diaz MD on 6/26/2024 at 12:00 PM     Finalized by (CST): Daljit Diaz MD on 6/26/2024 at  12:02 PM          XR CHEST AP PORTABLE  (CPT=71045)    Result Date: 6/26/2024  CONCLUSION:  1.  Moderate-sized left pleural effusion which has increased in size since prior exam.  Pulmonary opacity left mid and lower lung zone rightward axis imaging secondary compressive atelectasis related to effusion. 2.  Small right apical subpleural opacity likely representing loculated effusion.  There is an adjacent small pulmonary opacity.  The opacity could represent atelectasis or pneumonia and this region otherwise shows improved aeration since previous exam. 3.  Linear atelectasis in the right lung base , which has improved since prior exam.     Dictated by (CST): Gabo Gu MD on 6/26/2024 at 10:55 AM     Finalized by (CST): Gabo Gu MD on 6/26/2024 at 10:56 AM         My review and independent interpretation of XR images: L pleural effusion. Radiology report corroborates this in addition to other details as reported by them.      Decision rules/scores evaluated: none      Diagnostic labs/tests considered but not ordered: none    ED Medications Administered:   Medications   meropenem (Merrem) 1,000 mg in sodium chloride 0.9% 100 mL IVPB-MBP (has no administration in time range)   vancomycin (Vancocin) 1,000 mg in sodium chloride 0.9% 250 mL IVPB-ADDV (has no administration in time range)   diphenhydrAMINE (Benadryl) 50 mg/mL  injection 25 mg (has no administration in time range)                MDM       Medical Decision Making      Differential Diagnosis: After obtaining the patient's history, performing the physical exam and reviewing the diagnostics, multiple initial diagnoses were considered based on the presenting problem including pneumonia, viral syndrome, anxiety, pulmonary edema    External document review: I personally reviewed available external medical records for any recent pertinent discharge summaries, testing, and procedures - the findings are as follows: 6/9/24 admission for septic shock -  fusobacterium bacteremia/multifocal pneumonia    Complicating Factors: The patient already  has a past medical history of Acne, Anxiety, Bacterial endocarditis (HCC) (03/2017), Bipolar disorder (HCC), Chlamydia, Decorative tattoo, Depression, Hepatitis B virus infection, History of blood clots, Human papilloma virus infection, Intravenous drug abuse (HCC), Opioid dependence (HCC), Subclavian vein thrombosis (HCC) (12/04/2017), and Substance abuse (HCC). to contribute to the complexity of this ED evaluation.    Procedures performed: none    Discussed management with physician/appropriate source: Dr. Saleh - at bedside to do thoracentesis, Dr. Collazo, Dr. Blancas, Les pharm D, Dr. Paulino    Considered admission/deescalation of care for: chest pain/SOB    Social determinants of health affecting patient care: none    Prescription medications considered: discussed continuing current medication regimen    The patient requires continuous monitoring for: chest pain/SOB    Shared decision making: discussed possible admission        Disposition and Plan     Clinical Impression:  1. Pleural effusion        Disposition:  Admit    Follow-up:  No follow-up provider specified.    Medications Prescribed:  Current Discharge Medication List          Hospital Problems       Present on Admission  Date Reviewed: 6/12/2024            ICD-10-CM Noted POA    Pleural effusion J90 6/26/2024 Unknown

## 2024-06-26 NOTE — TELEPHONE ENCOUNTER
Per Ely @ [facility] doxycycline script has been cancelled as requested by RN and patient's mom picked up Z-Fernando.

## 2024-06-26 NOTE — CONSULTS
[facility]  part of [facility] ID CONSULT NOTE    Latonia Barker Patient Status:  Emergency    1/3/1995 MRN [id number]   Location [facility] EMERGENCY DEPARTMENT Attending Shruthi Patel MD   Hosp Day # 0 PCP Agatha Mcmanus MD       Reason for Consultation:  SOB, effusion    ASSESSMENT:    Antibiotics: Meropenem  ceftriaxone, daptomycin, cefepime     # L pleural effusion s/p thoracentesis 24 with 500 cc cloudy yellow fluid - cx pending    # Recent admission for fever in an active IVDU pt with Fusobacterium bacteremia ?GI source, Lemierre's less likely   - UA with only 1-5wbc, most recent urine cx on 24 with E coli  - CT chest neg for PE but has multifocal GGOs and consolidations - s/p IV meropenem EOT 24     # Multifocal pneumonia  # 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 Patient is a 29-year-old female with a history of active IV heroin use last 2 days prior to admission, HCV, bipolar disorder, history of MRSA bacteremia with pulmonic valve endocarditis and septic emboli status post 6 weeks of IV daptomycin .  Also pregnant.  Was seen recently and just discharged a few days prior for Fusobacterium bacteremia thought to be from GI source and less like millimeters disease with associate multifocal pneumonia.  Treated with IV meropenem completed on .  Discharged under police  custody and then went home after that.  Now comes in with 1 day of increased shortness of breath, chest pain, lower extremity pain without fevers, chills or significant cough.  Afebrile here, room air, normal WBC.  Chest x-ray with moderate-sized left pleural effusion.  Seen by pulmonary and underwent thoracentesis of the left side with 500 cc of cloudy yellow fluid.    History:  Past Medical History:    Acne    Anxiety    Bacterial endocarditis (HCC)    SBE pulmonic valve secondary to MRSA treated with daptomycin ×42 days    Bipolar disorder (HCC)    Chlamydia    Decorative tattoo    Depression    Hepatitis B virus infection    History of blood clots    Human papilloma virus infection    Intravenous drug abuse (HCC)    Heroin and crack cocaine    Opioid dependence (HCC)    Subclavian vein thrombosis (HCC)    Substance abuse (HCC)     Past Surgical History:   Procedure Laterality Date    Colonoscopy      Colonoscopy N/A 2017    Procedure: COLONOSCOPY;  Surgeon: Xu Day MD;  Location: [facility] ENDOSCOPY    Colposcopy, cervix w/upper adjacent vagina; w/biopsy(s), cervix            Other surgical history      wisdom teeth     Other surgical history      fasciotomy of right arm    Tonsillectomy      Upper gi endoscopy,exam       Family History   Problem Relation Age of Onset    Other (back pain) Father     Hypertension Mother     Hypertension Maternal Grandmother     Diabetes Maternal Grandfather     Prostate Cancer Paternal Grandfather         w/ metastasis      reports that she has been smoking cigarettes. She started smoking about 10 years ago. She has a 1 pack-year smoking history. She uses smokeless tobacco. She reports that she does not currently use alcohol. She reports current drug use. Drugs: Heroin, \"Crack\" cocaine, benzodiazepines, and Other-see comments.    Allergies:  Allergies   Allergen Reactions    Amoxicillin HIVES    Vancomycin RASH and ITCHING    Clindamycin RASH        Medications:    Current Facility-Administered Medications:     meropenem (Merrem) 1,000 mg in sodium chloride 0.9% 100 mL IVPB-MBP, 1,000 mg, Intravenous, Once    vancomycin (Vancocin) 1,000 mg in sodium chloride 0.9% 250 mL IVPB-ADDV, 15 mg/kg, Intravenous, Once    diphenhydrAMINE (Benadryl) 50 mg/mL  injection 25 mg, 25 mg, Intravenous, Once    Review of Systems:  Per HPI    Physical Exam:  Vital signs: Blood pressure 95/63, pulse 87, temperature 97.9 °F (36.6 °C), temperature source Oral, resp. rate 24, last menstrual period 03/25/2024, SpO2 95%, not currently breastfeeding.    General: Alert, oriented, NAD, room air  HEENT: Moist mucous membranes. EOMI  Neck: No lymphadenopathy.  Supple.  Cardiovascular: No chest wall tenderness  Respiratory: Symmetric expansion  Abdomen: Soft, nontender, nondistended.   Musculoskeletal: No edema noted  Integument: R inguinal needle site noted with mild bleeding; no erythema, warmth, ttp    Laboratory Data: Reviewed in EMR    Microbiology: Reviewed in EMR    Radiology: Reviewed    Thank you for allowing us to participate in the care of this patient. Please do not hesitate to call if you have any questions.     We will continue to follow with you and will make further recommendations based on his progress.    Juan José Angeles MD   [facility], [place], operated by [facility]  [phone]  6/26/2024

## 2024-06-26 NOTE — ED QUICK NOTES
Thoracentesis completed. Specimen collected by Dr. Saleh. Site covered with bandaid, scant bleeding noted.

## 2024-06-26 NOTE — ED QUICK NOTES
Left sided US guided thoracentesis by Dr. Saleh consent form signed and completed with patient. Patient denies any questions at this time.

## 2024-06-26 NOTE — PROGRESS NOTES
Attempted to assess fetal heart tones, unsuccessful. MD notified.     Phlebotomy at bedside to obtain blood cultures.

## 2024-06-27 ENCOUNTER — APPOINTMENT (OUTPATIENT)
Dept: PICC SERVICES | Facility: HOSPITAL | Age: 29
End: 2024-06-27
Attending: HOSPITALIST
Payer: MEDICAID

## 2024-06-27 ENCOUNTER — TELEPHONE (OUTPATIENT)
Dept: OBGYN CLINIC | Facility: CLINIC | Age: 29
End: 2024-06-27

## 2024-06-27 ENCOUNTER — APPOINTMENT (OUTPATIENT)
Dept: ULTRASOUND IMAGING | Facility: HOSPITAL | Age: 29
End: 2024-06-27
Attending: OBSTETRICS & GYNECOLOGY
Payer: MEDICAID

## 2024-06-27 PROBLEM — Z34.91 FIRST TRIMESTER PREGNANCY (HCC): Status: ACTIVE | Noted: 2024-06-27

## 2024-06-27 LAB
ANION GAP SERPL CALC-SCNC: 5 MMOL/L (ref 0–18)
BASOPHILS # BLD AUTO: 0.11 X10(3) UL (ref 0–0.2)
BASOPHILS NFR BLD AUTO: 1.3 %
BUN BLD-MCNC: <5 MG/DL (ref 9–23)
CALCIUM BLD-MCNC: 9 MG/DL (ref 8.7–10.4)
CHLORIDE SERPL-SCNC: 110 MMOL/L (ref 98–112)
CO2 SERPL-SCNC: 24 MMOL/L (ref 21–32)
CREAT BLD-MCNC: 0.41 MG/DL
DEPRECATED RDW RBC AUTO: 48.8 FL (ref 35.1–46.3)
EGFRCR SERPLBLD CKD-EPI 2021: 138 ML/MIN/1.73M2 (ref 60–?)
EOSINOPHIL # BLD AUTO: 0.18 X10(3) UL (ref 0–0.7)
EOSINOPHIL NFR BLD AUTO: 2.1 %
ERYTHROCYTE [DISTWIDTH] IN BLOOD BY AUTOMATED COUNT: 16.4 % (ref 11–15)
GLUCOSE BLD-MCNC: 84 MG/DL (ref 70–99)
HCT VFR BLD AUTO: 29.8 %
HGB BLD-MCNC: 9.2 G/DL
IMM GRANULOCYTES # BLD AUTO: 0.12 X10(3) UL (ref 0–1)
IMM GRANULOCYTES NFR BLD: 1.4 %
L PNEUMO AG UR QL: NEGATIVE
LYMPHOCYTES # BLD AUTO: 1.21 X10(3) UL (ref 1–4)
LYMPHOCYTES NFR BLD AUTO: 14.4 %
MCH RBC QN AUTO: 25.2 PG (ref 26–34)
MCHC RBC AUTO-ENTMCNC: 30.9 G/DL (ref 31–37)
MCV RBC AUTO: 81.6 FL
MONOCYTES # BLD AUTO: 0.35 X10(3) UL (ref 0.1–1)
MONOCYTES NFR BLD AUTO: 4.2 %
NEUTROPHILS # BLD AUTO: 6.45 X10 (3) UL (ref 1.5–7.7)
NEUTROPHILS # BLD AUTO: 6.45 X10(3) UL (ref 1.5–7.7)
NEUTROPHILS NFR BLD AUTO: 76.6 %
PLATELET # BLD AUTO: 414 10(3)UL (ref 150–450)
POTASSIUM SERPL-SCNC: 4.3 MMOL/L (ref 3.5–5.1)
RBC # BLD AUTO: 3.65 X10(6)UL
SODIUM SERPL-SCNC: 139 MMOL/L (ref 136–145)
STREP PNEUMO ANTIGEN, URINE: NEGATIVE
VANCOMYCIN PEAK SERPL-MCNC: 22.6 UG/ML (ref 30–50)
VANCOMYCIN TROUGH SERPL-MCNC: 11 UG/ML (ref 10–20)
WBC # BLD AUTO: 8.4 X10(3) UL (ref 4–11)

## 2024-06-27 PROCEDURE — 99232 SBSQ HOSP IP/OBS MODERATE 35: CPT | Performed by: INTERNAL MEDICINE

## 2024-06-27 PROCEDURE — 99232 SBSQ HOSP IP/OBS MODERATE 35: CPT | Performed by: OBSTETRICS & GYNECOLOGY

## 2024-06-27 PROCEDURE — 76815 OB US LIMITED FETUS(S): CPT | Performed by: OBSTETRICS & GYNECOLOGY

## 2024-06-27 PROCEDURE — 99233 SBSQ HOSP IP/OBS HIGH 50: CPT | Performed by: INTERNAL MEDICINE

## 2024-06-27 RX ORDER — MELATONIN
6 NIGHTLY PRN
Status: DISCONTINUED | OUTPATIENT
Start: 2024-06-27 | End: 2024-06-28

## 2024-06-27 NOTE — PROGRESS NOTES
[facility]  part of [facility]    Progress Note      Assessment and Plan:   1.  Acute respiratory syndrome-patient has worsening infiltrate and effusion at the left lung base consistent with pneumonia.  I drained 500 cc of pleural fluid, likely parapneumonic.  Need to cover for healthcare acquired infections.    The patient is breathing better.  Will repeat chest x-ray in the morning.  The post tap film looked good.  So far the cultures are negative.  The fluid is an exudate with neutrophilic predominance and marked white blood cells.  The Legionella and streptococcal urinary antigens are negative.     Recommendations:  1.  Empiric antibiotic as per ID  2.  Await blood cultures  3.  Repeat chest x-ray in the morning  4.  Will follow clinically.     2.  History of heroin abuse with recent withdrawal state.     3.  DVT prophylaxis-subcutaneous heparin     4.  Anemia-per primary care     5.  Pregnancy-obstetrics to reevaluate as this is known to be complicated by a subchorionic hematoma.     6.  DVT prophylaxis-SCDs     6.  Hepatitis C     7.  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         Subjective:   Latonia Barker is a(n) 29 year old female who is breathing better    Objective:   Blood pressure 111/77, pulse 98, temperature 97.7 °F (36.5 °C), temperature source Oral, resp. rate 18, height 5' 2\" (1.575 m), weight 149 lb 3.2 oz (67.7 kg), last menstrual period 03/25/2024, SpO2 94%, not currently breastfeeding.    Physical Exam alert white female  HEENT examination is unremarkable with pupils equal round and reactive to light and accommodation.   Neck without adenopathy, thyromegaly, JVD nor bruit.   Lungs diminished at left base to auscultation and percussion.  Cardiac regular rate and rhythm no murmur.   Abdomen nontender, without hepatosplenomegaly and no mass appreciable.   Extremities without clubbing  cyanosis nor edema.   Neurologic grossly intact with symmetric tone and strength and reflex.  Skin without gross abnormality     Results:     Lab Results   Component Value Date    WBC 8.4 06/27/2024    HGB 9.2 06/27/2024    HCT 29.8 06/27/2024    .0 06/27/2024    CREATSERUM 0.41 06/27/2024    BUN <5 06/27/2024     06/27/2024    K 4.3 06/27/2024     06/27/2024    CO2 24.0 06/27/2024    GLU 84 06/27/2024    CA 9.0 06/27/2024     Chest x-ray-left pleural effusion decreased status post tap.  Significant left basilar atelectasis.    Srinivasan Saleh MD  Medical Director, Critical Care, [facility]  Medical Director, [facility]  Pager: [phone]

## 2024-06-27 NOTE — TELEPHONE ENCOUNTER
Noted, patient appointment was canceled.      13w3d   Patient called stating she was admitted to hospital.  Information will be sent to MD on call about patient being  admitted to hospital.    To Dr. Laurent on Call

## 2024-06-27 NOTE — PLAN OF CARE
Patient alert, oriented, vitals stable. C/o pain, prn meds given with some effectiveness. Up and ambulatory, able to make needs known. Anxiety at times. Call light within reach, able to make needs known.         Problem: Risk for Violence/Aggression  Goal: Absence of Violence/Aggression  Description: INTERVENTIONS:   - Identify precipitating factors for behavior   - Notify Charge RN/Provider   - Consider decreasing stimulation   - Consider distraction measures   - Consider discussion with provider regarding prn meds    - Consider VIRAJ (Moderate Risk only)   - Consider Code Support (High Risk only)   - Consider room safety checks   - Consider restraints  Outcome: Progressing     Problem: Patient Centered Care  Goal: Patient preferences are identified and integrated in the patient's plan of care  Description: Interventions:  - What would you like us to know as we care for you? From home with mom  - Provide timely, complete, and accurate information to patient/family  - Incorporate patient and family knowledge, values, beliefs, and cultural backgrounds into the planning and delivery of care  - Encourage patient/family to participate in care and decision-making at the level they choose  - Honor patient and family perspectives and choices  Outcome: Progressing     Problem: SKIN/TISSUE INTEGRITY - ADULT  Goal: Skin integrity remains intact  Description: INTERVENTIONS  - Assess and document risk factors for pressure ulcer development  - Assess and document skin integrity  - Monitor for areas of redness and/or skin breakdown  - Initiate interventions, skin care algorithm/standards of care as needed  Outcome: Progressing  Goal: Incision(s), wounds(s) or drain site(s) healing without S/S of infection  Description: INTERVENTIONS:  - Assess and document risk factors for pressure ulcer development  - Assess and document skin integrity  - Assess and document dressing/incision, wound bed, drain sites and surrounding tissue  -  Implement wound care per orders  - Initiate isolation precautions as appropriate  - Initiate Pressure Ulcer prevention bundle as indicated  Outcome: Progressing     Problem: PAIN - ADULT  Goal: Verbalizes/displays adequate comfort level or patient's stated pain goal  Description: INTERVENTIONS:  - Encourage pt to monitor pain and request assistance  - Assess pain using appropriate pain scale  - Administer analgesics based on type and severity of pain and evaluate response  - Implement non-pharmacological measures as appropriate and evaluate response  - Consider cultural and social influences on pain and pain management  - Manage/alleviate anxiety  - Utilize distraction and/or relaxation techniques  - Monitor for opioid side effects  - Notify MD/LIP if interventions unsuccessful or patient reports new pain  - Anticipate increased pain with activity and pre-medicate as appropriate  Outcome: Progressing     Problem: ANXIETY  Goal: Will report anxiety at manageable levels  Description: INTERVENTIONS:  - Administer medication as ordered  - Teach and rehearse alternative coping skills  - Provide emotional support with 1:1 interaction with staff  Outcome: Progressing     Problem: BEHAVIOR  Goal: Pt/Family maintain appropriate behavior and adhere to behavioral management agreement, if implemented  Description: INTERVENTIONS:  - Assess patient/family’s coping skills and  non-compliant behavior (including use of illegal substances)  - Notify security of behavior or suspected illegal substances which indicate the need for search of the patient and/or belongings  - Encourage verbalization of thoughts and concerns in a socially appropriate manner  - Utilize positive, consistent limit setting strategies supporting safety of patient, staff and others  - Encourage participation in the decision making process about the behavioral management agreement  - Implement a Health Care Agreement if patient meets criteria  - If a patient’s  behavior jeopardizes the safety of the patient, staff, or others refer to organization policy. If a visitor’s behavior poses a threat to safety call refer to organization policy.  - Initiate consult with , Psychosocial CNS, Spiritual Care as appropriate  Outcome: Progressing     Problem: DRUG ABUSE/DETOX  Goal: Will have no detox symptoms and will verbalize plan for changing drug-related behavior  Description: INTERVENTIONS:  - Administer medication as ordered  - Monitor physical status  - Provide emotional support with 1:1 interaction with staff  - Encourage 12-Step involvement or recovery focused alternative  Outcome: Progressing

## 2024-06-27 NOTE — PROGRESS NOTES
INFECTIOUS DISEASE PROGRESS NOTE  [facility]  part of [facility] ID PROGRESS NOTE    Latonia Barker Patient Status:  Inpatient    1/3/1995 MRN [id number]   Location [facility]5W Attending Iram Marquez MD   Hosp Day # 1 PCP Agatha Mcmanus MD     Subjective:  ROS reviewed. Unable to sleep last night. On room air.    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 FU cx.  -  Follow fever curve, wbc.  -  Reviewed labs, micro, imaging reports, available old records.  -  Case d/w patient, RN.     History of Present Illness:  29-year-old female with a history of active IV heroin use last 2 days prior to admission, HCV, bipolar disorder, history of MRSA bacteremia with pulmonic valve endocarditis and septic emboli status post 6 weeks of IV daptomycin .  Also pregnant.  Was seen recently and just discharged a few days prior for Fusobacterium bacteremia thought to be from GI source and less like millimeters disease with associate multifocal pneumonia.  Treated with IV meropenem completed on .  Discharged under police custody and then  went home after that.  Now comes in with 1 day of increased shortness of breath, chest pain, lower extremity pain without fevers, chills or significant cough.  Afebrile here, room air, normal WBC.  Chest x-ray with moderate-sized left pleural effusion.  Seen by pulmonary and underwent thoracentesis of the left side with 500 cc of cloudy yellow fluid.    Physical Exam:  /72 (BP Location: Right arm)   Pulse 98   Temp 97.5 °F (36.4 °C) (Oral)   Resp 20   Ht 5' 2\" (1.575 m)   Wt 149 lb 3.2 oz (67.7 kg)   LMP 03/25/2024 (Approximate)   SpO2 100%   BMI 27.29 kg/m²     Gen:   Awake, in bed  HEENT:  EOMI, neck supple  CV/lungs:  RRR, CTAB  Abdom:  Soft, NT/ND, +BS  Skin/extrem:  No rashes, no c/c/e  :   Purewick+  Lines:  PIV+      Laboratory Data: Reviewed    Microbiology: Reviewed    Radiology: Reviewed      [facility]  [phone]  6/27/2024

## 2024-06-27 NOTE — CONSULTS
[facility]  part of [facility]    Report of Gyne Consultation    Latonia Barker Patient Status:  Inpatient    1/3/1995 MRN [id number]   Location [facility]5W Attending Xuan Blancas MD   Hosp Day # 1 PCP Agatha Mcmanus MD     Date of Admission:  2024  Date of Consult:  2024   Requesting service: Hospitalist    Reason for Consultation:   pregnant    History of Present Illness:   Patient is a 29 year old  Patient's last menstrual period was 2024 (approximate). EDC 1/3/25 per early 10 week ultrasound EGA 12 week 1 days who was admitted to the hospital for Pleural effusion:  Recently discharged on  after ICU admission from  for sepsis after injecting self w/ heroin in groin. Viable pregnancy at 13 wks on day of discharge. No vaginal bleeding / Cramps. Currently admitted with pneumonia.     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 treated with daptomycin ×42 days    Bipolar disorder (HCC)    Chlamydia    Decorative tattoo    Depression    Hepatitis B virus infection    History of blood clots    Human papilloma virus infection    Intravenous drug abuse (HCC)    Heroin and crack cocaine    Opioid dependence (HCC)    Subclavian vein thrombosis (HCC)    Substance abuse (HCC)       Past Surgical History  Past Surgical History:   Procedure Laterality Date    Colonoscopy      Colonoscopy N/A 2017    Procedure: COLONOSCOPY;  Surgeon: Xu Day MD;  Location: [facility] ENDOSCOPY    Colposcopy, cervix w/upper adjacent vagina; w/biopsy(s), cervix            Other surgical history      wisdom teeth     Other surgical history      fasciotomy of right arm    Tonsillectomy      Upper gi endoscopy,exam         Family History  Family History   Problem Relation Age of Onset    Other (back pain) Father     Hypertension Mother     Hypertension Maternal  Grandmother     Diabetes Maternal Grandfather     Prostate Cancer Paternal Grandfather         w/ metastasis       Social History  Social History     Socioeconomic History    Marital status: Single    Number of children: 0   Occupational History    Occupation: Unemployed   Tobacco Use    Smoking status: Every Day     Current packs/day: 0.00     Average packs/day: 1 pack/day for 1 year (1.0 ttl pk-yrs)     Types: Cigarettes     Start date: 2014     Last attempt to quit: 2015     Years since quittin.4    Smokeless tobacco: Current    Tobacco comments:     Vaping   Vaping Use    Vaping status: Never Used   Substance and Sexual Activity    Alcohol use: Not Currently     Comment: social    Drug use: Yes     Types: Heroin, \"Crack\" cocaine, benzodiazepines, Other-see comments     Comment: Acid, mushrooms    Sexual activity: Yes     Partners: Male     Birth control/protection: Condom   Other Topics Concern    Caffeine Concern No     Comment:  2 cans Cola per day    Exercise Yes     Comment: hep    Reaction to local anesthetic No   Social History Narrative    The patient does not use an assistive device..      The patient does live in a home with stairs.     Social Determinants of Health     Food Insecurity: No Food Insecurity (2024)    Food Insecurity     Food Insecurity: Never true   Transportation Needs: No Transportation Needs (2024)    Transportation Needs     Lack of Transportation: No   Housing Stability: Low Risk  (2024)    Housing Stability     Housing Instability: No           Outpatient meds:   Medications Prior to Admission   Medication Sig    venlafaxine  MG Oral Capsule SR 24 Hr Take 2 capsules (300 mg total) by mouth daily.    gabapentin 300 MG Oral Cap Take 1 capsule (300 mg total) by mouth in the morning and 1 capsule (300 mg total) before bedtime.    Buprenorphine HCl-Naloxone HCl 8-2 MG Sublingual SL Tab Place 1 tablet under the tongue in the morning, at noon, and at  bedtime.    prazosin 1 MG Oral Cap Take 4 capsules (4 mg total) by mouth nightly.    azithromycin 250 MG Oral Tab Take 2 tablets (500 mg total) by mouth daily for 1 day, THEN 1 tablet (250 mg total) daily for 4 days.    QUEtiapine 300 MG Oral Tab Take 1 tablet (300 mg total) by mouth 3 (three) times daily.    Melatonin 3 MG Oral Cap Take 1 capsule (3 mg total) by mouth at bedtime.    lurasidone 40 MG Oral Tab Take 1 tablet (40 mg total) by mouth daily with breakfast.    prenatal vitamin with DHA 27-0.8-228 MG Oral Cap Take 1 capsule by mouth daily.       Current Inpatient Medications:  Current Facility-Administered Medications   Medication Dose Route Frequency    meropenem (Merrem) 500 mg in sodium chloride 0.9% 100 mL IVPB-MBP  500 mg Intravenous Q8H    sodium chloride 0.9% infusion   Intravenous Continuous    ondansetron (Zofran) 4 MG/2ML injection 4 mg  4 mg Intravenous Q6H PRN    vancomycin (Vancocin) 1,000 mg in sodium chloride 0.9% 250 mL IVPB-ADDV  15 mg/kg Intravenous Q8H    diphenhydrAMINE (Benadryl) 50 mg/mL  injection 25 mg  25 mg Intravenous Q4H PRN    buprenorphine (SUBUTEX) SL tab 8 mg  8 mg Sublingual BID    [Held by provider] gabapentin (Neurontin) cap 300 mg  300 mg Oral BID    lurasidone (Latuda) tab 40 mg  40 mg Oral Daily with breakfast    melatonin tab 3 mg  3 mg Oral Nightly    [Held by provider] prazosin (Minipress) cap 4 mg  4 mg Oral Nightly    prenatal vitamin with DHA (PNV with DHA) cap 1 capsule  1 capsule Oral Daily    QUEtiapine (SEROquel) tab 300 mg  300 mg Oral TID    venlafaxine ER (Effexor-XR) 24 hr cap 300 mg  300 mg Oral Daily    acetaminophen (Tylenol Extra Strength) tab 500 mg  500 mg Oral Q4H PRN    lidocaine-menthol 4-1 % patch 1 patch  1 patch Transdermal Daily    sodium chloride 0.9 % IV bolus 500 mL  500 mL Intravenous Once       Allergies  Allergies   Allergen Reactions    Amoxicillin HIVES    Vancomycin RASH and ITCHING    Clindamycin RASH       Review of Systems:      10  point ROS completed and was negative, except for pertinent positive and negatives stated in subjective.    Physical Exam:   Vital Signs:  Patient Vitals for the past 24 hrs:   BP Temp Temp src Pulse Resp SpO2 Height Weight   06/26/24 2331 98/71 -- -- 96 20 96 % -- --   06/26/24 2209 -- -- -- 79 -- -- -- --   06/26/24 2043 100/71 98.1 °F (36.7 °C) Oral 103 20 97 % -- --   06/26/24 1809 107/75 98 °F (36.7 °C) Oral 95 20 95 % -- --   06/26/24 1455 113/77 98 °F (36.7 °C) Oral 99 20 97 % 5' 2\" (1.575 m) 149 lb 3.2 oz (67.7 kg)   06/26/24 1415 102/70 -- -- 86 20 96 % -- --   06/26/24 1400 94/57 -- -- 102 20 96 % -- --   06/26/24 1345 91/72 -- -- 104 20 95 % -- --   06/26/24 1330 101/66 96.5 °F (35.8 °C) Oral 105 20 95 % -- --   06/26/24 1315 97/69 -- -- 90 20 95 % -- --   06/26/24 1300 101/72 -- -- 92 20 95 % -- --   06/26/24 1245 95/63 -- -- 87 20 95 % -- --   06/26/24 1215 98/71 -- -- 99 20 98 % -- --   06/26/24 1200 103/87 -- -- 101 14 93 % -- --   06/26/24 1145 95/76 -- -- 90 20 93 % -- --   06/26/24 1100 98/77 -- -- 99 26 96 % -- --   06/26/24 1030 99/73 -- -- 96 20 96 % -- --   06/26/24 1000 95/68 -- -- 102 20 95 % -- --   06/26/24 0936 -- 97.9 °F (36.6 °C) Oral -- -- -- -- --   06/26/24 0933 104/74 -- -- 110 20 95 % -- --          General: No acute distress. Alert and oriented x 3.  HEENT: Moist mucous membranes.   Respiratory: nonlabored breathing  Cardiovascular: regular rate  Abdomen: Soft, nontender, nondistended.   Pelvic: defer  Musculoskeletal: No CT  swelling noted.  Integument: No lesions. No erythema.  Psychiatric: Appropriate mood and affect.    Results:     Laboratory Data:  Lab Results   Component Value Date    WBC 8.5 06/26/2024    HGB 8.7 (L) 06/26/2024    HCT 27.1 (L) 06/26/2024    .0 06/26/2024    CREATSERUM 0.43 (L) 06/26/2024    BUN <5 (L) 06/26/2024     06/26/2024    K 4.0 06/26/2024     06/26/2024    CO2 25.0 06/26/2024     (H) 06/26/2024    CA 9.2 06/26/2024    ALB  2.7 (L) 06/13/2024    ALKPHO 146 (H) 06/13/2024    BILT 1.5 (H) 06/13/2024    TP 5.4 (L) 06/13/2024    AST 32 06/13/2024    ALT 32 06/13/2024    PTT 29.2 05/30/2020    INR 1.9 (A) 08/12/2020    T4F 0.73 11/02/2011    TSH 2.080 02/13/2019    LIP 18 (L) 05/30/2020    DDIMER 0.47 02/28/2022    CRP <0.5 01/29/2015    MG 1.7 06/19/2024    PHOS 4.2 06/17/2024    TROP <0.045 05/30/2020    CK 55 03/29/2017    ETOH <3 06/09/2024       Recent Labs   Lab 06/21/24  1206 06/24/24  0810 06/26/24  1015   RBC 3.70* 3.73* 3.44*   HGB 9.2* 9.1* 8.7*   HCT 31.3* 29.4* 27.1*   MCV 84.6 78.8* 78.8*   MCH 24.9* 24.4* 25.3*   MCHC 29.4* 31.0 32.1   RDW 16.4* 16.3* 16.2*   NEPRELIM 7.54 5.85 6.80   WBC 10.0 8.5 8.5   .0 440.0 413.0     Recent Labs   Lab 06/21/24  0751 06/24/24  0810 06/26/24  1015   GLU 87 101* 116*   BUN 9 7* <5*   CREATSERUM 0.38* 0.44* 0.43*   CA 9.1 8.8 9.2   * 134* 136   K 4.0 3.9 4.0    102 103   CO2 21.0 22.0 25.0     Lab Results   Component Value Date    INR 1.9 (A) 08/12/2020    INR 1.0 07/20/2020    INR 2.1 (A) 06/17/2020       Culture:  Hospital Encounter on 06/26/24   1. Body Fluid Cult Aerobic and Anaerobic     Status: None (Preliminary result)    Collection Time: 06/26/24 11:48 AM    Specimen: Pleural Fluid, Left; Body fluid, unspecified   Result Value Ref Range    Body Fluid Smear This is a cytocentrifuged smear. N/A    Body Fluid Smear 4+ WBCs seen N/A    Body Fluid Smear No organisms seen N/A         Imaging:  US VENOUS DOPPLER LEG BILAT - DIAG IMG (CPT=93970)    Result Date: 6/26/2024  CONCLUSION:  No DVT in either lower extremity.   Elm-remote  Dictated by (CST): William Jerome MD on 6/26/2024 at 5:02 PM     Finalized by (CST): William Jerome MD on 6/26/2024 at 5:03 PM          XR CHEST AP PORTABLE  (CPT=71045)    Result Date: 6/26/2024  CONCLUSION: Post left thoracentesis.  Interval decrease in size of left effusion now small in size.  No pneumothorax.  Interval improvement of  secondary compressive atelectasis in the left base.  Other cardiopulmonary findings in the right hemithorax are  stable.    Dictated by (CST): Gabo Gu MD on 6/26/2024 at 12:31 PM     Finalized by (CST): Gabo Gu MD on 6/26/2024 at 12:33 PM          US THORACENTESIS GUIDED LEFT (CPT=32555)    Result Date: 6/26/2024  CONCLUSION:  1. Left pleural effusion.    Dictated by (CST): Daljit Diaz MD on 6/26/2024 at 12:00 PM     Finalized by (CST): Daljit Diaz MD on 6/26/2024 at 12:02 PM          XR CHEST AP PORTABLE  (CPT=71045)    Result Date: 6/26/2024  CONCLUSION:  1.  Moderate-sized left pleural effusion which has increased in size since prior exam.  Pulmonary opacity left mid and lower lung zone rightward axis imaging secondary compressive atelectasis related to effusion. 2.  Small right apical subpleural opacity likely representing loculated effusion.  There is an adjacent small pulmonary opacity.  The opacity could represent atelectasis or pneumonia and this region otherwise shows improved aeration since previous exam. 3.  Linear atelectasis in the right lung base , which has improved since prior exam.     Dictated by (CST): Gabo Gu MD on 6/26/2024 at 10:55 AM     Finalized by (CST): Gabo Gu MD on 6/26/2024 at 10:56 AM          US PREGNANCY LTD (CPT=76815)    Result Date: 6/24/2024  CONCLUSION:  1. Single live intrauterine gestation in breech position with an estimated gestational age of 12 weeks 1 day based on the initial ultrasound study.  There has been adequate growth. 2. KIN = 01/05/2025.    Dictated by (CST): William Jerome MD on 6/24/2024 at 3:45 PM     Finalized by (CST): William Jerome MD on 6/24/2024 at 3:49 PM          CT BRAIN OR HEAD (02829)    Result Date: 6/21/2024  CONCLUSION:  1. Negative unenhanced CT brain.    Dictated by (CST): Daljit Diaz MD on 6/21/2024 at 9:57 PM     Finalized by (CST): Daljit Diaz MD on 6/21/2024 at 9:59 PM          XR CHEST AP PORTABLE   (CPT=71045)    Result Date: 6/21/2024  CONCLUSION:  1. Status post removal of the enteric tube and central venous catheter. 2. Worsening bilateral pulmonary opacities.  Small pleural effusions, left larger than right, with mild worsening on the left.  These findings could relate to worsening of moderate CHF/fluid overload and/or a superimposed multifocal infectious/inflammatory process.    Dictated by (CST): William Jerome MD on 6/21/2024 at 7:20 AM     Finalized by (CST): William Jerome MD on 6/21/2024 at 7:22 AM          XR CHEST AP PORTABLE  (CPT=71045)    Result Date: 6/20/2024  CONCLUSION:  1. Lines/tubes in stable customary positioning. 2. Partial improvement in bilateral pulmonary opacities and small bilateral pleural effusions, which could relate to improving mild CHF/fluid overload although a superimposed multifocal infectious/inflammatory process cannot be excluded.    Dictated by (CST): William Jerome MD on 6/20/2024 at 7:54 AM     Finalized by (CST): William Jerome MD on 6/20/2024 at 7:57 AM          XR CHEST AP PORTABLE  (CPT=71045)    Result Date: 6/19/2024  CONCLUSION:  1. Borderline heart size.  2. Support tubes and catheters are in satisfactory position. 3. Bilateral mixed alveolar and interstitial multifocal airspace opacification, slight progression.  4. Bilateral pleural effusions.    Dictated by (CST): Khadar Baxter MD on 6/19/2024 at 7:54 AM     Finalized by (CST): Khadar Baxter MD on 6/19/2024 at 8:00 AM          XR CHEST AP PORTABLE  (CPT=71045)    Result Date: 6/18/2024  CONCLUSION:  1. Cardiomegaly. 2. Support tubes and catheters are satisfactory. 3. Extensive bilateral mixed multifocal airspace opacification right worse than left.  4. Small bilateral effusions    Dictated by (CST): Khadar Baxter MD on 6/18/2024 at 8:18 AM     Finalized by (CST): Khadar Baxter MD on 6/18/2024 at 8:32 AM          XR CHEST AP PORTABLE  (CPT=71045)    Result Date:  6/17/2024  CONCLUSION:  1. Cardiomegaly. 2. Support tubes and catheters are satisfactory. 3. Extensive bilateral mixed multifocal airspace opacification with interval progression.  4. Small-moderate bilateral pleural effusions have progressed    Dictated by (CST): Khadar Baxter MD on 6/17/2024 at 9:15 AM     Finalized by (CST): Khadar Baxter MD on 6/17/2024 at 9:19 AM          XR CHEST AP PORTABLE  (CPT=71045)    Result Date: 6/17/2024  PROCEDURE: XR CHEST AP PORTABLE  (CPT=71045) TIME: 829  COMPARISON: [facility], XR CHEST AP PORTABLE (CPT=71045), 6/14/2024, 5:28 AM.  [facility], XR CHEST AP PORTABLE (CPT=71045), 6/10/2024, 2:18 AM.  [facility], CT CHEST PE AORTA (IV ONLY) (CPT=71260), 6/09/2024, 9:50 AM.  [facility], XR CHEST AP PORTABLE (CPT=71045), 6/17/2024, 7:12 AM.  INDICATIONS: Shortness of breath.  TECHNIQUE:   Single view.   Findings and impression:  There has been no significant change  Stable well-positioned support devices.  Stable heart size.  Normal vascular pedicle  Persistent small consolidation in the upper right lung and confluent bibasilar opacities from atelectasis/effusion or lung base consolidation  There is no pneumothorax     Dictated by (CST): Steffen Godinez MD on 6/17/2024 at 8:44 AM     Finalized by (CST): Steffen Godinez MD on 6/17/2024 at 8:48 AM          XR CHEST AP PORTABLE  (CPT=71045)    Result Date: 6/16/2024  CONCLUSION: Moderate interstitial edema, slightly improved but not resolved since 6/15/2024.  Small right and trace left pleural effusions have also slightly improved.  Retrocardiac air space density which may represent atelectasis versus pneumonia.  Dictated by (CST): Syd Smith MD on 6/16/2024 at 7:35 AM     Finalized by (CST): Syd Smith MD on 6/16/2024 at 7:37 AM          XR CHEST AP PORTABLE  (CPT=71045)    Result Date: 6/15/2024  CONCLUSION:  Unchanged mild cardiomegaly with interstitial  pulmonary edema.  Moderate right pleural effusion, mildly increased.  Multifocal airspace opacities throughout the right lung, similar to prior.  Unchanged left retrocardiac opacity.    Dictated by (CST): Aziza Nath MD on 6/15/2024 at 9:14 AM     Finalized by (CST): Aziza Nath MD on 6/15/2024 at 9:16 AM          US VENOUS DOPPLER ARM BILAT - DIAG IMG (CPT=93970)    Result Date: 6/14/2024  CONCLUSION:   No evidence of deep venous thrombosis.  Right IJ central venous catheter.    Dictated by (CST): Kaz French MD on 6/14/2024 at 10:57 AM     Finalized by (CST): Kaz French MD on 6/14/2024 at 11:00 AM          XR CHEST AP PORTABLE  (CPT=71045)    Result Date: 6/14/2024  CONCLUSION:   Enlarging right upper lobe airspace opacity, which may reflect pneumonia, atelectasis, or pulmonary edema.  The remaining opacities in the bilateral perihilar regions and bilateral lower lobes are not significantly changed.  No significant change in the bilateral pleural effusions.     Dictated by (CST): Kaz French MD on 6/14/2024 at 7:38 AM     Finalized by (CST): Kaz French MD on 6/14/2024 at 7:49 AM              Impression:              Hospital D# 1, admitted for pleural effusion, 12 week pregnancy    Recommendations:  Check ultrasound for viability once more.  Call if heavy vaginal bleeding.  Defer hospitalization management to hospitalist / pulmonary service / ID.      Thank you for allowing me to participate in the care of your patient.    Amber Paulino MD  6/27/2024

## 2024-06-27 NOTE — TELEPHONE ENCOUNTER
Patient currently admitted again, in room 513 at [place] so her appointment today is cancelled    She wanted the doctors to be aware.

## 2024-06-27 NOTE — PROGRESS NOTES
Progress Note     Latonia Barker Patient Status:  Inpatient    1/3/1995 MRN [id number]   Location [facility]5W Attending Iram Marquez MD   Hosp Day # 1 PCP Agatha Mcmanus MD     Chief Complaint: Left pleural effusion, heroin withdrawal    Subjective:   S: Patient states that she is feeling well at this time.  She does not recall most of her prior admission and is surprised that she was intubated for as long as she was.  She has questions about viability of her baby.    Otherwise she is pleasant cooperative has no acute complaints or issues at this time denies any shortness of breath or chest pain or significant cough.    Review of Systems:   10 point ROS completed and was negative, except for pertinent positive and negatives stated in subjective.    Objective:   Vital signs:  Temp:  [96.5 °F (35.8 °C)-98.1 °F (36.7 °C)] 97.5 °F (36.4 °C)  Pulse:  [] 98  Resp:  [14-26] 20  BP: ()/(57-87) 111/72  SpO2:  [93 %-100 %] 100 %    Wt Readings from Last 6 Encounters:   24 149 lb 3.2 oz (67.7 kg)   24 154 lb 5.2 oz (70 kg)   24 160 lb 9.6 oz (72.8 kg)   24 164 lb (74.4 kg)   24 172 lb (78 kg)   24 172 lb (78 kg)         Physical Exam:    General: No acute distress. Alert ,         Respiratory: Clear to auscultation bilaterally. No wheezes. No rhonchi.  Cardiovascular: S1, S2. Regular rate and rhythm. No murmurs, rubs or gallops.   Abdomen: Soft, nontender, nondistended.  Positive bowel sounds. No rebound or guarding.  Neurologic: No focal neurological deficits.   Musculoskeletal: Moves all extremities.  Extremities: No edema.    Results:   Diagnostic Data:      Labs:    Labs Last 24 Hours:   BMP     CBC    Other     Na 139 Cl 110 BUN <5 Glu 84   Hb 9.2   PTT - Procal -   K 4.3 CO2 24.0 Cr 0.41   WBC 8.4 >< .0  INR - CRP -   Renal Lytes Endo    Hct 29.8   Trop - D dim -   eGFR - Ca 9.0 POC Gluc  -    LFT   pBNP - Lactic -   eGFR AA - PO4 - A1c -   AST -  APk - Prot -  LDL -     Mg - TSH -   ALT - T priyanka - Alb -        COVID-19 Lab Results    COVID-19  Lab Results   Component Value Date    COVID19 Not Detected 06/26/2024    COVID19 Not Detected 06/09/2024       Pro-Calcitonin  No results for input(s): \"PCT\" in the last 168 hours.    Cardiac  No results for input(s): \"TROP\", \"PBNP\" in the last 168 hours.    Creatinine Kinase  No results for input(s): \"CK\" in the last 168 hours.    Inflammatory Markers  No results for input(s): \"CRP\", \"PINYK\", \"LDH\", \"DDIMER\" in the last 168 hours.    Imaging: Imaging data reviewed in Epic.    Medications:    meropenem  500 mg Intravenous Q8H    vancomycin  15 mg/kg Intravenous Q8H    buprenorphine  8 mg Sublingual BID    [Held by provider] gabapentin  300 mg Oral BID    lurasidone  40 mg Oral Daily with breakfast    melatonin  3 mg Oral Nightly    [Held by provider] prazosin  4 mg Oral Nightly    prenatal vitamin with DHA  1 capsule Oral Daily    QUEtiapine  300 mg Oral TID    venlafaxine ER  300 mg Oral Daily    lidocaine-menthol  1 patch Transdermal Daily       Assessment & Plan:   ASSESSMENT / PLAN:        Ongoing IV heroin drug abuse/ho severe withdrawal requitring intubation during last admission  Psychiatry consulted  On suboxone, seroquel       Left pleural effusion s/p thoracentesis 6/26/24 with 500 cc cloudy yellow fluid - cx pending   Rule out empyema.  Recent history of multifocal pneumonia.  ID on consult  C/u IV vancomycin and merrem  Check HIV   Obtain venous Doppler study both lower extremities.     Monitor respiratory status and hemodynamic status.    Follow up on blood cultures.      Recent admission for fever in an active IVDU pt with Fusobacterium bacteremia     hep C infection - untreated         Anemia of pregnancy.      A 12-week life gestation.           Quality:  DVT Prophylaxis: Avoid anticoagulation for DVT prophylaxis considering recent subchorionic hemorrhage found on an ultrasound on June 9.      CODE status:  full      Coordinated care with providers and counseling re: treatment plan and workup     Iram Marquez MD    Supplementary Documentation:

## 2024-06-27 NOTE — PROGRESS NOTES
[facility] reviewed [facility] plan and has no additional suggestions for management of patient behaviors. Treatment team can consult psychiatry for medication management if behavioral interventions prove insufficient.

## 2024-06-27 NOTE — PAYOR COMM NOTE
--------------  ADMISSION REVIEW     Payor: [facility]  Subscriber #:  [id number]  Authorization Number: [id number]    Admit date: 6/26/24  Admit time:  2:55 PM       REVIEW DOCUMENTATION:     ED Provider Notes        HPI    History is provided by patient/independent historian: Patient, EMS  29 year old female with with another history of drug abuse, recent admission for pneumonia and bacteremia, status post a course of antibiotics, recently restarted his sleep yesterday per mom secondary to having shortness of breath, here with complaints of shortness of breath, chest pain, difficulty walking for the past 2 days.  Of note, she is also 13 weeks pregnant per EMS.  No cough or fevers.      ROS  Review of Systems   Respiratory:  Positive for shortness of breath.    Cardiovascular:  Positive for chest pain.   All other systems reviewed and are negative.     All other pertinent organ systems are reviewed and are negative.      Physical Exam     ED Triage Vitals   BP 06/26/24 0933 104/74   Pulse 06/26/24 0933 110   Resp 06/26/24 0933 20   Temp 06/26/24 0936 97.9 °F (36.6 °C)   Temp src 06/26/24 0936 Oral   SpO2 06/26/24 0933 95 %   O2 Device 06/26/24 0933 None (Room air)     Vital signs reviewed.      Physical Exam  Vitals and nursing note reviewed.   Cardiovascular:      Pulses: Normal pulses.   Pulmonary:      Effort: No respiratory distress.      Comments: Conversational dyspnea, no accessory muscle usage, speaking in complete sentences  Abdominal:      General: There is no distension.   Neurological:      Mental Status: She is alert.         ED Course       Labs:     Labs Reviewed   BASIC METABOLIC PANEL (8) - Abnormal; Notable for the following components:       Result Value    Glucose 116 (*)     BUN <5 (*)     Creatinine 0.43 (*)     All other components within normal limits   POCT PREGNANCY URINE - Abnormal; Notable for the following components:    POCT Urine Pregnancy Positive (*)      All other components within normal limits   CBC W/ DIFFERENTIAL - Abnormal; Notable for the following components:    RBC 3.44 (*)     HGB 8.7 (*)     HCT 27.1 (*)     MCV 78.8 (*)     MCH 25.3 (*)     RDW 16.2 (*)     All other components within normal limits            As reviewed and Interpreted by me      Imaging Results Available and Reviewed while in ED:   US THORACENTESIS GUIDED LEFT (CPT=32555)    Result Date: 6/26/2024  CONCLUSION:  1. Left pleural effusion.    Dictated by (CST): Daljit Diaz MD on 6/26/2024 at 12:00 PM     Finalized by (CST): Daljit Diaz MD on 6/26/2024 at 12:02 PM          XR CHEST AP PORTABLE  (CPT=71045)    Result Date: 6/26/2024  CONCLUSION:  1.  Moderate-sized left pleural effusion which has increased in size since prior exam.  Pulmonary opacity left mid and lower lung zone rightward axis imaging secondary compressive atelectasis related to effusion. 2.  Small right apical subpleural opacity likely representing loculated effusion.  There is an adjacent small pulmonary opacity.  The opacity could represent atelectasis or pneumonia and this region otherwise shows improved aeration since previous exam. 3.  Linear atelectasis in the right lung base , which has improved since prior exam.     Dictated by (CST): Gabo Gu MD on 6/26/2024 at 10:55 AM     Finalized by (CST): Gabo Gu MD on 6/26/2024 at 10:56 AM         My review and independent interpretation of XR images: L pleural effusion. Radiology report corroborates this in addition to other details as reported by them.      Decision rules/scores evaluated: none      Diagnostic labs/tests considered but not ordered: none      MDM       Medical Decision Making      Differential Diagnosis: After obtaining the patient's history, performing the physical exam and reviewing the diagnostics, multiple initial diagnoses were considered based on the presenting problem including pneumonia, viral syndrome, anxiety, pulmonary  edema    External document review: I personally reviewed available external medical records for any recent pertinent discharge summaries, testing, and procedures - the findings are as follows: 6/9/24 admission for septic shock - fusobacterium bacteremia/multifocal pneumonia    Complicating Factors: The patient already  has a past medical history of Acne, Anxiety, Bacterial endocarditis (HCC) (03/2017), Bipolar disorder (HCC), Chlamydia, Decorative tattoo, Depression, Hepatitis B virus infection, History of blood clots, Human papilloma virus infection, Intravenous drug abuse (HCC), Opioid dependence (HCC), Subclavian vein thrombosis (HCC) (12/04/2017), and Substance abuse (HCC). to contribute to the complexity of this ED evaluation.    Procedures performed: none    Discussed management with physician/appropriate source: Dr. Saleh - at bedside to do thoracentesis, Dr. Collazo, Dr. Blancas, Les pharm D, Dr. Paulino        Disposition and Plan     Clinical Impression:  1. Pleural effusion        Disposition:  Admit      Hospital Problems       Present on Admission  Date Reviewed: 6/12/2024            ICD-10-CM Noted POA    Pleural effusion J90 6/26/2024 Unknown        6/26 PULMONARY:    Procedure: Ultrasound-guided left-sided thoracentesis     Pre-Procedure Diagnosis: Pleural effusion     Post-Procedure Diagnosis: 500 cc of cloudy yellow fluid aspirated     Anesthesia:  Local     Finding and procedure: The left posterior axillary line was cleaned, draped, anesthetized and 500 cc of cloudy yellow fluid was aspirated.  Chest x-ray is pending.     Specimens: Sent     Blood Loss: None                  Tourniquet Time: None  Complications:  None  Drains: None     Secondary Diagnosis: None     Srinivasan Saleh MD  Medical Director, Critical Care, [facility]  Medical Director, [facility]  Pager: [phone]                        HISTORY AND PHYSICAL EXAMINATION     CHIEF COMPLAINT:  Pleural effusion, rule out  empyema.     HISTORY OF PRESENT ILLNESS:  The patient is a 29-year-old  female with history of IV heroin drug abuse, was hospitalized earlier this month with multifocal pneumonia and acute hypoxemic respiratory failure requiring endotracheal intubation, complicated by septic shock and acute renal failure, treated with IV meropenem.  The patient also had an ultrasound initially upon admission which showed subchorionic hemorrhage 3.2 cm, which was not evident prior to discharge on another ultrasound.  The patient was followed by ID, Pulmonary, and Gynecology Service.  The patient reported that she was on Suboxone prior to her recent admission and she relapsed 2 days prior to her admission.  Today, in the emergency room upon arrival she presented with shortness of breath, fever, and night sweats and chills.  CBC was unremarkable, hemoglobin 8.7, no leukocytes or left shift.  Chemistry was unremarkable.  Chest x-ray showed moderate-sized left pleural effusion with pulmonary opacity left mid and lower lung zones, rightward axis imaging secondary to compressive atelectasis related to effusion, small right apical subpleural opacity likely representing loculated effusion and there is an adjacent small pulmonary opacity.  The opacity could represent atelectasis or pneumonia, and this region otherwise shows improved aeration.  The patient was seen by Pulmonary and had left-sided pleurocentesis.  Fluid was cloudy in color, sent for cytology, laboratory studies, and cultures.  The patient was initiated on IV meropenem.     PAST MEDICAL HISTORY:  Per the record, the patient has history of bipolar affective disorder, severe anxiety and depression, and heroin IV drug abuse, has been on Suboxone, relapsed recently.  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        ASSESSMENT AND PLAN:    1.       Ongoing IV  heroin drug abuse.  2.       Left pleural effusion.  Rule out empyema.  Recent history of multifocal pneumonia.  3.       Anemia of pregnancy.  4.       A 12-week life gestation.     The patient will be admitted to general medical floor.  Started her on IV meropenem in the emergency room.  Obtain venous Doppler study both lower extremities.  Avoid anticoagulation for DVT prophylaxis considering recent subchorionic hemorrhage found on an ultrasound on June 9.  Also, the patient will need gram-positive coverage and MRSA coverage.  Will leave it to ID and Gynecology to further discuss.  Monitor respiratory status and hemodynamic status.  Follow up on blood cultures.  Further recommendations to follow.     Dictated By Xuan Blancas MD    PULMONARY:      Date:  6/26/2024  Date of Admission:  6/26/2024     Chief Complaint:   Latonia Barker is a(n) 29 year old female with chest discomfort and dyspnea.     HPI:   The patient is well-known to me from her recent hospitalization.  She has a history of IV heroin abuse.  She was admitted with withdrawal state as well as Fusobacterium bacteremia.  She had a prolonged ICU course.  She had respiratory failure and possible aspiration pneumonitis.  She required mechanical ventilation for prolonged period of time.  She was discharged to an incarcerated status briefly and now has been home for several days but returns to the emergency room with chest discomfort and dyspnea.  There is no personal history of deep venous thrombosis.  She admits to low-grade fever yesterday.  She called the office send I had sent in prescription for azithromycin; however, this had not been initiated.  There is no hemoptysis.  She does have cough.  Chest x-ray demonstrated moderate left pleural effusion which was new compared to before and I drained 500 cc of cloudy yellow fluid.       Assessment/Plan:   1.  Acute respiratory syndrome-patient has worsening infiltrate and effusion at the left lung base  consistent with pneumonia.  I drained 500 cc of pleural fluid, likely parapneumonic.  Hopefully not empyema.  Will await analyses.  Need to cover for healthcare acquired infections.     Recommendations:  1.  Empiric antibiotic as per ID  2.  Blood cultures  3.  Repeat chest x-ray  4.  Streptococcal and Legionella urinary antigens  5.  Will follow clinically.     2.  History of heroin abuse with recent withdrawal state.     3.  DVT prophylaxis-subcutaneous heparin     4.  Anemia-per primary care     5.  Pregnancy-obstetrics to reevaluate as this is known to be complicated by a subchorionic hematoma.     6.  DVT prophylaxis-SCDs     6.  Hepatitis C     7.  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     I am delighted to assist with Latonia's care.        Srinivasan Saleh MD    MEDICATIONS ADMINISTERED IN LAST 1 DAY:  acetaminophen (Tylenol Extra Strength) tab 500 mg       Date Action Dose Route User    6/26/2024 1807 Given 500 mg Oral Alina Lay RN          acetaminophen (Tylenol Extra Strength) tab 500 mg       Date Action Dose Route User    6/27/2024 1245 Given 500 mg Oral Alina Lay RN    6/27/2024 0753 Given 500 mg Oral Alina Lay RN    6/26/2024 2226 Given 500 mg Oral Erin Ruby RN          buprenorphine (SUBUTEX) SL tab 8 mg       Date Action Dose Route User    6/27/2024 0840 Given 8 mg Sublingual Alina Lay RN    6/26/2024 2144 Given 8 mg Sublingual Erin Ruby RN          diphenhydrAMINE (Benadryl) 50 mg/mL  injection 25 mg       Date Action Dose Route User    6/27/2024 1406 Given 25 mg Intravenous Alina Lay RN    6/27/2024 0958 Given 25 mg Intravenous Alina Lay RN    6/26/2024 2143 Given 25 mg Intravenous Erin Ruby RN          lidocaine-menthol 4-1 % patch 1 patch       Date Action Dose Route User    6/27/2024 0753 Patch Applied 1 patch Transdermal (Left Lateral  Chest) Alina Lay RN    6/26/2024 2045 Patch Applied 1 patch Transdermal (Left Lateral Chest) Erin Ruby RN          melatonin tab 3 mg       Date Action Dose Route User    6/26/2024 2143 Given 3 mg Oral Erin Ruby RN          melatonin tab 6 mg       Date Action Dose Route User    6/27/2024 0235 Given 6 mg Oral Erin Ruby RN          meropenem (Merrem) 500 mg in sodium chloride 0.9% 100 mL IVPB-MBP       Date Action Dose Route User    6/27/2024 0958 New Bag 500 mg Intravenous Alina Lay RN    6/27/2024 0235 New Bag 500 mg Intravenous Erin Ruby RN    6/26/2024 1705 New Bag 500 mg Intravenous Alina Lay RN          prenatal vitamin with DHA (PNV with DHA) cap 1 capsule       Date Action Dose Route User    6/27/2024 0753 Given 1 capsule Oral Alina Lay RN    6/26/2024 1706 Given 1 capsule Oral Alina Lay RN          QUEtiapine (SEROquel) tab 300 mg       Date Action Dose Route User    6/27/2024 0840 Given 300 mg Oral Alina Lay RN    6/26/2024 2143 Given 300 mg Oral Erin Ruby RN    6/26/2024 1705 Given 300 mg Oral Alina Lay RN          sodium chloride 0.9% infusion       Date Action Dose Route User    6/26/2024 1705 New Bag (none) Intravenous Alina Lay RN          sodium chloride 0.9 % IV bolus 500 mL       Date Action Dose Route User    6/27/2024 0003 New Bag 500 mL Intravenous Felecia Alfaro RN          vancomycin (Vancocin) 1,000 mg in sodium chloride 0.9% 250 mL IVPB-ADDV       Date Action Dose Route User    6/26/2024 2221 New Bag 1,000 mg Intravenous Erin Ruby RN          venlafaxine ER (Effexor-XR) 24 hr cap 300 mg       Date Action Dose Route User    6/27/2024 0753 Given 300 mg Oral Alina Lay RN            Vitals (last day)       Date/Time Temp Pulse Resp BP SpO2 Weight O2 Device O2 Flow Rate (L/min) [facility]    06/27/24 1237 97.7 °F (36.5 °C) 98 18 111/77 94 % -- None (Room air) --     06/27/24 0751 97.5 °F  (36.4 °C) 98 20 111/72 100 % -- None (Room air) --     06/27/24 0440 97.9 °F (36.6 °C) 95 20 99/67 94 % -- None (Room air) --     06/26/24 2331 -- 96 20 98/71 96 % -- None (Room air) --     06/26/24 2209 -- 79 -- -- -- -- -- --     06/26/24 2043 98.1 °F (36.7 °C) 103 20 100/71 97 % -- None (Room air) --     06/26/24 1809 98 °F (36.7 °C) 95 20 107/75 95 % -- None (Room air) --     06/26/24 1455 98 °F (36.7 °C) 99 20 113/77 97 % 149 lb 3.2 oz None (Room air) --     06/26/24 1415 -- 86 20 102/70 96 % -- None (Room air) --     06/26/24 1400 -- 102 20 94/57 96 % -- None (Room air) --     06/26/24 1345 -- 104 20 91/72 95 % -- -- --     06/26/24 1330 96.5 °F (35.8 °C) 105 20 101/66 95 % -- -- --     06/26/24 1315 -- 90 20 97/69 95 % -- -- --     06/26/24 1300 -- 92 20 101/72 95 % -- -- --     06/26/24 1245 -- 87 20 95/63 95 % -- None (Room air) --     06/26/24 1215 -- 99 20 98/71 98 % -- -- --     06/26/24 1200 -- 101 14 103/87 93 % -- -- --     06/26/24 1145 -- 90 20 95/76 93 % -- None (Room air) --     06/26/24 1100 -- 99 26 98/77 96 % -- None (Room air) --     06/26/24 1030 -- 96 20 99/73 96 % -- None (Room air) --     06/26/24 1000 -- 102 20 95/68 95 % -- None (Room air) --     06/26/24 0944 -- -- -- -- -- -- None (Room air) --     06/26/24 0936 97.9 °F (36.6 °C) -- -- -- -- -- -- --     06/26/24 0933 -- 110 20 104/74 95 % -- None (Room air) --

## 2024-06-27 NOTE — PLAN OF CARE
Problem: Risk for Violence/Aggression  Goal: Absence of Violence/Aggression  Description: INTERVENTIONS:   - Identify precipitating factors for behavior   - Notify Charge RN/Provider   - Consider decreasing stimulation   - Consider distraction measures   - Consider discussion with provider regarding prn meds    - Consider VIRAJ (Moderate Risk only)   - Consider Code Support (High Risk only)   - Consider room safety checks   - Consider restraints  Outcome: Progressing    Problem: SKIN/TISSUE INTEGRITY - ADULT  Goal: Skin integrity remains intact  Description: INTERVENTIONS  - Assess and document risk factors for pressure ulcer development  - Assess and document skin integrity  - Monitor for areas of redness and/or skin breakdown  - Initiate interventions, skin care algorithm/standards of care as needed  Outcome: Progressing  Goal: Incision(s), wounds(s) or drain site(s) healing without S/S of infection  Description: INTERVENTIONS:  - Assess and document risk factors for pressure ulcer development  - Assess and document skin integrity  - Assess and document dressing/incision, wound bed, drain sites and surrounding tissue  - Implement wound care per orders  - Initiate isolation precautions as appropriate  - Initiate Pressure Ulcer prevention bundle as indicated  Outcome: Progressing     Problem: PAIN - ADULT  Goal: Verbalizes/displays adequate comfort level or patient's stated pain goal  Description: INTERVENTIONS:  - Encourage pt to monitor pain and request assistance  - Assess pain using appropriate pain scale  - Administer analgesics based on type and severity of pain and evaluate response  - Implement non-pharmacological measures as appropriate and evaluate response  - Consider cultural and social influences on pain and pain management  - Manage/alleviate anxiety  - Utilize distraction and/or relaxation techniques  - Monitor for opioid side effects  - Notify MD/LIP if interventions unsuccessful or patient reports  new pain  - Anticipate increased pain with activity and pre-medicate as appropriate  Outcome: Progressing

## 2024-06-27 NOTE — CONSULTS
Spiritual Care Visit Note    Patient Name: Latonia Barker Date of Spiritual Care Visit: 26   : 1/3/1995 Primary Dx: Pleural effusion       Referred By: Referral From: Nurse;Patient    Spiritual Care Taxonomy:    Intended Effects: Establish rapport and connectedness    Methods: Collaborate with care team member;Offer support    Interventions: Active listening;Ask guided questions;Assist someone with Advance Directives;Prayer for healing    Visit Type/Summary:     - PoA: New PoAH Created: Visited patient in response to PoA for Healthcare consult/request. Created a new PoAH for Healthcare document that, per the patient, accurately reflects their wishes. Patient named mother, Randy Robbins ([phone]) as primary agent. Gave the patient the original PoAH document along with copies. Confirmed that the PoAH primary agent name and contact information have been entered into the Epic, Advance Directives section. A copy of the new PoAH document has been placed on the patient's paper chart. Mother and patient requested prayer. Writer extended words of encouragement and healing prayer. Writer gave family a Spiritual Care card. No other need at this time.     DUSTIN Vazquez   [id number]     Spiritual Care support can be requested via an [facility] consult. For urgent/immediate needs, please contact the On Call  at: [place]: ext [phone]

## 2024-06-28 ENCOUNTER — TELEPHONE (OUTPATIENT)
Dept: PULMONOLOGY | Facility: CLINIC | Age: 29
End: 2024-06-28

## 2024-06-28 ENCOUNTER — APPOINTMENT (OUTPATIENT)
Dept: GENERAL RADIOLOGY | Facility: HOSPITAL | Age: 29
End: 2024-06-28
Attending: INTERNAL MEDICINE
Payer: MEDICAID

## 2024-06-28 VITALS
BODY MASS INDEX: 27.45 KG/M2 | OXYGEN SATURATION: 98 % | DIASTOLIC BLOOD PRESSURE: 88 MMHG | HEIGHT: 62 IN | HEART RATE: 93 BPM | TEMPERATURE: 98 F | WEIGHT: 149.19 LBS | SYSTOLIC BLOOD PRESSURE: 111 MMHG | RESPIRATION RATE: 18 BRPM

## 2024-06-28 DIAGNOSIS — J90 PLEURAL EFFUSION ON LEFT: Primary | ICD-10-CM

## 2024-06-28 PROCEDURE — 99233 SBSQ HOSP IP/OBS HIGH 50: CPT | Performed by: INTERNAL MEDICINE

## 2024-06-28 PROCEDURE — 71046 X-RAY EXAM CHEST 2 VIEWS: CPT | Performed by: INTERNAL MEDICINE

## 2024-06-28 PROCEDURE — 99233 SBSQ HOSP IP/OBS HIGH 50: CPT | Performed by: PHYSICIAN ASSISTANT

## 2024-06-28 RX ORDER — CEFDINIR 300 MG/1
300 CAPSULE ORAL 2 TIMES DAILY
Qty: 42 CAPSULE | Refills: 0 | Status: SHIPPED | OUTPATIENT
Start: 2024-06-28 | End: 2024-07-19

## 2024-06-28 RX ORDER — NICOTINE 21 MG/24HR
1 PATCH, TRANSDERMAL 24 HOURS TRANSDERMAL DAILY
Status: DISCONTINUED | OUTPATIENT
Start: 2024-06-28 | End: 2024-06-28

## 2024-06-28 NOTE — PROGRESS NOTES
Patient seen  Feeling much better s/p drainage of paranpneumonic effusion  Denies vb or cramping  Reviewed US report  Viable IUP  Patient wishes to continue pregnancy  Explained that once she is DCed she will need to establish care at tertiary center-recommendations to call [facility] tomorrow to establish care  CPM per primary team  Will continue to follow     Study Result    Narrative   PROCEDURE:  PREGNANCY LTD (CPT=76815)     COMPARISON: [facility],  PREGNANCY LTD (CPT=76815), 6/24/2024, 3:16 PM.     INDICATIONS: Fetal heart tones and viability     TECHNIQUE: Limited sonographic examination for obstetrical and fetal evaluation.     FINDINGS:  FETAL NUMBER: Single.  The crown-rump length was further assessed on the prior ultrasound dated 06/24/2024.  POSITION variable lie  FETAL HEART RATE: 168 BPM    PLACENTA LOCATION: Posterior.               Impression   CONCLUSION:     Single intrauterine pregnancy with a fetal heart rate of 168 beats per minute.           Dictated by (CST): Kaz French MD on 6/27/2024 at 8:11 AM      Finalized by (CST): Kaz French MD on 6/27/2024 at 8:14 AM

## 2024-06-28 NOTE — PAYOR COMM NOTE
--------------  6/28:  CONTINUED STAY REVIEW    Payor: [facility]  Subscriber #:  [id number]  Authorization Number: [id number]    Admit date: 6/26/24  Admit time:  2:55 PM    PULMONARY:        Subjective:  Seen and examined this morning with mother at bedside. Tearful, wants to go home today. No shortness of breath or cough. No fever or chills. On room air.           Objective:  Blood pressure 97/86, pulse 93, temperature 97.6 °F (36.4 °C), temperature source Oral, resp. rate 18, height 5' 2\" (1.575 m), weight 149 lb 3.2 oz (67.7 kg), last menstrual period 03/25/2024, SpO2 92%, not currently breastfeeding.  Physical Exam  Vitals and nursing note reviewed.   Constitutional:       General: She is awake. She is not in acute distress.  HENT:      Head: Normocephalic and atraumatic.   Cardiovascular:      Rate and Rhythm: Normal rate and regular rhythm.   Pulmonary:      Effort: Pulmonary effort is normal. No respiratory distress.      Breath sounds: Normal breath sounds. No wheezing, rhonchi or rales.   Musculoskeletal:      Cervical back: Normal range of motion and neck supple.      Right lower leg: No edema.      Left lower leg: No edema.   Skin:     General: Skin is warm and dry.   Neurological:      General: No focal deficit present.      Mental Status: She is alert and oriented to person, place, and time.   Psychiatric:         Mood and Affect: Mood is anxious. Affect is tearful.         Behavior: Behavior is cooperative.               Results:        Lab Results   Component Value Date     WBC 8.4 06/27/2024     HGB 9.2 (L) 06/27/2024     HCT 29.8 (L) 06/27/2024     .0 06/27/2024     CREATSERUM 0.41 (L) 06/27/2024     BUN <5 (L) 06/27/2024      06/27/2024     K 4.3 06/27/2024      06/27/2024     CO2 24.0 06/27/2024     GLU 84 06/27/2024     CA 9.0 06/27/2024     ALB 2.7 (L) 06/13/2024     ALKPHO 146 (H) 06/13/2024     BILT 1.5 (H) 06/13/2024     TP 5.4 (L)  06/13/2024     AST 32 06/13/2024     ALT 32 06/13/2024     PTT 29.2 05/30/2020     INR 1.9 (A) 08/12/2020     T4F 0.73 11/02/2011     TSH 2.080 02/13/2019     LIP 18 (L) 05/30/2020     DDIMER 0.47 02/28/2022     CRP <0.5 01/29/2015     MG 1.7 06/19/2024     PHOS 4.2 06/17/2024     TROP <0.045 05/30/2020     TROPHS 4 02/28/2022     CK 55 03/29/2017     ETOH <3 06/09/2024         US PREGNANCY LTD (CPT=76815)     Result Date: 6/27/2024  CONCLUSION:   Single intrauterine pregnancy with a fetal heart rate of 168 beats per minute.    Dictated by (CST): Kaz French MD on 6/27/2024 at 8:11 AM     Finalized by (CST): Kaz French MD on 6/27/2024 at 8:14 AM           US VENOUS DOPPLER LEG BILAT - DIAG IMG (CPT=93970)     Result Date: 6/26/2024  CONCLUSION:  No DVT in either lower extremity.   Elm-remote  Dictated by (CST): William Jerome MD on 6/26/2024 at 5:02 PM     Finalized by (CST): William Jerome MD on 6/26/2024 at 5:03 PM           XR CHEST AP PORTABLE  (CPT=71045)     Result Date: 6/26/2024  CONCLUSION: Post left thoracentesis.  Interval decrease in size of left effusion now small in size.  No pneumothorax.  Interval improvement of secondary compressive atelectasis in the left base.  Other cardiopulmonary findings in the right hemithorax are  stable.    Dictated by (CST): Gabo Gu MD on 6/26/2024 at 12:31 PM     Finalized by (CST): Gabo Gu MD on 6/26/2024 at 12:33 PM           US THORACENTESIS GUIDED LEFT (CPT=32555)     Result Date: 6/26/2024  CONCLUSION:  1. Left pleural effusion.    Dictated by (CST): Daljit Diaz MD on 6/26/2024 at 12:00 PM     Finalized by (CST): Daljit Diaz MD on 6/26/2024 at 12:02 PM           XR CHEST AP PORTABLE  (CPT=71045)     Result Date: 6/26/2024  CONCLUSION:  1.  Moderate-sized left pleural effusion which has increased in size since prior exam.  Pulmonary opacity left mid and lower lung zone rightward axis imaging secondary compressive atelectasis related to  effusion. 2.  Small right apical subpleural opacity likely representing loculated effusion.  There is an adjacent small pulmonary opacity.  The opacity could represent atelectasis or pneumonia and this region otherwise shows improved aeration since previous exam. 3.  Linear atelectasis in the right lung base , which has improved since prior exam.     Dictated by (CST): Gabo Gu MD on 6/26/2024 at 10:55 AM     Finalized by (CST): Gabo Gu MD on 6/26/2024 at 10:56 AM         EKG 12 Lead     Result Date: 6/26/2024  Sinus tachycardia Otherwise normal ECG When compared with ECG of 13-JUN-2024 03:11, No significant change was found Confirmed by Brian Erickson ([id number]) on 6/26/2024 3:17:10 PM            Assessment & Plan:  Left pleural effusion  Chest x-ray 6/26 with worsening left pleural effusion and infiltrates  S/p left thoracentesis 6/26 with -500 ml  Chest x-ray today 6/28 with small left pleural effusion slightly increased compared to post-thoracentesis film  Pleural fluid exudate with marked WBCs and neutrophil predominance; likely parapneumonic  Pleural fluid cultures no growth to date  Blood cultures negative x1 day  Strep and Legionella antigens negative  Plan:  -Empiric antibiotics as per ID  -Plan for left thoracentesis tomorrow with Dr. 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 Saleh, ID PA-C, and RN.     Adan Reyna, PALULU  Pulmonary Medicine  6/28/2024                       MEDICATIONS ADMINISTERED IN LAST 1 DAY:  acetaminophen (Tylenol Extra Strength) tab 500 mg       Date Action Dose Route User    6/27/2024  1632 Given 500 mg Oral Alina Lay RN    6/27/2024 1245 Given 500 mg Oral Alina Lay RN          buprenorphine (SUBUTEX) SL tab 8 mg       Date Action Dose Route User    6/28/2024 0901 Given 8 mg Sublingual Stephani Tinoco RN    6/27/2024 2130 Given 8 mg Sublingual Candida Frias RN          diphenhydrAMINE (Benadryl) 50 mg/mL  injection 25 mg       Date Action Dose Route User    6/28/2024 0613 Given 25 mg Intravenous Candida Frias RN    6/28/2024 0153 Given 25 mg Intravenous Candida Frias RN    6/27/2024 2239 Given 25 mg Intravenous Candida Frias RN    6/27/2024 1915 Given 25 mg Intravenous Alina Lay RN    6/27/2024 1406 Given 25 mg Intravenous Alina Lay RN          melatonin tab 3 mg       Date Action Dose Route User    6/27/2024 2128 Given 3 mg Oral Candida Frias RN          meropenem (Merrem) 500 mg in sodium chloride 0.9% 100 mL IVPB-MBP       Date Action Dose Route User    6/28/2024 0849 New Bag 500 mg Intravenous Corrina Gee RN    6/28/2024 0057 New Bag 500 mg Intravenous Candida Frias RN    6/27/2024 1706 New Bag 500 mg Intravenous Alina Lay RN          nicotine (Nicoderm CQ) 14 MG/24HR patch 1 patch       Date Action Dose Route User    6/28/2024 0854 Patch Applied 1 patch Transdermal (Right Upper Arm) Corrina Gee RN          prenatal vitamin with DHA (PNV with DHA) cap 1 capsule       Date Action Dose Route User    6/28/2024 0849 Given 1 capsule Oral Corrina Gee RN          QUEtiapine (SEROquel) tab 300 mg       Date Action Dose Route User    6/28/2024 0849 Given 300 mg Oral Corrina Gee RN    6/27/2024 2128 Given 300 mg Oral Candida Frias RN    6/27/2024 1632 Given 300 mg Oral Alina Lay RN          sodium chloride 0.9% infusion       Date Action Dose Route User    6/28/2024 0614 New Bag (none) Intravenous Candida Frias, RN    6/27/2024 1915 New Bag (none) Intravenous Alina Lay, HANSEL          vancomycin (Vancocin)  1,000 mg in sodium chloride 0.9% 250 mL IVPB-ADDV       Date Action Dose Route User    6/28/2024 0613 New Bag 1,000 mg Intravenous Candida Frias RN    6/27/2024 2239 New Bag 1,000 mg Intravenous Candida Frias RN    6/27/2024 1449 New Bag 1,000 mg Intravenous Alina Lay RN          venlafaxine ER (Effexor-XR) 24 hr cap 300 mg       Date Action Dose Route User    6/28/2024 0849 Given 300 mg Oral Corrina Gee RN            Vitals (last day)       Date/Time Temp Pulse Resp BP SpO2 Weight O2 Device O2 Flow Rate (L/min) Who    06/28/24 0613 97.6 °F (36.4 °C) 93 18 97/86 92 % -- None (Room air) -- NM    06/28/24 0058 98.2 °F (36.8 °C) 89 18 102/77 97 % -- None (Room air) -- NM    06/27/24 2131 98.6 °F (37 °C) 105 18 112/73 96 % -- None (Room air) -- NM    06/27/24 1900 -- 104 -- -- -- -- -- -- CY    06/27/24 1631 97.8 °F (36.6 °C) 88 19 122/82 98 % -- None (Room air) -- CG    06/27/24 1237 97.7 °F (36.5 °C) 98 18 111/77 94 % -- None (Room air) -- CG    06/27/24 0751 97.5 °F (36.4 °C) 98 20 111/72 100 % -- None (Room air) -- CG    06/27/24 0440 97.9 °F (36.6 °C) 95 20 99/67 94 % -- None (Room air) -- ES

## 2024-06-28 NOTE — DISCHARGE INSTRUCTIONS
We will work on scheduling thoracentesis for you on Wednesday 7/3/24 with Dr. Saleh in the morning. Adan from Dr. Saleh's office will call you with information and timing of procedure. You need to arrive to [facility].

## 2024-06-28 NOTE — PROGRESS NOTES
[facility]  part of [facility]    Progress Note    Latonia Barker Patient Status:  Inpatient    1/3/1995 MRN [id number]   Location [facility]5W Attending Iram Marquez MD   Hosp Day # 2 PCP Agatha Mcmanus MD     Subjective:   Seen and examined this morning with mother at bedside. Tearful, wants to go home today. No shortness of breath or cough. No fever or chills. On room air.    Objective:   Blood pressure 97/86, pulse 93, temperature 97.6 °F (36.4 °C), temperature source Oral, resp. rate 18, height 5' 2\" (1.575 m), weight 149 lb 3.2 oz (67.7 kg), last menstrual period 2024, SpO2 92%, not currently breastfeeding.  Physical Exam  Vitals and nursing note reviewed.   Constitutional:       General: She is awake. She is not in acute distress.  HENT:      Head: Normocephalic and atraumatic.   Cardiovascular:      Rate and Rhythm: Normal rate and regular rhythm.   Pulmonary:      Effort: Pulmonary effort is normal. No respiratory distress.      Breath sounds: Normal breath sounds. No wheezing, rhonchi or rales.   Musculoskeletal:      Cervical back: Normal range of motion and neck supple.      Right lower leg: No edema.      Left lower leg: No edema.   Skin:     General: Skin is warm and dry.   Neurological:      General: No focal deficit present.      Mental Status: She is alert and oriented to person, place, and time.   Psychiatric:         Mood and Affect: Mood is anxious. Affect is tearful.         Behavior: Behavior is cooperative.       Results:   Lab Results   Component Value Date    WBC 8.4 2024    HGB 9.2 (L) 2024    HCT 29.8 (L) 2024    .0 2024    CREATSERUM 0.41 (L) 2024    BUN <5 (L) 2024     2024    K 4.3 2024     2024    CO2 24.0 2024    GLU 84 2024    CA 9.0 2024    ALB 2.7 (L) 2024    ALKPHO 146 (H) 2024    BILT 1.5 (H) 2024    TP 5.4 (L) 2024    AST  32 06/13/2024    ALT 32 06/13/2024    PTT 29.2 05/30/2020    INR 1.9 (A) 08/12/2020    T4F 0.73 11/02/2011    TSH 2.080 02/13/2019    LIP 18 (L) 05/30/2020    DDIMER 0.47 02/28/2022    CRP <0.5 01/29/2015    MG 1.7 06/19/2024    PHOS 4.2 06/17/2024    TROP <0.045 05/30/2020    TROPHS 4 02/28/2022    CK 55 03/29/2017    ETOH <3 06/09/2024       US PREGNANCY LTD (CPT=76815)    Result Date: 6/27/2024  CONCLUSION:   Single intrauterine pregnancy with a fetal heart rate of 168 beats per minute.    Dictated by (CST): Kaz French MD on 6/27/2024 at 8:11 AM     Finalized by (CST): Kaz French MD on 6/27/2024 at 8:14 AM          US VENOUS DOPPLER LEG BILAT - DIAG IMG (CPT=93970)    Result Date: 6/26/2024  CONCLUSION:  No DVT in either lower extremity.   Elm-remote  Dictated by (CST): William Jerome MD on 6/26/2024 at 5:02 PM     Finalized by (CST): William Jerome MD on 6/26/2024 at 5:03 PM          XR CHEST AP PORTABLE  (CPT=71045)    Result Date: 6/26/2024  CONCLUSION: Post left thoracentesis.  Interval decrease in size of left effusion now small in size.  No pneumothorax.  Interval improvement of secondary compressive atelectasis in the left base.  Other cardiopulmonary findings in the right hemithorax are  stable.    Dictated by (CST): Gabo Gu MD on 6/26/2024 at 12:31 PM     Finalized by (CST): Gabo Gu MD on 6/26/2024 at 12:33 PM          US THORACENTESIS GUIDED LEFT (CPT=32555)    Result Date: 6/26/2024  CONCLUSION:  1. Left pleural effusion.    Dictated by (CST): Daljit Diaz MD on 6/26/2024 at 12:00 PM     Finalized by (CST): Daljit Diaz MD on 6/26/2024 at 12:02 PM          XR CHEST AP PORTABLE  (CPT=71045)    Result Date: 6/26/2024  CONCLUSION:  1.  Moderate-sized left pleural effusion which has increased in size since prior exam.  Pulmonary opacity left mid and lower lung zone rightward axis imaging secondary compressive atelectasis related to effusion. 2.  Small right apical subpleural  opacity likely representing loculated effusion.  There is an adjacent small pulmonary opacity.  The opacity could represent atelectasis or pneumonia and this region otherwise shows improved aeration since previous exam. 3.  Linear atelectasis in the right lung base , which has improved since prior exam.     Dictated by (CST): Gabo Gu MD on 6/26/2024 at 10:55 AM     Finalized by (CST): Gabo Gu MD on 6/26/2024 at 10:56 AM         EKG 12 Lead    Result Date: 6/26/2024  Sinus tachycardia Otherwise normal ECG When compared with ECG of 13-JUN-2024 03:11, No significant change was found Confirmed by Brian Erickson ([id number]) on 6/26/2024 3:17:10 PM     Assessment & Plan:   Left pleural effusion  Chest x-ray 6/26 with worsening left pleural effusion and infiltrates  S/p left thoracentesis 6/26 with -500 ml  Chest x-ray today 6/28 with small left pleural effusion slightly increased compared to post-thoracentesis film  Pleural fluid exudate with marked WBCs and neutrophil predominance; likely parapneumonic  Pleural fluid cultures no growth to date  Blood cultures negative x1 day  Strep and Legionella antigens negative  Plan:  -Empiric antibiotics as per ID  -Plan for left thoracentesis tomorrow with Dr. 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 Saleh, ID PA-C, and RN.    ADDENDUM: Patient adamantly requesting discharge today and not agreeable to thoracentesis tomorrow while inpatient. Case d/w Dr. Saleh and ID team regarding discharge plan and antibiotics. Plan for outpatient left thoracentesis with Dr. Saleh on 7/3 at 10 am. Plan for  cefdinir 300 mg BID x21 days at discharge.    Adan Reyna PA-C  Pulmonary Medicine  6/28/2024

## 2024-06-28 NOTE — TELEPHONE ENCOUNTER
Spoke with Brandie in Radiology. Left sided ultrasound guided thoracentesis scheduled for 7/3 at 10 am with Dr. Saleh.    I spoke with patient and her mother.

## 2024-06-28 NOTE — DISCHARGE INSTRUCTIONS
Discharge Instructions for Thoracentesis  Thoracentesis is a procedure that removes extra fluid from the pleural space. This space is between the outside surface of the lungs (pleura) and the chest wall. The extra fluid is called pleural effusion. Thoracentesis may be done to take a sample of the fluid. It can then be tested to help find the cause. Or the procedure may be done to drain the extra fluid if you are having trouble breathing.     Home care  You may have some pain after the procedure. Your provider may prescribe pain medicine, if needed. Take these exactly as directed.  If you stopped taking other medicines before the procedure, ask your provider when you can start them again.  Take it easy for 48 hours after the procedure. Don't do anything active until your provider says it’s OK.  Don't do strenuous activities, such as lifting, until your provider says it’s OK.  You will have a small bandage over the puncture site. You may remove the bandage in 24 hours, or when your provider says it's OK.  Check the puncture site for the signs of infection listed below.    Follow-up  Make a follow-up appointment with your provider as directed. During your follow-up visit, your provider will check your healing. Be sure to let your provider know how you are feeling.   When to call your healthcare provider  Call your provider right away if you have any of these:   Fever of 100.4°F (38°C) or higher, or as directed  Pain that doesn't get better after taking pain medicine  Signs of infection at the puncture site. These include increased pain, redness, warmth, swelling, or fluid leaking that is green or yellow or smells bad.  Fluid draining from the puncture site  Bleeding from the puncture site  When to call 911  Call 911 or get care at the nearest emergency department if any of these occur:   Chest pain that is unusual or suddenly gets worse  Shortness of breath  Coughing up blood    THANK YOU, WE WISH YOU WELL.

## 2024-06-28 NOTE — PROGRESS NOTES
INFECTIOUS DISEASE PROGRESS NOTE  [facility]  part of [facility] ID PROGRESS NOTE    Latonia Barker Patient Status:  Inpatient    1/3/1995 MRN [id number]   Location [facility]5W Attending Iram Marquez MD   Hosp Day # 2 PCP Agatha Mcmanus MD     Subjective:  ROS reviewed. Anxious to get out of the hospital.    ASSESSMENT:    Antibiotics: Meropenem  ceftriaxone, daptomycin, cefepime     # L pleural effusion s/p thoracentesis 24 with 500 cc cloudy yellow fluid - cx NGTD     # Recent admission for fever in an active IVDU pt with Fusobacterium bacteremia ?GI source, Lemierre's less likely   - UA with only 1-5wbc, most recent urine cx on 24 with E coli  - CT chest neg for PE but has multifocal GGOs and consolidations - s/p IV meropenem EOT 24     # Multifocal pneumonia  # H/o MRSA bacteremia due to pulmonary valve endocarditis with septic embolic to lung s/p 6 weeks daptomycin (completed 17)               -TTE unremarkable     # Second trimester pregnancy  # Transaminitis  # hep C infection - untreated  # active IV heroin use  - HIV 24 non-reactive  - on Suboxone; relapsed 4 days ago     PLAN:  -  Continue on meropenem. Stop vancomycin.  -  CXR noted. Plans for likely repeat thoracentesis.  -  Follow fever curve, wbc.  -  Reviewed labs, micro, imaging reports, available old records.  -  Case d/w patient, RN.     History of Present Illness:  29-year-old female with a history of active IV heroin use last 2 days prior to admission, HCV, bipolar disorder, history of MRSA bacteremia with pulmonic valve endocarditis and septic emboli status post 6 weeks of IV daptomycin .  Also pregnant.  Was seen recently and just discharged a few days prior for Fusobacterium bacteremia thought to be from GI source and less like millimeters disease with associate multifocal pneumonia.  Treated with IV meropenem completed on .  Discharged under police custody and  then went home after that.  Now comes in with 1 day of increased shortness of breath, chest pain, lower extremity pain without fevers, chills or significant cough.  Afebrile here, room air, normal WBC.  Chest x-ray with moderate-sized left pleural effusion.  Seen by pulmonary and underwent thoracentesis of the left side with 500 cc of cloudy yellow fluid.    Physical Exam:  /88 (BP Location: Right arm)   Pulse 93   Temp 97.5 °F (36.4 °C) (Oral)   Resp 18   Ht 5' 2\" (1.575 m)   Wt 149 lb 3.2 oz (67.7 kg)   LMP 03/25/2024 (Approximate)   SpO2 98%   BMI 27.29 kg/m²     Gen:   Awake, in bed  HEENT:  EOMI, neck supple  CV/lungs:  RRR, CTAB  Abdom:  Soft, NT/ND, +BS  Skin/extrem:  No rashes, no c/c/e  :   Purewick+  Lines:  PIV+      Laboratory Data: Reviewed    Microbiology: Reviewed    Radiology: Reviewed      [facility]  [phone]  6/28/2024

## 2024-06-28 NOTE — PROGRESS NOTES
Progress Note     Latonia Barker Patient Status:  Inpatient    1/3/1995 MRN [id number]   Location [facility]5W Attending Iram Marquez MD   Hosp Day # 2 PCP Agatha Mcmanus MD     Chief Complaint: Left pleural effusion, heroin withdrawal    Subjective:   S: Patient states that she is feeling well at this time.  She does not recall most of her prior admission and is surprised that she was intubated for as long as she was.  She has questions about viability of her baby.    Otherwise she is pleasant cooperative has no acute complaints or issues at this time denies any shortness of breath or chest pain or significant cough.    Review of Systems:   10 point ROS completed and was negative, except for pertinent positive and negatives stated in subjective.    Objective:   Vital signs:  Temp:  [97.6 °F (36.4 °C)-98.6 °F (37 °C)] 97.6 °F (36.4 °C)  Pulse:  [] 93  Resp:  [18-19] 18  BP: ()/(73-86) 97/86  SpO2:  [92 %-98 %] 92 %    Wt Readings from Last 6 Encounters:   24 149 lb 3.2 oz (67.7 kg)   24 154 lb 5.2 oz (70 kg)   24 160 lb 9.6 oz (72.8 kg)   24 164 lb (74.4 kg)   24 172 lb (78 kg)   24 172 lb (78 kg)       {Results  Index  PMH  PSH  SocHx  FHx  Allergies  ProbList  Imaging  Cardio  Lab  Current Meds  Med Hx  ExpDC :8307}  Physical Exam:    General: No acute distress. Alert ,         Respiratory: Clear to auscultation bilaterally. No wheezes. No rhonchi.  Cardiovascular: S1, S2. Regular rate and rhythm. No murmurs, rubs or gallops.   Abdomen: Soft, nontender, nondistended.  Positive bowel sounds. No rebound or guarding.  Neurologic: No focal neurological deficits.   Musculoskeletal: Moves all extremities.  Extremities: No edema.    Results:   Diagnostic Data:      Labs:  {Results  Index  Imaging  Cardio  Lab :8307}  Labs Last 24 Hours:   BMP     CBC    Other     Na - Cl - BUN - Glu -   Hb -   PTT - Procal -   K - CO2 - Cr -   WBC - >< PLT  -  INR - CRP -   Renal Lytes Endo    Hct -   Trop - D dim -   eGFR - Ca - POC Gluc  -    LFT   pBNP - Lactic -   eGFR AA - PO4 - A1c -   AST - APk - Prot -  LDL -     Mg - TSH -   ALT - T priyanka - Alb -        COVID-19 Lab Results    COVID-19  Lab Results   Component Value Date    COVID19 Not Detected 06/26/2024    COVID19 Not Detected 06/09/2024       Pro-Calcitonin  No results for input(s): \"PCT\" in the last 168 hours.    Cardiac  No results for input(s): \"TROP\", \"PBNP\" in the last 168 hours.    Creatinine Kinase  No results for input(s): \"CK\" in the last 168 hours.    Inflammatory Markers  No results for input(s): \"CRP\", \"PINKY\", \"LDH\", \"DDIMER\" in the last 168 hours.    Imaging: Imaging data reviewed in Epic.    Medications:    nicotine  1 patch Transdermal Daily    meropenem  500 mg Intravenous Q8H    vancomycin  15 mg/kg Intravenous Q8H    buprenorphine  8 mg Sublingual BID    [Held by provider] gabapentin  300 mg Oral BID    lurasidone  40 mg Oral Daily with breakfast    melatonin  3 mg Oral Nightly    [Held by provider] prazosin  4 mg Oral Nightly    prenatal vitamin with DHA  1 capsule Oral Daily    QUEtiapine  300 mg Oral TID    venlafaxine ER  300 mg Oral Daily    lidocaine-menthol  1 patch Transdermal Daily       Assessment & Plan:   ASSESSMENT / PLAN:     Ongoing IV heroin drug abuse/ho severe withdrawal requitring intubation during last admission  On suboxone, seroquel  Well controlled this admission     Left pleural effusion s/p thoracentesis 6/26/24 with 500 cc cloudy yellow fluid - Fluid cx pending : NGTD  Rule out empyema.  Recent history of multifocal pneumonia.  ID on consult  C/u IV vancomycin and merrem  Check HIV   Obtain venous Doppler study both lower extremities.     Monitor respiratory status and hemodynamic status.    Follow up on blood cultures NGTD.   MRSA swab neg- possible discontinue vanc 6/28 if ok per ID  Sputum +ve candida albicans 6/16    Recent admission for fever in an active IVDU  pt with Fusobacterium bacteremia     hep C infection - untreated     Tobacco abuse: 1/2 pack /day  Nicotine patch requested and ordered      Anemia of pregnancy.    12-week life gestation/ recent subchorionic hemorrhage found on an ultrasound on June 9  Viable IUP   US shows Single intrauterine pregnancy with a fetal heart rate of 168 beats per minute.  Appr ob gyne  Explained that once she is DCed she will need to establish care at tertiary center-recommendations to call [facility] 6/28 to establish care      Quality:  DVT Prophylaxis: Avoid anticoagulation for DVT prophylaxis considering recent subchorionic hemorrhage found on an ultrasound on June 9.      CODE status: full      Coordinated care with providers and counseling re: treatment plan and workup     Iram Marquez MD    Supplementary Documentation:

## 2024-06-28 NOTE — PROGRESS NOTES
Located within [facility] Dosing Service      Follow Up Pharmacokinetic Consult for Vancomycin Dosing     Latonia Barker is a 29 year old female who is receiving vancomycin therapy for pneumonia. Patient is on day 2 of vancomycin and is currently receiving 1000 mg IV every 8 hours. The current treatment and monitoring approach is steady state AUC strategy.        Weight and Temperature:    Wt Readings from Last 1 Encounters:   24 67.7 kg (149 lb 3.2 oz)         Temp Readings from Last 1 Encounters:   24 98.6 °F (37 °C) (Oral)      Labs:   Recent Labs   Lab 24  0810 24  1015 24  0409   CREATSERUM 0.44* 0.43* 0.41*      Estimated Creatinine Clearance: 163 mL/min (A) (based on SCr of 0.41 mg/dL (L)).     Recent Labs   Lab 24  0810 24  1015 24  0409   WBC 8.5 8.5 8.4        Vancomycin Levels:  Lab Results   Component Value Date/Time    VANCT 11.0 2024 09:42 PM    VANCP 22.6 (L) 2024 06:14 PM       Corresponding 24 h-AUC:  429 mg-h/L     The Pharmacokinetic Target is:     to 600 mg-h/L and trough <=15 mg/L    Renal Dosing Considerations:    None     Assessment/Plan:   Maintenance Regimen: Continue vancomycin 1000 mg IV every 8 hours    Monitorin) Plan for vancomycin trough to be obtained in 5 - 7 days    2) Pharmacy will order SCr as clinically indicated to assess renal function.    3) Pharmacy will monitor for toxicity and efficacy, adjust vancomycin dose and/or frequency, and order vancomycin levels as appropriate per the Pharmacy and Therapeutics Committee approved protocol until discontinuation of the medication.       We appreciate the opportunity to assist in the care of this patient.     Rosa West, PharmD, [facility]  [facility] Extension: [phone]

## 2024-06-28 NOTE — PLAN OF CARE
Pt A&Ox4. Pt calm and cooperative to staff. No complaints of pain during the night. Benadryl given for itching. Iv antibiotics infused. Pt's mom at bedside updated on plan of care  Problem: Risk for Violence/Aggression  Goal: Absence of Violence/Aggression  Description: INTERVENTIONS:   - Identify precipitating factors for behavior   - Notify Charge RN/Provider   - Consider decreasing stimulation   - Consider distraction measures   - Consider discussion with provider regarding prn meds    - Consider VIRAJ (Moderate Risk only)   - Consider Code Support (High Risk only)   - Consider room safety checks   - Consider restraints  Outcome: Progressing     Problem: Patient Centered Care  Goal: Patient preferences are identified and integrated in the patient's plan of care  Description: Interventions:  - What would you like us to know as we care for you? From home with mom  - Provide timely, complete, and accurate information to patient/family  - Incorporate patient and family knowledge, values, beliefs, and cultural backgrounds into the planning and delivery of care  - Encourage patient/family to participate in care and decision-making at the level they choose  - Honor patient and family perspectives and choices  Outcome: Progressing     Problem: SKIN/TISSUE INTEGRITY - ADULT  Goal: Skin integrity remains intact  Description: INTERVENTIONS  - Assess and document risk factors for pressure ulcer development  - Assess and document skin integrity  - Monitor for areas of redness and/or skin breakdown  - Initiate interventions, skin care algorithm/standards of care as needed  Outcome: Progressing  Goal: Incision(s), wounds(s) or drain site(s) healing without S/S of infection  Description: INTERVENTIONS:  - Assess and document risk factors for pressure ulcer development  - Assess and document skin integrity  - Assess and document dressing/incision, wound bed, drain sites and surrounding tissue  - Implement wound care per orders  -  Initiate isolation precautions as appropriate  - Initiate Pressure Ulcer prevention bundle as indicated  Outcome: Progressing     Problem: PAIN - ADULT  Goal: Verbalizes/displays adequate comfort level or patient's stated pain goal  Description: INTERVENTIONS:  - Encourage pt to monitor pain and request assistance  - Assess pain using appropriate pain scale  - Administer analgesics based on type and severity of pain and evaluate response  - Implement non-pharmacological measures as appropriate and evaluate response  - Consider cultural and social influences on pain and pain management  - Manage/alleviate anxiety  - Utilize distraction and/or relaxation techniques  - Monitor for opioid side effects  - Notify MD/LIP if interventions unsuccessful or patient reports new pain  - Anticipate increased pain with activity and pre-medicate as appropriate  Outcome: Progressing     Problem: ANXIETY  Goal: Will report anxiety at manageable levels  Description: INTERVENTIONS:  - Administer medication as ordered  - Teach and rehearse alternative coping skills  - Provide emotional support with 1:1 interaction with staff  Outcome: Progressing     Problem: BEHAVIOR  Goal: Pt/Family maintain appropriate behavior and adhere to behavioral management agreement, if implemented  Description: INTERVENTIONS:  - Assess patient/family’s coping skills and  non-compliant behavior (including use of illegal substances)  - Notify security of behavior or suspected illegal substances which indicate the need for search of the patient and/or belongings  - Encourage verbalization of thoughts and concerns in a socially appropriate manner  - Utilize positive, consistent limit setting strategies supporting safety of patient, staff and others  - Encourage participation in the decision making process about the behavioral management agreement  - Implement a Health Care Agreement if patient meets criteria  - If a patient’s behavior jeopardizes the safety of  the patient, staff, or others refer to organization policy. If a visitor’s behavior poses a threat to safety call refer to organization policy.  - Initiate consult with , Psychosocial CNS, Spiritual Care as appropriate  Outcome: Progressing     Problem: DRUG ABUSE/DETOX  Goal: Will have no detox symptoms and will verbalize plan for changing drug-related behavior  Description: INTERVENTIONS:  - Administer medication as ordered  - Monitor physical status  - Provide emotional support with 1:1 interaction with staff  - Encourage 12-Step involvement or recovery focused alternative  Outcome: Progressing

## 2024-06-29 NOTE — PAYOR COMM NOTE
--------------  DISCHARGE REVIEW    Payor: [facility]  Subscriber #:  [id number]  Authorization Number: [id number]    Admit date: 6/26/24  Admit time:   2:55 PM  Discharge Date: 6/28/2024  2:10 PM     Admitting Physician: Xuan Blancas MD  Attending Physician:  No att. providers found  Primary Care Physician: Agatha Mcmanus MD

## 2024-07-01 ENCOUNTER — PATIENT OUTREACH (OUTPATIENT)
Dept: CASE MANAGEMENT | Age: 29
End: 2024-07-01

## 2024-07-01 DIAGNOSIS — J18.9 PNEUMONIA OF LEFT LOWER LOBE DUE TO INFECTIOUS ORGANISM: ICD-10-CM

## 2024-07-01 DIAGNOSIS — J90 PLEURAL EFFUSION ON LEFT: Primary | ICD-10-CM

## 2024-07-01 PROCEDURE — 1111F DSCHRG MED/CURRENT MED MERGE: CPT

## 2024-07-01 NOTE — PROGRESS NOTES
Attempted to reach the patient to complete a Hospital follow up call. Left a message for the patient to call the nurse care manager back at, [phone].

## 2024-07-01 NOTE — DISCHARGE SUMMARY
Discharge Summary     Latonia Barker Patient Status:  Inpatient    1/3/1995 MRN [id number]   Location [facility]5W Attending No att. providers found   Hosp Day # 2 PCP Agatha Mcmanus MD     Date of Admission: 2024  Date of Discharge: 2024  Discharge Disposition:AMA    Discharge Diagnosis:     Left pleural effusion s/p thoracentesis 24   Recent history of multifocal pneumonia.   Ongoing IV heroin drug abuse/ho severe withdrawa       History of Present Illness:         Brief Synopsis:   Ongoing IV heroin drug abuse/ho severe withdrawal requitring intubation during last admission  On suboxone, seroquel  Well controlled this admission      Left pleural effusion s/p thoracentesis 24 with 500 cc cloudy yellow fluid - Fluid cx pending : NGTD  Rule out empyema.  Recent history of multifocal pneumonia.  ID on consult  C/u IV vancomycin and merrem  Check HIV   Obtain venous Doppler study both lower extremities.     Monitor respiratory status and hemodynamic status.    Follow up on blood cultures NGTD.   MRSA swab neg- possible discontinue vanc  if ok per ID  Sputum +ve candida albicans        PATIENT DECIDED TO LEAVE AMA- WE HAVE PROVIDED PO ANTIBIOTICS- PATIENT UNDERSTANDS THIS IS SUBOPTIMAL TREATMENT AS WELL  AS THE RISKS OF LEAVING AMA AND HAS DECIDED TO LEAVE DESPITE OUR RECOMMENDATIONS.  PULMONARY SERVICE HAS ARRANGED OP US TO DETERMINE NEED FOR REPEAT THORACENTESIS.     HER LEAVING AMA PLACES PATIENT AT HIGH RISK FOR RE ADMISSION AND POSSIBLE CRITICAL ILLNESS AND DEATH AS WELL AS PLACING FETUS AT RISK FOR SAME. SHE UNDERSTANDS THESE RISKS.           Recent admission for fever in an active IVDU pt with Fusobacterium bacteremia      hep C infection - untreated      Tobacco abuse: 1/2 pack /day  Nicotine patch requested and ordered       Anemia of pregnancy.     12-week life gestation/ recent subchorionic hemorrhage found on an ultrasound on   Viable IUP   US shows Single  intrauterine pregnancy with a fetal heart rate of 168 beats per minute.  Appr ob gyne  Explained that once she is DCed she will need to establish care at tertiary center-recommendations to call [facility] 6/28 to establish care            Lace+ Score: 47  59-90 High Risk  29-58 Medium Risk  0-28   Low Risk       TCM Follow-Up Recommendation:  LACE > 58: High Risk of readmission after discharge from the hospital.        Consultants:  Pulmonary, ID, ob gyne    Discharge Medication List:     Discharge Medications        START taking these medications        Instructions Prescription details   cefdinir 300 MG Caps  Commonly known as: Omnicef      Take 1 capsule (300 mg total) by mouth 2 (two) times daily for 21 days.   Stop taking on: July 19, 2024  Quantity: 42 capsule  Refills: 0            CONTINUE taking these medications        Instructions Prescription details   Buprenorphine HCl-Naloxone HCl 8-2 MG Subl  Commonly known as: SUBOXONE      Place 1 tablet under the tongue in the morning, at noon, and at bedtime.   Refills: 0     gabapentin 300 MG Caps  Commonly known as: Neurontin      Take 1 capsule (300 mg total) by mouth in the morning and 1 capsule (300 mg total) before bedtime.   Refills: 0     lurasidone 40 MG Tabs  Commonly known as: Latuda      Take 1 tablet (40 mg total) by mouth daily with breakfast.   Quantity: 30 tablet  Refills: 1     Melatonin 3 MG Caps      Take 1 capsule (3 mg total) by mouth at bedtime.   Quantity: 30 capsule  Refills: 0     prazosin 1 MG Caps  Commonly known as: Minipress      Take 4 capsules (4 mg total) by mouth nightly.   Refills: 0     prenatal vitamin with DHA 27-0.8-228 MG Caps      Take 1 capsule by mouth daily.   Refills: 0     QUEtiapine 300 MG Tabs  Commonly known as: SEROquel      Take 1 tablet (300 mg total) by mouth 3 (three) times daily.   Quantity: 90 tablet  Refills: 1     venlafaxine  MG Cp24  Commonly known as: Effexor-XR      Take 2 capsules (300 mg total) by  mouth daily.   Refills: 0            STOP taking these medications      azithromycin 250 MG Tabs  Commonly known as: Zithromax                  Where to Get Your Medications        These medications were sent to [id number] - [place], [place] - [address] AT [address], [phone], [phone]  [address] [address]      Phone: [phone]   cefdinir 300 MG Caps         Follow-up appointment:   No follow-up provider specified.  Appointments for Next 30 Days 7/1/2024 - 7/31/2024        Date Arrival Time Visit Type Length Department Provider     7/3/2024 10:00 AM  [facility] US THORACENTESIS BY MD [[id number]] 60 min [facility] Ultrasound [facility] US RM3 PORTABLE     7/3/2024 10:00 AM  [facility] US THORACENTESIS BY MD [[id number]] 60 min [facility] X-ray [facility] RN RADIOLOGY 2    Patient Instructions:     Please refer to the ordering physician for questions about this exam.        Location Instructions:     Your appointment will be at [facility] located at [address] in [place], [place].&nbsp; For self-parking please park in the Blue Lot. There is also  parking outside the Main Entrance. Enter the door that says Main Entrance. Inside the building walk to the left and check in with Diagnostic Main. The phone number for this location is [phone].  [facility] ([facility]) reserves the right to restrict and prohibit the use of video, recording, and photography devices while on the premises. In order to protect the safety and privacy of our patients and staff, no photography, videotaping, or audio recording is allowed in any [facility] department without prior authorization.    Because of the nature of the Emergency Department/Immediate Care, please be advised of the possibility your appointment may be delayed.    Please bring your insurance card and photo ID. You will also need to bring your doctor's order unless your physician's office submitted the order  electronically or faxed the order. Without the order, your test may be delayed or postponed.  Children: Children under the age of 12 must have another adult caregiver with them.  Please do not bring your child/children without a caregiver.  Because of the highly sensitive equipment and privacy of all our patients, children will not be permitted in the exam rooms, unless otherwise noted and in accordance with departmental policy.   PATIENT RESPONSIBILITY ESTIMATE  - (Estimate) We will provide you with your estimated remaining deductible and coinsurance balance for your services at the time of check in.  - (Payment) Please be aware that you may be asked for payment at the time of service.  Masks are optional for all patients and visitors, unless otherwise indicated.               7/8/2024  2:20 PM  [facility] FOLLOW UP [[id number]] 20 min [facility] Fabiana Vega APRN    Patient Instructions:         Location Instructions:     Your appointment is scheduled at [address] [place] [address]  Masks are optional for all patients and visitors, unless otherwise indicated.                      Supplementary Documentation:   [facility] reviewed: na    Vital signs:       Physical Exam:    General:  NAD  Cardiovascular:  S1, S2    -----------------------------------------------------------------------------------------------  PATIENT DISCHARGE INSTRUCTIONS: See electronic chart    Tip: Documentation requirements: For split shared discharge, BOTH providers need to document specific floor, unit, and time spent on the discharge.  The note needs to be signed by the provider with > 50% of time and bill under their NPI.   Time spent:  45min         Iram Marquez MD

## 2024-07-02 ENCOUNTER — TELEPHONE (OUTPATIENT)
Dept: OBGYN CLINIC | Facility: CLINIC | Age: 29
End: 2024-07-02

## 2024-07-02 NOTE — TELEPHONE ENCOUNTER
Patients mother Brandie calling regarding transfer of care to [facility]. Mother informed will reach out to Dr. Christianson regarding transfer. Mother  informed he will be back in office tomorrow and that one of the nurse or the referral department will be reaching out. Mother states understanding.     To Dr. Christianson please advise. Thank you,   To maggi Wagner. Thank you.

## 2024-07-02 NOTE — PROGRESS NOTES
Initial Post Discharge Follow Up   Discharge Date: 6/28/24  Contact Date: 7/2/2024    Consent Verification:  Assessment Completed With: Caregiver: Brandie Permission received per patient?  written  HIPAA Verified?  Yes    Discharge Dx:   Left pleural effusion s/p thoracentesis 6/26/24   Recent history of multifocal pneumonia.   Ongoing IV heroin drug abuse/ho severe withdrawal    General:   How have you been since your discharge from the hospital? The mother reported the patient has had continued shortness of breath with exertion. The patient is scheduled for a thoracentesis on 7/3/2024. The mother reported the past thoracentesis incision site has been healing well and denies any pain, discharge, redness, or edema.The mother denies any trouble breathing , chest pain, coughing, weakness, fever, dizziness, or syncope.  Do you have any pain since discharge?  No    How well was your pain managed while in the hospital? N/A  When you were leaving the hospital were your discharge instructions reviewed with you? Yes  How well were your discharge instructions explained to you?   On a scale of 1-5   1- Very Poor and 5- Very well   Very Well  Do you have any questions about your discharge instructions?  No  Before leaving the hospital was your diagnoses explained to you? Yes  Do you have any questions about your diagnoses? No  Are you able to perform normal daily activities of living as you have prior to your hospital stay (dressing, bathing, ambulating to the bathroom, etc)? yes  (NCM) Was patient given a different diet per AVS? no      Medications: NCM did confirm the patient has discontinued the following as directed:  STOP taking:  azithromycin 250 MG Tabs (Zithromax)  Current Outpatient Medications   Medication Sig Dispense Refill    cefdinir 300 MG Oral Cap Take 1 capsule (300 mg total) by mouth 2 (two) times daily for 21 days. 42 capsule 0    venlafaxine  MG Oral Capsule SR 24 Hr Take 2 capsules (300 mg total) by  mouth daily.      gabapentin 300 MG Oral Cap Take 1 capsule (300 mg total) by mouth in the morning and 1 capsule (300 mg total) before bedtime.      Buprenorphine HCl-Naloxone HCl 8-2 MG Sublingual SL Tab Place 1 tablet under the tongue in the morning, at noon, and at bedtime.      prazosin 1 MG Oral Cap Take 4 capsules (4 mg total) by mouth nightly.      QUEtiapine 300 MG Oral Tab Take 1 tablet (300 mg total) by mouth 3 (three) times daily. 90 tablet 1    lurasidone 40 MG Oral Tab Take 1 tablet (40 mg total) by mouth daily with breakfast. 30 tablet 1    prenatal vitamin with DHA 27-0.8-228 MG Oral Cap Take 1 capsule by mouth daily.      Melatonin 3 MG Oral Cap Take 1 capsule (3 mg total) by mouth at bedtime. 30 capsule 0     Were there any changes to your current medication(s) noted on the AVS? Yes  If so, were these medication changes discussed with you prior to leaving the hospital? Yes  If a new medication was prescribed:    Was the new medication's purpose & side effects reviewed? Yes  Do you have any questions about your new medication? No  Did you  your discharge medications when you left the hospital? Yes  Let's go over your medications together to make sure we are not missing anything. Medications Reviewed  Are there any reasons that keep you from taking your medication as prescribed? Yes    The mother reported the patient has never filled the medication Lurasidone 40mg.       Discharge medications reviewed/discussed/and reconciled against outpatient medications with patient.  Any changes or updates to medications sent to PCP.  Patient Acknowledged     Referrals/orders at D/C:  Referrals/orders placed at D/C? no    Except for Home Health Services mentioned above, have you scheduled these other services? Yes The patient is scheduled for an Ultrasound guided thoracentesis on 7/3/2024.     If yes, have you started these services? no    DME ordered at D/C? No    Discharge orders, AVS reviewed and  discussed with patient. Any changes or updates to orders sent to PCP.  Patient Acknowledged          Diagnosis specifics:  Re-Admit:  Tell me what led up to your readmission: The patient returned to the emergency department due to shortness of breath and chest pain.   Did you call your PCP office first? no  Did you attempt to get in with your PCP?  no  Do you feel this could have been prevented? yes      If Yes, how? The mother expressed concern of early discharge on 6/24/2024.     Follow up appointments:      Your appointments       Date & Time Appointment Department (Center)    Jul 03, 2024 10:00 AM CDT ULTRASOUND THORACENTESIS with [facility] US RM3 PORTABLE [facility] Ultrasound ([facility])    Please refer to the ordering physician for questions about this exam.        Jul 03, 2024 10:00 AM CDT ULTRASOUND THORACENTESIS with [facility] RN RADIOLOGY 2 [facility] X-ray ([facility])    Please refer to the ordering physician for questions about this exam.        Jul 08, 2024 2:20 PM T Hospital Follow Up with Fabiana Vega APRN [facility] ([facility] Ultrasound  [facility]  [address] [id number] [phone] [facility] X-ray  [facility]  [address] [id number] [phone] [facility]  [address] [id number] [phone]            TCC  Was TCC ordered: No    PCP (If no TCC appointment)  Does patient already have a PCP appointment scheduled? Yes; The patient is scheduled on 7/8/2024 with ALISE Byrnes.   NCM Confirmed PCP office HFU appointment with patient       Specialist    Does the patient have any other follow up appointment(s) needing to be scheduled? No    Is there any reason as to why you cannot  make your appointment(s)?  No     Needs post D/C:   Now that you are home, are there any needs or concerns you need addressed before your next visit with your PCP?  (DME, meds, questions, etc.): No    Interventions by NCM:    Reviewed all discharge instructions with the patient's mother. All medications were reviewed and educated on the importance of taking the medications as directed.  Patient symptoms were assessed, education was completed for signs/symptoms to monitor, and reviewed when to contact providers versus go to the emergency department/call 911. Appointments were reviewed and stressed the importance of a close follow up with providers.  The patient verbalized understanding and will contact the office with any further questions or concerns.       Overall Rating:   How would you rate the care you received while in the hospital? excellent    CCM referral placed:    Not Applicable    BOOK BY DATE: 7/12/2024

## 2024-07-02 NOTE — TELEPHONE ENCOUNTER
Patient was recently informed that she is high risk for her pregnancy and she should seek care at [facility]. Her mom is calling to get information on who she should be seeing and how to move forward. There is a release on file. Please call.

## 2024-07-02 NOTE — PROGRESS NOTES
Initial Post Discharge Follow Up   Discharge Date: 6/28/24  Contact Date: 7/1/2024    Consent Verification:  Assessment Completed With:  Brandie      Discharge Dx:   Left pleural effusion s/p thoracentesis 6/26/24   Recent history of multifocal pneumonia.   Ongoing IV heroin drug abuse/ho severe withdrawal    General:   Nurse care manager spoke briefly with the patient's mother, Brandie. The patient just returned home and is unable to complete a hospital follow up call at this time. The mother requested a call back later.   Nurse care manager will attempt to reach the patient again later as requested.

## 2024-07-03 ENCOUNTER — HOSPITAL ENCOUNTER (OUTPATIENT)
Dept: GENERAL RADIOLOGY | Facility: HOSPITAL | Age: 29
Discharge: HOME | End: 2024-07-03
Attending: INTERNAL MEDICINE
Payer: MEDICAID

## 2024-07-03 ENCOUNTER — HOSPITAL ENCOUNTER (OUTPATIENT)
Dept: ULTRASOUND IMAGING | Facility: HOSPITAL | Age: 29
Discharge: HOME | End: 2024-07-03
Attending: PHYSICIAN ASSISTANT
Payer: MEDICAID

## 2024-07-03 DIAGNOSIS — J90 PLEURAL EFFUSION ON LEFT: ICD-10-CM

## 2024-07-03 PROCEDURE — 71046 X-RAY EXAM CHEST 2 VIEWS: CPT | Performed by: INTERNAL MEDICINE

## 2024-07-03 PROCEDURE — 76604 US EXAM CHEST: CPT | Performed by: PHYSICIAN ASSISTANT

## 2024-07-03 NOTE — IMAGING NOTE
0948 PT TO ULTRASOUND ROOM, MOTHER SAM PRESENT    0952 HX TAKEN, PT HAVING DYSPNEA WITH EXERTION, HARD TO SLEEP DUE TO DISCOMFORT BUT DENIES PND OR COUGH. PROCEDURE EXPLAINED, QUESTIONS ANSWERED.     PT CONSENTED AT 0955    0958 /72 HR 93 O2 SAT 99%    1000  SCANS COMPLETED BY TORRES US TECH, SCANT FLUID NOTED    6/27/24  PLATELETS = 414      1005 DR. HERNANDEZ HERE, SCANNING COMPLETED, INFORMED PT NO NEED FOR THORACENTESIS, WILL GET PA/LAT CXR WHILE SHE IS HERE.    I CALLED RADIOLOGY TECH AFTER DR HERNANDEZ FINISHED TALKING TO PT, THEY ARE NOT YET READY FOR HER.     1026 RAD TECH STATES WILL BE READY SHORTLY, ADVISED TAKING PT & HER MOTHER TO US WAITING AREA. I ESCORTED THEM THERE.

## 2024-07-03 NOTE — TELEPHONE ENCOUNTER
3rd attempt failed     Called [facility] spoke to Veronika who stated can fax clinicals to alternate number [phone]     Clinicals faxed

## 2024-07-03 NOTE — TELEPHONE ENCOUNTER
Spoke to Dr. Christianson, states he will reach out to [facility] at [facility] to discuss transfer of care.

## 2024-07-03 NOTE — TELEPHONE ENCOUNTER
Transfer of care form filled and faxed to [facility] at [phone] with Clinicals, 61pgs. Will reached out to patients mother once a fax confirmation is received

## 2024-07-05 NOTE — TELEPHONE ENCOUNTER
Records were faxed to [facility].  Mom informed and given phone number to call [facility] next week if she does not hear from them.

## 2024-07-05 NOTE — TELEPHONE ENCOUNTER
Called [facility] spoke to Harper who stated received 37pg out of 61. All they would need is 38p-61/ faxed to them at [phone]. Received fax confirmation.  Once [facility] reviews clinicals will reach out to Patient. As listed below RN provided patients mother with [facility] number if they dont her from them by end of next week

## 2024-07-05 NOTE — TELEPHONE ENCOUNTER
Mom called in regarding notes below.   Mom request for a nurse to call.  Mom states have not heard anything from anyone, request for a nurse to call

## 2024-07-08 ENCOUNTER — OFFICE VISIT (OUTPATIENT)
Dept: INTERNAL MEDICINE CLINIC | Facility: CLINIC | Age: 29
End: 2024-07-08

## 2024-07-08 VITALS
BODY MASS INDEX: 28.16 KG/M2 | SYSTOLIC BLOOD PRESSURE: 109 MMHG | WEIGHT: 153 LBS | DIASTOLIC BLOOD PRESSURE: 79 MMHG | HEIGHT: 62 IN | HEART RATE: 88 BPM

## 2024-07-08 DIAGNOSIS — R21 RASH: ICD-10-CM

## 2024-07-08 DIAGNOSIS — A41.9 SEPTIC SHOCK (HCC): Primary | ICD-10-CM

## 2024-07-08 DIAGNOSIS — R65.21 SEPTIC SHOCK (HCC): Primary | ICD-10-CM

## 2024-07-08 PROCEDURE — 99215 OFFICE O/P EST HI 40 MIN: CPT | Performed by: NURSE PRACTITIONER

## 2024-07-08 RX ORDER — VENLAFAXINE HYDROCHLORIDE 150 MG/1
300 CAPSULE, EXTENDED RELEASE ORAL DAILY
Qty: 90 CAPSULE | Refills: 0 | Status: SHIPPED | OUTPATIENT
Start: 2024-07-08

## 2024-07-08 RX ORDER — QUETIAPINE FUMARATE 300 MG/1
300 TABLET, FILM COATED ORAL 3 TIMES DAILY
Qty: 90 TABLET | Refills: 1 | Status: SHIPPED | OUTPATIENT
Start: 2024-07-08

## 2024-07-08 NOTE — PROGRESS NOTES
HPI:    Patient ID: Latonia Barker is a 29 year old female.  13 weeks pregnant  HPI Hospital Follow up  29 year old female recently hospitalized x 2  6/9/2024    Date of Admission: 6/9/2024     Disposition:   Home or Self Care                 Date of Discharge:  6/24/2024      Admitting Diagnosis:   Opiate use [F11.90]  Pneumonia of left lower lobe due to infectious organism [J18.9]  Septic shock (HCC) [A41.9, R65.21]  Subchorionic hematoma in first trimester, single or unspecified fetus (HCC) [O41.8X10, O46.8X1]     Hospital Discharge Diagnoses:     Acute septic shock due to multifocal pneumonia and Fusobacterium bacteremia  Acute hypoxic respiratory failure  Fusobacterium bacteremia  Hx of IVDA  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     Problem List:          Patient Active Problem List   Diagnosis    Abdominal pain    Acne    Behavioral problems    Malaise and fatigue    Injury of knee, leg, ankle and foot    Anxiety    Bipolar affective (HCC)    Irritable bowel syndrome    Low back pain    right L5 radiculopathy    mild dextroscoliosis with normal flexion and extension x-rays    L5-S1 right & central mild-slight, L4-5 mild central, L3-4 slight central bulging discs    Neck pain    Bilateral groin pain    Leg pain    Chest pain, atypical    Elevated troponin    Opiate dependence, continuous (HCC)    Superficial venous thrombosis of arm, left    IV drug abuse (HCC)    Opioid dependence on agonist therapy (HCC)    Internal jugular vein thrombosis (HCC)    Cellulitis of upper extremity    Opioid dependence with opioid-induced disorder (HCC)    Moderate depressed bipolar I disorder (HCC)    Changes in skin texture    Thrombosis of right subclavian vein  (HCC)    Reactive depression    Long term (current) use of anticoagulants    New onset seizure (HCC)    Polysubstance abuse (HCC)    Hepatitis C    Class 1 obesity due to excess calories with serious comorbidity and body mass index (BMI) of 30.0 to 30.9 in adult    Occasional tremors    Slow transit constipation    Gastroesophageal reflux disease with esophagitis without hemorrhage    Physical exam    Annual physical exam    Grade I hemorrhoids    Wound discharge    Leg swelling    Heroin addiction (HCC)    Positive hepatitis C antibody test    Rubella non-immune status, antepartum (HCC)    Significant discrepancy between uterine size and clinical dates, antepartum, first trimester (HCC)    History of maternal deep vein thrombosis    History of pre-eclampsia in prior pregnancy, currently pregnant (HCC)    History of substance abuse (HCC)    Septic shock (HCC)    Pneumonia of left lower lobe due to infectious organism    Subchorionic hematoma in first trimester, single or unspecified fetus (HCC)    Opiate use    Opiate withdrawal (HCC)    Moderate mixed bipolar I disorder (HCC)        Ongoing IV heroin drug abuse/ho severe withdrawal requitring intubation during last admission  On suboxone, seroquel  Well controlled this admission     Second Admit  Date of Admission: 6/26/2024  Date of Discharge: 6/28/2024  Discharge Disposition:AMA     Discharge Diagnosis:      Left pleural effusion s/p thoracentesis 6/26/24   Recent history of multifocal pneumonia.   Ongoing IV heroin drug abuse/ho severe withdrawa         History of Present Illness:            Brief Synopsis:   Ongoing IV heroin drug abuse/ho severe withdrawal requitring intubation during last admission  On suboxone, seroquel  Well controlled this admission      Left pleural effusion s/p thoracentesis 6/26/24 with 500 cc cloudy yellow fluid - Fluid cx pending : NGTD  Rule out empyema.  Recent history of multifocal pneumonia.  ID on consult  C/u IV vancomycin and  merrem  Check HIV   Obtain venous Doppler study both lower extremities.     Monitor respiratory status and hemodynamic status.    Follow up on blood cultures NGTD.   MRSA swab neg- possible discontinue vanc 6/28 if ok per ID  Sputum +ve candida albicans 6/16        PATIENT DECIDED TO LEAVE AMA- WE HAVE PROVIDED PO ANTIBIOTICS- PATIENT UNDERSTANDS THIS IS SUBOPTIMAL TREATMENT AS WELL  AS THE RISKS OF LEAVING AMA AND HAS DECIDED TO LEAVE DESPITE OUR RECOMMENDATIONS.  PULMONARY SERVICE HAS ARRANGED OP US TO DETERMINE NEED FOR REPEAT THORACENTESIS.      HER LEAVING AMA PLACES PATIENT AT HIGH RISK FOR RE ADMISSION AND POSSIBLE CRITICAL ILLNESS AND DEATH AS WELL AS PLACING FETUS AT RISK FOR SAME. SHE UNDERSTANDS THESE RISKS.      Recent admission for fever in an active IVDU pt with Fusobacterium bacteremia      hep C infection - untreated      Tobacco abuse: 1/2 pack /day  Nicotine patch requested and ordered       Anemia of pregnancy.     12-week life gestation/ recent subchorionic hemorrhage found on an ultrasound on June 9  Viable IUP   US shows Single intrauterine pregnancy with a fetal heart rate of 168 beats per minute.  Appr ob gyne  Explained that once she is DCed she will need to establish care at tertiary center-recommendations to call [facility] 6/28 to establish care    Immunization History   Administered Date(s) Administered    Covid-19 Vaccine Moderna 100 mcg/0.5 ml 08/04/2021, 09/14/2021    DTAP 07/07/1998, 02/05/2002    DTP/HIB 03/06/1997, 05/06/1997, 07/14/1997    HEP A 07/02/2009, 08/20/2012    HEP A,Ped/Adol,(2 Dose) 08/20/2012    HEP B 01/04/1997, 02/03/1997, 09/09/1997    HIB 04/07/1998    HPV (Gardasil) 07/02/2009, 09/02/2009, 08/20/2012    MMR 01/06/1998, 02/05/2002    Meningococcal (Menomune) 07/02/2009    OPV 03/06/1997, 05/06/1997, 07/14/1997, 02/05/2002    TDAP 07/02/2009    Varicella 09/30/2002, 07/02/2009       Past Medical History:    Acne    Anxiety    Bacterial endocarditis (HCC)    SBE pulmonic  valve secondary to MRSA treated with daptomycin ×42 days    Bipolar disorder (HCC)    Chlamydia    Decorative tattoo    Depression    Hepatitis B virus infection    History of blood clots    Human papilloma virus infection    Intravenous drug abuse (HCC)    Heroin and crack cocaine    Opioid dependence (HCC)    Subclavian vein thrombosis (HCC)    Substance abuse (HCC)      Past Surgical History:   Procedure Laterality Date    Colonoscopy      Colonoscopy N/A 2017    Procedure: COLONOSCOPY;  Surgeon: Xu Day MD;  Location: [facility] ENDOSCOPY    Colposcopy, cervix w/upper adjacent vagina; w/biopsy(s), cervix            Other surgical history      wisdom teeth     Other surgical history      fasciotomy of right arm    Tonsillectomy      Upper gi endoscopy,exam        Social History     Socioeconomic History    Marital status: Single    Number of children: 0   Occupational History    Occupation: Unemployed   Tobacco Use    Smoking status: Every Day     Current packs/day: 0.00     Average packs/day: 1 pack/day for 1 year (1.0 ttl pk-yrs)     Types: Cigarettes     Start date: 2014     Last attempt to quit: 2015     Years since quittin.4    Smokeless tobacco: Current    Tobacco comments:     Vaping   Vaping Use    Vaping status: Never Used   Substance and Sexual Activity    Alcohol use: Not Currently     Comment: social    Drug use: Yes     Types: Heroin, \"Crack\" cocaine, benzodiazepines, Other-see comments     Comment: Acid, mushrooms    Sexual activity: Yes     Partners: Male     Birth control/protection: Condom   Other Topics Concern    Caffeine Concern No     Comment:  2 cans Cola per day    Exercise Yes     Comment: hep    Reaction to local anesthetic No          Review of Systems   Constitutional:  Negative for chills, fatigue and fever.   HENT:  Negative for congestion, ear discharge, ear pain, facial swelling, hearing loss, postnasal drip, sinus pressure, sore throat  and trouble swallowing.    Eyes:  Negative for pain, discharge, redness and visual disturbance.   Respiratory:  Negative for cough, chest tightness, shortness of breath and wheezing.    Cardiovascular:  Negative for chest pain, palpitations and leg swelling.   Gastrointestinal:  Negative for abdominal distention, abdominal pain, constipation, diarrhea, nausea and vomiting.   Endocrine: Negative for cold intolerance, heat intolerance, polydipsia, polyphagia and polyuria.   Genitourinary:  Negative for difficulty urinating, dysuria, pelvic pain and vaginal bleeding.   Musculoskeletal:  Negative for back pain, gait problem, neck pain and neck stiffness.   Skin:  Negative for color change and rash.   Neurological:  Negative for dizziness, seizures, weakness and headaches.   Psychiatric/Behavioral:  Negative for agitation and sleep disturbance. The patient is not nervous/anxious.               Current Outpatient Medications   Medication Sig Dispense Refill    venlafaxine  MG Oral Capsule SR 24 Hr Take 2 capsules (300 mg total) by mouth daily. 90 capsule 0    QUEtiapine 300 MG Oral Tab Take 1 tablet (300 mg total) by mouth 3 (three) times daily. 90 tablet 1    cefdinir 300 MG Oral Cap Take 1 capsule (300 mg total) by mouth 2 (two) times daily for 21 days. 42 capsule 0    gabapentin 300 MG Oral Cap Take 1 capsule (300 mg total) by mouth in the morning and 1 capsule (300 mg total) before bedtime.      Buprenorphine HCl-Naloxone HCl 8-2 MG Sublingual SL Tab Place 1 tablet under the tongue in the morning, at noon, and at bedtime.      prazosin 1 MG Oral Cap Take 4 capsules (4 mg total) by mouth nightly.      prenatal vitamin with DHA 27-0.8-228 MG Oral Cap Take 1 capsule by mouth daily.      Melatonin 3 MG Oral Cap Take 1 capsule (3 mg total) by mouth at bedtime. 30 capsule 0     Allergies:  Allergies   Allergen Reactions    Amoxicillin HIVES    Vancomycin RASH and ITCHING    Clindamycin RASH      PHYSICAL EXAM:    Physical Exam  Constitutional:       Appearance: Normal appearance. She is well-developed. She is ill-appearing and diaphoretic.   HENT:      Head: Normocephalic.      Right Ear: Tympanic membrane normal.      Left Ear: Tympanic membrane normal.      Nose: Nose normal.      Mouth/Throat:      Mouth: Mucous membranes are moist.      Pharynx: No oropharyngeal exudate or posterior oropharyngeal erythema.   Eyes:      General:         Right eye: No discharge.         Left eye: No discharge.      Pupils: Pupils are equal, round, and reactive to light.   Cardiovascular:      Rate and Rhythm: Normal rate and regular rhythm.      Heart sounds: Normal heart sounds. No murmur heard.     No friction rub. No gallop.   Pulmonary:      Effort: Pulmonary effort is normal. No respiratory distress.      Breath sounds: Normal breath sounds. No wheezing, rhonchi or rales.   Abdominal:      General: Bowel sounds are normal. There is no distension.      Palpations: Abdomen is soft. There is no mass.      Tenderness: There is no abdominal tenderness. There is no right CVA tenderness, left CVA tenderness or guarding.   Musculoskeletal:         General: No tenderness.      Cervical back: Normal range of motion and neck supple. No tenderness.      Right lower leg: No edema.      Left lower leg: No edema.   Lymphadenopathy:      Cervical: No cervical adenopathy.   Skin:     General: Skin is warm.      Findings: No rash.   Neurological:      Mental Status: She is alert and oriented to person, place, and time.      Coordination: Coordination normal.      Gait: Gait normal.   Psychiatric:         Mood and Affect: Mood normal.         Behavior: Behavior normal.         Thought Content: Thought content normal.         Judgment: Judgment normal.       /79 (BP Location: Left arm, Patient Position: Sitting, Cuff Size: adult)   Pulse 88   Ht 5' 2\" (1.575 m)   Wt 153 lb (69.4 kg)   LMP 03/25/2024 (Approximate)   BMI 27.98 kg/m²   Wt  Readings from Last 2 Encounters:   07/08/24 153 lb (69.4 kg)   06/26/24 149 lb 3.2 oz (67.7 kg)     Body mass index is 27.98 kg/m².(2)  Lab Results   Component Value Date    WBC 8.4 06/27/2024    RBC 3.65 (L) 06/27/2024    HGB 9.2 (L) 06/27/2024    HCT 29.8 (L) 06/27/2024    MCV 81.6 06/27/2024    MCH 25.2 (L) 06/27/2024    MCHC 30.9 (L) 06/27/2024    RDW 16.4 (H) 06/27/2024    .0 06/27/2024    MPV 8.8 01/05/2018      Lab Results   Component Value Date    GLU 84 06/27/2024    BUN <5 (L) 06/27/2024    BUNCREA 15.9 06/24/2024    CREATSERUM 0.41 (L) 06/27/2024    ANIONGAP 5 06/27/2024    GFRNAA 106 02/28/2022    GFRAA 122 02/28/2022    CA 9.0 06/27/2024    OSMOCALC 276 06/24/2024    ALKPHO 146 (H) 06/13/2024    AST 32 06/13/2024    ALT 32 06/13/2024    ALKPHOS 56 01/29/2015    BILT 1.5 (H) 06/13/2024    TP 5.4 (L) 06/13/2024    ALB 2.7 (L) 06/13/2024    GLOBULIN 2.7 06/13/2024    AGRATIO 1.2 01/29/2015     06/27/2024    K 4.3 06/27/2024     06/27/2024    CO2 24.0 06/27/2024      Lab Results   Component Value Date    A1C 5.2 03/19/2017      Lab Results   Component Value Date    CHOLEST 135 03/15/2017    TRIG 253 (H) 06/17/2024    HDL 39 03/15/2017    LDL 88 03/15/2017    NONHDLC 96 03/15/2017      Lab Results   Component Value Date    T4F 0.73 11/02/2011    TSH 2.080 02/13/2019                ASSESSMENT/PLAN:     Problem List Items Addressed This Visit       Septic shock (HCC) - Primary     Recent ICU admission x 2  Septic Shock  Pregnancy    Plan  Will follow with ID  Pulmonary  And OB at [place]         Rash     Red Rash on inner thighs    Plan  Cold compresses  Do not wear tight fitting clothes  Follow up with I.D.               No orders of the defined types were placed in this encounter.      Meds This Visit:  Requested Prescriptions     Signed Prescriptions Disp Refills    venlafaxine  MG Oral Capsule SR 24 Hr 90 capsule 0     Sig: Take 2 capsules (300 mg total) by mouth daily.     QUEtiapine 300 MG Oral Tab 90 tablet 1     Sig: Take 1 tablet (300 mg total) by mouth 3 (three) times daily.       Imaging & Referrals:  None     Prior to this encounter, I spent over 30 minutes with preparing for the visit, including reviewing documents from other specialties as well as from PCP and going over test results and imaging studies. During the face to face encounter, I spent an additional 20 minutes which were determined for follow-up. Greater than 50% of the time was spent in counseling, anticipatory guidance, and coordination of care. Patient concerns were answered to the best of my knowledge.    Follow up phone in the evening    ALISE Byrnes

## 2024-07-09 ENCOUNTER — TELEPHONE (OUTPATIENT)
Dept: INTERNAL MEDICINE CLINIC | Facility: CLINIC | Age: 29
End: 2024-07-09

## 2024-07-09 RX ORDER — QUETIAPINE FUMARATE 300 MG/1
300 TABLET, FILM COATED ORAL 2 TIMES DAILY
Qty: 90 TABLET | Refills: 1 | Status: SHIPPED | OUTPATIENT
Start: 2024-07-09

## 2024-07-09 NOTE — ASSESSMENT & PLAN NOTE
Red Rash on inner thighs    Plan  Cold compresses  Do not wear tight fitting clothes  Follow up with I.D.

## 2024-07-09 NOTE — ASSESSMENT & PLAN NOTE
Recent ICU admission x 2  Septic Shock  Pregnancy    Plan  Will follow with ID  Pulmonary  And OB at [place]

## 2024-07-09 NOTE — TELEPHONE ENCOUNTER
QUEtiapine 300 MG Oral Tab, Take 1 tablet (300 mg total) by mouth 3 (three) times daily., Disp: 90 tablet, Rfl: 1

## 2024-07-10 ENCOUNTER — TELEPHONE (OUTPATIENT)
Dept: OBGYN CLINIC | Facility: CLINIC | Age: 29
End: 2024-07-10

## 2024-07-10 NOTE — TELEPHONE ENCOUNTER
Received call back from HANSEL Cardenas at [facility] states they have been trying to contact patient without any success.    Also reflected that first sheet on transfers of care form is not legible. Will refax.    Spoke to Brandie Patients mother aware is to call  [facility] and let them know HANSEL Cardenas is expecting a call from her. She verbalized understandings

## 2024-07-10 NOTE — TELEPHONE ENCOUNTER
Called [facility] left message to call back to confirmed everything that they needed has been obtained.

## 2024-07-10 NOTE — TELEPHONE ENCOUNTER
To Kristy, [facility] at [facility] states they did not receive record. Please advise. Thank you.

## 2024-07-10 NOTE — TELEPHONE ENCOUNTER
Mom called to follow up on records being sent to [facility]. Mom was told by [facility] that they have not received any information for patient. With that, an appointment cannot be scheduled. Mom would like office to reach out to [facility].  Please call mom with update as soon as possible. Previous telephone encounter of 7/02.

## 2024-07-10 NOTE — TELEPHONE ENCOUNTER
Patient's mom very frustrated that she can't reach anyone at [place]. Was given another number to call [phone]. Is asking that Kristy call her back as well for further assistance.

## 2024-07-10 NOTE — TELEPHONE ENCOUNTER
Mom states she just spoke to [facility] and was told they have not received anything. Please advise

## 2024-07-12 ENCOUNTER — ROUTINE PRENATAL (OUTPATIENT)
Dept: OBGYN CLINIC | Facility: CLINIC | Age: 29
End: 2024-07-12

## 2024-07-12 ENCOUNTER — TELEPHONE (OUTPATIENT)
Dept: OBGYN CLINIC | Facility: CLINIC | Age: 29
End: 2024-07-12

## 2024-07-12 VITALS
SYSTOLIC BLOOD PRESSURE: 97 MMHG | WEIGHT: 153 LBS | BODY MASS INDEX: 28 KG/M2 | DIASTOLIC BLOOD PRESSURE: 68 MMHG | HEART RATE: 93 BPM

## 2024-07-12 DIAGNOSIS — B37.2 YEAST DERMATITIS: ICD-10-CM

## 2024-07-12 DIAGNOSIS — Z34.82 ENCOUNTER FOR SUPERVISION OF OTHER NORMAL PREGNANCY IN SECOND TRIMESTER (HCC): Primary | ICD-10-CM

## 2024-07-12 PROCEDURE — 99214 OFFICE O/P EST MOD 30 MIN: CPT | Performed by: OBSTETRICS & GYNECOLOGY

## 2024-07-12 NOTE — TELEPHONE ENCOUNTER
Called and spoke to Brandie marquez mother aware we we will provider with patients records today at her appointment with Dr. Paulino at 320pm. And that I will notify [facility] that she will bring them in Monday to their clinic..    Called [facility] at [phone] and spoke to HANSEL Christiansen who stated will notify HANSEL Cardenas that patient will  records today and drop them off Monday. Also asked Елена if they can view patients clinicals through care every where because I could see marcelo noted. She confirmed she can and they should be all set.

## 2024-07-12 NOTE — TELEPHONE ENCOUNTER
Patient coming in for appointment today with Dr. Paulino.     Will give records to MD to provide to patient.     Attempted to fax to [facility] 12+ times and per [facility] all ineligible

## 2024-07-12 NOTE — TELEPHONE ENCOUNTER
Received call from HANSEL Cardenas states patient is scheduled at [facility] for Wednesday,7/17/24 at 11:15am

## 2024-07-12 NOTE — TELEPHONE ENCOUNTER
Patient mother is calling back she can't do the early appointment has to stick  with appointment she has at 350 ,

## 2024-07-12 NOTE — TELEPHONE ENCOUNTER
Left message to call back-PSR please transfer patient to RN's. Thank you.     RN please move patient to Dr. Jean at 1115 am-thank you.

## 2024-07-12 NOTE — TELEPHONE ENCOUNTER
15w4d. Spoke to patients mother. Patient has raised red rash on the vagina and along the creases of her leg. Mother accepts an appointment at 350 pm today with Dr. Paulino.

## 2024-07-13 ENCOUNTER — TELEPHONE (OUTPATIENT)
Dept: OBGYN CLINIC | Facility: CLINIC | Age: 29
End: 2024-07-13

## 2024-07-13 PROBLEM — Z00.00 PHYSICAL EXAM: Status: RESOLVED | Noted: 2023-08-01 | Resolved: 2024-07-13

## 2024-07-13 PROBLEM — Z92.89 HISTORY OF RECENT HOSPITALIZATION: Status: ACTIVE | Noted: 2024-07-13

## 2024-07-13 PROBLEM — Z00.00 ANNUAL PHYSICAL EXAM: Status: RESOLVED | Noted: 2023-08-01 | Resolved: 2024-07-13

## 2024-07-13 NOTE — PROGRESS NOTES
Has appointment to transfer care to [facility] in couple days -- package given with her full OB records to take to [facility] appointment. Severe external rash w/ itching. Has been on multiple abx recently.  (+) left labial erythema with bilateral inguinal irreg petechia. Plan for monistat & mycolog.  Recent hospitalizations x 2 -- left AMA on last one.    See Prenatal Vitals flowsheet for documentation of vitals / urine / exam. See problem list -- updated in detail.   Used 30-39 min in order to do detailed chart review & review of blood work / ultrasound reports / MFM reports, reviewing history, performing prenatal exam / pelvic exam, counseling pt on vaccines, prenatal education, ordering tests or meds, ordering MFM /specialist referrals, documentation in EMR, interpretation / communication of results & care coordination

## 2024-07-13 NOTE — TELEPHONE ENCOUNTER
Mom called in regarding patient, states patient was in on yesterday seen Dr Paulino.    Mom states  patient was  to get a  topical cream, no prescription was sent over. Request a nurse to call to advise

## 2024-07-13 NOTE — TELEPHONE ENCOUNTER
Appears nystat-triamcin cream needed prior authorization, so did not release to pharmacy. Called and gave verbal to pharmacist. Advised ok to split into 2 scripts if compound not covered. They will get this ready today.    Notified mom, on verbal release.

## 2024-07-26 NOTE — TELEPHONE ENCOUNTER
Patient calling with +HPT.   LMP was 3/20/24. Cycles are regular, 28d. Today she is dated at 6w5d.  Patient agrees to policy to rotate care between all providers in the office, and understands that on-call provider will deliver her.   Patient directed to start PNV with iron, DHA and folic acid.     HT 5'2\"   lb    OBN appt scheduled on 5/9/24.      Normal

## 2024-09-20 ENCOUNTER — LAB ENCOUNTER (OUTPATIENT)
Dept: LAB | Facility: HOSPITAL | Age: 29
End: 2024-09-20
Attending: OBSTETRICS & GYNECOLOGY
Payer: MEDICAID
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
## 2024-09-20 DIAGNOSIS — O09.92 SUPERVISION OF HIGH RISK PREGNANCY IN SECOND TRIMESTER (HCC): ICD-10-CM

## 2024-09-20 LAB
ALBUMIN SERPL-MCNC: 3.9 G/DL (ref 3.2–4.8)
ALBUMIN/GLOB SERPL: 1.3 {RATIO} (ref 1–2)
ALP LIVER SERPL-CCNC: 111 U/L
ALT SERPL-CCNC: 14 U/L
ANION GAP SERPL CALC-SCNC: 7 MMOL/L (ref 0–18)
ANTIBODY SCREEN: NEGATIVE
AST SERPL-CCNC: 24 U/L (ref ?–34)
BILIRUB SERPL-MCNC: <0.2 MG/DL (ref 0.3–1.2)
BUN BLD-MCNC: 7 MG/DL (ref 9–23)
BUN/CREAT SERPL: 11.7 (ref 10–20)
CALCIUM BLD-MCNC: 9.4 MG/DL (ref 8.7–10.4)
CHLORIDE SERPL-SCNC: 107 MMOL/L (ref 98–112)
CO2 SERPL-SCNC: 21 MMOL/L (ref 21–32)
CREAT BLD-MCNC: 0.6 MG/DL
EGFRCR SERPLBLD CKD-EPI 2021: 126 ML/MIN/1.73M2 (ref 60–?)
FASTING STATUS PATIENT QL REPORTED: NO
GLOBULIN PLAS-MCNC: 3.1 G/DL (ref 2–3.5)
GLUCOSE BLD-MCNC: 67 MG/DL (ref 70–99)
OSMOLALITY SERPL CALC.SUM OF ELEC: 276 MOSM/KG (ref 275–295)
POTASSIUM SERPL-SCNC: 4.4 MMOL/L (ref 3.5–5.1)
PROT SERPL-MCNC: 7 G/DL (ref 5.7–8.2)
RH BLOOD TYPE: POSITIVE
RUBV IGG SER-ACNC: 6 IU/ML (ref 10–?)
SODIUM SERPL-SCNC: 135 MMOL/L (ref 136–145)
T PALLIDUM AB SER QL IA: NONREACTIVE

## 2024-09-20 PROCEDURE — 86780 TREPONEMA PALLIDUM: CPT

## 2024-09-20 PROCEDURE — 86900 BLOOD TYPING SEROLOGIC ABO: CPT

## 2024-09-20 PROCEDURE — 87389 HIV-1 AG W/HIV-1&-2 AB AG IA: CPT

## 2024-09-20 PROCEDURE — 80053 COMPREHEN METABOLIC PANEL: CPT

## 2024-09-20 PROCEDURE — 86850 RBC ANTIBODY SCREEN: CPT

## 2024-09-20 PROCEDURE — 36415 COLL VENOUS BLD VENIPUNCTURE: CPT

## 2024-09-20 PROCEDURE — 86762 RUBELLA ANTIBODY: CPT

## 2024-09-20 PROCEDURE — 86901 BLOOD TYPING SEROLOGIC RH(D): CPT

## 2024-11-18 NOTE — ED NOTES
Pt unable to provide a urine sample at the moment for a pregnancy tests. Pt states there is no change of pregnancy, no intercourse for the past 8 months. room air

## 2024-11-23 ENCOUNTER — LAB ENCOUNTER (OUTPATIENT)
Dept: LAB | Facility: HOSPITAL | Age: 29
End: 2024-11-23
Attending: PHYSICIAN ASSISTANT
Payer: MEDICAID

## 2024-11-23 DIAGNOSIS — O99.323: ICD-10-CM

## 2024-11-23 DIAGNOSIS — F19.10: ICD-10-CM

## 2024-11-23 DIAGNOSIS — O09.93 HIGH-RISK PREGNANCY IN THIRD TRIMESTER (HCC): Primary | ICD-10-CM

## 2024-11-23 LAB
BASOPHILS # BLD AUTO: 0.04 X10(3) UL (ref 0–0.2)
BASOPHILS NFR BLD AUTO: 0.4 %
DEPRECATED RDW RBC AUTO: 42.6 FL (ref 35.1–46.3)
EOSINOPHIL # BLD AUTO: 0.07 X10(3) UL (ref 0–0.7)
EOSINOPHIL NFR BLD AUTO: 0.6 %
ERYTHROCYTE [DISTWIDTH] IN BLOOD BY AUTOMATED COUNT: 13.8 % (ref 11–15)
GLUCOSE 1H P GLC SERPL-MCNC: 87 MG/DL
HBV SURFACE AB SER QL: REACTIVE
HBV SURFACE AB SERPL IA-ACNC: 46.79 MIU/ML
HCT VFR BLD AUTO: 35.8 %
HGB BLD-MCNC: 11.6 G/DL
IMM GRANULOCYTES # BLD AUTO: 0.09 X10(3) UL (ref 0–1)
IMM GRANULOCYTES NFR BLD: 0.8 %
LYMPHOCYTES # BLD AUTO: 2.02 X10(3) UL (ref 1–4)
LYMPHOCYTES NFR BLD AUTO: 18 %
MCH RBC QN AUTO: 27.4 PG (ref 26–34)
MCHC RBC AUTO-ENTMCNC: 32.4 G/DL (ref 31–37)
MCV RBC AUTO: 84.6 FL
MONOCYTES # BLD AUTO: 0.43 X10(3) UL (ref 0.1–1)
MONOCYTES NFR BLD AUTO: 3.8 %
NEUTROPHILS # BLD AUTO: 8.56 X10 (3) UL (ref 1.5–7.7)
NEUTROPHILS # BLD AUTO: 8.56 X10(3) UL (ref 1.5–7.7)
NEUTROPHILS NFR BLD AUTO: 76.4 %
PLATELET # BLD AUTO: 229 10(3)UL (ref 150–450)
RBC # BLD AUTO: 4.23 X10(6)UL
WBC # BLD AUTO: 11.2 X10(3) UL (ref 4–11)

## 2024-11-23 PROCEDURE — 87389 HIV-1 AG W/HIV-1&-2 AB AG IA: CPT

## 2024-11-23 PROCEDURE — 82950 GLUCOSE TEST: CPT

## 2024-11-23 PROCEDURE — 85025 COMPLETE CBC W/AUTO DIFF WBC: CPT

## 2024-11-23 PROCEDURE — 36415 COLL VENOUS BLD VENIPUNCTURE: CPT

## 2024-11-23 PROCEDURE — 86592 SYPHILIS TEST NON-TREP QUAL: CPT

## 2024-11-23 PROCEDURE — 86706 HEP B SURFACE ANTIBODY: CPT

## 2024-11-26 LAB — RPR SER QL: NONREACTIVE

## (undated) DEVICE — ENDOSCOPY PACK - LOWER: Brand: MEDLINE INDUSTRIES, INC.

## (undated) DEVICE — Device: Brand: DEFENDO AIR/WATER/SUCTION AND BIOPSY VALVE

## (undated) DEVICE — FORCEP RADIAL JAW 4

## (undated) DEVICE — ENDOSCOPY PACK UPPER: Brand: MEDLINE INDUSTRIES, INC.

## (undated) NOTE — Clinical Note
Hospital follow up call completed. A hospital follow up appointment was scheduled on 7/8/2024. The patient is scheduled for a thoracentesis on 7/3/2024. Thank you.

## (undated) NOTE — LETTER
Date & Time: 2/19/2024, 4:02 AM  Patient: Latonia Barker  Encounter Provider(s):    Grace Perez MD       To Whom It May Concern:    Latonia Barker was seen and treated in our department on 2/19/2024. She is medically cleared once she starts antibiotics and remains fever free. She is cleared to go to rehab.     If you have any questions or concerns, please do not hesitate to call.        _____________________________  Physician/APC Signature

## (undated) NOTE — LETTER
9/1/2020              [address] [place] [address],[address]         To Whom It May Concern,      Jeanne Barthel is currently pregnant and under my medical care. A foot draw is acceptable as a last resort for a blood draw.  If

## (undated) NOTE — LETTER
9/25/2019          To Whom It May Concern:    Dillon Luu is currently under my medical care and may not return to work at this time. Please excuse [place] for 3 days. Please excuse her for 9/24-26/19  She may return to work on 9/27.     If you requ

## (undated) NOTE — LETTER
[address], [place]  Authorization for Invasive Procedures  1.  I hereby authorize Dr Naya Vergara  , my physician and whomever may be designated as the doctor's assistant, to perform the following operation and/or procedure: Madhuri Northern performed for the purposes of advancing medicine, science, and/or education, provided my identity is not revealed. If the procedure has been videotaped, the physician/surgeon will obtain the original videotape.  The hospital will not be responsible for stor My signature below affirms that prior to the time of the procedure, I have explained to the patient and/or her legal representative, the risks and benefits involved in the proposed treatment and any reasonable alternative to the proposed treatment.  I have

## (undated) NOTE — LETTER
[facility] ULTRASOUND  [address]  [place] [address]  Authorization for Imaging Procedure  Date of Procedure:     I hereby authorize Dr. HERNANDEZ, my physician and his/her assistants (if applicable), which may include medical students, residents, and/or fellows, to perform the following procedure and administer such anesthesia as may be determined necessary by my physician: ULTRASOUND GUIDED LEFT THORACENTESIS on Latonia Barker.   2.  I recognize that during the procedure, unforeseen conditions may necessitate additional or different procedures than those listed above. I, therefore, further authorize and request that the above-named physician, assistants, or designees perform such procedures as are, in their judgment, necessary and desirable.    3.  My physician has discussed prior to my procedure the potential benefits, risks and side effects of this procedure; the likelihood of achieving goals; and potential problems that might occur during recuperation. They also discussed reasonable alternatives to the procedure, including risks, benefits, and side effects related to the alternatives and risks related to not receiving this procedure. I have had all my questions answered and I acknowledge that no guarantee has been made as to the result that may be obtained.    4.  Should the need arise during my procedure, which includes change of level of care prior to discharge, I also consent to the administration of blood and/or blood products. Further, I understand that despite careful testing and screening of blood or blood products by collecting agencies, I may still be subject to ill effects as a result of receiving a blood transfusion and/or blood products. The following are some, but not all, of the potential risks that can occur: fever and allergic reactions, hemolytic reactions, transmission of diseases such as Hepatitis, AIDS and Cytomegalovirus (CMV) and fluid overload. In the event that I wish to  have an autologous transfusion of my own blood, or a directed donor transfusion, I will discuss this with my physician.  Check only if Refusing Blood or Blood Products  I understand refusal of blood or blood products as deemed necessary by my physician may have serious consequences to my condition to include possible death. I hereby assume responsibility for my refusal and release the hospital, its personnel, and my physicians from any responsibility for the consequences of my refusal.   [  ] Patient Refuses Blood      5.  I authorize the use of any specimen, organs, tissues, body parts or foreign objects that may be removed from my body during the procedure for diagnosis, research or teaching purposes and their subsequent disposal by hospital authorities. I also authorize the release of specimen test results and/or written reports to my treating physician on the hospital medical staff or other referring or consulting physicians involved in my care, at the discretion of the Pathologist or my treating physician.    6.  I consent to the photographing or videotaping of the procedures to be performed, including appropriate portions of my body for medical, scientific, or educational purposes, provided my identity is not revealed by the pictures or by descriptive texts accompanying them. If the procedure has been photographed/videotaped, the physician will obtain the original picture, image, videotape or CD. The hospital will not be responsible for storage, release or maintenance of the picture, image, tape or CD.   7.  I consent to the presence of a  or observers in the operating room as deemed necessary by my physician or their designees.    8.  I recognize that in the event my procedure results in extended X-Ray/fluoroscopy time, I may develop a skin reaction.    9.  If I have a Do Not Attempt Resuscitation (DNAR) order in place, that status will be suspended while in the operating room, procedural  suite, and during the recovery period unless otherwise explicitly stated by me (or a person authorized to consent on my behalf). The performing physician or my attending physician will determine when the applicable recovery period ends for purposes of reinstating the DNAR order.  10.  I acknowledge that my physician has explained sedation/analgesia administration to me including the risk and benefits I consent to the administration of sedation/analgesia as may be necessary or desirable in the judgment of my physician.      I CERTIFY THAT I HAVE READ AND FULLY UNDERSTAND THE ABOVE CONSENT FOR THE PROCEDURE.     Signature of Patient: _____________________________________________________________  Responsible person in case of minor, unconscious: ____________________________________  Relationship to patient:  __________________________________________________________    Signature of Witness: _______________________________Date: _________Time: __________  Statement of Physician: My signature below affirms that prior to the time of the procedure, I have explained to the patient and/or her guardian, the risks and benefits involved in the proposed treatment and any reasonable alternative to the proposed treatment. I have also explained the risks and benefits involved in the refusal of the proposed treatment and have answered the patient's questions. If I have a significant financial interest in a co-management agreement or a significant financial interest in any product or implant, or other significant relationship used in the procedure/surgery, I have disclosed this and had a discussion with my patient.  Signature of Physician:   _________________________________Date:_____________Time:________  Patient Name: Latonia Barker : 1/3/1995  Printed: 2024   Medical Record #: [id number]

## (undated) NOTE — LETTER
[address], [place], [place]     AUTHORIZATION FOR SURGICAL OPERATION OR PROCEDURE    1.  I hereby authorize Dr. Ryan Falk, my Physician(s) and whomever may be designated as the doctor's Assistant, to perform the following opera 5. I consent to the photographing of procedure(s) to be performed for the purposes of advancing medicine, science and/or education, provided my identity is not revealed.  If the procedure has been videotaped, the physician/surgeon will obtain the original v (Witness signature)                                                                                                  (Date)                                (Time)  STATEMENT OF PHYSICIAN My signature below affirms that prior to the time of the procedure;  I

## (undated) NOTE — LETTER
Sukumar Crisostomo  1/3/1995    A patient of your clinic was recently seen by the hospitalists at [facility], and will need to [address], Wednesday OR Thursday. Voice mail left for patient.      The

## (undated) NOTE — ED AVS SNAPSHOT
Fabiana Parker   MRN: [id number]    Department:  [facility] Emergency Department   Date of Visit:  10/27/2017           Disclosure     Insurance plans vary and the physician(s) referred by the ER may not be covered by your plan.  Please contact CARE PHYSICIAN AT ONCE OR RETURN IMMEDIATELY TO THE EMERGENCY DEPARTMENT. If you have been prescribed any medication(s), please fill your prescription right away and begin taking the medication(s) as directed.   If you believe that any of the medications

## (undated) NOTE — MR AVS SNAPSHOT
[facility] SPECIALTY [facility] [place] [address] [place]  [address] [phone]               Thank you for choosing us for your health care visit with NONI Marroquin.   We are glad to serve you and happy to provide you with this summary Medical Issues Discussed Today     Chest pain, unspecified type    -  Primary      Instructions and Information about Your Health    1. Stress echo if normal then see cardiology as needed       Allergies as of May 02, 2017     Amoxicillin Hives Tips for making healthy food choices  -   Enjoy your food, but eat less. Fully enjoy your food when eating. Don’t eat while distracted and slow down. Avoid over sized portions. Don’t eat while when you’re bored.      EAT THESE FOODS MORE OFTEN: EAT T

## (undated) NOTE — ED AVS SNAPSHOT
Fortunato Morejon   MRN: [id number]    Department:  [facility] Emergency Department   Date of Visit:  3/19/2019           Disclosure     Insurance plans vary and the physician(s) referred by the ER may not be covered by your plan.  Please contact CARE PHYSICIAN AT ONCE OR RETURN IMMEDIATELY TO THE EMERGENCY DEPARTMENT. If you have been prescribed any medication(s), please fill your prescription right away and begin taking the medication(s) as directed.   If you believe that any of the medications

## (undated) NOTE — LETTER
4/24/2017          To Whom It May Concern:    Jeanne Barthel was hospitalized from 3/15/17- 4/24/17. She was discharged 4/24/17. If you require additional information please contact our office.         Sincerely,           Elmer Lowe MD        Docu

## (undated) NOTE — IP AVS SNAPSHOT
[address]  [address] [facility] [phone]                Discharge Summary   3/15/2017    Debra Sepulveda           Admission Information        Provider Department    3/15/2017 Jessica Wolf MD [facility] 5sw/ Commonly known as:  ATARAX   Next dose due:  4/24/17 evening          Take 1 tablet (25 mg total) by mouth 2 (two) times daily.     [address]                              nicotine 21 MG/24HR Pt24   Last time this was given:  1 patch on 4/24/2017 10:40 Before dinner and at bedtime             CONTINUE taking these medications        Instructions Authorizing Provider    Morning Afternoon Evening As Needed    METHADONE HCL OR   Last time this was given:  60 mg on 4/24/2017 10:36 AM   Next dose due:  4/25/1 Contact information:    [address] [place] [address]          Follow up with Dona Aguilera MD.    Specialty:  GASTROENTEROLOGY    Why:  as directed , As needed    Contact information:    [address] 2 (04/14/17)  1  (04/14/17)  6.9 (04/14/17)  3.4 (04/14/17)  0.7 (04/14/17)  0.2 (04/14/17)  0.1    (04/11/17)  57 (04/11/17)  34 (04/11/17)  4 (04/11/17)  3 (04/11/17)  2  (04/11/17)  6.9 (04/11/17)  4.1 (H) (04/11/17)  0.5 (04/11/17)  0.4 (04/11/17)  0.2 Call the [employer]k for assistance with your inactive [employer] account    If you have questions, you can call [phone] to talk to our [facility] Staff. Remember, [employer] is NOT to be used for urgent needs. For medical emergencies, dial 911.     Vi nausea/vomiting, somnolence   What to report to your healthcare team: Dizziness, Somnolence, Weakness, Headache, Nausea/vomiting           Mood and Thought Medications     haloperidol 1 MG Oral Tab    QUEtiapine Fumarate 200 MG Oral Tab    QUEtiapine Violet

## (undated) NOTE — Clinical Note
TUSHAR, HFU call made, see [facility] notes. [facility] Scheduled PCP office HFU Non-TCM appointment with patient on 7/2/2024 at 10:00 am with NONI Byrnes

## (undated) NOTE — LETTER
Radha Herron  1/3/1995    A patient of your clinic was recently seen by the hospitalists at [facility], and will be following up with you today at 1pm.    The patient's last INR values were, as follows:    Lab Results  Component Value Da

## (undated) NOTE — LETTER
9/29/2020              [address]         Dear Sherrie,      It was a pleasure to see you at our [address] office.  Your Pap Smear was normal. Y

## (undated) NOTE — LETTER
[facility] 2W/SW  [address] [facility] [address]  [phone]    Blood Transfusion Consent    In the course of your treatment, it may become necessary to administer a transfusion of blood or blood components. This form provides basic information concerning this procedure and, if signed by you, authorizes its administration. By signing this form, you agree that all of your questions about the administration of blood or blood products have been answered by the ordering medical professional or designee.    Description of Procedure  Blood is introduced into one of your veins, commonly in the arm, using a sterilized disposable needle. The amount of blood transfused, and whether the transfusion will be of blood or blood components is a judgement the physician will make based on your particular needs.    Risks  The transfusion is a common procedure of low risk.  MINOR AND TEMPORARY REACTIONS ARE NOT UNCOMMON, including a slight bruise, swelling or local reaction in the area where the needle pierces your skin, or a nonserious reaction to the transfused material itself, including headache, fever or mild skin reaction, such as rash.  Serious reactions are possible, though very unlikely, and include severe allergic reaction (shock) and destruction (hemolysis) of transfused blood cells.  Infectious diseases which are known to be transmitted by blood transfusion include certain types of viral Hepatitis(liver infection from a virus), Human Immunodeficiency Virus (HIV-1,2) infection, a viral infection known to cause Acquired Immunodeficiency Syndrome (AIDS), as well as certain other bacterial, viral, and parasitic diseases. While a minimal risk of acquiring an infectious disease from transfused blood exists, in accordance with the Federal and State law, all due care has been taken in donor selection and testing to avoid transmission of disease.    Alternatives  If loss of blood poses serious threats during your  treatment, THERE IS NO EFFECTIVE ALTERNATIVE TO BLOOD TRANSFUSION. However, if you have any further questions on this matter, your provider will fully explain the alternatives to you if it has not already been done.    I, ______________________________, have read/had read to me the above. I understand the matters bearing on the decision whether or not to authorize a transfusion of blood or blood components. I have no questions which have not been answered to my full satisfaction. I hereby consent to such transfusion as my physician may deem necessary or advisable in the course of my treatment.    ______________________________________________                    ___________________________  (Signature of Patient or Responsible party in case of minor,                 (Printed Name of Patient or incompetent, or unconscious patient)              Responsible Party)    ___________________________               _____________________  (Relationship to Patient if not self)                                    (Date and Time)    __________________________                                                           ______________________              (Signature of Witness)               (Printed Name of Witness)     Language line ()    Telephone/Verbal/Video Consent    __________________________                     ____________________  (Signature of 2nd Witness           (Printed Name of 2nd  Telephone/Verbal/Video Consent)           Witness)    Patient Name: Latonia Barker     : 1/3/1995                 Printed: Annetta 10, 2024     Medical Record #: [id number]      Rev: 2023